# Patient Record
Sex: FEMALE | Race: WHITE | NOT HISPANIC OR LATINO | Employment: OTHER
[De-identification: names, ages, dates, MRNs, and addresses within clinical notes are randomized per-mention and may not be internally consistent; named-entity substitution may affect disease eponyms.]

---

## 2017-01-13 ENCOUNTER — SURGERY (OUTPATIENT)
Age: 77
End: 2017-01-13

## 2017-01-13 ENCOUNTER — HOSPITAL ENCOUNTER (EMERGENCY)
Facility: HOSPITAL | Age: 77
Discharge: OTHER INSTITUTION | End: 2017-01-13
Attending: EMERGENCY MEDICINE
Payer: MEDICARE

## 2017-01-13 ENCOUNTER — HOSPITAL ENCOUNTER (INPATIENT)
Facility: HOSPITAL | Age: 77
LOS: 1 days | Discharge: HOME | DRG: 247 | End: 2017-01-14
Attending: EMERGENCY MEDICINE | Admitting: HOSPITALIST
Payer: MEDICARE

## 2017-01-13 VITALS
DIASTOLIC BLOOD PRESSURE: 77 MMHG | BODY MASS INDEX: 38.39 KG/M2 | WEIGHT: 195.56 LBS | SYSTOLIC BLOOD PRESSURE: 175 MMHG | HEIGHT: 60 IN | OXYGEN SATURATION: 100 % | RESPIRATION RATE: 20 BRPM | HEART RATE: 68 BPM | TEMPERATURE: 99 F

## 2017-01-13 DIAGNOSIS — I21.4 NSTEMI (NON-ST ELEVATED MYOCARDIAL INFARCTION): Primary | ICD-10-CM

## 2017-01-13 DIAGNOSIS — J45.40 MODERATE PERSISTENT ASTHMA, UNCOMPLICATED: ICD-10-CM

## 2017-01-13 DIAGNOSIS — R07.9 CHEST PAIN: ICD-10-CM

## 2017-01-13 PROBLEM — I10 UNCONTROLLED HYPERTENSION: Status: ACTIVE | Noted: 2017-01-13

## 2017-01-13 PROBLEM — I25.110 CORONARY ARTERY DISEASE INVOLVING NATIVE CORONARY ARTERY OF NATIVE HEART WITH UNSTABLE ANGINA PECTORIS: Status: ACTIVE | Noted: 2017-01-13

## 2017-01-13 LAB
ALBUMIN SERPL BCP-MCNC: 3.3 G/DL
ALBUMIN SERPL BCP-MCNC: 3.7 G/DL
ALP SERPL-CCNC: 83 U/L
ALP SERPL-CCNC: 86 U/L
ALT SERPL W/O P-5'-P-CCNC: 10 U/L
ALT SERPL W/O P-5'-P-CCNC: 12 U/L
ANION GAP SERPL CALC-SCNC: 10 MMOL/L
ANION GAP SERPL CALC-SCNC: 6 MMOL/L
APTT BLDCRRT: 27.5 SEC
APTT BLDCRRT: 33.3 SEC
AST SERPL-CCNC: 21 U/L
AST SERPL-CCNC: 22 U/L
BASOPHILS # BLD AUTO: 0.03 K/UL
BASOPHILS # BLD AUTO: 0.1 K/UL
BASOPHILS NFR BLD: 0.4 %
BASOPHILS NFR BLD: 1 %
BILIRUB SERPL-MCNC: 0.4 MG/DL
BILIRUB SERPL-MCNC: 0.5 MG/DL
BNP SERPL-MCNC: 257 PG/ML
BUN SERPL-MCNC: 14 MG/DL
BUN SERPL-MCNC: 18 MG/DL
CALCIUM SERPL-MCNC: 8.9 MG/DL
CALCIUM SERPL-MCNC: 9.6 MG/DL
CHLORIDE SERPL-SCNC: 105 MMOL/L
CHLORIDE SERPL-SCNC: 109 MMOL/L
CO2 SERPL-SCNC: 24 MMOL/L
CO2 SERPL-SCNC: 25 MMOL/L
CREAT SERPL-MCNC: 0.7 MG/DL
CREAT SERPL-MCNC: 0.8 MG/DL
DIFFERENTIAL METHOD: ABNORMAL
DIFFERENTIAL METHOD: NORMAL
EOSINOPHIL # BLD AUTO: 0.1 K/UL
EOSINOPHIL # BLD AUTO: 0.1 K/UL
EOSINOPHIL NFR BLD: 1 %
EOSINOPHIL NFR BLD: 1.3 %
ERYTHROCYTE [DISTWIDTH] IN BLOOD BY AUTOMATED COUNT: 14.2 %
ERYTHROCYTE [DISTWIDTH] IN BLOOD BY AUTOMATED COUNT: 14.2 %
EST. GFR  (AFRICAN AMERICAN): >60 ML/MIN/1.73 M^2
EST. GFR  (AFRICAN AMERICAN): >60 ML/MIN/1.73 M^2
EST. GFR  (NON AFRICAN AMERICAN): >60 ML/MIN/1.73 M^2
EST. GFR  (NON AFRICAN AMERICAN): >60 ML/MIN/1.73 M^2
GIANT PLATELETS BLD QL SMEAR: PRESENT
GLUCOSE SERPL-MCNC: 82 MG/DL
GLUCOSE SERPL-MCNC: 89 MG/DL
HCT VFR BLD AUTO: 36.9 %
HCT VFR BLD AUTO: 38.9 %
HGB BLD-MCNC: 12.4 G/DL
HGB BLD-MCNC: 12.8 G/DL
INR PPP: 0.9
INR PPP: 1
LYMPHOCYTES # BLD AUTO: 1.6 K/UL
LYMPHOCYTES # BLD AUTO: 1.8 K/UL
LYMPHOCYTES NFR BLD: 22.6 %
LYMPHOCYTES NFR BLD: 26.9 %
MCH RBC QN AUTO: 29 PG
MCH RBC QN AUTO: 29.4 PG
MCHC RBC AUTO-ENTMCNC: 32.8 %
MCHC RBC AUTO-ENTMCNC: 33.6 %
MCV RBC AUTO: 87 FL
MCV RBC AUTO: 89 FL
MONOCYTES # BLD AUTO: 0.6 K/UL
MONOCYTES # BLD AUTO: 0.6 K/UL
MONOCYTES NFR BLD: 8.4 %
MONOCYTES NFR BLD: 8.5 %
NEUTROPHILS # BLD AUTO: 4.3 K/UL
NEUTROPHILS # BLD AUTO: 4.7 K/UL
NEUTROPHILS NFR BLD: 62.7 %
NEUTROPHILS NFR BLD: 66.6 %
PLATELET # BLD AUTO: 183 K/UL
PLATELET # BLD AUTO: 210 K/UL
PLATELET BLD QL SMEAR: NORMAL
PMV BLD AUTO: 11.3 FL
PMV BLD AUTO: 12.8 FL
POTASSIUM SERPL-SCNC: 3.8 MMOL/L
POTASSIUM SERPL-SCNC: 3.8 MMOL/L
PROT SERPL-MCNC: 6.2 G/DL
PROT SERPL-MCNC: 6.8 G/DL
PROTHROMBIN TIME: 10.3 SEC
PROTHROMBIN TIME: 9.8 SEC
RBC # BLD AUTO: 4.22 M/UL
RBC # BLD AUTO: 4.4 M/UL
SODIUM SERPL-SCNC: 139 MMOL/L
SODIUM SERPL-SCNC: 140 MMOL/L
TROPONIN I SERPL DL<=0.01 NG/ML-MCNC: 0.62 NG/ML
TROPONIN I SERPL DL<=0.01 NG/ML-MCNC: 0.71 NG/ML
WBC # BLD AUTO: 6.81 K/UL
WBC # BLD AUTO: 7.1 K/UL

## 2017-01-13 PROCEDURE — C1887 CATHETER, GUIDING: HCPCS

## 2017-01-13 PROCEDURE — 25000003 PHARM REV CODE 250: Performed by: HOSPITALIST

## 2017-01-13 PROCEDURE — 96360 HYDRATION IV INFUSION INIT: CPT

## 2017-01-13 PROCEDURE — 85610 PROTHROMBIN TIME: CPT

## 2017-01-13 PROCEDURE — 83880 ASSAY OF NATRIURETIC PEPTIDE: CPT

## 2017-01-13 PROCEDURE — 99223 1ST HOSP IP/OBS HIGH 75: CPT | Mod: ,,, | Performed by: HOSPITALIST

## 2017-01-13 PROCEDURE — 93010 ELECTROCARDIOGRAM REPORT: CPT | Mod: 59,,, | Performed by: INTERNAL MEDICINE

## 2017-01-13 PROCEDURE — 25000003 PHARM REV CODE 250: Performed by: EMERGENCY MEDICINE

## 2017-01-13 PROCEDURE — 99285 EMERGENCY DEPT VISIT HI MDM: CPT | Mod: 25,27

## 2017-01-13 PROCEDURE — 84484 ASSAY OF TROPONIN QUANT: CPT

## 2017-01-13 PROCEDURE — 96374 THER/PROPH/DIAG INJ IV PUSH: CPT

## 2017-01-13 PROCEDURE — 36415 COLL VENOUS BLD VENIPUNCTURE: CPT

## 2017-01-13 PROCEDURE — 93005 ELECTROCARDIOGRAM TRACING: CPT

## 2017-01-13 PROCEDURE — 93454 CORONARY ARTERY ANGIO S&I: CPT | Mod: 26,59,, | Performed by: INTERNAL MEDICINE

## 2017-01-13 PROCEDURE — 99285 EMERGENCY DEPT VISIT HI MDM: CPT | Mod: 25

## 2017-01-13 PROCEDURE — 85025 COMPLETE CBC W/AUTO DIFF WBC: CPT | Mod: 91

## 2017-01-13 PROCEDURE — 25000003 PHARM REV CODE 250: Performed by: INTERNAL MEDICINE

## 2017-01-13 PROCEDURE — 27200074 CATH LAB PROCEDURE

## 2017-01-13 PROCEDURE — 80053 COMPREHEN METABOLIC PANEL: CPT

## 2017-01-13 PROCEDURE — 25000003 PHARM REV CODE 250

## 2017-01-13 PROCEDURE — 85730 THROMBOPLASTIN TIME PARTIAL: CPT | Mod: 91

## 2017-01-13 PROCEDURE — 84484 ASSAY OF TROPONIN QUANT: CPT | Mod: 91

## 2017-01-13 PROCEDURE — 20600001 HC STEP DOWN PRIVATE ROOM

## 2017-01-13 PROCEDURE — 85025 COMPLETE CBC W/AUTO DIFF WBC: CPT

## 2017-01-13 PROCEDURE — 96375 TX/PRO/DX INJ NEW DRUG ADDON: CPT

## 2017-01-13 PROCEDURE — 80053 COMPREHEN METABOLIC PANEL: CPT | Mod: 91

## 2017-01-13 PROCEDURE — 92928 PRQ TCAT PLMT NTRAC ST 1 LES: CPT | Mod: RC,,, | Performed by: INTERNAL MEDICINE

## 2017-01-13 PROCEDURE — 85730 THROMBOPLASTIN TIME PARTIAL: CPT

## 2017-01-13 PROCEDURE — 85610 PROTHROMBIN TIME: CPT | Mod: 91

## 2017-01-13 PROCEDURE — 63600175 PHARM REV CODE 636 W HCPCS

## 2017-01-13 PROCEDURE — 99285 EMERGENCY DEPT VISIT HI MDM: CPT | Mod: ,,, | Performed by: EMERGENCY MEDICINE

## 2017-01-13 PROCEDURE — 027034Z DILATION OF CORONARY ARTERY, ONE ARTERY WITH DRUG-ELUTING INTRALUMINAL DEVICE, PERCUTANEOUS APPROACH: ICD-10-PCS | Performed by: INTERNAL MEDICINE

## 2017-01-13 PROCEDURE — 99284 EMERGENCY DEPT VISIT MOD MDM: CPT | Mod: ,,, | Performed by: INTERNAL MEDICINE

## 2017-01-13 PROCEDURE — B2111ZZ FLUOROSCOPY OF MULTIPLE CORONARY ARTERIES USING LOW OSMOLAR CONTRAST: ICD-10-PCS | Performed by: INTERNAL MEDICINE

## 2017-01-13 PROCEDURE — 63600175 PHARM REV CODE 636 W HCPCS: Performed by: HOSPITALIST

## 2017-01-13 RX ORDER — GLUCOSAMINE/CHONDRO SU A 500-400 MG
1 TABLET ORAL 3 TIMES DAILY
COMMUNITY
End: 2022-03-11

## 2017-01-13 RX ORDER — DIPHENHYDRAMINE HCL 50 MG
50 CAPSULE ORAL ONCE
Status: DISCONTINUED | OUTPATIENT
Start: 2017-01-13 | End: 2017-01-14 | Stop reason: HOSPADM

## 2017-01-13 RX ORDER — FERROUS SULFATE, DRIED 160(50) MG
1 TABLET, EXTENDED RELEASE ORAL DAILY
Status: DISCONTINUED | OUTPATIENT
Start: 2017-01-14 | End: 2017-01-14 | Stop reason: HOSPADM

## 2017-01-13 RX ORDER — LABETALOL HYDROCHLORIDE 5 MG/ML
10 INJECTION, SOLUTION INTRAVENOUS
Status: DISCONTINUED | OUTPATIENT
Start: 2017-01-13 | End: 2017-01-13

## 2017-01-13 RX ORDER — MONTELUKAST SODIUM 10 MG/1
10 TABLET ORAL NIGHTLY
COMMUNITY
End: 2018-01-10 | Stop reason: SDUPTHER

## 2017-01-13 RX ORDER — ATORVASTATIN CALCIUM 20 MG/1
40 TABLET, FILM COATED ORAL DAILY
Status: DISCONTINUED | OUTPATIENT
Start: 2017-01-14 | End: 2017-01-14 | Stop reason: HOSPADM

## 2017-01-13 RX ORDER — CLOPIDOGREL 300 MG/1
600 TABLET, FILM COATED ORAL
Status: COMPLETED | OUTPATIENT
Start: 2017-01-13 | End: 2017-01-13

## 2017-01-13 RX ORDER — HYDRALAZINE HYDROCHLORIDE 25 MG/1
25 TABLET, FILM COATED ORAL EVERY 6 HOURS
Status: DISCONTINUED | OUTPATIENT
Start: 2017-01-14 | End: 2017-01-13

## 2017-01-13 RX ORDER — HEPARIN SODIUM 5000 [USP'U]/ML
5000 INJECTION, SOLUTION INTRAVENOUS; SUBCUTANEOUS EVERY 8 HOURS
Status: DISCONTINUED | OUTPATIENT
Start: 2017-01-14 | End: 2017-01-14 | Stop reason: HOSPADM

## 2017-01-13 RX ORDER — LABETALOL HYDROCHLORIDE 5 MG/ML
5 INJECTION, SOLUTION INTRAVENOUS
Status: COMPLETED | OUTPATIENT
Start: 2017-01-13 | End: 2017-01-13

## 2017-01-13 RX ORDER — ASPIRIN 81 MG/1
81 TABLET ORAL DAILY
Status: DISCONTINUED | OUTPATIENT
Start: 2017-01-14 | End: 2017-01-14 | Stop reason: HOSPADM

## 2017-01-13 RX ORDER — MONTELUKAST SODIUM 10 MG/1
10 TABLET ORAL NIGHTLY
Status: DISCONTINUED | OUTPATIENT
Start: 2017-01-13 | End: 2017-01-14 | Stop reason: HOSPADM

## 2017-01-13 RX ORDER — NITROGLYCERIN 0.4 MG/1
0.4 TABLET SUBLINGUAL
Status: DISCONTINUED | OUTPATIENT
Start: 2017-01-13 | End: 2017-01-13

## 2017-01-13 RX ORDER — PANTOPRAZOLE SODIUM 40 MG/1
40 TABLET, DELAYED RELEASE ORAL DAILY
Status: DISCONTINUED | OUTPATIENT
Start: 2017-01-14 | End: 2017-01-14 | Stop reason: HOSPADM

## 2017-01-13 RX ORDER — LABETALOL HYDROCHLORIDE 5 MG/ML
10 INJECTION, SOLUTION INTRAVENOUS EVERY 4 HOURS PRN
Status: DISCONTINUED | OUTPATIENT
Start: 2017-01-13 | End: 2017-01-14 | Stop reason: HOSPADM

## 2017-01-13 RX ORDER — ALBUTEROL SULFATE 2.5 MG/.5ML
5 SOLUTION RESPIRATORY (INHALATION) EVERY 12 HOURS
Status: DISCONTINUED | OUTPATIENT
Start: 2017-01-13 | End: 2017-01-14 | Stop reason: HOSPADM

## 2017-01-13 RX ORDER — CLOPIDOGREL BISULFATE 75 MG/1
75 TABLET ORAL DAILY
Status: DISCONTINUED | OUTPATIENT
Start: 2017-01-14 | End: 2017-01-14 | Stop reason: HOSPADM

## 2017-01-13 RX ORDER — HYDRALAZINE HYDROCHLORIDE 25 MG/1
25 TABLET, FILM COATED ORAL EVERY 6 HOURS
Status: DISCONTINUED | OUTPATIENT
Start: 2017-01-13 | End: 2017-01-14 | Stop reason: HOSPADM

## 2017-01-13 RX ORDER — NITROGLYCERIN 0.4 MG/1
0.4 TABLET SUBLINGUAL
Status: COMPLETED | OUTPATIENT
Start: 2017-01-13 | End: 2017-01-13

## 2017-01-13 RX ORDER — LISINOPRIL 10 MG/1
10 TABLET ORAL DAILY
Status: DISCONTINUED | OUTPATIENT
Start: 2017-01-14 | End: 2017-01-14 | Stop reason: HOSPADM

## 2017-01-13 RX ORDER — SODIUM CHLORIDE 9 MG/ML
3 INJECTION, SOLUTION INTRAVENOUS CONTINUOUS
Status: ACTIVE | OUTPATIENT
Start: 2017-01-13 | End: 2017-01-13

## 2017-01-13 RX ORDER — NAPROXEN SODIUM 220 MG/1
162 TABLET, FILM COATED ORAL
Status: COMPLETED | OUTPATIENT
Start: 2017-01-13 | End: 2017-01-13

## 2017-01-13 RX ORDER — SODIUM CHLORIDE 9 MG/ML
1.5 INJECTION, SOLUTION INTRAVENOUS CONTINUOUS
Status: DISCONTINUED | OUTPATIENT
Start: 2017-01-13 | End: 2017-01-14

## 2017-01-13 RX ORDER — MORPHINE SULFATE 2 MG/ML
1 INJECTION, SOLUTION INTRAMUSCULAR; INTRAVENOUS EVERY 4 HOURS PRN
Status: DISCONTINUED | OUTPATIENT
Start: 2017-01-13 | End: 2017-01-14 | Stop reason: HOSPADM

## 2017-01-13 RX ORDER — CARVEDILOL 3.12 MG/1
3.12 TABLET ORAL 2 TIMES DAILY
Status: DISCONTINUED | OUTPATIENT
Start: 2017-01-13 | End: 2017-01-14 | Stop reason: HOSPADM

## 2017-01-13 RX ORDER — NAPROXEN SODIUM 220 MG/1
243 TABLET, FILM COATED ORAL
Status: DISCONTINUED | OUTPATIENT
Start: 2017-01-13 | End: 2017-01-13

## 2017-01-13 RX ORDER — DICLOFENAC SODIUM AND MISOPROSTOL 50; 200 MG/1; UG/1
1 TABLET, DELAYED RELEASE ORAL 3 TIMES DAILY
COMMUNITY
End: 2017-03-15

## 2017-01-13 RX ORDER — NITROGLYCERIN 0.4 MG/1
0.4 TABLET SUBLINGUAL EVERY 5 MIN PRN
Status: DISCONTINUED | OUTPATIENT
Start: 2017-01-13 | End: 2017-01-14 | Stop reason: HOSPADM

## 2017-01-13 RX ORDER — HEPARIN SODIUM,PORCINE/D5W 25000/250
12 INTRAVENOUS SOLUTION INTRAVENOUS CONTINUOUS
Status: DISCONTINUED | OUTPATIENT
Start: 2017-01-13 | End: 2017-01-13 | Stop reason: HOSPADM

## 2017-01-13 RX ORDER — LEVOTHYROXINE SODIUM 100 UG/1
100 TABLET ORAL
Status: DISCONTINUED | OUTPATIENT
Start: 2017-01-14 | End: 2017-01-14 | Stop reason: HOSPADM

## 2017-01-13 RX ADMIN — SODIUM CHLORIDE 1.5 ML/KG/HR: 0.9 INJECTION, SOLUTION INTRAVENOUS at 08:01

## 2017-01-13 RX ADMIN — LABETALOL HYDROCHLORIDE 5 MG: 5 INJECTION, SOLUTION INTRAVENOUS at 10:01

## 2017-01-13 RX ADMIN — HEPARIN SODIUM AND DEXTROSE 12 UNITS/KG/HR: 10000; 5 INJECTION INTRAVENOUS at 11:01

## 2017-01-13 RX ADMIN — CARVEDILOL 3.12 MG: 3.12 TABLET, FILM COATED ORAL at 08:01

## 2017-01-13 RX ADMIN — HYDRALAZINE HYDROCHLORIDE 25 MG: 25 TABLET, FILM COATED ORAL at 11:01

## 2017-01-13 RX ADMIN — NITROGLYCERIN 0.5 INCH: 20 OINTMENT TOPICAL at 10:01

## 2017-01-13 RX ADMIN — MORPHINE SULFATE 1 MG: 2 INJECTION, SOLUTION INTRAMUSCULAR; INTRAVENOUS at 09:01

## 2017-01-13 RX ADMIN — NITROGLYCERIN 0.4 MG: 0.4 TABLET SUBLINGUAL at 04:01

## 2017-01-13 RX ADMIN — CLOPIDOGREL BISULFATE 600 MG: 300 TABLET, FILM COATED ORAL at 03:01

## 2017-01-13 RX ADMIN — ASPIRIN 81 MG CHEWABLE TABLET 162 MG: 81 TABLET CHEWABLE at 08:01

## 2017-01-13 RX ADMIN — HYDRALAZINE HYDROCHLORIDE 25 MG: 25 TABLET, FILM COATED ORAL at 08:01

## 2017-01-13 RX ADMIN — MONTELUKAST SODIUM 10 MG: 10 TABLET ORAL at 09:01

## 2017-01-13 RX ADMIN — SODIUM CHLORIDE 3 ML/KG/HR: 0.9 INJECTION, SOLUTION INTRAVENOUS at 05:01

## 2017-01-13 NOTE — ED NOTES
Pt here for eval of left side chest pain that radiates to jaw  and shoulder that has been intermittent x3 days. Denies SOB. Denies n/v/. c/o mild dizziness upon changing positions. resp even and unlabored. Breath sounds clear bilat. abd soft non tender and non distended. No edema noted. Pt aaox3. Placed on cardiac monitor, nibp and continuous pulse ox. Pt placed on 2 L o2 nc. Denies discomfort at this time. Appears comfortable. Family at bedside.

## 2017-01-13 NOTE — IP AVS SNAPSHOT
[facility] [address]  [address]  [facility] [address]  Phone: [phone]           Patient Discharge Instructions     Our goal is to set you up for success. This packet includes information on your condition, medications, and your home care. It will help you to care for yourself so you don't get sicker and need to go back to the hospital.     Please ask your nurse if you have any questions.        There are many details to remember when preparing to leave the hospital. Here is what you will need to do:    1. Take your medicine. If you are prescribed medications, review your Medication List in the following pages. You may have new medications to  at the pharmacy and others that you'll need to stop taking. Review the instructions for how and when to take your medications. Talk with your doctor or nurses if you are unsure of what to do.     2. Go to your follow-up appointments. Specific follow-up information is listed in the following pages. Your may be contacted by a transition nurse or clinical provider about future appointments. Be sure we have all of the phone numbers to reach you, if needed. Please contact your provider's office if you are unable to make an appointment.     3. Watch for warning signs. Your doctor or nurse will give you detailed warning signs to watch for and when to call for assistance. These instructions may also include educational information about your condition. If you experience any of warning signs to your health, call your doctor.               [facility]  Unless otherwise directed by your provider, please contact [facility], our nurse care line that is available for 24/7 assistance.     [phone] (toll-free)    Registered nurses in the [facility] provide clinical advisement, health education, appointment booking, and other advisory services.                    ** Verify the list of medication(s) below is accurate and up  to date. Carry this with you in case of emergency. If your medications have changed, please notify your healthcare provider.             Medication List      START taking these medications        Additional Info    Begin Date AM Noon PM Bedtime    atorvastatin 40 MG tablet   Commonly known as:  LIPITOR   Quantity:  30 tablet   Refills:  2   Dose:  40 mg    Last time this was given:  40 mg on 1/14/2017  8:17 AM   Instructions:  Take 1 tablet (40 mg total) by mouth once daily.                               carvedilol 3.125 MG tablet   Commonly known as:  COREG   Quantity:  60 tablet   Refills:  2   Dose:  3.125 mg    Last time this was given:  3.125 mg on 1/14/2017  8:17 AM   Instructions:  Take 1 tablet (3.125 mg total) by mouth 2 (two) times daily.                                  clopidogrel 75 mg tablet   Commonly known as:  PLAVIX   Quantity:  30 tablet   Refills:  11   Dose:  75 mg    Last time this was given:  75 mg on 1/14/2017  8:17 AM   Instructions:  Take 1 tablet (75 mg total) by mouth once daily.                               lisinopril 10 MG tablet   Quantity:  30 tablet   Refills:  2   Dose:  10 mg    Last time this was given:  10 mg on 1/14/2017  8:17 AM   Instructions:  Take 1 tablet (10 mg total) by mouth once daily.                               nitroGLYCERIN 0.4 MG SL tablet   Commonly known as:  NITROSTAT   Quantity:  30 tablet   Refills:  0   Dose:  0.4 mg    Last time this was given:  0.4 mg on 1/13/2017  4:15 PM   Instructions:  Place 1 tablet (0.4 mg total) under the tongue every 5 (five) minutes as needed for Chest pain.     Take as needed for Chest Pain                         CONTINUE taking these medications        Additional Info    Begin Date AM Noon PM Bedtime    albuterol 5 mg/mL nebulizer solution   Commonly known as:  PROVENTIL   Refills:  0    Last time this was given:  5 mg on 1/14/2017  8:35 AM   Instructions:  Inhale into the lungs every 6 (six) hours as needed for Wheezing.      As needed for Wheezing                       aspirin 81 MG EC tablet   Commonly known as:  ECOTRIN   Refills:  0   Dose:  81 mg    Last time this was given:  81 mg on 1/14/2017  8:17 AM   Instructions:  Take 81 mg by mouth once daily.                               CALCIUM 500 + D ORAL   Refills:  0   Dose:  2 tablet    Last time this was given:  1 tablet on 1/14/2017  8:17 AM   Instructions:  Take 2 tablets by mouth once daily.                               diazePAM 10 MG Tab   Commonly known as:  VALIUM   Refills:  0   Dose:  10 mg    Instructions:  Take 10 mg by mouth daily as needed.     As needed for Anxiety/Stress                       diclofenac-misoprostol  mg-mcg  mg-mcg per tablet   Commonly known as:  ARTHROTEC 50   Refills:  0   Dose:  1 tablet    Instructions:  Take 1 tablet by mouth 3 (three) times daily.                                     glucosamine-chondroitin 500-400 mg tablet   Refills:  0   Dose:  1 tablet    Instructions:  Take 1 tablet by mouth 3 (three) times daily.                                     hydrOXYzine HCl 10 MG Tab   Commonly known as:  ATARAX   Refills:  0   Dose:  10 mg    Instructions:  Take 10 mg by mouth 3 (three) times daily as needed.                                     levothyroxine 100 MCG tablet   Commonly known as:  SYNTHROID   Refills:  0   Dose:  100 mcg    Last time this was given:  100 mcg on 1/14/2017  5:52 AM   Instructions:  Take 100 mcg by mouth before breakfast.            Take 2-3 hours before eating                   montelukast 10 mg tablet   Commonly known as:  SINGULAIR   Refills:  0   Dose:  10 mg    Last time this was given:  10 mg on 1/13/2017  9:00 PM   Instructions:  Take 10 mg by mouth every evening.                               omeprazole 40 MG capsule   Commonly known as:  PRILOSEC   Refills:  0   Dose:  40 mg    Instructions:  Take 40 mg by mouth once daily.                                    Where to Get Your Medications      These  "medications were sent to [id number] [place] [place] - [address]  [address] [facility] [address], [place] [address]     Phone:  [phone]     atorvastatin 40 MG tablet    carvedilol 3.125 MG tablet    clopidogrel 75 mg tablet    lisinopril 10 MG tablet    nitroGLYCERIN 0.4 MG SL tablet                  Please bring to all follow up appointments:    1. A copy of your discharge instructions.  2. All medicines you are currently taking in their original bottles.  3. Identification and insurance card.    Please arrive 15 minutes ahead of scheduled appointment time.    Please call 24 hours in advance if you must reschedule your appointment and/or time.        Your Scheduled Appointments     Feb 14, 2017 10:20 AM CST   Established Patient Visit with Yogesh Knight MD   [facility] ([facility])    [address] [place] [address], [address]  [facility] [address] [phone]              Follow-up Information     Follow up with Vinny Hernandes Iv, MD In 1 week.    Specialty:  Family Medicine    Contact information:    [address]   [address] [address]  [place] [address]  [phone]        Referrals     Future Orders    Ambulatory referral to Cardiology           Primary Diagnosis     Your primary diagnosis was:  Heart Attack      Admission Information     Date & Time Provider Department CSN    1/13/2017 12:35 PM Yogesh Bowen MD [facility] [id number]      Care Providers     Provider Role Specialty Primary office phone    Yogesh Bowen MD Attending Provider Hospitalist [phone]    Yogesh Bowen MD Team Attending  Hospitalist [phone]    Lisa Cazares MD Team Attending  Hospitalist [phone]      Your Vitals Were     BP Pulse Temp Resp Height Weight    137/80 (BP Location: Left arm, Patient Position: Lying, BP Method: Automatic) 72 98.2 °F (36.8 °C) (Oral) 16 5' 3" (1.6 m) 88.9 kg (196 lb)    SpO2 BMI             98% 34.72 kg/m2         Recent Lab Values        1/14/2017             "               5:29 AM           A1C 5.3           Comment for A1C at  5:29 AM on 1/14/2017:  According to ADA guidelines, hemoglobin A1C <7.0% represents  optimal control in non-pregnant diabetic patients.  Different  metrics may apply to specific populations.   Standards of Medical Care in Diabetes - 2016.  For the purpose of screening for the presence of diabetes:  <5.7%     Consistent with the absence of diabetes  5.7-6.4%  Consistent with increasing risk for diabetes   (prediabetes)  >or=6.5%  Consistent with diabetes  Currently no consensus exists for use of hemoglobin A1C  for diagnosis of diabetes for children.        Allergies as of 1/14/2017        Reactions    Codeine Nausea Only    Mobic [Meloxicam]     Sulfur       Advance Directives     An advance directive is a document which, in the event you are no longer able to make decisions for yourself, tells your healthcare team what kind of treatment you do or do not want to receive, or who you would like to make those decisions for you.  If you do not currently have an advance directive, [facility] encourages you to create one.  For more information call:  [phone] ([phone]), [phone] ([phone]),  or log on to [email or web address].        Smoking Cessation     If you would like to quit smoking:   You may be eligible for free services if you are a [place] resident and started smoking cigarettes before September 1, 1988.  Call the Smoking Cessation Trust (SCT) toll free at (674) 934-3418 or (815) 779-2676.   Call 1-800-QUIT-NOW if you do not meet the above criteria.            Language Assistance Services     ATTENTION: Language assistance services are available, free of charge. Please call [phone].      ATENCIÓN: Si habla español, tiene a prater disposición servicios gratuitos de asistencia lingüística. Llame al [phone].     CHÚ Ý: N?u b?n nói Ti?ng Vi?t, có các d?ch v? h? tr? ngôn ng? mi?n phí dành cho b?n. G?i s?  [phone].        [facility] Sign-Up     Activating your [facility] account is as easy as 1-2-3!     1) Visit [email or web address], select Sign Up Now, enter this activation code and your date of birth, then select Next.  [email or web address]  Expires: 2/28/2017 12:38 PM      2) Create a username and password to use when you visit [facility] in the future and select a security question in case you lose your password and select Next.    3) Enter your e-mail address and click Sign Up!    Additional Information  If you have questions, please e-mail [email or web address] or call [phone] to talk to our [facility] staff. Remember, [facility] is NOT to be used for urgent needs. For medical emergencies, dial 911.          [facility] complies with applicable Federal civil rights laws and does not discriminate on the basis of race, color, national origin, age, disability, or sex.

## 2017-01-13 NOTE — ED TRIAGE NOTES
Pt transfer from [facility] for CP.  Pt currently not experiencing CP or have any other symptoms.

## 2017-01-13 NOTE — PROGRESS NOTES
"Interventional Cardiology    Findings:  1. PAD  2. Mid LAD with 50% stenosis, YONG 3 flow  3. RCA 90% proximal stenosis (Right dominant system)    Interventions:  1. PCI with ARSLAN to mid RCA    Closure: Manual pressure    S: Pt resting comfortably. Denies chest pain, palpitations, lightheadedness, dizziness, dyspnea, numbness or weakness. Denies pain, bleeding or discharge from the cath site.    O:  Visit Vitals    BP (!) 172/81    Pulse 82    Temp 98.4 °F (36.9 °C) (Oral)    Resp (!) 21    Ht 5' 3" (1.6 m)    Wt 88.9 kg (196 lb)    SpO2 98%    BMI 34.72 kg/m2     Gen: NAD, resting comfortably  Extr: Right CFA cath site: no bleeding or discharge, no hematoma or mass  Pulse: 2+  Neuro: A+Ox3, 5/5 strength, grossly normal sensation    A/P: 76 y.o.female s/p RCA PCI.  1. Stable as above, cont to monitor  2. Flat in bed for 6 hours  3. Loaded with plavix. Cantinue dual antiplatelet therapy for 1 year (ASA/Plavix)  4. High intensity statin therapy  5. 2D echo to assess LV function this admission  6. Blood pressure control with BB and ACEI or ARB  7. Cardiac rehab consult  8. Follow up with Dr. Eric Tillman at [place]  9. Please refer to full report on EPIC      Pa Jimenes MD  Interventional Cardiology  Structural/Valvular heart disease  Fellow PGY-8  [phone]      "

## 2017-01-13 NOTE — H&P
"HISTORY AND PHYSICAL  Interventional Cardiology     CC: Chest Pain    HPI:  Heather Hughes is a 76 y.o. female with PMH of HTN, hypothyroidism, GERD, obesity, who presents with chest pain.  Chest pain started on Sunday while shopping at [employer].  Pain localized to left chest area, along with jaw pain radiating down her left arm.  Symptoms continued throughout the week and became significantly worse last night up to 10/10 in intensity.  Blood pressure noted to be 224/102 on presentation to ED.  EKG shows normal sinus rhythm with  inferior T wave inversions.  Troponin elevated at 0.62.  Presentation consistent with NSTEMI.  Currently chest pain free and hemodynamically stable.  Of note, pt reports history of "bleeding ulcers" in 1999, with no episodes since that time.  Takes ASA 81 mg daily with no bleeding issues.       Review of Systems:  Constitution: Denies chills, fever, and sweats.  HENT: Denies headaches or blurry vision.  Cardiovascular: Positive for chest and jaw pain.  Respiratory: Denies cough or shortness of breath.  Gastrointestinal: Denies abdominal pain, nausea, or vomiting.  Musculoskeletal: Denies muscle cramps.  Neurological: Denies dizziness or focal weakness.  Psychiatric/Behavioral: Normal mental status.  Hematologic/Lymphatic: Denies bleeding problem or easy bruising/bleeding.  Skin: Denies rash or suspicious lesions    History reviewed. No pertinent past medical history.  There are no active problems to display for this patient.    Social History   Substance Use Topics    Smoking status: Current Every Day Smoker    Smokeless tobacco: Never Used    Alcohol use No     History reviewed. No pertinent family history.  No current facility-administered medications on file prior to encounter.      Current Outpatient Prescriptions on File Prior to Encounter   Medication Sig Dispense Refill    albuterol (PROVENTIL) 5 mg/mL nebulizer solution Inhale into the lungs every 6 (six) hours as needed " for Wheezing.      aspirin (ECOTRIN) 81 MG EC tablet Take 81 mg by mouth once daily.      diazePAM (VALIUM) 10 MG Tab Take 10 mg by mouth daily as needed.      hydrOXYzine HCl (ATARAX) 10 MG Tab Take 10 mg by mouth 3 (three) times daily as needed.      levothyroxine (SYNTHROID) 100 MCG tablet Take 100 mcg by mouth before breakfast.      omeprazole (PRILOSEC) 40 MG capsule Take 40 mg by mouth once daily.      [DISCONTINUED] doxycycline (VIBRAMYCIN) 100 MG Cap Take 100 mg by mouth every 12 (twelve) hours.       Review of patient's allergies indicates:   Allergen Reactions    Codeine Nausea Only    Mobic [meloxicam]     Sulfur         Vital Signs (Most Recent)  Temp: 98.6 °F (37 °C) (01/13/17 0624)  Pulse: 68 (01/13/17 1100)  Resp: 20 (01/13/17 0624)  BP: (!) 175/77 (01/13/17 1048)  SpO2: 100 % (01/13/17 1100)    Vital Signs Range (Last 24H):  Temp:  [98.6 °F (37 °C)]   Pulse:  [65-83]   Resp:  [20]   BP: (158-224)/()   SpO2:  [96 %-100 %]     Physical Exam:  Constitutional: Frail appearing elderly lade, no acute distress, conversant  HEENT: Sclera anicteric, Pupils equal, round and reactive to light, extraocular motions intact, Oropharynx clear  Neck: No JVD, no carotid bruits  Cardiovascular: Regular rate and rhythm, no murmur, rubs or gallops, normal S1/S2  Pulmonary: Clear to auscultation bilaterally  Abdominal: Abdomen soft, nontender, nondistended, positive bowel sounds  Extremities: No lower extremity edema,   Pulses:  Carotid pulses are 2+ on the right side, and 2+ on the left side.  Radial pulses are 2+ on the right side, and 2+ on the left side.   Femoral pulses are 2+ on the right side, and 2+ on the left side.  Skin: No ecchymosis, erythema, or ulcers  Psych: Alert and oriented x 3, appropriate affect  Neuro: CNII-XII intact, no focal deficits        Laboratory:  Chemistry:  Lab Results   Component Value Date     01/13/2017    K 3.8 01/13/2017     01/13/2017    CO2 24 01/13/2017     BUN 18 01/13/2017    CREATININE 0.8 01/13/2017    CALCIUM 9.6 01/13/2017     (H) 01/13/2017     Cardiac Markers:  Lab Results   Component Value Date    TROPONINI 0.623 (H) 01/13/2017     Cardiac Markers (Last 3):  Lab Results   Component Value Date    TROPONINI 0.623 (H) 01/13/2017     CBC:   Lab Results   Component Value Date    WBC 7.10 01/13/2017    HGB 12.8 01/13/2017    HCT 38.9 01/13/2017    MCV 89 01/13/2017     01/13/2017     Lipids:No results found for: CHOL, TRIG, HDL, LDLDIRECT  Coagulation:   Lab Results   Component Value Date    INR 0.9 01/13/2017    APTT 27.5 01/13/2017       Diagnostic Results:  Labs: Reviewed  ECG: Reviewed  X-Ray: Reviewed      ASSESSMENT:  Heather Hughes is a 76 y.o. female with PMH of HTN, hypothyroidism, GERD, obesity, who presents with a NSTEMI.  Currently chest pain free and hemodynamically stable.     PLAN:  - Transfer to [facility] with possible intervention today  - Improve blood pressure control  - Start heparin drip  - Plan discussed in detail with pt and family.      The risks, benefits, and alternatives of coronary vascular angiography and possible intervention were discussed with pt. All questions were answered.  I had a detailed discussion with the patient regarding risk of stroke, MI, bleeding access site complications, renal failure, emergent need for heart surgery, acute limb complications including ischemia and loss, contrast allergy and death. Pt understands the risks and benefits of the procedure and wishes to proceed. If stents are needed and there is preference for ARSLAN, pt understands that would necessitate aspirin for life with plavix for at least 1 year. Additionally, pt is aware that non compliance is likely to result in stent clotting with heart attack, heart failure, and/or death.       Eric Tillman MD, PhD  Interventional Cardiology

## 2017-01-13 NOTE — CONSULTS
Cardiology Consult Note:    75 yo F with HTN (no meds), hypothyroidism, GERD/PUD with single episode of UGIB in 1999), obesity who presents with chest pain. Chest pain started on Sunday or Monday while shopping and carrying in groceries. She had intermittent exertional CP throughout the week that resolved with rest. CP described as substernal/left-sided pressure, radiating to jaw and left arm, associated with SOB. Last night she was awaken with severe CP, took one of husbands SL NTG with resolution. CP recurred when walking to bathroom. This prompted her to present to [facility] where she was diagnosed with NSTEMI. She was given ASA and Labetalol. She was transferred to [facility] for further management, accepted by interventional cardiology staff Dr. Potter.    AF. HTNive to 224/118 on presentation -> 148/82 with Labetalol, otherwise VSS. Exam unremarkable, 2+ and symmetric peripheral pulses throughout.  Labs notable for nl CBC, nl CMP, Tn 0.623, .  EKG: NSR, inferior TWIs  CXR: No acute radiographic findings in the chest on the single view.    Assessment:  NSTEMI: currently CP-free and HD stable  HTN: uncontrolled    Recs:  Admit to internal medicine for ACS protocol  Plavix 600 mg now followed by 75 mg daily  Pt already loaded with ASA, cont ASA 81 mg daily  High-intensity statin (Lipitor 80 mg daily)  B-blocker - start Lopressor 50 mg BID, titrate to goal HR 55-65  ACE-I - start Lisinopril 10 mg daily, titrate for goal BP <140/90  Heparin gtt  NTG prn  Trend Tn with EKGs q6  2D echo with CFD  Interventional cardiology consult for [facility] as early as today

## 2017-01-13 NOTE — ED PROVIDER NOTES
Encounter Date: 1/13/2017    SCRIBE #1 NOTE: I, Stephanie Adamson, am scribing for, and in the presence of,  Dr. Sherman . I have scribed the entire note.       History     Chief Complaint   Patient presents with    Chest Pain     Transfer from [facility]. EMS reports elevated Troponin. Heparin infusing at 12 U/kg/hr. Pt denies CP at present.      Review of patient's allergies indicates:   Allergen Reactions    Codeine Nausea Only    Mobic [meloxicam]     Sulfur      HPI Comments: Time seen by provider: 1:02 PM    This is a 76 y.o. female with history of HTN who presents as transfer from [facility] with acute intermittent chest pain that has progressively worsened for 5 days. Pt states the pain radiates to her jaw and left arm and is 10/10. She notes the pain is exacerbated by movement but has been relieved slightly when given nitroglycerin. Pt denies nausea and vomiting. She states she has not had this before.     The history is provided by the patient and a relative.     No past medical history on file.  No past medical history pertinent negatives.  No past surgical history on file.  No family history on file.  Social History   Substance Use Topics    Smoking status: Current Every Day Smoker    Smokeless tobacco: Never Used    Alcohol use No     Review of Systems   Constitutional: Negative for fever.   HENT:        Positive for jaw pain.   Eyes: Negative for visual disturbance.   Respiratory: Negative for shortness of breath.    Cardiovascular: Positive for chest pain.   Gastrointestinal: Negative for nausea and vomiting.   Genitourinary: Negative for dysuria.   Musculoskeletal: Negative for back pain.   Skin: Negative for rash.   Neurological: Negative for syncope.       Physical Exam   Initial Vitals   BP Pulse Resp Temp SpO2   01/13/17 1235 01/13/17 1235 01/13/17 1235 01/13/17 1235 01/13/17 1235   224/118 78 16 98.4 °F (36.9 °C) 98 %     Physical Exam    Nursing note and vitals reviewed.  Constitutional:  She appears well-developed and well-nourished. She is not diaphoretic. No distress.   HENT:   Head: Normocephalic and atraumatic.   Mouth/Throat: Oropharynx is clear and moist.   Neck: Normal range of motion. Neck supple. No JVD present.   Cardiovascular: Normal rate, regular rhythm, normal heart sounds and intact distal pulses.   Pulmonary/Chest: Breath sounds normal. No respiratory distress. She has no wheezes. She has no rhonchi. She has no rales.   Abdominal: Soft. She exhibits no distension. There is no tenderness.   Musculoskeletal: Normal range of motion. She exhibits no edema.   Lymphadenopathy:     She has no cervical adenopathy.   Neurological: She is alert and oriented to person, place, and time. She has normal strength. No cranial nerve deficit or sensory deficit.   Skin: Skin is warm and dry.         ED Course   Procedures  Labs Reviewed   COMPREHENSIVE METABOLIC PANEL   CBC W/ AUTO DIFFERENTIAL   TROPONIN I     EKG Readings: (Independently Interpreted)   Normal sinus rhythm at 76 with T-wave inversions in V3 and AVF. No ST elevations.        X-Rays:   Independently Interpreted Readings:   Chest X-Ray: No acute pulmonary disease.      Medical Decision Making:   History:   Old Medical Records: I decided to obtain old medical records.  Old Records Summarized: records from another hospital.  Initial Assessment:   Emergent evaluation of 76 y.o. female transferred from [facility] for emergent cardiology consult. Pt was found to have an NSTEMI and was treated with a heparin drip and transferred for further care. Cardiology contacted and case discussed. They will evaluate the pt. Labs, chest x-ray, and EKG reviewed from outside facility.     1:13 PM  Spoke to cardiology.     3:23 PM   Cardiology evaluated the pt. They deemed the pt stable for [place] [facility] 600 mg of plavix given. Hospital medicine contacted and case discussed. Will admit for further treatment and evaluation.   Clinical Tests:    Lab Tests: Reviewed  Radiological Study: Reviewed  Medical Tests: Reviewed  ED Management:  Plavix  Emergent Cardiology consult  Other:   I have discussed this case with another health care provider.       <> Summary of the Discussion: Cardiology.  Internal medicine.             Scribe Attestation:   Scribe #1: I performed the above scribed service and the documentation accurately describes the services I performed. I attest to the accuracy of the note.    Attending Attestation:           Physician Attestation for Scribe:  Physician Attestation Statement for Scribe #1: I, Dr. Sherman , reviewed documentation, as scribed by Stephanie Adamson in my presence, and it is both accurate and complete.                 ED Course     Clinical Impression:   The encounter diagnosis was NSTEMI (non-ST elevated myocardial infarction).    Disposition:   Disposition: Admitted  Condition: Annemarie Sherman MD  01/13/17 3798

## 2017-01-13 NOTE — ED NOTES
Two patient identifiers have been checked and are correct.     Appearance: Pt awake, alert & oriented to person, place & time. Pt in no acute distress at present time. Pt is clean and well groomed with clothes appropriately fastened.    Skin: Skin warm, dry & intact. Color consistent with ethnicity. Mucous membranes moist. No breakdown noted.  Musculoskeletal: Patient moving all extremities well, no obvious swelling or deformities noted.    Respiratory: Respirations spontaneous, even, and non-labored. Visible chest rise noted. Airway is open and patent. No accessory muscle use noted.    Neurologic: Sensation is intact. Speech is clear and appropriate. Eyes open spontaneously, behavior appropriate to situation, follows commands, facial expression symmetrical, bilateral hand grasp equal and even, purposeful motor response noted.  Cardiac: All peripheral pulses present. No Bilateral lower extremity edema. Cap refill is <3 seconds.  Abdomen: Abdomen soft, non-tender to palpation.    : Pt reports no dysuria or hematuria.

## 2017-01-13 NOTE — ED PROVIDER NOTES
Chief complaint:  Chest Pain (midsternal chest pain onset wednesday intermittently until now, worsening today, relieved by sublingual nitroglycerin last night)      HPI:  Heather Hughes is a 76 y.o. female with hx htn presenting with intermittent chest pain.  Patient has had intermittent left-sided chest pain with occasional radiation to the left arm for the past 3 days becoming more constant last evening marked by pressure in the jaw as well as the left arm and left chest.  Symptoms occur at rest and are not notably linked to exertion.  They do improve and ultimately resolve this morning with multiple doses of nitroglycerin, but would return once again to be improved with nitroglycerin again.  Patient has no known history of CAD.  She denies associated symptoms of diaphoresis, vomiting, dyspnea.  Patient is pain-free at present with pain mild to moderate at peak intensity.    ROS: As per HPI and below:  No headache, neck pain, syncope, dyspnea, hemoptysis, cough, rhinorrhea, fever, dysuria, abdominal pain, rashes, new peripheral swelling, visual changes. The patient/family denies dysphagia,  joint swelling, easy bruising.    Review of patient's allergies indicates:   Allergen Reactions    Codeine Nausea Only    Mobic [meloxicam]     Sulfur        Patient's Medications   New Prescriptions    No medications on file   Previous Medications    ALBUTEROL (PROVENTIL) 5 MG/ML NEBULIZER SOLUTION    Inhale into the lungs every 6 (six) hours as needed for Wheezing.    ASPIRIN (ECOTRIN) 81 MG EC TABLET    Take 81 mg by mouth once daily.    CALCIUM CARBONATE/VITAMIN D3 (CALCIUM 500 + D ORAL)    Take 2 tablets by mouth once daily.    DIAZEPAM (VALIUM) 10 MG TAB    Take 10 mg by mouth daily as needed.    DICLOFENAC-MISOPROSTOL  MG-MCG (ARTHROTEC 50)  MG-MCG PER TABLET    Take 1 tablet by mouth 3 (three) times daily.    GLUCOSAMINE-CHONDROITIN 500-400 MG TABLET    Take 1 tablet by mouth 3 (three) times daily.     HYDROXYZINE HCL (ATARAX) 10 MG TAB    Take 10 mg by mouth 3 (three) times daily as needed.    LEVOTHYROXINE (SYNTHROID) 100 MCG TABLET    Take 100 mcg by mouth before breakfast.    MONTELUKAST (SINGULAIR) 10 MG TABLET    Take 10 mg by mouth every evening.    OMEPRAZOLE (PRILOSEC) 40 MG CAPSULE    Take 40 mg by mouth once daily.   Modified Medications    No medications on file   Discontinued Medications    DOXYCYCLINE (VIBRAMYCIN) 100 MG CAP    Take 100 mg by mouth every 12 (twelve) hours.       PMH:  As per HPI and below:  History reviewed. No pertinent past medical history.  History reviewed. No pertinent past surgical history.    Social History     Social History    Marital status:      Spouse name: N/A    Number of children: N/A    Years of education: N/A     Social History Main Topics    Smoking status: Current Every Day Smoker    Smokeless tobacco: Never Used    Alcohol use No    Drug use: No    Sexual activity: No     Other Topics Concern    None     Social History Narrative       History reviewed. No pertinent family history.    Physical Exam:    Vitals:    01/13/17 0624   BP: (!) 224/102   Pulse: 81   Resp: 20   Temp: 98.6 °F (37 °C)     GENERAL:  No apparent distress.  Alert.    HEENT:  Moist mucous membranes.  Normocephalic and atraumatic.    NECK:  No swelling.  Midline trachea.   CARDIOVASCULAR:  Regular rate and rhythm.  2+ radial pulses.  No murmurs auscultated.  PULMONARY:  Lungs clear to auscultation bilaterally.  No wheezes, rales, or rhonci.  Unlabored respirations.  ABDOMEN:  Non-tender and non-distended.    EXTREMITIES:  Warm and well perfused.  Brisk capillary refill.  Minimal peripheral edema.  Legs symmetric and nontender to palpation.  NEUROLOGICAL:  Normal mental status.  Appropriate and conversant.  5/5 strength and sensation.    SKIN:  No rashes or ecchymoses.    BACK:  Atraumatic.  No CVA tenderness to palpation.      Labs Reviewed   CBC W/ AUTO DIFFERENTIAL    COMPREHENSIVE METABOLIC PANEL   TROPONIN I   B-TYPE NATRIURETIC PEPTIDE       Current Discharge Medication List      CONTINUE these medications which have NOT CHANGED    Details   albuterol (PROVENTIL) 5 mg/mL nebulizer solution Inhale into the lungs every 6 (six) hours as needed for Wheezing.      aspirin (ECOTRIN) 81 MG EC tablet Take 81 mg by mouth once daily.      CALCIUM CARBONATE/VITAMIN D3 (CALCIUM 500 + D ORAL) Take 2 tablets by mouth once daily.      diazePAM (VALIUM) 10 MG Tab Take 10 mg by mouth daily as needed.      diclofenac-misoprostol  mg-mcg (ARTHROTEC 50)  mg-mcg per tablet Take 1 tablet by mouth 3 (three) times daily.      glucosamine-chondroitin 500-400 mg tablet Take 1 tablet by mouth 3 (three) times daily.      hydrOXYzine HCl (ATARAX) 10 MG Tab Take 10 mg by mouth 3 (three) times daily as needed.      levothyroxine (SYNTHROID) 100 MCG tablet Take 100 mcg by mouth before breakfast.      montelukast (SINGULAIR) 10 mg tablet Take 10 mg by mouth every evening.      omeprazole (PRILOSEC) 40 MG capsule Take 40 mg by mouth once daily.             Orders Placed This Encounter   Procedures    X-Ray Chest AP Portable    CBC auto differential    Comprehensive metabolic panel    Troponin I    Brain natriuretic peptide    Cardiac Monitoring - Adult    EKG 12-lead (Chest Pain) Age >30    Saline lock IV       Imaging Results         X-Ray Chest AP Portable (In process)           ED Course   Comment By Time   EKG:  NSR, rate of 76,  normal intervals.  T-wave inversions in III and aVF.  No significant ST elevation or depression.  No prior EKG for comparison.   Gerard Singh MD 01/13 0700         MDM:    76 y.o. female with chest pain concerning for potential ACS.  Initial EKG shows nonspecific changes possibly consistent with ischemia without prior for comparison.  Further cardiac biomarker was sent for risk stratification.  Patient is currently pain-free.  Low suspicion for  other emergent intrathoracic processes as aortic dissection or PE based on history and evaluation.  I do not think further d-dimer or CT angiography is indicated.  Patient is already on aspirin.    Patient has inferior injury pattern with positive troponin suggestive of NSTEMI.  I did discuss with Dr. Tillman from cardiology who assessed the patient in the emergency department.  We did initially discussed transfer to [facility], although Dr. Tillman did ultimately change this requested disposition to [facility] for interventional cardiology there.  Patient was transferred ED ED for likely catheterization with any coagulation to be done at arrival receiving facility per discussion with Dr. Tillman.  Patient did receive labetalol and further nitroglycerin here at his direction.    Diagnoses:    1. Chest pain  2. NSTEMI     Gerard Singh MD  01/13/17 6447

## 2017-01-13 NOTE — ED NOTES
Pt up to RR in wheelchair. Reports increase in jaw pain and chest discomfort upon exertion. resp labored. Skin warm and dry. Pt aaox3. C/o mild dizziness.

## 2017-01-13 NOTE — ED NOTES
Returned from bathroom per wheelchair. Placed on cardiac and vitals monitor. Complains of jaw pain on return from bathroom

## 2017-01-13 NOTE — IP AVS SNAPSHOT
[address]  [place] [address]  Phone: [phone]           I have received a copy of my After Visit Summary and discharge instructions from [facility].    INSTRUCTIONS RECEIVED AND UNDERSTOOD BY:                     Patient/Patient Representative: ________________________________________________________________     Date/Time: ________________________________________________________________                     Instructions Given By: ________________________________________________________________     Date/Time: ________________________________________________________________

## 2017-01-13 NOTE — CONSULTS
INTERVENTIONAL CARDIOLOGY SERVICE  PGY-8 NOTE    STAFF REQUESTING CONSULT: Eric Tillman    REASON FOR CONSULT:  Angioplasty    HISTORY OF PRESENT ILLNESS  77 yo F with HTN (no meds), hypothyroidism, GERD/PUD with single episode of UGIB in 1999), obesity who presents with chest pain. Chest pain started on Sunday or Monday while shopping and carrying in groceries. She had intermittent exertional CP throughout the week that resolved with rest. CP described as substernal/left-sided pressure, radiating to jaw and left arm, associated with SOB. Last night she was awaken with severe CP, took one of husbands SL NTG with resolution. CP recurred when walking to bathroom. This prompted her to present to [facility] where she was diagnosed with NSTEMI. She was given ASA and Labetalol. She was transferred to [facility] ED for further management, accepted by interventional cardiology staff Dr. Potter.    PAST MEDICAL HISTORY  No past medical history on file.     PAST SURGICAL HISTORY  No past surgical history on file.    MEDICATIONS  Current Facility-Administered Medications on File Prior to Encounter   Medication Dose Route Frequency Provider Last Rate Last Dose    [COMPLETED] aspirin chewable tablet 162 mg  162 mg Oral ED 1 Time Gerard Singh MD   162 mg at 01/13/17 0836    [COMPLETED] heparin 25,000 units in dextrose 5% 250 mL (100 units/mL) bolus from bag; INITIAL BOLUS DOSE  46 Units/kg Intravenous Once Gerard Singh MD   4,080.2 Units at 01/13/17 1126    [COMPLETED] labetalol injection 5 mg  5 mg Intravenous ED 1 Time Gerard Singh MD   5 mg at 01/13/17 1029    [COMPLETED] nitroGLYCERIN 2% TD oint ointment 0.5 inch  0.5 inch Topical ED 1 Time Gerard Singh MD   0.5 inch at 01/13/17 1036    [DISCONTINUED] aspirin chewable tablet 243 mg  243 mg Oral ED 1 Time Gerard Singh MD        [DISCONTINUED] heparin 25,000 units in dextrose 5% 250 mL (100 units/mL) bolus from bag; PRN BOLUS  70  Units/kg Intravenous PRN Gerard Singh MD        [DISCONTINUED] heparin 25,000 units in dextrose 5% 250 mL (100 units/mL) bolus from bag; PRN BOLUS  35 Units/kg Intravenous PRN Gerard Singh MD        [DISCONTINUED] heparin 25,000 units in dextrose 5% 250 mL (100 units/mL) infusion; FEMALE  12 Units/kg/hr Intravenous Continuous Gerard Singh MD 10.6 mL/hr at 01/13/17 1131 12 Units/kg/hr at 01/13/17 1131    [DISCONTINUED] labetalol injection 10 mg  10 mg Intravenous ED 1 Time Gerard Singh MD        [DISCONTINUED] nitroGLYCERIN 2% TD oint (NITROGLYN) 2 % ointment             [DISCONTINUED] nitroGLYCERIN SL tablet 0.4 mg  0.4 mg Sublingual ED 1 Time Gerard Singh MD         Current Outpatient Prescriptions on File Prior to Encounter   Medication Sig Dispense Refill    albuterol (PROVENTIL) 5 mg/mL nebulizer solution Inhale into the lungs every 6 (six) hours as needed for Wheezing.      aspirin (ECOTRIN) 81 MG EC tablet Take 81 mg by mouth once daily.      CALCIUM CARBONATE/VITAMIN D3 (CALCIUM 500 + D ORAL) Take 2 tablets by mouth once daily.      diazePAM (VALIUM) 10 MG Tab Take 10 mg by mouth daily as needed.      diclofenac-misoprostol  mg-mcg (ARTHROTEC 50)  mg-mcg per tablet Take 1 tablet by mouth 3 (three) times daily.      glucosamine-chondroitin 500-400 mg tablet Take 1 tablet by mouth 3 (three) times daily.      hydrOXYzine HCl (ATARAX) 10 MG Tab Take 10 mg by mouth 3 (three) times daily as needed.      levothyroxine (SYNTHROID) 100 MCG tablet Take 100 mcg by mouth before breakfast.      montelukast (SINGULAIR) 10 mg tablet Take 10 mg by mouth every evening.      omeprazole (PRILOSEC) 40 MG capsule Take 40 mg by mouth once daily.      [DISCONTINUED] doxycycline (VIBRAMYCIN) 100 MG Cap Take 100 mg by mouth every 12 (twelve) hours.          SOCIAL HISTORY  TOBACCO:  ETOH:  ILLEGAL DRUGS:    Review of Systems   Constitutional: Negative for  "fever and chills.   HENT: Negative for hearing loss.   Eyes: Negative for blurred vision.   Respiratory:Negative for cough and shortness of breath.   Cardiovascular: See HPI   Gastrointestinal: Negative for heartburn and nausea.   Genitourinary: Negative for dysuria.   Musculoskeletal: Negative for myalgias.   Skin: Negative for rash.   Neurological: Negative for dizziness, tingling and headaches.   Endo/Heme/Allergies: Does not bruise/bleed easily.   Psychiatric/Behavioral: Negative for depression.       Physical Exam   Visit Vitals    BP (!) 172/77    Pulse 80    Temp 98.4 °F (36.9 °C) (Oral)    Resp (!) 21    Ht 5' 3" (1.6 m)    Wt 88.9 kg (196 lb)    SpO2 98%    BMI 34.72 kg/m2      Constitutional: Appears well-developed. not intubated.   HENT: PERRLA  Head: Normocephalic.   Eyes: Conjunctivae are normal. Pupils are equal, round, and reactive to light.   Neck: Normal range of motion. No JVD present.   Cardiovascular: RRR, no murmurs   Pulmonary/Chest: No apnea. Not intubated. No respiratory distress.   Musculoskeletal: No edema.   Skin: Not diaphoretic.       TELEMETRY REVIEW  NSR    12-LEAD SURFACE EKG:  NSR, TWI inferolateral  leads        ASSESSMENT AND PLAN    Heather Hughes is a 76 y.o. female with NSTEMI      RECOMMENDATIONS  Cardiac catheterization with probable PCI.   Antiplatelets: ASA, PLavix  Access: [facility]  Catheters: JL4, JR4  Pt is a ARSLAN candidate and understands the importance of taking plavix for at least one year, understands that in case of receiving a drug coated stent the failure to comply with dual anti-platelet therapy is likely to result in stent clothing, heart attack and death.   The risks, benefits, and alternatives of coronary vascular angiography and possible intervention were discussed with the patient. All questions were answered and informed consent was obtained. I had a detailed discussion with the patient regarding risk of stroke, MI, bleeding access site " complications including limb loss, allergy, kidney failure including dialysis and death.  The patient understands the risks and benefits and wishes to go ahead with the procedure.  1. All patient's questions were answered  2.       Pa Jimenes MD  Interventional Cardiology   Fellow PGY-7  [phone]

## 2017-01-14 VITALS
DIASTOLIC BLOOD PRESSURE: 80 MMHG | BODY MASS INDEX: 34.73 KG/M2 | HEART RATE: 72 BPM | OXYGEN SATURATION: 98 % | RESPIRATION RATE: 16 BRPM | HEIGHT: 63 IN | SYSTOLIC BLOOD PRESSURE: 137 MMHG | WEIGHT: 196 LBS | TEMPERATURE: 98 F

## 2017-01-14 LAB
ALBUMIN SERPL BCP-MCNC: 3 G/DL
ALP SERPL-CCNC: 79 U/L
ALT SERPL W/O P-5'-P-CCNC: 11 U/L
ANION GAP SERPL CALC-SCNC: 6 MMOL/L
AST SERPL-CCNC: 21 U/L
BASOPHILS # BLD AUTO: 0.03 K/UL
BASOPHILS NFR BLD: 0.4 %
BILIRUB SERPL-MCNC: 0.8 MG/DL
BUN SERPL-MCNC: 13 MG/DL
CALCIUM SERPL-MCNC: 8.7 MG/DL
CHLORIDE SERPL-SCNC: 107 MMOL/L
CHOLEST/HDLC SERPL: 3.1 {RATIO}
CO2 SERPL-SCNC: 24 MMOL/L
CREAT SERPL-MCNC: 0.7 MG/DL
DIFFERENTIAL METHOD: ABNORMAL
EOSINOPHIL # BLD AUTO: 0 K/UL
EOSINOPHIL NFR BLD: 0.6 %
ERYTHROCYTE [DISTWIDTH] IN BLOOD BY AUTOMATED COUNT: 14.1 %
EST. GFR  (AFRICAN AMERICAN): >60 ML/MIN/1.73 M^2
EST. GFR  (NON AFRICAN AMERICAN): >60 ML/MIN/1.73 M^2
ESTIMATED AVG GLUCOSE: 105 MG/DL
GLUCOSE SERPL-MCNC: 91 MG/DL
HBA1C MFR BLD HPLC: 5.3 %
HCT VFR BLD AUTO: 33.9 %
HDL/CHOLESTEROL RATIO: 32 %
HDLC SERPL-MCNC: 172 MG/DL
HDLC SERPL-MCNC: 55 MG/DL
HGB BLD-MCNC: 11 G/DL
LDLC SERPL CALC-MCNC: 103.4 MG/DL
LYMPHOCYTES # BLD AUTO: 1.8 K/UL
LYMPHOCYTES NFR BLD: 25.6 %
MAGNESIUM SERPL-MCNC: 1.8 MG/DL
MCH RBC QN AUTO: 29.3 PG
MCHC RBC AUTO-ENTMCNC: 32.4 %
MCV RBC AUTO: 90 FL
MONOCYTES # BLD AUTO: 0.5 K/UL
MONOCYTES NFR BLD: 7.5 %
NEUTROPHILS # BLD AUTO: 4.6 K/UL
NEUTROPHILS NFR BLD: 65.6 %
NONHDLC SERPL-MCNC: 117 MG/DL
PLATELET # BLD AUTO: 188 K/UL
PMV BLD AUTO: 12.9 FL
POTASSIUM SERPL-SCNC: 3.6 MMOL/L
PROT SERPL-MCNC: 5.7 G/DL
RBC # BLD AUTO: 3.75 M/UL
SODIUM SERPL-SCNC: 137 MMOL/L
TRIGL SERPL-MCNC: 68 MG/DL
WBC # BLD AUTO: 6.96 K/UL

## 2017-01-14 PROCEDURE — 36415 COLL VENOUS BLD VENIPUNCTURE: CPT

## 2017-01-14 PROCEDURE — 94640 AIRWAY INHALATION TREATMENT: CPT

## 2017-01-14 PROCEDURE — 85025 COMPLETE CBC W/AUTO DIFF WBC: CPT

## 2017-01-14 PROCEDURE — 99239 HOSP IP/OBS DSCHRG MGMT >30: CPT | Mod: ,,, | Performed by: HOSPITALIST

## 2017-01-14 PROCEDURE — 25000242 PHARM REV CODE 250 ALT 637 W/ HCPCS: Performed by: INTERNAL MEDICINE

## 2017-01-14 PROCEDURE — 80061 LIPID PANEL: CPT

## 2017-01-14 PROCEDURE — 93306 TTE W/DOPPLER COMPLETE: CPT | Mod: 26,,, | Performed by: INTERNAL MEDICINE

## 2017-01-14 PROCEDURE — 83036 HEMOGLOBIN GLYCOSYLATED A1C: CPT

## 2017-01-14 PROCEDURE — 25000003 PHARM REV CODE 250: Performed by: INTERNAL MEDICINE

## 2017-01-14 PROCEDURE — 93306 TTE W/DOPPLER COMPLETE: CPT

## 2017-01-14 PROCEDURE — 80053 COMPREHEN METABOLIC PANEL: CPT

## 2017-01-14 PROCEDURE — 25000003 PHARM REV CODE 250: Performed by: HOSPITALIST

## 2017-01-14 PROCEDURE — 83735 ASSAY OF MAGNESIUM: CPT

## 2017-01-14 RX ORDER — ATORVASTATIN CALCIUM 40 MG/1
40 TABLET, FILM COATED ORAL DAILY
Qty: 30 TABLET | Refills: 2 | Status: SHIPPED | OUTPATIENT
Start: 2017-01-14 | End: 2017-03-15 | Stop reason: SINTOL

## 2017-01-14 RX ORDER — NITROGLYCERIN 0.4 MG/1
0.4 TABLET SUBLINGUAL EVERY 5 MIN PRN
Qty: 30 TABLET | Refills: 0 | Status: SHIPPED | OUTPATIENT
Start: 2017-01-14 | End: 2017-02-13

## 2017-01-14 RX ORDER — CARVEDILOL 3.12 MG/1
3.12 TABLET ORAL 2 TIMES DAILY
Qty: 60 TABLET | Refills: 2 | Status: SHIPPED | OUTPATIENT
Start: 2017-01-14 | End: 2017-04-03 | Stop reason: SDUPTHER

## 2017-01-14 RX ORDER — LISINOPRIL 10 MG/1
10 TABLET ORAL DAILY
Qty: 30 TABLET | Refills: 2 | Status: SHIPPED | OUTPATIENT
Start: 2017-01-14 | End: 2017-03-31 | Stop reason: SDUPTHER

## 2017-01-14 RX ORDER — CLOPIDOGREL BISULFATE 75 MG/1
75 TABLET ORAL DAILY
Qty: 30 TABLET | Refills: 11 | Status: SHIPPED | OUTPATIENT
Start: 2017-01-14 | End: 2018-01-11 | Stop reason: SDUPTHER

## 2017-01-14 RX ADMIN — LISINOPRIL 10 MG: 10 TABLET ORAL at 08:01

## 2017-01-14 RX ADMIN — HYDRALAZINE HYDROCHLORIDE 25 MG: 25 TABLET, FILM COATED ORAL at 05:01

## 2017-01-14 RX ADMIN — HYDRALAZINE HYDROCHLORIDE 25 MG: 25 TABLET, FILM COATED ORAL at 12:01

## 2017-01-14 RX ADMIN — LEVOTHYROXINE SODIUM 100 MCG: 100 TABLET ORAL at 05:01

## 2017-01-14 RX ADMIN — OYSTER SHELL CALCIUM WITH VITAMIN D 1 TABLET: 500; 200 TABLET, FILM COATED ORAL at 08:01

## 2017-01-14 RX ADMIN — HEPARIN SODIUM 5000 UNITS: 5000 INJECTION, SOLUTION INTRAVENOUS; SUBCUTANEOUS at 05:01

## 2017-01-14 RX ADMIN — CLOPIDOGREL 75 MG: 75 TABLET, FILM COATED ORAL at 08:01

## 2017-01-14 RX ADMIN — CARVEDILOL 3.12 MG: 3.12 TABLET, FILM COATED ORAL at 08:01

## 2017-01-14 RX ADMIN — ATORVASTATIN CALCIUM 40 MG: 20 TABLET, FILM COATED ORAL at 08:01

## 2017-01-14 RX ADMIN — ALBUTEROL SULFATE 5 MG: 2.5 SOLUTION RESPIRATORY (INHALATION) at 08:01

## 2017-01-14 RX ADMIN — PANTOPRAZOLE SODIUM 40 MG: 40 TABLET, DELAYED RELEASE ORAL at 08:01

## 2017-01-14 RX ADMIN — ASPIRIN 81 MG: 81 TABLET, COATED ORAL at 08:01

## 2017-01-14 NOTE — PROGRESS NOTES
Pt does not have card with information about stent in her chart. Interventional cardiology paged. Will wait for return phone call.

## 2017-01-14 NOTE — H&P
HISTORY & PHYSICAL  Hospital Medicine    Team: [facility] C    PRESENTING HISTORY     Chief Complaint/Reason for Admission:  NSTEMI    History of Present Illness:  75 yo F with HTN (no meds), hypothyroidism, GERD/PUD (with single episode of UGIB in 1999), asthma, obesity, tobacco abuse who presents with chest pain. Chest pain started on Sunday or Monday while shopping and carrying in groceries. She had intermittent exertional CP throughout the week that resolved with rest. CP described as substernal/left-sided pressure, radiating to jaw and left arm, associated with SOB. Last night she was awaken with severe CP, took one of husbands SL NTG with resolution. CP recurred when walking to bathroom. This prompted her to present to [facility] where she was diagnosed with NSTEMI. She was given ASA and Labetalol. She was transferred to [facility] ED for further management, accepted by interventional cardiology staff Dr. Potter.    Review of Systems:    Review of Systems   Constitutional: Negative for chills and fever.   HENT: Negative for congestion and sore throat.    Eyes: Negative for photophobia, pain and discharge.   Respiratory: Negative for cough, hemoptysis, sputum production and shortness of breath.    Cardiovascular: Negative for chest pain, palpitations and leg swelling.   Gastrointestinal: Negative for abdominal pain, diarrhea, nausea and vomiting.   Genitourinary: Negative for dysuria and urgency.   Musculoskeletal: Negative for myalgias and neck pain.   Skin: Negative for itching and rash.   Neurological: Negative for sensory change, focal weakness and headaches.   Endo/Heme/Allergies: Negative for polydipsia. Does not bruise/bleed easily.   Psychiatric/Behavioral: Negative for depression and suicidal ideas.       PAST HISTORY:     No past medical history on file.    No past surgical history on file.    No family history on file.    Social History     Social History    Marital status:      Spouse name: N/A     Number of children: N/A    Years of education: N/A     Social History Main Topics    Smoking status: Current Every Day Smoker    Smokeless tobacco: Never Used    Alcohol use No    Drug use: No    Sexual activity: No     Other Topics Concern    Not on file     Social History Narrative       MEDICATIONS & ALLERGIES:     Current Facility-Administered Medications on File Prior to Encounter   Medication Dose Route Frequency Provider Last Rate Last Dose    [COMPLETED] aspirin chewable tablet 162 mg  162 mg Oral ED 1 Time Gerard Singh MD   162 mg at 01/13/17 0836    [COMPLETED] heparin 25,000 units in dextrose 5% 250 mL (100 units/mL) bolus from bag; INITIAL BOLUS DOSE  46 Units/kg Intravenous Once Gerard Singh MD   4,080.2 Units at 01/13/17 1126    [COMPLETED] labetalol injection 5 mg  5 mg Intravenous ED 1 Time Gerard Singh MD   5 mg at 01/13/17 1029    [COMPLETED] nitroGLYCERIN 2% TD oint ointment 0.5 inch  0.5 inch Topical ED 1 Time Gerard Singh MD   0.5 inch at 01/13/17 1036    [DISCONTINUED] aspirin chewable tablet 243 mg  243 mg Oral ED 1 Time Gerard Singh MD        [DISCONTINUED] heparin 25,000 units in dextrose 5% 250 mL (100 units/mL) bolus from bag; PRN BOLUS  70 Units/kg Intravenous PRN Gerard Singh MD        [DISCONTINUED] heparin 25,000 units in dextrose 5% 250 mL (100 units/mL) bolus from bag; PRN BOLUS  35 Units/kg Intravenous PRN Gerard Singh MD        [DISCONTINUED] heparin 25,000 units in dextrose 5% 250 mL (100 units/mL) infusion; FEMALE  12 Units/kg/hr Intravenous Continuous Gerard Singh MD 10.6 mL/hr at 01/13/17 1131 12 Units/kg/hr at 01/13/17 1131    [DISCONTINUED] labetalol injection 10 mg  10 mg Intravenous ED 1 Time Gerard Singh MD        [DISCONTINUED] nitroGLYCERIN 2% TD oint (NITROGLYN) 2 % ointment             [DISCONTINUED] nitroGLYCERIN SL tablet 0.4 mg  0.4 mg Sublingual ED 1 Time  "Gerard Singh MD         Current Outpatient Prescriptions on File Prior to Encounter   Medication Sig Dispense Refill    albuterol (PROVENTIL) 5 mg/mL nebulizer solution Inhale into the lungs every 6 (six) hours as needed for Wheezing.      aspirin (ECOTRIN) 81 MG EC tablet Take 81 mg by mouth once daily.      CALCIUM CARBONATE/VITAMIN D3 (CALCIUM 500 + D ORAL) Take 2 tablets by mouth once daily.      diazePAM (VALIUM) 10 MG Tab Take 10 mg by mouth daily as needed.      diclofenac-misoprostol  mg-mcg (ARTHROTEC 50)  mg-mcg per tablet Take 1 tablet by mouth 3 (three) times daily.      glucosamine-chondroitin 500-400 mg tablet Take 1 tablet by mouth 3 (three) times daily.      hydrOXYzine HCl (ATARAX) 10 MG Tab Take 10 mg by mouth 3 (three) times daily as needed.      levothyroxine (SYNTHROID) 100 MCG tablet Take 100 mcg by mouth before breakfast.      montelukast (SINGULAIR) 10 mg tablet Take 10 mg by mouth every evening.      omeprazole (PRILOSEC) 40 MG capsule Take 40 mg by mouth once daily.      [DISCONTINUED] doxycycline (VIBRAMYCIN) 100 MG Cap Take 100 mg by mouth every 12 (twelve) hours.          Review of patient's allergies indicates:   Allergen Reactions    Codeine Nausea Only    Mobic [meloxicam]     Sulfur        OBJECTIVE:     Vital Signs:  Temp:  [98.4 °F (36.9 °C)-98.9 °F (37.2 °C)] 98.9 °F (37.2 °C)  Pulse:  [65-83] 77  Resp:  [16-21] 21  SpO2:  [96 %-100 %] 98 %  BP: (143-224)/() 163/67  Body mass index is 34.72 kg/(m^2).     Physical Exam:    Objective:  General Appearance:  Comfortable and well-appearing.    Vital signs: (most recent): Blood pressure (!) 169/77, pulse 90, temperature 98.9 °F (37.2 °C), temperature source Oral, resp. rate (!) 21, height 5' 3" (1.6 m), weight 88.9 kg (196 lb), SpO2 98 %.  No fever.    Output: Producing urine and producing stool.    HEENT: Normal HEENT exam.    Lungs:  Normal effort.  She is not in respiratory distress.  Breath " sounds clear to auscultation.  No wheezes, rales or rhonchi.    Heart: Normal rate.  Regular rhythm.  S1 normal.  No murmur.   Chest: Symmetric chest wall expansion.   Extremities: Normal range of motion.    Neurological: Patient is alert and oriented to person, place and time.  Normal strength.    Skin:  Warm and dry.    Abdomen: Abdomen is soft.  Bowel sounds are normal.   There is no abdominal tenderness.   There is no mass.   Pupils:  Pupils are equal, round, and reactive to light.    Pulses: Distal pulses are intact.          Laboratory  Lab Results   Component Value Date    WBC 6.81 01/13/2017    HGB 12.4 01/13/2017    HCT 36.9 (L) 01/13/2017    MCV 87 01/13/2017     01/13/2017       Recent Labs  Lab 01/13/17  1604   GLU 89      K 3.8      CO2 25   BUN 14   CREATININE 0.7   CALCIUM 8.9     Lab Results   Component Value Date    INR 0.9 01/13/2017     No results found for: HGBA1C  No results for input(s): POCTGLUCOSE in the last 72 hours.    Diagnostic Results:  Labs: Reviewed  ECG: Reviewed  X-Ray: Reviewed    ASSESSMENT & PLAN:     Current Problems List:  Active Hospital Problems    Diagnosis  POA    NSTEMI (non-ST elevated myocardial infarction) [I21.4]  Yes    Moderate persistent asthma without complication [J45.40]  Yes    Uncontrolled hypertension [I10]  Yes    Coronary artery disease involving native coronary artery of native heart with unstable angina pectoris [I25.110]  Yes      Resolved Hospital Problems    Diagnosis Date Resolved POA   No resolved problems to display.       HIGH RISK CONDITION(S):  Patient has a condition that poses threat to life and bodily function: Acute Myocardial Infarction    Problem Assessment & Treatment Plan:    NSTEMI  - +Severo  - EKG: NSR, inferior TWIs  - CXR: No acute radiographic findings in the chest on the single view.  - Cath lab today with stent placement  - C/w ASA/plavix/statin/coreg/lisinopril  - 2D ECHO    HTN  - Cardiac meds as above  -  Patient appears persistently hypertensive: prn hydralazine and labetalol with target goals    Asthma  - C/w montelukast/nebs    Hypothyroidism  - C/w synthroid    Tobacco abuse  - Counseled extensively on cessation    Aim for d/c in AM.    Yogesh Bowen MD  [facility]

## 2017-01-14 NOTE — DISCHARGE SUMMARY
Discharge Summary  Hospital Medicine    Attending Provider on Discharge: Yogesh Bowen     Discharging Team: [facility]     Date of Admission:  1/13/2017         Date of Discharge:  1/14/2017      Diagnoses:     Principal Problem(s):   NSTEMI (non-ST elevated myocardial infarction)     Secondary Problems:  Active Hospital Problems    Diagnosis    *NSTEMI (non-ST elevated myocardial infarction)    Moderate persistent asthma without complication    Uncontrolled hypertension    Coronary artery disease involving native coronary artery of native heart with unstable angina pectoris        Hospital Course:      77 yo F with HTN (no meds), hypothyroidism, GERD/PUD (with single episode of UGIB in 1999), asthma, obesity, tobacco abuse who presents with chest pain. Chest pain started on Sunday or Monday while shopping and carrying in groceries. She had intermittent exertional CP throughout the week that resolved with rest. CP described as substernal/left-sided pressure, radiating to jaw and left arm, associated with SOB. Last night she was awaken with severe CP, took one of husbands SL NTG with resolution. CP recurred when walking to bathroom. This prompted her to present to [facility] where she was diagnosed with NSTEMI. She was given ASA and Labetalol. She was transferred to [facility] for further management, accepted by interventional cardiology staff Dr. Potter. Patient went for cath on 1/13 in which a ARSLAN was placed in the RCA: Patient did well and was ready for discharge the following day.  See below for further details:    NSTEMI  - +Severo  - EKG: NSR, inferior TWIs  - CXR: No acute radiographic findings in the chest on the single view.  - S/p cath with ARSLAN to RCA placed  - C/w ASA/plavix/statin/coreg/lisinopril  - 2D ECHO      HTN  - Cardiac meds as above  - Patient appears persistently hypertensive: prn hydralazine and labetalol with target goals      Asthma  - C/w montelukast/nebs      Hypothyroidism  - C/w  synthroid      Tobacco abuse  - Counseled extensively on cessation    Significant Diagnostic Studies:   Labs:   Recent Labs      01/14/17   0529   WBC  6.96   RBC  3.75*   HGB  11.0*   HCT  33.9*   PLT  188   MCV  90   MCH  29.3   MCHC  32.4   GRAN  65.6  4.6   LYMPH  25.6  1.8   MONO  7.5  0.5   EOS  0.0      Recent Labs      01/14/17   0529   GLU  91   NA  137   K  3.6   CL  107   CO2  24   BUN  13   CREATININE  0.7   CALCIUM  8.7   ANIONGAP  6*   MG  1.8        Microbiology:   No results found for: LABBLOO  No results found for: LABURIN  No results found for: LABAERO  No results found for: LABANAE    Radiology:   No results found for this or any previous visit.   No results found for this or any previous visit.   No results found for this or any previous visit.   No results found for this or any previous visit.     Cardiac Studies: Cardiac Cath, ECHO    Significant Treatments/Procedures:   Cardiac cath 1/13: Proximal RCA:  The lesion was successfully intervened. Post-stenosis of 0%, post-YONG 3 flow and TMP grade 3. The vessel was accessed natively. The following items were used: Blln Sc Euphora 2.5 X 10 and Stent Resolute Rx 3.00x15 (ARSLAN).    Consults while in Hospital:   Cardiology    Discharge Medications:      Current Discharge Medication List      START taking these medications    Details   atorvastatin (LIPITOR) 40 MG tablet Take 1 tablet (40 mg total) by mouth once daily.  Qty: 30 tablet, Refills: 2      carvedilol (COREG) 3.125 MG tablet Take 1 tablet (3.125 mg total) by mouth 2 (two) times daily.  Qty: 60 tablet, Refills: 2      clopidogrel (PLAVIX) 75 mg tablet Take 1 tablet (75 mg total) by mouth once daily.  Qty: 30 tablet, Refills: 11      lisinopril 10 MG tablet Take 1 tablet (10 mg total) by mouth once daily.  Qty: 30 tablet, Refills: 2      nitroGLYCERIN (NITROSTAT) 0.4 MG SL tablet Place 1 tablet (0.4 mg total) under the tongue every 5 (five) minutes as needed for Chest pain.  Qty: 30 tablet,  Refills: 0         CONTINUE these medications which have NOT CHANGED    Details   albuterol (PROVENTIL) 5 mg/mL nebulizer solution Inhale into the lungs every 6 (six) hours as needed for Wheezing.      aspirin (ECOTRIN) 81 MG EC tablet Take 81 mg by mouth once daily.      CALCIUM CARBONATE/VITAMIN D3 (CALCIUM 500 + D ORAL) Take 2 tablets by mouth once daily.      diazePAM (VALIUM) 10 MG Tab Take 10 mg by mouth daily as needed.      diclofenac-misoprostol  mg-mcg (ARTHROTEC 50)  mg-mcg per tablet Take 1 tablet by mouth 3 (three) times daily.      glucosamine-chondroitin 500-400 mg tablet Take 1 tablet by mouth 3 (three) times daily.      hydrOXYzine HCl (ATARAX) 10 MG Tab Take 10 mg by mouth 3 (three) times daily as needed.      levothyroxine (SYNTHROID) 100 MCG tablet Take 100 mcg by mouth before breakfast.      montelukast (SINGULAIR) 10 mg tablet Take 10 mg by mouth every evening.      omeprazole (PRILOSEC) 40 MG capsule Take 40 mg by mouth once daily.              Discharge Diet:cardiac diet with Normal Fluid intake of 1500 - 2000 mL per day    Activity: activity as tolerated    Discharged Condition: Stable    Discharge Disposition: Home or Self Care    Follow-up:   Future Appointments  Date Time Provider Department Center   2/14/2017 10:20 AM Yogesh Knight MD [facility]       Studies/Results pending on discharge:   None    Yogesh Bowen MD  [facility]

## 2017-01-14 NOTE — PROGRESS NOTES
Pt arrived from cath lab. Telemetry attached and fluids running at 270ml/hr. Dr. Bowen notified of pt's arrival. Pt's v/s are as follow. Insertion site of right groin did require additional pressure to be held. Dr. Marquez came to bedside to examine site and held pressure. I then held pressure for 10 more minutes. No bleeding at site at this time. Will continue to monitor and frequency checks initiated.        01/13/17 1745   Vital Signs   Temp 98.9 °F (37.2 °C)   Temp src Oral   Pulse 78   SpO2 98 %   BP (!) 143/68   MAP (mmHg) 98

## 2017-01-14 NOTE — PROGRESS NOTES
Progress Note  Hospital Medicine    Provider team: [facility]  Admit Date: 1/13/2017  Encounter Date: 01/14/2017     SUBJECTIVE:     Follow-up Visit for: NSTEMI (non-ST elevated myocardial infarction)    HPI (See H&P for complete P,F,SHx):75 yo F with HTN (no meds), hypothyroidism, GERD/PUD (with single episode of UGIB in 1999), asthma, obesity, tobacco abuse who presents with chest pain. Chest pain started on Sunday or Monday while shopping and carrying in groceries. She had intermittent exertional CP throughout the week that resolved with rest. CP described as substernal/left-sided pressure, radiating to jaw and left arm, associated with SOB. Last night she was awaken with severe CP, took one of husbands SL NTG with resolution. CP recurred when walking to bathroom. This prompted her to present to [facility] where she was diagnosed with NSTEMI. She was given ASA and Labetalol. She was transferred to [facility] for further management, accepted by interventional cardiology staff Dr. oPtter.  Patient went for cath on 1/13 in which a ARSLAN was placed in the RCA: Proximal RCA:  The lesion was successfully intervened. Post-stenosis of 0%, post-YONG 3 flow and TMP grade 3. The vessel was accessed natively. The following items were used: Blln Sc Euphora 2.5 X 10 and Stent Resolute Rx 3.00x15 (ARSLAN).    Interval history: Patient offered no complaints and was eager about discharge.  She reports that she is motivated to quit smoking.    Review of Systems:  Review of Systems   Constitutional: Negative for chills and fever.   HENT: Negative for congestion and sore throat.    Eyes: Negative for photophobia, pain and discharge.   Respiratory: Negative for cough, hemoptysis, sputum production and shortness of breath.    Cardiovascular: Negative for chest pain, palpitations and leg swelling.   Gastrointestinal: Negative for abdominal pain, diarrhea, nausea and vomiting.   Genitourinary: Negative for dysuria and urgency.  "  Musculoskeletal: Negative for myalgias and neck pain.   Skin: Negative for itching and rash.   Neurological: Negative for sensory change, focal weakness and headaches.   Endo/Heme/Allergies: Negative for polydipsia. Does not bruise/bleed easily.   Psychiatric/Behavioral: Negative for depression and suicidal ideas.       OBJECTIVE:       Intake/Output Summary (Last 24 hours) at 01/14/17 1332  Last data filed at 01/14/17 0800   Gross per 24 hour   Intake             1080 ml   Output              300 ml   Net              780 ml     Vital Signs Range (Last 24H):  Temp:  [97.3 °F (36.3 °C)-98.9 °F (37.2 °C)]   Pulse:  [69-87]   Resp:  [12-21]   BP: (130-188)/(58-88)   SpO2:  [95 %-99 %]   Body mass index is 34.72 kg/(m^2).    Objective:  General Appearance:  Comfortable and well-appearing.    Vital signs: (most recent): Blood pressure 137/80, pulse 72, temperature 98.2 °F (36.8 °C), temperature source Oral, resp. rate 16, height 5' 3" (1.6 m), weight 88.9 kg (196 lb), SpO2 98 %, not currently breastfeeding.  No fever.    Output: Producing urine and producing stool.    HEENT: Normal HEENT exam.    Lungs:  Normal effort.  She is not in respiratory distress.  Breath sounds clear to auscultation.  No wheezes, rales or rhonchi.    Heart: Normal rate.  Regular rhythm.  S1 normal and S2 normal.  No murmur.   Chest: Symmetric chest wall expansion.   Extremities: Normal range of motion.  There is no deformity, effusion, local swelling or dependent edema.    Neurological: Patient is alert and oriented to person, place and time.  Normal strength.    Skin:  Warm and dry.    Abdomen: Abdomen is soft.  Bowel sounds are normal.   There is no abdominal tenderness.     Pupils:  Pupils are equal, round, and reactive to light.    Pulses: Distal pulses are intact.          Medications:  Medication list was reviewed in EPIC and changes noted under Assessment/Plan and MAR.    Laboratory:  Recent Labs      01/14/17   0529   WBC  6.96   RBC  " 3.75*   HGB  11.0*   HCT  33.9*   PLT  188   MCV  90   MCH  29.3   MCHC  32.4   GRAN  65.6  4.6   LYMPH  25.6  1.8   MONO  7.5  0.5   EOS  0.0      Recent Labs      01/14/17   0529   GLU  91   NA  137   K  3.6   CL  107   CO2  24   BUN  13   CREATININE  0.7   CALCIUM  8.7   ANIONGAP  6*   MG  1.8       ASSESSMENT/PLAN:     Active Hospital Problems    Diagnosis  POA    *NSTEMI (non-ST elevated myocardial infarction) [I21.4]  Yes    Moderate persistent asthma without complication [J45.40]  Yes    Uncontrolled hypertension [I10]  Yes    Coronary artery disease involving native coronary artery of native heart with unstable angina pectoris [I25.110]  Yes      Resolved Hospital Problems    Diagnosis Date Resolved POA   No resolved problems to display.      NSTEMI  - +Severo  - EKG: NSR, inferior TWIs  - CXR: No acute radiographic findings in the chest on the single view.  - S/p cath with ARSLAN to RCA placed  - C/w ASA/plavix/statin/coreg/lisinopril  - 2D ECHO     HTN  - Cardiac meds as above  - Patient appears persistently hypertensive: prn hydralazine and labetalol with target goals     Asthma  - C/w montelukast/nebs     Hypothyroidism  - C/w synthroid     Tobacco abuse  - Counseled extensively on cessation    Anticipated discharge date and disposition:   D/c today. 35 min d/c planning and counseling alone.  Yogesh Bowen MD  [facility]

## 2017-01-14 NOTE — PROGRESS NOTES
PT D/C HOME PER MD ORDERS. TELE REMOVED, IV ACCESS REMOVED AND INTACT X1. VSS, NAD AND NO COMPLAINTS AT THIS TIME. PT GIVEN AND EXPLAINED MED LIST AND PRESCRIPTIONS. PT VERBALIZES COMPLETE UNDERSTANDING OF ALL D/C INSTRUCTIONS AND FOLLOW UP CARE. PT GIVEN PRINTED AVS, SIGNED COPY PLACED IN CHART.  PT AWAITING FAMILY ARRIVAL AND PT ESCORT. WILL CONTINUE TO MONITOR

## 2017-01-14 NOTE — PLAN OF CARE
Problem: Patient Care Overview  Goal: Plan of Care Review  Outcome: Ongoing (interventions implemented as appropriate)   Plan of care reviewed with patient. Frequent vitals done post cath. VSS. Pt to stay flat until 2345. Right groin site assessed, no bleeding or hematoma noted. All pulses 2+ bilaterally.  Pt c/o of back pain, morphine PRN orders given to relieve pain. Fall precaution in place. Daily goals discussed with patient. No acute events overnight. Will continue to monitor

## 2017-01-15 LAB
DIASTOLIC DYSFUNCTION: NO
ESTIMATED PA SYSTOLIC PRESSURE: 25
RETIRED EF AND QEF - SEE NOTES: 60 (ref 55–65)
TRICUSPID VALVE REGURGITATION: NORMAL

## 2017-01-16 ENCOUNTER — TELEPHONE (OUTPATIENT)
Dept: FAMILY MEDICINE | Facility: CLINIC | Age: 77
End: 2017-01-16

## 2017-01-16 LAB
CORONARY STENOSIS: ABNORMAL
CORONARY STENT: YES
POC ACTIVATED CLOTTING TIME K: 188 SEC (ref 74–137)
SAMPLE: ABNORMAL

## 2017-01-16 NOTE — TELEPHONE ENCOUNTER
----- Message from Brea Petersen sent at 1/16/2017 10:38 AM CST -----  Contact: /Chet [phone]  , Chet Hughes, is a patient of Dr. Mazariegos. His wife, the patient, had a heart attack on 1/12/17 and went to [facility] emergency room. They told him to schedule an appointment to see her doctor in one week.  is asking Dr. Mazariegos to be her PCP because he trusts him so much and see her Friday or Monday. Please call to schedule.

## 2017-01-17 ENCOUNTER — PATIENT OUTREACH (OUTPATIENT)
Dept: ADMINISTRATIVE | Facility: CLINIC | Age: 77
End: 2017-01-17
Payer: MEDICARE

## 2017-01-17 NOTE — PATIENT INSTRUCTIONS
Discharge Instructions for Heart Attack  You have had a heart attack (acute myocardial infarction). A heart attack occurs when a vessel that sends blood to your heart suddenly becomes blocked. Follow these guidelines for home care and lifestyle changes.  Home care  · Take your medicines exactly as directed. Dont skip doses.  · Remember that recovery after a heart attack takes time. Plan to rest for at least 4 to 8 weeks while you recover. Then return to normal activity when your doctor says its OK.  · Ask your doctor about joining a heart rehabilitation program.  · Tell your doctor if you are feeling depressed. Feelings of sadness are common after a heart attack. But it is important to speak to someone if you are feeling overwhelmed by these feelings.  · If you are having chest pain, call 911 for an ambulance. Do not drive yourself to the hospital.  · Ask your family members to learn CPR.  · Learn to take your own blood pressure and pulse. Keep a record of your results. Ask your doctor when you should seek emergency medical attention. He or she will tell you which blood pressure reading is dangerous.  Lifestyle changes  Your heart attack might have been caused by cardiovascular disease. Your healthcare provider will work with you to make changes to your lifestyle. This will help the heart disease from getting worse.  Diet  Your healthcare provider will tell you what changes you need to make to your diet. You may need to see a registered dietitian for help with these diet changes. These changes may include:  · Cutting back on the amount of fat and cholesterol in your diet  · Cutting back on the amount of sodium (salt) in your food, especially if you have high blood pressure  · Eating more fresh vegetables and fruits  · Eating lean proteins such as fish, poultry, and legumes (beans and peas), and eating less red meat and processed meats  · Using low-fat dairy products  · Using vegetable and nut oils in limited  amounts  · Limiting how many sweets and processed foods such as chips, cookies, and baked goods that you eat  Exercise  Your healthcare provider may tell you to get more exercise if you haven't been physically active. Depending on your case, your provider may recommend that you get moderate to vigorous physical activity for at least 40 minutes each day, and for at least 3 to 4 days each week. A few examples of moderate to vigorous activity include:  · Walking at a brisk pace, about 3 to 4 miles per hour  · Jogging or running  · Swimming or water aerobics  · Hiking  · Dancing  · Martial arts  · Tennis  · Riding a bicycle or stationary bike  · Dancing  Other changes  Your healthcare provider may also recommend that you:  · Lose weight. If you are overweight or obese, your provider will work with you to lose extra pounds. Making diet changes and getting more exercise can help.  · Stop smoking. Sign up for a stop-smoking program to make it more likely for you to quit for good.  · Learn to manage stress. Stress management techniques to help you deal with stress in your home and work life.  Follow-up  Make a follow-up appointment as directed by our staff.     When to seek medical advice  Call 911 right away if you have:  · Chest pain that is not relieved by medicine.  · Shortness of breath.  Otherwise, call your doctor immediately if you have:  · Lightheadedness, dizziness, or fainting.  · Feeling of irregular heartbeat or fast pulse.   © 8731-1781 [employer]. [address] [place] [address], [place] [address]. All rights reserved. This information is not intended as a substitute for professional medical care. Always follow your healthcare professional's instructions.

## 2017-01-17 NOTE — TELEPHONE ENCOUNTER
C3 nurse spoke with Heather Hughes for a TCC post hospital discharge follow up call. The patient has a scheduled HOSFU appointment with Dr. Mazariegos-carline PCP 03/15/17 @ 5109, and waiting on appointment this week with current physician.    Respectfully,  Larissa Fabian, RN  [facility]    [email or web address]       Please do not reply to this message, as this inbox is not routinely monitored.

## 2017-02-23 ENCOUNTER — DOCUMENTATION ONLY (OUTPATIENT)
Dept: FAMILY MEDICINE | Facility: CLINIC | Age: 77
End: 2017-02-23

## 2017-02-23 NOTE — PROGRESS NOTES
Pre-Visit Chart Review  For Appointment Scheduled on 3-15-17    Health Maintenance Due   Topic Date Due    TETANUS VACCINE  06/22/1959    DEXA SCAN  06/22/1981    Colonoscopy  06/22/1991    Zoster Vaccine  06/22/2001    Pneumococcal (65+) (1 of 2 - PCV13) 06/22/2006    Influenza Vaccine  08/01/2016

## 2017-03-15 ENCOUNTER — LAB VISIT (OUTPATIENT)
Dept: LAB | Facility: HOSPITAL | Age: 77
End: 2017-03-15
Attending: FAMILY MEDICINE
Payer: MEDICARE

## 2017-03-15 ENCOUNTER — OFFICE VISIT (OUTPATIENT)
Dept: FAMILY MEDICINE | Facility: CLINIC | Age: 77
End: 2017-03-15
Payer: MEDICARE

## 2017-03-15 VITALS
OXYGEN SATURATION: 96 % | HEIGHT: 61 IN | HEART RATE: 72 BPM | TEMPERATURE: 98 F | WEIGHT: 199.06 LBS | SYSTOLIC BLOOD PRESSURE: 136 MMHG | DIASTOLIC BLOOD PRESSURE: 76 MMHG | BODY MASS INDEX: 37.58 KG/M2 | RESPIRATION RATE: 16 BRPM

## 2017-03-15 DIAGNOSIS — I21.4 NSTEMI (NON-ST ELEVATED MYOCARDIAL INFARCTION): ICD-10-CM

## 2017-03-15 DIAGNOSIS — I10 GOOD HYPERTENSION CONTROL: ICD-10-CM

## 2017-03-15 DIAGNOSIS — Z76.89 ESTABLISHING CARE WITH NEW DOCTOR, ENCOUNTER FOR: Primary | ICD-10-CM

## 2017-03-15 DIAGNOSIS — Z78.0 POST-MENOPAUSE: ICD-10-CM

## 2017-03-15 DIAGNOSIS — Z12.11 COLON CANCER SCREENING: ICD-10-CM

## 2017-03-15 DIAGNOSIS — E03.9 HYPOTHYROIDISM, UNSPECIFIED TYPE: ICD-10-CM

## 2017-03-15 DIAGNOSIS — G47.00 INSOMNIA, UNSPECIFIED TYPE: ICD-10-CM

## 2017-03-15 DIAGNOSIS — Z00.00 ANNUAL PHYSICAL EXAM: ICD-10-CM

## 2017-03-15 DIAGNOSIS — I25.110 CORONARY ARTERY DISEASE INVOLVING NATIVE CORONARY ARTERY OF NATIVE HEART WITH UNSTABLE ANGINA PECTORIS: ICD-10-CM

## 2017-03-15 LAB
T4 FREE SERPL-MCNC: 1.26 NG/DL
TSH SERPL DL<=0.005 MIU/L-ACNC: 5.03 UIU/ML

## 2017-03-15 PROCEDURE — 3078F DIAST BP <80 MM HG: CPT | Mod: S$GLB,,, | Performed by: FAMILY MEDICINE

## 2017-03-15 PROCEDURE — 3075F SYST BP GE 130 - 139MM HG: CPT | Mod: S$GLB,,, | Performed by: FAMILY MEDICINE

## 2017-03-15 PROCEDURE — 99397 PER PM REEVAL EST PAT 65+ YR: CPT | Mod: S$GLB,,, | Performed by: FAMILY MEDICINE

## 2017-03-15 PROCEDURE — 36415 COLL VENOUS BLD VENIPUNCTURE: CPT | Mod: PO

## 2017-03-15 PROCEDURE — 99999 PR PBB SHADOW E&M-EST. PATIENT-LVL IV: CPT | Mod: PBBFAC,,, | Performed by: FAMILY MEDICINE

## 2017-03-15 PROCEDURE — 84443 ASSAY THYROID STIM HORMONE: CPT

## 2017-03-15 PROCEDURE — 84439 ASSAY OF FREE THYROXINE: CPT

## 2017-03-15 PROCEDURE — 82270 OCCULT BLOOD FECES: CPT | Mod: PBBFAC,PO | Performed by: FAMILY MEDICINE

## 2017-03-15 RX ORDER — NITROGLYCERIN 0.4 MG/1
0.4 TABLET SUBLINGUAL EVERY 5 MIN PRN
COMMUNITY
End: 2018-04-11 | Stop reason: SDUPTHER

## 2017-03-15 RX ORDER — TRAMADOL HYDROCHLORIDE 50 MG/1
50 TABLET ORAL 3 TIMES DAILY PRN
COMMUNITY
End: 2018-01-10 | Stop reason: ALTCHOICE

## 2017-03-15 RX ORDER — PRAVASTATIN SODIUM 40 MG/1
40 TABLET ORAL NIGHTLY
Qty: 90 TABLET | Refills: 3 | Status: SHIPPED | OUTPATIENT
Start: 2017-03-15 | End: 2018-01-10 | Stop reason: SINTOL

## 2017-03-15 RX ORDER — ALBUTEROL SULFATE 90 UG/1
2 AEROSOL, METERED RESPIRATORY (INHALATION) EVERY 6 HOURS PRN
COMMUNITY
End: 2018-01-10 | Stop reason: SDUPTHER

## 2017-03-15 RX ORDER — ERGOCALCIFEROL 1.25 MG/1
50000 CAPSULE ORAL
COMMUNITY
End: 2017-05-16

## 2017-03-15 RX ORDER — TRAZODONE HYDROCHLORIDE 50 MG/1
50 TABLET ORAL NIGHTLY PRN
Qty: 30 TABLET | Refills: 11 | Status: SHIPPED | OUTPATIENT
Start: 2017-03-15 | End: 2017-05-16

## 2017-03-15 RX ORDER — IBUPROFEN 200 MG
200 TABLET ORAL EVERY 6 HOURS PRN
COMMUNITY
End: 2018-01-10

## 2017-03-15 RX ORDER — BENZONATATE 200 MG/1
200 CAPSULE ORAL 3 TIMES DAILY PRN
Qty: 60 CAPSULE | Refills: 5 | Status: SHIPPED | OUTPATIENT
Start: 2017-03-15 | End: 2018-04-11 | Stop reason: SDUPTHER

## 2017-03-15 RX ORDER — ACETAMINOPHEN 500 MG
500 TABLET ORAL EVERY 6 HOURS PRN
COMMUNITY
End: 2018-04-11 | Stop reason: DRUGHIGH

## 2017-03-15 RX ORDER — BENZONATATE 200 MG/1
200 CAPSULE ORAL 3 TIMES DAILY PRN
COMMUNITY
End: 2017-03-15 | Stop reason: SDUPTHER

## 2017-03-15 NOTE — PROGRESS NOTES
Subjective:       Patient ID: Heather Hughes is a 76 y.o. female.    Chief Complaint: Establish Care    HPI Comments: 76 year old female in to establish care.  She had a NSTEMI January 13 with angiogram finding a 50% block in the LAD and a 99% block in the dominant RCA which was stented.  She is on plavix.  She smoked 1/2 ppd from teens to the MI and stopped on January 13.  She is feeling well but had multiple falls, falling backward without warning after starting lipitor and the problem stopped when she stopped the statin.  She has been under stress with the suicide of her son recently and the death of a close friend yesterday.  She uses valium for sleep.    Past Medical History:  No date: Allergy  No date: Arthritis  No date: Asthma  01/13/2017: Heart attack  No date: Hypertension  No date: Osteoporosis  No date: Thyroid disease  No date: Ulcer    Past Surgical History:  01/13/2017: cardic stent  No date: EYE SURGERY      Comment: bilateral cataracts  2011: FRACTURE SURGERY      Comment: left wrist   No date: THYROIDECTOMY    Review of patient's family history indicates:    Lupus                          Mother                    Osteoporosis                   Mother                    Heart disease                  Mother                    Ulcers                         Mother                    Esophageal cancer              Father                    Cancer                         Father                      Comment: esophageal    DIEGO disease                    Father                    Cancer                         Brother                   Esophageal cancer              Brother                   Lupus                          Daughter                  DIEGO disease                    Son                       Heart disease                  Maternal Aunt             Cancer                         Maternal Aunt             Kidney disease                 Maternal Aunt             Kidney cancer                   Maternal Aunt             Liver cancer                   Maternal Aunt             Dementia                       Maternal Aunt             Cancer                         Maternal Uncle            Colon cancer                   Maternal Uncle            Breast cancer                  Maternal Uncle            Lung cancer                    Maternal Uncle              Comment: smoker    Parkinsonism                   Paternal Aunt             Cancer                         Paternal Aunt             Stomach cancer                 Paternal Aunt             Heart disease                  Paternal Uncle            Cancer                         Paternal Uncle            Stomach cancer                 Paternal Uncle              Comment: x2    Bone cancer                    Paternal Uncle            Cancer                         Maternal Grandmother        Comment: tumors on head and body    No Known Problems              Maternal Grandfather      Parkinsonism                   Paternal Grandmother      No Known Problems              Paternal Grandfather      DIEGO disease                    Brother                   Ulcers                         Brother                   Dementia                       Brother                   Early death                    Son                         Comment: self    Bipolar disorder               Son                       Cancer                         Son                       Esophageal cancer              Son                       Drug abuse                     Son                       Social History    Marital status:              Spouse name:                       Years of education:                 Number of children:               Social History Main Topics    Smoking status: Former Smoker                                                                Packs/day: 0.50      Years: 0.00           Types: Cigarettes       Quit date: 1/13/2017    Smokeless status: Never  Used                        Alcohol use: No              Drug use: No              Sexual activity: No                       Current Outpatient Prescriptions:     acetaminophen (TYLENOL) 500 MG tablet, Take 500 mg by mouth every 6 (six) hours as needed for Pain., Disp: , Rfl:     albuterol 90 mcg/actuation inhaler, Inhale 2 puffs into the lungs every 6 (six) hours as needed for Wheezing. Rescue, Disp: , Rfl:     aspirin (ECOTRIN) 81 MG EC tablet, Take 81 mg by mouth once daily., Disp: , Rfl:     benzonatate (TESSALON) 200 MG capsule, Take 1 capsule (200 mg total) by mouth 3 (three) times daily as needed for Cough., Disp: 60 capsule, Rfl: 5    CALCIUM CARBONATE/VITAMIN D3 (CALCIUM 500 + D ORAL), Take 2 tablets by mouth once daily., Disp: , Rfl:     carvedilol (COREG) 3.125 MG tablet, Take 1 tablet (3.125 mg total) by mouth 2 (two) times daily., Disp: 60 tablet, Rfl: 2    clopidogrel (PLAVIX) 75 mg tablet, Take 1 tablet (75 mg total) by mouth once daily., Disp: 30 tablet, Rfl: 11    diazePAM (VALIUM) 10 MG Tab, Take 5 mg by mouth daily as needed. , Disp: , Rfl:     ergocalciferol (ERGOCALCIFEROL) 50,000 unit Cap, Take 50,000 Units by mouth every 7 days., Disp: , Rfl:     glucosamine-chondroitin 500-400 mg tablet, Take 1 tablet by mouth 3 (three) times daily., Disp: , Rfl:     hydrOXYzine HCl (ATARAX) 10 MG Tab, Take 5 mg by mouth 3 (three) times daily as needed. , Disp: , Rfl:     ibuprofen (ADVIL,MOTRIN) 200 MG tablet, Take 200 mg by mouth every 6 (six) hours as needed for Pain., Disp: , Rfl:     levothyroxine (SYNTHROID) 100 MCG tablet, Take 100 mcg by mouth before breakfast., Disp: , Rfl:     lisinopril 10 MG tablet, Take 1 tablet (10 mg total) by mouth once daily., Disp: 30 tablet, Rfl: 2    montelukast (SINGULAIR) 10 mg tablet, Take 10 mg by mouth every evening., Disp: , Rfl:     nitroGLYCERIN (NITROSTAT) 0.4 MG SL tablet, Place 0.4 mg under the tongue every 5 (five) minutes as needed for Chest  pain., Disp: , Rfl:     omeprazole (PRILOSEC) 40 MG capsule, Take 40 mg by mouth once daily., Disp: , Rfl:     tramadol (ULTRAM) 50 mg tablet, Take 50 mg by mouth 3 (three) times daily as needed for Pain., Disp: , Rfl:     pravastatin (PRAVACHOL) 40 MG tablet, Take 1 tablet (40 mg total) by mouth every evening., Disp: 90 tablet, Rfl: 3    trazodone (DESYREL) 50 MG tablet, Take 1 tablet (50 mg total) by mouth nightly as needed for Insomnia., Disp: 30 tablet, Rfl: 11    Fecal Occult Blood Test (FOBT) due on 1941  DEXA SCAN due on 06/22/1981  Colonoscopy due on 06/22/1991  Zoster Vaccine due on 06/22/2001      Review of Systems   Constitutional: Negative for chills, fatigue and fever.   HENT: Negative for congestion, nosebleeds and postnasal drip.    Eyes: Negative for photophobia and visual disturbance.   Respiratory: Positive for cough (needs refill benzonatate). Negative for chest tightness and shortness of breath.    Cardiovascular: Negative for chest pain and palpitations.   Gastrointestinal: Negative for abdominal pain, blood in stool, constipation, diarrhea, nausea and vomiting.   Endocrine: Negative for cold intolerance, heat intolerance, polydipsia and polyuria.   Genitourinary: Negative for dysuria, frequency, hematuria and urgency.   Musculoskeletal: Positive for arthralgias and joint swelling. Negative for myalgias.   Psychiatric/Behavioral: Positive for sleep disturbance. Negative for confusion, decreased concentration and dysphoric mood. The patient is nervous/anxious.        Objective:      Physical Exam   Constitutional: She is oriented to person, place, and time. She appears well-developed. No distress.   Good blood pressure control  Patient is obese with bmi of 37.9.    HENT:   Head: Normocephalic and atraumatic.   Right Ear: External ear normal.   Left Ear: External ear normal.   Nose: Nose normal.   Mouth/Throat: Oropharynx is clear and moist. No oropharyngeal exudate.   Eyes: Conjunctivae  and EOM are normal. Pupils are equal, round, and reactive to light. Right eye exhibits no discharge. Left eye exhibits no discharge. No scleral icterus.   Neck: Normal range of motion. Neck supple. No JVD present. No tracheal deviation present. No thyromegaly present.   Cardiovascular: Normal rate, regular rhythm and normal heart sounds.  Exam reveals no gallop and no friction rub.    No murmur heard.  Pulmonary/Chest: Effort normal and breath sounds normal. No respiratory distress. She has no wheezes. She has no rales. She exhibits no tenderness.   Abdominal: Soft. Bowel sounds are normal. She exhibits no distension and no mass. There is no tenderness. There is no rebound and no guarding. No hernia.   Musculoskeletal: Normal range of motion. She exhibits no edema or tenderness.   Lymphadenopathy:     She has no cervical adenopathy.   Neurological: She is alert and oriented to person, place, and time. She has normal reflexes. She displays normal reflexes. No cranial nerve deficit. She exhibits normal muscle tone.   Skin: Skin is warm and dry. No rash noted. She is not diaphoretic. No erythema.   Psychiatric: She has a normal mood and affect. Her behavior is normal. Judgment and thought content normal.   Nursing note and vitals reviewed.      Assessment:       1. Establishing care with new doctor, encounter for    2. Annual physical exam    3. Coronary artery disease involving native coronary artery of native heart with unstable angina pectoris    4. NSTEMI (non-ST elevated myocardial infarction)    5. Good hypertension control    6. Hypothyroidism, unspecified type    7. Post-menopause    8. Insomnia, unspecified type    9. Colon cancer screening    10. BMI 37.0-37.9, adult        Plan:       1. Establishing care with new doctor, encounter for    2. Annual physical exam    3. Coronary artery disease involving native coronary artery of native heart with unstable angina pectoris  - pravastatin (PRAVACHOL) 40 MG  tablet; Take 1 tablet (40 mg total) by mouth every evening.  Dispense: 90 tablet; Refill: 3    4. NSTEMI (non-ST elevated myocardial infarction)    5. Good hypertension control  Cough might be due to ace, borderline control.  She will monitor home blood pressures and consider increase or change at next visit    6. Hypothyroidism, unspecified type  - TSH; Future    7. Post-menopause  - DXA Bone Density Spine And Hip; Future    8. Insomnia, unspecified type  - trazodone (DESYREL) 50 MG tablet; Take 1 tablet (50 mg total) by mouth nightly as needed for Insomnia.  Dispense: 30 tablet; Refill: 11    9. Colon cancer screening  - POCT Hemocult Stool X3    10. BMI 37.0-37.9, adult         Patient readiness: acceptance and barriers:none    During the course of the visit the patient was educated and counseled about the following:     Hypertension:   Medication: consider increasing lisinopril to 20, if cough persists may change to losartan.  Dietary sodium restriction.  Regular aerobic exercise.  Obesity:   General weight loss/lifestyle modification strategies discussed (elicit support from others; identify saboteurs; non-food rewards, etc).  Informal exercise measures discussed, e.g. taking stairs instead of elevator.  Regular aerobic exercise program discussed.    Goals: Hypertension: Reduce Blood Pressure and Obesity: Reduce calorie intake and BMI    Did patient meet goals/outcomes: Yes    The following self management tools provided: blood pressure log  excercise log    Patient Instructions (the written plan) was given to the patient/family.     Time spent with patient: 55 minutes

## 2017-03-15 NOTE — PATIENT INSTRUCTIONS
Weight Management: Getting Started  Healthy bodies come in all shapes and sizes. Not all bodies are made to be thin. For some people, a healthy weight is higher than the average weight listed on weight charts. Your healthcare provider can help you decide on a healthy weight for you.    Reasons to lose weight  Losing weight can help with some health problems, such as high blood pressure, heart disease, diabetes, sleep apnea, and arthritis. You may also feel more energy.  Set your long-term goal  Your goal doesn't even have to be a specific weight. You may decide on a fitness goal (such as being able to walk 10 miles a week), or a health goal (such as lowering your blood pressure). Choose a goal that is measurable and reasonable, so you know when you've reached it. A goal of reaching a BMI of less than 25 is not always reasonable (or possible).   Make an action plan  Habits dont change overnight. Setting your goals too high can leave you feeling discouraged if you cant reach them. Be realistic. Choose one or two small changes you can make now. Set an action plan for how you are going to make these changes. When you can stick to this plan, keep making a few more small changes. Taking small steps will help you stay on the path to success.  Track your progress  Write down your goals. Then, keep a daily record of your progress. Write down what you eat and how active you are. This record lets you look back on how much youve done. It may also help when youre feeling frustrated. Reward yourself for success. Even if you dont reach every goal, give yourself credit for what you do get done.  Get support  Encouragement from others can help make losing weight easier. Ask your family members and friends for support. They may even want to join you. Also look to your healthcare provider, registered dietitian, and  for help. Your local hospital can give you more information about nutrition, exercise, and  weight loss.  Date Last Reviewed: 1/31/2016 © 2000-2016 [employer]. [address] [place] [address], [place] [address]. All rights reserved. This information is not intended as a substitute for professional medical care. Always follow your healthcare professional's instructions.        Controlling High Blood Pressure  High blood pressure (hypertension) is often called the silent killer. This is because many people who have it dont know it. High blood pressure is defined as 140/90 mm Hg or higher. Know your blood pressure and remember to check it regularly. Doing so can save your life. Here are some things you can do to help control your blood pressure.    Choose heart-healthy foods  · Select low-salt, low-fat foods. Limit sodium intake to 2,400 mg per day or the amount suggested by your healthcare provider.  · Limit canned, dried, cured, packaged, and fast foods. These can contain a lot of salt.  · Eat 8 to 10 servings of fruits and vegetables every day.  · Choose lean meats, fish, or chicken.  · Eat whole-grain pasta, brown rice, and beans.  · Eat 2 to 3 servings of low-fat or fat-free dairy products.  · Ask your doctor about the DASH eating plan. This plan helps reduce blood pressure.  · When you go to a restaurant, ask that your meal be prepared with no added salt.  Maintain a healthy weight  · Ask your healthcare provider how many calories to eat a day. Then stick to that number.  · Ask your healthcare provider what weight range is healthiest for you. If you are overweight, a weight loss of only 3% to 5% of your body weight can help lower blood pressure. Generally, a good weight loss goal is to lose 10% of your body weight in a year.  · Limit snacks and sweets.  · Get regular exercise.  Get up and get active  · Choose activities you enjoy. Find ones you can do with friends or family. This includes bicycling, dancing, walking, and jogging.  · Park farther away from building entrances.  · Use stairs  instead of the elevator.  · When you can, walk or bike instead of driving.  · [place] leaves, garden, or do household repairs.  · Be active at a moderate to vigorous level of physical activity for at least 40 minutes for a minimum of 3 to 4 days a week.   Manage stress  · Make time to relax and enjoy life. Find time to laugh.  · Communicate your concerns with your loved ones and your healthcare provider.  · Visit with family and friends, and keep up with hobbies.  Limit alcohol and quit smoking  · Men should have no more than 2 drinks per day.  · Women should have no more than 1 drink per day.  · Talk with your healthcare provider about quitting smoking. Smoking significantly increases your risk for heart disease and stroke. Ask your healthcare provider about community smoking cessation programs and other options.  Medicines  If lifestyle changes arent enough, your healthcare provider may prescribe high blood pressure medicine. Take all medicines as prescribed. If you have any questions about your medicines, ask your healthcare provider before stopping or changing them.   Date Last Reviewed: 4/27/2016 © 2000-2016 [employer]. [address] [place] [address]. All rights reserved. This information is not intended as a substitute for professional medical care. Always follow your healthcare professional's instructions.        Walking for Fitness  Fitness walking has something for everyone, even people who are already fit. Walking is one of the safest ways to condition your body aerobically. It can boost energy, help you lose weight, and reduce stress.    Physical benefits  · Walking strengthens your heart and lungs, and tones your muscles.  · When walking, your feet land with less impact than in other sports. This reduces chances of muscle, bone, and joint injury.  · Regular walking improves your cholesterol levels and lowers your risk of heart disease. And it helps you control your blood sugar if  you have diabetes.  · Walking is a weight-bearing activity, which helps maintain bone density. This can help prevent osteoporosis.  Personal rewards  · Taking walks can help you relax and manage stress. And fitness walking may make you feel better about yourself.  · Walking can help you sleep better at night and make you less likely to be depressed.  · Regular walking may help maintain your memory as you get older.  · Walking is a great way to spend extra time with friends and family members. Be sure to invite your dog along!  Q&A about fitness walking  Q: Will walking keep me fit?  A: Yes. Regular walking at the right pace gives you all the benefits of other aerobic activities, such as jogging and swimming.  Q: Will walking help me lose weight and keep it off?  A: Yes. Per mile, walking can burn as many calories as jogging. Your health care provider can help work walking into your weight-loss plan.  Q: Is walking safe for my health?  A: Yes. Walking is safe if you have high blood pressure, diabetes, heart disease, or other conditions. Talk to your health care provider before you start.  Date Last Reviewed: 5/9/2015  © 0228-8345 [employer]. [address] [place] [address], [place] [address]. All rights reserved. This information is not intended as a substitute for professional medical care. Always follow your healthcare professional's instructions.

## 2017-03-15 NOTE — MR AVS SNAPSHOT
[facility]  [address]  [place] [address]  Phone: [phone]  Fax: [phone]                  Heather Hughes   3/15/2017 2:40 PM   Office Visit    Description:  Female : 1940   Provider:  Graham Mazariegos MD   Department:  [place] [facility]           Reason for Visit     Establish Care           Diagnoses this Visit        Comments    Establishing care with new doctor, encounter for    -  Primary     Annual physical exam         Coronary artery disease involving native coronary artery of native heart with unstable angina pectoris         NSTEMI (non-ST elevated myocardial infarction)         Good hypertension control         Hypothyroidism, unspecified type         Post-menopause         Insomnia, unspecified type         Colon cancer screening         BMI 37.0-37.9, adult                To Do List           Future Appointments        Provider Department Dept Phone    4/3/2017 9:20 AM SLIC DEXA1 [facility] [phone]    2017 9:40 AM Graham Mazariegos MD [facility] [phone]      Goals (5 Years of Data)     None      Follow-Up and Disposition     Return in about 2 months (around 5/15/2017) for blood pressure and heart.       These Medications        Disp Refills Start End    pravastatin (PRAVACHOL) 40 MG tablet 90 tablet 3 3/15/2017 3/15/2018    Take 1 tablet (40 mg total) by mouth every evening. - Oral    [facility]: [id number] [place] [place] - [address] [place] [address] [address] Ph #: [phone]       trazodone (DESYREL) 50 MG tablet 30 tablet 11 3/15/2017 3/15/2018    Take 1 tablet (50 mg total) by mouth nightly as needed for Insomnia. - Oral    [facility]: [id number] - [place] - [address] [facility] [address] Ph #: [phone]       benzonatate (TESSALON) 200 MG capsule 60 capsule 5 3/15/2017     Take 1 capsule (200 mg total) by mouth 3 (three) times daily as needed for Cough. - Oral    Ph[facility]: [id number] -  [place] - [address] [facility] [address]  #: [phone]         [facility] On [facility]     [facility] Line - 24/7 Assistance  Registered nurses in the [facility] provide clinical advisement, health education, appointment booking, and other advisory services.  Call for this free service at [phone].             Medications           Message regarding Medications     Verify the changes and/or additions to your medication regime listed below are the same as discussed with your clinician today.  If any of these changes or additions are incorrect, please notify your healthcare provider.        START taking these NEW medications        Refills    pravastatin (PRAVACHOL) 40 MG tablet 3    Sig: Take 1 tablet (40 mg total) by mouth every evening.    Class: Normal    Route: Oral    trazodone (DESYREL) 50 MG tablet 11    Sig: Take 1 tablet (50 mg total) by mouth nightly as needed for Insomnia.    Class: Normal    Route: Oral    benzonatate (TESSALON) 200 MG capsule 5    Sig: Take 1 capsule (200 mg total) by mouth 3 (three) times daily as needed for Cough.    Class: Normal    Route: Oral      STOP taking these medications     atorvastatin (LIPITOR) 40 MG tablet Take 1 tablet (40 mg total) by mouth once daily.    albuterol (PROVENTIL) 5 mg/mL nebulizer solution Inhale 2.5 mg into the lungs every 6 (six) hours as needed for Wheezing.     diclofenac-misoprostol  mg-mcg (ARTHROTEC 50)  mg-mcg per tablet Take 1 tablet by mouth 3 (three) times daily.           Verify that the below list of medications is an accurate representation of the medications you are currently taking.  If none reported, the list may be blank. If incorrect, please contact your healthcare provider. Carry this list with you in case of emergency.           Current Medications     acetaminophen (TYLENOL) 500 MG tablet Take 500 mg by mouth every 6 (six) hours as needed for Pain.    albuterol 90 mcg/actuation inhaler Inhale 2 puffs  "into the lungs every 6 (six) hours as needed for Wheezing. Rescue    aspirin (ECOTRIN) 81 MG EC tablet Take 81 mg by mouth once daily.    benzonatate (TESSALON) 200 MG capsule Take 1 capsule (200 mg total) by mouth 3 (three) times daily as needed for Cough.    CALCIUM CARBONATE/VITAMIN D3 (CALCIUM 500 + D ORAL) Take 2 tablets by mouth once daily.    carvedilol (COREG) 3.125 MG tablet Take 1 tablet (3.125 mg total) by mouth 2 (two) times daily.    clopidogrel (PLAVIX) 75 mg tablet Take 1 tablet (75 mg total) by mouth once daily.    diazePAM (VALIUM) 10 MG Tab Take 5 mg by mouth daily as needed.     ergocalciferol (ERGOCALCIFEROL) 50,000 unit Cap Take 50,000 Units by mouth every 7 days.    glucosamine-chondroitin 500-400 mg tablet Take 1 tablet by mouth 3 (three) times daily.    hydrOXYzine HCl (ATARAX) 10 MG Tab Take 5 mg by mouth 3 (three) times daily as needed.     ibuprofen (ADVIL,MOTRIN) 200 MG tablet Take 200 mg by mouth every 6 (six) hours as needed for Pain.    levothyroxine (SYNTHROID) 100 MCG tablet Take 100 mcg by mouth before breakfast.    lisinopril 10 MG tablet Take 1 tablet (10 mg total) by mouth once daily.    montelukast (SINGULAIR) 10 mg tablet Take 10 mg by mouth every evening.    nitroGLYCERIN (NITROSTAT) 0.4 MG SL tablet Place 0.4 mg under the tongue every 5 (five) minutes as needed for Chest pain.    omeprazole (PRILOSEC) 40 MG capsule Take 40 mg by mouth once daily.    tramadol (ULTRAM) 50 mg tablet Take 50 mg by mouth 3 (three) times daily as needed for Pain.    pravastatin (PRAVACHOL) 40 MG tablet Take 1 tablet (40 mg total) by mouth every evening.    trazodone (DESYREL) 50 MG tablet Take 1 tablet (50 mg total) by mouth nightly as needed for Insomnia.           Clinical Reference Information           Your Vitals Were     BP Pulse Temp Resp Height Weight    136/76 (BP Location: Right arm, Patient Position: Sitting, BP Method: Manual) 72 97.7 °F (36.5 °C) (Oral) 16 5' 1" (1.549 m) 90.3 kg (199 " lb 1.2 oz)    SpO2 BMI             96% 37.62 kg/m2         Blood Pressure          Most Recent Value    BP  136/76      Allergies as of 3/15/2017     Lipitor [Atorvastatin]    Myrbetriq [Mirabegron]    Codeine    Mobic [Meloxicam]    Sulfur      Immunizations Administered on Date of Encounter - 3/15/2017     None      Orders Placed During Today's Visit      Normal Orders This Visit    POCT Hemocult Stool X3     Future Labs/Procedures Expected by Expires    DXA Bone Density Spine And Hip  3/15/2017 3/15/2018    TSH  3/15/2017 3/16/2018      Instructions      Weight Management: Getting Started  Healthy bodies come in all shapes and sizes. Not all bodies are made to be thin. For some people, a healthy weight is higher than the average weight listed on weight charts. Your healthcare provider can help you decide on a healthy weight for you.    Reasons to lose weight  Losing weight can help with some health problems, such as high blood pressure, heart disease, diabetes, sleep apnea, and arthritis. You may also feel more energy.  Set your long-term goal  Your goal doesn't even have to be a specific weight. You may decide on a fitness goal (such as being able to walk 10 miles a week), or a health goal (such as lowering your blood pressure). Choose a goal that is measurable and reasonable, so you know when you've reached it. A goal of reaching a BMI of less than 25 is not always reasonable (or possible).   Make an action plan  Habits dont change overnight. Setting your goals too high can leave you feeling discouraged if you cant reach them. Be realistic. Choose one or two small changes you can make now. Set an action plan for how you are going to make these changes. When you can stick to this plan, keep making a few more small changes. Taking small steps will help you stay on the path to success.  Track your progress  Write down your goals. Then, keep a daily record of your progress. Write down what you eat and how active  you are. This record lets you look back on how much youve done. It may also help when youre feeling frustrated. Reward yourself for success. Even if you dont reach every goal, give yourself credit for what you do get done.  Get support  Encouragement from others can help make losing weight easier. Ask your family members and friends for support. They may even want to join you. Also look to your healthcare provider, registered dietitian, and  for help. Your local hospital can give you more information about nutrition, exercise, and weight loss.  Date Last Reviewed: 1/31/2016 © 2000-2016 [employer]. [address] [place] [address], [place] [address]. All rights reserved. This information is not intended as a substitute for professional medical care. Always follow your healthcare professional's instructions.        Controlling High Blood Pressure  High blood pressure (hypertension) is often called the silent killer. This is because many people who have it dont know it. High blood pressure is defined as 140/90 mm Hg or higher. Know your blood pressure and remember to check it regularly. Doing so can save your life. Here are some things you can do to help control your blood pressure.    Choose heart-healthy foods  · Select low-salt, low-fat foods. Limit sodium intake to 2,400 mg per day or the amount suggested by your healthcare provider.  · Limit canned, dried, cured, packaged, and fast foods. These can contain a lot of salt.  · Eat 8 to 10 servings of fruits and vegetables every day.  · Choose lean meats, fish, or chicken.  · Eat whole-grain pasta, brown rice, and beans.  · Eat 2 to 3 servings of low-fat or fat-free dairy products.  · Ask your doctor about the DASH eating plan. This plan helps reduce blood pressure.  · When you go to a restaurant, ask that your meal be prepared with no added salt.  Maintain a healthy weight  · Ask your healthcare provider how many calories to eat a day.  Then stick to that number.  · Ask your healthcare provider what weight range is healthiest for you. If you are overweight, a weight loss of only 3% to 5% of your body weight can help lower blood pressure. Generally, a good weight loss goal is to lose 10% of your body weight in a year.  · Limit snacks and sweets.  · Get regular exercise.  Get up and get active  · Choose activities you enjoy. Find ones you can do with friends or family. This includes bicycling, dancing, walking, and jogging.  · Park farther away from building entrances.  · Use stairs instead of the elevator.  · When you can, walk or bike instead of driving.  · [place] leaves, garden, or do household repairs.  · Be active at a moderate to vigorous level of physical activity for at least 40 minutes for a minimum of 3 to 4 days a week.   Manage stress  · Make time to relax and enjoy life. Find time to laugh.  · Communicate your concerns with your loved ones and your healthcare provider.  · Visit with family and friends, and keep up with hobbies.  Limit alcohol and quit smoking  · Men should have no more than 2 drinks per day.  · Women should have no more than 1 drink per day.  · Talk with your healthcare provider about quitting smoking. Smoking significantly increases your risk for heart disease and stroke. Ask your healthcare provider about community smoking cessation programs and other options.  Medicines  If lifestyle changes arent enough, your healthcare provider may prescribe high blood pressure medicine. Take all medicines as prescribed. If you have any questions about your medicines, ask your healthcare provider before stopping or changing them.   Date Last Reviewed: 4/27/2016 © 2000-2016 [employer]. [address] [place] [address], [place] [address]. All rights reserved. This information is not intended as a substitute for professional medical care. Always follow your healthcare professional's instructions.        Walking for  Fitness  Fitness walking has something for everyone, even people who are already fit. Walking is one of the safest ways to condition your body aerobically. It can boost energy, help you lose weight, and reduce stress.    Physical benefits  · Walking strengthens your heart and lungs, and tones your muscles.  · When walking, your feet land with less impact than in other sports. This reduces chances of muscle, bone, and joint injury.  · Regular walking improves your cholesterol levels and lowers your risk of heart disease. And it helps you control your blood sugar if you have diabetes.  · Walking is a weight-bearing activity, which helps maintain bone density. This can help prevent osteoporosis.  Personal rewards  · Taking walks can help you relax and manage stress. And fitness walking may make you feel better about yourself.  · Walking can help you sleep better at night and make you less likely to be depressed.  · Regular walking may help maintain your memory as you get older.  · Walking is a great way to spend extra time with friends and family members. Be sure to invite your dog along!  Q&A about fitness walking  Q: Will walking keep me fit?  A: Yes. Regular walking at the right pace gives you all the benefits of other aerobic activities, such as jogging and swimming.  Q: Will walking help me lose weight and keep it off?  A: Yes. Per mile, walking can burn as many calories as jogging. Your health care provider can help work walking into your weight-loss plan.  Q: Is walking safe for my health?  A: Yes. Walking is safe if you have high blood pressure, diabetes, heart disease, or other conditions. Talk to your health care provider before you start.  Date Last Reviewed: 5/9/2015 © 2000-2016 [employer]. [address] [place] [address]. All rights reserved. This information is not intended as a substitute for professional medical care. Always follow your healthcare professional's  instructions.             Language Assistance Services     ATTENTION: Language assistance services are available, free of charge. Please call [phone].      ATENCIÓN: Si habla acacia, tiene a prater disposición servicios gratuitos de asistencia lingüística. Llame al [phone].     CHÚ Ý: N?u b?n nói Ti?ng Vi?t, có các d?ch v? h? tr? ngôn ng? mi?n phí dành cho b?n. G?i s? [phone].         [facility] complies with applicable Federal civil rights laws and does not discriminate on the basis of race, color, national origin, age, disability, or sex.

## 2017-03-17 DIAGNOSIS — E55.9 VITAMIN D DEFICIENCY: ICD-10-CM

## 2017-03-17 DIAGNOSIS — E03.9 HYPOTHYROIDISM, UNSPECIFIED TYPE: Primary | ICD-10-CM

## 2017-03-17 RX ORDER — LEVOTHYROXINE SODIUM 112 UG/1
112 TABLET ORAL
Qty: 30 TABLET | Refills: 5 | Status: SHIPPED | OUTPATIENT
Start: 2017-03-17 | End: 2017-09-10 | Stop reason: SDUPTHER

## 2017-03-17 NOTE — TELEPHONE ENCOUNTER
----- Message from Graham Mazariegos MD sent at 3/16/2017  7:57 PM CDT -----  The TSH is slightly elevated which means the thyroid is borderline low.  The T4 was normal so the thyroid level is okay for now but may tend to run a little low some of the time.  If feeling well no changes are needed.  If feeling cold, having constipation problems, trouble with memory or concentration we can increase the synthroid to the next dose up (112mcg) but too much can be a problem too.

## 2017-03-17 NOTE — TELEPHONE ENCOUNTER
Synthroid generic 112mcg sent to [facility]  Order in for TSH in three months  Order in for vitam D level.  May not need refill on that.

## 2017-03-17 NOTE — TELEPHONE ENCOUNTER
Patient was given results.   She stated she is having all of the listed problems.   Please send in new generic rx to her [facility].     She also wanted refill on her vitamin D. I don't see a lab result for vitamin d .

## 2017-03-20 RX ORDER — ERGOCALCIFEROL 1.25 MG/1
50000 CAPSULE ORAL
OUTPATIENT
Start: 2017-03-20

## 2017-03-29 ENCOUNTER — TELEPHONE (OUTPATIENT)
Dept: FAMILY MEDICINE | Facility: CLINIC | Age: 77
End: 2017-03-29

## 2017-03-29 NOTE — TELEPHONE ENCOUNTER
1.  Which medication or medications does she need to have refilled.  She is on carvedilol and lisinopril.  Both are for blood pressure.    2.  She was to keep a blood pressure log, does she have it?

## 2017-03-29 NOTE — TELEPHONE ENCOUNTER
----- Message from Izabella Cortez sent at 3/29/2017  2:13 PM CDT -----  Patient is requesting a refill on her blood pressure medication but states at her last visit Dr. Mazariegos was to speak with her cardiologist to find out if a change was need in medication after her stent were placed, contact patient at [phone].    Thank you

## 2017-03-31 RX ORDER — LISINOPRIL 20 MG/1
20 TABLET ORAL DAILY
Qty: 30 TABLET | Refills: 5 | Status: SHIPPED | OUTPATIENT
Start: 2017-03-31 | End: 2017-04-10 | Stop reason: DRUGHIGH

## 2017-03-31 NOTE — TELEPHONE ENCOUNTER
----- Message from Florencio Wright sent at 3/31/2017  9:02 AM CDT -----  Contact: pt  Pt is calling with her blood pressure readings 3-16-56338/84 pulse 72 3-17-17 160/80 pulse 74  3-18-17 154/87 pulse 74 3-19-17 142/87 pule 75 3-20-17 151/84 pulse 72 3-21-17 170/90 pulse 61 3-22-17 140/75 pulse 72 3-23-17 145/76 pulse 73 3-24-17 172/81 pulse 70 3-25-17 150/80 pulse 72 3-26-17 160/80 pulse 74 3-27-17 140/70 pulse 72 3-28-17 136/76 pulse 72  3-29-17 145/80 pulse 72 3-30-17 154/86 pulse 74 3-31-17 173/90 pulse 72 (pt is requeting a refill on her Zestril and Coreg 3.125mg)  Call Back#[phone]  Thanks    [id number] [place] [place] - [address]  [address]  [place] [address]  Phone: [phone] Fax: [phone]

## 2017-03-31 NOTE — TELEPHONE ENCOUNTER
bp readings are consistently high.  I increased lisinopril to 20mg and sent in refill.  Should recheck bp after four weeks.  Lisinopril is generic, zestril is brand name-same drug

## 2017-04-03 RX ORDER — CARVEDILOL 3.12 MG/1
3.12 TABLET ORAL 2 TIMES DAILY
Qty: 60 TABLET | Refills: 10 | Status: SHIPPED | OUTPATIENT
Start: 2017-04-03 | End: 2017-08-09 | Stop reason: ALTCHOICE

## 2017-04-03 NOTE — TELEPHONE ENCOUNTER
Patient tried to bring BP log this morning, but stated due to the rain, she will bring it in to office tomorrow.

## 2017-04-03 NOTE — TELEPHONE ENCOUNTER
Explained to patient regarding increased dosage of lisinopril, understanding indicated.  She is also requesting a refill of Coreg  LV 3.15.17  NV 5.16.17

## 2017-04-04 ENCOUNTER — HOSPITAL ENCOUNTER (OUTPATIENT)
Dept: RADIOLOGY | Facility: CLINIC | Age: 77
Discharge: HOME | End: 2017-04-04
Attending: FAMILY MEDICINE
Payer: MEDICARE

## 2017-04-04 DIAGNOSIS — Z78.0 POST-MENOPAUSE: ICD-10-CM

## 2017-04-04 LAB
CTP QC/QA: YES
FECAL OCCULT BLOOD, POC: NEGATIVE

## 2017-04-04 PROCEDURE — 77080 DXA BONE DENSITY AXIAL: CPT | Mod: TC,PO

## 2017-04-04 PROCEDURE — 77080 DXA BONE DENSITY AXIAL: CPT | Mod: 26,,, | Performed by: RADIOLOGY

## 2017-04-05 ENCOUNTER — TELEPHONE (OUTPATIENT)
Dept: FAMILY MEDICINE | Facility: CLINIC | Age: 77
End: 2017-04-05

## 2017-04-05 DIAGNOSIS — M81.0 OSTEOPOROSIS, UNSPECIFIED OSTEOPOROSIS TYPE, UNSPECIFIED PATHOLOGICAL FRACTURE PRESENCE: Primary | ICD-10-CM

## 2017-04-05 RX ORDER — IBANDRONATE SODIUM 150 MG/1
150 TABLET, FILM COATED ORAL
Qty: 1 TABLET | Refills: 11 | Status: SHIPPED | OUTPATIENT
Start: 2017-04-05 | End: 2017-05-16 | Stop reason: SDDI

## 2017-04-05 NOTE — TELEPHONE ENCOUNTER
----- Message from Graham Mazariegos MD sent at 4/4/2017  8:32 PM CDT -----  Her bone density is positive for osteoporosis at high risk of fracture.  It is recommended to go on a bisphosphonate such as boniva, actonel, or fosamax.

## 2017-04-10 RX ORDER — LISINOPRIL 40 MG/1
40 TABLET ORAL DAILY
Qty: 90 TABLET | Refills: 3 | Status: SHIPPED | OUTPATIENT
Start: 2017-04-10 | End: 2017-08-09 | Stop reason: ALTCHOICE

## 2017-04-10 NOTE — TELEPHONE ENCOUNTER
Patient was called for log, stated new medication is causing her to void a lot. She is also having problems with bladder. She is going to get her blood pressure log together and call back.   ( lori:Nephew shot himself over the weekend.)   Brigitte received call back.

## 2017-04-10 NOTE — TELEPHONE ENCOUNTER
Patient notified to increase Lisinopril to 40mg-will recheck in May- send new dose to [facility]

## 2017-04-10 NOTE — TELEPHONE ENCOUNTER
bp readings      169/95 76 today soon after taking medication  160/95  179/95  144/84  154/85  132/78

## 2017-05-12 ENCOUNTER — DOCUMENTATION ONLY (OUTPATIENT)
Dept: FAMILY MEDICINE | Facility: CLINIC | Age: 77
End: 2017-05-12

## 2017-05-12 NOTE — PROGRESS NOTES
Pre-Visit Chart Review  For Appointment Scheduled on 5-16-17    Health Maintenance Due   Topic Date Due    Colonoscopy  06/22/1991    Zoster Vaccine  06/22/2001

## 2017-05-16 ENCOUNTER — OFFICE VISIT (OUTPATIENT)
Dept: FAMILY MEDICINE | Facility: CLINIC | Age: 77
End: 2017-05-16
Payer: MEDICARE

## 2017-05-16 VITALS
SYSTOLIC BLOOD PRESSURE: 122 MMHG | HEART RATE: 65 BPM | WEIGHT: 198.44 LBS | BODY MASS INDEX: 37.47 KG/M2 | DIASTOLIC BLOOD PRESSURE: 84 MMHG | HEIGHT: 61 IN | TEMPERATURE: 98 F | RESPIRATION RATE: 20 BRPM | OXYGEN SATURATION: 97 %

## 2017-05-16 DIAGNOSIS — I10 GOOD HYPERTENSION CONTROL: Primary | ICD-10-CM

## 2017-05-16 DIAGNOSIS — M81.0 OSTEOPOROSIS, UNSPECIFIED OSTEOPOROSIS TYPE, UNSPECIFIED PATHOLOGICAL FRACTURE PRESENCE: ICD-10-CM

## 2017-05-16 PROCEDURE — 3079F DIAST BP 80-89 MM HG: CPT | Mod: S$GLB,,, | Performed by: FAMILY MEDICINE

## 2017-05-16 PROCEDURE — 3074F SYST BP LT 130 MM HG: CPT | Mod: S$GLB,,, | Performed by: FAMILY MEDICINE

## 2017-05-16 PROCEDURE — 1126F AMNT PAIN NOTED NONE PRSNT: CPT | Mod: S$GLB,,, | Performed by: FAMILY MEDICINE

## 2017-05-16 PROCEDURE — 99999 PR PBB SHADOW E&M-EST. PATIENT-LVL IV: CPT | Mod: PBBFAC,,, | Performed by: FAMILY MEDICINE

## 2017-05-16 PROCEDURE — 1160F RVW MEDS BY RX/DR IN RCRD: CPT | Mod: S$GLB,,, | Performed by: FAMILY MEDICINE

## 2017-05-16 PROCEDURE — 99213 OFFICE O/P EST LOW 20 MIN: CPT | Mod: S$GLB,,, | Performed by: FAMILY MEDICINE

## 2017-05-16 PROCEDURE — 1159F MED LIST DOCD IN RCRD: CPT | Mod: S$GLB,,, | Performed by: FAMILY MEDICINE

## 2017-05-16 NOTE — MR AVS SNAPSHOT
[facility]  [address]  [place] [address]  Phone: [phone]  Fax: [phone]                  Heather Hughes   2017 9:40 AM   Office Visit    Description:  Female : 1940   Provider:  Graham Mazariegos MD   Department:  [facility]           Reason for Visit     Hypertension           Diagnoses this Visit        Comments    Good hypertension control    -  Primary     Osteoporosis, unspecified osteoporosis type, unspecified pathological fracture presence                To Do List           Future Appointments        Provider Department Dept Phone    2017 10:00 AM LABANG[facility] - [facility] [phone]      Goals (5 Years of Data)     None      [facility]     [facility] Nurse Care [facility] -  Assistance  Unless otherwise directed by your provider, please contact [facility], our nurse care line that is available for  assistance.     Registered nurses in the [facility] provide: appointment scheduling, clinical advisement, health education, and other advisory services.  Call: [phone] (toll free)               Medications           Message regarding Medications     Verify the changes and/or additions to your medication regime listed below are the same as discussed with your clinician today.  If any of these changes or additions are incorrect, please notify your healthcare provider.        STOP taking these medications     diazePAM (VALIUM) 10 MG Tab Take 5 mg by mouth daily as needed.     ergocalciferol (ERGOCALCIFEROL) 50,000 unit Cap Take 50,000 Units by mouth every 7 days.    hydrOXYzine HCl (ATARAX) 10 MG Tab Take 5 mg by mouth 3 (three) times daily as needed.     ibandronate (BONIVA) 150 mg tablet Take 1 tablet (150 mg total) by mouth every 30 days.    trazodone (DESYREL) 50 MG tablet Take 1 tablet (50 mg total) by mouth nightly as needed for Insomnia.           Verify that the below list of  medications is an accurate representation of the medications you are currently taking.  If none reported, the list may be blank. If incorrect, please contact your healthcare provider. Carry this list with you in case of emergency.           Current Medications     acetaminophen (TYLENOL) 500 MG tablet Take 500 mg by mouth every 6 (six) hours as needed for Pain.    albuterol 90 mcg/actuation inhaler Inhale 2 puffs into the lungs every 6 (six) hours as needed for Wheezing. Rescue    aspirin (ECOTRIN) 81 MG EC tablet Take 81 mg by mouth once daily.    benzonatate (TESSALON) 200 MG capsule Take 1 capsule (200 mg total) by mouth 3 (three) times daily as needed for Cough.    CALCIUM CARBONATE/VITAMIN D3 (CALCIUM 500 + D ORAL) Take 2 tablets by mouth once daily.    carvedilol (COREG) 3.125 MG tablet Take 1 tablet (3.125 mg total) by mouth 2 (two) times daily.    clopidogrel (PLAVIX) 75 mg tablet Take 1 tablet (75 mg total) by mouth once daily.    glucosamine-chondroitin 500-400 mg tablet Take 1 tablet by mouth 3 (three) times daily.    ibuprofen (ADVIL,MOTRIN) 200 MG tablet Take 200 mg by mouth every 6 (six) hours as needed for Pain.    levothyroxine (SYNTHROID) 112 MCG tablet Take 1 tablet (112 mcg total) by mouth before breakfast.    lisinopril (PRINIVIL,ZESTRIL) 40 MG tablet Take 1 tablet (40 mg total) by mouth once daily. Dose change from 20mg    montelukast (SINGULAIR) 10 mg tablet Take 10 mg by mouth every evening.    nitroGLYCERIN (NITROSTAT) 0.4 MG SL tablet Place 0.4 mg under the tongue every 5 (five) minutes as needed for Chest pain.    omeprazole (PRILOSEC) 40 MG capsule Take 40 mg by mouth once daily.    pravastatin (PRAVACHOL) 40 MG tablet Take 1 tablet (40 mg total) by mouth every evening.    tramadol (ULTRAM) 50 mg tablet Take 50 mg by mouth 3 (three) times daily as needed for Pain.           Clinical Reference Information           Your Vitals Were     BP Pulse Temp Resp Height Weight    122/84 (BP  "Location: Right arm, Patient Position: Sitting, BP Method: Manual) 65 97.5 °F (36.4 °C) (Oral) 20 5' 1" (1.549 m) 90 kg (198 lb 6.6 oz)    SpO2 BMI             97% 37.49 kg/m2         Blood Pressure          Most Recent Value    BP  122/84      Allergies as of 5/16/2017     Lipitor [Atorvastatin]    Myrbetriq [Mirabegron]    Codeine    Mobic [Meloxicam]    Sulfur      Immunizations Administered on Date of Encounter - 5/16/2017     None      Instructions      Weight Management: Getting Started  Healthy bodies come in all shapes and sizes. Not all bodies are made to be thin. For some people, a healthy weight is higher than the average weight listed on weight charts. Your healthcare provider can help you decide on a healthy weight for you.    Reasons to lose weight  Losing weight can help with some health problems, such as high blood pressure, heart disease, diabetes, sleep apnea, and arthritis. You may also feel more energy.  Set your long-term goal  Your goal doesn't even have to be a specific weight. You may decide on a fitness goal (such as being able to walk 10 miles a week), or a health goal (such as lowering your blood pressure). Choose a goal that is measurable and reasonable, so you know when you've reached it. A goal of reaching a BMI of less than 25 is not always reasonable (or possible).   Make an action plan  Habits dont change overnight. Setting your goals too high can leave you feeling discouraged if you cant reach them. Be realistic. Choose one or two small changes you can make now. Set an action plan for how you are going to make these changes. When you can stick to this plan, keep making a few more small changes. Taking small steps will help you stay on the path to success.  Track your progress  Write down your goals. Then, keep a daily record of your progress. Write down what you eat and how active you are. This record lets you look back on how much youve done. It may also help when youre feeling " frustrated. Reward yourself for success. Even if you dont reach every goal, give yourself credit for what you do get done.  Get support  Encouragement from others can help make losing weight easier. Ask your family members and friends for support. They may even want to join you. Also look to your healthcare provider, registered dietitian, and  for help. Your local hospital can give you more information about nutrition, exercise, and weight loss.  Date Last Reviewed: 1/31/2016  © 5962-1144 [employer]. [address] [place] [address]. All rights reserved. This information is not intended as a substitute for professional medical care. Always follow your healthcare professional's instructions.        Controlling High Blood Pressure  High blood pressure (hypertension) is often called the silent killer. This is because many people who have it dont know it. High blood pressure is defined as 140/90 mm Hg or higher. Know your blood pressure and remember to check it regularly. Doing so can save your life. Here are some things you can do to help control your blood pressure.    Choose heart-healthy foods  · Select low-salt, low-fat foods. Limit sodium intake to 2,400 mg per day or the amount suggested by your healthcare provider.  · Limit canned, dried, cured, packaged, and fast foods. These can contain a lot of salt.  · Eat 8 to 10 servings of fruits and vegetables every day.  · Choose lean meats, fish, or chicken.  · Eat whole-grain pasta, brown rice, and beans.  · Eat 2 to 3 servings of low-fat or fat-free dairy products.  · Ask your doctor about the DASH eating plan. This plan helps reduce blood pressure.  · When you go to a restaurant, ask that your meal be prepared with no added salt.  Maintain a healthy weight  · Ask your healthcare provider how many calories to eat a day. Then stick to that number.  · Ask your healthcare provider what weight range is healthiest for you. If  you are overweight, a weight loss of only 3% to 5% of your body weight can help lower blood pressure. Generally, a good weight loss goal is to lose 10% of your body weight in a year.  · Limit snacks and sweets.  · Get regular exercise.  Get up and get active  · Choose activities you enjoy. Find ones you can do with friends or family. This includes bicycling, dancing, walking, and jogging.  · Park farther away from building entrances.  · Use stairs instead of the elevator.  · When you can, walk or bike instead of driving.  · [place] leaves, garden, or do household repairs.  · Be active at a moderate to vigorous level of physical activity for at least 40 minutes for a minimum of 3 to 4 days a week.   Manage stress  · Make time to relax and enjoy life. Find time to laugh.  · Communicate your concerns with your loved ones and your healthcare provider.  · Visit with family and friends, and keep up with hobbies.  Limit alcohol and quit smoking  · Men should have no more than 2 drinks per day.  · Women should have no more than 1 drink per day.  · Talk with your healthcare provider about quitting smoking. Smoking significantly increases your risk for heart disease and stroke. Ask your healthcare provider about community smoking cessation programs and other options.  Medicines  If lifestyle changes arent enough, your healthcare provider may prescribe high blood pressure medicine. Take all medicines as prescribed. If you have any questions about your medicines, ask your healthcare provider before stopping or changing them.   Date Last Reviewed: 4/27/2016  © 8576-2659 [employer]. [address] [place] [address], [place] [address]. All rights reserved. This information is not intended as a substitute for professional medical care. Always follow your healthcare professional's instructions.             Language Assistance Services     ATTENTION: Language assistance services are available, free of charge. Please call  [phone].      ATENCIÓN: Si habla español, tiene a prater disposición servicios gratuitos de asistencia lingüística. Llame al [phone].     CHÚ Ý: N?u b?n nói Ti?ng Vi?t, có các d?ch v? h? tr? ngôn ng? mi?n phí dành cho b?n. G?i s? [phone].         [facility] complies with applicable Federal civil rights laws and does not discriminate on the basis of race, color, national origin, age, disability, or sex.

## 2017-05-16 NOTE — PROGRESS NOTES
Subjective:       Patient ID: Heather Hughes is a 76 y.o. female.    Chief Complaint: Hypertension (f/u/ heart)    HPI Comments: 76 year old female with hypertension, chronic anxiety comes in for blood pressure recheck.  She had been given lisinopril 40mg at her last visit but stopped it due to complaints of muscle cramping and frequent urination.  Pill was checked and confirmed not to have a diuretic.  She went back to her 20mg taking it bid for a total daily dose of 40mg and has no side effect issues.  Home bp is labile depending on mood and stress.  All diastolics are good but most systolics are high.  After 10 minutes of relaxation she had a good level.  She uses a wrist cuff at home.    Past Medical History:  No date: Allergy  No date: Arthritis  No date: Asthma  01/13/2017: Heart attack  No date: Hypertension  No date: Osteoporosis  No date: Thyroid disease  No date: Ulcer    Past Surgical History:  01/13/2017: cardic stent  No date: EYE SURGERY      Comment: bilateral cataracts  2011: FRACTURE SURGERY      Comment: left wrist   No date: THYROIDECTOMY      Current Outpatient Prescriptions:     acetaminophen (TYLENOL) 500 MG tablet, Take 500 mg by mouth every 6 (six) hours as needed for Pain., Disp: , Rfl:     albuterol 90 mcg/actuation inhaler, Inhale 2 puffs into the lungs every 6 (six) hours as needed for Wheezing. Rescue, Disp: , Rfl:     aspirin (ECOTRIN) 81 MG EC tablet, Take 81 mg by mouth once daily., Disp: , Rfl:     benzonatate (TESSALON) 200 MG capsule, Take 1 capsule (200 mg total) by mouth 3 (three) times daily as needed for Cough., Disp: 60 capsule, Rfl: 5    CALCIUM CARBONATE/VITAMIN D3 (CALCIUM 500 + D ORAL), Take 2 tablets by mouth once daily., Disp: , Rfl:     carvedilol (COREG) 3.125 MG tablet, Take 1 tablet (3.125 mg total) by mouth 2 (two) times daily., Disp: 60 tablet, Rfl: 10    clopidogrel (PLAVIX) 75 mg tablet, Take 1 tablet (75 mg total) by mouth once daily., Disp: 30  tablet, Rfl: 11    glucosamine-chondroitin 500-400 mg tablet, Take 1 tablet by mouth 3 (three) times daily., Disp: , Rfl:     ibuprofen (ADVIL,MOTRIN) 200 MG tablet, Take 200 mg by mouth every 6 (six) hours as needed for Pain., Disp: , Rfl:     levothyroxine (SYNTHROID) 112 MCG tablet, Take 1 tablet (112 mcg total) by mouth before breakfast., Disp: 30 tablet, Rfl: 5    lisinopril (PRINIVIL,ZESTRIL) 40 MG tablet, Take 1 tablet (40 mg total) by mouth once daily. Dose change from 20mg (Patient taking differently: Take 20 mg by mouth 2 (two) times daily. Dose change from 20mg), Disp: 90 tablet, Rfl: 3    montelukast (SINGULAIR) 10 mg tablet, Take 10 mg by mouth every evening., Disp: , Rfl:     nitroGLYCERIN (NITROSTAT) 0.4 MG SL tablet, Place 0.4 mg under the tongue every 5 (five) minutes as needed for Chest pain., Disp: , Rfl:     omeprazole (PRILOSEC) 40 MG capsule, Take 40 mg by mouth once daily., Disp: , Rfl:     pravastatin (PRAVACHOL) 40 MG tablet, Take 1 tablet (40 mg total) by mouth every evening., Disp: 90 tablet, Rfl: 3    tramadol (ULTRAM) 50 mg tablet, Take 50 mg by mouth 3 (three) times daily as needed for Pain., Disp: , Rfl:     Zoster Vaccine due on 06/22/2001    She recently increased synthroid and is scheduled for recheck June 20.    Hypertension       Review of Systems   Psychiatric/Behavioral: Positive for sleep disturbance. The patient is nervous/anxious (she declined alternatives to valium which she is aware I will not give her.).        Objective:      Physical Exam   Constitutional: She appears well-developed. No distress.   Good blood pressure control  Patient is obese with bmi of 37.5.   Weight is decreased by 0.7lb since last visit of 3/15/17.     Skin: She is not diaphoretic.   Nursing note and vitals reviewed.      Assessment:       1. Good hypertension control    2. Osteoporosis, unspecified osteoporosis type, unspecified pathological fracture presence        Plan:       1. Good  hypertension control  No changes needed    2. Osteoporosis, unspecified osteoporosis type, unspecified pathological fracture presence  Declines bisphosphonates due to taking synthroid.  Both say to take alone first thing in the morning even though bisphosphonate is once a month she refuses to take both.         Patient readiness: nonacceptance and barriers:readiness, social stressors and household issues    During the course of the visit the patient was educated and counseled about the following:     Hypertension:   Medication: no change.  Dietary sodium restriction.  Regular aerobic exercise.  Obesity:   General weight loss/lifestyle modification strategies discussed (elicit support from others; identify saboteurs; non-food rewards, etc).  Informal exercise measures discussed, e.g. taking stairs instead of elevator.  Regular aerobic exercise program discussed.    Goals: Hypertension: Reduce Blood Pressure and Obesity: Reduce calorie intake and BMI     Did patient meet goals/outcomes: Yes    The following self management tools provided: blood pressure log  excercise log    Patient Instructions (the written plan) was given to the patient/family.     Time spent with patient: 15 minutes

## 2017-06-20 ENCOUNTER — LAB VISIT (OUTPATIENT)
Dept: LAB | Facility: HOSPITAL | Age: 77
End: 2017-06-20
Attending: FAMILY MEDICINE
Payer: MEDICARE

## 2017-06-20 DIAGNOSIS — E03.9 HYPOTHYROIDISM, UNSPECIFIED TYPE: ICD-10-CM

## 2017-06-20 DIAGNOSIS — E55.9 VITAMIN D DEFICIENCY: ICD-10-CM

## 2017-06-20 LAB
25(OH)D3+25(OH)D2 SERPL-MCNC: 20 NG/ML
TSH SERPL DL<=0.005 MIU/L-ACNC: 0.88 UIU/ML

## 2017-06-20 PROCEDURE — 36415 COLL VENOUS BLD VENIPUNCTURE: CPT | Mod: PO

## 2017-06-20 PROCEDURE — 82306 VITAMIN D 25 HYDROXY: CPT

## 2017-06-20 PROCEDURE — 84443 ASSAY THYROID STIM HORMONE: CPT

## 2017-06-20 RX ORDER — ERGOCALCIFEROL 1.25 MG/1
50000 CAPSULE ORAL
Qty: 12 CAPSULE | Refills: 3 | Status: SHIPPED | OUTPATIENT
Start: 2017-06-20 | End: 2018-04-11 | Stop reason: SDUPTHER

## 2017-07-25 ENCOUNTER — TELEPHONE (OUTPATIENT)
Dept: CARDIOLOGY | Facility: CLINIC | Age: 77
End: 2017-07-25

## 2017-07-25 DIAGNOSIS — I21.4 NSTEMI (NON-ST ELEVATED MYOCARDIAL INFARCTION): Primary | ICD-10-CM

## 2017-07-25 DIAGNOSIS — I25.10 CORONARY ARTERY DISEASE, ANGINA PRESENCE UNSPECIFIED, UNSPECIFIED VESSEL OR LESION TYPE, UNSPECIFIED WHETHER NATIVE OR TRANSPLANTED HEART: ICD-10-CM

## 2017-07-25 NOTE — TELEPHONE ENCOUNTER
----- Message from Ryan Pearl sent at 7/25/2017 10:44 AM CDT -----  [phone]- pt had bypass earlier this year and needs follow up nothing available soon  Please call thanks

## 2017-07-25 NOTE — TELEPHONE ENCOUNTER
Returned pts call. No answer. Left message advising pt that Dr Tillman is out of the office until September, but she can be scheduled with Dr Ace if she wished.  Left office number for pt to call back.

## 2017-07-27 NOTE — TELEPHONE ENCOUNTER
Called pt and scheduled her to br seen by Dr Ace, by her request. Pt verbalized OK with date and time. Letter also mailed out. No further issues discussed.

## 2017-08-09 ENCOUNTER — OFFICE VISIT (OUTPATIENT)
Dept: CARDIOLOGY | Facility: CLINIC | Age: 77
End: 2017-08-09
Payer: MEDICARE

## 2017-08-09 VITALS
HEIGHT: 61 IN | DIASTOLIC BLOOD PRESSURE: 86 MMHG | BODY MASS INDEX: 37.79 KG/M2 | OXYGEN SATURATION: 98 % | WEIGHT: 200.19 LBS | SYSTOLIC BLOOD PRESSURE: 197 MMHG | HEART RATE: 64 BPM

## 2017-08-09 DIAGNOSIS — I10 ESSENTIAL HYPERTENSION: ICD-10-CM

## 2017-08-09 DIAGNOSIS — I25.10 CORONARY ARTERY DISEASE INVOLVING NATIVE CORONARY ARTERY OF NATIVE HEART WITHOUT ANGINA PECTORIS: Primary | ICD-10-CM

## 2017-08-09 DIAGNOSIS — I25.10 CORONARY ARTERY DISEASE INVOLVING NATIVE CORONARY ARTERY OF NATIVE HEART WITHOUT ANGINA PECTORIS: ICD-10-CM

## 2017-08-09 PROCEDURE — 93000 ELECTROCARDIOGRAM COMPLETE: CPT | Mod: S$GLB,,, | Performed by: INTERNAL MEDICINE

## 2017-08-09 PROCEDURE — 3077F SYST BP >= 140 MM HG: CPT | Mod: S$GLB,,, | Performed by: INTERNAL MEDICINE

## 2017-08-09 PROCEDURE — 1126F AMNT PAIN NOTED NONE PRSNT: CPT | Mod: S$GLB,,, | Performed by: INTERNAL MEDICINE

## 2017-08-09 PROCEDURE — 3079F DIAST BP 80-89 MM HG: CPT | Mod: S$GLB,,, | Performed by: INTERNAL MEDICINE

## 2017-08-09 PROCEDURE — 3008F BODY MASS INDEX DOCD: CPT | Mod: S$GLB,,, | Performed by: INTERNAL MEDICINE

## 2017-08-09 PROCEDURE — 99213 OFFICE O/P EST LOW 20 MIN: CPT | Mod: S$GLB,,, | Performed by: INTERNAL MEDICINE

## 2017-08-09 PROCEDURE — 1159F MED LIST DOCD IN RCRD: CPT | Mod: S$GLB,,, | Performed by: INTERNAL MEDICINE

## 2017-08-09 PROCEDURE — 99999 PR PBB SHADOW E&M-EST. PATIENT-LVL III: CPT | Mod: PBBFAC,,, | Performed by: INTERNAL MEDICINE

## 2017-08-09 RX ORDER — HYDROCHLOROTHIAZIDE 25 MG/1
25 TABLET ORAL DAILY
Qty: 30 TABLET | Refills: 0 | Status: SHIPPED | OUTPATIENT
Start: 2017-08-09 | End: 2017-12-28 | Stop reason: ALTCHOICE

## 2017-08-09 RX ORDER — CARVEDILOL 6.25 MG/1
6.25 TABLET ORAL 2 TIMES DAILY WITH MEALS
Qty: 90 TABLET | Refills: 3 | Status: SHIPPED | OUTPATIENT
Start: 2017-08-09 | End: 2018-10-03 | Stop reason: SDUPTHER

## 2017-08-09 RX ORDER — LISINOPRIL AND HYDROCHLOROTHIAZIDE 12.5; 2 MG/1; MG/1
2 TABLET ORAL DAILY
Qty: 180 TABLET | Refills: 3 | Status: SHIPPED | OUTPATIENT
Start: 2017-08-09 | End: 2017-12-28 | Stop reason: ALTCHOICE

## 2017-08-09 NOTE — PROGRESS NOTES
[facility]    CC: Follow-up after her PCI  Chief Complaint   Patient presents with    Follow-up     Follow Up - stents in Jan 2017       Patient ID: Heather Hughes is a 77 y.o. female with a past medical history of non-STEMI status post PCI to the right coronary artery    HPI  Patient has been doing well post-PCI.  It was done at the main campus.  She remains on dual antiplatelet therapy.  Denies any chest pain or shortness of breath.    Past Medical History:   Diagnosis Date    Allergy     Arthritis     Asthma     Heart attack 01/13/2017    Hypertension     Osteoporosis     Thyroid disease     Ulcer      Past Surgical History:   Procedure Laterality Date    cardic stent  01/13/2017    EYE SURGERY      bilateral cataracts    FRACTURE SURGERY  2011    left wrist     THYROIDECTOMY       Social History     Social History    Marital status:      Spouse name: N/A    Number of children: N/A    Years of education: N/A     Occupational History    Not on file.     Social History Main Topics    Smoking status: Former Smoker     Packs/day: 0.50     Types: Cigarettes     Quit date: 1/13/2017    Smokeless tobacco: Never Used    Alcohol use No    Drug use: No    Sexual activity: No     Other Topics Concern    Not on file     Social History Narrative    No narrative on file     Family History   Problem Relation Age of Onset    Lupus Mother     Osteoporosis Mother     Heart disease Mother     Ulcers Mother     Esophageal cancer Father     Cancer Father      esophageal    DIEGO disease Father     Cancer Brother     Esophageal cancer Brother     Lupus Daughter     DIEGO disease Son     Heart disease Maternal Aunt     Cancer Maternal Aunt     Kidney disease Maternal Aunt     Kidney cancer Maternal Aunt     Liver cancer Maternal Aunt     Dementia Maternal Aunt     Cancer Maternal Uncle     Colon cancer Maternal Uncle     Breast cancer Maternal Uncle     Lung cancer  Maternal Uncle      smoker    Parkinsonism Paternal Aunt     Cancer Paternal Aunt     Stomach cancer Paternal Aunt     Heart disease Paternal Uncle     Cancer Paternal Uncle     Stomach cancer Paternal Uncle      x2    Bone cancer Paternal Uncle     Cancer Maternal Grandmother      tumors on head and body    No Known Problems Maternal Grandfather     Parkinsonism Paternal Grandmother     No Known Problems Paternal Grandfather     DIEGO disease Brother     Ulcers Brother     Dementia Brother     Early death Son      self    Bipolar disorder Son     Cancer Son     Esophageal cancer Son     Drug abuse Son        Review of patient's allergies indicates:   Allergen Reactions    Lipitor [atorvastatin] Other (See Comments)     Fall/ balance     Myrbetriq [mirabegron] Swelling and Other (See Comments)     Legs swelling, couldn't void     Codeine Nausea Only    Mobic [meloxicam]     Sulfur        Medication List with Changes/Refills   New Medications    CARVEDILOL (COREG) 6.25 MG TABLET    Take 1 tablet (6.25 mg total) by mouth 2 (two) times daily with meals.    HYDROCHLOROTHIAZIDE (HYDRODIURIL) 25 MG TABLET    Take 1 tablet (25 mg total) by mouth once daily.    LISINOPRIL-HYDROCHLOROTHIAZIDE (PRINZIDE,ZESTORETIC) 20-12.5 MG PER TABLET    Take 2 tablets by mouth once daily.   Current Medications    ACETAMINOPHEN (TYLENOL) 500 MG TABLET    Take 500 mg by mouth every 6 (six) hours as needed for Pain.    ALBUTEROL 90 MCG/ACTUATION INHALER    Inhale 2 puffs into the lungs every 6 (six) hours as needed for Wheezing. Rescue    ASPIRIN (ECOTRIN) 81 MG EC TABLET    Take 81 mg by mouth once daily.    BENZONATATE (TESSALON) 200 MG CAPSULE    Take 1 capsule (200 mg total) by mouth 3 (three) times daily as needed for Cough.    CALCIUM CARBONATE/VITAMIN D3 (CALCIUM 500 + D ORAL)    Take 2 tablets by mouth once daily.    CLOPIDOGREL (PLAVIX) 75 MG TABLET    Take 1 tablet (75 mg total) by mouth once daily.     "ERGOCALCIFEROL (ERGOCALCIFEROL) 50,000 UNIT CAP    Take 1 capsule (50,000 Units total) by mouth every 7 days.    GLUCOSAMINE-CHONDROITIN 500-400 MG TABLET    Take 1 tablet by mouth 3 (three) times daily.    IBUPROFEN (ADVIL,MOTRIN) 200 MG TABLET    Take 200 mg by mouth every 6 (six) hours as needed for Pain.    LEVOTHYROXINE (SYNTHROID) 112 MCG TABLET    Take 1 tablet (112 mcg total) by mouth before breakfast.    MONTELUKAST (SINGULAIR) 10 MG TABLET    Take 10 mg by mouth every evening.    NITROGLYCERIN (NITROSTAT) 0.4 MG SL TABLET    Place 0.4 mg under the tongue every 5 (five) minutes as needed for Chest pain.    OMEPRAZOLE (PRILOSEC) 40 MG CAPSULE    Take 40 mg by mouth once daily.    PRAVASTATIN (PRAVACHOL) 40 MG TABLET    Take 1 tablet (40 mg total) by mouth every evening.    TRAMADOL (ULTRAM) 50 MG TABLET    Take 50 mg by mouth 3 (three) times daily as needed for Pain.   Discontinued Medications    CARVEDILOL (COREG) 3.125 MG TABLET    Take 1 tablet (3.125 mg total) by mouth 2 (two) times daily.    LISINOPRIL (PRINIVIL,ZESTRIL) 40 MG TABLET    Take 1 tablet (40 mg total) by mouth once daily. Dose change from 20mg       Review of Systems  Constitution: Denies chills, fever, and sweats.  HENT: Denies headaches or blurry vision.  Cardiovascular: Denies chest pain or irregular heart beat.  Respiratory: Denies cough or shortness of breath.  Gastrointestinal: Denies abdominal pain, nausea, or vomiting.  Musculoskeletal: Denies muscle cramps.  Neurological: Denies dizziness or focal weakness.  Psychiatric/Behavioral: Normal mental status.  Hematologic/Lymphatic: Denies bleeding problem or easy bruising/bleeding.  Skin: Denies rash or suspicious lesions    Physical Examination  BP (!) 197/86 (BP Location: Left arm, Patient Position: Sitting)   Pulse 64   Ht 5' 1" (1.549 m)   Wt 90.8 kg (200 lb 2.8 oz)   SpO2 98%   BMI 37.82 kg/m²     Constitutional: No acute distress, conversant  HEENT: Sclera anicteric, Pupils " equal, round and reactive to light, extraocular motions intact, Oropharynx clear  Neck: No JVD, no carotid bruits  Cardiovascular: regular rate and rhythm, no murmur, rubs or gallops, normal S1/S2  Pulmonary: Clear to auscultation bilaterally  Abdominal: Abdomen soft, nontender, nondistended, positive bowel sounds  Extremities: No lower extremity edema,   Pulses:  Carotid pulses are 2+ on the right side, and 2+ on the left side.  Radial pulses are 2+ on the right side, and 2+ on the left side.   Femoral pulses are 2+ on the right side, and 2+ on the left side.  .    Skin: No ecchymosis, erythema, or ulcers  Psych: Alert and oriented x 3, appropriate affect  Neuro: CNII-XII intact, no focal deficits    Labs:  Most Recent Data  CBC:   Lab Results   Component Value Date    WBC 6.96 01/14/2017    HGB 11.0 (L) 01/14/2017    HCT 33.9 (L) 01/14/2017     01/14/2017    MCV 90 01/14/2017    RDW 14.1 01/14/2017     BMP:   Lab Results   Component Value Date     01/14/2017    K 3.6 01/14/2017     01/14/2017    CO2 24 01/14/2017    BUN 13 01/14/2017    CREATININE 0.7 01/14/2017    GLU 91 01/14/2017    CALCIUM 8.7 01/14/2017    MG 1.8 01/14/2017     LFTS;   Lab Results   Component Value Date    PROT 5.7 (L) 01/14/2017    ALBUMIN 3.0 (L) 01/14/2017    BILITOT 0.8 01/14/2017    AST 21 01/14/2017    ALKPHOS 79 01/14/2017    ALT 11 01/14/2017     COAGS:   Lab Results   Component Value Date    INR 1.0 01/13/2017     FLP:   Lab Results   Component Value Date    CHOL 172 01/14/2017    HDL 55 01/14/2017    LDLCALC 103.4 01/14/2017    TRIG 68 01/14/2017    CHOLHDL 32.0 01/14/2017     CARDIAC:   Lab Results   Component Value Date    TROPONINI 0.709 (H) 01/13/2017     (H) 01/13/2017       Imaging:    EKG: Normal sinus rhythm.  Inferior Q waves.    Echo:  CONCLUSIONS     1 - Normal left ventricular systolic function (EF 60-65%).     2 - Normal left ventricular diastolic function.     3 - Normal right ventricular  systolic function .     4 - The estimated PA systolic pressure is 25 mmHg.     5 - Mild tricuspid regurgitation.     6 - Low central venous pressure.   Stress Testing:    I have personally reviewed these images and echo data    Assessment/Plan:  Heather was seen today for follow-up.    Diagnoses and all orders for this visit:    Coronary artery disease involving native coronary artery of native heart without angina pectoris  -     EKG 12-lead    Essential hypertension    Other orders  -     carvedilol (COREG) 6.25 MG tablet; Take 1 tablet (6.25 mg total) by mouth 2 (two) times daily with meals.  -     lisinopril-hydrochlorothiazide (PRINZIDE,ZESTORETIC) 20-12.5 mg per tablet; Take 2 tablets by mouth once daily.  -     hydrochlorothiazide (HYDRODIURIL) 25 MG tablet; Take 1 tablet (25 mg total) by mouth once daily.       her blood pressure was slightly on the higher side.  I increased the Coreg.  In addition I added lisinopril with hydrochlorothiazide.  Emphasized on the need of dual antiplatelet therapy.  He is absolutely opposed to taking statins because apparently she was falling down on statins.    Total duration of face to face visit time 45 minutes.  Total time spent counseling greater than fifty percent of total visit time.  Counseling included discussion regarding imaging findings, diagnosis, possibilities, treatment options, risks and benefits.  The patient had many questions regarding the options and long-term effect which were all answered to my best ability.      Wale Ace MD,MRCP,RPVI,FACC,FSCAI.  Interventional Cardiology   Phone [phone]

## 2017-08-16 RX ORDER — ALBUTEROL SULFATE 90 UG/1
2 AEROSOL, METERED RESPIRATORY (INHALATION) EVERY 6 HOURS PRN
Qty: 18 G | Refills: 2 | Status: SHIPPED | OUTPATIENT
Start: 2017-08-16

## 2017-08-16 NOTE — TELEPHONE ENCOUNTER
Patient in clinic today with . She is requesting refill on ventolin inhaler previously prescribed via Dr. Hernandes. She reports wheezing/shortness of breath with purfume fragrances/strong odors. Has used this PRN for years, but is out currently.

## 2017-09-10 DIAGNOSIS — E03.9 HYPOTHYROIDISM, UNSPECIFIED TYPE: ICD-10-CM

## 2017-09-11 RX ORDER — LEVOTHYROXINE SODIUM 112 UG/1
TABLET ORAL
Qty: 30 TABLET | Refills: 8 | Status: SHIPPED | OUTPATIENT
Start: 2017-09-11 | End: 2018-05-29 | Stop reason: SDUPTHER

## 2017-12-08 ENCOUNTER — TELEPHONE (OUTPATIENT)
Dept: FAMILY MEDICINE | Facility: CLINIC | Age: 77
End: 2017-12-08

## 2017-12-08 NOTE — TELEPHONE ENCOUNTER
----- Message from Zoe Gonzales sent at 12/7/2017  9:19 AM CST -----  Contact: -Chet Dodson [phone]  Patient complain of left knee pain, the  asking for a referral to see Dr Frances office -[phone] Thanks!

## 2017-12-18 RX ORDER — LISINOPRIL 20 MG/1
TABLET ORAL
Qty: 30 TABLET | Refills: 0 | OUTPATIENT
Start: 2017-12-18

## 2017-12-19 NOTE — TELEPHONE ENCOUNTER
Taken off this and changed to lisinopril hct in august.  Last bp way up.  What has pharmacy been giving her??????  Needs bp check

## 2017-12-21 ENCOUNTER — TELEPHONE (OUTPATIENT)
Dept: CARDIOLOGY | Facility: CLINIC | Age: 77
End: 2017-12-21

## 2017-12-21 ENCOUNTER — TELEPHONE (OUTPATIENT)
Dept: FAMILY MEDICINE | Facility: CLINIC | Age: 77
End: 2017-12-21

## 2017-12-21 DIAGNOSIS — E03.9 ACQUIRED HYPOTHYROIDISM: ICD-10-CM

## 2017-12-21 DIAGNOSIS — R25.2 LEG CRAMPS: Primary | ICD-10-CM

## 2017-12-21 DIAGNOSIS — L65.9 LOSS OF HAIR: ICD-10-CM

## 2017-12-21 NOTE — TELEPHONE ENCOUNTER
We can change to lisinopril 40mg once a day and eliminate the hctz.  Its usually the diuretic that causes the problem.  Will need to recheck with me in two weeks to check bp and get a chemistry panel to check kidney function.

## 2017-12-21 NOTE — TELEPHONE ENCOUNTER
----- Message from Cheryl Moya sent at 12/21/2017  9:39 AM CST -----  Contact: self  Patient was told to call back Shanika to discuss medication   Please call back [phone]

## 2017-12-21 NOTE — TELEPHONE ENCOUNTER
Ms. Hughes came in to the office today to reschedule her appt with Dr. Ace from 02/2018 to 01/04/2018 due to needing sx clearance. While she was here she stated that Dr. ace changed her lisinopril from 20mg to lisinopril-hctz 40mg daily along with an extra fluid pill. She stated that she stopped taking the 40mg tablet because her BP was really low and she was also constantly in the restroom. I informed her that Dr. ace was out of the office until 12/26/2017, and I gave her a BP log to track her BP for us. She will call us with it on 12/26/2017.     I also advised her that is if she starts to feel dizzy, nauseous or faint again to proceed to the Er. Her and her daughter verbalized understanding. No further issues notes.

## 2017-12-21 NOTE — TELEPHONE ENCOUNTER
Patient called stating she is having numerous leg cramps since starting Lisinopril HCT 20-12.5mg - she is only taking one daily- she is not taking HCTZ 25mg on her list- seen recently by Dr. Ace and he made changes- she has not called him about this. bp running 116/66  108/73  90/75.

## 2017-12-26 ENCOUNTER — TELEPHONE (OUTPATIENT)
Dept: CARDIOLOGY | Facility: CLINIC | Age: 77
End: 2017-12-26

## 2017-12-26 NOTE — TELEPHONE ENCOUNTER
Patient stated that since she is taking the 20-12.5mg Zestoretic she is having a lot of voiding.  Having to spend too much time and stated that she feels weak with her kidneys.   Patient was instructed to take just the 40 mg lisinopril today and stop the HTCZ medication.  Asked patient to monitor her b/p twice daily.  And bring in logs to MD appointment moved frOm 01/04/18 to 12/28/17 at 2:00 pm.  Patient stated that she understands and agrees.

## 2017-12-28 ENCOUNTER — OFFICE VISIT (OUTPATIENT)
Dept: CARDIOLOGY | Facility: CLINIC | Age: 77
End: 2017-12-28
Payer: MEDICARE

## 2017-12-28 VITALS
HEIGHT: 61 IN | BODY MASS INDEX: 38.75 KG/M2 | SYSTOLIC BLOOD PRESSURE: 182 MMHG | HEART RATE: 74 BPM | WEIGHT: 205.25 LBS | OXYGEN SATURATION: 97 % | DIASTOLIC BLOOD PRESSURE: 101 MMHG

## 2017-12-28 DIAGNOSIS — Z01.818 PREOPERATIVE CLEARANCE: Primary | ICD-10-CM

## 2017-12-28 DIAGNOSIS — I10 ESSENTIAL HYPERTENSION: ICD-10-CM

## 2017-12-28 PROCEDURE — 99999 PR PBB SHADOW E&M-EST. PATIENT-LVL III: CPT | Mod: PBBFAC,,, | Performed by: INTERNAL MEDICINE

## 2017-12-28 PROCEDURE — 99214 OFFICE O/P EST MOD 30 MIN: CPT | Mod: S$GLB,,, | Performed by: INTERNAL MEDICINE

## 2017-12-28 RX ORDER — LISINOPRIL 40 MG/1
40 TABLET ORAL DAILY
COMMUNITY
End: 2017-12-28 | Stop reason: ALTCHOICE

## 2017-12-28 RX ORDER — LISINOPRIL 20 MG/1
20 TABLET ORAL 2 TIMES DAILY
Qty: 180 TABLET | Refills: 3 | Status: SHIPPED | OUTPATIENT
Start: 2017-12-28 | End: 2018-07-10

## 2017-12-28 NOTE — PROGRESS NOTES
[facility]    CC: PREOperative risk stratification    Patient ID: Heather Hughes is a 77 y.o. female with a past medical history of coronary artery disease     HPI  Patient is due to undergo left knee total replacement.  Had an angiogram or early part of this year.  She had a PCI to the right coronary artery.  There was an intermediate lesion in the LAD.  He is currently asymptomatic and denies any chest pain shortness of breath however her exercise capacity is extremely limited and she walks with a cane.  This is related to her knee issues.    Past Medical History:   Diagnosis Date    Allergy     Arthritis     Asthma     Heart attack 01/13/2017    Hypertension     Osteoporosis     Thyroid disease     Ulcer      Past Surgical History:   Procedure Laterality Date    cardic stent  01/13/2017    EYE SURGERY      bilateral cataracts    FRACTURE SURGERY  2011    left wrist     THYROIDECTOMY       Social History     Social History    Marital status:      Spouse name: N/A    Number of children: N/A    Years of education: N/A     Occupational History    Not on file.     Social History Main Topics    Smoking status: Former Smoker     Packs/day: 0.50     Types: Cigarettes     Quit date: 1/13/2017    Smokeless tobacco: Never Used    Alcohol use No    Drug use: No    Sexual activity: No     Other Topics Concern    Not on file     Social History Narrative    No narrative on file     Family History   Problem Relation Age of Onset    Lupus Mother     Osteoporosis Mother     Heart disease Mother     Ulcers Mother     Esophageal cancer Father     Cancer Father      esophageal    DIEGO disease Father     Cancer Brother     Esophageal cancer Brother     Lupus Daughter     DIEGO disease Son     Heart disease Maternal Aunt     Cancer Maternal Aunt     Kidney disease Maternal Aunt     Kidney cancer Maternal Aunt     Liver cancer Maternal Aunt     Dementia Maternal Aunt      Cancer Maternal Uncle     Colon cancer Maternal Uncle     Breast cancer Maternal Uncle     Lung cancer Maternal Uncle      smoker    Parkinsonism Paternal Aunt     Cancer Paternal Aunt     Stomach cancer Paternal Aunt     Heart disease Paternal Uncle     Cancer Paternal Uncle     Stomach cancer Paternal Uncle      x2    Bone cancer Paternal Uncle     Cancer Maternal Grandmother      tumors on head and body    No Known Problems Maternal Grandfather     Parkinsonism Paternal Grandmother     No Known Problems Paternal Grandfather     DIEGO disease Brother     Ulcers Brother     Dementia Brother     Early death Son      self    Bipolar disorder Son     Cancer Son     Esophageal cancer Son     Drug abuse Son        Review of patient's allergies indicates:   Allergen Reactions    Lipitor [atorvastatin] Other (See Comments)     Fall/ balance     Myrbetriq [mirabegron] Swelling and Other (See Comments)     Legs swelling, couldn't void     Codeine Nausea Only    Mobic [meloxicam]     Sulfur        Medication List with Changes/Refills   New Medications    LISINOPRIL (PRINIVIL,ZESTRIL) 20 MG TABLET    Take 1 tablet (20 mg total) by mouth 2 (two) times daily.   Current Medications    ACETAMINOPHEN (TYLENOL) 500 MG TABLET    Take 500 mg by mouth every 6 (six) hours as needed for Pain.    ALBUTEROL 90 MCG/ACTUATION INHALER    Inhale 2 puffs into the lungs every 6 (six) hours as needed for Wheezing. Rescue    ALBUTEROL 90 MCG/ACTUATION INHALER    Inhale 2 puffs into the lungs every 6 (six) hours as needed for Wheezing.    ASPIRIN (ECOTRIN) 81 MG EC TABLET    Take 81 mg by mouth once daily.    BENZONATATE (TESSALON) 200 MG CAPSULE    Take 1 capsule (200 mg total) by mouth 3 (three) times daily as needed for Cough.    CALCIUM CARBONATE/VITAMIN D3 (CALCIUM 500 + D ORAL)    Take 2 tablets by mouth once daily.    CARVEDILOL (COREG) 6.25 MG TABLET    Take 1 tablet (6.25 mg total) by mouth 2 (two) times daily  "with meals.    CLOPIDOGREL (PLAVIX) 75 MG TABLET    Take 1 tablet (75 mg total) by mouth once daily.    ERGOCALCIFEROL (ERGOCALCIFEROL) 50,000 UNIT CAP    Take 1 capsule (50,000 Units total) by mouth every 7 days.    GLUCOSAMINE-CHONDROITIN 500-400 MG TABLET    Take 1 tablet by mouth 3 (three) times daily.    IBUPROFEN (ADVIL,MOTRIN) 200 MG TABLET    Take 200 mg by mouth every 6 (six) hours as needed for Pain.    MONTELUKAST (SINGULAIR) 10 MG TABLET    Take 10 mg by mouth every evening.    NITROGLYCERIN (NITROSTAT) 0.4 MG SL TABLET    Place 0.4 mg under the tongue every 5 (five) minutes as needed for Chest pain.    OMEPRAZOLE (PRILOSEC) 40 MG CAPSULE    Take 40 mg by mouth once daily.    PRAVASTATIN (PRAVACHOL) 40 MG TABLET    Take 1 tablet (40 mg total) by mouth every evening.    SYNTHROID 112 MCG TABLET    TAKE ONE TABLET BY MOUTH EVERY DAY BEFORE BREAKFAST    TRAMADOL (ULTRAM) 50 MG TABLET    Take 50 mg by mouth 3 (three) times daily as needed for Pain.   Discontinued Medications    HYDROCHLOROTHIAZIDE (HYDRODIURIL) 25 MG TABLET    Take 1 tablet (25 mg total) by mouth once daily.    LISINOPRIL (PRINIVIL,ZESTRIL) 40 MG TABLET    Take 40 mg by mouth once daily.    LISINOPRIL-HYDROCHLOROTHIAZIDE (PRINZIDE,ZESTORETIC) 20-12.5 MG PER TABLET    Take 2 tablets by mouth once daily.       Review of Systems  Constitution: Denies chills, fever, and sweats.  HENT: Denies headaches or blurry vision.  Cardiovascular: Denies chest pain or irregular heart beat.  Respiratory: Denies cough or shortness of breath.  Gastrointestinal: Denies abdominal pain, nausea, or vomiting.  Musculoskeletal: Denies muscle cramps.  Neurological: Denies dizziness or focal weakness.  Psychiatric/Behavioral: Normal mental status.  Hematologic/Lymphatic: Denies bleeding problem or easy bruising/bleeding.  Skin: Denies rash or suspicious lesions    Physical Examination  BP (!) 182/101 (BP Location: Left arm)   Pulse 74   Ht 5' 1" (1.549 m)   Wt 93.1 " kg (205 lb 4 oz)   SpO2 97%   BMI 38.78 kg/m²     Constitutional: No acute distress, conversant  HEENT: Sclera anicteric, Pupils equal, round and reactive to light, extraocular motions intact, Oropharynx clear  Neck: No JVD, no carotid bruits  Cardiovascular: regular rate and rhythm, no murmur, rubs or gallops, normal S1/S2  Pulmonary: Clear to auscultation bilaterally  Abdominal: Abdomen soft, nontender, nondistended, positive bowel sounds  Extremities: No lower extremity edema,   Pulses:  Carotid pulses are 2+ on the right side, and 2+ on the left side.  Radial pulses are 2+ on the right side, and 2+ on the left side.     Skin: No ecchymosis, erythema, or ulcers  Psych: Alert and oriented x 3, appropriate affect  Neuro: CNII-XII intact, no focal deficits    Labs:  Most Recent Data  CBC:   Lab Results   Component Value Date    WBC 6.96 01/14/2017    HGB 11.0 (L) 01/14/2017    HCT 33.9 (L) 01/14/2017     01/14/2017    MCV 90 01/14/2017    RDW 14.1 01/14/2017     BMP:   Lab Results   Component Value Date     01/14/2017    K 3.6 01/14/2017     01/14/2017    CO2 24 01/14/2017    BUN 13 01/14/2017    CREATININE 0.7 01/14/2017    GLU 91 01/14/2017    CALCIUM 8.7 01/14/2017    MG 1.8 01/14/2017     LFTS;   Lab Results   Component Value Date    PROT 5.7 (L) 01/14/2017    ALBUMIN 3.0 (L) 01/14/2017    BILITOT 0.8 01/14/2017    AST 21 01/14/2017    ALKPHOS 79 01/14/2017    ALT 11 01/14/2017     COAGS:   Lab Results   Component Value Date    INR 1.0 01/13/2017     FLP:   Lab Results   Component Value Date    CHOL 172 01/14/2017    HDL 55 01/14/2017    LDLCALC 103.4 01/14/2017    TRIG 68 01/14/2017    CHOLHDL 32.0 01/14/2017     CARDIAC:   Lab Results   Component Value Date    TROPONINI 0.709 (H) 01/13/2017     (H) 01/13/2017       Imaging:    EKG: Normal sinus rhythm.  No significant ST-T wave changes.    Echo:    Diagnostic:          Patient has a right dominant coronary artery.        - Left Main  Coronary Artery:             The LM has luminal irregularities. There is YONG 3 flow.     - Left Anterior Descending Artery:             The mid LAD has a 50% stenosis. There is YONG 3 flow. The remaining portion of the vessel has luminal irregularities.     - Left Circumflex Artery:             The LCX has luminal irregularities. There is YONG 3 flow.     - Right Coronary Artery:             The proximal RCA has a 99% stenosis. There is YONG 3 flow.     - Common Femoral Artery:             The right CFA is normal.     - Femoral Artery:             The femoral artery is normal.  I have personally reviewed these images and echo data.    Intervention          Proximal RCA:              The lesion was successfully intervened. Post-stenosis of 0%, post-YONG 3 flow and TMP grade 3. The vessel was accessed natively.  The following items were used: Blln Sc Euphora 2.5 X 10 and Stent Resolute Rx 3.00x15 (ARSLAN).    Assessment/Plan:  Diagnoses and all orders for this visit:    Preoperative clearance  -     Pharmacological stress echo; Future    Essential hypertension    Other orders  -     lisinopril (PRINIVIL,ZESTRIL) 20 MG tablet; Take 1 tablet (20 mg total) by mouth 2 (two) times daily.        The patient's blood pressure was high today.  She has not taken any of her blood pressure medications today.  She actually wanted a refill on her lisinopril.  Final recommendations would follow the results of the pharmacological stress echo        Total duration of face to face visit time 30 minutes.  Total time spent counseling greater than fifty percent of total visit time.  Counseling included discussion regarding imaging findings, diagnosis, possibilities, treatment options, risks and benefits.  The patient had many questions regarding the options and long-term effect which were all answered to my best ability.      Wale Ace MD,MRCP,RPVI,FACC,FSCAI.  Interventional Cardiology   Phone [phone]

## 2018-01-10 ENCOUNTER — DOCUMENTATION ONLY (OUTPATIENT)
Dept: FAMILY MEDICINE | Facility: CLINIC | Age: 78
End: 2018-01-10

## 2018-01-10 ENCOUNTER — OFFICE VISIT (OUTPATIENT)
Dept: FAMILY MEDICINE | Facility: CLINIC | Age: 78
End: 2018-01-10
Attending: FAMILY MEDICINE
Payer: MEDICARE

## 2018-01-10 VITALS
RESPIRATION RATE: 16 BRPM | SYSTOLIC BLOOD PRESSURE: 110 MMHG | BODY MASS INDEX: 38.71 KG/M2 | OXYGEN SATURATION: 98 % | TEMPERATURE: 98 F | HEART RATE: 69 BPM | WEIGHT: 205 LBS | HEIGHT: 61 IN | DIASTOLIC BLOOD PRESSURE: 74 MMHG

## 2018-01-10 DIAGNOSIS — I10 GOOD HYPERTENSION CONTROL: Primary | ICD-10-CM

## 2018-01-10 DIAGNOSIS — Z23 IMMUNIZATION DUE: ICD-10-CM

## 2018-01-10 PROCEDURE — 99999 PR PBB SHADOW E&M-EST. PATIENT-LVL IV: CPT | Mod: PBBFAC,,, | Performed by: FAMILY MEDICINE

## 2018-01-10 PROCEDURE — 90732 PPSV23 VACC 2 YRS+ SUBQ/IM: CPT | Mod: S$GLB,,, | Performed by: FAMILY MEDICINE

## 2018-01-10 PROCEDURE — G0009 ADMIN PNEUMOCOCCAL VACCINE: HCPCS | Mod: S$GLB,,, | Performed by: FAMILY MEDICINE

## 2018-01-10 PROCEDURE — 99213 OFFICE O/P EST LOW 20 MIN: CPT | Mod: S$GLB,,, | Performed by: FAMILY MEDICINE

## 2018-01-10 RX ORDER — OMEPRAZOLE 40 MG/1
40 CAPSULE, DELAYED RELEASE ORAL DAILY
Qty: 90 CAPSULE | Refills: 3 | Status: SHIPPED | OUTPATIENT
Start: 2018-01-10 | End: 2018-02-23 | Stop reason: SDUPTHER

## 2018-01-10 RX ORDER — CLONIDINE HYDROCHLORIDE 0.1 MG/1
TABLET ORAL
Qty: 60 TABLET | Refills: 5 | Status: SHIPPED | OUTPATIENT
Start: 2018-01-10 | End: 2018-07-10

## 2018-01-10 RX ORDER — MONTELUKAST SODIUM 10 MG/1
10 TABLET ORAL NIGHTLY
Qty: 90 TABLET | Refills: 3 | Status: SHIPPED | OUTPATIENT
Start: 2018-01-10 | End: 2018-02-23 | Stop reason: SDUPTHER

## 2018-01-10 NOTE — PROGRESS NOTES
Subjective:       Patient ID: Heather Hughes is a 77 y.o. female.    Chief Complaint: Hypertension    77-year-old female with hypertension and coronary artery disease comes in for blood pressure check.  She tends to have systolic increases in the auscultatory gaps which make assessment of blood pressure difficult along with some white coat hypertension.  She has no symptoms of tinnitus, vision changes, chest pain, shortness of breath.  Home blood pressures ranged from systolics in the upper 120s to the 160s with rare increases to 180 with diastolics generally in the 60s to 80s.  Her pulse usually runs in the low 60s.  Past Medical History:  No date: Allergy  No date: Arthritis  No date: Asthma  01/13/2017: Heart attack  No date: Hypertension  No date: Osteoporosis  No date: Thyroid disease  No date: Ulcer    Past Surgical History:  01/13/2017: cardic stent  No date: EYE SURGERY      Comment: bilateral cataracts  2011: FRACTURE SURGERY      Comment: left wrist   No date: THYROIDECTOMY      Current Outpatient Prescriptions:     acetaminophen (TYLENOL) 500 MG tablet, Take 500 mg by mouth every 6 (six) hours as needed for Pain., Disp: , Rfl:     albuterol 90 mcg/actuation inhaler, Inhale 2 puffs into the lungs every 6 (six) hours as needed for Wheezing., Disp: 18 g, Rfl: 2    aspirin (ECOTRIN) 81 MG EC tablet, Take 81 mg by mouth once daily., Disp: , Rfl:     benzonatate (TESSALON) 200 MG capsule, Take 1 capsule (200 mg total) by mouth 3 (three) times daily as needed for Cough., Disp: 60 capsule, Rfl: 5    CALCIUM CARBONATE/VITAMIN D3 (CALCIUM 500 + D ORAL), Take 2 tablets by mouth once daily., Disp: , Rfl:     carvedilol (COREG) 6.25 MG tablet, Take 1 tablet (6.25 mg total) by mouth 2 (two) times daily with meals., Disp: 90 tablet, Rfl: 3    clopidogrel (PLAVIX) 75 mg tablet, Take 1 tablet (75 mg total) by mouth once daily., Disp: 30 tablet, Rfl: 11    ergocalciferol (ERGOCALCIFEROL) 50,000 unit Cap, Take  1 capsule (50,000 Units total) by mouth every 7 days., Disp: 12 capsule, Rfl: 3    glucosamine-chondroitin 500-400 mg tablet, Take 1 tablet by mouth 3 (three) times daily., Disp: , Rfl:     lisinopril (PRINIVIL,ZESTRIL) 20 MG tablet, Take 1 tablet (20 mg total) by mouth 2 (two) times daily., Disp: 180 tablet, Rfl: 3    montelukast (SINGULAIR) 10 mg tablet, Take 1 tablet (10 mg total) by mouth every evening., Disp: 90 tablet, Rfl: 3    nitroGLYCERIN (NITROSTAT) 0.4 MG SL tablet, Place 0.4 mg under the tongue every 5 (five) minutes as needed for Chest pain., Disp: , Rfl:     omeprazole (PRILOSEC) 40 MG capsule, Take 1 capsule (40 mg total) by mouth once daily., Disp: 90 capsule, Rfl: 3    SYNTHROID 112 mcg tablet, TAKE ONE TABLET BY MOUTH EVERY DAY BEFORE BREAKFAST, Disp: 30 tablet, Rfl: 8    cloNIDine (CATAPRES) 0.1 MG tablet, Take one if blood pressure over 160/110 up to four times a day prn, Disp: 60 tablet, Rfl: 5        Review of Systems   HENT: Positive for hearing loss. Negative for tinnitus.    Respiratory: Negative for cough, chest tightness and shortness of breath.    Cardiovascular: Negative for chest pain and palpitations.   Gastrointestinal: Negative for nausea and vomiting.   Neurological: Negative for dizziness, light-headedness and headaches.       Objective:      Physical Exam   Constitutional: She is oriented to person, place, and time. She appears well-developed. No distress.   Initial blood pressure was elevated but she has a prominent auscultatory gap   Cardiovascular: Normal rate, regular rhythm and normal heart sounds.  Exam reveals no gallop and no friction rub.    No murmur heard.  Pulmonary/Chest: Effort normal and breath sounds normal. No respiratory distress. She has no wheezes. She has no rales. She exhibits no tenderness.   Neurological: She is alert and oriented to person, place, and time.   Skin: She is not diaphoretic.   Psychiatric: She has a normal mood and affect. Her  behavior is normal. Thought content normal.   Nursing note and vitals reviewed.      Assessment:       1. Good hypertension control    2. Immunization due        Plan:       1. Good hypertension control  Patient given some low-dose clonidine to use when necessary blood pressure spikes with instructions to use the 1 mg for systolic over 160 or diastolic over 110    2. Immunization due  - (In Office Administered) Pneumococcal Polysaccharide Vaccine (23 Valent) (SQ/IM)

## 2018-01-10 NOTE — PROGRESS NOTES
Pre-Visit Chart Review  For Appointment Scheduled on 1-10-18    Health Maintenance Due   Topic Date Due    Zoster Vaccine  06/22/2001    Influenza Vaccine  08/01/2017    Pneumococcal (65+) (2 of 2 - PPSV23) 12/15/2017

## 2018-01-10 NOTE — PATIENT INSTRUCTIONS
Weight Management: Getting Started  Healthy bodies come in all shapes and sizes. Not all bodies are made to be thin. For some people, a healthy weight is higher than the average weight listed on weight charts. Your healthcare provider can help you decide on a healthy weight for you.    Reasons to lose weight  Losing weight can help with some health problems, such as high blood pressure, heart disease, diabetes, sleep apnea, and arthritis. You may also feel more energy.  Set your long-term goal  Your goal doesn't even have to be a specific weight. You may decide on a fitness goal (such as being able to walk 10 miles a week), or a health goal (such as lowering your blood pressure). Choose a goal that is measurable and reasonable, so you know when you've reached it. A goal of reaching a BMI of less than 25 is not always reasonable (or possible).   Make an action plan  Habits dont change overnight. Setting your goals too high can leave you feeling discouraged if you cant reach them. Be realistic. Choose one or two small changes you can make now. Set an action plan for how you are going to make these changes. When you can stick to this plan, keep making a few more small changes. Taking small steps will help you stay on the path to success.  Track your progress  Write down your goals. Then, keep a daily record of your progress. Write down what you eat and how active you are. This record lets you look back on how much youve done. It may also help when youre feeling frustrated. Reward yourself for success. Even if you dont reach every goal, give yourself credit for what you do get done.  Get support  Encouragement from others can help make losing weight easier. Ask your family members and friends for support. They may even want to join you. Also look to your healthcare provider, registered dietitian, and  for help. Your local hospital can give you more information about nutrition, exercise, and  weight loss.  Date Last Reviewed: 1/31/2016  © 3155-4564 [employer]. [address] [place] [address], [place] [address]. All rights reserved. This information is not intended as a substitute for professional medical care. Always follow your healthcare professional's instructions.        Controlling High Blood Pressure  High blood pressure (hypertension) is often called the silent killer. This is because many people who have it dont know it. High blood pressure is defined as 140/90 mm Hg or higher. Know your blood pressure and remember to check it regularly. Doing so can save your life. Here are some things you can do to help control your blood pressure.    Choose heart-healthy foods  · Select low-salt, low-fat foods. Limit sodium intake to 2,400 mg per day or the amount suggested by your healthcare provider.  · Limit canned, dried, cured, packaged, and fast foods. These can contain a lot of salt.  · Eat 8 to 10 servings of fruits and vegetables every day.  · Choose lean meats, fish, or chicken.  · Eat whole-grain pasta, brown rice, and beans.  · Eat 2 to 3 servings of low-fat or fat-free dairy products.  · Ask your doctor about the DASH eating plan. This plan helps reduce blood pressure.  · When you go to a restaurant, ask that your meal be prepared with no added salt.  Maintain a healthy weight  · Ask your healthcare provider how many calories to eat a day. Then stick to that number.  · Ask your healthcare provider what weight range is healthiest for you. If you are overweight, a weight loss of only 3% to 5% of your body weight can help lower blood pressure. Generally, a good weight loss goal is to lose 10% of your body weight in a year.  · Limit snacks and sweets.  · Get regular exercise.  Get up and get active  · Choose activities you enjoy. Find ones you can do with friends or family. This includes bicycling, dancing, walking, and jogging.  · Park farther away from building entrances.  · Use stairs  instead of the elevator.  · When you can, walk or bike instead of driving.  · [place] leaves, garden, or do household repairs.  · Be active at a moderate to vigorous level of physical activity for at least 40 minutes for a minimum of 3 to 4 days a week.   Manage stress  · Make time to relax and enjoy life. Find time to laugh.  · Communicate your concerns with your loved ones and your healthcare provider.  · Visit with family and friends, and keep up with hobbies.  Limit alcohol and quit smoking  · Men should have no more than 2 drinks per day.  · Women should have no more than 1 drink per day.  · Talk with your healthcare provider about quitting smoking. Smoking significantly increases your risk for heart disease and stroke. Ask your healthcare provider about community smoking cessation programs and other options.  Medicines  If lifestyle changes arent enough, your healthcare provider may prescribe high blood pressure medicine. Take all medicines as prescribed. If you have any questions about your medicines, ask your healthcare provider before stopping or changing them.   Date Last Reviewed: 4/27/2016 © 2000-2017 [employer]. [address] [place] [address], [place] [address]. All rights reserved. This information is not intended as a substitute for professional medical care. Always follow your healthcare professional's instructions.

## 2018-01-10 NOTE — TELEPHONE ENCOUNTER
Patient saw Dr. Barbosa in cardiology and he changed patients meds- taking Lisinopril 20mg to one twice daily- Coreg 6.25mg one twice daily- patient given appt 1/10/18 for bp follow up and lab. tsh ordered due to patient stating losing a lot of hair and requesting level.

## 2018-01-11 NOTE — TELEPHONE ENCOUNTER
----- Message from Malik Green sent at 1/11/2018  1:27 PM CST -----  Contact: same  Patient called in and wanted to see, since Dr. Ace would refill her Rx for:    clopidogrel (PLAVIX) 75 mg tablet 30 tablet 11 1/14/2017 1/14/2018   Sig - Route: Take 1 tablet (75 mg total) by mouth once daily. - Oral     Rx ran out and was last filled by Dr. Bowen at [facility].      [id number] - [place] - [address]  [address]  [place] [address]  Phone: [phone] Fax: [phone]    Patient call back number is [phone]

## 2018-01-11 NOTE — TELEPHONE ENCOUNTER
Called to let MsGuerline Heather know that we received her message and the request was sent over to Dr. Ace. She verbalized understanding. No further issues noted.

## 2018-01-24 RX ORDER — CLOPIDOGREL BISULFATE 75 MG/1
75 TABLET ORAL DAILY
Qty: 30 TABLET | Refills: 11 | Status: SHIPPED | OUTPATIENT
Start: 2018-01-24 | End: 2018-02-23 | Stop reason: SDUPTHER

## 2018-02-23 RX ORDER — MONTELUKAST SODIUM 10 MG/1
10 TABLET ORAL NIGHTLY
Qty: 90 TABLET | Refills: 3 | Status: SHIPPED | OUTPATIENT
Start: 2018-02-23 | End: 2018-11-29 | Stop reason: SDUPTHER

## 2018-02-23 RX ORDER — CLOPIDOGREL BISULFATE 75 MG/1
75 TABLET ORAL DAILY
Qty: 90 TABLET | Refills: 3 | Status: SHIPPED | OUTPATIENT
Start: 2018-02-23 | End: 2018-11-29 | Stop reason: SDUPTHER

## 2018-02-23 RX ORDER — OMEPRAZOLE 40 MG/1
40 CAPSULE, DELAYED RELEASE ORAL DAILY
Qty: 90 CAPSULE | Refills: 3 | Status: SHIPPED | OUTPATIENT
Start: 2018-02-23 | End: 2019-03-26 | Stop reason: SDUPTHER

## 2018-04-05 ENCOUNTER — OFFICE VISIT (OUTPATIENT)
Dept: FAMILY MEDICINE | Facility: CLINIC | Age: 78
End: 2018-04-05
Payer: MEDICARE

## 2018-04-05 VITALS
HEART RATE: 76 BPM | HEIGHT: 61 IN | SYSTOLIC BLOOD PRESSURE: 174 MMHG | TEMPERATURE: 98 F | DIASTOLIC BLOOD PRESSURE: 81 MMHG | WEIGHT: 209.69 LBS | BODY MASS INDEX: 39.59 KG/M2

## 2018-04-05 DIAGNOSIS — R31.9 URINARY TRACT INFECTION WITH HEMATURIA, SITE UNSPECIFIED: Primary | ICD-10-CM

## 2018-04-05 DIAGNOSIS — R30.0 DYSURIA: ICD-10-CM

## 2018-04-05 DIAGNOSIS — N39.0 URINARY TRACT INFECTION WITH HEMATURIA, SITE UNSPECIFIED: Primary | ICD-10-CM

## 2018-04-05 LAB
BILIRUB SERPL-MCNC: ABNORMAL MG/DL
BLOOD URINE, POC: 50
COLOR, POC UA: YELLOW
GLUCOSE UR QL STRIP: NORMAL
KETONES UR QL STRIP: ABNORMAL
LEUKOCYTE ESTERASE URINE, POC: ABNORMAL
NITRITE, POC UA: ABNORMAL
PH, POC UA: 7
PROTEIN, POC: ABNORMAL
SPECIFIC GRAVITY, POC UA: 1
UROBILINOGEN, POC UA: NORMAL

## 2018-04-05 PROCEDURE — 87077 CULTURE AEROBIC IDENTIFY: CPT

## 2018-04-05 PROCEDURE — 99214 OFFICE O/P EST MOD 30 MIN: CPT | Mod: 25,S$GLB,, | Performed by: FAMILY MEDICINE

## 2018-04-05 PROCEDURE — 87086 URINE CULTURE/COLONY COUNT: CPT

## 2018-04-05 PROCEDURE — 3077F SYST BP >= 140 MM HG: CPT | Mod: CPTII,S$GLB,, | Performed by: FAMILY MEDICINE

## 2018-04-05 PROCEDURE — 3079F DIAST BP 80-89 MM HG: CPT | Mod: CPTII,S$GLB,, | Performed by: FAMILY MEDICINE

## 2018-04-05 PROCEDURE — 87088 URINE BACTERIA CULTURE: CPT

## 2018-04-05 PROCEDURE — 81002 URINALYSIS NONAUTO W/O SCOPE: CPT | Mod: S$GLB,,, | Performed by: FAMILY MEDICINE

## 2018-04-05 PROCEDURE — 99999 PR PBB SHADOW E&M-EST. PATIENT-LVL IV: CPT | Mod: PBBFAC,,, | Performed by: FAMILY MEDICINE

## 2018-04-05 PROCEDURE — 87186 SC STD MICRODIL/AGAR DIL: CPT

## 2018-04-05 RX ORDER — NITROFURANTOIN 25; 75 MG/1; MG/1
CAPSULE ORAL
Qty: 20 CAPSULE | OUTPATIENT
Start: 2018-04-05

## 2018-04-05 RX ORDER — NITROFURANTOIN 25; 75 MG/1; MG/1
100 CAPSULE ORAL 2 TIMES DAILY
Qty: 20 CAPSULE | Refills: 0 | Status: SHIPPED | OUTPATIENT
Start: 2018-04-05 | End: 2018-06-20 | Stop reason: ALTCHOICE

## 2018-04-05 NOTE — PROGRESS NOTES
Subjective:       Patient ID: Heather Hughes is a 77 y.o. female.    Chief Complaint: Dysuria    Dysuria    This is a new problem. The current episode started yesterday. The problem occurs every urination. The quality of the pain is described as burning. The pain is moderate. There has been no fever. There is a history of pyelonephritis. Associated symptoms include frequency and nausea. Pertinent negatives include no flank pain, hematuria, vomiting or rash. She has tried increased fluids for the symptoms. The treatment provided mild relief.     Review of Systems   Constitutional: Negative for fever.   Respiratory: Negative for shortness of breath.    Cardiovascular: Negative for chest pain.   Gastrointestinal: Positive for nausea. Negative for abdominal pain and vomiting.   Genitourinary: Positive for dysuria and frequency. Negative for flank pain and hematuria.   Skin: Negative for rash.   All other systems reviewed and are negative.      Objective:      Physical Exam   Constitutional: She appears well-developed. No distress.   Cardiovascular: Normal rate and regular rhythm.    No murmur heard.  Pulmonary/Chest: Effort normal and breath sounds normal.   Abdominal: Soft. There is tenderness in the suprapubic area. There is no CVA tenderness.       Assessment:       1. Urinary tract infection with hematuria, site unspecified    2. Dysuria        Plan:         Heather was seen today for dysuria.    Diagnoses and all orders for this visit:    Urinary tract infection with hematuria, site unspecified  -     Urine culture    Dysuria  -     POCT URINE DIPSTICK WITHOUT MICROSCOPE    Other orders  -     nitrofurantoin, macrocrystal-monohydrate, (MACROBID) 100 MG capsule; Take 1 capsule (100 mg total) by mouth 2 (two) times daily.      Patient Instructions   AZO take two tablets three times a day for two days.

## 2018-04-09 ENCOUNTER — DOCUMENTATION ONLY (OUTPATIENT)
Dept: FAMILY MEDICINE | Facility: CLINIC | Age: 78
End: 2018-04-09

## 2018-04-09 LAB — BACTERIA UR CULT: NORMAL

## 2018-04-09 NOTE — PROGRESS NOTES
Pre-Visit Chart Review  For Appointment Scheduled on 4-11-18    Health Maintenance Due   Topic Date Due    Zoster Vaccine  06/22/2001    Influenza Vaccine  08/01/2017    Lipid Panel  01/14/2018    Fecal Occult Blood Test (FOBT)/FitKit  04/04/2018

## 2018-04-11 ENCOUNTER — OFFICE VISIT (OUTPATIENT)
Dept: FAMILY MEDICINE | Facility: CLINIC | Age: 78
End: 2018-04-11
Attending: FAMILY MEDICINE
Payer: MEDICARE

## 2018-04-11 ENCOUNTER — LAB VISIT (OUTPATIENT)
Dept: LAB | Facility: HOSPITAL | Age: 78
End: 2018-04-11
Attending: FAMILY MEDICINE
Payer: MEDICARE

## 2018-04-11 VITALS
OXYGEN SATURATION: 96 % | HEART RATE: 68 BPM | DIASTOLIC BLOOD PRESSURE: 80 MMHG | WEIGHT: 207.69 LBS | RESPIRATION RATE: 12 BRPM | BODY MASS INDEX: 39.21 KG/M2 | HEIGHT: 61 IN | TEMPERATURE: 98 F | SYSTOLIC BLOOD PRESSURE: 122 MMHG

## 2018-04-11 DIAGNOSIS — E78.5 HYPERLIPIDEMIA, UNSPECIFIED HYPERLIPIDEMIA TYPE: ICD-10-CM

## 2018-04-11 DIAGNOSIS — E55.9 VITAMIN D DEFICIENCY: ICD-10-CM

## 2018-04-11 DIAGNOSIS — E03.9 ACQUIRED HYPOTHYROIDISM: ICD-10-CM

## 2018-04-11 DIAGNOSIS — Z12.11 COLON CANCER SCREENING: ICD-10-CM

## 2018-04-11 DIAGNOSIS — E66.01 SEVERE OBESITY WITH BODY MASS INDEX (BMI) OF 35.0 TO 39.9 WITH COMORBIDITY: ICD-10-CM

## 2018-04-11 DIAGNOSIS — N32.81 OAB (OVERACTIVE BLADDER): ICD-10-CM

## 2018-04-11 DIAGNOSIS — I10 GOOD HYPERTENSION CONTROL: ICD-10-CM

## 2018-04-11 DIAGNOSIS — I10 GOOD HYPERTENSION CONTROL: Primary | ICD-10-CM

## 2018-04-11 LAB
25(OH)D3+25(OH)D2 SERPL-MCNC: 22 NG/ML
ANION GAP SERPL CALC-SCNC: 7 MMOL/L
BASOPHILS # BLD AUTO: 0.06 K/UL
BASOPHILS NFR BLD: 0.9 %
BUN SERPL-MCNC: 16 MG/DL
CALCIUM SERPL-MCNC: 9.9 MG/DL
CHLORIDE SERPL-SCNC: 101 MMOL/L
CHOLEST SERPL-MCNC: 223 MG/DL
CHOLEST/HDLC SERPL: 3.3 {RATIO}
CO2 SERPL-SCNC: 27 MMOL/L
CREAT SERPL-MCNC: 0.8 MG/DL
DIFFERENTIAL METHOD: ABNORMAL
EOSINOPHIL # BLD AUTO: 0.2 K/UL
EOSINOPHIL NFR BLD: 2.7 %
ERYTHROCYTE [DISTWIDTH] IN BLOOD BY AUTOMATED COUNT: 13.1 %
EST. GFR  (AFRICAN AMERICAN): >60 ML/MIN/1.73 M^2
EST. GFR  (NON AFRICAN AMERICAN): >60 ML/MIN/1.73 M^2
GLUCOSE SERPL-MCNC: 81 MG/DL
HCT VFR BLD AUTO: 36.6 %
HDLC SERPL-MCNC: 67 MG/DL
HDLC SERPL: 30 %
HGB BLD-MCNC: 11.9 G/DL
IMM GRANULOCYTES # BLD AUTO: 0.02 K/UL
IMM GRANULOCYTES NFR BLD AUTO: 0.3 %
LDLC SERPL CALC-MCNC: 133.6 MG/DL
LYMPHOCYTES # BLD AUTO: 2.3 K/UL
LYMPHOCYTES NFR BLD: 37 %
MCH RBC QN AUTO: 30.3 PG
MCHC RBC AUTO-ENTMCNC: 32.5 G/DL
MCV RBC AUTO: 93 FL
MONOCYTES # BLD AUTO: 0.5 K/UL
MONOCYTES NFR BLD: 8.5 %
NEUTROPHILS # BLD AUTO: 3.2 K/UL
NEUTROPHILS NFR BLD: 50.6 %
NONHDLC SERPL-MCNC: 156 MG/DL
NRBC BLD-RTO: 0 /100 WBC
PLATELET # BLD AUTO: 268 K/UL
PMV BLD AUTO: 12.5 FL
POTASSIUM SERPL-SCNC: 4.4 MMOL/L
RBC # BLD AUTO: 3.93 M/UL
SODIUM SERPL-SCNC: 135 MMOL/L
TRIGL SERPL-MCNC: 112 MG/DL
TSH SERPL DL<=0.005 MIU/L-ACNC: 0.56 UIU/ML
WBC # BLD AUTO: 6.33 K/UL

## 2018-04-11 PROCEDURE — 3079F DIAST BP 80-89 MM HG: CPT | Mod: CPTII,S$GLB,, | Performed by: FAMILY MEDICINE

## 2018-04-11 PROCEDURE — 99214 OFFICE O/P EST MOD 30 MIN: CPT | Mod: S$GLB,,, | Performed by: FAMILY MEDICINE

## 2018-04-11 PROCEDURE — 3074F SYST BP LT 130 MM HG: CPT | Mod: CPTII,S$GLB,, | Performed by: FAMILY MEDICINE

## 2018-04-11 PROCEDURE — 84443 ASSAY THYROID STIM HORMONE: CPT

## 2018-04-11 PROCEDURE — 85025 COMPLETE CBC W/AUTO DIFF WBC: CPT

## 2018-04-11 PROCEDURE — 99999 PR PBB SHADOW E&M-EST. PATIENT-LVL IV: CPT | Mod: PBBFAC,,, | Performed by: FAMILY MEDICINE

## 2018-04-11 PROCEDURE — 80061 LIPID PANEL: CPT

## 2018-04-11 PROCEDURE — 80048 BASIC METABOLIC PNL TOTAL CA: CPT

## 2018-04-11 PROCEDURE — 36415 COLL VENOUS BLD VENIPUNCTURE: CPT | Mod: PO

## 2018-04-11 PROCEDURE — 82306 VITAMIN D 25 HYDROXY: CPT

## 2018-04-11 RX ORDER — DEXTROMETHORPHAN HYDROBROMIDE, GUAIFENESIN 5; 100 MG/5ML; MG/5ML
650 LIQUID ORAL EVERY 8 HOURS PRN
COMMUNITY

## 2018-04-11 RX ORDER — NITROGLYCERIN 0.4 MG/1
0.4 TABLET SUBLINGUAL EVERY 5 MIN PRN
Qty: 25 TABLET | Refills: 3 | Status: SHIPPED | OUTPATIENT
Start: 2018-04-11 | End: 2022-03-17

## 2018-04-11 RX ORDER — BENZONATATE 200 MG/1
200 CAPSULE ORAL 3 TIMES DAILY PRN
Qty: 60 CAPSULE | Refills: 5 | Status: SHIPPED | OUTPATIENT
Start: 2018-04-11 | End: 2018-10-30 | Stop reason: ALTCHOICE

## 2018-04-11 RX ORDER — ERGOCALCIFEROL 1.25 MG/1
50000 CAPSULE ORAL
Qty: 12 CAPSULE | Refills: 0 | Status: SHIPPED | OUTPATIENT
Start: 2018-04-11 | End: 2018-07-01 | Stop reason: SDUPTHER

## 2018-04-11 RX ORDER — OXYBUTYNIN CHLORIDE 5 MG/1
5 TABLET, EXTENDED RELEASE ORAL DAILY
Qty: 30 TABLET | Refills: 11 | Status: SHIPPED | OUTPATIENT
Start: 2018-04-11 | End: 2018-07-10

## 2018-04-11 NOTE — PATIENT INSTRUCTIONS
Weight Management: Getting Started  Healthy bodies come in all shapes and sizes. Not all bodies are made to be thin. For some people, a healthy weight is higher than the average weight listed on weight charts. Your healthcare provider can help you decide on a healthy weight for you.    Reasons to lose weight  Losing weight can help with some health problems, such as high blood pressure, heart disease, diabetes, sleep apnea, and arthritis. You may also feel more energy.  Set your long-term goal  Your goal doesn't even have to be a specific weight. You may decide on a fitness goal (such as being able to walk 10 miles a week), or a health goal (such as lowering your blood pressure). Choose a goal that is measurable and reasonable, so you know when you've reached it. A goal of reaching a BMI of less than 25 is not always reasonable (or possible).   Make an action plan  Habits dont change overnight. Setting your goals too high can leave you feeling discouraged if you cant reach them. Be realistic. Choose one or two small changes you can make now. Set an action plan for how you are going to make these changes. When you can stick to this plan, keep making a few more small changes. Taking small steps will help you stay on the path to success.  Track your progress  Write down your goals. Then, keep a daily record of your progress. Write down what you eat and how active you are. This record lets you look back on how much youve done. It may also help when youre feeling frustrated. Reward yourself for success. Even if you dont reach every goal, give yourself credit for what you do get done.  Get support  Encouragement from others can help make losing weight easier. Ask your family members and friends for support. They may even want to join you. Also look to your healthcare provider, registered dietitian, and  for help. Your local hospital can give you more information about nutrition, exercise, and  weight loss.  Date Last Reviewed: 1/31/2016  © 2371-3987 [employer], [employer]. [address] [place] [address], [place] [address]. All rights reserved. This information is not intended as a substitute for professional medical care. Always follow your healthcare professional's instructions.

## 2018-04-11 NOTE — PROGRESS NOTES
Subjective:       Patient ID: Heather Hughes is a 77 y.o. female.    Chief Complaint: Medication Refill    77-year-old female with a case of white coat hypertension coming in for blood pressure check and also needs some refills.  She has some urge incontinence mixed with stress incontinence and wants to try some ditropan.  She needs a refill on nitroglycerin because hers have been open for to long and are no longer fresh but she's not having any active problems at this time.  She's due for a recheck on her thyroid and vitamin D levels and needs a couple of other refills as well.  She is also due for a fit kit.  She was treated last week for urinary tract infection is still on antibiotics.    Past Medical History:  No date: Allergy  No date: Arthritis  No date: Asthma  01/13/2017: Heart attack  No date: Hypertension  No date: Osteoporosis  No date: Thyroid disease  No date: Ulcer    Past Surgical History:  01/13/2017: cardic stent  No date: EYE SURGERY      Comment: bilateral cataracts  2011: FRACTURE SURGERY      Comment: left wrist   No date: THYROIDECTOMY      Current Outpatient Prescriptions:     acetaminophen (TYLENOL) 650 MG TbSR, Take 650 mg by mouth every 8 (eight) hours as needed., Disp: , Rfl:     albuterol 90 mcg/actuation inhaler, Inhale 2 puffs into the lungs every 6 (six) hours as needed for Wheezing., Disp: 18 g, Rfl: 2    aspirin (ECOTRIN) 81 MG EC tablet, Take 81 mg by mouth once daily., Disp: , Rfl:     benzonatate (TESSALON) 200 MG capsule, Take 1 capsule (200 mg total) by mouth 3 (three) times daily as needed for Cough., Disp: 60 capsule, Rfl: 5    CALCIUM CARBONATE/VITAMIN D3 (CALCIUM 500 + D ORAL), Take 2 tablets by mouth once daily., Disp: , Rfl:     carvedilol (COREG) 6.25 MG tablet, Take 1 tablet (6.25 mg total) by mouth 2 (two) times daily with meals., Disp: 90 tablet, Rfl: 3    cloNIDine (CATAPRES) 0.1 MG tablet, Take one if blood pressure over 160/110 up to four times a day  prn, Disp: 60 tablet, Rfl: 5    clopidogrel (PLAVIX) 75 mg tablet, Take 1 tablet (75 mg total) by mouth once daily., Disp: 90 tablet, Rfl: 3    ergocalciferol (ERGOCALCIFEROL) 50,000 unit Cap, Take 1 capsule (50,000 Units total) by mouth every 7 days., Disp: 12 capsule, Rfl: 0    glucosamine-chondroitin 500-400 mg tablet, Take 1 tablet by mouth 3 (three) times daily., Disp: , Rfl:     lisinopril (PRINIVIL,ZESTRIL) 20 MG tablet, Take 1 tablet (20 mg total) by mouth 2 (two) times daily., Disp: 180 tablet, Rfl: 3    montelukast (SINGULAIR) 10 mg tablet, Take 1 tablet (10 mg total) by mouth every evening., Disp: 90 tablet, Rfl: 3    nitrofurantoin, macrocrystal-monohydrate, (MACROBID) 100 MG capsule, Take 1 capsule (100 mg total) by mouth 2 (two) times daily., Disp: 20 capsule, Rfl: 0    nitroGLYCERIN (NITROSTAT) 0.4 MG SL tablet, Place 1 tablet (0.4 mg total) under the tongue every 5 (five) minutes as needed for Chest pain., Disp: 25 tablet, Rfl: 3    omeprazole (PRILOSEC) 40 MG capsule, Take 1 capsule (40 mg total) by mouth once daily., Disp: 90 capsule, Rfl: 3    SYNTHROID 112 mcg tablet, TAKE ONE TABLET BY MOUTH EVERY DAY BEFORE BREAKFAST, Disp: 30 tablet, Rfl: 8    Zoster Vaccine due on 06/22/2001  Lipid Panel due on 01/14/2018  Fecal Occult Blood Test (FOBT)/FitKit due on 04/04/2018        Review of Systems   Constitutional: Negative for chills and fever.   Respiratory: Negative for chest tightness and shortness of breath.    Cardiovascular: Negative for chest pain and palpitations.   Gastrointestinal: Negative for nausea and vomiting.   Genitourinary: Positive for frequency and urgency. Negative for dysuria.   Neurological: Negative for dizziness, light-headedness and headaches.       Objective:      Physical Exam   Constitutional: She is oriented to person, place, and time. She appears well-developed. No distress.   Initial blood pressure elevated but repeat was normal.  She is obese with a BMI of 39.2  she is up 2.7 pounds from her last visit with me January 10, 2018   Cardiovascular: Normal rate, regular rhythm and normal heart sounds.  Exam reveals no gallop and no friction rub.    No murmur heard.  Pulmonary/Chest: Effort normal and breath sounds normal. No respiratory distress. She has no wheezes. She has no rales. She exhibits no tenderness.   Neurological: She is alert and oriented to person, place, and time.   Skin: She is not diaphoretic.   Psychiatric: She has a normal mood and affect. Her behavior is normal. Judgment and thought content normal.   Nursing note and vitals reviewed.      Assessment:       1. Good hypertension control    2. Acquired hypothyroidism    3. Vitamin D deficiency    4. Hyperlipidemia, unspecified hyperlipidemia type    5. OAB (overactive bladder)    6. Colon cancer screening    7. Severe obesity with body mass index (BMI) of 35.0 to 39.9 with comorbidity        Plan:       1. Good hypertension control  No changes needed  - Basic metabolic panel; Future  - CBC auto differential; Future    2. Acquired hypothyroidism  Lab ordered  - TSH; Future    3. Vitamin D deficiency  Lab pending  - Vitamin D; Future  - ergocalciferol (ERGOCALCIFEROL) 50,000 unit Cap; Take 1 capsule (50,000 Units total) by mouth every 7 days.  Dispense: 12 capsule; Refill: 0    4. Hyperlipidemia, unspecified hyperlipidemia type  Lab pending  - Lipid panel; Future    5. OAB (overactive bladder)  Trial ditropan, warned that it could increase the symptoms from stress incontinence    6. Colon cancer screening  - Fecal Immunochemical Test (iFOBT); Future    7. Severe obesity with body mass index (BMI) of 35.0 to 39.9 with comorbidity           Patient readiness: acceptance and barriers:readiness    During the course of the visit the patient was educated and counseled about the following:     Hypertension:   Medication: no change.  Dietary sodium restriction.  Regular aerobic exercise.  Obesity:   General weight  loss/lifestyle modification strategies discussed (elicit support from others; identify saboteurs; non-food rewards, etc).  Informal exercise measures discussed, e.g. taking stairs instead of elevator.  Regular aerobic exercise program discussed.    Goals: Hypertension: Reduce Blood Pressure and Obesity: Reduce calorie intake and BMI    Did patient meet goals/outcomes: No    The following self management tools provided: blood pressure log  excercise log    Patient Instructions (the written plan) was given to the patient/family.     Time spent with patient: 30 minutes

## 2018-05-11 ENCOUNTER — TELEPHONE (OUTPATIENT)
Dept: CARDIOLOGY | Facility: CLINIC | Age: 78
End: 2018-05-11

## 2018-05-11 NOTE — TELEPHONE ENCOUNTER
Called and spoke with Ms. Hughes, gave her a number to call and reschedule the stress test. She verbalized understanding. No further issues noted.

## 2018-05-11 NOTE — TELEPHONE ENCOUNTER
----- Message from Cheryl Moya sent at 5/11/2018  9:48 AM CDT -----  Contact: Daughter Anupama   Daughter needs to speak to the nurse about getting patients Stress test rescheduled    Please call back [phone]

## 2018-05-25 ENCOUNTER — HOSPITAL ENCOUNTER (OUTPATIENT)
Dept: CARDIOLOGY | Facility: HOSPITAL | Age: 78
Discharge: HOME | End: 2018-05-25
Attending: INTERNAL MEDICINE
Payer: MEDICARE

## 2018-05-25 VITALS — HEART RATE: 70 BPM | DIASTOLIC BLOOD PRESSURE: 77 MMHG | SYSTOLIC BLOOD PRESSURE: 155 MMHG

## 2018-05-25 DIAGNOSIS — Z01.818 PREOPERATIVE CLEARANCE: ICD-10-CM

## 2018-05-25 PROCEDURE — 93351 STRESS TTE COMPLETE: CPT | Mod: 26,,, | Performed by: INTERNAL MEDICINE

## 2018-05-25 PROCEDURE — 63600175 PHARM REV CODE 636 W HCPCS

## 2018-05-25 PROCEDURE — 25500020 PHARM REV CODE 255: Performed by: INTERNAL MEDICINE

## 2018-05-25 PROCEDURE — 93351 STRESS TTE COMPLETE: CPT

## 2018-05-25 RX ORDER — ATROPINE SULFATE 0.1 MG/ML
INJECTION INTRAVENOUS
Status: DISCONTINUED
Start: 2018-05-25 | End: 2018-05-25 | Stop reason: WASHOUT

## 2018-05-25 RX ORDER — DOBUTAMINE HYDROCHLORIDE 200 MG/100ML
INJECTION INTRAVENOUS
Status: COMPLETED
Start: 2018-05-25 | End: 2018-05-25

## 2018-05-25 RX ORDER — DOBUTAMINE HYDROCHLORIDE 200 MG/100ML
50 INJECTION INTRAVENOUS ONCE
Status: DISCONTINUED | OUTPATIENT
Start: 2018-05-25 | End: 2018-05-25 | Stop reason: SDUPTHER

## 2018-05-25 RX ADMIN — DOBUTAMINE HYDROCHLORIDE 50 MCG/KG/MIN: 200 INJECTION INTRAVENOUS at 08:05

## 2018-05-25 RX ADMIN — SULFUR HEXAFLUORIDE 5 ML: KIT at 08:05

## 2018-05-28 ENCOUNTER — LAB VISIT (OUTPATIENT)
Dept: LAB | Facility: HOSPITAL | Age: 78
End: 2018-05-28
Attending: FAMILY MEDICINE
Payer: MEDICARE

## 2018-05-28 DIAGNOSIS — Z12.11 COLON CANCER SCREENING: ICD-10-CM

## 2018-05-28 LAB
AORTIC ROOT ANNULUS: 2.58 CM
AORTIC VALVE CUSP SEPERATION: 1.48 CM
CV ECHO LV RWT: 0.4 CM
CV STRESS PEAK BP: 155 MMHG
DOP CALC LVOT AREA: 3.27 CM2
DOP CALC LVOT DIAMETER: 2.04 CM
ECHO LV POSTERIOR WALL: 1.13 CM (ref 0.6–1.1)
FRACTIONAL SHORTENING: 31 % (ref 28–44)
HEMOCCULT STL QL IA: NEGATIVE
INTERVENTRICULAR SEPTUM: 1.09 CM (ref 0.6–1.1)
LEFT ATRIUM SIZE: 3.36 CM
LEFT INTERNAL DIMENSION IN SYSTOLE: 3.79 CM (ref 2.1–4)
LEFT VENTRICULAR INTERNAL DIMENSION IN DIASTOLE: 5.51 CM (ref 3.5–6)
LEFT VENTRICULAR MASS: 245.71 G
RIGHT VENTRICULAR END-DIASTOLIC DIMENSION: 2.76 CM
STRESS ECHO POST ESTIMATED WORKLOAD: 1 METS
STRESS ECHO POST EXERCISE DUR MIN: 7 MIN
STRESS ECHO POST EXERCISE DUR SEC: 26

## 2018-05-28 PROCEDURE — 82274 ASSAY TEST FOR BLOOD FECAL: CPT

## 2018-05-29 DIAGNOSIS — E03.9 HYPOTHYROIDISM, UNSPECIFIED TYPE: ICD-10-CM

## 2018-05-29 RX ORDER — LEVOTHYROXINE SODIUM 112 UG/1
TABLET ORAL
Qty: 30 TABLET | Refills: 10 | Status: SHIPPED | OUTPATIENT
Start: 2018-05-29 | End: 2019-04-18 | Stop reason: SDUPTHER

## 2018-06-05 ENCOUNTER — TELEPHONE (OUTPATIENT)
Dept: CARDIOLOGY | Facility: CLINIC | Age: 78
End: 2018-06-05

## 2018-06-05 NOTE — TELEPHONE ENCOUNTER
Called and spoke with ms. Hughes, advised her per Db her stress test is normal. She verbalized understanding. No further issues noted.

## 2018-06-05 NOTE — TELEPHONE ENCOUNTER
----- Message from Florencio Urrutia sent at 6/5/2018 11:18 AM CDT -----  Contact: pt  Stress test results   Call Back#[phone]  Thanks

## 2018-06-20 ENCOUNTER — OFFICE VISIT (OUTPATIENT)
Dept: FAMILY MEDICINE | Facility: CLINIC | Age: 78
End: 2018-06-20
Attending: FAMILY MEDICINE
Payer: MEDICARE

## 2018-06-20 VITALS
HEIGHT: 61 IN | DIASTOLIC BLOOD PRESSURE: 62 MMHG | OXYGEN SATURATION: 97 % | SYSTOLIC BLOOD PRESSURE: 140 MMHG | BODY MASS INDEX: 39.21 KG/M2 | TEMPERATURE: 98 F | RESPIRATION RATE: 17 BRPM | HEART RATE: 78 BPM | WEIGHT: 207.69 LBS

## 2018-06-20 DIAGNOSIS — I10 GOOD HYPERTENSION CONTROL: Primary | ICD-10-CM

## 2018-06-20 DIAGNOSIS — L30.9 ECZEMA, UNSPECIFIED TYPE: ICD-10-CM

## 2018-06-20 DIAGNOSIS — H91.90 HEARING LOSS, UNSPECIFIED HEARING LOSS TYPE, UNSPECIFIED LATERALITY: ICD-10-CM

## 2018-06-20 PROCEDURE — 99213 OFFICE O/P EST LOW 20 MIN: CPT | Mod: S$GLB,,, | Performed by: FAMILY MEDICINE

## 2018-06-20 PROCEDURE — 99999 PR PBB SHADOW E&M-EST. PATIENT-LVL IV: CPT | Mod: PBBFAC,,, | Performed by: FAMILY MEDICINE

## 2018-06-20 PROCEDURE — 3077F SYST BP >= 140 MM HG: CPT | Mod: CPTII,S$GLB,, | Performed by: FAMILY MEDICINE

## 2018-06-20 PROCEDURE — 3078F DIAST BP <80 MM HG: CPT | Mod: CPTII,S$GLB,, | Performed by: FAMILY MEDICINE

## 2018-06-20 NOTE — PROGRESS NOTES
Subjective:       Patient ID: Heather Hughes is a 77 y.o. female.    Chief Complaint: Follow-up and Rash (right leg)    77-year-old female coming in for a multitude of complaints with a sort of meandering complaint to complaint of complaint.  She has been having low blood pressures on her home readings so she stopped her carvedilol and lisinopril.  Pressure is borderline to mildly elevated now.  She is asymptomatic.  She has been trying to get a left knee replacement for about 6 months now and she finally completed a cardiology clearance with Dr. Ace.  She has never gone back to Dr. crane to get the surgery scheduled and wants me to give her another clearance.  She is having problems with hearing loss and wants to go to an audiologist.  She would like hearing aids.    Past Medical History:  No date: Allergy  No date: Arthritis  No date: Asthma  01/13/2017: Heart attack  No date: Hypertension  No date: Osteoporosis  No date: Thyroid disease  No date: Ulcer    Past Surgical History:  01/13/2017: cardic stent  No date: EYE SURGERY      Comment: bilateral cataracts  2011: FRACTURE SURGERY      Comment: left wrist   No date: THYROIDECTOMY    Current Outpatient Prescriptions on File Prior to Visit:  acetaminophen (TYLENOL) 650 MG TbSR, Take 650 mg by mouth every 8 (eight) hours as needed., Disp: , Rfl:   albuterol 90 mcg/actuation inhaler, Inhale 2 puffs into the lungs every 6 (six) hours as needed for Wheezing., Disp: 18 g, Rfl: 2  aspirin (ECOTRIN) 81 MG EC tablet, Take 81 mg by mouth once daily., Disp: , Rfl:   benzonatate (TESSALON) 200 MG capsule, Take 1 capsule (200 mg total) by mouth 3 (three) times daily as needed for Cough., Disp: 60 capsule, Rfl: 5  CALCIUM CARBONATE/VITAMIN D3 (CALCIUM 500 + D ORAL), Take 2 tablets by mouth once daily., Disp: , Rfl:   cloNIDine (CATAPRES) 0.1 MG tablet, Take one if blood pressure over 160/110 up to four times a day prn, Disp: 60 tablet, Rfl: 5  clopidogrel (PLAVIX) 75  mg tablet, Take 1 tablet (75 mg total) by mouth once daily., Disp: 90 tablet, Rfl: 3  ergocalciferol (ERGOCALCIFEROL) 50,000 unit Cap, Take 1 capsule (50,000 Units total) by mouth every 7 days., Disp: 12 capsule, Rfl: 0  glucosamine-chondroitin 500-400 mg tablet, Take 1 tablet by mouth 3 (three) times daily., Disp: , Rfl:   lisinopril (PRINIVIL,ZESTRIL) 20 MG tablet, Take 1 tablet (20 mg total) by mouth 2 (two) times daily., Disp: 180 tablet, Rfl: 3  montelukast (SINGULAIR) 10 mg tablet, Take 1 tablet (10 mg total) by mouth every evening., Disp: 90 tablet, Rfl: 3  nitroGLYCERIN (NITROSTAT) 0.4 MG SL tablet, Place 1 tablet (0.4 mg total) under the tongue every 5 (five) minutes as needed for Chest pain., Disp: 25 tablet, Rfl: 3  omeprazole (PRILOSEC) 40 MG capsule, Take 1 capsule (40 mg total) by mouth once daily., Disp: 90 capsule, Rfl: 3  SYNTHROID 112 mcg tablet, TAKE ONE TABLET BY MOUTH EVERY DAY BEFORE BREAKFAST, Disp: 30 tablet, Rfl: 10  carvedilol (COREG) 6.25 MG tablet, Take 1 tablet (6.25 mg total) by mouth 2 (two) times daily with meals., Disp: 90 tablet, Rfl: 3  oxybutynin (DITROPAN-XL) 5 MG TR24, Take 1 tablet (5 mg total) by mouth once daily., Disp: 30 tablet, Rfl: 11  (DISCONTINUED) nitrofurantoin, macrocrystal-monohydrate, (MACROBID) 100 MG capsule, Take 1 capsule (100 mg total) by mouth 2 (two) times daily., Disp: 20 capsule, Rfl: 0    No current facility-administered medications on file prior to visit.           Review of Systems   HENT: Positive for hearing loss. Negative for congestion, ear discharge and ear pain.    Respiratory: Negative for chest tightness and shortness of breath.    Cardiovascular: Negative for chest pain and palpitations.   Skin: Positive for color change and rash.       Objective:      Physical Exam   Constitutional: She appears well-developed. No distress.   Mildly elevated blood pressure  Severe obesity with a BMI of 39.2 she is stable from her last visit April 11, 2018    HENT:   Head: Normocephalic and atraumatic.   Right Ear: External ear normal.   Left Ear: External ear normal.   No cerumen impactions, tympanic membranes intact with good landmarks   Cardiovascular: Normal rate, regular rhythm and normal heart sounds.  Exam reveals no gallop and no friction rub.    No murmur heard.  Pulmonary/Chest: Effort normal and breath sounds normal. No respiratory distress. She has no wheezes. She has no rales. She exhibits no tenderness.   Skin: Skin is warm and dry. Rash noted. She is not diaphoretic. There is erythema. No pallor.        Nursing note and vitals reviewed.      Assessment:       1. Good hypertension control    2. Hearing loss, unspecified hearing loss type, unspecified laterality    3. Eczema, unspecified type        Plan:       1. Good hypertension control  See below    2. Hearing loss, unspecified hearing loss type, unspecified laterality  - Ambulatory Referral to Audiology    3. Eczema, unspecified type  No treatment needed minimal rash and asymptomatic         Patient readiness: acceptance and barriers:none    During the course of the visit the patient was educated and counseled about the following:     Hypertension:   Resume Coreg 6.25 twice a day.  She can hold off on the lisinopril for the time being.  Monitor blood pressure and if needed we can add lisinopril back at a lower dose    Goals: Hypertension: Reduce Blood Pressure    Did patient meet goals/outcomes: Yes    The following self management tools provided: blood pressure log    Patient Instructions (the written plan) was given to the patient/family.     Time spent with patient: 30 minutes

## 2018-06-25 ENCOUNTER — TELEPHONE (OUTPATIENT)
Dept: ORTHOPEDICS | Facility: CLINIC | Age: 78
End: 2018-06-25

## 2018-06-25 NOTE — TELEPHONE ENCOUNTER
Spoke with pt, she states she has cardio clearance and wants to set up surgery. Made ankush't for her to come in and asked her to bring the clearance with her when she comes.

## 2018-06-25 NOTE — TELEPHONE ENCOUNTER
----- Message from Dora Austin sent at 6/25/2018 11:40 AM CDT -----  Contact: PATIENT  PATIENT WANTS TO DISCUSS SETTING UP SURGERY

## 2018-07-01 DIAGNOSIS — E55.9 VITAMIN D DEFICIENCY: ICD-10-CM

## 2018-07-02 ENCOUNTER — OFFICE VISIT (OUTPATIENT)
Dept: OTOLARYNGOLOGY | Facility: CLINIC | Age: 78
End: 2018-07-02
Attending: FAMILY MEDICINE
Payer: MEDICARE

## 2018-07-02 ENCOUNTER — CLINICAL SUPPORT (OUTPATIENT)
Dept: AUDIOLOGY | Facility: CLINIC | Age: 78
End: 2018-07-02
Attending: FAMILY MEDICINE
Payer: MEDICARE

## 2018-07-02 VITALS — BODY MASS INDEX: 39.21 KG/M2 | WEIGHT: 207.69 LBS | HEIGHT: 61 IN

## 2018-07-02 DIAGNOSIS — H90.3 SENSORINEURAL HEARING LOSS (SNHL) OF BOTH EARS: Primary | ICD-10-CM

## 2018-07-02 DIAGNOSIS — H90.3 BILATERAL SENSORINEURAL HEARING LOSS: Primary | ICD-10-CM

## 2018-07-02 DIAGNOSIS — H93.19 TINNITUS, UNSPECIFIED LATERALITY: ICD-10-CM

## 2018-07-02 PROCEDURE — 99999 PR PBB SHADOW E&M-EST. PATIENT-LVL I: CPT | Mod: PBBFAC,,,

## 2018-07-02 PROCEDURE — 92557 COMPREHENSIVE HEARING TEST: CPT | Mod: S$GLB,,, | Performed by: AUDIOLOGIST

## 2018-07-02 PROCEDURE — 99999 PR PBB SHADOW E&M-EST. PATIENT-LVL III: CPT | Mod: PBBFAC,,, | Performed by: OTOLARYNGOLOGY

## 2018-07-02 PROCEDURE — 92567 TYMPANOMETRY: CPT | Mod: S$GLB,,, | Performed by: AUDIOLOGIST

## 2018-07-02 PROCEDURE — 99203 OFFICE O/P NEW LOW 30 MIN: CPT | Mod: S$GLB,,, | Performed by: OTOLARYNGOLOGY

## 2018-07-02 RX ORDER — ERGOCALCIFEROL 1.25 MG/1
CAPSULE ORAL
Qty: 12 CAPSULE | Refills: 1 | Status: SHIPPED | OUTPATIENT
Start: 2018-07-02 | End: 2019-07-23 | Stop reason: ALTCHOICE

## 2018-07-02 NOTE — PATIENT INSTRUCTIONS
"Oral Anti-histamine    Take an oral anti-histamine daily for control of nasal and/or eye symptoms. Depending on the dosage listed on the bottle, these medications are once or twice daily. They control allergy and sinus issues. Examples include Allegra, Zyrtec (Cetrizine) , Claritin, Xyzal as well as the generic names for each.     Be sure to look out for combination medications that contain a decongestant as well. Many times, the bottle will have a " -D " at the end of the name. I would prefer if you did not take the combination medication regularly. Stick to the single medication for control of daily symptoms and use a separate decongestant as needed for days where you feel more congested.    Common side effects of anti-histamines include: dry mouth and dry secretions, dry eyes, occasional drowsiness, constipation. I recommend drinking plenty of water while on this medication.    "

## 2018-07-02 NOTE — PROGRESS NOTES
Subjective:       Patient ID: Heather Hughes is a 78 y.o. female.    Chief Complaint: Hearing Loss    Hearing Loss:    She describes a left greater than right sided hearing loss starting many years ago and has been gradually worsening.      no pain, no otorrhea, no aural fullness, no tinnitus    Risk factors:  no Familial deafness   no Ototoxic medication exposure  no Acoustic trauma  no Head injury or trauma  no Otologic infection   no History of meningitis  no Ear surgery     Review of Systems   Constitutional: Negative for activity change and appetite change.   Eyes: Negative for discharge.   Respiratory: Negative for difficulty breathing and wheezing   Cardiovascular: Negative for chest pain.   Gastrointestinal: Negative for abdominal distention and abdominal pain.   Endocrine: Negative for cold intolerance and heat intolerance.   Genitourinary: Negative for dysuria.   Musculoskeletal: Negative for gait problem and joint swelling.   Skin: Negative for color change and pallor.   Neurological: Negative for syncope and weakness.   Psychiatric/Behavioral: Negative for agitation and confusion.     Objective:        Constitutional:   She is oriented to person, place, and time. She appears well-developed and well-nourished. She appears alert. She is active. Normal speech.      Head:  Normocephalic and atraumatic. Head is without TMJ tenderness. No scars. Salivary glands normal.  Facial strength is normal.      Ears:    Right Ear: No drainage or swelling. No middle ear effusion.   Left Ear: No drainage or swelling.  No middle ear effusion.     Nose:  No mucosal edema, rhinorrhea or sinus tenderness. No turbinate hypertrophy.      Mouth/Throat  Oropharynx clear and moist without lesions or asymmetry, normal uvula midline and mirror exam normal. Normal dentition. No uvula swelling, lacerations or trismus. No oropharyngeal exudate. Tonsillar erythema, tonsillar exudate.      Neck:  Full range of motion with neck  supple and no adenopathy. Thyroid tenderness is present. No tracheal deviation, no edema, no erythema, normal range of motion, no stridor, no crepitus and no neck rigidity present. No thyroid mass present.     Cardiovascular:   Intact distal pulses and normal pulses.      Pulmonary/Chest:   Effort normal and breath sounds normal. No stridor.     Psychiatric:   Her speech is normal and behavior is normal. Her mood appears not anxious. Her affect is not labile.     Neurological:   She is alert and oriented to person, place, and time. No sensory deficit.     Skin:   No abrasions, lacerations, lesions, or rashes. No abrasion and no bruising noted.         Tests / Results:  I personally reviewed the audiogram and my findings reveal: normal sloping to severe SNHL, good WRS           Assessment:       1. Bilateral sensorineural hearing loss          Plan:         Heather Hughes has bilateral hearing loss which is likely secondary to presbycusis.     she is medically cleared for hearing aids.     Discussed audio protection. Continue routine audiometric follow-ups.    Follow-up with me as needed.

## 2018-07-02 NOTE — LETTER
July 3, 2018      Graham Mazariegos MD  [address]  [place] [address]           [facility]  [address] [facility] [address] [place] [address]  Phone: [phone]  Fax: [phone]          Patient: Heather Hughes   MR Number: [id number]   YOB: 1940   Date of Visit: 7/2/2018       Dear Dr. Graham Mazariegos:    Thank you for referring Heather Hughes to me for evaluation. Attached you will find relevant portions of my assessment and plan of care.    If you have questions, please do not hesitate to call me. I look forward to following Heather Hughes along with you.    Sincerely,    Jagjit Ruffin MD    Enclosure  CC:  No Recipients    If you would like to receive this communication electronically, please contact [email or web address] or [phone] to request more information on [employer] access.    For providers and/or their staff who would like to refer a patient to [facility], please contact us through our one-stop-shop provider referral line, [facility], at [phone].    If you feel you have received this communication in error or would no longer like to receive these types of communications, please e-mail [email or web address]

## 2018-07-10 ENCOUNTER — OFFICE VISIT (OUTPATIENT)
Dept: FAMILY MEDICINE | Facility: CLINIC | Age: 78
End: 2018-07-10
Payer: MEDICARE

## 2018-07-10 ENCOUNTER — TELEPHONE (OUTPATIENT)
Dept: ORTHOPEDICS | Facility: CLINIC | Age: 78
End: 2018-07-10

## 2018-07-10 ENCOUNTER — DOCUMENTATION ONLY (OUTPATIENT)
Dept: FAMILY MEDICINE | Facility: CLINIC | Age: 78
End: 2018-07-10

## 2018-07-10 VITALS
SYSTOLIC BLOOD PRESSURE: 159 MMHG | TEMPERATURE: 98 F | HEART RATE: 73 BPM | HEIGHT: 61 IN | BODY MASS INDEX: 41.7 KG/M2 | DIASTOLIC BLOOD PRESSURE: 83 MMHG | WEIGHT: 220.88 LBS

## 2018-07-10 DIAGNOSIS — M17.12 PRIMARY OSTEOARTHRITIS OF LEFT KNEE: Primary | ICD-10-CM

## 2018-07-10 DIAGNOSIS — B37.31 VAGINAL YEAST INFECTION: ICD-10-CM

## 2018-07-10 DIAGNOSIS — B37.0 THRUSH: ICD-10-CM

## 2018-07-10 PROCEDURE — 99213 OFFICE O/P EST LOW 20 MIN: CPT | Mod: S$GLB,,, | Performed by: PHYSICIAN ASSISTANT

## 2018-07-10 PROCEDURE — 3077F SYST BP >= 140 MM HG: CPT | Mod: CPTII,S$GLB,, | Performed by: PHYSICIAN ASSISTANT

## 2018-07-10 PROCEDURE — 99999 PR PBB SHADOW E&M-EST. PATIENT-LVL III: CPT | Mod: PBBFAC,,, | Performed by: PHYSICIAN ASSISTANT

## 2018-07-10 PROCEDURE — 3079F DIAST BP 80-89 MM HG: CPT | Mod: CPTII,S$GLB,, | Performed by: PHYSICIAN ASSISTANT

## 2018-07-10 RX ORDER — NYSTATIN 100000 [USP'U]/ML
4 SUSPENSION ORAL 4 TIMES DAILY
Qty: 160 ML | Refills: 0 | Status: SHIPPED | OUTPATIENT
Start: 2018-07-10 | End: 2018-07-20

## 2018-07-10 RX ORDER — FLUCONAZOLE 150 MG/1
TABLET ORAL
Qty: 2 TABLET | Refills: 0 | Status: SHIPPED | OUTPATIENT
Start: 2018-07-10 | End: 2018-07-30

## 2018-07-10 RX ORDER — PHENAZOPYRIDINE HYDROCHLORIDE 95 MG/1
95 TABLET ORAL DAILY
COMMUNITY
End: 2018-10-30 | Stop reason: ALTCHOICE

## 2018-07-10 NOTE — PROGRESS NOTES
Pre-Visit Chart Review  For Appointment Scheduled on 07/10/18    Health Maintenance Due   Topic Date Due    Zoster Vaccine  06/22/2001

## 2018-07-10 NOTE — TELEPHONE ENCOUNTER
----- Message from Jose Whitley sent at 7/10/2018  2:10 PM CDT -----  Patient needs a clearance form filled out , please fax it over to Dr. Ace's Office ATTN: CATHY [phone]

## 2018-07-10 NOTE — TELEPHONE ENCOUNTER
----- Message from Dora Austin sent at 7/10/2018  8:12 AM CDT -----  Contact: Patient  Patient has appointment on the 17th and she is having severe pain in her left knee, wants to know what to do ? Can she be seen sooner ?

## 2018-07-11 ENCOUNTER — OFFICE VISIT (OUTPATIENT)
Dept: ORTHOPEDICS | Facility: CLINIC | Age: 78
End: 2018-07-11
Payer: MEDICARE

## 2018-07-11 VITALS
HEIGHT: 61 IN | HEART RATE: 71 BPM | BODY MASS INDEX: 41.7 KG/M2 | SYSTOLIC BLOOD PRESSURE: 148 MMHG | WEIGHT: 220.88 LBS | DIASTOLIC BLOOD PRESSURE: 72 MMHG

## 2018-07-11 DIAGNOSIS — M17.11 ARTHRITIS OF KNEE, RIGHT: ICD-10-CM

## 2018-07-11 DIAGNOSIS — M25.562 LEFT KNEE PAIN, UNSPECIFIED CHRONICITY: Primary | ICD-10-CM

## 2018-07-11 DIAGNOSIS — M17.12 ARTHRITIS OF LEFT KNEE: Primary | ICD-10-CM

## 2018-07-11 DIAGNOSIS — Z01.818 PRE-OP EXAM: ICD-10-CM

## 2018-07-11 PROCEDURE — 99999 PR PBB SHADOW E&M-EST. PATIENT-LVL III: CPT | Mod: PBBFAC,,, | Performed by: ORTHOPAEDIC SURGERY

## 2018-07-11 PROCEDURE — 20610 DRAIN/INJ JOINT/BURSA W/O US: CPT | Mod: 50,S$GLB,, | Performed by: ORTHOPAEDIC SURGERY

## 2018-07-11 PROCEDURE — 3078F DIAST BP <80 MM HG: CPT | Mod: CPTII,S$GLB,, | Performed by: ORTHOPAEDIC SURGERY

## 2018-07-11 PROCEDURE — 3077F SYST BP >= 140 MM HG: CPT | Mod: CPTII,S$GLB,, | Performed by: ORTHOPAEDIC SURGERY

## 2018-07-11 PROCEDURE — 99204 OFFICE O/P NEW MOD 45 MIN: CPT | Mod: 25,S$GLB,, | Performed by: ORTHOPAEDIC SURGERY

## 2018-07-11 RX ORDER — TRIAMCINOLONE ACETONIDE 40 MG/ML
40 INJECTION, SUSPENSION INTRA-ARTICULAR; INTRAMUSCULAR
Status: DISCONTINUED | OUTPATIENT
Start: 2018-07-11 | End: 2018-07-12 | Stop reason: HOSPADM

## 2018-07-11 RX ADMIN — TRIAMCINOLONE ACETONIDE 40 MG: 40 INJECTION, SUSPENSION INTRA-ARTICULAR; INTRAMUSCULAR at 10:07

## 2018-07-11 NOTE — LETTER
July 11, 2018      ISAAC Berg  [address]  [facility] [address]           [address] [address]  Phone: [phone]          Patient: Heather Hughes   MR Number: [id number]   YOB: 1940   Date of Visit: 7/11/2018       Dear Hortencia Ignacio:    Thank you for referring Heather Hughes to me for evaluation. Attached you will find relevant portions of my assessment and plan of care.    If you have questions, please do not hesitate to call me. I look forward to following Heather Hughes along with you.    Sincerely,    Shantanu Santiago MD    Enclosure  CC:  No Recipients    If you would like to receive this communication electronically, please contact [email or web address] or [phone] to request more information on [employer] Link access.    For providers and/or their staff who would like to refer a patient to [facility], please contact us through our one-stop-shop provider referral line, [facility], at [phone].    If you feel you have received this communication in error or would no longer like to receive these types of communications, please e-mail [email or web address]

## 2018-07-12 ENCOUNTER — TELEPHONE (OUTPATIENT)
Dept: FAMILY MEDICINE | Facility: CLINIC | Age: 78
End: 2018-07-12

## 2018-07-12 RX ORDER — SODIUM CHLORIDE 0.9 % (FLUSH) 0.9 %
3 SYRINGE (ML) INJECTION
Status: CANCELLED | OUTPATIENT
Start: 2018-07-12

## 2018-07-12 RX ORDER — MUPIROCIN 20 MG/G
OINTMENT TOPICAL
Status: CANCELLED | OUTPATIENT
Start: 2018-07-12

## 2018-07-12 NOTE — PROGRESS NOTES
Past Medical History:   Diagnosis Date    Allergy     Arthritis     Asthma     Heart attack 01/13/2017    Hypertension     Osteoporosis     Thyroid disease     Ulcer        Past Surgical History:   Procedure Laterality Date    cardic stent  01/13/2017    EYE SURGERY      bilateral cataracts    FRACTURE SURGERY  2011    left wrist     THYROIDECTOMY         Current Outpatient Prescriptions   Medication Sig    acetaminophen (TYLENOL) 650 MG TbSR Take 650 mg by mouth every 8 (eight) hours as needed.    albuterol 90 mcg/actuation inhaler Inhale 2 puffs into the lungs every 6 (six) hours as needed for Wheezing.    aspirin (ECOTRIN) 81 MG EC tablet Take 81 mg by mouth once daily.    benzonatate (TESSALON) 200 MG capsule Take 1 capsule (200 mg total) by mouth 3 (three) times daily as needed for Cough.    CALCIUM CARBONATE/VITAMIN D3 (CALCIUM 500 + D ORAL) Take 2 tablets by mouth once daily.    carvedilol (COREG) 6.25 MG tablet Take 1 tablet (6.25 mg total) by mouth 2 (two) times daily with meals. (Patient taking differently: Take 6.25 mg by mouth once daily. 1/2 tablet daily)    clopidogrel (PLAVIX) 75 mg tablet Take 1 tablet (75 mg total) by mouth once daily.    ergocalciferol (ERGOCALCIFEROL) 50,000 unit Cap TAKE ONE CAPSULE BY MOUTH EVERY 7 DAYS    fluconazole (DIFLUCAN) 150 MG Tab Take 1 tablet x 1; repeat in 72 hours    glucosamine-chondroitin 500-400 mg tablet Take 1 tablet by mouth 3 (three) times daily.    montelukast (SINGULAIR) 10 mg tablet Take 1 tablet (10 mg total) by mouth every evening.    nitroGLYCERIN (NITROSTAT) 0.4 MG SL tablet Place 1 tablet (0.4 mg total) under the tongue every 5 (five) minutes as needed for Chest pain.    nystatin (MYCOSTATIN) 100,000 unit/mL suspension Take 4 mLs (400,000 Units total) by mouth 4 (four) times daily. for 10 days    omeprazole (PRILOSEC) 40 MG capsule Take 1 capsule (40 mg total) by mouth once daily.    phenazopyridine (PYRIDIUM) 95 MG tablet  Take 95 mg by mouth once daily.    SYNTHROID 112 mcg tablet TAKE ONE TABLET BY MOUTH EVERY DAY BEFORE BREAKFAST     No current facility-administered medications for this visit.        Review of patient's allergies indicates:   Allergen Reactions    Lipitor [atorvastatin] Other (See Comments)     Fall/ balance     Myrbetriq [mirabegron] Swelling and Other (See Comments)     Legs swelling, couldn't void     Pravastatin Other (See Comments)     Balance problem     Codeine Nausea Only    Mobic [meloxicam]     Sulfur        Family History   Problem Relation Age of Onset    Lupus Mother     Osteoporosis Mother     Heart disease Mother     Ulcers Mother     Esophageal cancer Father     Cancer Father         esophageal    DIEGO disease Father     Cancer Brother     Esophageal cancer Brother     Lupus Daughter     DIEGO disease Son     Heart disease Maternal Aunt     Cancer Maternal Aunt     Kidney disease Maternal Aunt     Kidney cancer Maternal Aunt     Liver cancer Maternal Aunt     Dementia Maternal Aunt     Cancer Maternal Uncle     Colon cancer Maternal Uncle     Breast cancer Maternal Uncle     Lung cancer Maternal Uncle         smoker    Parkinsonism Paternal Aunt     Cancer Paternal Aunt     Stomach cancer Paternal Aunt     Heart disease Paternal Uncle     Cancer Paternal Uncle     Stomach cancer Paternal Uncle         x2    Bone cancer Paternal Uncle     Cancer Maternal Grandmother         tumors on head and body    Parkinsonism Paternal Grandmother     DIEGO disease Brother     Ulcers Brother     Dementia Brother     Early death Son         self    Bipolar disorder Son     Cancer Son     Esophageal cancer Son     Drug abuse Son        Social History     Social History    Marital status:      Spouse name: N/A    Number of children: N/A    Years of education: N/A     Occupational History    Not on file.     Social History Main Topics    Smoking status: Former Smoker  "    Packs/day: 0.50     Types: Cigarettes     Quit date: 1/13/2017    Smokeless tobacco: Never Used    Alcohol use No    Drug use: No    Sexual activity: No     Other Topics Concern    Not on file     Social History Narrative    No narrative on file       Chief Complaint:   Chief Complaint   Patient presents with    Left Knee - Pain       Consulting Physician: Hortencia Ignacio PA    History of present illness:    This is a 78 y.o. female who complains of bilateral knee pain with left worse. Pain severe in left and worse with use. No new injury.    Review of Systems:    Constitution: Denies chills, fever, and sweats.  HENT: Denies headaches or blurry vision.  Cardiovascular: Denies chest pain or irregular heart beat.  Respiratory: Denies cough or shortness of breath.  Gastrointestinal: Denies abdominal pain, nausea, or vomiting.  Musculoskeletal:  Denies muscle cramps.  Neurological: Denies dizziness or focal weakness.  Psychiatric/Behavioral: Normal mental status.  Hematologic/Lymphatic: Denies bleeding problem or easy bruising/bleeding.  Skin: Denies rash or suspicious lesions.    Examination:    Vital Signs:    Vitals:    07/11/18 1356   BP: (!) 148/72   Pulse: 71   Weight: 100.2 kg (220 lb 14.4 oz)   Height: 5' 1" (1.549 m)   PainSc: 0-No pain   PainLoc: Knee       Body mass index is 41.74 kg/m².    This a well-developed, well nourished patient in no acute distress.    Alert and oriented x 3 and cooperative to examination.       Physical Exam: Right Knee Exam    Gait   Antalgic    Skin  Rash:   None  Scars:   None    Inspection  Erythema:  None  Bruising:  None  Effusion:  None  Masses:  None  Lymphadenopathy: None    Range of Motion: 0 to 130°    Medial Joint : n  Lateral Joint : y    Patellofemoral Tenderness: Yes  Patellofemoral Crepitus: Yes    Lachman:  Normal  Anterior Drawer: Normal  Posterior Drawer: Normal    Khalif's:  Negative  Apley's:  Negative    Varus " Stress:  Stable  Valgus Stress:  Stable    Strength:  5/5    Pulses:  Palpable  Sensation:  Intact    Left Knee Exam    Gait   Antalgic    Skin  Rash:   None  Scars:   None    Inspection  Erythema:  None  Bruising:  None  Effusion:  None  Masses:  None  Lymphadenopathy: None    Range of Motion: 0 to 130°    Medial Joint : n  Lateral Joint : y    Patellofemoral Tenderness: Yes  Patellofemoral Crepitus: Yes    Lachman:  Normal  Anterior Drawer: Normal  Posterior Drawer: Normal    Khalif's:  Negative  Apley's:  Negative    Varus Stress:  Stable  Valgus Stress:  Stable    Strength:  5/5    Pulses:  Palpable  Sensation:  Intact            Imaging: X-rays ordered and reviewed today personally of both knees show severe DJD in the lateral sides.        Assessment: Arthritis of left knee  -     Large Joint Aspiration/Injection    Arthritis of knee, right  -     Large Joint Aspiration/Injection        Plan:  We discussed options today and she would like a left TKA. Injected both today. The risks, benefits, and alternatives to the procedure were explained to the patient including, but not limited to: incomplete pain relief, surgical failure, hardware failure, need for further procedures, damage to nerves, arteries, blood vessels and other structures, infection, Deep Vein Thrombosis (DVT), Pulmonary Embolus (PE), Complex Regional Pain Syndrome as well as general anesthetic complications including seizure, stroke, heart attack and even death. The patient understood these risks and wished to proceed and signed the informed consent. All questions were answered. No guarantees were implied or stated.    We will obtain the necessary clearances and schedule the patient for surgery.            DISCLAIMER: This note may have been dictated using voice recognition software and may contain grammatical errors.     NOTE: Consult report sent to referring provider via [employer].

## 2018-07-12 NOTE — PROCEDURES
Large Joint Aspiration/Injection  Date/Time: 7/11/2018 10:39 PM  Performed by: NEREIDA HAMMOND  Authorized by: NEREIDA HAMMOND     Consent Done?:  Yes (Verbal)  Indications:  Pain  Timeout: Prior to procedure the correct patient, procedure, and site was verified      Location:  Knee  Site:  R knee and L knee  Prep: Patient was prepped and draped in usual sterile fashion    Approach:  Anteromedial  Medications:  40 mg triamcinolone acetonide 40 mg/mL; 40 mg triamcinolone acetonide 40 mg/mL

## 2018-07-12 NOTE — TELEPHONE ENCOUNTER
----- Message from Aissatou Gee LPN sent at 7/11/2018  5:22 PM CDT -----      ----- Message -----  From: Monika Larkin LPN  Sent: 7/11/2018   3:02 PM  To: Delilah SHERIDAN Staff    We are scheduling Mrs. Hughes for left total knee arthroplasty for 8/15/18.  We require surgical clearance.  Will she need to be seen again or can Dr. Mazariegos clear her based on her last appt.    Please adviseMonika

## 2018-07-16 ENCOUNTER — TELEPHONE (OUTPATIENT)
Dept: ORTHOPEDICS | Facility: CLINIC | Age: 78
End: 2018-07-16

## 2018-07-16 NOTE — TELEPHONE ENCOUNTER
Returned pt's call, LVM advising that she can use PO or topical benadryl for welts.  Encouraged emergency treatment for SOB or swelling of throat.

## 2018-07-16 NOTE — TELEPHONE ENCOUNTER
----- Message from Kathy Villa MA sent at 7/16/2018  9:24 AM CDT -----  Contact: Self  The patient is wanting to make the staff aware of some side effects from her last shot. She has whelps appearing around the area. They started  Wednesday evening and more appeared Friday. She has taken Benadryl. She would like to know if there is something else she needs to do.     Call Back# [phone]  Thanks

## 2018-07-17 ENCOUNTER — HOSPITAL ENCOUNTER (OUTPATIENT)
Dept: RADIOLOGY | Facility: HOSPITAL | Age: 78
Discharge: HOME | End: 2018-07-17
Attending: ORTHOPAEDIC SURGERY
Payer: MEDICARE

## 2018-07-17 DIAGNOSIS — M25.562 LEFT KNEE PAIN, UNSPECIFIED CHRONICITY: ICD-10-CM

## 2018-07-17 PROCEDURE — 77073 BONE LENGTH STUDIES: CPT | Mod: TC,FY

## 2018-07-17 PROCEDURE — 73721 MRI JNT OF LWR EXTRE W/O DYE: CPT | Mod: TC,LT

## 2018-07-17 PROCEDURE — 73721 MRI JNT OF LWR EXTRE W/O DYE: CPT | Mod: 26,LT,, | Performed by: RADIOLOGY

## 2018-07-17 PROCEDURE — 77073 BONE LENGTH STUDIES: CPT | Mod: 26,,, | Performed by: RADIOLOGY

## 2018-07-20 NOTE — PROGRESS NOTES
Subjective:       Patient ID: Heather Hughes is a 78 y.o. female.    Chief Complaint: mouth is sore    HPI   Patient is a 78 year old  female presenting to the clinic with complaint of soreness to the mouth. She reports having been treated with abx for recent kidney infection and devloped burning sensation in her mouth. She tried an old rx for nystatin swish and swallow which has started to help, but ran out. Patient also reports vaginal irritation all the time, not associated with urinating.     She reports being told she needs a TKA & was going to get this scheduled with Dr. Frances, however, she would like to be referred to someone here at [facility]. She reports developing severe pain in left knee within the last couple of days making it difficult for her to ambulate. She is upset that Dr. Frances's office is not returning her calls regarding this issue.   Review of Systems   Constitutional: Negative for activity change, appetite change, chills, diaphoresis, fatigue and fever.   HENT: Negative for congestion, postnasal drip and rhinorrhea.    Respiratory: Negative.  Negative for cough, shortness of breath and wheezing.    Cardiovascular: Negative.  Negative for chest pain.   Gastrointestinal: Negative for abdominal pain, blood in stool, constipation, diarrhea, nausea and vomiting.   Genitourinary: Positive for vaginal discharge. Negative for dysuria, frequency, hematuria and urgency.   Musculoskeletal: Positive for arthralgias. Negative for neck pain and neck stiffness.   Skin: Negative.  Negative for color change and rash.   Neurological: Negative for dizziness and syncope.   Psychiatric/Behavioral: Negative for agitation, behavioral problems and confusion.       Objective:      Physical Exam   Constitutional: Vital signs are normal. She appears well-developed and well-nourished. No distress.   Cardiovascular: Normal rate, regular rhythm, S1 normal, S2 normal and normal heart sounds.  Exam reveals no  gallop.    No murmur heard.  Pulses:       Radial pulses are 2+ on the right side, and 2+ on the left side.   Pulmonary/Chest: Effort normal and breath sounds normal. No respiratory distress. She has no wheezes. She has no rhonchi.   Skin: Skin is warm and dry. She is not diaphoretic.   Psychiatric: She has a normal mood and affect. Her speech is normal and behavior is normal. Judgment and thought content normal. Cognition and memory are normal.       Assessment:       1. Primary osteoarthritis of left knee    2. Vaginal yeast infection    3. Thrush        Plan:       Heather was seen today for mouth is sore.    Diagnoses and all orders for this visit:    Primary osteoarthritis of left knee  -     Ambulatory referral to Orthopedics    Vaginal yeast infection  -     fluconazole (DIFLUCAN) 150 MG Tab; Take 1 tablet x 1; repeat in 72 hours    Thrush  -     nystatin (MYCOSTATIN) 100,000 unit/mL suspension; Take 4 mLs (400,000 Units total) by mouth 4 (four) times daily. for 10 days

## 2018-07-27 ENCOUNTER — DOCUMENTATION ONLY (OUTPATIENT)
Dept: FAMILY MEDICINE | Facility: CLINIC | Age: 78
End: 2018-07-27

## 2018-07-27 NOTE — PROGRESS NOTES
Pre-Visit Chart Review  For Appointment Scheduled on 7/30/18    Health Maintenance Due   Topic Date Due    Zoster Vaccine  06/22/2001

## 2018-07-30 ENCOUNTER — OFFICE VISIT (OUTPATIENT)
Dept: FAMILY MEDICINE | Facility: CLINIC | Age: 78
End: 2018-07-30
Attending: FAMILY MEDICINE
Payer: MEDICARE

## 2018-07-30 ENCOUNTER — TELEPHONE (OUTPATIENT)
Dept: FAMILY MEDICINE | Facility: CLINIC | Age: 78
End: 2018-07-30

## 2018-07-30 ENCOUNTER — HOSPITAL ENCOUNTER (OUTPATIENT)
Dept: RADIOLOGY | Facility: CLINIC | Age: 78
Discharge: HOME | End: 2018-07-30
Attending: FAMILY MEDICINE
Payer: MEDICARE

## 2018-07-30 VITALS
RESPIRATION RATE: 16 BRPM | WEIGHT: 206.38 LBS | TEMPERATURE: 98 F | HEIGHT: 61 IN | HEART RATE: 62 BPM | OXYGEN SATURATION: 97 % | DIASTOLIC BLOOD PRESSURE: 64 MMHG | BODY MASS INDEX: 38.96 KG/M2 | SYSTOLIC BLOOD PRESSURE: 134 MMHG

## 2018-07-30 DIAGNOSIS — J45.40 MODERATE PERSISTENT ASTHMA WITHOUT COMPLICATION: ICD-10-CM

## 2018-07-30 DIAGNOSIS — M17.0 PRIMARY OSTEOARTHRITIS OF BOTH KNEES: Primary | ICD-10-CM

## 2018-07-30 DIAGNOSIS — E66.01 SEVERE OBESITY (BMI 35.0-39.9) WITH COMORBIDITY: ICD-10-CM

## 2018-07-30 DIAGNOSIS — Z01.818 PREOP EXAMINATION: ICD-10-CM

## 2018-07-30 DIAGNOSIS — I25.110 CORONARY ARTERY DISEASE INVOLVING NATIVE CORONARY ARTERY OF NATIVE HEART WITH UNSTABLE ANGINA PECTORIS: ICD-10-CM

## 2018-07-30 DIAGNOSIS — E03.9 ACQUIRED HYPOTHYROIDISM: ICD-10-CM

## 2018-07-30 DIAGNOSIS — I10 GOOD HYPERTENSION CONTROL: ICD-10-CM

## 2018-07-30 PROCEDURE — 71046 X-RAY EXAM CHEST 2 VIEWS: CPT | Mod: 26,,, | Performed by: RADIOLOGY

## 2018-07-30 PROCEDURE — 93005 ELECTROCARDIOGRAM TRACING: CPT | Mod: S$GLB,,, | Performed by: FAMILY MEDICINE

## 2018-07-30 PROCEDURE — 93010 ELECTROCARDIOGRAM REPORT: CPT | Mod: S$GLB,,, | Performed by: INTERNAL MEDICINE

## 2018-07-30 PROCEDURE — 99999 PR PBB SHADOW E&M-EST. PATIENT-LVL IV: CPT | Mod: PBBFAC,,, | Performed by: FAMILY MEDICINE

## 2018-07-30 PROCEDURE — 3075F SYST BP GE 130 - 139MM HG: CPT | Mod: CPTII,S$GLB,, | Performed by: FAMILY MEDICINE

## 2018-07-30 PROCEDURE — 3078F DIAST BP <80 MM HG: CPT | Mod: CPTII,S$GLB,, | Performed by: FAMILY MEDICINE

## 2018-07-30 PROCEDURE — 99214 OFFICE O/P EST MOD 30 MIN: CPT | Mod: S$GLB,,, | Performed by: FAMILY MEDICINE

## 2018-07-30 PROCEDURE — 71046 X-RAY EXAM CHEST 2 VIEWS: CPT | Mod: TC,FY,PO

## 2018-07-30 NOTE — TELEPHONE ENCOUNTER
She is not currently on lisinopril.  She was here for preop clearance.  If she changes med she will not be cleared until we see they are stable.

## 2018-07-30 NOTE — TELEPHONE ENCOUNTER
Patient was just seen. She wants rx of lisinopril 20 and she would like the coreg to be half dose of 6.25 sent to [facility] [address] .

## 2018-07-30 NOTE — PROGRESS NOTES
Subjective:       Patient ID: Heather Hughes is a 78 y.o. female.    Chief Complaint: Pre-op Exam (BP medication needs to be changed in system)    78-year-old female coming in for preop clearance for left total knee arthroplasty to be done by Dr. Ortega's on August 15.  If all goes well there is a plan to do the right knee in November.  She has a history of hypertension, coronary disease with non-ST elevation MI and stents, hypothyroidism, moderate persistent asthma and arthritis.    Past Medical History:  No date: Allergy  No date: Arthritis  No date: Asthma  01/13/2017: Heart attack  No date: Hypertension  No date: Osteoporosis  No date: Thyroid disease  No date: Ulcer    Past Surgical History:  01/13/2017: cardic stent  No date: EYE SURGERY      Comment: bilateral cataracts  2011: FRACTURE SURGERY      Comment: left wrist   No date: THYROIDECTOMY    Review of patient's family history indicates:  Problem: Lupus      Relation: Mother       Age of Onset: (Not Specified)   Problem: Osteoporosis      Relation: Mother       Age of Onset: (Not Specified)   Problem: Heart disease      Relation: Mother       Age of Onset: (Not Specified)   Problem: Ulcers      Relation: Mother       Age of Onset: (Not Specified)   Problem: Esophageal cancer      Relation: Father       Age of Onset: (Not Specified)   Problem: Cancer      Relation: Father       Age of Onset: (Not Specified)       Comment: esophageal  Problem: DIEGO disease      Relation: Father       Age of Onset: (Not Specified)   Problem: Cancer      Relation: Brother       Age of Onset: (Not Specified)   Problem: Esophageal cancer      Relation: Brother       Age of Onset: (Not Specified)   Problem: Lupus      Relation: Daughter       Age of Onset: (Not Specified)   Problem: DIEGO disease      Relation: Son       Age of Onset: (Not Specified)   Problem: Heart disease      Relation: Maternal Aunt       Age of Onset: (Not Specified)   Problem: Cancer      Relation:  Maternal Aunt       Age of Onset: (Not Specified)   Problem: Kidney disease      Relation: Maternal Aunt       Age of Onset: (Not Specified)   Problem: Kidney cancer      Relation: Maternal Aunt       Age of Onset: (Not Specified)   Problem: Liver cancer      Relation: Maternal Aunt       Age of Onset: (Not Specified)   Problem: Dementia      Relation: Maternal Aunt       Age of Onset: (Not Specified)   Problem: Cancer      Relation: Maternal Uncle       Age of Onset: (Not Specified)   Problem: Colon cancer      Relation: Maternal Uncle       Age of Onset: (Not Specified)   Problem: Breast cancer      Relation: Maternal Uncle       Age of Onset: (Not Specified)   Problem: Lung cancer      Relation: Maternal Uncle       Age of Onset: (Not Specified)       Comment: smoker  Problem: Parkinsonism      Relation: Paternal Aunt       Age of Onset: (Not Specified)   Problem: Cancer      Relation: Paternal Aunt       Age of Onset: (Not Specified)   Problem: Stomach cancer      Relation: Paternal Aunt       Age of Onset: (Not Specified)   Problem: Heart disease      Relation: Paternal Uncle       Age of Onset: (Not Specified)   Problem: Cancer      Relation: Paternal Uncle       Age of Onset: (Not Specified)   Problem: Stomach cancer      Relation: Paternal Uncle       Age of Onset: (Not Specified)       Comment: x2  Problem: Bone cancer      Relation: Paternal Uncle       Age of Onset: (Not Specified)   Problem: Cancer      Relation: Maternal Grandmother       Age of Onset: (Not Specified)       Comment: tumors on head and body  Problem: Parkinsonism      Relation: Paternal Grandmother       Age of Onset: (Not Specified)   Problem: DIEGO disease      Relation: Brother       Age of Onset: (Not Specified)   Problem: Ulcers      Relation: Brother       Age of Onset: (Not Specified)   Problem: Dementia      Relation: Brother       Age of Onset: (Not Specified)   Problem: Early death      Relation: Son       Age of Onset: (Not  Specified)       Comment: self  Problem: Bipolar disorder      Relation: Son       Age of Onset: (Not Specified)   Problem: Cancer      Relation: Son       Age of Onset: (Not Specified)   Problem: Esophageal cancer      Relation: Son       Age of Onset: (Not Specified)   Problem: Drug abuse      Relation: Son       Age of Onset: (Not Specified)     Social History    Marital status:              Spouse name:                       Years of education:                 Number of children:               Social History Main Topics    Smoking status: Former Smoker                                                                Packs/day: 0.50      Years: 0.00           Types: Cigarettes       Quit date: 1/13/2017    Smokeless tobacco: Never Used                        Alcohol use: No              Drug use: No              Sexual activity: No                     Current Outpatient Prescriptions on File Prior to Visit:  acetaminophen (TYLENOL) 650 MG TbSR, Take 650 mg by mouth every 8 (eight) hours as needed., Disp: , Rfl:   albuterol 90 mcg/actuation inhaler, Inhale 2 puffs into the lungs every 6 (six) hours as needed for Wheezing., Disp: 18 g, Rfl: 2  aspirin (ECOTRIN) 81 MG EC tablet, Take 81 mg by mouth once daily., Disp: , Rfl:   benzonatate (TESSALON) 200 MG capsule, Take 1 capsule (200 mg total) by mouth 3 (three) times daily as needed for Cough., Disp: 60 capsule, Rfl: 5  CALCIUM CARBONATE/VITAMIN D3 (CALCIUM 500 + D ORAL), Take 2 tablets by mouth once daily., Disp: , Rfl:   carvedilol (COREG) 6.25 MG tablet, Take 1 tablet (6.25 mg total) by mouth 2 (two) times daily with meals. (Patient taking differently: Take 6.25 mg by mouth once daily. 1/2 tablet daily), Disp: 90 tablet, Rfl: 3  clopidogrel (PLAVIX) 75 mg tablet, Take 1 tablet (75 mg total) by mouth once daily., Disp: 90 tablet, Rfl: 3  ergocalciferol (ERGOCALCIFEROL) 50,000 unit Cap, TAKE ONE CAPSULE BY MOUTH EVERY 7 DAYS, Disp: 12 capsule, Rfl:  1  glucosamine-chondroitin 500-400 mg tablet, Take 1 tablet by mouth 3 (three) times daily., Disp: , Rfl:   montelukast (SINGULAIR) 10 mg tablet, Take 1 tablet (10 mg total) by mouth every evening., Disp: 90 tablet, Rfl: 3  nitroGLYCERIN (NITROSTAT) 0.4 MG SL tablet, Place 1 tablet (0.4 mg total) under the tongue every 5 (five) minutes as needed for Chest pain., Disp: 25 tablet, Rfl: 3  omeprazole (PRILOSEC) 40 MG capsule, Take 1 capsule (40 mg total) by mouth once daily., Disp: 90 capsule, Rfl: 3  phenazopyridine (PYRIDIUM) 95 MG tablet, Take 95 mg by mouth once daily., Disp: , Rfl:   SYNTHROID 112 mcg tablet, TAKE ONE TABLET BY MOUTH EVERY DAY BEFORE BREAKFAST, Disp: 30 tablet, Rfl: 10  (DISCONTINUED) fluconazole (DIFLUCAN) 150 MG Tab, Take 1 tablet x 1; repeat in 72 hours, Disp: 2 tablet, Rfl: 0    No current facility-administered medications on file prior to visit.       Zoster Vaccine due on 06/22/2001        Review of Systems   Constitutional: Negative for chills, diaphoresis, fatigue, fever and unexpected weight change.   HENT: Negative for congestion, ear pain, hearing loss, postnasal drip, sinus pressure, sneezing, sore throat, tinnitus and trouble swallowing.    Eyes: Negative for itching and visual disturbance.   Respiratory: Negative for cough, chest tightness, shortness of breath and wheezing (Use his rescue inhaler one to two times a week as needed).         No orthopnea   Cardiovascular: Negative for chest pain (No recent use of nitroglycerin), palpitations and leg swelling.   Gastrointestinal: Negative for abdominal pain, blood in stool, constipation, diarrhea, nausea and vomiting.   Genitourinary: Negative for dysuria, frequency, hematuria, menstrual problem, pelvic pain, vaginal bleeding and vaginal discharge.   Musculoskeletal: Positive for arthralgias, gait problem and joint swelling. Negative for back pain and myalgias.   Neurological: Negative for dizziness and headaches.   Hematological:  Negative for adenopathy.   Psychiatric/Behavioral: Negative for sleep disturbance. The patient is not nervous/anxious.        Objective:      Physical Exam   Constitutional: She is oriented to person, place, and time. She appears well-developed and well-nourished. No distress.   Initial blood pressure readings were a little high but third reading settle down.  She brings in her blood pressure log from home with systolics ranging from a low of 103 to a high of 139 and diastolics ranging from a low of 72 to a high of 89   HENT:   Head: Normocephalic and atraumatic.   Right Ear: External ear normal.   Left Ear: External ear normal.   Nose: Nose normal.   Mouth/Throat: Oropharynx is clear and moist. No oropharyngeal exudate.   Eyes: Conjunctivae and EOM are normal. Pupils are equal, round, and reactive to light. Right eye exhibits no discharge. Left eye exhibits no discharge. No scleral icterus.   Neck: Normal range of motion. Neck supple. No JVD present. No tracheal deviation present. No thyromegaly present.   Cardiovascular: Normal rate, regular rhythm and normal heart sounds.  Exam reveals no gallop and no friction rub.    No murmur heard.  Carotid pulses 2+ no bruit   Pulmonary/Chest: Effort normal and breath sounds normal. No respiratory distress. She has no wheezes. She has no rales. She exhibits no tenderness.   Abdominal: Soft. Bowel sounds are normal. She exhibits no distension and no mass. There is no tenderness. There is no rebound and no guarding.   Musculoskeletal: She exhibits no edema or tenderness.        Right knee: She exhibits deformity and abnormal alignment.        Left knee: She exhibits decreased range of motion, deformity and abnormal alignment.   Lymphadenopathy:     She has no cervical adenopathy.   Neurological: She is alert and oriented to person, place, and time. She has normal reflexes. She displays normal reflexes. No cranial nerve deficit or sensory deficit. She exhibits normal muscle  tone.   Skin: Skin is warm and dry. No rash noted. No erythema.   Psychiatric: She has a normal mood and affect. Her behavior is normal. Judgment and thought content normal.   Nursing note and vitals reviewed.      Assessment:       1. Primary osteoarthritis of both knees    2. Preop examination    3. Good hypertension control    4. Moderate persistent asthma without complication    5. Coronary artery disease involving native coronary artery of native heart with unstable angina pectoris    6. Acquired hypothyroidism    7. Severe obesity (BMI 35.0-39.9) with comorbidity        Plan:       1. Primary osteoarthritis of both knees  Plan for surgery left knee August 15    2. Preop examination  Clinically clear for surgery awaiting lab results EKG and chest x-ray  - Basic metabolic panel; Future  - CBC auto differential; Future  - X-Ray Chest PA And Lateral; Future  - IN OFFICE EKG 12-LEAD (to Muse)  - Urinalysis; Future    3. Good hypertension control  No changes needed  - Basic metabolic panel; Future  - CBC auto differential; Future    4. Moderate persistent asthma without complication  Relatively asymptomatic,    5. Coronary artery disease involving native coronary artery of native heart with unstable angina pectoris  Asymptomatic    6. Acquired hypothyroidism  Lab Results   Component Value Date    TSH 0.556 04/11/2018         7. Severe obesity (BMI 35.0-39.9) with comorbidity           Patient readiness: acceptance and barriers:none    During the course of the visit the patient was educated and counseled about the following:     Hypertension:   Medication: no change.  Dietary sodium restriction.  Regular aerobic exercise.  Obesity:   General weight loss/lifestyle modification strategies discussed (elicit support from others; identify saboteurs; non-food rewards, etc).  Informal exercise measures discussed, e.g. taking stairs instead of elevator.  Regular aerobic exercise program discussed.    Goals: Hypertension:  Reduce Blood Pressure and Obesity: Reduce calorie intake and BMI    Did patient meet goals/outcomes: No    The following self management tools provided: blood pressure log  excercise log    Patient Instructions (the written plan) was given to the patient/family.     Time spent with patient: 45 minutes

## 2018-07-31 ENCOUNTER — TELEPHONE (OUTPATIENT)
Dept: FAMILY MEDICINE | Facility: CLINIC | Age: 78
End: 2018-07-31

## 2018-07-31 DIAGNOSIS — N39.0 URINARY TRACT INFECTION WITHOUT HEMATURIA, SITE UNSPECIFIED: Primary | ICD-10-CM

## 2018-07-31 RX ORDER — AMOXICILLIN AND CLAVULANATE POTASSIUM 500; 125 MG/1; MG/1
1 TABLET, FILM COATED ORAL 2 TIMES DAILY
Qty: 14 TABLET | Refills: 0 | Status: SHIPPED | OUTPATIENT
Start: 2018-07-31 | End: 2018-08-07

## 2018-07-31 NOTE — TELEPHONE ENCOUNTER
She has a urinary tract infection.  We need to try to eradicate this before surgery.  Otherwise her lab is all good and she is clear for surgery.  I am sending augmentin 500mg bid for seven days to her pharmacy and putting in an order for a repeat ua when completed.

## 2018-08-03 ENCOUNTER — TELEPHONE (OUTPATIENT)
Dept: FAMILY MEDICINE | Facility: CLINIC | Age: 78
End: 2018-08-03

## 2018-08-03 DIAGNOSIS — B37.31 VAGINA, CANDIDIASIS: Primary | ICD-10-CM

## 2018-08-03 RX ORDER — FLUCONAZOLE 150 MG/1
150 TABLET ORAL EVERY OTHER DAY
Qty: 2 TABLET | Refills: 0 | Status: SHIPPED | OUTPATIENT
Start: 2018-08-03 | End: 2018-08-06

## 2018-08-03 NOTE — TELEPHONE ENCOUNTER
Patient will stay on Augmentin-started taking probiotic and yogurt- asking for Diflucan to be called in-says she always gets yeast infections when she takes antibiotics. Rustam

## 2018-08-03 NOTE — TELEPHONE ENCOUNTER
----- Message from Priscila Fuentes sent at 8/3/2018  8:59 AM CDT -----  Type: Needs Medical Advice    Who Called:patietn  Symptoms (please be specific):  Diarrhea/yeast infection  How long has patient had these symptoms:  na  Pharmacy name and phone #:    [facility] [place], [place] - [address] AT [facility] [address] [facility] [address]  [address]  [facility] [address]  Phone: [phone] Fax: [phone]    Best Call Back Number: [phone] (home)     Additional Information: Stating the amoxicillin-clavulanate 500-125mg (AUGMENTIN) 500-125 mg Tab has given her a yeast infection and diarrhea, would like another antibiotic instead

## 2018-08-06 ENCOUNTER — TELEPHONE (OUTPATIENT)
Dept: ORTHOPEDICS | Facility: CLINIC | Age: 78
End: 2018-08-06

## 2018-08-06 ENCOUNTER — HOSPITAL ENCOUNTER (OUTPATIENT)
Dept: PREADMISSION TESTING | Facility: HOSPITAL | Age: 78
Discharge: HOME | End: 2018-08-06
Attending: ORTHOPAEDIC SURGERY
Payer: MEDICARE

## 2018-08-06 ENCOUNTER — HOSPITAL ENCOUNTER (OUTPATIENT)
Dept: RADIOLOGY | Facility: HOSPITAL | Age: 78
Discharge: HOME | End: 2018-08-06
Attending: ORTHOPAEDIC SURGERY
Payer: MEDICARE

## 2018-08-06 VITALS — WEIGHT: 205 LBS | HEIGHT: 63 IN | BODY MASS INDEX: 36.32 KG/M2

## 2018-08-06 DIAGNOSIS — M17.12 ARTHRITIS OF LEFT KNEE: ICD-10-CM

## 2018-08-06 DIAGNOSIS — M17.12 ARTHRITIS OF KNEE, LEFT: Primary | ICD-10-CM

## 2018-08-06 DIAGNOSIS — Z01.818 PRE-OP EXAM: ICD-10-CM

## 2018-08-06 LAB
ABO + RH BLD: NORMAL
BLD GP AB SCN CELLS X3 SERPL QL: NORMAL

## 2018-08-06 PROCEDURE — 87081 CULTURE SCREEN ONLY: CPT

## 2018-08-06 PROCEDURE — 99900104 DSU ONLY-NO CHARGE-EA ADD'L HR (STAT)

## 2018-08-06 PROCEDURE — 99900103 DSU ONLY-NO CHARGE-INITIAL HR (STAT)

## 2018-08-06 PROCEDURE — 86850 RBC ANTIBODY SCREEN: CPT

## 2018-08-06 PROCEDURE — 36415 COLL VENOUS BLD VENIPUNCTURE: CPT

## 2018-08-06 NOTE — TELEPHONE ENCOUNTER
----- Message from Kathy Villa MA sent at 8/6/2018  2:02 PM CDT -----  Contact: Self  LVM and would like a call back to discuss her upcoming surgery    Call Back# [phone]

## 2018-08-07 ENCOUNTER — TELEPHONE (OUTPATIENT)
Dept: ORTHOPEDICS | Facility: CLINIC | Age: 78
End: 2018-08-07

## 2018-08-07 NOTE — TELEPHONE ENCOUNTER
----- Message from Rosalinda Canada sent at 8/7/2018  7:58 AM CDT -----  Please call pt at [phone] / returning call for Monika

## 2018-08-07 NOTE — TELEPHONE ENCOUNTER
Returned pt's call, reviewed Pre-op instructions with her, reiterated what she was advised yesterday that she needs to hold Plavix as of today and ASA as of 8/11/18.  No further needs at this time.

## 2018-08-08 LAB — MRSA SPEC QL CULT: NORMAL

## 2018-08-08 NOTE — PRE ADMISSION SCREENING
"               [facility] Risk Assessment Scale    Patient Name: Heather Hughes  YOB: 1940  MRN: [id number]            RIsk Factor Measure Recommendation Patient Data Scale/Score   BMI >40 Reconsider surgery, weight loss   Estimated body mass index is 36.31 kg/m² as calculated from the following:    Height as of this encounter: 5' 3" (1.6 m).    Weight as of this encounter: 93 kg (205 lb).   [] 0 = 1 - 24.9  [] 1 = 25-29.9  [] 2 = 30-34.9  [x] 3 = 35-39.9  [] 4 = 40-44.9  [] 5 = 45-99.9   Hemoglobin AIC (if applicable) >9 Delay surgery until DM under control  Refer for:  · Nutrition Therapy  · Exercise   · Medication    Lab Results   Component Value Date    HGBA1C 5.3 01/14/2017       Lab Results   Component Value Date    GLU 87 07/30/2018      [] 0 = 4.0-5.6  [] 1 = 5.7-6.4  [] 2 = 6.5-6.9  [] 3 = 7.0-7.9  [] 4 = 8.0-8.9  [] 5 = 9.0-12   Hemoglobin (Anemia) <9 Delay surgery   Correct anemia Lab Results   Component Value Date    HGB 12.3 07/30/2018    [] 20 - <9.0                    Albumin <3 Delay surgery &Workup Lab Results   Component Value Date    ALBUMIN 3.0 (L) 01/14/2017    [] 20 - <3.0   Smoking Cessation >4 Weeks Delay Surgery  Refer to OP Cessation Class    Former Smoker  Quit: 1/13/2017 [] 20 - current smoker                                _____ PPD                    Hx of MI, PE, Arrhythmia, CVA, DVT <30 Days Delay Surgery    N/A [] 20      Infection Variable Delay surgery and re-evaluate    [x] 20 - recent/current infection     Depression (PHQ) >10 out of 27 Delay Surgery and re-evaluate  Medication  Counseling              [x] 0     []1     []2     []3      []4      [] 5                    (1-4)      (5-9)  (10-14)  (15-19)   (20-27)     Memory Impairment & Memory loss (Mini-Cog Screening Tool) Advanced dementia and/or Parkinson's Reconsider surgery     [x] 0     []1     []2     []3     []4     [] 5     Physical Conditioning (Modified AM-PAC Per Physical [facility] at [facility]) Unable to " ambulate on day of surgery Delay surgery and re-evaluate  Pre-Rehabilitation   (PT evaluation)       []  0   [x]4       []8     []12        []16     []20       (<20%)   (<40%)   (<60%)   (<80% )    (>80%)     Home Environment/Caregiver support  (Per /Navigator Interview)    Availability of basic services and/or approprate assistance during post-operative period Delay surgery and re-evaluate  Safe home environment  Health   1 week post-surgery  Transportation  availability  Ability to obtain DME/Medications post-op    [x] 0     []1     []2     []3     []4     [] 5  [x] 0     []1     []2     []3     []4     [] 5  [x] 0     []1     []2     []3     []4     [] 5  [x] 0     []1     []2     []3     []4     [] 5         MD Contact: Dr. Santiago Comments:  Total Score:  27

## 2018-08-08 NOTE — PRE ADMISSION SCREENING
Patient Name: Heather Hughes  YOB: 1940   MRN: [id number]     [facility]   Basic Mobility Inpatient Short Form 6 Clicks         How much difficulty does the patient currently have  Unable  A Lot  A Little  None      1. Turning over in bed (including adjusting bedclothes, sheets and blankets)?     1 []    2 []    3 [x]    4 []        2. Sitting down on and standing up from a chair with arms (e.g., wheelchair, bedside commode, etc.)     1 []  2 []  3 []     4 [x]      3. Moving from lying on back to sitting on the side of the bed?     1 []  2 []  3 []    4 [x]    How much help from another person does the patient currently need  Total  A Lot  A Little  None      4. Moving to and from a bed to a chair (including a wheelchair)?    1 []  2 []  3 []    4 [x]      5. Need to walk in hospital room?    1 []  2 []  3 []    4 [x]      6. Climbing 3-5 steps with a railing?    1 []  2 []  3 [x]    4 []       Raw Score:     22             CMS 0-100% Score:    20.91        %   Standardized Score:   53.28            CMS Modifier:       [facility]                                   [facility]   Basic Mobility Inpatient Short Form 6 Clicks Score Conversion Table*         *Use this form to convert -PAC Basic Mobility Inpatient Raw Scores.   [facility] Inpatient Basic Mobility Short Form Scoring Example   1. Add the number values associated with the response to each item. For example, items totals yield a Raw Score of 21.   2. Match the raw score to the t-Scale scores (t-Scale score = 50.25, SE = 4.69).   3. Find the associated CMS % (CMS % = 28.97%).   4. Locate the correct CMS Functional Modifier Code, or G Code (G code = CJ)     NOTE: Each -PAC Short Form has a separate conversion table. Make sure that you use the correct conversion table.       Instruction Manual - page 45 contains conversion table

## 2018-08-08 NOTE — PRE ADMISSION SCREENING
JOINT CAMP ASSESSMENT    Name Heather Hughes   MRN [id number]    Age/Sex 78 y.o. female    Surgeon Dr. Shantanu Santiago   Joint Camp Date 8/8/2018   Surgery Date 8/15/2018   Procedure Left Knee Arthroplasty   Insurance Payor: [employer] MEDICARE / Plan: [employer] MEDICARE HMO / Product Type: Capitation /    Care Team Patient Care Team:  Graham Mazariegos MD as PCP - General (Family Medicine)  Eric Tillman MD PhD as Consulting Physician (Cardiology)  Jen Boyd MD as Consulting Physician (Optometry)  Wale Ace MD as Consulting Physician (Cardiology)  Shantanu Santiago MD as Consulting Physician (Sports Medicine)    Pharmacy   [facility] - [place], [place] - [address]  [address] [facility] [address] [address]  Phone: [phone] Fax: [phone]    [id number] - [place], [place] - [address] [facility] [address]  [address]  [place] [address]  Phone: [phone] Fax: [phone]    [facility] - [place], [place] - [address] [place]  [address] [place]  [place] [address]  Phone: [phone] Fax: [phone]     AM-PAC Score   22   Risk Assessment Score 27     Past Medical History:   Diagnosis Date    Allergy     Anticoagulant long-term use     Arthritis     Asthma     Coronary artery disease     stent x 1    Full dentures     Heart attack 01/13/2017    Hypertension     Osteoporosis     Thyroid disease     Ulcer     Wears glasses        Past Surgical History:   Procedure Laterality Date    cardic stent  01/13/2017    CHOLECYSTECTOMY      EYE SURGERY      bilateral cataracts    FRACTURE SURGERY  2011    left wrist     THYROIDECTOMY           Home Enviroment     Living Arrangement: Lives with spouse  Home Environment: 1-story house/ trailer, number of outside stairs: 1, walk-in shower  Home Safety Concerns: unremarkable    DISCHARGE CAREGIVER/SUPPORT SYSTEM     Identified post-op caregiver:  Patient has children / family / friends to help, spouse and daughter.  Patient's caregiver(s) will be able to provide physical assistance. Patient will have someone to assist overnight.      Caregiver present at pre-op interview:  No      PRE-OPERATIVE FUNCTIONAL STATUS     Employment: Retired    Pre-op Functional Status: Patient is independent with mobility/ambulation, transfers, ADL's, IADL's.    Use of assistive device for ambulation: walker  ADL: self care  ADL Limitations: difficulty with walking  Medical Restrictions: Decreased range of motions in extremities    POTENTIAL BARRIERS TO DISCHARGE/POTENTIAL POST-OP COMPLICATIONS     Patient with heart attack (01/13/2017) and CAD with stent X1 with long term anticoagulation use (Plavix). Patient also has Hx of asthma. Consider respiratory therapy. Patient also states that she has urinary incontinence. Patient had Hx of blood clots in right leg in 1965 & 1971.      DISCHARGE PLAN     Expected LOS of 2 days or less for joint replacement discussed with patient. We also discussed a discharge path of HH for approximately two weeks with a transition to outpatient PT on the third week given no post-op complications.      Patient in agreement with discharge plan: Yes    Discharge to: Discharge home with home emerson (PT/OT) x2 weeks with transition to outpatient PT     HH: [facility]      OP PT: [facility]      Home DME: rolling walker, shower chair and patient with grab bars around commode.     Needed DME at D/C: none     Rx: Per Dr. Santiago at discharge.     Meds to Beds: N/A    Patient expected to discharge on previous dose of Plavix ( Clopidogrel) and Aspirin for DVT    prophylaxis.

## 2018-08-09 ENCOUNTER — LAB VISIT (OUTPATIENT)
Dept: LAB | Facility: HOSPITAL | Age: 78
End: 2018-08-09
Attending: FAMILY MEDICINE
Payer: MEDICARE

## 2018-08-09 DIAGNOSIS — N39.0 URINARY TRACT INFECTION WITHOUT HEMATURIA, SITE UNSPECIFIED: ICD-10-CM

## 2018-08-09 LAB

## 2018-08-09 PROCEDURE — 81003 URINALYSIS AUTO W/O SCOPE: CPT

## 2018-08-14 ENCOUNTER — ANESTHESIA EVENT (OUTPATIENT)
Dept: SURGERY | Facility: HOSPITAL | Age: 78
DRG: 470 | End: 2018-08-14
Payer: MEDICARE

## 2018-08-15 ENCOUNTER — HOSPITAL ENCOUNTER (INPATIENT)
Facility: HOSPITAL | Age: 78
LOS: 1 days | Discharge: HOME HEALTH | DRG: 470 | End: 2018-08-16
Attending: ORTHOPAEDIC SURGERY | Admitting: ORTHOPAEDIC SURGERY
Payer: MEDICARE

## 2018-08-15 ENCOUNTER — ANESTHESIA (OUTPATIENT)
Dept: SURGERY | Facility: HOSPITAL | Age: 78
DRG: 470 | End: 2018-08-15
Payer: MEDICARE

## 2018-08-15 DIAGNOSIS — I10 HYPERTENSION, UNSPECIFIED TYPE: ICD-10-CM

## 2018-08-15 DIAGNOSIS — I10 ESSENTIAL HYPERTENSION: ICD-10-CM

## 2018-08-15 DIAGNOSIS — I25.110 CORONARY ARTERY DISEASE INVOLVING NATIVE CORONARY ARTERY OF NATIVE HEART WITH UNSTABLE ANGINA PECTORIS: ICD-10-CM

## 2018-08-15 DIAGNOSIS — M17.12 ARTHRITIS OF LEFT KNEE: ICD-10-CM

## 2018-08-15 DIAGNOSIS — Z96.652 S/P TOTAL KNEE ARTHROPLASTY, LEFT: Primary | ICD-10-CM

## 2018-08-15 LAB
ANION GAP SERPL CALC-SCNC: 6 MMOL/L
BASOPHILS # BLD AUTO: 0 K/UL
BASOPHILS NFR BLD: 0.4 %
BUN SERPL-MCNC: 16 MG/DL
CALCIUM SERPL-MCNC: 9.4 MG/DL
CHLORIDE SERPL-SCNC: 107 MMOL/L
CO2 SERPL-SCNC: 25 MMOL/L
CREAT SERPL-MCNC: 0.8 MG/DL
DIFFERENTIAL METHOD: ABNORMAL
EOSINOPHIL # BLD AUTO: 0.1 K/UL
EOSINOPHIL NFR BLD: 1.5 %
ERYTHROCYTE [DISTWIDTH] IN BLOOD BY AUTOMATED COUNT: 14.1 %
EST. GFR  (AFRICAN AMERICAN): >60 ML/MIN/1.73 M^2
EST. GFR  (NON AFRICAN AMERICAN): >60 ML/MIN/1.73 M^2
GLUCOSE SERPL-MCNC: 109 MG/DL
HCT VFR BLD AUTO: 35.6 %
HGB BLD-MCNC: 11.6 G/DL
LYMPHOCYTES # BLD AUTO: 1.4 K/UL
LYMPHOCYTES NFR BLD: 20.3 %
MCH RBC QN AUTO: 28.8 PG
MCHC RBC AUTO-ENTMCNC: 32.5 G/DL
MCV RBC AUTO: 89 FL
MONOCYTES # BLD AUTO: 0.3 K/UL
MONOCYTES NFR BLD: 3.8 %
NEUTROPHILS # BLD AUTO: 5.2 K/UL
NEUTROPHILS NFR BLD: 74 %
PLATELET # BLD AUTO: 219 K/UL
PMV BLD AUTO: 10.4 FL
POTASSIUM SERPL-SCNC: 5.1 MMOL/L
RBC # BLD AUTO: 4.01 M/UL
SODIUM SERPL-SCNC: 138 MMOL/L
WBC # BLD AUTO: 7 K/UL

## 2018-08-15 PROCEDURE — 71000039 HC RECOVERY, EACH ADD'L HOUR: Performed by: ORTHOPAEDIC SURGERY

## 2018-08-15 PROCEDURE — 64447 NJX AA&/STRD FEMORAL NRV IMG: CPT | Mod: 59,LT,, | Performed by: ANESTHESIOLOGY

## 2018-08-15 PROCEDURE — 11000001 HC ACUTE MED/SURG PRIVATE ROOM

## 2018-08-15 PROCEDURE — 97162 PT EVAL MOD COMPLEX 30 MIN: CPT

## 2018-08-15 PROCEDURE — 63600175 PHARM REV CODE 636 W HCPCS: Performed by: ORTHOPAEDIC SURGERY

## 2018-08-15 PROCEDURE — 63600175 PHARM REV CODE 636 W HCPCS: Performed by: ANESTHESIOLOGY

## 2018-08-15 PROCEDURE — C1713 ANCHOR/SCREW BN/BN,TIS/BN: HCPCS | Performed by: ORTHOPAEDIC SURGERY

## 2018-08-15 PROCEDURE — 37000009 HC ANESTHESIA EA ADD 15 MINS: Performed by: ORTHOPAEDIC SURGERY

## 2018-08-15 PROCEDURE — 80048 BASIC METABOLIC PNL TOTAL CA: CPT

## 2018-08-15 PROCEDURE — C1776 JOINT DEVICE (IMPLANTABLE): HCPCS | Performed by: ORTHOPAEDIC SURGERY

## 2018-08-15 PROCEDURE — G8978 MOBILITY CURRENT STATUS: HCPCS | Mod: CL

## 2018-08-15 PROCEDURE — 27200750 HC INSULATED NEEDLE/ STIMUPLEX: Performed by: ANESTHESIOLOGY

## 2018-08-15 PROCEDURE — 25000003 PHARM REV CODE 250: Performed by: ANESTHESIOLOGY

## 2018-08-15 PROCEDURE — 27201423 OPTIME MED/SURG SUP & DEVICES STERILE SUPPLY: Performed by: ORTHOPAEDIC SURGERY

## 2018-08-15 PROCEDURE — 64447 NJX AA&/STRD FEMORAL NRV IMG: CPT | Performed by: ANESTHESIOLOGY

## 2018-08-15 PROCEDURE — 25000003 PHARM REV CODE 250: Performed by: INTERNAL MEDICINE

## 2018-08-15 PROCEDURE — D9220A PRA ANESTHESIA: Mod: ANES,,, | Performed by: ANESTHESIOLOGY

## 2018-08-15 PROCEDURE — 97116 GAIT TRAINING THERAPY: CPT

## 2018-08-15 PROCEDURE — 27200688 HC TRAY, SPINAL-HYPER/ ISOBARIC: Performed by: ANESTHESIOLOGY

## 2018-08-15 PROCEDURE — 63600175 PHARM REV CODE 636 W HCPCS

## 2018-08-15 PROCEDURE — G8979 MOBILITY GOAL STATUS: HCPCS | Mod: CK

## 2018-08-15 PROCEDURE — 99900104 DSU ONLY-NO CHARGE-EA ADD'L HR (STAT): Performed by: ORTHOPAEDIC SURGERY

## 2018-08-15 PROCEDURE — 94761 N-INVAS EAR/PLS OXIMETRY MLT: CPT

## 2018-08-15 PROCEDURE — 25000003 PHARM REV CODE 250: Performed by: ORTHOPAEDIC SURGERY

## 2018-08-15 PROCEDURE — 36000711: Performed by: ORTHOPAEDIC SURGERY

## 2018-08-15 PROCEDURE — 76942 ECHO GUIDE FOR BIOPSY: CPT | Mod: 26,,, | Performed by: ANESTHESIOLOGY

## 2018-08-15 PROCEDURE — 3E0T3BZ INTRODUCTION OF ANESTHETIC AGENT INTO PERIPHERAL NERVES AND PLEXI, PERCUTANEOUS APPROACH: ICD-10-PCS | Performed by: ANESTHESIOLOGY

## 2018-08-15 PROCEDURE — 85025 COMPLETE CBC W/AUTO DIFF WBC: CPT

## 2018-08-15 PROCEDURE — 36415 COLL VENOUS BLD VENIPUNCTURE: CPT

## 2018-08-15 PROCEDURE — 99900103 DSU ONLY-NO CHARGE-INITIAL HR (STAT): Performed by: ORTHOPAEDIC SURGERY

## 2018-08-15 PROCEDURE — 71000033 HC RECOVERY, INTIAL HOUR: Performed by: ORTHOPAEDIC SURGERY

## 2018-08-15 PROCEDURE — D9220A PRA ANESTHESIA: Mod: CRNA,,, | Performed by: NURSE ANESTHETIST, CERTIFIED REGISTERED

## 2018-08-15 PROCEDURE — 36000710: Performed by: ORTHOPAEDIC SURGERY

## 2018-08-15 PROCEDURE — 99222 1ST HOSP IP/OBS MODERATE 55: CPT | Mod: ,,, | Performed by: INTERNAL MEDICINE

## 2018-08-15 PROCEDURE — 27447 TOTAL KNEE ARTHROPLASTY: CPT | Mod: LT,,, | Performed by: ORTHOPAEDIC SURGERY

## 2018-08-15 PROCEDURE — 97110 THERAPEUTIC EXERCISES: CPT

## 2018-08-15 PROCEDURE — 25000003 PHARM REV CODE 250: Performed by: NURSE ANESTHETIST, CERTIFIED REGISTERED

## 2018-08-15 PROCEDURE — 0SRD0J9 REPLACEMENT OF LEFT KNEE JOINT WITH SYNTHETIC SUBSTITUTE, CEMENTED, OPEN APPROACH: ICD-10-PCS | Performed by: ORTHOPAEDIC SURGERY

## 2018-08-15 PROCEDURE — 63600175 PHARM REV CODE 636 W HCPCS: Performed by: NURSE ANESTHETIST, CERTIFIED REGISTERED

## 2018-08-15 PROCEDURE — 63600175 PHARM REV CODE 636 W HCPCS: Performed by: INTERNAL MEDICINE

## 2018-08-15 PROCEDURE — S0020 INJECTION, BUPIVICAINE HYDRO: HCPCS | Performed by: ANESTHESIOLOGY

## 2018-08-15 PROCEDURE — 37000008 HC ANESTHESIA 1ST 15 MINUTES: Performed by: ORTHOPAEDIC SURGERY

## 2018-08-15 PROCEDURE — 99900035 HC TECH TIME PER 15 MIN (STAT)

## 2018-08-15 DEVICE — BASEPLATE GEN II TIBIAL SZ4 LT: Type: IMPLANTABLE DEVICE | Site: KNEE | Status: FUNCTIONAL

## 2018-08-15 DEVICE — COMP PATELLA GENESIS II 8.5X29: Type: IMPLANTABLE DEVICE | Site: KNEE | Status: FUNCTIONAL

## 2018-08-15 DEVICE — CEMENT BONE RDPQ 40G PDR 20ML: Type: IMPLANTABLE DEVICE | Site: KNEE | Status: FUNCTIONAL

## 2018-08-15 RX ORDER — EPHEDRINE SULFATE 50 MG/ML
INJECTION, SOLUTION INTRAVENOUS
Status: DISCONTINUED | OUTPATIENT
Start: 2018-08-15 | End: 2018-08-15

## 2018-08-15 RX ORDER — SODIUM CHLORIDE 0.9 % (FLUSH) 0.9 %
3 SYRINGE (ML) INJECTION
Status: DISCONTINUED | OUTPATIENT
Start: 2018-08-15 | End: 2018-08-16 | Stop reason: HOSPADM

## 2018-08-15 RX ORDER — TRANEXAMIC ACID 100 MG/ML
INJECTION, SOLUTION INTRAVENOUS
Status: DISCONTINUED | OUTPATIENT
Start: 2018-08-15 | End: 2018-08-15

## 2018-08-15 RX ORDER — BENZONATATE 100 MG/1
200 CAPSULE ORAL 3 TIMES DAILY PRN
Status: DISCONTINUED | OUTPATIENT
Start: 2018-08-15 | End: 2018-08-16 | Stop reason: HOSPADM

## 2018-08-15 RX ORDER — OXYCODONE HYDROCHLORIDE 5 MG/1
5 TABLET ORAL
Status: DISCONTINUED | OUTPATIENT
Start: 2018-08-15 | End: 2018-08-16 | Stop reason: HOSPADM

## 2018-08-15 RX ORDER — ACETAMINOPHEN 10 MG/ML
1000 INJECTION, SOLUTION INTRAVENOUS ONCE
Status: COMPLETED | OUTPATIENT
Start: 2018-08-15 | End: 2018-08-15

## 2018-08-15 RX ORDER — ONDANSETRON 2 MG/ML
INJECTION INTRAMUSCULAR; INTRAVENOUS
Status: DISCONTINUED | OUTPATIENT
Start: 2018-08-15 | End: 2018-08-15

## 2018-08-15 RX ORDER — PANTOPRAZOLE SODIUM 40 MG/1
40 TABLET, DELAYED RELEASE ORAL DAILY
Status: DISCONTINUED | OUTPATIENT
Start: 2018-08-16 | End: 2018-08-16 | Stop reason: HOSPADM

## 2018-08-15 RX ORDER — ALBUTEROL SULFATE 90 UG/1
2 AEROSOL, METERED RESPIRATORY (INHALATION) EVERY 6 HOURS PRN
Status: DISCONTINUED | OUTPATIENT
Start: 2018-08-15 | End: 2018-08-15

## 2018-08-15 RX ORDER — ACETAMINOPHEN 10 MG/ML
1000 INJECTION, SOLUTION INTRAVENOUS EVERY 8 HOURS
Status: DISCONTINUED | OUTPATIENT
Start: 2018-08-15 | End: 2018-08-15 | Stop reason: SDUPTHER

## 2018-08-15 RX ORDER — LIDOCAINE HYDROCHLORIDE 10 MG/ML
INJECTION, SOLUTION EPIDURAL; INFILTRATION; INTRACAUDAL; PERINEURAL
Status: DISCONTINUED
Start: 2018-08-15 | End: 2018-08-15 | Stop reason: WASHOUT

## 2018-08-15 RX ORDER — CELECOXIB 100 MG/1
200 CAPSULE ORAL ONCE
Status: COMPLETED | OUTPATIENT
Start: 2018-08-15 | End: 2018-08-15

## 2018-08-15 RX ORDER — SODIUM CHLORIDE, SODIUM LACTATE, POTASSIUM CHLORIDE, CALCIUM CHLORIDE 600; 310; 30; 20 MG/100ML; MG/100ML; MG/100ML; MG/100ML
INJECTION, SOLUTION INTRAVENOUS CONTINUOUS
Status: DISCONTINUED | OUTPATIENT
Start: 2018-08-15 | End: 2018-08-15

## 2018-08-15 RX ORDER — LIDOCAINE HCL/PF 100 MG/5ML
SYRINGE (ML) INTRAVENOUS
Status: DISCONTINUED | OUTPATIENT
Start: 2018-08-15 | End: 2018-08-15

## 2018-08-15 RX ORDER — DEXAMETHASONE SODIUM PHOSPHATE 4 MG/ML
INJECTION, SOLUTION INTRA-ARTICULAR; INTRALESIONAL; INTRAMUSCULAR; INTRAVENOUS; SOFT TISSUE
Status: DISCONTINUED | OUTPATIENT
Start: 2018-08-15 | End: 2018-08-15

## 2018-08-15 RX ORDER — BUPIVACAINE HYDROCHLORIDE 7.5 MG/ML
INJECTION, SOLUTION EPIDURAL; RETROBULBAR
Status: DISCONTINUED | OUTPATIENT
Start: 2018-08-15 | End: 2018-08-15

## 2018-08-15 RX ORDER — PROPOFOL 10 MG/ML
VIAL (ML) INTRAVENOUS
Status: DISCONTINUED | OUTPATIENT
Start: 2018-08-15 | End: 2018-08-15

## 2018-08-15 RX ORDER — LEVOTHYROXINE SODIUM 112 UG/1
112 TABLET ORAL
Status: DISCONTINUED | OUTPATIENT
Start: 2018-08-16 | End: 2018-08-16 | Stop reason: HOSPADM

## 2018-08-15 RX ORDER — HYDROCODONE BITARTRATE AND ACETAMINOPHEN 10; 325 MG/1; MG/1
1 TABLET ORAL EVERY 4 HOURS PRN
Status: DISCONTINUED | OUTPATIENT
Start: 2018-08-15 | End: 2018-08-16 | Stop reason: HOSPADM

## 2018-08-15 RX ORDER — OXYCODONE HCL 10 MG/1
10 TABLET, FILM COATED, EXTENDED RELEASE ORAL ONCE
Status: COMPLETED | OUTPATIENT
Start: 2018-08-15 | End: 2018-08-15

## 2018-08-15 RX ORDER — MIDAZOLAM HYDROCHLORIDE 1 MG/ML
INJECTION, SOLUTION INTRAMUSCULAR; INTRAVENOUS
Status: DISCONTINUED | OUTPATIENT
Start: 2018-08-15 | End: 2018-08-15

## 2018-08-15 RX ORDER — PREGABALIN 75 MG/1
75 CAPSULE ORAL ONCE
Status: COMPLETED | OUTPATIENT
Start: 2018-08-15 | End: 2018-08-15

## 2018-08-15 RX ORDER — FENTANYL CITRATE 50 UG/ML
INJECTION, SOLUTION INTRAMUSCULAR; INTRAVENOUS
Status: DISCONTINUED | OUTPATIENT
Start: 2018-08-15 | End: 2018-08-15

## 2018-08-15 RX ORDER — FENTANYL CITRATE 50 UG/ML
25 INJECTION, SOLUTION INTRAMUSCULAR; INTRAVENOUS EVERY 5 MIN PRN
Status: DISCONTINUED | OUTPATIENT
Start: 2018-08-15 | End: 2018-08-15 | Stop reason: HOSPADM

## 2018-08-15 RX ORDER — ASPIRIN 81 MG/1
81 TABLET ORAL DAILY
Status: DISCONTINUED | OUTPATIENT
Start: 2018-08-16 | End: 2018-08-16 | Stop reason: HOSPADM

## 2018-08-15 RX ORDER — PREGABALIN 75 MG/1
75 CAPSULE ORAL 2 TIMES DAILY
Status: DISCONTINUED | OUTPATIENT
Start: 2018-08-15 | End: 2018-08-16 | Stop reason: HOSPADM

## 2018-08-15 RX ORDER — SODIUM CHLORIDE 9 MG/ML
INJECTION, SOLUTION INTRAVENOUS CONTINUOUS
Status: DISCONTINUED | OUTPATIENT
Start: 2018-08-15 | End: 2018-08-15

## 2018-08-15 RX ORDER — IPRATROPIUM BROMIDE AND ALBUTEROL SULFATE 2.5; .5 MG/3ML; MG/3ML
3 SOLUTION RESPIRATORY (INHALATION) EVERY 6 HOURS PRN
Status: DISCONTINUED | OUTPATIENT
Start: 2018-08-15 | End: 2018-08-16 | Stop reason: HOSPADM

## 2018-08-15 RX ORDER — SODIUM CHLORIDE, SODIUM LACTATE, POTASSIUM CHLORIDE, CALCIUM CHLORIDE 600; 310; 30; 20 MG/100ML; MG/100ML; MG/100ML; MG/100ML
125 INJECTION, SOLUTION INTRAVENOUS CONTINUOUS
Status: DISCONTINUED | OUTPATIENT
Start: 2018-08-15 | End: 2018-08-15

## 2018-08-15 RX ORDER — ACETAMINOPHEN 10 MG/ML
1000 INJECTION, SOLUTION INTRAVENOUS EVERY 8 HOURS
Status: COMPLETED | OUTPATIENT
Start: 2018-08-15 | End: 2018-08-16

## 2018-08-15 RX ORDER — MEPERIDINE HYDROCHLORIDE 25 MG/ML
12.5 INJECTION INTRAMUSCULAR; INTRAVENOUS; SUBCUTANEOUS EVERY 10 MIN PRN
Status: DISCONTINUED | OUTPATIENT
Start: 2018-08-15 | End: 2018-08-15 | Stop reason: HOSPADM

## 2018-08-15 RX ORDER — OXYCODONE HYDROCHLORIDE 5 MG/1
10 TABLET ORAL EVERY 4 HOURS PRN
Status: DISCONTINUED | OUTPATIENT
Start: 2018-08-15 | End: 2018-08-16 | Stop reason: HOSPADM

## 2018-08-15 RX ORDER — ERGOCALCIFEROL 1.25 MG/1
50000 CAPSULE ORAL
Status: DISCONTINUED | OUTPATIENT
Start: 2018-08-16 | End: 2018-08-15

## 2018-08-15 RX ORDER — ERGOCALCIFEROL 1.25 MG/1
50000 CAPSULE ORAL
Status: DISCONTINUED | OUTPATIENT
Start: 2018-08-15 | End: 2018-08-16 | Stop reason: HOSPADM

## 2018-08-15 RX ORDER — OXYCODONE HCL 10 MG/1
10 TABLET, FILM COATED, EXTENDED RELEASE ORAL EVERY 12 HOURS
Status: DISCONTINUED | OUTPATIENT
Start: 2018-08-15 | End: 2018-08-16 | Stop reason: HOSPADM

## 2018-08-15 RX ORDER — CLOPIDOGREL BISULFATE 75 MG/1
75 TABLET ORAL DAILY
Status: DISCONTINUED | OUTPATIENT
Start: 2018-08-16 | End: 2018-08-16 | Stop reason: HOSPADM

## 2018-08-15 RX ORDER — LIDOCAINE HYDROCHLORIDE 10 MG/ML
1 INJECTION, SOLUTION EPIDURAL; INFILTRATION; INTRACAUDAL; PERINEURAL ONCE
Status: COMPLETED | OUTPATIENT
Start: 2018-08-15 | End: 2018-08-15

## 2018-08-15 RX ORDER — OXYCODONE HYDROCHLORIDE 5 MG/1
5 TABLET ORAL ONCE AS NEEDED
Status: DISCONTINUED | OUTPATIENT
Start: 2018-08-15 | End: 2018-08-15 | Stop reason: HOSPADM

## 2018-08-15 RX ORDER — ALBUTEROL SULFATE 2.5 MG/.5ML
2.5 SOLUTION RESPIRATORY (INHALATION) EVERY 6 HOURS PRN
Status: DISCONTINUED | OUTPATIENT
Start: 2018-08-15 | End: 2018-08-16 | Stop reason: HOSPADM

## 2018-08-15 RX ORDER — MUPIROCIN 20 MG/G
OINTMENT TOPICAL
Status: DISCONTINUED | OUTPATIENT
Start: 2018-08-15 | End: 2018-08-15 | Stop reason: HOSPADM

## 2018-08-15 RX ORDER — BUPIVACAINE HYDROCHLORIDE 5 MG/ML
INJECTION, SOLUTION EPIDURAL; INTRACAUDAL
Status: DISCONTINUED | OUTPATIENT
Start: 2018-08-15 | End: 2018-08-15

## 2018-08-15 RX ORDER — CELECOXIB 100 MG/1
100 CAPSULE ORAL 2 TIMES DAILY
Status: DISCONTINUED | OUTPATIENT
Start: 2018-08-15 | End: 2018-08-16 | Stop reason: HOSPADM

## 2018-08-15 RX ORDER — PROPOFOL 10 MG/ML
VIAL (ML) INTRAVENOUS CONTINUOUS PRN
Status: DISCONTINUED | OUTPATIENT
Start: 2018-08-15 | End: 2018-08-15

## 2018-08-15 RX ORDER — CEFAZOLIN SODIUM 2 G/50ML
2 SOLUTION INTRAVENOUS EVERY 8 HOURS
Status: COMPLETED | OUTPATIENT
Start: 2018-08-15 | End: 2018-08-16

## 2018-08-15 RX ORDER — CARVEDILOL 6.25 MG/1
6.25 TABLET ORAL 2 TIMES DAILY WITH MEALS
Status: DISCONTINUED | OUTPATIENT
Start: 2018-08-15 | End: 2018-08-16 | Stop reason: HOSPADM

## 2018-08-15 RX ORDER — MONTELUKAST SODIUM 10 MG/1
10 TABLET ORAL NIGHTLY
Status: DISCONTINUED | OUTPATIENT
Start: 2018-08-15 | End: 2018-08-16 | Stop reason: HOSPADM

## 2018-08-15 RX ORDER — PHENYLEPHRINE HYDROCHLORIDE 10 MG/ML
INJECTION INTRAVENOUS
Status: DISCONTINUED | OUTPATIENT
Start: 2018-08-15 | End: 2018-08-15

## 2018-08-15 RX ORDER — ENOXAPARIN SODIUM 100 MG/ML
40 INJECTION SUBCUTANEOUS EVERY 24 HOURS
Status: DISCONTINUED | OUTPATIENT
Start: 2018-08-15 | End: 2018-08-16 | Stop reason: HOSPADM

## 2018-08-15 RX ORDER — GLYCOPYRROLATE 0.2 MG/ML
INJECTION INTRAMUSCULAR; INTRAVENOUS
Status: DISCONTINUED | OUTPATIENT
Start: 2018-08-15 | End: 2018-08-15

## 2018-08-15 RX ORDER — ONDANSETRON 2 MG/ML
4 INJECTION INTRAMUSCULAR; INTRAVENOUS ONCE AS NEEDED
Status: DISCONTINUED | OUTPATIENT
Start: 2018-08-15 | End: 2018-08-15 | Stop reason: HOSPADM

## 2018-08-15 RX ORDER — CEFAZOLIN SODIUM 2 G/50ML
2 SOLUTION INTRAVENOUS
Status: COMPLETED | OUTPATIENT
Start: 2018-08-15 | End: 2018-08-15

## 2018-08-15 RX ORDER — FERROUS SULFATE, DRIED 160(50) MG
1 TABLET, EXTENDED RELEASE ORAL DAILY
Status: DISCONTINUED | OUTPATIENT
Start: 2018-08-16 | End: 2018-08-16 | Stop reason: HOSPADM

## 2018-08-15 RX ADMIN — CEFAZOLIN SODIUM 2 G: 2 SOLUTION INTRAVENOUS at 03:08

## 2018-08-15 RX ADMIN — SODIUM CHLORIDE, SODIUM LACTATE, POTASSIUM CHLORIDE, AND CALCIUM CHLORIDE: .6; .31; .03; .02 INJECTION, SOLUTION INTRAVENOUS at 07:08

## 2018-08-15 RX ADMIN — PREGABALIN 75 MG: 75 CAPSULE ORAL at 06:08

## 2018-08-15 RX ADMIN — ONDANSETRON 4 MG: 2 INJECTION, SOLUTION INTRAMUSCULAR; INTRAVENOUS at 07:08

## 2018-08-15 RX ADMIN — EPHEDRINE SULFATE 25 MG: 50 INJECTION, SOLUTION INTRAMUSCULAR; INTRAVENOUS; SUBCUTANEOUS at 08:08

## 2018-08-15 RX ADMIN — SODIUM CHLORIDE, SODIUM LACTATE, POTASSIUM CHLORIDE, AND CALCIUM CHLORIDE: .6; .31; .03; .02 INJECTION, SOLUTION INTRAVENOUS at 09:08

## 2018-08-15 RX ADMIN — MONTELUKAST SODIUM 10 MG: 10 TABLET, FILM COATED ORAL at 08:08

## 2018-08-15 RX ADMIN — CEFAZOLIN SODIUM 2 G: 2 SOLUTION INTRAVENOUS at 10:08

## 2018-08-15 RX ADMIN — CARVEDILOL 6.25 MG: 6.25 TABLET, FILM COATED ORAL at 05:08

## 2018-08-15 RX ADMIN — SODIUM CHLORIDE 1000 ML: 0.9 INJECTION, SOLUTION INTRAVENOUS at 06:08

## 2018-08-15 RX ADMIN — MIDAZOLAM 0.5 MG: 1 INJECTION INTRAMUSCULAR; INTRAVENOUS at 08:08

## 2018-08-15 RX ADMIN — GLYCOPYRROLATE 0.2 MG: 0.2 INJECTION, SOLUTION INTRAMUSCULAR; INTRAVENOUS at 07:08

## 2018-08-15 RX ADMIN — BUPIVACAINE HYDROCHLORIDE 7.5 ML: 5 INJECTION, SOLUTION EPIDURAL; INTRACAUDAL; PERINEURAL at 01:08

## 2018-08-15 RX ADMIN — OXYCODONE HYDROCHLORIDE 10 MG: 10 TABLET, FILM COATED, EXTENDED RELEASE ORAL at 08:08

## 2018-08-15 RX ADMIN — FENTANYL CITRATE 25 MCG: 50 INJECTION, SOLUTION INTRAMUSCULAR; INTRAVENOUS at 07:08

## 2018-08-15 RX ADMIN — SODIUM CHLORIDE, SODIUM LACTATE, POTASSIUM CHLORIDE, AND CALCIUM CHLORIDE: .6; .31; .03; .02 INJECTION, SOLUTION INTRAVENOUS at 08:08

## 2018-08-15 RX ADMIN — FENTANYL CITRATE 50 MCG: 50 INJECTION, SOLUTION INTRAMUSCULAR; INTRAVENOUS at 07:08

## 2018-08-15 RX ADMIN — CELECOXIB 100 MG: 100 CAPSULE ORAL at 08:08

## 2018-08-15 RX ADMIN — CELECOXIB 200 MG: 100 CAPSULE ORAL at 06:08

## 2018-08-15 RX ADMIN — ACETAMINOPHEN 1000 MG: 10 INJECTION, SOLUTION INTRAVENOUS at 09:08

## 2018-08-15 RX ADMIN — PREGABALIN 75 MG: 75 CAPSULE ORAL at 08:08

## 2018-08-15 RX ADMIN — PROPOFOL 20 MG: 10 INJECTION, EMULSION INTRAVENOUS at 07:08

## 2018-08-15 RX ADMIN — ACETAMINOPHEN 1000 MG: 10 INJECTION, SOLUTION INTRAVENOUS at 06:08

## 2018-08-15 RX ADMIN — MUPIROCIN: 20 OINTMENT TOPICAL at 06:08

## 2018-08-15 RX ADMIN — ERGOCALCIFEROL 50000 UNITS: 1.25 CAPSULE ORAL at 05:08

## 2018-08-15 RX ADMIN — DEXAMETHASONE SODIUM PHOSPHATE 8 MG: 4 INJECTION, SOLUTION INTRAMUSCULAR; INTRAVENOUS at 07:08

## 2018-08-15 RX ADMIN — OXYCODONE HYDROCHLORIDE 5 MG: 5 TABLET ORAL at 03:08

## 2018-08-15 RX ADMIN — ACETAMINOPHEN 1000 MG: 10 INJECTION, SOLUTION INTRAVENOUS at 01:08

## 2018-08-15 RX ADMIN — MIDAZOLAM 1 MG: 1 INJECTION INTRAMUSCULAR; INTRAVENOUS at 07:08

## 2018-08-15 RX ADMIN — ENOXAPARIN SODIUM 40 MG: 100 INJECTION SUBCUTANEOUS at 08:08

## 2018-08-15 RX ADMIN — TRANEXAMIC ACID 900 MG: 100 INJECTION, SOLUTION INTRAVENOUS at 07:08

## 2018-08-15 RX ADMIN — BUPIVACAINE HYDROCHLORIDE 2 ML: 7.5 INJECTION, SOLUTION EPIDURAL; RETROBULBAR at 01:08

## 2018-08-15 RX ADMIN — PHENYLEPHRINE HYDROCHLORIDE 100 MCG: 10 INJECTION INTRAVENOUS at 08:08

## 2018-08-15 RX ADMIN — PROPOFOL 50 MCG/KG/MIN: 10 INJECTION, EMULSION INTRAVENOUS at 07:08

## 2018-08-15 RX ADMIN — LIDOCAINE HYDROCHLORIDE 50 MG: 20 INJECTION, SOLUTION INTRAVENOUS at 07:08

## 2018-08-15 RX ADMIN — CEFAZOLIN SODIUM 2 G: 2 SOLUTION INTRAVENOUS at 08:08

## 2018-08-15 RX ADMIN — OXYCODONE HYDROCHLORIDE 10 MG: 10 TABLET, FILM COATED, EXTENDED RELEASE ORAL at 06:08

## 2018-08-15 NOTE — PROGRESS NOTES
08/15/18 1505   Patient Assessment/Suction   Level of Consciousness (AVPU) alert   Respiratory Effort Normal   All Lung Fields Breath Sounds diminished   PRE-TX-O2-ETCO2   O2 Device (Oxygen Therapy) room air   SpO2 99 %   Pulse Oximetry Type Intermittent   $ Pulse Oximetry - Multiple Charge Pulse Oximetry - Multiple   Pulse 61   Resp 18   Aerosol Therapy   $ Aerosol Therapy Charges PRN treatment not required   Respiratory Treatment Status PRN treatment not required   Alb and Duo Q6PRN, RA, IS, vitals as charted.

## 2018-08-15 NOTE — ANESTHESIA POSTPROCEDURE EVALUATION
"Anesthesia Post Evaluation    Patient: Heather Hughes    Procedure(s) Performed: Procedure(s) (LRB):  ARTHROPLASTY, KNEE (Left)    Final Anesthesia Type: spinal  Patient location during evaluation: PACU  Patient participation: Yes- Able to Participate  Level of consciousness: awake and alert  Post-procedure vital signs: reviewed and stable  Pain management: adequate  Airway patency: patent  PONV status at discharge: No PONV  Anesthetic complications: no      Cardiovascular status: hemodynamically stable  Respiratory status: unassisted and room air  Hydration status: euvolemic  Follow-up not needed.        Visit Vitals  BP (!) 166/70 (Patient Position: Lying)   Pulse 61   Temp 35.7 °C (96.3 °F) (Axillary)   Resp 16   Ht 5' 3" (1.6 m)   Wt 92.3 kg (203 lb 7.8 oz)   SpO2 95%   Breastfeeding? No   BMI 36.05 kg/m²       Pain/Leonor Score: Pain Assessment Performed: Yes (8/15/2018 11:30 AM)  Presence of Pain: denies (8/15/2018 11:30 AM)  Pain Rating Prior to Med Admin: 0 (8/15/2018  1:36 PM)  Leonor Score: 10 (8/15/2018 11:30 AM)        "

## 2018-08-15 NOTE — ANESTHESIA PROCEDURE NOTES
Spinal    Diagnosis: osteoarthritis   Patient location during procedure: OR  Start time: 8/15/2018 7:40 AM  Timeout: 8/15/2018 7:40 AM  End time: 8/15/2018 7:50 AM  Staffing  Anesthesiologist: Agustín Cartwright MD  Performed: anesthesiologist   Preanesthetic Checklist  Completed: patient identified, site marked, surgical consent, pre-op evaluation, timeout performed, IV checked, risks and benefits discussed and monitors and equipment checked  Spinal Block  Patient position: sitting  Prep: ChloraPrep  Patient monitoring: heart rate, cardiac monitor and continuous pulse ox  Approach: midline  Location: L4-5  Injection technique: single shot  CSF Fluid: clear free-flowing CSF  Needle  Needle type: Travis   Needle gauge: 25 G  Needle length: 3.5 in  Additional Documentation: incremental injection, negative aspiration for heme and no paresthesia on injection  Needle localization: anatomical landmarks  Assessment  Sensory level: T10   Dermatomal levels determined by alcohol wipe  Ease of block: easy  Patient's tolerance of the procedure: comfortable throughout block and no complaints  Additional Notes  VSS

## 2018-08-15 NOTE — PT/OT/SLP EVAL
"Physical Therapy Evaluation    Patient Name:  Heather Hughes   MRN:  [id number]    Recommendations:     Discharge Recommendations:  home health PT   Discharge Equipment Recommendations: none   Barriers to discharge: None    Assessment:     Heather Hughes is a 78 y.o. female admitted with a medical diagnosis of S/P total knee arthroplasty, left.  She presents with the following impairments/functional limitations:  weakness, impaired functional mobilty, impaired endurance, impaired self care skills, gait instability, decreased lower extremity function, decreased ROM. Pt had multiple family members present upon PT entry, and was in good spirits. Pt was receptive to treatment, and stated she was "not used to being the patient".     Rehab Prognosis:  good; patient would benefit from acute skilled PT services to address these deficits and reach maximum level of function.      Recent Surgery: Procedure(s) (LRB):  ARTHROPLASTY, KNEE (Left) Day of Surgery    Plan:     During this hospitalization, patient to be seen BID to address the above listed problems via gait training, therapeutic activities, therapeutic exercises  · Plan of Care Expires:  08/31/18   Plan of Care Reviewed with: patient, family, daughter, spouse    Subjective     Communicated with nurse Norton prior to session.  Patient found supine with multiple family members present upon PT entry to room, agreeable to evaluation. Pt stated she is "usually the caregiver" and is not used to being taken care of. Pt indicated that her leg was still numb from the block, but was willing to perform gait training.     Chief Complaint: numb leg  Patient comments/goals: get better  Pain/Comfort:  · Pain Rating 1: 0/10    Patients cultural, spiritual, Uatsdin conflicts given the current situation:      Living Environment:  Lives with spouse  Prior to admission, patients level of function was independent.  Patient has the following equipment: walker, rolling.  DME " owned (not currently used): rolling walker.  Upon discharge, patient will have assistance from spouse and family.    Objective:     Patient found with: cryotherapy, peripheral IV, knee immobilizer, SCD     General Precautions: Standard, fall   Orthopedic Precautions:LLE weight bearing as tolerated   Braces: Knee immobilizer     Exams:  · Sensation:    · -       Intact  on non-op leg, no sensation on op leg due to block  · RLE ROM: WFL  · RLE Strength: WFL  · LLE ROM: 73 degrees of flexion  · LLE Strength: 3/5    Functional Mobility:  · Bed Mobility:     · Rolling Left:  moderate assistance  · Scooting: moderate assistance  · Supine to Sit: moderate assistance  · Sit to Supine: moderate assistance  · Gait: pt ambulated 10 ft with RW   · Gait was slow with small steps  · Side, forward, and backward steps performed     AM-PAC 6 CLICK MOBILITY  Total Score:14       Therapeutic Activities and Exercises:   AP, LAQ, SLR, QS, Hip flexion    Patient left supine, HOB elevated, immobilizer removed per nurse Dionne' instructions with call button in reach and nurse Dionne notified.    GOALS:   Multidisciplinary Problems     Physical Therapy Goals        Problem: Physical Therapy Goal    Goal Priority Disciplines Outcome Goal Variances Interventions   Physical Therapy Goal     PT, PT/OT             Problem: Physical Therapy Goal    Goal Priority Disciplines Outcome Goal Variances Interventions   Physical Therapy Goal     PT, PT/OT      Description:  Goals to be met by: 2018     Patient will increase functional independence with mobility by performin. Supine to sit with Supervision  2. Sit to stand transfer with Contact Guard Assistance  3. Bed to chair transfer with Supervision using Rolling Walker  4. Gait  x 250 feet with Contact Guard Assistance using Rolling Walker.   5. Increased functional strength to WFL to be able to drive.  6. Lower extremity exercise program x20 reps per handout, with independence                       History:     Past Medical History:   Diagnosis Date    Allergy     Anticoagulant long-term use     Arthritis     Asthma     Coronary artery disease     stent x 1    Deep vein blood clot of right lower extremity 1965 & 1971    Full dentures     Heart attack 01/13/2017    Hypertension     Osteoporosis     Thyroid disease     Ulcer     Wears glasses        Past Surgical History:   Procedure Laterality Date    cardic stent  01/13/2017    CHOLECYSTECTOMY      EYE SURGERY      bilateral cataracts    FRACTURE SURGERY  2011    left wrist     THYROIDECTOMY         Clinical Decision Making:     History  Co-morbidities and personal factors that may impact the plan of care Examination  Body Structures and Functions, activity limitations and participation restrictions that may impact the plan of care Clinical Presentation   Decision Making/ Complexity Score   Co-morbidities:   [] Time since onset of injury / illness / exacerbation  [] Status of current condition  []Patient's cognitive status and safety concerns    [] Multiple Medical Problems (see med hx)  Personal Factors:   [] Patient's age  [] Prior Level of function   [] Patient's home situation (environment and family support)  [] Patient's level of motivation  [] Expected progression of patient      HISTORY:(criteria)    [] 95897 - no personal factors/history    [] 58863 - has 1-2 personal factor/comorbidity     [] 92177 - has >3 personal factor/comorbidity     Body Regions:  [] Objective examination findings  [] Head     []  Neck  [] Trunk   [] Upper Extremity  [] Lower Extremity    Body Systems:  [] For communication ability, affect, cognition, language, and learning style: the assessment of the ability to make needs known, consciousness, orientation (person, place, and time), expected emotional /behavioral responses, and learning preferences (eg, learning barriers, education  needs)  [] For the neuromuscular system: a general assessment of  gross coordinated movement (eg, balance, gait, locomotion, transfers, and transitions) and motor function  (motor control and motor learning)  [] For the musculoskeletal system: the assessment of gross symmetry, gross range of motion, gross strength, height, and weight  [] For the integumentary system: the assessment of pliability(texture), presence of scar formation, skin color, and skin integrity  [] For cardiovascular/pulmonary system: the assessment of heart rate, respiratory rate, blood pressure, and edema     Activity limitations:    [] Patient's cognitive status and saf ety concerns          [] Status of current condition      [] Weight bearing restriction  [] Cardiopulmunary Restriction    Participation Restrictions:   [] Goals and goal agreement with the patient     [] Rehab potential (prognosis) and probable outcome      Examination of Body System: (criteria)    [] 27259 - addressing 1-2 elements    [] 26836 - addressing a total of 3 or more elements     [] 19006 -  Addressing a total of 4 or more elements         Clinical Presentation: (criteria)  Choose one     On examination of body system using standardized tests and measures patient presents with (CHOOSE ONE) elements from any of the following: body structures and functions, activity limitations, and/or participation restrictions.  Leading to a clinical presentation that is considered (CHOOSE ONE)                              Clinical Decision Making  (Eval Complexity):  Choose One     Time Tracking:     PT Received On: 08/15/18  PT Start Time: 1355     PT Stop Time: 1432  PT Total Time (min): 37 min     Billable Minutes: Evaluation 8, Gait Training 15 and Therapeutic Exercise 14      Valarie Tejada, PT  08/15/2018

## 2018-08-15 NOTE — INTERVAL H&P NOTE
The patient has been examined and the H&P has been reviewed:    I concur with the findings and no changes have occurred since H&P was written.    Anesthesia/Surgery risks, benefits and alternative options discussed and understood by patient/family.          Active Hospital Problems    Diagnosis  POA    Arthritis of left knee [M17.12]  Yes      Resolved Hospital Problems   No resolved problems to display.

## 2018-08-15 NOTE — ANESTHESIA PREPROCEDURE EVALUATION
08/15/2018  Heather Hughes is a 78 y.o., female.    Anesthesia Evaluation    I have reviewed the Patient Summary Reports.    I have reviewed the Nursing Notes.   I have reviewed the Medications.     Review of Systems  Anesthesia Hx:  No problems with previous Anesthesia    Social:  Former Smoker    Hematology/Oncology:  Hematology Normal   Oncology Normal     EENT/Dental:   Full dentures   Cardiovascular:   Hypertension Past MI CAD  CABG/stent  Angina    Pulmonary:   Asthma    Musculoskeletal:   Arthritis  Arthritis of left knee   Neurological:  Neurology Normal    Dermatological:  Skin Normal    Psych:  Psychiatric Normal           Physical Exam  General:  Obesity    Airway/Jaw/Neck:  Airway Findings: Mouth Opening: Normal Tongue: Normal  General Airway Assessment: Adult  Mallampati: II  TM Distance: Normal, at least 6 cm        Eyes/Ears/Nose:  EYES/EARS/NOSE FINDINGS: Normal   Dental:  DENTAL FINDINGS: Normal   Chest/Lungs:  Chest/Lungs Findings: Clear to auscultation, Normal Respiratory Rate     Heart/Vascular:  Heart Findings: Rate: Normal  Rhythm: Regular Rhythm        Mental Status:  Mental Status Findings:  Cooperative, Alert and Oriented         Anesthesia Plan  Type of Anesthesia, risks & benefits discussed:  Anesthesia Type:  spinal  Patient's Preference:   Intra-op Monitoring Plan: standard ASA monitors  Intra-op Monitoring Plan Comments:   Post Op Pain Control Plan: multimodal analgesia, peripheral nerve block and IV/PO Opioids PRN  Post Op Pain Control Plan Comments:   Induction:   IV  Beta Blocker:  Patient is on a Beta-Blocker and has received one dose within the past 24 hours (No further documentation required).       Informed Consent: Patient understands risks and agrees with Anesthesia plan.  Questions answered. Anesthesia consent signed with patient.  ASA Score: 3     Day of Surgery  Review of History & Physical:    H&P update referred to the surgeon.         Ready For Surgery From Anesthesia Perspective.

## 2018-08-15 NOTE — BRIEF OP NOTE
[facility]  Brief Operative Note    SUMMARY     Surgery Date: 8/15/2018     Surgeon(s) and Role:     * Shantanu Santiago MD - Primary    Assisting Surgeon: None    Pre-op Diagnosis:  Arthritis of left knee [M17.12]    Post-op Diagnosis:  Post-Op Diagnosis Codes:     * Arthritis of left knee [M17.12]    Procedure(s) (LRB):  ARTHROPLASTY, KNEE (Left)    Anesthesia: General    Description of Procedure: left total knee arthroplasty    Description of the findings of the procedure: See Dictation     Estimated Blood Loss: 25 mL         Specimens:   Specimen (12h ago, onward)    None      Dictation #1  MRN:[id number]  CSN:[id number]  [id number]

## 2018-08-15 NOTE — H&P
Past Medical History:   Diagnosis Date    Allergy     Arthritis     Asthma     Heart attack 01/13/2017    Hypertension     Osteoporosis     Thyroid disease     Ulcer            Past Surgical History:   Procedure Laterality Date    cardic stent  01/13/2017    EYE SURGERY      bilateral cataracts    FRACTURE SURGERY  2011    left wrist     THYROIDECTOMY            Current Outpatient Prescriptions   Medication Sig    acetaminophen (TYLENOL) 650 MG TbSR Take 650 mg by mouth every 8 (eight) hours as needed.    albuterol 90 mcg/actuation inhaler Inhale 2 puffs into the lungs every 6 (six) hours as needed for Wheezing.    aspirin (ECOTRIN) 81 MG EC tablet Take 81 mg by mouth once daily.    benzonatate (TESSALON) 200 MG capsule Take 1 capsule (200 mg total) by mouth 3 (three) times daily as needed for Cough.    CALCIUM CARBONATE/VITAMIN D3 (CALCIUM 500 + D ORAL) Take 2 tablets by mouth once daily.    carvedilol (COREG) 6.25 MG tablet Take 1 tablet (6.25 mg total) by mouth 2 (two) times daily with meals. (Patient taking differently: Take 6.25 mg by mouth once daily. 1/2 tablet daily)    clopidogrel (PLAVIX) 75 mg tablet Take 1 tablet (75 mg total) by mouth once daily.    ergocalciferol (ERGOCALCIFEROL) 50,000 unit Cap TAKE ONE CAPSULE BY MOUTH EVERY 7 DAYS    fluconazole (DIFLUCAN) 150 MG Tab Take 1 tablet x 1; repeat in 72 hours    glucosamine-chondroitin 500-400 mg tablet Take 1 tablet by mouth 3 (three) times daily.    montelukast (SINGULAIR) 10 mg tablet Take 1 tablet (10 mg total) by mouth every evening.    nitroGLYCERIN (NITROSTAT) 0.4 MG SL tablet Place 1 tablet (0.4 mg total) under the tongue every 5 (five) minutes as needed for Chest pain.    nystatin (MYCOSTATIN) 100,000 unit/mL suspension Take 4 mLs (400,000 Units total) by mouth 4 (four) times daily. for 10 days    omeprazole (PRILOSEC) 40 MG capsule Take 1 capsule (40 mg total) by mouth once daily.    phenazopyridine (PYRIDIUM) 95 MG  tablet Take 95 mg by mouth once daily.    SYNTHROID 112 mcg tablet TAKE ONE TABLET BY MOUTH EVERY DAY BEFORE BREAKFAST     No current facility-administered medications for this visit.            Review of patient's allergies indicates:   Allergen Reactions    Lipitor [atorvastatin] Other (See Comments)     Fall/ balance     Myrbetriq [mirabegron] Swelling and Other (See Comments)     Legs swelling, couldn't void     Pravastatin Other (See Comments)     Balance problem     Codeine Nausea Only    Mobic [meloxicam]     Sulfur            Family History   Problem Relation Age of Onset    Lupus Mother     Osteoporosis Mother     Heart disease Mother     Ulcers Mother     Esophageal cancer Father     Cancer Father     esophageal    DIEGO disease Father     Cancer Brother     Esophageal cancer Brother     Lupus Daughter     DIEGO disease Son     Heart disease Maternal Aunt     Cancer Maternal Aunt     Kidney disease Maternal Aunt     Kidney cancer Maternal Aunt     Liver cancer Maternal Aunt     Dementia Maternal Aunt     Cancer Maternal Uncle     Colon cancer Maternal Uncle     Breast cancer Maternal Uncle     Lung cancer Maternal Uncle     smoker    Parkinsonism Paternal Aunt     Cancer Paternal Aunt     Stomach cancer Paternal Aunt     Heart disease Paternal Uncle     Cancer Paternal Uncle     Stomach cancer Paternal Uncle     x2    Bone cancer Paternal Uncle     Cancer Maternal Grandmother     tumors on head and body    Parkinsonism Paternal Grandmother     DIEGO disease Brother     Ulcers Brother     Dementia Brother     Early death Son     self    Bipolar disorder Son     Cancer Son     Esophageal cancer Son     Drug abuse Son      Social History                                                                                                                                                                      Chief Complaint:       Chief Complaint   Patient presents with    Left  "Knee - Pain     Consulting Physician: Hortencia Ignacio PA   History of present illness:   This is a 78 y.o. female who complains of bilateral knee pain with left worse. Pain severe in left and worse with use. No new injury.   Review of Systems:   Constitution: Denies chills, fever, and sweats.   HENT: Denies headaches or blurry vision.   Cardiovascular: Denies chest pain or irregular heart beat.   Respiratory: Denies cough or shortness of breath.   Gastrointestinal: Denies abdominal pain, nausea, or vomiting.   Musculoskeletal: Denies muscle cramps.   Neurological: Denies dizziness or focal weakness.   Psychiatric/Behavioral: Normal mental status.   Hematologic/Lymphatic: Denies bleeding problem or easy bruising/bleeding.   Skin: Denies rash or suspicious lesions.   Examination:   Vital Signs:       Vitals:    07/11/18 1356   BP: (!) 148/72   Pulse: 71   Weight: 100.2 kg (220 lb 14.4 oz)   Height: 5' 1" (1.549 m)   PainSc: 0-No pain   PainLoc: Knee     Body mass index is 41.74 kg/m².   This a well-developed, well nourished patient in no acute distress.   Alert and oriented x 3 and cooperative to examination.   Physical Exam: Right Knee Exam   Gait Antalgic   Skin   Rash: None   Scars: None   Inspection   Erythema: None   Bruising: None   Effusion: None   Masses: None   Lymphadenopathy: None   Range of Motion: 0 to 130°   Medial Joint : n   Lateral Joint : y   Patellofemoral Tenderness: Yes   Patellofemoral Crepitus: Yes   Lachman: Normal   Anterior Drawer: Normal   Posterior Drawer: Normal   Khalif's: Negative   Apley's: Negative   Varus Stress: Stable   Valgus Stress: Stable   Strength: 5/5   Pulses: Palpable   Sensation: Intact   Left Knee Exam   Gait Antalgic   Skin   Rash: None   Scars: None   Inspection   Erythema: None   Bruising: None   Effusion: None   Masses: None   Lymphadenopathy: None   Range of Motion: 0 to 130°   Medial Joint : n   Lateral Joint : y "   Patellofemoral Tenderness: Yes   Patellofemoral Crepitus: Yes   Lachman: Normal   Anterior Drawer: Normal   Posterior Drawer: Normal   Khalif's: Negative   Apley's: Negative   Varus Stress: Stable   Valgus Stress: Stable   Strength: 5/5   Pulses: Palpable   Sensation: Intact   Imaging: X-rays ordered and reviewed today personally of both knees show severe DJD in the lateral sides.   Assessment: Arthritis of left knee   - Large Joint Aspiration/Injection   Arthritis of knee, right   - Large Joint Aspiration/Injection     Plan: We discussed options today and she would like a left TKA. Injected both today. The risks, benefits, and alternatives to the procedure were explained to the patient including, but not limited to: incomplete pain relief, surgical failure, hardware failure, need for further procedures, damage to nerves, arteries, blood vessels and other structures, infection, Deep Vein Thrombosis (DVT), Pulmonary Embolus (PE), Complex Regional Pain Syndrome as well as general anesthetic complications including seizure, stroke, heart attack and even death. The patient understood these risks and wished to proceed and signed the informed consent. All questions were answered. No guarantees were implied or stated.

## 2018-08-15 NOTE — TRANSFER OF CARE
"Anesthesia Transfer of Care Note    Patient: Heather Hughes    Procedure(s) Performed: Procedure(s) (LRB):  ARTHROPLASTY, KNEE (Left)    Patient location: PACU    Anesthesia Type: general    Transport from OR: Transported from OR on 2-3 L/min O2 by NC with adequate spontaneous ventilation    Post pain: adequate analgesia    Post assessment: no apparent anesthetic complications and tolerated procedure well    Post vital signs: stable    Level of consciousness: awake, alert and oriented    Nausea/Vomiting: no nausea/vomiting    Complications: none    Transfer of care protocol was followed      Last vitals:   Visit Vitals  BP (!) 175/77   Pulse (!) 58   Temp 36.8 °C (98.2 °F) (Skin)   Ht 5' 3" (1.6 m)   Wt 93 kg (205 lb)   SpO2 100%   Breastfeeding? No   BMI 36.31 kg/m²     "

## 2018-08-15 NOTE — OP NOTE
DATE OF PROCEDURE:  08/15/2018.    PREOPERATIVE DIAGNOSIS:  Left knee osteoarthritis.    POSTOPERATIVE DIAGNOSIS:  Left knee osteoarthritis.    PROCEDURE:  Left total knee arthroplasty.    SURGEON:  Shantanu Santiago M.D.    ASSISTANT:  Marie Canada.    ANESTHESIA:  Spinal with adductor block and local infiltrate.    HISTORY:  Ms. Hughes is a 78-year-old woman who presented to our office with   complaint of bilateral knee pain.  Her imaging studies and examination were   consistent with osteoarthritis.  We discussed different treatment options.  She   elected to proceed with a left total knee arthroplasty.  The risks and benefits   of surgical intervention including the potential incomplete pain relief and the   need for further procedures were explained to the patient who expressed she   understood and wished to proceed.  Informed consent was obtained.    PROCEDURE IN DETAIL:  After verifying informed consent and correct site, the   patient was brought back to the Operating Room, placed on the OR table in the   supine position.  Preoperative IV antibiotics were administered and a timeout   was performed.  The patient's left lower extremity was then prepped and draped   in sterile fashion.  We began by exsanguinating the leg and raising the   tourniquet to 300 psi.  Once the tourniquet was up, we made a midline incision   over the knee.  Dissection was carried down through skin and subcutaneous tissue   until we came down on to the patella and patellar tendon.  A medial lateral   flap was created and then a medial parapatellar arthrotomy was performed.  Once   arthrotomy was done, we removed extraneous tissue and then placed our   retractors, we could visualize the distal end of the femur.  We then placed our   [employer] patient specific instrumentation on the distal femur and pinned   in place.  We then made our distal femoral cut and measured our pieces, which   were consistent with our preoperative plan.  We  then placed a 4-in-1 cutting   guide and made our chamfer cuts.  These cuts were also verified against the   preoperative plan.  Once the femoral cuts were made, we then did a trial size 5   femoral component, which fit nicely.  We then removed the trial component and   turned our attention to the tibia.  Once again, the patient's specific block was   placed and our proximal tibial cut was made.  We verified again the   preoperative plan and found that it was acceptable.  We then finished our tibial   preparation and placed a trial size 4 tibial component.  With our trial tibial   component in place, we then used our femoral guide to create our femoral box   cut.  With the box cut in place, we then trialed our complete component set up,   which was a size 5 femur and a size 4 tibia along with a 9-mm poly.  Once this   was put in place, we had excellent range of motion, we had full extension and we   had flexion past 120 degrees.  We had good stability to varus and valgus both   in flexion and extension.  We then turned our attention to the patella.  We made   our patellar cut and trialed the 29 mm patellar button, which fit nicely and   tracked well.  At this point, I removed all of our trial components and   irrigated the bone cuts in preparation for cementing.  A standard cementing   technique was used to place a [employer] size 5 femoral component,   size 4 tibial component and a 29 mm patellar button.  We held the knee in full   extension while the cement was cured.  Once the cement was cured, we again   checked our fit on the 9-mm poly and we are happy with that, so we placed a   final 9-mm polyethylene component.  At this point, the wounds were copiously   irrigated and we began our closure.  A #1 Stratafix suture was used to close the   arthrotomy followed by 2-0 Vicryl to approximate the skin followed with a 3-0   Stratafix to approximate the skin.  A Prineo dressing was applied to the knee    along with a sterile dressing and an Ace bandage with a PolarCare device.  The   patient was then awoken from anesthesia, extubated, and brought to the PACU in   good condition.    TOTAL TOURNIQUET TIME:  75 minutes.    DRAINS:  None.    SPECIMENS:  None.    COMPLICATIONS:  None.    ESTIMATED BLOOD LOSS:  25 mL    POSTOPERATIVE PLAN:  She will be admitted for postoperative care.          /mere [id number] margo(s)        HERMES/CHRISTINA  dd: 08/15/2018 10:15:08 (CDT)  td: 08/15/2018 11:47:36 (CDT)  Doc ID   #[id number]  Job ID #[id number]    CC:

## 2018-08-15 NOTE — H&P
PCP: Graham Mazariegos MD    History & Physical    Chief Complaint: s/p Left total knee arthroplasty    History of Present Illness:  Patient is a 78 y.o. female admitted to Hospitalist Service from Operation Room s/p left total knee arthroplasty performed by Dr. Santiago. Patient reportedly has past medical history significant for Hypertension, CAD, history of bronchial asthma, history of DVT and hypothyroidism. Post-operatively, patient denied chest pain, shortness of breath, abdominal pain, nausea, vomiting, headache, vision changes, focal neuro-deficits, cough or fever.    Past Medical History:   Diagnosis Date    Allergy     Anticoagulant long-term use     Arthritis     Asthma     Coronary artery disease     stent x 1    Deep vein blood clot of right lower extremity 1965 & 1971    Full dentures     Heart attack 01/13/2017    Hypertension     Osteoporosis     Thyroid disease     Ulcer     Wears glasses      Past Surgical History:   Procedure Laterality Date    cardic stent  01/13/2017    CHOLECYSTECTOMY      EYE SURGERY      bilateral cataracts    FRACTURE SURGERY  2011    left wrist     THYROIDECTOMY       Family History   Problem Relation Age of Onset    Lupus Mother     Osteoporosis Mother     Heart disease Mother     Ulcers Mother     Esophageal cancer Father     Cancer Father         esophageal    DIEGO disease Father     Cancer Brother     Esophageal cancer Brother     Lupus Daughter     DIEGO disease Son     Heart disease Maternal Aunt     Cancer Maternal Aunt     Kidney disease Maternal Aunt     Kidney cancer Maternal Aunt     Liver cancer Maternal Aunt     Dementia Maternal Aunt     Cancer Maternal Uncle     Colon cancer Maternal Uncle     Breast cancer Maternal Uncle     Lung cancer Maternal Uncle         smoker    Parkinsonism Paternal Aunt     Cancer Paternal Aunt     Stomach cancer Paternal Aunt     Heart disease Paternal Uncle     Cancer Paternal Uncle      Stomach cancer Paternal Uncle         x2    Bone cancer Paternal Uncle     Cancer Maternal Grandmother         tumors on head and body    Parkinsonism Paternal Grandmother     DIEGO disease Brother     Ulcers Brother     Dementia Brother     Early death Son         self    Bipolar disorder Son     Cancer Son     Esophageal cancer Son     Drug abuse Son      Social History     Tobacco Use    Smoking status: Former Smoker     Packs/day: 0.50     Types: Cigarettes     Last attempt to quit: 2017     Years since quittin.5    Smokeless tobacco: Never Used   Substance Use Topics    Alcohol use: No    Drug use: No      Review of patient's allergies indicates:   Allergen Reactions    Lipitor [atorvastatin] Other (See Comments)     Fall/ balance     Myrbetriq [mirabegron] Swelling and Other (See Comments)     Legs swelling, couldn't void     Pravastatin Other (See Comments)     Balance problem     Codeine Nausea Only    Mobic [meloxicam] Other (See Comments)     Not sure reaction    Perfume Other (See Comments)     And flowers cause allergy and makes her cough.    Sulfur Other (See Comments)     Was told not to take as a child     PTA Medications   Medication Sig    acetaminophen (TYLENOL) 650 MG TbSR Take 650 mg by mouth every 8 (eight) hours as needed.    albuterol 90 mcg/actuation inhaler Inhale 2 puffs into the lungs every 6 (six) hours as needed for Wheezing.    aspirin (ECOTRIN) 81 MG EC tablet Take 81 mg by mouth once daily.    benzonatate (TESSALON) 200 MG capsule Take 1 capsule (200 mg total) by mouth 3 (three) times daily as needed for Cough.    CALCIUM CARBONATE/VITAMIN D3 (CALCIUM 500 + D ORAL) Take 2 tablets by mouth once daily.    ergocalciferol (ERGOCALCIFEROL) 50,000 unit Cap TAKE ONE CAPSULE BY MOUTH EVERY 7 DAYS    glucosamine-chondroitin 500-400 mg tablet Take 1 tablet by mouth 3 (three) times daily.    montelukast (SINGULAIR) 10 mg tablet Take 1 tablet (10 mg total) by  mouth every evening.    omeprazole (PRILOSEC) 40 MG capsule Take 1 capsule (40 mg total) by mouth once daily. (Patient taking differently: Take 40 mg by mouth every evening. )    phenazopyridine (PYRIDIUM) 95 MG tablet Take 95 mg by mouth once daily.    SYNTHROID 112 mcg tablet TAKE ONE TABLET BY MOUTH EVERY DAY BEFORE BREAKFAST    carvedilol (COREG) 6.25 MG tablet Take 1 tablet (6.25 mg total) by mouth 2 (two) times daily with meals. (Patient taking differently: Take 6.25 mg by mouth once daily. 1/2 tablet daily)    clopidogrel (PLAVIX) 75 mg tablet Take 1 tablet (75 mg total) by mouth once daily.    nitroGLYCERIN (NITROSTAT) 0.4 MG SL tablet Place 1 tablet (0.4 mg total) under the tongue every 5 (five) minutes as needed for Chest pain.     Review of Systems:  Constitutional: no fever or chills  Eyes: no visual changes  Ears, nose, mouth, throat, and face: no nasal congestion or sore throat  Respiratory: no cough or shorness of breath  Cardiovascular: no chest pain or palpitations  Gastrointestinal: no nausea or vomiting, no abdominal pain or change in bowel habits  Genitourinary: no hematuria or dysuria  Integument/breast: no rash or pruritis  Hematologic/lymphatic: no easy bruising or lymphadenopathy  Musculoskeletal: see HPI  Neurological: no seizures or tremors.  Behavioral/Psych: no auditory or visual hallucinations  Endocrine: no heat or cold intolerance     OBJECTIVE:     Vital Signs (Most Recent)  Temp: 96.3 °F (35.7 °C) (08/15/18 1145)  Pulse: 61 (08/15/18 1145)  Resp: 16 (08/15/18 1145)  BP: (!) 166/70 (08/15/18 1145)  SpO2: 95 % (08/15/18 1145)    Physical Exam:  General appearance: well developed, appears stated age  Head: normocephalic, atraumatic  Eyes:  conjunctivae/corneas clear. PERRL.  Nose: Nares normal. Septum midline.  Throat: lips, mucosa, and tongue normal; teeth and gums normal, no throat erythema.  Neck: supple, symmetrical, trachea midline, no JVD and thyroid not enlarged, symmetric,  no tenderness/mass/nodules  Lungs:  clear to auscultation bilaterally and normal respiratory effort  Chest wall: no tenderness  Heart: regular rate and rhythm, S1, S2 normal, no murmur, click, rub or gallop  Abdomen: soft, non-tender non-distented; bowel sounds normal; no masses,  no organomegaly  Extremities: no cyanosis, clubbing or edema. Left knee dressing C/D/I.  Pulses: 2+ and symmetric  Skin: Skin color, texture, turgor normal. No rashes or lesions.  Lymph nodes: Cervical, supraclavicular, and axillary nodes normal.  Neurologic: Normal strength and tone. No focal numbness or weakness. CNII-XII intact.      Laboratory:   CBC:   Recent Labs   Lab  08/15/18   1039   WBC  7.00   RBC  4.01   HGB  11.6*   HCT  35.6*   PLT  219   MCV  89   MCH  28.8   MCHC  32.5     CMP:   Recent Labs   Lab  08/15/18   1039   GLU  109   CALCIUM  9.4   NA  138   K  5.1   CO2  25   CL  107   BUN  16   CREATININE  0.8     Recent Labs   Lab  08/09/18   1215   COLORU  Yellow   SPECGRAV  1.015   PHUR  6.0   PROTEINUA  Negative   NITRITE  Negative   LEUKOCYTESUR  Negative   UROBILINOGEN  Negative       Hemoglobin A1C   Date Value Ref Range Status   01/14/2017 5.3 4.5 - 6.2 % Final     Comment:     According to ADA guidelines, hemoglobin A1C <7.0% represents  optimal control in non-pregnant diabetic patients.  Different  metrics may apply to specific populations.   Standards of Medical Care in Diabetes - 2016.  For the purpose of screening for the presence of diabetes:  <5.7%     Consistent with the absence of diabetes  5.7-6.4%  Consistent with increasing risk for diabetes   (prediabetes)  >or=6.5%  Consistent with diabetes  Currently no consensus exists for use of hemoglobin A1C  for diagnosis of diabetes for children.       Diagnostic Results:   Left knee x-ray: Status post left tricompartmental knee arthroplasty without complication.    Assessment/Plan:     Active Hospital Problems    Diagnosis  POA    *S/P total knee arthroplasty,  left [Z96.652]  Not Applicable    Arthritis of left knee [M17.12]  Continue to follow Orthopedic recommendations.  Needs aggressive incentive spirometry.  Follow hemoglobin and hematocrit closely.  Pain control with IV narcotics and antiemetics as needed.  Physical therapy as per Orthopedics protocol with fall precautions.    Yes    Essential hypertension [I10]  Chronic problem. Will continue chronic medications and monitor for any changes, adjusting as needed.  DC IVF.    Yes    Coronary artery disease involving native coronary artery of native heart with unstable angina pectoris [I25.110]  Patient with known CAD and monitor for S/Sx of angina/ACS. Continue to monitor on telemetry.    Yes           VTE Risk Mitigation (From admission, onward)        Ordered     enoxaparin injection 40 mg  Daily      08/15/18 1223     IP VTE LOW RISK PATIENT  Once      08/15/18 0611     Place sequential compression device  Until discontinued      08/15/18 0611     Place RUBY hose  Until discontinued      08/15/18 0611        Aleshia Isabel MD  Department of Hospital Medicine   [facility]

## 2018-08-15 NOTE — PLAN OF CARE
Patient is stable at this time.  VSS. Anesthesiologist at patient's bedside and is ok for patient to transfer to the floor.  Dressings clean, dry and intact.  Pain is a 0/10.  No complaints of nausea or vomiting.  Patient tolerating po intake well.

## 2018-08-15 NOTE — PLAN OF CARE
Problem: Fall Risk (Adult)  Goal: Identify Related Risk Factors and Signs and Symptoms  Related risk factors and signs and symptoms are identified upon initiation of Human Response Clinical Practice Guideline (CPG)  Outcome: Ongoing (interventions implemented as appropriate)  Pt remained free of falls, injuries, or trauma during shift. Fall precautions maintained throughout shift. Bed kept in lowest position, locked and Call light within reach. Fall risk band/allergy band/blood band/exparil band on. Pt showed no new s/s of skin breakdown during shift. Pt was able to use trapeze to reposition ~Q2hr to prevent skin breakdown.Pt rounded on hourly in a purposeful manner. Neurovascular checks intact, VSS and Pain well controlled per patient. Cryo in place and dressing C/D/I. Tele in place with sinus rhythm noted. No acute events throughout the shift. Pt able to work with PT some this afternoon. Pt also able to stand up in room and walk about 10 steps. Patient progressing towards goals as tolerated. Plan of care reviewed with pt, all concerns addressed. Will continue to monitor

## 2018-08-15 NOTE — ANESTHESIA PROCEDURE NOTES
Peripheral    Patient location during procedure: pre-op   Block not for primary anesthetic.  Reason for block: at surgeon's request and post-op pain management   Post-op Pain Location: left knee  Start time: 8/15/2018 7:05 AM  Timeout: 8/15/2018 7:05 AM   End time: 8/15/2018 7:10 AM  Staffing  Anesthesiologist: Agustín Cartwright MD  Performed: anesthesiologist   Preanesthetic Checklist  Completed: patient identified, site marked, surgical consent, pre-op evaluation, timeout performed, IV checked, risks and benefits discussed and monitors and equipment checked  Peripheral Block  Patient position: supine  Prep: ChloraPrep  Patient monitoring: heart rate, cardiac monitor, continuous pulse ox, continuous capnometry and frequent blood pressure checks  Block type: adductor canal  Laterality: left  Injection technique: single shot  Needle  Needle type: Stimuplex   Needle gauge: 21 G  Needle length: 4 in  Needle localization: anatomical landmarks and ultrasound guidance   -ultrasound image captured on disc.  Assessment  Injection assessment: negative aspiration, negative parasthesia and local visualized surrounding nerve  Paresthesia pain: none  Heart rate change: no  Slow fractionated injection: yes  Additional Notes  +Exparel 15cc.  VSS throughout

## 2018-08-16 VITALS
WEIGHT: 203.5 LBS | DIASTOLIC BLOOD PRESSURE: 67 MMHG | SYSTOLIC BLOOD PRESSURE: 146 MMHG | RESPIRATION RATE: 18 BRPM | TEMPERATURE: 97 F | HEIGHT: 63 IN | OXYGEN SATURATION: 96 % | HEART RATE: 71 BPM | BODY MASS INDEX: 36.06 KG/M2

## 2018-08-16 LAB
ANION GAP SERPL CALC-SCNC: 6 MMOL/L
BASOPHILS # BLD AUTO: 0 K/UL
BASOPHILS NFR BLD: 0.4 %
BUN SERPL-MCNC: 18 MG/DL
CALCIUM SERPL-MCNC: 8.9 MG/DL
CHLORIDE SERPL-SCNC: 106 MMOL/L
CO2 SERPL-SCNC: 21 MMOL/L
CREAT SERPL-MCNC: 0.8 MG/DL
DIFFERENTIAL METHOD: ABNORMAL
EOSINOPHIL # BLD AUTO: 0 K/UL
EOSINOPHIL NFR BLD: 0 %
ERYTHROCYTE [DISTWIDTH] IN BLOOD BY AUTOMATED COUNT: 13.8 %
EST. GFR  (AFRICAN AMERICAN): >60 ML/MIN/1.73 M^2
EST. GFR  (NON AFRICAN AMERICAN): >60 ML/MIN/1.73 M^2
GLUCOSE SERPL-MCNC: 132 MG/DL
HCT VFR BLD AUTO: 31.4 %
HGB BLD-MCNC: 10 G/DL
LYMPHOCYTES # BLD AUTO: 0.9 K/UL
LYMPHOCYTES NFR BLD: 8.4 %
MCH RBC QN AUTO: 28.4 PG
MCHC RBC AUTO-ENTMCNC: 32 G/DL
MCV RBC AUTO: 89 FL
MONOCYTES # BLD AUTO: 0.8 K/UL
MONOCYTES NFR BLD: 7.4 %
NEUTROPHILS # BLD AUTO: 8.8 K/UL
NEUTROPHILS NFR BLD: 83.8 %
PLATELET # BLD AUTO: 199 K/UL
PMV BLD AUTO: 10.9 FL
POTASSIUM SERPL-SCNC: 4.5 MMOL/L
RBC # BLD AUTO: 3.52 M/UL
SODIUM SERPL-SCNC: 133 MMOL/L
WBC # BLD AUTO: 10.5 K/UL

## 2018-08-16 PROCEDURE — 97116 GAIT TRAINING THERAPY: CPT

## 2018-08-16 PROCEDURE — 36415 COLL VENOUS BLD VENIPUNCTURE: CPT

## 2018-08-16 PROCEDURE — 63600175 PHARM REV CODE 636 W HCPCS: Performed by: ORTHOPAEDIC SURGERY

## 2018-08-16 PROCEDURE — 97110 THERAPEUTIC EXERCISES: CPT

## 2018-08-16 PROCEDURE — 99024 POSTOP FOLLOW-UP VISIT: CPT | Mod: ,,, | Performed by: ORTHOPAEDIC SURGERY

## 2018-08-16 PROCEDURE — 25000003 PHARM REV CODE 250: Performed by: INTERNAL MEDICINE

## 2018-08-16 PROCEDURE — 80048 BASIC METABOLIC PNL TOTAL CA: CPT

## 2018-08-16 PROCEDURE — 99239 HOSP IP/OBS DSCHRG MGMT >30: CPT | Mod: ,,, | Performed by: INTERNAL MEDICINE

## 2018-08-16 PROCEDURE — 99900035 HC TECH TIME PER 15 MIN (STAT)

## 2018-08-16 PROCEDURE — 25000003 PHARM REV CODE 250: Performed by: ANESTHESIOLOGY

## 2018-08-16 PROCEDURE — 94761 N-INVAS EAR/PLS OXIMETRY MLT: CPT

## 2018-08-16 PROCEDURE — 25000242 PHARM REV CODE 250 ALT 637 W/ HCPCS: Performed by: INTERNAL MEDICINE

## 2018-08-16 PROCEDURE — 94640 AIRWAY INHALATION TREATMENT: CPT

## 2018-08-16 PROCEDURE — 97530 THERAPEUTIC ACTIVITIES: CPT

## 2018-08-16 PROCEDURE — 94799 UNLISTED PULMONARY SVC/PX: CPT

## 2018-08-16 PROCEDURE — 85025 COMPLETE CBC W/AUTO DIFF WBC: CPT

## 2018-08-16 RX ORDER — OXYCODONE AND ACETAMINOPHEN 10; 325 MG/1; MG/1
1-2 TABLET ORAL EVERY 6 HOURS PRN
Qty: 60 TABLET | Refills: 0 | OUTPATIENT
Start: 2018-08-16

## 2018-08-16 RX ORDER — ASPIRIN 325 MG
325 TABLET ORAL 2 TIMES DAILY
Refills: 0 | COMMUNITY
Start: 2018-08-16 | End: 2019-06-11 | Stop reason: DRUGHIGH

## 2018-08-16 RX ORDER — HYDROCODONE BITARTRATE AND ACETAMINOPHEN 10; 325 MG/1; MG/1
1 TABLET ORAL EVERY 4 HOURS PRN
Refills: 0
Start: 2018-08-16 | End: 2018-08-30 | Stop reason: SDUPTHER

## 2018-08-16 RX ORDER — ASPIRIN 325 MG
325 TABLET ORAL 2 TIMES DAILY
Qty: 60 TABLET | Refills: 1 | OUTPATIENT
Start: 2018-08-16 | End: 2018-10-15

## 2018-08-16 RX ORDER — IBUPROFEN 800 MG/1
800 TABLET ORAL 3 TIMES DAILY
Qty: 90 TABLET | Refills: 0 | OUTPATIENT
Start: 2018-08-16

## 2018-08-16 RX ORDER — ASPIRIN 325 MG
325 TABLET ORAL 2 TIMES DAILY
Qty: 60 TABLET | Refills: 0 | Status: SHIPPED | OUTPATIENT
Start: 2018-08-16 | End: 2018-08-30 | Stop reason: CLARIF

## 2018-08-16 RX ORDER — OXYCODONE AND ACETAMINOPHEN 10; 325 MG/1; MG/1
1-2 TABLET ORAL EVERY 6 HOURS PRN
Qty: 60 TABLET | Refills: 0 | Status: SHIPPED | OUTPATIENT
Start: 2018-08-16 | End: 2018-09-07 | Stop reason: SDUPTHER

## 2018-08-16 RX ADMIN — PREGABALIN 75 MG: 75 CAPSULE ORAL at 08:08

## 2018-08-16 RX ADMIN — CALCIUM CARBONATE-VITAMIN D TAB 500 MG-200 UNIT 1 TABLET: 500-200 TAB at 08:08

## 2018-08-16 RX ADMIN — OXYCODONE HYDROCHLORIDE 5 MG: 5 TABLET ORAL at 03:08

## 2018-08-16 RX ADMIN — OXYCODONE HYDROCHLORIDE 5 MG: 5 TABLET ORAL at 10:08

## 2018-08-16 RX ADMIN — ASPIRIN 81 MG: 81 TABLET, COATED ORAL at 08:08

## 2018-08-16 RX ADMIN — LEVOTHYROXINE SODIUM 112 MCG: 112 TABLET ORAL at 06:08

## 2018-08-16 RX ADMIN — FLUCONAZOLE 150 MG: 50 TABLET ORAL at 04:08

## 2018-08-16 RX ADMIN — CARVEDILOL 6.25 MG: 6.25 TABLET, FILM COATED ORAL at 08:08

## 2018-08-16 RX ADMIN — IPRATROPIUM BROMIDE AND ALBUTEROL SULFATE 3 ML: .5; 3 SOLUTION RESPIRATORY (INHALATION) at 01:08

## 2018-08-16 RX ADMIN — CLOPIDOGREL BISULFATE 75 MG: 75 TABLET ORAL at 08:08

## 2018-08-16 RX ADMIN — ALBUTEROL SULFATE 2.5 MG: 2.5 SOLUTION RESPIRATORY (INHALATION) at 05:08

## 2018-08-16 RX ADMIN — CEFAZOLIN SODIUM 2 G: 2 SOLUTION INTRAVENOUS at 06:08

## 2018-08-16 RX ADMIN — CELECOXIB 100 MG: 100 CAPSULE ORAL at 08:08

## 2018-08-16 RX ADMIN — PANTOPRAZOLE SODIUM 40 MG: 40 TABLET, DELAYED RELEASE ORAL at 08:08

## 2018-08-16 RX ADMIN — OXYCODONE HYDROCHLORIDE 10 MG: 10 TABLET, FILM COATED, EXTENDED RELEASE ORAL at 08:08

## 2018-08-16 RX ADMIN — ACETAMINOPHEN 1000 MG: 10 INJECTION, SOLUTION INTRAVENOUS at 05:08

## 2018-08-16 NOTE — PROGRESS NOTES
POD 1    Pt AAOx3. Pain controlled. Ambulating.    Left LE NVI. Dressing CDI.     Plan - Doing well. DC home. FU 2 weeks.

## 2018-08-16 NOTE — PLAN OF CARE
Received call from pt's daughter, Angela who states [facility] is the agency she wants her mother to use for home health....NABILA Mann       08/16/18 2208   Discharge Reassessment   Assessment Type Discharge Planning Reassessment

## 2018-08-16 NOTE — PT/OT/SLP PROGRESS
Physical Therapy Treatment    Patient Name:  Heather Hughes   MRN:  [id number]    Recommendations:     Discharge Recommendations:  home health PT   Discharge Equipment Recommendations: none   Barriers to discharge: None    Assessment:     Heather Hughes is a 78 y.o. female admitted with a medical diagnosis of S/P total knee arthroplasty, left.  She presents with the following impairments/functional limitations:  weakness, impaired endurance, impaired self care skills, gait instability, decreased lower extremity function, pain, decreased ROM, impaired functional mobilty . Pt seen BID today with increased gait distance with LK flexion 86 degrees. Pt anticipating discharge this pm.    Rehab Prognosis:  good; patient would benefit from acute skilled PT services to address these deficits and reach maximum level of function.      Recent Surgery: Procedure(s) (LRB):  ARTHROPLASTY, KNEE (Left) 1 Day Post-Op    Plan:     During this hospitalization, patient to be seen BID to address the above listed problems via gait training, therapeutic activities, therapeutic exercises  · Plan of Care Expires:  08/31/18   Plan of Care Reviewed with: patient, daughter    Subjective     Communicated with nurse Kendrick prior to session.  Patient found supine upon PT entry to room, agreeable to treatment.    Pt stated she just laid down and was up all morning and afternoon  Pt agreeable to ambulate inspite of being tired  Daughter and son at the bedside  Chief Complaint: tired  Patient comments/goals: get home today  Pain/Comfort:  · Pain Rating 1: 8/10  · Location - Side 1: Left  · Location - Orientation 1: upper  · Location 1: knee  · Pain Addressed 1: Reposition, Distraction  · Pain Rating Post-Intervention 1: 8/10    Patients cultural, spiritual, Adventist conflicts given the current situation:      Objective:     Patient found with: cryotherapy, SCD, peripheral IV     General Precautions: Standard, fall   Orthopedic  Precautions:LLE weight bearing as tolerated   Braces: Knee immobilizer     Functional Mobility:  · Bed Mobility:     · Rolling Right: contact guard assistance  · Supine to Sit: contact guard assistance  · Transfers:     · Sit to Stand:  contact guard assistance with rolling walker  · Gait: 120 ft with RW              Therapeutic Activities and Exercises:   pt completed HS, LAQ and AP while seated EOB      Patient left EOB with nurse Mireille, son/daughter- pt being discharged present..    GOALS:   Multidisciplinary Problems     Physical Therapy Goals        Problem: Physical Therapy Goal    Goal Priority Disciplines Outcome Goal Variances Interventions   Physical Therapy Goal     PT, PT/OT             Problem: Physical Therapy Goal    Goal Priority Disciplines Outcome Goal Variances Interventions   Physical Therapy Goal     PT, PT/OT Ongoing (interventions implemented as appropriate)     Description:  Goals to be met by: 2018     Patient will increase functional independence with mobility by performin. Supine to sit with Supervision  2. Sit to stand transfer with Contact Guard Assistance  3. Bed to chair transfer with Supervision using Rolling Walker  4. Gait  x 250 feet with Contact Guard Assistance using Rolling Walker.   5. Increased functional strength to WFL for ADLs.  6. Lower extremity exercise program x20 reps per handout, with independence                      Time Tracking:     PT Received On: 18  PT Start Time: 1457     PT Stop Time: 1515  PT Total Time (min): 18 min     Billable Minutes: Gait Training 9 and Therapeutic Exercise 9    Treatment Type: Treatment  PT/PTA: PT           Valarie Tejada, PT  2018

## 2018-08-16 NOTE — PT/OT/SLP PROGRESS
"Physical Therapy Treatment    Patient Name:  Heather Hughes   MRN:  [id number]    Recommendations:     Discharge Recommendations:  home health PT   Discharge Equipment Recommendations: none   Barriers to discharge: None    Assessment:     Heather Hughes is a 78 y.o. female admitted with a medical diagnosis of S/P total knee arthroplasty, left.  She presents with the following impairments/functional limitations:  weakness, impaired endurance, impaired self care skills, gait instability, decreased lower extremity function, pain, decreased ROM, impaired functional mobilty. Pt was motivated for therapy, talkative, and wants to be discharged. Pt stated she wanted to progress and "didn't want to be like her mother".     Rehab Prognosis:  good; patient would benefit from acute skilled PT services to address these deficits and reach maximum level of function.      Recent Surgery: Procedure(s) (LRB):  ARTHROPLASTY, KNEE (Left) 1 Day Post-Op    Plan:     During this hospitalization, patient to be seen BID to address the above listed problems via gait training, therapeutic exercises  · Plan of Care Expires:  08/31/18   Plan of Care Reviewed with: patient, daughter    Subjective     Communicated with nurse Kendrick prior to session.  Patient found sitting bedside talking to her daughter upon PT entry to room, agreeable to treatment. Pt was talkative and eager to progress to ambulation for discharge. During gait instructions pt jovially stated "I'm not stupid".  Pt began to experience pain in the LLE due to block dissipating.     Chief Complaint: pain  Patient comments/goals: get better  Pain/Comfort:  · Pain Rating 1: 8/10  · Location - Side 1: Left  · Location - Orientation 1: upper  · Location 1: leg  · Pain Addressed 1: Pre-medicate for activity, Distraction  · Pain Rating Post-Intervention 1: 8/10    Patients cultural, spiritual, Restoration conflicts given the current situation:      Objective:     Patient found " with: cryotherapy, SCD, peripheral IV     General Precautions: Standard, fall   Orthopedic Precautions:LLE weight bearing as tolerated   Braces: Knee immobilizer     Functional Mobility:  · Gait: pt ambulated 250 ft with RW with 3 brief pauses to catch breath   · LK flexion to 86 degrees  · Pt found sitting, performed sit-to-stand with CGA  · Pt performed stand-to-sit with CGA                Therapeutic Activities and Exercises:   AP, HS, LAQ x10 reps    Patient left up in chair with nurse Mireille notified and daughter present..    GOALS:   Multidisciplinary Problems     Physical Therapy Goals        Problem: Physical Therapy Goal    Goal Priority Disciplines Outcome Goal Variances Interventions   Physical Therapy Goal     PT, PT/OT             Problem: Physical Therapy Goal    Goal Priority Disciplines Outcome Goal Variances Interventions   Physical Therapy Goal     PT, PT/OT      Description:  Goals to be met by: 2018     Patient will increase functional independence with mobility by performin. Supine to sit with Supervision  2. Sit to stand transfer with Contact Guard Assistance  3. Bed to chair transfer with Supervision using Rolling Walker  4. Gait  x 250 feet with Contact Guard Assistance using Rolling Walker.   5. Increased functional strength to WFL for ADLs.  6. Lower extremity exercise program x20 reps per handout, with independence                      Time Tracking:     PT Received On: 18  PT Start Time: 1104     PT Stop Time: 1151  PT Total Time (min): 47 min     Billable Minutes: Gait Training 35 and Therapeutic Exercise 12    Treatment Type: Treatment  PT/PTA: PT           Valarie Tejada, PT  2018

## 2018-08-16 NOTE — PROGRESS NOTES
Ok to pull bedside commode from [facility] closet per Hyun with [facility] [phone].  [facility] sent patient information to [facility] through [facility] for authorization and acceptance.DON de luna    The referral for the patient in [facility], room 402, bed 402 A admitted at 8/15/2018 5:54 AM has been updated by [email or web address].  Update: Accepted.

## 2018-08-16 NOTE — PLAN OF CARE
Problem: Physical Therapy Goal  Goal: Physical Therapy Goal  Goals to be met by: 2018     Patient will increase functional independence with mobility by performin. Supine to sit with Supervision  2. Sit to stand transfer with Contact Guard Assistance  3. Bed to chair transfer with Supervision using Rolling Walker  4. Gait  x 250 feet with Contact Guard Assistance using Rolling Walker.   5. Increased functional strength to WFL for ADLs.  6. Lower extremity exercise program x20 reps per handout, with independence     Outcome: Ongoing (interventions implemented as appropriate)  Pt seen BID today. Gait training to hallways with RW 250ft in am and 120ft in pm. Pt up in chair x 3 hours .

## 2018-08-16 NOTE — PLAN OF CARE
08/16/18 0841   Discharge Assessment   Assessment Type Discharge Planning Assessment   Confirmed/corrected address and phone number on facesheet? Yes   Assessment information obtained from? Patient   Expected Length of Stay (days) 2   Communicated expected length of stay with patient/caregiver yes   Prior to hospitilization cognitive status: Alert/Oriented   Prior to hospitalization functional status: Independent   Current cognitive status: Alert/Oriented   Current Functional Status: Assistive Equipment;Needs Assistance   Lives With spouse  (daughter, Angela Steele (ph#[phone]) will also be staying with the pt for 6wks when she is discharged )   Able to Return to Prior Arrangements yes   Is patient able to care for self after discharge? Yes  (with assistance )   Patient's perception of discharge disposition admitted as an inpatient   Readmission Within The Last 30 Days no previous admission in last 30 days   Patient currently being followed by outpatient case management? No   Patient currently receives any other outside agency services? No   Equipment Currently Used at Home walker, rolling   Do you have any problems affording any of your prescribed medications? No  (pharm: [facility] )   Is the patient taking medications as prescribed? yes   Does the patient have transportation home? Yes   Transportation Available family or friend will provide   Does the patient receive services at the [facility]? No   Discharge Plan A Home Health   Patient/Family In Agreement With Plan yes     The pt states that she initially declined a 3 in 1 BSC but now wants one. She also spoke with Zac about using [facility] but her daughter told her about another home health agency she should use. We both tried calling the daughter, Angela with no answer. I left my name and number on the white board for the pt to call me once she speaks with her daughter.   The pt did ramana the pt choice/disclosure and I placed  it in her blue folder....NABILA Mann CM

## 2018-08-16 NOTE — PLAN OF CARE
Problem: Patient Care Overview  Goal: Plan of Care Review  Outcome: Ongoing (interventions implemented as appropriate)  Patient is AA&O x4. Plan of care reviewed with patient. L. Knee dressing clean, dry & intact. Pulses wnl, cryotherapy in use to L. Knee. Medicated once for pain with scheduled pain medication. Full relief obtained. Remains free from falls/injury. Instructed to call for assistance as needed during night . Verbalized understanding. Call light in reach. Bed in low &lock position, bed alarm in use.

## 2018-08-16 NOTE — DISCHARGE SUMMARY
Discharge Summary  Hospital Medicine    Admit Date: 8/15/2018    Date and Time: 8/16/201812:36 PM    Discharge Attending Physician: Aleshia Isabel MD    Primary Care Physician: Graham Mazariegos MD    Diagnoses:  Active Hospital Problems    Diagnosis  POA    *S/P total knee arthroplasty, left [Z96.652]  Not Applicable    Arthritis of left knee [M17.12]  Yes    Essential hypertension [I10]  Yes    Coronary artery disease involving native coronary artery of native heart with unstable angina pectoris [I25.110]  Yes    Hypertension [I10]  Yes      Resolved Hospital Problems   No resolved problems to display.     Discharged Condition: Good    Hospital Course:   Patient is a 78 y.o. female admitted to Hospitalist Service from Operation Room s/p left total knee arthroplasty performed by Dr. Santiago. Patient reportedly has past medical history significant for Hypertension, CAD, history of bronchial asthma, history of DVT and hypothyroidism. Post-operatively, patient denied chest pain, shortness of breath, abdominal pain, nausea, vomiting, headache, vision changes, focal neuro-deficits, cough or fever. Patient was admitted to Hospitalist medicine service. Patient was followed by Dr. Santiago.Post-operative, patient did well. Pain adequately controlled. Patient participated with physical therapy. Home health and home physical therapy has been arranged. Fall precautions discussed with the patient. Patient to follow up with primary care physician next week and orthopedic doctor in 2 weeks. Post-operative anti-coagulation as per orthopedics recommendations advised. During hospital stay, patient complained of fungal vaginitis and mild thrush for which patient is prescribed antifungal treatment. In case of chest pain, shortness of breath, stroke or stroke like symptoms, high grade fever or any signs or symptoms of surgical site wound infection symptoms, patient to return to nearest emergency room as soon as possible. Patient was  discharged home in stable condition with following discharge plan of care. Total time with the patient was 30 minutes and greater than 50% was spent in counseling and coordination of care. The assessment and plan have been discussed at length. Physicians' notes reviewed. Labs and procedure reviewed.     Consults: Dr. Santiago    Significant Diagnostic Studies:     Left knee x-ray: Status post left tricompartmental knee arthroplasty without complication.    Special Treatments/Procedures: as above  Disposition: Home or Self Care    Medications:  Reconciled Home Medications:   Current Discharge Medication List      START taking these medications    Details   aspirin 325 MG tablet Take 1 tablet (325 mg total) by mouth 2 (two) times daily.  Refills: 0      HYDROcodone-acetaminophen (NORCO)  mg per tablet Take 1 tablet by mouth every 4 (four) hours as needed.  Refills: 0         CONTINUE these medications which have NOT CHANGED    Details   acetaminophen (TYLENOL) 650 MG TbSR Take 650 mg by mouth every 8 (eight) hours as needed.      albuterol 90 mcg/actuation inhaler Inhale 2 puffs into the lungs every 6 (six) hours as needed for Wheezing.  Qty: 18 g, Refills: 2      benzonatate (TESSALON) 200 MG capsule Take 1 capsule (200 mg total) by mouth 3 (three) times daily as needed for Cough.  Qty: 60 capsule, Refills: 5      CALCIUM CARBONATE/VITAMIN D3 (CALCIUM 500 + D ORAL) Take 2 tablets by mouth once daily.      ergocalciferol (ERGOCALCIFEROL) 50,000 unit Cap TAKE ONE CAPSULE BY MOUTH EVERY 7 DAYS  Qty: 12 capsule, Refills: 1    Associated Diagnoses: Vitamin D deficiency      glucosamine-chondroitin 500-400 mg tablet Take 1 tablet by mouth 3 (three) times daily.      montelukast (SINGULAIR) 10 mg tablet Take 1 tablet (10 mg total) by mouth every evening.  Qty: 90 tablet, Refills: 3      omeprazole (PRILOSEC) 40 MG capsule Take 1 capsule (40 mg total) by mouth once daily.  Qty: 90 capsule, Refills: 3     "  phenazopyridine (PYRIDIUM) 95 MG tablet Take 95 mg by mouth once daily.      SYNTHROID 112 mcg tablet TAKE ONE TABLET BY MOUTH EVERY DAY BEFORE BREAKFAST  Qty: 30 tablet, Refills: 10    Comments: This prescription was filled on 5/8/2018. Any refills authorized will be placed on file.  Associated Diagnoses: Hypothyroidism, unspecified type      carvedilol (COREG) 6.25 MG tablet Take 1 tablet (6.25 mg total) by mouth 2 (two) times daily with meals.  Qty: 90 tablet, Refills: 3      clopidogrel (PLAVIX) 75 mg tablet Take 1 tablet (75 mg total) by mouth once daily.  Qty: 90 tablet, Refills: 3      nitroGLYCERIN (NITROSTAT) 0.4 MG SL tablet Place 1 tablet (0.4 mg total) under the tongue every 5 (five) minutes as needed for Chest pain.  Qty: 25 tablet, Refills: 3         STOP taking these medications       aspirin (ECOTRIN) 81 MG EC tablet Comments:   Reason for Stopping:             Discharge Procedure Orders   COMMODE FOR HOME USE     Order Specific Question Answer Comments   Type: Standard 3 in 1   Height: 5' 3" (1.6 m)    Weight: 92.3 kg (203 lb 7.8 oz)    Does patient have medical equipment at home? walker, rolling    Length of need (1-99 months): 11      Ambulatory referral to Home Health   Referral Priority: Routine Referral Type: Home Health   Referral Reason: Specialty Services Required   Requested Specialty: Home Health Services   Number of Visits Requested: 1     Diet Cardiac     Other restrictions (specify):   Order Comments: PLEASE OBSERVE FALL PRECAUTIONS     Call MD for:   Order Comments: For worsening symptoms, chest pain, shortness of breath, increased abdominal pain, high grade fever, stroke or stroke like symptoms, immediately go to the nearest Emergency Room or call 911 as soon as possible.     Follow-up Information     Shantanu Santiago MD.    Specialties:  Sports Medicine, Orthopedic Surgery  Contact information:  [address] [place] [address]  [place] [address] [phone]             Graham Mazariegos MD " In 1 week.    Specialty:  Family Medicine  Contact information:  [address] [place] [address]  [phone]

## 2018-08-16 NOTE — PLAN OF CARE
08/16/18 1056   Patient Assessment/Suction   Level of Consciousness (AVPU) alert   Respiratory Effort Normal;Unlabored   Expansion/Accessory Muscles/Retractions no use of accessory muscles;no retractions;expansion symmetric   All Lung Fields Breath Sounds clear   Rhythm/Pattern, Respiratory depth regular;pattern regular;unlabored   Cough Frequency infrequent   Cough Type good;nonproductive   PRE-TX-O2-ETCO2   O2 Device (Oxygen Therapy) room air   SpO2 96 %   Pulse Oximetry Type Intermittent   $ Pulse Oximetry - Multiple Charge Pulse Oximetry - Multiple   Pulse 69   Resp 16   Aerosol Therapy   $ Aerosol Therapy Charges PRN treatment not required   Respiratory Treatment Status PRN treatment not required   Incentive Spirometer   $ Incentive Spirometer Charges done with encouragement   Administration (Incentive Spirometer) done with encouragement   Number of Repetitions (Incentive Spirometer) 10   Level (mL) (Incentive Spirometer) 1250   Patient Tolerance good       Aerosol treatments PRN. Patient encouraged to do deep breathing exercises independently.

## 2018-08-16 NOTE — PLAN OF CARE
Patient felt very tired this morning and have not taken any pain medication. Will decrease drips to 4 ml/h and 8 ml/h.  Ask to call if any concerns.   Problem: Patient Care Overview  Goal: Plan of Care Review  Patient sats 94% on RA/ I S done 1000ml x 10bpm, not in any distress

## 2018-08-16 NOTE — NURSING
Discharge instructions given to patient and family, verbalized understanding. Peripheral IV removed, tolerated well. Dressing changed. Left floor via wheelchair with son at side. Remained free from fall and injury.

## 2018-08-17 ENCOUNTER — TELEPHONE (OUTPATIENT)
Dept: ORTHOPEDICS | Facility: CLINIC | Age: 78
End: 2018-08-17

## 2018-08-17 NOTE — TELEPHONE ENCOUNTER
Call to pt, she is post op day 2 from TKA.   Pt reports having dizzy spell yesterday upon arrival home from hospital.  She is only taking 1/2 tab of Percocet to control pain.  Home health has been to home and PT will be out today to evaluate her.  Family believes that she may need hospital bed, asked them to let PT evaluate her before ordering.  Encouraged them to call for any need, 2 wk post op appt made.

## 2018-08-17 NOTE — TELEPHONE ENCOUNTER
Returned call to  nurse and advised ok for island dressing change if current dressing is soiled or not intact. Advised does not need to change dressing if it is clean, dry and intact. Nurse verbalized understanding.

## 2018-08-17 NOTE — TELEPHONE ENCOUNTER
----- Message from Malik Green sent at 8/17/2018  3:33 PM CDT -----  Contact: Deena/[facility]  Denea called in regarding the attached patient.  Deena stated patient just got out of hospital yesterday 8/16/18 and Deena needs wound care orders for patient.  Deena's call back number is [phone]

## 2018-08-28 ENCOUNTER — TELEPHONE (OUTPATIENT)
Dept: ORTHOPEDICS | Facility: CLINIC | Age: 78
End: 2018-08-28

## 2018-08-28 NOTE — TELEPHONE ENCOUNTER
----- Message from Kathy Villa MA sent at 8/28/2018  1:54 PM CDT -----  Contact: Darling//[facility]  Patients leg is swollen with purple/red colors. It is warm to the touch. The patient follows up on Friday.    Call Back# [phone]  Thanks

## 2018-08-28 NOTE — TELEPHONE ENCOUNTER
Returned call to [place].  She advised that it was her first time seeing pt and she was concerned about cellulitis.  Pt son and  report that leg is improving.  Asked them to ice and elevate leg, have PT evaluate it tomorrow and let us know if it is worsening.  She verbalized understanding.

## 2018-08-30 ENCOUNTER — HOSPITAL ENCOUNTER (OUTPATIENT)
Facility: HOSPITAL | Age: 78
Discharge: HOME HEALTH | End: 2018-09-01
Attending: EMERGENCY MEDICINE | Admitting: INTERNAL MEDICINE
Payer: MEDICARE

## 2018-08-30 DIAGNOSIS — Z96.652 S/P TOTAL KNEE ARTHROPLASTY, LEFT: Primary | ICD-10-CM

## 2018-08-30 DIAGNOSIS — L03.116 CELLULITIS OF LEG WITHOUT FOOT, LEFT: ICD-10-CM

## 2018-08-30 DIAGNOSIS — M79.89 LEFT LEG SWELLING: ICD-10-CM

## 2018-08-30 DIAGNOSIS — L03.116 CELLULITIS OF LEFT LOWER EXTREMITY WITHOUT FOOT: ICD-10-CM

## 2018-08-30 DIAGNOSIS — M25.561 RIGHT KNEE PAIN, UNSPECIFIED CHRONICITY: Primary | ICD-10-CM

## 2018-08-30 DIAGNOSIS — M25.562 LEFT KNEE PAIN: ICD-10-CM

## 2018-08-30 DIAGNOSIS — M25.562 LEFT KNEE PAIN, UNSPECIFIED CHRONICITY: Primary | ICD-10-CM

## 2018-08-30 LAB
ALBUMIN SERPL BCP-MCNC: 3.2 G/DL
ALP SERPL-CCNC: 150 U/L
ALT SERPL W/O P-5'-P-CCNC: 14 U/L
ANION GAP SERPL CALC-SCNC: 9 MMOL/L
AST SERPL-CCNC: 17 U/L
BASOPHILS # BLD AUTO: 0 K/UL
BASOPHILS NFR BLD: 0.4 %
BILIRUB SERPL-MCNC: 0.4 MG/DL
BILIRUB UR QL STRIP: NEGATIVE
BUN SERPL-MCNC: 20 MG/DL
CALCIUM SERPL-MCNC: 9.5 MG/DL
CHLORIDE SERPL-SCNC: 103 MMOL/L
CLARITY UR: CLEAR
CO2 SERPL-SCNC: 25 MMOL/L
COLOR UR: YELLOW
CREAT SERPL-MCNC: 1 MG/DL
DIFFERENTIAL METHOD: ABNORMAL
EOSINOPHIL # BLD AUTO: 0.3 K/UL
EOSINOPHIL NFR BLD: 4.2 %
ERYTHROCYTE [DISTWIDTH] IN BLOOD BY AUTOMATED COUNT: 14.6 %
EST. GFR  (AFRICAN AMERICAN): >60 ML/MIN/1.73 M^2
EST. GFR  (NON AFRICAN AMERICAN): 54 ML/MIN/1.73 M^2
GLUCOSE SERPL-MCNC: 118 MG/DL
GLUCOSE UR QL STRIP: NEGATIVE
HCT VFR BLD AUTO: 28.4 %
HGB BLD-MCNC: 9 G/DL
HGB UR QL STRIP: NEGATIVE
KETONES UR QL STRIP: NEGATIVE
LACTATE SERPL-SCNC: 0.8 MMOL/L
LEUKOCYTE ESTERASE UR QL STRIP: NEGATIVE
LYMPHOCYTES # BLD AUTO: 1.3 K/UL
LYMPHOCYTES NFR BLD: 17.6 %
MCH RBC QN AUTO: 28.5 PG
MCHC RBC AUTO-ENTMCNC: 31.9 G/DL
MCV RBC AUTO: 89 FL
MONOCYTES # BLD AUTO: 0.5 K/UL
MONOCYTES NFR BLD: 7 %
NEUTROPHILS # BLD AUTO: 5.2 K/UL
NEUTROPHILS NFR BLD: 70.8 %
NITRITE UR QL STRIP: NEGATIVE
PH UR STRIP: 7 [PH] (ref 5–8)
PLATELET # BLD AUTO: 330 K/UL
PMV BLD AUTO: 9.9 FL
POTASSIUM SERPL-SCNC: 4.6 MMOL/L
PROT SERPL-MCNC: 6.2 G/DL
PROT UR QL STRIP: NEGATIVE
RBC # BLD AUTO: 3.17 M/UL
SODIUM SERPL-SCNC: 137 MMOL/L
SP GR UR STRIP: <=1.005 (ref 1–1.03)
URN SPEC COLLECT METH UR: ABNORMAL
UROBILINOGEN UR STRIP-ACNC: NEGATIVE EU/DL
WBC # BLD AUTO: 7.4 K/UL

## 2018-08-30 PROCEDURE — G0378 HOSPITAL OBSERVATION PER HR: HCPCS

## 2018-08-30 PROCEDURE — 12000002 HC ACUTE/MED SURGE SEMI-PRIVATE ROOM

## 2018-08-30 PROCEDURE — 83605 ASSAY OF LACTIC ACID: CPT

## 2018-08-30 PROCEDURE — 80053 COMPREHEN METABOLIC PANEL: CPT

## 2018-08-30 PROCEDURE — 36415 COLL VENOUS BLD VENIPUNCTURE: CPT

## 2018-08-30 PROCEDURE — 96365 THER/PROPH/DIAG IV INF INIT: CPT

## 2018-08-30 PROCEDURE — 85025 COMPLETE CBC W/AUTO DIFF WBC: CPT

## 2018-08-30 PROCEDURE — 87040 BLOOD CULTURE FOR BACTERIA: CPT | Mod: 59

## 2018-08-30 PROCEDURE — 99284 EMERGENCY DEPT VISIT MOD MDM: CPT | Mod: 25

## 2018-08-30 PROCEDURE — 81003 URINALYSIS AUTO W/O SCOPE: CPT

## 2018-08-30 PROCEDURE — 25000003 PHARM REV CODE 250: Performed by: EMERGENCY MEDICINE

## 2018-08-30 RX ORDER — HYDROCODONE BITARTRATE AND ACETAMINOPHEN 5; 325 MG/1; MG/1
1 TABLET ORAL
Status: COMPLETED | OUTPATIENT
Start: 2018-08-30 | End: 2018-08-30

## 2018-08-30 RX ORDER — VANCOMYCIN HCL IN 5 % DEXTROSE 1G/250ML
1000 PLASTIC BAG, INJECTION (ML) INTRAVENOUS
Status: COMPLETED | OUTPATIENT
Start: 2018-08-30 | End: 2018-08-31

## 2018-08-30 RX ADMIN — HYDROCODONE BITARTRATE AND ACETAMINOPHEN 1 TABLET: 5; 325 TABLET ORAL at 10:08

## 2018-08-31 PROBLEM — M25.562 LEFT KNEE PAIN: Status: ACTIVE | Noted: 2018-08-31

## 2018-08-31 PROBLEM — Z86.718 HISTORY OF DVT (DEEP VEIN THROMBOSIS): Status: ACTIVE | Noted: 2018-08-31

## 2018-08-31 PROBLEM — E03.9 ACQUIRED HYPOTHYROIDISM: Status: ACTIVE | Noted: 2018-08-31

## 2018-08-31 LAB
ANION GAP SERPL CALC-SCNC: 7 MMOL/L
BASOPHILS # BLD AUTO: 0 K/UL
BASOPHILS NFR BLD: 0.7 %
BUN SERPL-MCNC: 17 MG/DL
CALCIUM SERPL-MCNC: 9.5 MG/DL
CHLORIDE SERPL-SCNC: 105 MMOL/L
CO2 SERPL-SCNC: 27 MMOL/L
CREAT SERPL-MCNC: 0.8 MG/DL
DIFFERENTIAL METHOD: ABNORMAL
EOSINOPHIL # BLD AUTO: 0.3 K/UL
EOSINOPHIL NFR BLD: 4.9 %
ERYTHROCYTE [DISTWIDTH] IN BLOOD BY AUTOMATED COUNT: 14.5 %
EST. GFR  (AFRICAN AMERICAN): >60 ML/MIN/1.73 M^2
EST. GFR  (NON AFRICAN AMERICAN): >60 ML/MIN/1.73 M^2
FOLATE SERPL-MCNC: 11 NG/ML
GLUCOSE SERPL-MCNC: 97 MG/DL
HCT VFR BLD AUTO: 25.1 %
HGB BLD-MCNC: 8.1 G/DL
IRON SERPL-MCNC: 19 UG/DL
LACTATE SERPL-SCNC: 0.7 MMOL/L
LYMPHOCYTES # BLD AUTO: 1.5 K/UL
LYMPHOCYTES NFR BLD: 26.1 %
MCH RBC QN AUTO: 28.6 PG
MCHC RBC AUTO-ENTMCNC: 32.3 G/DL
MCV RBC AUTO: 88 FL
MONOCYTES # BLD AUTO: 0.5 K/UL
MONOCYTES NFR BLD: 8.2 %
NEUTROPHILS # BLD AUTO: 3.5 K/UL
NEUTROPHILS NFR BLD: 60.1 %
PLATELET # BLD AUTO: 304 K/UL
PMV BLD AUTO: 9.6 FL
POTASSIUM SERPL-SCNC: 4.4 MMOL/L
RBC # BLD AUTO: 2.84 M/UL
SATURATED IRON: 7 %
SODIUM SERPL-SCNC: 139 MMOL/L
TOTAL IRON BINDING CAPACITY: 280 UG/DL
TRANSFERRIN SERPL-MCNC: 189 MG/DL
VIT B12 SERPL-MCNC: 1194 PG/ML
WBC # BLD AUTO: 5.8 K/UL

## 2018-08-31 PROCEDURE — G0378 HOSPITAL OBSERVATION PER HR: HCPCS

## 2018-08-31 PROCEDURE — 97162 PT EVAL MOD COMPLEX 30 MIN: CPT

## 2018-08-31 PROCEDURE — 83605 ASSAY OF LACTIC ACID: CPT

## 2018-08-31 PROCEDURE — 80048 BASIC METABOLIC PNL TOTAL CA: CPT

## 2018-08-31 PROCEDURE — G8979 MOBILITY GOAL STATUS: HCPCS | Mod: CH

## 2018-08-31 PROCEDURE — 25000003 PHARM REV CODE 250: Performed by: INTERNAL MEDICINE

## 2018-08-31 PROCEDURE — 99219 PR INITIAL OBSERVATION CARE,LEVL II: CPT | Mod: ,,, | Performed by: INTERNAL MEDICINE

## 2018-08-31 PROCEDURE — 63600175 PHARM REV CODE 636 W HCPCS: Performed by: NURSE PRACTITIONER

## 2018-08-31 PROCEDURE — 25000003 PHARM REV CODE 250: Performed by: NURSE PRACTITIONER

## 2018-08-31 PROCEDURE — 63600175 PHARM REV CODE 636 W HCPCS: Performed by: EMERGENCY MEDICINE

## 2018-08-31 PROCEDURE — 85025 COMPLETE CBC W/AUTO DIFF WBC: CPT

## 2018-08-31 PROCEDURE — 25000003 PHARM REV CODE 250: Performed by: EMERGENCY MEDICINE

## 2018-08-31 PROCEDURE — 94761 N-INVAS EAR/PLS OXIMETRY MLT: CPT

## 2018-08-31 PROCEDURE — G8978 MOBILITY CURRENT STATUS: HCPCS | Mod: CK

## 2018-08-31 PROCEDURE — 63600175 PHARM REV CODE 636 W HCPCS: Performed by: INTERNAL MEDICINE

## 2018-08-31 PROCEDURE — 36415 COLL VENOUS BLD VENIPUNCTURE: CPT

## 2018-08-31 PROCEDURE — 82607 VITAMIN B-12: CPT

## 2018-08-31 PROCEDURE — 83540 ASSAY OF IRON: CPT

## 2018-08-31 PROCEDURE — 82746 ASSAY OF FOLIC ACID SERUM: CPT

## 2018-08-31 RX ORDER — AMOXICILLIN 250 MG
1 CAPSULE ORAL 2 TIMES DAILY PRN
Status: DISCONTINUED | OUTPATIENT
Start: 2018-08-31 | End: 2018-09-01 | Stop reason: HOSPADM

## 2018-08-31 RX ORDER — ASPIRIN 325 MG
325 TABLET ORAL 2 TIMES DAILY
Status: DISCONTINUED | OUTPATIENT
Start: 2018-08-31 | End: 2018-08-31

## 2018-08-31 RX ORDER — PANTOPRAZOLE SODIUM 40 MG/1
40 TABLET, DELAYED RELEASE ORAL DAILY
Status: DISCONTINUED | OUTPATIENT
Start: 2018-08-31 | End: 2018-09-01 | Stop reason: HOSPADM

## 2018-08-31 RX ORDER — ONDANSETRON 2 MG/ML
4 INJECTION INTRAMUSCULAR; INTRAVENOUS EVERY 4 HOURS PRN
Status: DISCONTINUED | OUTPATIENT
Start: 2018-08-31 | End: 2018-09-01 | Stop reason: HOSPADM

## 2018-08-31 RX ORDER — LEVOTHYROXINE SODIUM 112 UG/1
112 TABLET ORAL
Status: DISCONTINUED | OUTPATIENT
Start: 2018-08-31 | End: 2018-09-01 | Stop reason: HOSPADM

## 2018-08-31 RX ORDER — ENOXAPARIN SODIUM 100 MG/ML
40 INJECTION SUBCUTANEOUS EVERY 24 HOURS
Status: DISCONTINUED | OUTPATIENT
Start: 2018-08-31 | End: 2018-09-01 | Stop reason: HOSPADM

## 2018-08-31 RX ORDER — HYDROCODONE BITARTRATE AND ACETAMINOPHEN 10; 325 MG/1; MG/1
1 TABLET ORAL EVERY 6 HOURS PRN
Status: DISCONTINUED | OUTPATIENT
Start: 2018-08-31 | End: 2018-09-01 | Stop reason: HOSPADM

## 2018-08-31 RX ORDER — RAMELTEON 8 MG/1
8 TABLET ORAL NIGHTLY PRN
Status: DISCONTINUED | OUTPATIENT
Start: 2018-08-31 | End: 2018-09-01 | Stop reason: HOSPADM

## 2018-08-31 RX ORDER — HYDROCODONE BITARTRATE AND ACETAMINOPHEN 5; 325 MG/1; MG/1
1 TABLET ORAL EVERY 6 HOURS PRN
Status: DISCONTINUED | OUTPATIENT
Start: 2018-08-31 | End: 2018-09-01 | Stop reason: HOSPADM

## 2018-08-31 RX ORDER — CLOPIDOGREL BISULFATE 75 MG/1
75 TABLET ORAL DAILY
Status: DISCONTINUED | OUTPATIENT
Start: 2018-08-31 | End: 2018-09-01 | Stop reason: HOSPADM

## 2018-08-31 RX ORDER — HYDRALAZINE HYDROCHLORIDE 20 MG/ML
10 INJECTION INTRAMUSCULAR; INTRAVENOUS EVERY 6 HOURS PRN
Status: DISCONTINUED | OUTPATIENT
Start: 2018-08-31 | End: 2018-08-31

## 2018-08-31 RX ORDER — LABETALOL HYDROCHLORIDE 5 MG/ML
20 INJECTION, SOLUTION INTRAVENOUS ONCE
Status: COMPLETED | OUTPATIENT
Start: 2018-08-31 | End: 2018-08-31

## 2018-08-31 RX ORDER — MAG HYDROX/ALUMINUM HYD/SIMETH 200-200-20
15 SUSPENSION, ORAL (FINAL DOSE FORM) ORAL EVERY 6 HOURS PRN
Status: DISCONTINUED | OUTPATIENT
Start: 2018-08-31 | End: 2018-09-01 | Stop reason: HOSPADM

## 2018-08-31 RX ORDER — FERROUS SULFATE, DRIED 160(50) MG
2 TABLET, EXTENDED RELEASE ORAL DAILY
Status: DISCONTINUED | OUTPATIENT
Start: 2018-08-31 | End: 2018-09-01 | Stop reason: HOSPADM

## 2018-08-31 RX ORDER — SODIUM CHLORIDE 0.9 % (FLUSH) 0.9 %
5 SYRINGE (ML) INJECTION
Status: DISCONTINUED | OUTPATIENT
Start: 2018-08-31 | End: 2018-09-01 | Stop reason: HOSPADM

## 2018-08-31 RX ORDER — HYDRALAZINE HYDROCHLORIDE 20 MG/ML
10 INJECTION INTRAMUSCULAR; INTRAVENOUS
Status: DISCONTINUED | OUTPATIENT
Start: 2018-08-31 | End: 2018-09-01 | Stop reason: HOSPADM

## 2018-08-31 RX ORDER — CARVEDILOL 6.25 MG/1
6.25 TABLET ORAL DAILY
Status: DISCONTINUED | OUTPATIENT
Start: 2018-08-31 | End: 2018-09-01 | Stop reason: HOSPADM

## 2018-08-31 RX ORDER — MONTELUKAST SODIUM 10 MG/1
10 TABLET ORAL NIGHTLY
Status: DISCONTINUED | OUTPATIENT
Start: 2018-08-31 | End: 2018-09-01 | Stop reason: HOSPADM

## 2018-08-31 RX ORDER — ACETAMINOPHEN 325 MG/1
650 TABLET ORAL EVERY 4 HOURS PRN
Status: DISCONTINUED | OUTPATIENT
Start: 2018-08-31 | End: 2018-09-01 | Stop reason: HOSPADM

## 2018-08-31 RX ORDER — ASPIRIN 81 MG/1
81 TABLET ORAL DAILY
Status: DISCONTINUED | OUTPATIENT
Start: 2018-09-01 | End: 2018-09-01 | Stop reason: HOSPADM

## 2018-08-31 RX ADMIN — LEVOTHYROXINE SODIUM 112 MCG: 112 TABLET ORAL at 05:08

## 2018-08-31 RX ADMIN — PANTOPRAZOLE SODIUM 40 MG: 40 TABLET, DELAYED RELEASE ORAL at 08:08

## 2018-08-31 RX ADMIN — MONTELUKAST SODIUM 10 MG: 10 TABLET, FILM COATED ORAL at 10:08

## 2018-08-31 RX ADMIN — CLOPIDOGREL BISULFATE 75 MG: 75 TABLET ORAL at 08:08

## 2018-08-31 RX ADMIN — ACETAMINOPHEN 650 MG: 325 TABLET, FILM COATED ORAL at 08:08

## 2018-08-31 RX ADMIN — CALCIUM CARBONATE 500 MG (1,250 MG)-VITAMIN D3 200 UNIT TABLET 2 TABLET: at 08:08

## 2018-08-31 RX ADMIN — CARVEDILOL 6.25 MG: 6.25 TABLET, FILM COATED ORAL at 08:08

## 2018-08-31 RX ADMIN — HYDROCODONE BITARTRATE AND ACETAMINOPHEN 1 TABLET: 10; 325 TABLET ORAL at 05:08

## 2018-08-31 RX ADMIN — HYDROCODONE BITARTRATE AND ACETAMINOPHEN 1 TABLET: 5; 325 TABLET ORAL at 10:08

## 2018-08-31 RX ADMIN — ENOXAPARIN SODIUM 40 MG: 100 INJECTION SUBCUTANEOUS at 05:08

## 2018-08-31 RX ADMIN — ASPIRIN 325 MG ORAL TABLET 325 MG: 325 PILL ORAL at 08:08

## 2018-08-31 RX ADMIN — RAMELTEON 8 MG: 8 TABLET, FILM COATED ORAL at 10:08

## 2018-08-31 RX ADMIN — LABETALOL HYDROCHLORIDE 20 MG: 5 INJECTION, SOLUTION INTRAVENOUS at 06:08

## 2018-08-31 RX ADMIN — VANCOMYCIN HYDROCHLORIDE 1000 MG: 1 INJECTION, POWDER, LYOPHILIZED, FOR SOLUTION INTRAVENOUS at 12:08

## 2018-08-31 RX ADMIN — HYDRALAZINE HYDROCHLORIDE 10 MG: 20 INJECTION INTRAMUSCULAR; INTRAVENOUS at 04:08

## 2018-08-31 NOTE — SUBJECTIVE & OBJECTIVE
Past Medical History:   Diagnosis Date    Allergy     Anticoagulant long-term use     Arthritis     Asthma     Coronary artery disease     stent x 1    Deep vein blood clot of right lower extremity 1965 & 1971    Full dentures     Heart attack 01/13/2017    Hypertension     Osteoporosis     Thyroid disease     Ulcer     Wears glasses        Past Surgical History:   Procedure Laterality Date    cardic stent  01/13/2017    CHOLECYSTECTOMY      EYE SURGERY      bilateral cataracts    FRACTURE SURGERY  2011    left wrist     THYROIDECTOMY         Review of patient's allergies indicates:   Allergen Reactions    Lipitor [atorvastatin] Other (See Comments)     Fall/ balance     Myrbetriq [mirabegron] Swelling and Other (See Comments)     Legs swelling, couldn't void     Pravastatin Other (See Comments)     Balance problem     Codeine Nausea Only    Mobic [meloxicam] Other (See Comments)     Not sure reaction    Perfume Other (See Comments)     And flowers cause allergy and makes her cough.    Sulfur Other (See Comments)     Was told not to take as a child       No current facility-administered medications on file prior to encounter.      Current Outpatient Medications on File Prior to Encounter   Medication Sig    acetaminophen (TYLENOL) 650 MG TbSR Take 650 mg by mouth every 8 (eight) hours as needed.    albuterol 90 mcg/actuation inhaler Inhale 2 puffs into the lungs every 6 (six) hours as needed for Wheezing.    aspirin 325 MG tablet Take 1 tablet (325 mg total) by mouth 2 (two) times daily.    benzonatate (TESSALON) 200 MG capsule Take 1 capsule (200 mg total) by mouth 3 (three) times daily as needed for Cough.    CALCIUM CARBONATE/VITAMIN D3 (CALCIUM 500 + D ORAL) Take 2 tablets by mouth once daily.    carvedilol (COREG) 6.25 MG tablet Take 1 tablet (6.25 mg total) by mouth 2 (two) times daily with meals. (Patient taking differently: Take 6.25 mg by mouth once daily. )    clopidogrel  (PLAVIX) 75 mg tablet Take 1 tablet (75 mg total) by mouth once daily.    ergocalciferol (ERGOCALCIFEROL) 50,000 unit Cap TAKE ONE CAPSULE BY MOUTH EVERY 7 DAYS    glucosamine-chondroitin 500-400 mg tablet Take 1 tablet by mouth 3 (three) times daily.    montelukast (SINGULAIR) 10 mg tablet Take 1 tablet (10 mg total) by mouth every evening.    nitroGLYCERIN (NITROSTAT) 0.4 MG SL tablet Place 1 tablet (0.4 mg total) under the tongue every 5 (five) minutes as needed for Chest pain. (Patient taking differently: Place 0.4 mg under the tongue every 5 (five) minutes as needed for Chest pain. Seek medical help if pain is not relieved by the third dose.)    omeprazole (PRILOSEC) 40 MG capsule Take 1 capsule (40 mg total) by mouth once daily. (Patient taking differently: Take 40 mg by mouth every evening. )    oxyCODONE-acetaminophen (PERCOCET)  mg per tablet Take 1-2 tablets by mouth every 6 (six) hours as needed for Pain. (Patient taking differently: Take 1-2 tablets by mouth every 6 (six) hours as needed for Pain. Take for 7 days.)    phenazopyridine (PYRIDIUM) 95 MG tablet Take 95 mg by mouth once daily.    SYNTHROID 112 mcg tablet TAKE ONE TABLET BY MOUTH EVERY DAY BEFORE BREAKFAST     Family History     Problem Relation (Age of Onset)    Bipolar disorder Son    Bone cancer Paternal Uncle    Breast cancer Maternal Uncle    Cancer Father, Brother, Maternal Aunt, Maternal Uncle, Paternal Aunt, Paternal Uncle, Maternal Grandmother, Son    Colon cancer Maternal Uncle    Dementia Maternal Aunt, Brother    Drug abuse Son    Early death Son    Esophageal cancer Father, Brother, Son    DIEGO disease Father, Son, Brother    Heart disease Mother, Maternal Aunt, Paternal Uncle    Kidney cancer Maternal Aunt    Kidney disease Maternal Aunt    Liver cancer Maternal Aunt    Lung cancer Maternal Uncle    Lupus Mother, Daughter    Osteoporosis Mother    Parkinsonism Paternal Aunt, Paternal Grandmother    Stomach cancer  Paternal Aunt, Paternal Uncle    Ulcers Mother, Brother        Tobacco Use    Smoking status: Former Smoker     Packs/day: 0.50     Types: Cigarettes     Last attempt to quit: 2017     Years since quittin.6    Smokeless tobacco: Never Used   Substance and Sexual Activity    Alcohol use: No    Drug use: No    Sexual activity: No     Review of Systems   Constitutional: Negative for chills and fever.   HENT: Negative for congestion.    Eyes: Negative for visual disturbance.   Respiratory: Negative for shortness of breath.    Cardiovascular: Positive for leg swelling. Negative for chest pain and palpitations.   Gastrointestinal: Negative for abdominal pain, constipation, diarrhea, nausea and vomiting.   Genitourinary: Negative for dysuria.   Musculoskeletal: Positive for arthralgias.   Skin: Positive for color change and wound.        Left knee incision   Neurological: Negative for dizziness and headaches.   Hematological: Bruises/bleeds easily.        On ASA and Plavix   Psychiatric/Behavioral: Negative for confusion and hallucinations.     Objective:     Vital Signs (Most Recent):  Temp: 97.5 °F (36.4 °C) (18 0123)  Pulse: 77 (18 0123)  Resp: 19 (18 0123)  BP: (!) 182/77 (18 0123)  SpO2: 97 % (18) Vital Signs (24h Range):  Temp:  [97.5 °F (36.4 °C)-98.5 °F (36.9 °C)] 97.5 °F (36.4 °C)  Pulse:  [77-84] 77  Resp:  [18-19] 19  SpO2:  [97 %] 97 %  BP: (169-199)/(77-84) 182/77     Weight: 96.6 kg (212 lb 15.4 oz)  Body mass index is 37.72 kg/m².    Physical Exam   Constitutional: She is oriented to person, place, and time. No distress.   HENT:   Head: Normocephalic.   Eyes: Pupils are equal, round, and reactive to light.   Neck: Normal range of motion. Neck supple. No JVD present. No tracheal deviation present.   Cardiovascular: Normal rate, regular rhythm, normal heart sounds and intact distal pulses.   No murmur heard.  Pulmonary/Chest: Effort normal and breath sounds  normal. No respiratory distress.   Abdominal: Soft. Bowel sounds are normal. She exhibits no distension. There is no tenderness.   Musculoskeletal: Normal range of motion. She exhibits edema and tenderness.   Swelling and tenderness to LLE extending from knee to toes.    Neurological: She is alert and oriented to person, place, and time. No cranial nerve deficit.   Skin: Skin is warm and dry. Capillary refill takes less than 2 seconds. There is erythema.   Healing anterior left knee incision without swelling or drainage. Erythema around incision extending anteriorly down leg to ankle. Affected areas are slightly warmer that surrounding areas and right leg. Ecchymosis to toes on right foot.   Psychiatric: She has a normal mood and affect. Her behavior is normal. Judgment and thought content normal.         CRANIAL NERVES     CN III, IV, VI   Pupils are equal, round, and reactive to light.       Significant Labs:   CBC:   Recent Labs   Lab  08/30/18   2150   WBC  7.40   HGB  9.0*   HCT  28.4*   PLT  330     CMP:   Recent Labs   Lab  08/30/18   2149   NA  137   K  4.6   CL  103   CO2  25   GLU  118*   BUN  20   CREATININE  1.0   CALCIUM  9.5   PROT  6.2   ALBUMIN  3.2*   BILITOT  0.4   ALKPHOS  150*   AST  17   ALT  14   ANIONGAP  9   EGFRNONAA  54*     Lactic Acid:   Recent Labs   Lab  08/30/18   2236  08/31/18   0148   LACTATE  0.8  0.7     Urine Studies:   Recent Labs   Lab  08/30/18   2215   COLORU  Yellow   APPEARANCEUA  Clear   PHUR  7.0   SPECGRAV  <=1.005*   PROTEINUA  Negative   GLUCUA  Negative   KETONESU  Negative   BILIRUBINUA  Negative   OCCULTUA  Negative   NITRITE  Negative   UROBILINOGEN  Negative   LEUKOCYTESUR  Negative     Microbiology Results (last 7 days)     Procedure Component Value Units Date/Time    Blood culture x two cultures. Draw prior to antibiotics. [[id number]] Collected:  08/30/18 2236    Order Status:  Sent Specimen:  Blood from Peripheral, Left  Hand Updated:  08/30/18 7325    Blood  culture x two cultures. Draw prior to antibiotics. [[id number]] Collected:  08/30/18 6090    Order Status:  Sent Specimen:  Blood from Antecubital, Right Updated:  08/30/18 9072            Significant Imaging:     CXR: Reviewed film, appears hazy.    Left Knee Xray: Radiologist's report not downloaded.    Venous Ultrasound LLE: Reviewed radiologist's report--No DVT. Edema throughout leg.

## 2018-08-31 NOTE — CONSULTS
Past Medical History:   Diagnosis Date    Allergy     Anticoagulant long-term use     Arthritis     Asthma     Coronary artery disease     stent x 1    Deep vein blood clot of right lower extremity 1965 & 1971    Full dentures     Heart attack 01/13/2017    Hypertension     Osteoporosis     Thyroid disease     Ulcer     Wears glasses        Past Surgical History:   Procedure Laterality Date    cardic stent  01/13/2017    CHOLECYSTECTOMY      EYE SURGERY      bilateral cataracts    FRACTURE SURGERY  2011    left wrist     THYROIDECTOMY         Current Facility-Administered Medications   Medication    acetaminophen tablet 650 mg    aluminum-magnesium hydroxide-simethicone 200-200-20 mg/5 mL suspension 15 mL    [START ON 9/1/2018] aspirin EC tablet 81 mg    calcium-vitamin D3 500 mg(1,250mg) -200 unit per tablet 2 tablet    carvedilol tablet 6.25 mg    clopidogrel tablet 75 mg    enoxaparin injection 40 mg    hydrALAZINE injection 10 mg    HYDROcodone-acetaminophen  mg per tablet 1 tablet    HYDROcodone-acetaminophen 5-325 mg per tablet 1 tablet    levothyroxine tablet 112 mcg    montelukast tablet 10 mg    ondansetron injection 4 mg    pantoprazole EC tablet 40 mg    ramelteon tablet 8 mg    senna-docusate 8.6-50 mg per tablet 1 tablet    sodium chloride 0.9% flush 5 mL    [START ON 9/1/2018] vancomycin (VANCOCIN) 1,250 mg in dextrose 5 % 250 mL IVPB       Review of patient's allergies indicates:   Allergen Reactions    Lipitor [atorvastatin] Other (See Comments)     Fall/ balance     Myrbetriq [mirabegron] Swelling and Other (See Comments)     Legs swelling, couldn't void     Pravastatin Other (See Comments)     Balance problem     Codeine Nausea Only    Mobic [meloxicam] Other (See Comments)     Not sure reaction    Perfume Other (See Comments)     And flowers cause allergy and makes her cough.    Sulfur Other (See Comments)     Was told not to take as a child        Family History   Problem Relation Age of Onset    Lupus Mother     Osteoporosis Mother     Heart disease Mother     Ulcers Mother     Esophageal cancer Father     Cancer Father         esophageal    DIEGO disease Father     Cancer Brother     Esophageal cancer Brother     Lupus Daughter     DIEGO disease Son     Heart disease Maternal Aunt     Cancer Maternal Aunt     Kidney disease Maternal Aunt     Kidney cancer Maternal Aunt     Liver cancer Maternal Aunt     Dementia Maternal Aunt     Cancer Maternal Uncle     Colon cancer Maternal Uncle     Breast cancer Maternal Uncle     Lung cancer Maternal Uncle         smoker    Parkinsonism Paternal Aunt     Cancer Paternal Aunt     Stomach cancer Paternal Aunt     Heart disease Paternal Uncle     Cancer Paternal Uncle     Stomach cancer Paternal Uncle         x2    Bone cancer Paternal Uncle     Cancer Maternal Grandmother         tumors on head and body    Parkinsonism Paternal Grandmother     DIEGO disease Brother     Ulcers Brother     Dementia Brother     Early death Son         self    Bipolar disorder Son     Cancer Son     Esophageal cancer Son     Drug abuse Son        Social History     Socioeconomic History    Marital status:      Spouse name: Not on file    Number of children: Not on file    Years of education: Not on file    Highest education level: Not on file   Social Needs    Financial resource strain: Not on file    Food insecurity - worry: Not on file    Food insecurity - inability: Not on file    Transportation needs - medical: Not on file    Transportation needs - non-medical: Not on file   Occupational History    Not on file   Tobacco Use    Smoking status: Former Smoker     Packs/day: 0.50     Types: Cigarettes     Last attempt to quit: 2017     Years since quittin.6    Smokeless tobacco: Never Used   Substance and Sexual Activity    Alcohol use: No    Drug use: No    Sexual  "activity: No   Other Topics Concern    Not on file   Social History Narrative    Not on file       Chief Complaint:   Chief Complaint   Patient presents with    Leg Pain     c/o pain to left lower leg with redness, edema and warm to touch; s/p left knee replacement August 15th       Consulting Physician: Self, Aaareferral    History of Present Illness:    This is a 78 y.o. year old female who complains of left leg pain, swelling and redness for 2 days. Pain localized to lateral ankle and occasionally shooting up leg towards knee. Redness was mostly around ankle to mid tibia, but started to come up yesterday so she came to the ER.      ROS    Examination:    Vital Signs:    Vitals:    08/31/18 0123 08/31/18 0808 08/31/18 0903 08/31/18 1204   BP: (!) 182/77 (!) 205/95  (!) 190/79   Pulse: 77 83  72   Resp: 19 18  20   Temp: 97.5 °F (36.4 °C) 97.9 °F (36.6 °C)  97 °F (36.1 °C)   TempSrc: Oral Oral  Oral   SpO2: 97% 97% 97% 97%   Weight: 96.6 kg (212 lb 15.4 oz)      Height: 5' 3" (1.6 m)          Body mass index is 37.72 kg/m².    This a well-developed, well nourished patient in no acute distress.    Alert and oriented x 3 and cooperative to examination.       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 Draw prior to antibiotics.; Standing  -     CBC auto differential; Standing  -     Comprehensive metabolic panel; Standing  -     Lactic acid, plasma #1; Standing  -     Lactic acid, plasma #2; Standing  -     Vital signs; Standing  -     Bed rest; Standing  -     Cardiac Monitoring - Adult; Standing  -     Pulse Oximetry Continuous; Standing  -     Strict intake and output; Standing  -     Urinalysis, Reflex to Urine Culture Urine, Clean Catch; Standing  -     Saline lock IV; Standing  -     X-Ray Chest AP Portable; Standing  -     HYDROcodone-acetaminophen 5-325 mg per tablet 1 tablet; Take 1 tablet by mouth ED 1 Time.  -     vancomycin in dextrose 5 % 1 gram/250 mL IVPB 1,000 mg; Inject 250 mLs (1,000 mg total) into the vein ED 1 Time.  -     Cancel: Place in Observation; Standing  -     Admit to Inpatient; Standing  -     sodium chloride 0.9% flush 5 mL; Inject 5 mLs into the vein as needed for Line Care.  -     IP VTE HIGH RISK PATIENT; Standing  -     Full code; Standing  -     Vital signs; Standing  -     Pulse Oximetry Q4H; Standing  -     Progressive Mobility Protocol (mobilize patient to their highest level of functioning at least twice daily); Standing  -     Cardiac Monitoring - Adult; Standing  -     Fall precautions; Standing  -     Diet Cardiac; Standing  -     enoxaparin injection 40 mg; Inject 0.4 mLs (40 mg total) into the skin Daily.  -     acetaminophen tablet 650 mg; Take 2 tablets (650 mg total) by mouth every 4 (four) hours as needed for mild pain 1-3/10 pain scale or Headaches.  -     HYDROcodone-acetaminophen 5-325 mg per tablet 1 tablet; Take 1 tablet by mouth every 6 (six) hours as needed for moderate pain 4-6/10 pain scale.  -      HYDROcodone-acetaminophen  mg per tablet 1 tablet; Take 1 tablet by mouth every 6 (six) hours as needed for severe pain 7-10/10 pain scale.  -     ramelteon tablet 8 mg; Take 1 tablet (8 mg total) by mouth nightly as needed for Insomnia.  -     senna-docusate 8.6-50 mg per tablet 1 tablet; Take 1 tablet by mouth 2 (two) times daily as needed for Constipation.  -     aluminum-magnesium hydroxide-simethicone 200-200-20 mg/5 mL suspension 15 mL; Take 15 mLs by mouth every 6 (six) hours as needed for Indigestion.  -     ondansetron injection 4 mg; Inject 4 mg into the vein every 4 (four) hours as needed for Nausea/Vomiting (1st choice) - use as first treatment.  -     CBC auto differential; Standing  -     Basic metabolic panel; Standing  -     Pharmacy to dose Vancomycin consult; Standing  -     Discontinue: hydrALAZINE injection 10 mg; Inject 0.5 mLs (10 mg total) into the vein every 6 (six) hours as needed (SBP >150).  -     PT evaluate and treat; Standing  -     Discontinue: aspirin tablet 325 mg; Take 1 tablet (325 mg total) by mouth 2 (two) times daily.  -     carvedilol tablet 6.25 mg; Take 1 tablet (6.25 mg total) by mouth once daily.  -     clopidogrel tablet 75 mg; Take 1 tablet (75 mg total) by mouth once daily.  -     montelukast tablet 10 mg; Take 1 tablet (10 mg total) by mouth every evening.  -     pantoprazole EC tablet 40 mg; Take 1 tablet (40 mg total) by mouth once daily.  -     levothyroxine tablet 112 mcg; Take 1 tablet (112 mcg total) by mouth before breakfast.  -     calcium-vitamin D3 500 mg(1,250mg) -200 unit per tablet 2 tablet; Take 2 tablets by mouth once daily.  -     Inpatient consult to Orthopedic Surgery; Standing  -     vancomycin (VANCOCIN) 1,250 mg in dextrose 5 % 250 mL IVPB; Inject 1,250 mg into the vein every 24 hours.  -     Cancel: VANCOMYCIN, TROUGH before 3rd dose; Standing  -     VANCOMYCIN, TROUGH before 3rd dose; Standing  -     Basic metabolic panel; Standing  -      hydrALAZINE injection 10 mg; Inject 0.5 mLs (10 mg total) into the vein every 2 (two) hours as needed for SBP greater than (160 mm Hg).  -     Tele: Maintain IV access while on telemetry - Adult; Standing  -     Cardiac Monitoring - Adult; Standing  -     aspirin EC tablet 81 mg; Take 1 tablet (81 mg total) by mouth once daily.  -     Vitamin B12; Standing  -     Folate; Standing  -     Iron and TIBC; Standing  -     Place in Observation; Standing        Plan:  She has cellulitis that appears to be unrelated to her previous total knee replacement. No evidence DVT. Good ROM knee and able to ambulate without knee pain. Recommend continuing antibiotics and observation.      DISCLAIMER: This note may have been dictated using voice recognition software and may contain grammatical errors.     NOTE: Consult report sent to referring provider via [employer].

## 2018-08-31 NOTE — CONSULTS
Date: 8/31/2018   Heather Hughes [id number] is a 78 y.o. female who has been consulted for vancomycin dosing.    The patient has the following labs:     Creatinine (mg/dl)  WBC Count  Serum creatinine: 0.8 mg/dL 08/31/18 0335  Estimated creatinine clearance: 65.2 mL/min  Lab Results  Component  Value  Date  WBC  5.80  08/31/2018    Current weight is 96.6 kg (212 lb 15.4 oz)    Pt being treated with vancomycin for skin &soft tissue.    The patient received  1000 mg on 08/31 at 0000.    The patient will be started on vancomycin at a dose of 1250 mg every 24 hours. The vancomycin trough has been ordered for 09/01 at 2330.  Target goal is 10-15.     Patient will be followed by pharmacy for changes in renal function, toxicity, and efficacy.      Thank you for allowing us to participate in this patient's care.     Veronique Ovalles, PharmD

## 2018-08-31 NOTE — PLAN OF CARE
Problem: Physical Therapy Goal  Goal: Physical Therapy Goal  Goals to be met by: 2018     Patient will increase functional independence with mobility by performin. Supine to sit with [place]  1. Sit to supine with [place]  2. Sit to stand transfer with [place]  3. Bed to chair transfer with Modified [place] using Rolling Walker  4. Gait  x 250 feet with Modified [place] using Rolling Walker.     Outcome: Ongoing (interventions implemented as appropriate)  PT renay completed this pm. Pt was able to tolerate bed mobility, transfers and gait 20 ft x 2 min A with RW. Pt was left supine in bed w/ needs in reach and lines intact.

## 2018-08-31 NOTE — ED PROVIDER NOTES
Encounter Date: 8/30/2018    SCRIBE #1 NOTE: I, Rene Casillas, am scribing for, and in the presence of, Dr. Lea.       History     Chief Complaint   Patient presents with    Leg Pain     c/o pain to left lower leg with redness, edema and warm to touch; s/p left knee replacement August 15th 08/30/2018 9:19 PM    The pt is a 78 y.o. female with a past medical history CAD, DVT, HTN, thyroid disease, and MI presenting to the ED with a gradual onset of acute L knee pain beginning 2 days ago. The pt reported s/p L knee replacement surgery her knee became swollen and painful. The pt described the knee pain as a sharp stabbing which radiates from her L ankle to her L knee that has progressively worsened. The relative noted they were advised to report to the ED by home health nurse. The pt endorsed taking hydrocodone with minimal improvement of pain. She has a history of cigarette smoking. The pt has a past surgical history of thyroidectomy, cardiac stent, L knee arthroplasty.       The history is provided by the patient.     Review of patient's allergies indicates:   Allergen Reactions    Lipitor [atorvastatin] Other (See Comments)     Fall/ balance     Myrbetriq [mirabegron] Swelling and Other (See Comments)     Legs swelling, couldn't void     Pravastatin Other (See Comments)     Balance problem     Codeine Nausea Only    Mobic [meloxicam] Other (See Comments)     Not sure reaction    Perfume Other (See Comments)     And flowers cause allergy and makes her cough.    Sulfur Other (See Comments)     Was told not to take as a child     Past Medical History:   Diagnosis Date    Allergy     Anticoagulant long-term use     Arthritis     Asthma     Coronary artery disease     stent x 1    Deep vein blood clot of right lower extremity 1965 & 1971    Full dentures     Heart attack 01/13/2017    Hypertension     Osteoporosis     Thyroid disease     Ulcer     Wears glasses      Past Surgical History:    Procedure Laterality Date    cardic stent  2017    CHOLECYSTECTOMY      EYE SURGERY      bilateral cataracts    FRACTURE SURGERY  2011    left wrist     THYROIDECTOMY       Family History   Problem Relation Age of Onset    Lupus Mother     Osteoporosis Mother     Heart disease Mother     Ulcers Mother     Esophageal cancer Father     Cancer Father         esophageal    DIEGO disease Father     Cancer Brother     Esophageal cancer Brother     Lupus Daughter     DIEGO disease Son     Heart disease Maternal Aunt     Cancer Maternal Aunt     Kidney disease Maternal Aunt     Kidney cancer Maternal Aunt     Liver cancer Maternal Aunt     Dementia Maternal Aunt     Cancer Maternal Uncle     Colon cancer Maternal Uncle     Breast cancer Maternal Uncle     Lung cancer Maternal Uncle         smoker    Parkinsonism Paternal Aunt     Cancer Paternal Aunt     Stomach cancer Paternal Aunt     Heart disease Paternal Uncle     Cancer Paternal Uncle     Stomach cancer Paternal Uncle         x2    Bone cancer Paternal Uncle     Cancer Maternal Grandmother         tumors on head and body    Parkinsonism Paternal Grandmother     DIEGO disease Brother     Ulcers Brother     Dementia Brother     Early death Son         self    Bipolar disorder Son     Cancer Son     Esophageal cancer Son     Drug abuse Son      Social History     Tobacco Use    Smoking status: Former Smoker     Packs/day: 0.50     Types: Cigarettes     Last attempt to quit: 2017     Years since quittin.6    Smokeless tobacco: Never Used   Substance Use Topics    Alcohol use: No    Drug use: No     Review of Systems   Constitutional: Negative for fever.   HENT: Negative for congestion.    Eyes: Negative for visual disturbance.   Respiratory: Negative for wheezing.    Cardiovascular: Negative for chest pain.   Gastrointestinal: Negative for abdominal pain.   Genitourinary: Negative for dysuria.   Musculoskeletal:  Negative for joint swelling.        + L knee pain  + L leg swelling   Skin: Negative for rash.        + L knee surgical wound   Neurological: Negative for syncope.   Hematological: Does not bruise/bleed easily.   Psychiatric/Behavioral: Negative for confusion.       Physical Exam     Initial Vitals [08/30/18 2030]   BP Pulse Resp Temp SpO2   (!) 199/81 84 18 98.5 °F (36.9 °C) 97 %      MAP       --         Physical Exam    Nursing note and vitals reviewed.  Constitutional: She appears well-nourished.   HENT:   Head: Normocephalic and atraumatic.   Eyes: Conjunctivae and EOM are normal.   Neck: Normal range of motion. Neck supple. No thyroid mass present.   Cardiovascular: Normal rate, regular rhythm and normal heart sounds. Exam reveals no gallop and no friction rub.    No murmur heard.  Pulmonary/Chest: Breath sounds normal. She has no wheezes. She has no rhonchi. She has no rales.   Abdominal: Soft. Normal appearance and bowel sounds are normal. There is no tenderness.   Musculoskeletal:   No L knee swelling  Diffuse swelling at the level of the distal L thigh   Neurological: She is alert and oriented to person, place, and time. She has normal strength. No cranial nerve deficit or sensory deficit.   Skin: Skin is warm and dry.   Diffuse warmth at the level of the L distal thigh  Well healed incision of the L knee  No focal erythema  No L knee isolated warmth or erythema   Psychiatric: She has a normal mood and affect. Her speech is normal. Cognition and memory are normal.         ED Course   Procedures  Labs Reviewed   CBC W/ AUTO DIFFERENTIAL - Abnormal; Notable for the following components:       Result Value    RBC 3.17 (*)     Hemoglobin 9.0 (*)     Hematocrit 28.4 (*)     MCHC 31.9 (*)     RDW 14.6 (*)     Lymph% 17.6 (*)     All other components within normal limits   CULTURE, BLOOD   CULTURE, BLOOD   COMPREHENSIVE METABOLIC PANEL   LACTIC ACID, PLASMA   LACTIC ACID, PLASMA   URINALYSIS, REFLEX TO URINE  CULTURE          Imaging Results          US Lower Extremity Veins Left (In process)                X-Ray Chest AP Portable (In process)                X-Ray Knee 3 View Left (In process)                  Medical Decision Making:   History:   Old Medical Records: I decided to obtain old medical records.  Clinical Tests:   Lab Tests: Ordered and Reviewed  Radiological Study: Reviewed and Ordered  ED Management:  This patient was interviewed and examined emergently.  There is diffuse erythema, warmth, and swelling localized to the left lower extremity not including the foot.  Ultrasound is negative for evidence of underlying DVT.  These complaints are not associated with the knee exclusively and the incision is well healed.  I doubt septic arthritis, but rather, this appears to be associated with a diffuse area of cellulitis.  Labs found to be negative for evidence of progressing sepsis, septic shock, end organ failure, but the patient does warrant admission for further IV antibiotics considering the large area involved, underlying new prosthetic joint and for potential consultation with her orthopedic surgeon.  The case was discussed with Mrs. Lombardi who agreed to admit the patient.  She was initiated on vancomycin and transferred to a medical bed in guarded condition after being updated on the plan of care and was in agreement.            Scribe Attestation:   Scribe #1: I performed the above scribed service and the documentation accurately describes the services I performed. I attest to the accuracy of the note.    I, Dr. Krishna Lea, personally performed the services described in this documentation. All medical record entries made by the scribe were at my direction and in my presence.  I have reviewed the chart and agree that the record reflects my personal performance and is accurate and complete. Krishna Lea MD.  6:41 AM 08/31/2018             Clinical Impression:   Diagnoses of Left knee pain, Left leg  swelling, Cellulitis of left lower extremity without foot, and Cellulitis of leg without foot, left were pertinent to this visit.      Disposition:   Disposition: Admitted  Condition: Annemarie Lea MD  08/31/18 0641

## 2018-08-31 NOTE — H&P
PCP: Graham Mazariegos MD    History & Physical    Chief Complaint: Left leg cellulitis    History of Present Illness:  Patient is a 78 y.o. female admitted to Hospitalist Service from [facility] with complaint of Left leg pain and redness.Patient reportedly has past medical history significant for Hypertension, CAD, history of bronchial asthma, history of DVT and hypothyroidism. Patient recently underwent left TKA performed on 08-15-18 by Dr. Santiago. Patient was discharged home on 08-16-18. The pt described the knee pain as a sharp stabbing which radiates from her L ankle to her L knee that has progressively worsened. The relative noted they were advised to report to the ED by home health nurse. The pt endorsed taking hydrocodone with minimal improvement of pain. Patient denied chest pain, shortness of breath, abdominal pain, nausea, vomiting, headache, vision changes, focal neuro-deficits, cough or fever. Patient is continuing to smoke cigarettes.    Past Medical History:   Diagnosis Date    Allergy     Anticoagulant long-term use     Arthritis     Asthma     Coronary artery disease     stent x 1    Deep vein blood clot of right lower extremity 1965 & 1971    Full dentures     Heart attack 01/13/2017    Hypertension     Osteoporosis     Thyroid disease     Ulcer     Wears glasses      Past Surgical History:   Procedure Laterality Date    cardic stent  01/13/2017    CHOLECYSTECTOMY      EYE SURGERY      bilateral cataracts    FRACTURE SURGERY  2011    left wrist     THYROIDECTOMY       Family History   Problem Relation Age of Onset    Lupus Mother     Osteoporosis Mother     Heart disease Mother     Ulcers Mother     Esophageal cancer Father     Cancer Father         esophageal    DIEGO disease Father     Cancer Brother     Esophageal cancer Brother     Lupus Daughter     DIEGO disease Son     Heart disease Maternal Aunt     Cancer Maternal Aunt     Kidney  disease Maternal Aunt     Kidney cancer Maternal Aunt     Liver cancer Maternal Aunt     Dementia Maternal Aunt     Cancer Maternal Uncle     Colon cancer Maternal Uncle     Breast cancer Maternal Uncle     Lung cancer Maternal Uncle         smoker    Parkinsonism Paternal Aunt     Cancer Paternal Aunt     Stomach cancer Paternal Aunt     Heart disease Paternal Uncle     Cancer Paternal Uncle     Stomach cancer Paternal Uncle         x2    Bone cancer Paternal Uncle     Cancer Maternal Grandmother         tumors on head and body    Parkinsonism Paternal Grandmother     DIEGO disease Brother     Ulcers Brother     Dementia Brother     Early death Son         self    Bipolar disorder Son     Cancer Son     Esophageal cancer Son     Drug abuse Son      Social History     Tobacco Use    Smoking status: Former Smoker     Packs/day: 0.50     Types: Cigarettes     Last attempt to quit: 2017     Years since quittin.6    Smokeless tobacco: Never Used   Substance Use Topics    Alcohol use: No    Drug use: No      Review of patient's allergies indicates:   Allergen Reactions    Lipitor [atorvastatin] Other (See Comments)     Fall/ balance     Myrbetriq [mirabegron] Swelling and Other (See Comments)     Legs swelling, couldn't void     Pravastatin Other (See Comments)     Balance problem     Codeine Nausea Only    Mobic [meloxicam] Other (See Comments)     Not sure reaction    Perfume Other (See Comments)     And flowers cause allergy and makes her cough.    Sulfur Other (See Comments)     Was told not to take as a child     PTA Medications   Medication Sig    acetaminophen (TYLENOL) 650 MG TbSR Take 650 mg by mouth every 8 (eight) hours as needed.    albuterol 90 mcg/actuation inhaler Inhale 2 puffs into the lungs every 6 (six) hours as needed for Wheezing.    aspirin 325 MG tablet Take 1 tablet (325 mg total) by mouth 2 (two) times daily.    benzonatate (TESSALON) 200 MG capsule  Take 1 capsule (200 mg total) by mouth 3 (three) times daily as needed for Cough.    CALCIUM CARBONATE/VITAMIN D3 (CALCIUM 500 + D ORAL) Take 2 tablets by mouth once daily.    carvedilol (COREG) 6.25 MG tablet Take 1 tablet (6.25 mg total) by mouth 2 (two) times daily with meals. (Patient taking differently: Take 6.25 mg by mouth once daily. )    clopidogrel (PLAVIX) 75 mg tablet Take 1 tablet (75 mg total) by mouth once daily.    ergocalciferol (ERGOCALCIFEROL) 50,000 unit Cap TAKE ONE CAPSULE BY MOUTH EVERY 7 DAYS    glucosamine-chondroitin 500-400 mg tablet Take 1 tablet by mouth 3 (three) times daily.    montelukast (SINGULAIR) 10 mg tablet Take 1 tablet (10 mg total) by mouth every evening.    omeprazole (PRILOSEC) 40 MG capsule Take 1 capsule (40 mg total) by mouth once daily. (Patient taking differently: Take 40 mg by mouth every evening. )    oxyCODONE-acetaminophen (PERCOCET)  mg per tablet Take 1-2 tablets by mouth every 6 (six) hours as needed for Pain. (Patient taking differently: Take 1-2 tablets by mouth every 6 (six) hours as needed for Pain. Take for 7 days.)    phenazopyridine (PYRIDIUM) 95 MG tablet Take 95 mg by mouth once daily.    SYNTHROID 112 mcg tablet TAKE ONE TABLET BY MOUTH EVERY DAY BEFORE BREAKFAST    nitroGLYCERIN (NITROSTAT) 0.4 MG SL tablet Place 1 tablet (0.4 mg total) under the tongue every 5 (five) minutes as needed for Chest pain. (Patient taking differently: Place 0.4 mg under the tongue every 5 (five) minutes as needed for Chest pain. Seek medical help if pain is not relieved by the third dose.)     Review of Systems:  Constitutional: no fever or chills  Eyes: no visual changes  Ears, nose, mouth, throat, and face: no nasal congestion or sore throat  Respiratory: no cough or shorness of breath  Cardiovascular: no chest pain or palpitations  Gastrointestinal: no nausea or vomiting, no abdominal pain or change in bowel habits  Genitourinary: no hematuria or  dysuria  Integument/breast: see HPI  Hematologic/lymphatic: no easy bruising or lymphadenopathy  Musculoskeletal: + Left leg pain  Neurological: no seizures or tremors.  Behavioral/Psych: no auditory or visual hallucinations  Endocrine: no heat or cold intolerance     OBJECTIVE:     Vital Signs (Most Recent)  Temp: 97.9 °F (36.6 °C) (08/31/18 0808)  Pulse: 83 (08/31/18 0808)  Resp: 18 (08/31/18 0808)  BP: (!) 205/95 (08/31/18 0808)  SpO2: 97 % (08/31/18 0808)    Physical Exam:  General appearance: well developed, appears stated age  Head: normocephalic, atraumatic  Eyes:  conjunctivae/corneas clear. PERRL.  Nose: Nares normal. Septum midline.  Throat: lips, mucosa, and tongue normal; teeth and gums normal, no throat erythema.  Neck: supple, symmetrical, trachea midline, no JVD and thyroid not enlarged, symmetric, no tenderness/mass/nodules  Lungs:  clear to auscultation bilaterally and normal respiratory effort  Chest wall: no tenderness  Heart: regular rate and rhythm, S1, S2 normal, no murmur, click, rub or gallop  Abdomen: soft, non-tender non-distented; bowel sounds normal; no masses,  no organomegaly  Extremities: no cyanosis, clubbing or edema. No knee swelling. Left distal thigh diffuse swelling.   Pulses: 2+ and symmetric  Skin: Skin color, texture, turgor normal. No rashes or lesions.  Lymph nodes: Cervical, supraclavicular, and axillary nodes normal.  Neurologic: Normal strength and tone. No focal numbness or weakness. CNII-XII intact.      Laboratory:   CBC:   Recent Labs   Lab  08/31/18   0335   WBC  5.80   RBC  2.84*   HGB  8.1*   HCT  25.1*   PLT  304   MCV  88   MCH  28.6   MCHC  32.3     CMP:   Recent Labs   Lab  08/30/18   2149  08/31/18   0335   GLU  118*  97   CALCIUM  9.5  9.5   ALBUMIN  3.2*   --    PROT  6.2   --    NA  137  139   K  4.6  4.4   CO2  25  27   CL  103  105   BUN  20  17   CREATININE  1.0  0.8   ALKPHOS  150*   --    ALT  14   --    AST  17   --    BILITOT  0.4   --       Microbiology Results (last 7 days)     Procedure Component Value Units Date/Time    Blood culture x two cultures. Draw prior to antibiotics. [[id number]] Collected:  08/30/18 2236    Order Status:  Completed Specimen:  Blood from Peripheral, Left  Hand Updated:  08/31/18 0915     Blood Culture, Routine No Growth to date    Narrative:       Aerobic and anaerobic    Blood culture x two cultures. Draw prior to antibiotics. [[id number]] Collected:  08/30/18 2235    Order Status:  Completed Specimen:  Blood from Antecubital, Right Updated:  08/31/18 0915     Blood Culture, Routine No Growth to date    Narrative:       Aerobic and anaerobic        Recent Labs   Lab  08/30/18 2215   COLORU  Yellow   SPECGRAV  <=1.005*   PHUR  7.0   PROTEINUA  Negative   NITRITE  Negative   LEUKOCYTESUR  Negative   UROBILINOGEN  Negative       Hemoglobin A1C   Date Value Ref Range Status   01/14/2017 5.3 4.5 - 6.2 % Final     Comment:     According to ADA guidelines, hemoglobin A1C <7.0% represents  optimal control in non-pregnant diabetic patients.  Different  metrics may apply to specific populations.   Standards of Medical Care in Diabetes - 2016.  For the purpose of screening for the presence of diabetes:  <5.7%     Consistent with the absence of diabetes  5.7-6.4%  Consistent with increasing risk for diabetes   (prediabetes)  >or=6.5%  Consistent with diabetes  Currently no consensus exists for use of hemoglobin A1C  for diagnosis of diabetes for children.       Microbiology Results (last 7 days)     Procedure Component Value Units Date/Time    Blood culture x two cultures. Draw prior to antibiotics. [[id number]] Collected:  08/30/18 2236    Order Status:  Completed Specimen:  Blood from Peripheral, Left  Hand Updated:  08/31/18 0915     Blood Culture, Routine No Growth to date    Narrative:       Aerobic and anaerobic    Blood culture x two cultures. Draw prior to antibiotics. [[id number]] Collected:  08/30/18 2235    Order Status:   Completed Specimen:  Blood from Antecubital, Right Updated:  08/31/18 0915     Blood Culture, Routine No Growth to date    Narrative:       Aerobic and anaerobic        Diagnostic Results:  Chest X-Ray: No acute process.  No significant change.    Left knee x-ray: Status post left tricompartmental knee arthroplasty.  Small joint effusion without evidence for periprosthetic fracture.  Mild generalized soft tissue swelling.    Left leg venous doppler scan: No evidence of deep venous thrombosis in the left lower extremity.    Assessment/Plan:     * Cellulitis of left lower extremity without foot    IV vancomycin  Pain management  Consult orthopedics since had recent surgery to affected area          Acquired hypothyroidism    Chronic. Continue home levothyroxine dose.          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  Chronic problem. Will continue chronic medications and monitor for any changes, adjusting as needed.  Use IV Hydralazine 10 mg q 2 hrs prn SBP > 160 mmHg.     VTE Risk Mitigation (From admission, onward)        Ordered     enoxaparin injection 40 mg  Daily      08/31/18 0100     IP VTE HIGH RISK PATIENT  Once      08/31/18 0100        Aleshia Isabel MD  Department of Hospital Medicine   [facility]

## 2018-08-31 NOTE — NURSING
Alert/oriented. Denies pain to left knee/leg. Instructed on plan of care.  Picture of left knee/leg placed in epic. Elevated on pillow. Incontinent care done. Will continue to monitor.

## 2018-08-31 NOTE — PLAN OF CARE
The pt was admitted 8/15/18-8/16/18 for a knee replacement. She discharged home where she lives with her spouse . She was delivered a BSC and set up with [facility]. The pts daughter Angela Steele [phone] is staying with the pt for several weeks to assist in her care. I updated Epic with her contact info. The pts PCP is Dr. Mazariegos and she has [employer] managed medicare. The pt will discharge home with resumption of  services. Neena Jacob LMSW      08/31/18 1004   Discharge Assessment   Assessment Type Discharge Planning Assessment   Confirmed/corrected address and phone number on facesheet? No   Assessment information obtained from? Medical Record;Patient   Communicated expected length of stay with patient/caregiver no   Prior to hospitilization cognitive status: Alert/Oriented   Prior to hospitalization functional status: Assistive Equipment   Current cognitive status: Alert/Oriented   Current Functional Status: Assistive Equipment   Lives With spouse   Able to Return to Prior Arrangements yes   Is patient able to care for self after discharge? Yes   Who are your caregiver(s) and their phone number(s)? Spouse Chet [phone] and daughter Angela [phone]    Readmission Within The Last 30 Days current reason for admission unrelated to previous admission   If yes, most recent facility name: [facility] 8/15-8/16/18   Patient currently being followed by outpatient case management? No   Patient currently receives any other outside agency services? No   Equipment Currently Used at Home walker, rolling   Do you have any problems affording any of your prescribed medications? No   Is the patient taking medications as prescribed? yes   Does the patient have transportation home? Yes   Transportation Available car   Does the patient receive services at the [facility]? No   Discharge Plan A Home;Home Health   Discharge Plan B Home with family;Home Health   Patient/Family In Agreement With  Plan yes

## 2018-08-31 NOTE — ASSESSMENT & PLAN NOTE
Chronic/Uncontrolled--possibly due to pain  Continue chronic medications, adjusting as needed  IV Hydralazine prn

## 2018-08-31 NOTE — PLAN OF CARE
Problem: Patient Care Overview  Goal: Plan of Care Review  Outcome: Ongoing (interventions implemented as appropriate)  Pt on room air with 97% sats

## 2018-08-31 NOTE — HPI
Ms. Hughes is a 77yo F with a PMH of HTN, CAD/MI, Hypothyroidism, and DVT on ASA. She presents to the ED today with c/o left leg redness and swelling. She underwent a left TKA on 8/15/18 per Dr. Santiago and has been doing fairly well since surgery until about 2-3 days ago when she developed swelling to her LLE. She also developed redness to her knee and lower leg areas. Associated symptoms include warmth and intermittent shooting pain to her ankle. She takes hydrocodone for her pain and it was not helping. She gets home health and was advised to go to the hospital. While in the ED, a venous ultrasound was done which was negative for a DVT. IV vancomycin was given. She currently denies pain or discomforts. Her daughter is at bedside.             
29-Mar-2017 04:48

## 2018-08-31 NOTE — PLAN OF CARE
The pt was admitted 8/15/18-8/16/18 for a knee replacement. She discharged home with a BSC and  services from [facility]. She returned due to knee redness, edema and warmth. Neena Jacob, [facility] [place] [facility]     08/31/18 1003   Readmission Questionnaire   At the time of your discharge, did someone talk to you about what your health problems were? Yes   At the time of discharge, did someone talk to you about what to watch out for regarding worsening of your health problem? Yes   At the time of discharge, did someone talk to you about what to do if you experienced worsening of your health problem? Yes   At the time of discharge, did someone talk to you about which medication to take when you left the hospital and which ones to stop taking? Yes   At the time of discharge, did someone talk to you about when and where to follow up with a doctor after you left the hospital? Yes   How often do you need to have someone help you when you read instructions, pamphlets, or other written material from your doctor or pharmacy? Sometimes   Do you have problems taking your medications as prescribed? No   Do you have any problems affording any of  your prescribed medications? No   Do you have problems obtaining/receiving your medications? No   Does the patient have transportation to healthcare appointments? Yes   Lives With spouse   Does the patient have family/friends to help with healtcare needs after discharge? yes   Who are your caregiver(s) and their phone number(s)? Spouse- Chet- 2 sons and 1 daughter Angela [phone]   Does your caregiver provide all the help you need? Yes   Are you currently feeling confused? No   Are you currently having problems thinking? No   Are you currently having memory problems? No   In the last 7 days, my sleep quality was: fair

## 2018-08-31 NOTE — PT/OT/SLP EVAL
Physical Therapy Evaluation    Patient Name:  Heather Hughes   MRN:  [id number]    Recommendations:     Discharge Recommendations:  home, home with home health   Discharge Equipment Recommendations: none   Barriers to discharge: None    Assessment:     Heather Hughes is a 78 y.o. female admitted with a medical diagnosis of Cellulitis of left lower extremity without foot.  She presents with the following impairments/functional limitations:  weakness, impaired endurance, impaired functional mobilty, gait instability, impaired balance, decreased coordination, decreased safety awareness which limits safe functional mobility.    Rehab Prognosis:  GOOD; patient would benefit from acute skilled PT services to address these deficits and reach maximum level of function.      Recent Surgery: * No surgery found *      Plan:     During this hospitalization, patient to be seen 6 x/week to address the above listed problems via gait training, therapeutic activities, therapeutic exercises  · Plan of Care Expires:  09/21/18   Plan of Care Reviewed with: patient    Subjective     Communicated with nurse Ng prior to session.  Patient found sitting on toilet with aide after bath upon PT entry to room, agreeable to evaluation.      Chief Complaint: no major complaint  Patient comments/goals: to go home  Pain/Comfort:  · Pain Rating 1: 0/10  · Pain Addressed 1: Reposition, Distraction    Patients cultural, spiritual, Quaker conflicts given the current situation:      Living Environment:  Pt lives with  in single story home.  Prior to admission, patients level of function was independent with intermittent assist; recent L TKR for which she was receiving HHPT.  Patient has the following equipment: bath bench, bedside commode, walker, rolling, cane, straight.  DME owned (not currently used): none.  Upon discharge, patient will have assistance from family (son checks on her frequently) and will benefit from  resumption of HHPT services.    Objective:     Patient found with: peripheral IV, telemetry     General Precautions: Standard, fall   Orthopedic Precautions:N/A   Braces: N/A     Exams:  · RLE ROM: WFL  · RLE Strength: WFL  · LLE ROM: WFL  · LLE Strength: WFL    Functional Mobility:  · Bed Mobility:     · Sit to Supine: contact guard assistance  · Transfers:     · Sit to Stand:  contact guard assistance with rolling walker  · Bed to Chair: contact guard assistance with  rolling walker  using  Stand Pivot  · Gait: 20ft x2 min A with RW    AM-PAC 6 CLICK MOBILITY  Total Score:16       Therapeutic Activities and Exercises:       Patient left supine with all lines intact, call button in reach and nursing notified.    GOALS:   Multidisciplinary Problems     Physical Therapy Goals        Problem: Physical Therapy Goal    Goal Priority Disciplines Outcome Goal Variances Interventions   Physical Therapy Goal     PT, PT/OT Ongoing (interventions implemented as appropriate)     Description:  Goals to be met by: 2018     Patient will increase functional independence with mobility by performin. Supine to sit with [place]  1. Sit to supine with [place]  2. Sit to stand transfer with [place]  3. Bed to chair transfer with Modified [place] using Rolling Walker  4. Gait  x 250 feet with Modified [place] using Rolling Walker.                       History:     Past Medical History:   Diagnosis Date    Allergy     Anticoagulant long-term use     Arthritis     Asthma     Coronary artery disease     stent x 1    Deep vein blood clot of right lower extremity 1965 & 1971    Full dentures     Heart attack 2017    Hypertension     Osteoporosis     Thyroid disease     Ulcer     Wears glasses        Past Surgical History:   Procedure Laterality Date    cardic stent  2017    CHOLECYSTECTOMY      EYE SURGERY      bilateral cataracts    FRACTURE SURGERY      left wrist      THYROIDECTOMY         Clinical Decision Making:     History  Co-morbidities and personal factors that may impact the plan of care Examination  Body Structures and Functions, activity limitations and participation restrictions that may impact the plan of care Clinical Presentation   Decision Making/ Complexity Score   Co-morbidities:   [] Time since onset of injury / illness / exacerbation  [] Status of current condition  []Patient's cognitive status and safety concerns    [] Multiple Medical Problems (see med hx)  Personal Factors:   [] Patient's age  [] Prior Level of function   [] Patient's home situation (environment and family support)  [] Patient's level of motivation  [] Expected progression of patient      HISTORY:(criteria)    [] 90571 - no personal factors/history    [] 68674 - has 1-2 personal factor/comorbidity     [] 83460 - has >3 personal factor/comorbidity     Body Regions:  [] Objective examination findings  [] Head     []  Neck  [] Trunk   [] Upper Extremity  [] Lower Extremity    Body Systems:  [] For communication ability, affect, cognition, language, and learning style: the assessment of the ability to make needs known, consciousness, orientation (person, place, and time), expected emotional /behavioral responses, and learning preferences (eg, learning barriers, education  needs)  [] For the neuromuscular system: a general assessment of gross coordinated movement (eg, balance, gait, locomotion, transfers, and transitions) and motor function  (motor control and motor learning)  [] For the musculoskeletal system: the assessment of gross symmetry, gross range of motion, gross strength, height, and weight  [] For the integumentary system: the assessment of pliability(texture), presence of scar formation, skin color, and skin integrity  [] For cardiovascular/pulmonary system: the assessment of heart rate, respiratory rate, blood pressure, and edema     Activity limitations:    [] Patient's cognitive  status and saf ety concerns          [] Status of current condition      [] Weight bearing restriction  [] Cardiopulmunary Restriction    Participation Restrictions:   [] Goals and goal agreement with the patient     [] Rehab potential (prognosis) and probable outcome      Examination of Body System: (criteria)    [] 69209 - addressing 1-2 elements    [] 03726 - addressing a total of 3 or more elements     [] 73278 -  Addressing a total of 4 or more elements         Clinical Presentation: (criteria)  Choose one     On examination of body system using standardized tests and measures patient presents with (CHOOSE ONE) elements from any of the following: body structures and functions, activity limitations, and/or participation restrictions.  Leading to a clinical presentation that is considered (CHOOSE ONE)                              Clinical Decision Making  (Eval Complexity):  Choose One     Time Tracking:     PT Received On: 08/31/18  PT Start Time: 1143     PT Stop Time: 1156  PT Total Time (min): 13 min     Billable Minutes: Evaluation 13      Mily Sky, PT  08/31/2018

## 2018-09-01 ENCOUNTER — TELEPHONE (OUTPATIENT)
Dept: FAMILY MEDICINE | Facility: CLINIC | Age: 78
End: 2018-09-01

## 2018-09-01 VITALS
WEIGHT: 212.94 LBS | TEMPERATURE: 98 F | SYSTOLIC BLOOD PRESSURE: 125 MMHG | DIASTOLIC BLOOD PRESSURE: 77 MMHG | RESPIRATION RATE: 18 BRPM | HEIGHT: 63 IN | OXYGEN SATURATION: 96 % | HEART RATE: 81 BPM | BODY MASS INDEX: 37.73 KG/M2

## 2018-09-01 LAB
ANION GAP SERPL CALC-SCNC: 8 MMOL/L
BASOPHILS # BLD AUTO: 0 K/UL
BASOPHILS NFR BLD: 0.6 %
BUN SERPL-MCNC: 10 MG/DL
CALCIUM SERPL-MCNC: 9.6 MG/DL
CHLORIDE SERPL-SCNC: 102 MMOL/L
CO2 SERPL-SCNC: 25 MMOL/L
CREAT SERPL-MCNC: 0.7 MG/DL
DIFFERENTIAL METHOD: ABNORMAL
EOSINOPHIL # BLD AUTO: 0.2 K/UL
EOSINOPHIL NFR BLD: 3.5 %
ERYTHROCYTE [DISTWIDTH] IN BLOOD BY AUTOMATED COUNT: 14.3 %
EST. GFR  (AFRICAN AMERICAN): >60 ML/MIN/1.73 M^2
EST. GFR  (NON AFRICAN AMERICAN): >60 ML/MIN/1.73 M^2
GLUCOSE SERPL-MCNC: 96 MG/DL
HCT VFR BLD AUTO: 28.5 %
HGB BLD-MCNC: 9.2 G/DL
LYMPHOCYTES # BLD AUTO: 1.9 K/UL
LYMPHOCYTES NFR BLD: 30.5 %
MCH RBC QN AUTO: 28.5 PG
MCHC RBC AUTO-ENTMCNC: 32.2 G/DL
MCV RBC AUTO: 89 FL
MONOCYTES # BLD AUTO: 0.6 K/UL
MONOCYTES NFR BLD: 9 %
NEUTROPHILS # BLD AUTO: 3.6 K/UL
NEUTROPHILS NFR BLD: 56.4 %
PLATELET # BLD AUTO: 342 K/UL
PMV BLD AUTO: 9.9 FL
POTASSIUM SERPL-SCNC: 3.8 MMOL/L
RBC # BLD AUTO: 3.22 M/UL
SODIUM SERPL-SCNC: 135 MMOL/L
WBC # BLD AUTO: 6.4 K/UL

## 2018-09-01 PROCEDURE — 99217 PR OBSERVATION CARE DISCHARGE: CPT | Mod: ,,, | Performed by: INTERNAL MEDICINE

## 2018-09-01 PROCEDURE — 25000003 PHARM REV CODE 250: Performed by: NURSE PRACTITIONER

## 2018-09-01 PROCEDURE — 97116 GAIT TRAINING THERAPY: CPT

## 2018-09-01 PROCEDURE — 63600175 PHARM REV CODE 636 W HCPCS: Performed by: NURSE PRACTITIONER

## 2018-09-01 PROCEDURE — 36415 COLL VENOUS BLD VENIPUNCTURE: CPT

## 2018-09-01 PROCEDURE — G0378 HOSPITAL OBSERVATION PER HR: HCPCS

## 2018-09-01 PROCEDURE — 94761 N-INVAS EAR/PLS OXIMETRY MLT: CPT

## 2018-09-01 PROCEDURE — 80048 BASIC METABOLIC PNL TOTAL CA: CPT

## 2018-09-01 PROCEDURE — 63600175 PHARM REV CODE 636 W HCPCS: Performed by: INTERNAL MEDICINE

## 2018-09-01 PROCEDURE — 85025 COMPLETE CBC W/AUTO DIFF WBC: CPT

## 2018-09-01 PROCEDURE — 25000003 PHARM REV CODE 250: Performed by: INTERNAL MEDICINE

## 2018-09-01 RX ORDER — AMOXICILLIN AND CLAVULANATE POTASSIUM 875; 125 MG/1; MG/1
1 TABLET, FILM COATED ORAL 2 TIMES DAILY
Qty: 14 TABLET | Refills: 0 | Status: SHIPPED | OUTPATIENT
Start: 2018-09-01 | End: 2018-09-08

## 2018-09-01 RX ADMIN — CLOPIDOGREL BISULFATE 75 MG: 75 TABLET ORAL at 09:09

## 2018-09-01 RX ADMIN — CALCIUM CARBONATE 500 MG (1,250 MG)-VITAMIN D3 200 UNIT TABLET 2 TABLET: at 09:09

## 2018-09-01 RX ADMIN — CARVEDILOL 6.25 MG: 6.25 TABLET, FILM COATED ORAL at 09:09

## 2018-09-01 RX ADMIN — VANCOMYCIN HYDROCHLORIDE 1250 MG: 1 INJECTION, POWDER, LYOPHILIZED, FOR SOLUTION INTRAVENOUS at 12:09

## 2018-09-01 RX ADMIN — ONDANSETRON 4 MG: 2 INJECTION INTRAMUSCULAR; INTRAVENOUS at 07:09

## 2018-09-01 RX ADMIN — LEVOTHYROXINE SODIUM 112 MCG: 112 TABLET ORAL at 05:09

## 2018-09-01 RX ADMIN — HYDRALAZINE HYDROCHLORIDE 10 MG: 20 INJECTION INTRAMUSCULAR; INTRAVENOUS at 03:09

## 2018-09-01 RX ADMIN — PANTOPRAZOLE SODIUM 40 MG: 40 TABLET, DELAYED RELEASE ORAL at 09:09

## 2018-09-01 RX ADMIN — ASPIRIN 81 MG: 81 TABLET, COATED ORAL at 09:09

## 2018-09-01 NOTE — PT/OT/SLP PROGRESS
Physical Therapy Treatment    Patient Name:  Heather Hughes   MRN:  [id number]    Recommendations:     Discharge Recommendations:          Assessment:     Heather Hughes is a 78 y.o. female admitted with a medical diagnosis of Cellulitis of left lower extremity without foot.  She presents with the following impairments/functional limitations:  weakness, impaired endurance, impaired self care skills, impaired functional mobilty .  Min SOB with ambulation.  No LOB.    Rehab Prognosis:  ; patient would benefit from acute skilled PT services to address these deficits and reach maximum level of function.      Recent Surgery: * No surgery found *      Plan:     During this hospitalization, patient to be seen 6 x/week to address the above listed problems via gait training, therapeutic activities, therapeutic exercises  · Plan of Care Expires:  09/21/18   Plan of Care Reviewed with: patient    Subjective     Communicated with nurse Fuentes prior to session.  Patient found supine upon PT entry to room, agreeable to treatment.      Chief Complaint:   Patient comments/goals: agreeable to tx  Pain/Comfort:  · Pain Rating 1: 0/10    Patients cultural, spiritual, Orthodox conflicts given the current situation:      Objective:     Patient found with: telemetry     General Precautions: Standard, fall   Orthopedic Precautions:N/A   Braces:       Functional Mobility:  · Bed Mobility:     · Rolling Left:  stand by assistance  · Transfers:     · Sit to Stand:  contact guard assistance with rolling walker  · Gait: 70      AM-PAC 6 CLICK MOBILITY          Therapeutic Activities and Exercises:   seated truong pritchard, hr/tr x 20 B each    Patient left up in chair with call button in reach and nurse present..    GOALS:   Multidisciplinary Problems     Physical Therapy Goals     Not on file          Multidisciplinary Problems (Resolved)        Problem: Physical Therapy Goal    Goal Priority Disciplines Outcome Goal Variances  Interventions   Physical Therapy Goal   (Resolved)     PT, PT/OT Outcome(s) achieved     Description:  Goals to be met by: 2018     Patient will increase functional independence with mobility by performin. Supine to sit with [place]  1. Sit to supine with [place]  2. Sit to stand transfer with [place]  3. Bed to chair transfer with Modified [place] using Rolling Walker  4. Gait  x 250 feet with Modified [place] using Rolling Walker.                       Time Tracking:     PT Received On: 18  PT Start Time: 938     PT Stop Time: 950  PT Total Time (min): 12 min     Billable Minutes: Gait Training 12    Treatment Type: Treatment  PT/PTA: PTA     PTA Visit Number: 1     Rowena Vallejo, PTA  2018

## 2018-09-01 NOTE — PLAN OF CARE
Discharge planner sent [facility] home health referral via [facility]. Notified on-call nursing of patient discharge disposition, awaiting call back to accept patient     1248- Patient accepted by [facility]

## 2018-09-01 NOTE — PLAN OF CARE
Discharged pt per MD order. Given d/c instruction and education on new medications with family at bedside. Given RX X1. Pt transferred off unit via w/c with NAD noted. Belongings with family.

## 2018-09-01 NOTE — TELEPHONE ENCOUNTER
Patient was recently admitted to and discharged from hospital.  She should probably scheduled for a hospital follow-up visit if possible.  It appears that she may have been discharged home with home health.  If so, a hospital follow-up visit is probably optional.  Thanks.  Elen

## 2018-09-01 NOTE — PLAN OF CARE
Problem: Fall Risk (Adult)  Goal: Identify Related Risk Factors and Signs and Symptoms  Related risk factors and signs and symptoms are identified upon initiation of Human Response Clinical Practice Guideline (CPG)  Outcome: Ongoing (interventions implemented as appropriate)  Alert/oriented  Chronic pain controlled with ordered medication  Ambulates with walker and stand by assistance  Clear yellow urine/some incontinence  Sinus rhythm  Safe

## 2018-09-02 NOTE — DISCHARGE SUMMARY
[facility] Hospital Medicine  Discharge Summary      Patient Name: Heather Hughes  MRN: [id number]  Admission Date: 8/30/2018  Hospital Length of Stay: 1 days  Discharge Date and Time: 9/1/2018 11:47 AM  Attending Physician: No att. providers found   Discharging Provider: Hoa Clark NP  Primary Care Provider: Graham Mazariegos MD      HPI:   Ms. Hughes is a 77yo F with a PMH of HTN, CAD/MI, Hypothyroidism, and DVT on ASA. She presents to the ED today with c/o left leg redness and swelling. She underwent a left TKA on 8/15/18 per Dr. Santiago and has been doing fairly well since surgery until about 2-3 days ago when she developed swelling to her LLE. She also developed redness to her knee and lower leg areas. Associated symptoms include warmth and intermittent shooting pain to her ankle. She takes hydrocodone for her pain and it was not helping. She gets home health and was advised to go to the hospital. While in the ED, a venous ultrasound was done which was negative for a DVT. IV vancomycin was given. She currently denies pain or discomforts. Her daughter is at bedside.               * No surgery found *      Hospital Course:   Patient monitored during observation stay. She was initiated on IV antibiotics. Orthopedic surgery consulted. PT consulted. Erythema to left knee improving. She is stable for DC from ortho standpoint. Ambulated in rodgers with PT using walker.     PE: ext. LLL +1 edema, no erythema to left knee present.      Consults:   Consults (From admission, onward)        Status Ordering Provider     Inpatient consult to Orthopedic Surgery  Once     Provider:  Shantanu Santiago MD    Completed BREE GALAVIZ          * Cellulitis of left lower extremity without foot    IV vancomycin  Pain management  Consult orthopedics since had recent surgery to affected area          Acquired hypothyroidism    Chronic. Continue home levothyroxine dose.          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/18    Consult PT          Essential hypertension    Chronic/Uncontrolled--possibly due to pain  Continue chronic medications, adjusting as needed  IV Hydralazine prn          NSTEMI (non-ST elevated myocardial infarction)    Continue ASA, Plavix, and statin            Final Active Diagnoses:    Diagnosis Date Noted POA    PRINCIPAL PROBLEM:  Cellulitis of left lower extremity without foot [L03.116] 08/30/2018 Yes    History of DVT (deep vein thrombosis) [Z86.718] 08/31/2018 Not Applicable    Acquired hypothyroidism [E03.9] 08/31/2018 Yes    Left knee pain [M25.562] 08/31/2018 Yes    S/P total knee arthroplasty, left 8/15/18 [Z96.652] 08/15/2018 Not Applicable    Essential hypertension [I10] 08/09/2017 Yes    NSTEMI (non-ST elevated myocardial infarction) [I21.4] 01/13/2017 Yes      Problems Resolved During this Admission:       Discharged Condition: stable    Disposition: Home-Health Care [facility]    Follow Up:  Follow-up Information     Graham Mazariegos MD In 1 week.    Specialty:  Family Medicine  Why:  hospital follow up  Contact information:  [address] [facility] [address]  [phone]             [facility].    Specialties:  Physical Therapy, Home Health Services  Why:  Home Health  Contact information:  [address]  [place] [address]  [phone]                 Patient Instructions:      Ambulatory referral to Home Health   Referral Priority: Routine Referral Type: Home Health   Referral Reason: Specialty Services Required   Requested Specialty: Home Health Services   Number of Visits Requested: 1     Diet Cardiac     Notify your health care provider if you experience any of the following:  severe uncontrolled pain     Notify your health care provider if you experience any of the following:  persistent nausea and vomiting or diarrhea      Notify your health care provider if you experience any of the following:  persistent dizziness, light-headedness, or visual disturbances     Notify your health care provider if you experience any of the following:  worsening rash     Notify your health care provider if you experience any of the following:  redness, tenderness, or signs of infection (pain, swelling, redness, odor or green/yellow discharge around incision site)     Activity as tolerated       Significant Diagnostic Studies: Labs:   BMP:   Recent Labs   Lab  09/01/18   0348   GLU  96   NA  135*   K  3.8   CL  102   CO2  25   BUN  10   CREATININE  0.7   CALCIUM  9.6    and CMP   Recent Labs   Lab  09/01/18   0348   NA  135*   K  3.8   CL  102   CO2  25   GLU  96   BUN  10   CREATININE  0.7   CALCIUM  9.6   ANIONGAP  8   ESTGFRAFRICA  >60   EGFRNONAA  >60     Radiology:   Order Status: Completed Updated: 08/31/18 1439   US Lower Extremity Veins Left [[id number]] Resulted: 08/31/18 0904   Order Status: Completed Updated: 08/31/18 0906   Narrative:     EXAMINATION:  US LOWER EXTREMITY VEINS LEFT    CLINICAL HISTORY:  Other specified soft tissue disorders    TECHNIQUE:  Duplex and color flow Doppler evaluation and graded compression of the left lower extremity veins was performed.    COMPARISON:  None    FINDINGS:  Left thigh veins: The common femoral, femoral, popliteal, upper greater saphenous, and deep femoral veins are patent and free of thrombus. The veins are normally compressible and have normal phasic flow and augmentation response.    Left calf veins: The visualized calf veins are patent.    Contralateral CFV: The contralateral (right) common femoral vein is patent and free of thrombus.    There is edema throughout the leg.   Impression:       No evidence of deep venous thrombosis in the left lower extremity.      Electronically signed by: Yogesh Nicholson MD  Date: 08/31/2018  Time: 09:04   X-Ray Chest AP Portable [[id number]] Resulted: 08/31/18  0845   Order Status: Completed Updated: 08/31/18 0848   Narrative:     EXAMINATION:  XR CHEST AP PORTABLE    CLINICAL HISTORY:  Sepsis;    TECHNIQUE:  Single frontal view of the chest was performed.    COMPARISON:  07/30/2018    FINDINGS:  The cardiomediastinal silhouette is with normal limits.  The lungs are well expanded without consolidation or pleural effusion.  Surgical clips are present at the base of the neck.   Impression:       No acute process.  No significant change.      Electronically signed by: Spencer Kruger MD  Date: 08/31/2018  Time: 08:45   X-Ray Knee 3 View Left [[id number]] Resulted: 08/31/18 0845   Order Status: Completed Updated: 08/31/18 0847   Narrative:     EXAMINATION:  XR KNEE 3 VIEW LEFT    CLINICAL HISTORY:  Pain in left knee    TECHNIQUE:  AP, lateral, and Merchant views of the left knee were performed.    COMPARISON:  None    FINDINGS:  There is a cemented tricompartmental left knee arthroplasty without evidence for loosening, migration, or periprosthetic fracture.  A small joint effusion is present.  Mild lateral patellar tilt is present.  Mild generalized soft tissue swelling is present.   Impression:       Status post left tricompartmental knee arthroplasty.  Small joint effusion without evidence for periprosthetic fracture.  Mild generalized soft tissue swelling.      Electronically signed by: Spencer Kruger MD  Date: 08/31/2018  Time: 08:45            Pending Diagnostic Studies:     None         Medications:  Reconciled Home Medications:      Medication List      START taking these medications    amoxicillin-clavulanate 875-125mg 875-125 mg per tablet  Commonly known as:  AUGMENTIN  Take 1 tablet by mouth 2 (two) times daily. for 7 days        CHANGE how you take these medications    carvedilol 6.25 MG tablet  Commonly known as:  COREG  Take 1 tablet (6.25 mg total) by mouth 2 (two) times daily with meals.  What changed:  when to take this     nitroGLYCERIN 0.4 MG SL  tablet  Commonly known as:  NITROSTAT  Place 1 tablet (0.4 mg total) under the tongue every 5 (five) minutes as needed for Chest pain.  What changed:  additional instructions     omeprazole 40 MG capsule  Commonly known as:  PRILOSEC  Take 1 capsule (40 mg total) by mouth once daily.  What changed:  when to take this     oxyCODONE-acetaminophen  mg per tablet  Commonly known as:  PERCOCET  Take 1-2 tablets by mouth every 6 (six) hours as needed for Pain.  What changed:  additional instructions        CONTINUE taking these medications    acetaminophen 650 MG Tbsr  Commonly known as:  TYLENOL  Take 650 mg by mouth every 8 (eight) hours as needed.     albuterol 90 mcg/actuation inhaler  Inhale 2 puffs into the lungs every 6 (six) hours as needed for Wheezing.     aspirin 325 MG tablet  Take 1 tablet (325 mg total) by mouth 2 (two) times daily.     benzonatate 200 MG capsule  Commonly known as:  TESSALON  Take 1 capsule (200 mg total) by mouth 3 (three) times daily as needed for Cough.     CALCIUM 500 + D ORAL  Take 2 tablets by mouth once daily.     clopidogrel 75 mg tablet  Commonly known as:  PLAVIX  Take 1 tablet (75 mg total) by mouth once daily.     ergocalciferol 50,000 unit Cap  Commonly known as:  ERGOCALCIFEROL  TAKE ONE CAPSULE BY MOUTH EVERY 7 DAYS     glucosamine-chondroitin 500-400 mg tablet  Take 1 tablet by mouth 3 (three) times daily.     montelukast 10 mg tablet  Commonly known as:  SINGULAIR  Take 1 tablet (10 mg total) by mouth every evening.     phenazopyridine 95 MG tablet  Commonly known as:  PYRIDIUM  Take 95 mg by mouth once daily.     SYNTHROID 112 MCG tablet  Generic drug:  levothyroxine  TAKE ONE TABLET BY MOUTH EVERY DAY BEFORE BREAKFAST            Indwelling Lines/Drains at time of discharge:   Lines/Drains/Airways          None          Time spent on the discharge of patient: 30 minutes  Patient was seen and examined on the date of discharge and determined to be suitable for  discharge.         Hoa Clark NP  Department of Hospital Medicine  [facility]

## 2018-09-02 NOTE — HOSPITAL COURSE
Patient monitored during observation stay. She was initiated on IV antibiotics. Orthopedic surgery consulted. PT consulted. Erythema to left knee improving. She is stable for DC from ortho standpoint. Ambulated in rodgers with PT using walker.     PE: ext. LLL +1 edema, no erythema to left knee present.

## 2018-09-04 ENCOUNTER — TELEPHONE (OUTPATIENT)
Dept: ORTHOPEDICS | Facility: CLINIC | Age: 78
End: 2018-09-04

## 2018-09-04 NOTE — TELEPHONE ENCOUNTER
Called and LVM at provider's request.  Asked pt to return call so we can reschedule her hospital follow up.

## 2018-09-05 LAB
BACTERIA BLD CULT: NORMAL
BACTERIA BLD CULT: NORMAL

## 2018-09-05 NOTE — TELEPHONE ENCOUNTER
----- Message from Luz Dubose sent at 9/5/2018  9:59 AM CDT -----  Contact: Heather   Calling to get a hospital follow up for 1 week from 09/01. She was in for cellulitis in her knee and leg after her surgery. Please call her [phone] (home)

## 2018-09-07 NOTE — TELEPHONE ENCOUNTER
----- Message from Kathy Villa MA sent at 9/7/2018  2:01 PM CDT -----  Contact: Self  oxyCODONE-acetaminophen (PERCOCET)  mg per tablet  SENT TO  [facility] [address]  [place], [place] - [address] AT [facility] [address]   [phone] (Phone)  [phone] (Fax)

## 2018-09-10 ENCOUNTER — DOCUMENTATION ONLY (OUTPATIENT)
Dept: FAMILY MEDICINE | Facility: CLINIC | Age: 78
End: 2018-09-10

## 2018-09-10 RX ORDER — OXYCODONE AND ACETAMINOPHEN 10; 325 MG/1; MG/1
1-2 TABLET ORAL EVERY 6 HOURS PRN
Qty: 40 TABLET | Refills: 0 | Status: SHIPPED | OUTPATIENT
Start: 2018-09-10 | End: 2018-10-30 | Stop reason: ALTCHOICE

## 2018-09-10 NOTE — TELEPHONE ENCOUNTER
Returned pt's call, advised that her prescriptions have been refilled as requested.  She advised that the pharmacy will be calling about some issue they are having.

## 2018-09-10 NOTE — PROGRESS NOTES
Pre-Visit Chart Review  For Appointment Scheduled on 9-13-18    Health Maintenance Due   Topic Date Due    Zoster Vaccine  06/22/2001    Influenza Vaccine  08/01/2018

## 2018-09-11 ENCOUNTER — HOSPITAL ENCOUNTER (OUTPATIENT)
Dept: RADIOLOGY | Facility: HOSPITAL | Age: 78
Discharge: HOME | End: 2018-09-11
Attending: ORTHOPAEDIC SURGERY
Payer: MEDICARE

## 2018-09-11 ENCOUNTER — OFFICE VISIT (OUTPATIENT)
Dept: ORTHOPEDICS | Facility: CLINIC | Age: 78
End: 2018-09-11
Payer: MEDICARE

## 2018-09-11 VITALS
HEART RATE: 74 BPM | HEIGHT: 63 IN | SYSTOLIC BLOOD PRESSURE: 197 MMHG | WEIGHT: 212.94 LBS | BODY MASS INDEX: 37.73 KG/M2 | DIASTOLIC BLOOD PRESSURE: 87 MMHG

## 2018-09-11 DIAGNOSIS — Z96.652 S/P TOTAL KNEE ARTHROPLASTY, LEFT: Primary | ICD-10-CM

## 2018-09-11 DIAGNOSIS — M25.562 LEFT KNEE PAIN, UNSPECIFIED CHRONICITY: Primary | ICD-10-CM

## 2018-09-11 DIAGNOSIS — M25.562 LEFT KNEE PAIN, UNSPECIFIED CHRONICITY: ICD-10-CM

## 2018-09-11 PROCEDURE — 73560 X-RAY EXAM OF KNEE 1 OR 2: CPT | Mod: 26,LT,, | Performed by: RADIOLOGY

## 2018-09-11 PROCEDURE — 73560 X-RAY EXAM OF KNEE 1 OR 2: CPT | Mod: TC,PN,LT

## 2018-09-11 PROCEDURE — 99213 OFFICE O/P EST LOW 20 MIN: CPT | Mod: PBBFAC,25,PN | Performed by: ORTHOPAEDIC SURGERY

## 2018-09-11 PROCEDURE — 99024 POSTOP FOLLOW-UP VISIT: CPT | Mod: ,,, | Performed by: ORTHOPAEDIC SURGERY

## 2018-09-11 PROCEDURE — 99999 PR PBB SHADOW E&M-EST. PATIENT-LVL III: CPT | Mod: PBBFAC,,, | Performed by: ORTHOPAEDIC SURGERY

## 2018-09-14 NOTE — PROGRESS NOTES
Past Medical History:   Diagnosis Date    Allergy     Anticoagulant long-term use     Arthritis     Asthma     Coronary artery disease     stent x 1    Deep vein blood clot of right lower extremity 1965 & 1971    Full dentures     Heart attack 01/13/2017    Hypertension     Osteoporosis     Thyroid disease     Ulcer     Wears glasses        Past Surgical History:   Procedure Laterality Date    ARTHROPLASTY, KNEE Left 8/15/2018    Per[facility] Shantanu Santiago [facility] at [facility] OR    [facility] stent  01/13/2017    CHOLECYSTECTOMY      EYE SURGERY      bilateral cataracts    FRACTURE SURGERY  2011    left wrist     KNEE ARTHROPLASTY Left 8/15/2018    Procedure: ARTHROPLASTY, KNEE;  Surgeon: Shantanu Santiago MD;  Location: [facility];  Service: Orthopedics;  Laterality: Left;  ANESTHESIA GENERAL AND BLOCK    STENT-CORONARY N/A 1/13/2017    Performed by [facility] at [facility] CATH LAB    THYROIDECTOMY         Current Outpatient Medications   Medication Sig    acetaminophen (TYLENOL) 650 MG TbSR Take 650 mg by mouth every 8 (eight) hours as needed.    albuterol 90 mcg/actuation inhaler Inhale 2 puffs into the lungs every 6 (six) hours as needed for Wheezing.    aspirin 325 MG tablet Take 1 tablet (325 mg total) by mouth 2 (two) times daily.    benzonatate (TESSALON) 200 MG capsule Take 1 capsule (200 mg total) by mouth 3 (three) times daily as needed for Cough.    CALCIUM CARBONATE/VITAMIN D3 (CALCIUM 500 + D ORAL) Take 2 tablets by mouth once daily.    carvedilol (COREG) 6.25 MG tablet Take 1 tablet (6.25 mg total) by mouth 2 (two) times daily with meals. (Patient taking differently: Take 6.25 mg by mouth once daily. )    clopidogrel (PLAVIX) 75 mg tablet Take 1 tablet (75 mg total) by mouth once daily.    ergocalciferol (ERGOCALCIFEROL) 50,000 unit Cap TAKE ONE CAPSULE BY MOUTH EVERY 7 DAYS    glucosamine-chondroitin 500-400 mg tablet Take 1 tablet by mouth 3 (three) times daily.     montelukast (SINGULAIR) 10 mg tablet Take 1 tablet (10 mg total) by mouth every evening.    nitroGLYCERIN (NITROSTAT) 0.4 MG SL tablet Place 1 tablet (0.4 mg total) under the tongue every 5 (five) minutes as needed for Chest pain. (Patient taking differently: Place 0.4 mg under the tongue every 5 (five) minutes as needed for Chest pain. Seek medical help if pain is not relieved by the third dose.)    omeprazole (PRILOSEC) 40 MG capsule Take 1 capsule (40 mg total) by mouth once daily. (Patient taking differently: Take 40 mg by mouth every evening. )    oxyCODONE-acetaminophen (PERCOCET)  mg per tablet Take 1-2 tablets by mouth every 6 (six) hours as needed for Pain.    phenazopyridine (PYRIDIUM) 95 MG tablet Take 95 mg by mouth once daily.    SYNTHROID 112 mcg tablet TAKE ONE TABLET BY MOUTH EVERY DAY BEFORE BREAKFAST     No current facility-administered medications for this visit.        Review of patient's allergies indicates:   Allergen Reactions    Lipitor [atorvastatin] Other (See Comments)     Fall/ balance     Myrbetriq [mirabegron] Swelling and Other (See Comments)     Legs swelling, couldn't void     Pravastatin Other (See Comments)     Balance problem     Codeine Nausea Only    Mobic [meloxicam] Other (See Comments)     Not sure reaction    Perfume Other (See Comments)     And flowers cause allergy and makes her cough.    Sulfur Other (See Comments)     Was told not to take as a child       Family History   Problem Relation Age of Onset    Lupus Mother     Osteoporosis Mother     Heart disease Mother     Ulcers Mother     Esophageal cancer Father     Cancer Father         esophageal    DIEGO disease Father     Cancer Brother     Esophageal cancer Brother     Lupus Daughter     DIEGO disease Son     Heart disease Maternal Aunt     Cancer Maternal Aunt     Kidney disease Maternal Aunt     Kidney cancer Maternal Aunt     Liver cancer Maternal Aunt     Dementia Maternal Aunt      Cancer Maternal Uncle     Colon cancer Maternal Uncle     Breast cancer Maternal Uncle     Lung cancer Maternal Uncle         smoker    Parkinsonism Paternal Aunt     Cancer Paternal Aunt     Stomach cancer Paternal Aunt     Heart disease Paternal Uncle     Cancer Paternal Uncle     Stomach cancer Paternal Uncle         x2    Bone cancer Paternal Uncle     Cancer Maternal Grandmother         tumors on head and body    Parkinsonism Paternal Grandmother     DIEGO disease Brother     Ulcers Brother     Dementia Brother     Early death Son         self    Bipolar disorder Son     Cancer Son     Esophageal cancer Son     Drug abuse Son        Social History     Socioeconomic History    Marital status:      Spouse name: Not on file    Number of children: Not on file    Years of education: Not on file    Highest education level: Not on file   Social Needs    Financial resource strain: Not on file    Food insecurity - worry: Not on file    Food insecurity - inability: Not on file    Transportation needs - medical: Not on file    Transportation needs - non-medical: Not on file   Occupational History    Not on file   Tobacco Use    Smoking status: Former Smoker     Packs/day: 0.50     Types: Cigarettes     Last attempt to quit: 2017     Years since quittin.6    Smokeless tobacco: Never Used   Substance and Sexual Activity    Alcohol use: No    Drug use: No    Sexual activity: No   Other Topics Concern    Not on file   Social History Narrative    Not on file       Chief Complaint:   Chief Complaint   Patient presents with    Post-op Evaluation     S/P LEFT TKA 8/15/18       Consulting Physician: No ref. provider found    History of present illness: This is a 78 y.o. female following up for left TKA 8-15-18.      Review of Systems:    Constitution: Denies chills, fever, and sweats.    HENT: Denies headaches or blurry vision.    Cardiovascular: Denies chest pain or irregular  heart beat.    Respiratory: Denies cough or shortness of breath.    Gastrointestinal: Denies abdominal pain, nausea, or vomiting.    Musculoskeletal:  Denies muscle cramps.    Neurological: Denies dizziness or focal weakness.    Psychiatric/Behavioral: Normal mental status.    Hematologic/Lymphatic: Denies bleeding problem or easy bruising/bleeding.    Skin: Denies rash or suspicious lesions.      Examination:    Vital Signs:    Vitals:    09/11/18 1309   BP: (!) 197/87   Pulse: 74       Body mass index is 37.72 kg/m².    This a well-developed, well nourished patient in no acute distress.    Alert and oriented and cooperative to examination.       Physical Exam: left knee    Inspection/Observation   Swelling:   Mild, small fracture blister distal tibia.  Erythema:   none  Bruising:   none  Wounds:   Clean and dry  Drainage:  None    Range of Motion   5-110    Muscle Strength   4    Other   Sensation:  normal  Pulse:    palpable    Imaging: Normal post-operative XR     Assessment: S/P total knee arthroplasty, left        Plan: Doing well. Continue PT. Back in 4 weeks.      DISCLAIMER: This note may have been dictated using voice recognition software and may contain grammatical errors. Report sent to referring provider as required.

## 2018-09-17 ENCOUNTER — TELEPHONE (OUTPATIENT)
Dept: ORTHOPEDICS | Facility: CLINIC | Age: 78
End: 2018-09-17

## 2018-09-17 NOTE — TELEPHONE ENCOUNTER
----- Message from Papi Carrillo sent at 9/17/2018  9:51 AM CDT -----  Contact: Gilbert STEVENSON with [facility]  Gilbert STEVENSON with [facility], [phone]. Calling to give an update on patient's status. Please advise. Thanks.

## 2018-09-17 NOTE — TELEPHONE ENCOUNTER
Returned Gilbert's call, he advised that pt's left leg is longer than right, so she compensates by bending left knee.  He explained that due to this he does not feel that she will ever get full extension.  Other than that, she is doing well.  He just wanted to give office an update.

## 2018-09-18 DIAGNOSIS — S80.02XA CONTUSION OF LEFT KNEE, INITIAL ENCOUNTER: ICD-10-CM

## 2018-09-18 DIAGNOSIS — S40.012A CONTUSION OF LEFT SHOULDER, INITIAL ENCOUNTER: Primary | ICD-10-CM

## 2018-09-18 RX ORDER — TRAMADOL HYDROCHLORIDE 50 MG/1
50 TABLET ORAL EVERY 6 HOURS PRN
Qty: 40 TABLET | Refills: 0 | OUTPATIENT
Start: 2018-09-18 | End: 2018-09-28

## 2018-09-18 RX ORDER — TRAMADOL HYDROCHLORIDE 50 MG/1
50 TABLET ORAL EVERY 6 HOURS PRN
Qty: 40 TABLET | Refills: 0 | Status: SHIPPED | OUTPATIENT
Start: 2018-09-18 | End: 2018-09-28

## 2018-09-18 NOTE — TELEPHONE ENCOUNTER
"----- Message from Shantanu Santiago MD sent at 9/18/2018 12:22 PM CDT -----  Contact: Kerri//[facility]  Yes.  ----- Message -----  From: Monika Larkin LPN  Sent: 9/18/2018   9:52 AM  To: Shantanu Santiago MD     Would you like to change her medication to Tramadol?  Please adviseMonika  ----- Message -----  From: Kathy Villa MA  Sent: 9/18/2018   9:38 AM  To: [facility] Staff    Kerri states the patient is taking Percocet 10. The patient does not like the way the medication is making her feel. The patient is requesting a medication that does make her feel, "out of it", and that does not make her sleep all day.    Call Back# [phone]  Thanks      "

## 2018-09-20 DIAGNOSIS — Z96.652 HISTORY OF TOTAL LEFT KNEE REPLACEMENT: Primary | ICD-10-CM

## 2018-09-25 ENCOUNTER — TELEPHONE (OUTPATIENT)
Dept: FAMILY MEDICINE | Facility: CLINIC | Age: 78
End: 2018-09-25

## 2018-09-25 NOTE — TELEPHONE ENCOUNTER
Spoke to patient- she cancelled hospital f/u appt after knee surgery- she has fatigue and is anemic per cbc done recently- appt made with Dr. Mazariegos.

## 2018-09-25 NOTE — TELEPHONE ENCOUNTER
----- Message from Sindy Singh sent at 9/25/2018 10:36 AM CDT -----  Contact: Pt  Name of Who is Calling: Heather      What is the request in detail: Patient is calling requesting to reschedule her follow up visit for next week. Patient states 10/25 is too long, because she's already been waiting. Patient states if she doesn't have to come in please let her know      Can the clinic reply by [facility]:       What Number to Call Back if not in [facility]: [phone] (home)

## 2018-09-26 ENCOUNTER — DOCUMENTATION ONLY (OUTPATIENT)
Dept: FAMILY MEDICINE | Facility: CLINIC | Age: 78
End: 2018-09-26

## 2018-09-26 NOTE — PROGRESS NOTES
Pre-Visit Chart Review  For Appointment Scheduled on 10-3-18    Health Maintenance Due   Topic Date Due    Zoster Vaccine  06/22/2001    Influenza Vaccine  08/01/2018

## 2018-10-02 ENCOUNTER — OFFICE VISIT (OUTPATIENT)
Dept: ORTHOPEDICS | Facility: CLINIC | Age: 78
End: 2018-10-02
Payer: MEDICARE

## 2018-10-02 VITALS
SYSTOLIC BLOOD PRESSURE: 178 MMHG | DIASTOLIC BLOOD PRESSURE: 80 MMHG | HEIGHT: 63 IN | WEIGHT: 212.94 LBS | HEART RATE: 68 BPM | BODY MASS INDEX: 37.73 KG/M2

## 2018-10-02 DIAGNOSIS — Z96.652 S/P TOTAL KNEE ARTHROPLASTY, LEFT: Primary | ICD-10-CM

## 2018-10-02 PROCEDURE — 99024 POSTOP FOLLOW-UP VISIT: CPT | Mod: ,,, | Performed by: ORTHOPAEDIC SURGERY

## 2018-10-02 PROCEDURE — 99213 OFFICE O/P EST LOW 20 MIN: CPT | Mod: PBBFAC,PN | Performed by: ORTHOPAEDIC SURGERY

## 2018-10-02 PROCEDURE — 99999 PR PBB SHADOW E&M-EST. PATIENT-LVL III: CPT | Mod: PBBFAC,,, | Performed by: ORTHOPAEDIC SURGERY

## 2018-10-02 RX ORDER — TRAMADOL HYDROCHLORIDE 50 MG/1
50 TABLET ORAL EVERY 6 HOURS PRN
COMMUNITY
End: 2018-10-30 | Stop reason: SDUPTHER

## 2018-10-03 ENCOUNTER — OFFICE VISIT (OUTPATIENT)
Dept: FAMILY MEDICINE | Facility: CLINIC | Age: 78
End: 2018-10-03
Attending: FAMILY MEDICINE
Payer: MEDICARE

## 2018-10-03 ENCOUNTER — TELEPHONE (OUTPATIENT)
Dept: ORTHOPEDICS | Facility: CLINIC | Age: 78
End: 2018-10-03

## 2018-10-03 VITALS
RESPIRATION RATE: 16 BRPM | DIASTOLIC BLOOD PRESSURE: 80 MMHG | SYSTOLIC BLOOD PRESSURE: 152 MMHG | HEART RATE: 66 BPM | BODY MASS INDEX: 36.64 KG/M2 | HEIGHT: 63 IN | OXYGEN SATURATION: 96 % | TEMPERATURE: 98 F | WEIGHT: 206.81 LBS

## 2018-10-03 DIAGNOSIS — E66.01 SEVERE OBESITY (BMI 35.0-39.9) WITH COMORBIDITY: ICD-10-CM

## 2018-10-03 DIAGNOSIS — I10 HYPERTENSION, POOR CONTROL: Primary | ICD-10-CM

## 2018-10-03 DIAGNOSIS — L03.116 CELLULITIS OF LEFT LOWER EXTREMITY WITHOUT FOOT: ICD-10-CM

## 2018-10-03 DIAGNOSIS — I10 HYPERTENSION, ESSENTIAL: ICD-10-CM

## 2018-10-03 DIAGNOSIS — E03.9 ACQUIRED HYPOTHYROIDISM: ICD-10-CM

## 2018-10-03 DIAGNOSIS — Z86.718 HISTORY OF DVT (DEEP VEIN THROMBOSIS): ICD-10-CM

## 2018-10-03 DIAGNOSIS — Z96.652 S/P TOTAL KNEE ARTHROPLASTY, LEFT: ICD-10-CM

## 2018-10-03 PROBLEM — I25.2 HISTORY OF MI (MYOCARDIAL INFARCTION): Status: ACTIVE | Noted: 2017-01-13

## 2018-10-03 PROCEDURE — 3077F SYST BP >= 140 MM HG: CPT | Mod: CPTII,,, | Performed by: FAMILY MEDICINE

## 2018-10-03 PROCEDURE — 3079F DIAST BP 80-89 MM HG: CPT | Mod: CPTII,,, | Performed by: FAMILY MEDICINE

## 2018-10-03 PROCEDURE — 1101F PT FALLS ASSESS-DOCD LE1/YR: CPT | Mod: CPTII,,, | Performed by: FAMILY MEDICINE

## 2018-10-03 PROCEDURE — 99214 OFFICE O/P EST MOD 30 MIN: CPT | Mod: S$PBB,,, | Performed by: FAMILY MEDICINE

## 2018-10-03 PROCEDURE — 99213 OFFICE O/P EST LOW 20 MIN: CPT | Mod: PBBFAC,PO | Performed by: FAMILY MEDICINE

## 2018-10-03 PROCEDURE — 99999 PR PBB SHADOW E&M-EST. PATIENT-LVL III: CPT | Mod: PBBFAC,,, | Performed by: FAMILY MEDICINE

## 2018-10-03 PROCEDURE — 90662 IIV NO PRSV INCREASED AG IM: CPT | Mod: PBBFAC,PO

## 2018-10-03 RX ORDER — CARVEDILOL 6.25 MG/1
6.25 TABLET ORAL 2 TIMES DAILY WITH MEALS
Qty: 180 TABLET | Refills: 3 | Status: SHIPPED | OUTPATIENT
Start: 2018-10-03 | End: 2018-11-29 | Stop reason: SDUPTHER

## 2018-10-03 NOTE — TELEPHONE ENCOUNTER
----- Message from Kathy Villa MA sent at 10/3/2018  9:46 AM CDT -----  Contact: Ashlie//[facility]  Home Health orders needs to be discussed    Call Back# [phone]  Thanks

## 2018-10-03 NOTE — PROGRESS NOTES
Subjective:       Patient ID: Heather Hughes is a 78 y.o. female.    Chief Complaint: Follow-up (left kneee replacement f/u)    78-year-old female with a history of coronary disease with prior MI, hypertension, asthma, hypothyroidism and deep vein thrombosis recently underwent a left total knee arthroplasty with Dr. Santiago.  Surgery went fairly well but postoperatively and post discharge she develops some redness and swelling in her left leg and having had a deep vein thrombosis in the past she presume that she had a repeat of that and went to the emergency room immediately.  Ultrasound was negative and she was found to have cellulitis of the left lower leg but not the foot.  She was treated and has now resolved and her symptoms have resolved.  Post discharge she was given Percocet which was way too strong for and she was having problems with lightheadedness weakness and some diaphoresis so she stopped taking it.  She is on tramadol now an using generally 1 a day although she has use none in the last 2 days.  She is working with home physical therapy through [facility] and has graduated from that program and will be going to physical therapy outpatient in the near future.  She is having some problems with feeling tired weak and cold.  She is noted to have had anemia postop which is slowly improving.  She does have a history of hypothyroidism and a recent TSH was normal so I suspect that is the iron deficiency that is giving her symptoms.  She are  both need the flu vaccine.    Past Medical History:  No date: Allergy  No date: Anticoagulant long-term use  No date: Arthritis  No date: Asthma  No date: Coronary artery disease      Comment:  stent x 1  1965 & 1971: Deep vein blood clot of right lower extremity  No date: Full dentures  01/13/2017: Heart attack  No date: Hypertension  No date: Osteoporosis  No date: Thyroid disease  No date: Ulcer  No date: Wears glasses    Past Surgical  History:  8/15/2018: ARTHROPLASTY, KNEE; Left      Comment:  Performed by Shantanu Santiago MD at [facility] OR  01/13/2017: cardic stent  No date: CHOLECYSTECTOMY  No date: EYE SURGERY      Comment:  bilateral cataracts  2011: FRACTURE SURGERY      Comment:  left wrist   8/15/2018: KNEE ARTHROPLASTY; Left      Comment:  Procedure: ARTHROPLASTY, KNEE;  Surgeon: Shantanu Santiago MD;  Location: [facility] OR;  Service: Orthopedics;                 Laterality: Left;  ANESTHESIA GENERAL AND BLOCK  1/13/2017: STENT-CORONARY; N/A      Comment:  Performed by Yg Potter MD at [facility] CATH LAB  No date: THYROIDECTOMY    Current Outpatient Medications on File Prior to Visit:  acetaminophen (TYLENOL) 650 MG TbSR, Take 650 mg by mouth every 8 (eight) hours as needed., Disp: , Rfl:   albuterol 90 mcg/actuation inhaler, Inhale 2 puffs into the lungs every 6 (six) hours as needed for Wheezing., Disp: 18 g, Rfl: 2  benzonatate (TESSALON) 200 MG capsule, Take 1 capsule (200 mg total) by mouth 3 (three) times daily as needed for Cough., Disp: 60 capsule, Rfl: 5  CALCIUM CARBONATE/VITAMIN D3 (CALCIUM 500 + D ORAL), Take 2 tablets by mouth once daily., Disp: , Rfl:   clopidogrel (PLAVIX) 75 mg tablet, Take 1 tablet (75 mg total) by mouth once daily., Disp: 90 tablet, Rfl: 3  ergocalciferol (ERGOCALCIFEROL) 50,000 unit Cap, TAKE ONE CAPSULE BY MOUTH EVERY 7 DAYS, Disp: 12 capsule, Rfl: 1  glucosamine-chondroitin 500-400 mg tablet, Take 1 tablet by mouth 3 (three) times daily., Disp: , Rfl:   montelukast (SINGULAIR) 10 mg tablet, Take 1 tablet (10 mg total) by mouth every evening., Disp: 90 tablet, Rfl: 3  nitroGLYCERIN (NITROSTAT) 0.4 MG SL tablet, Place 1 tablet (0.4 mg total) under the tongue every 5 (five) minutes as needed for Chest pain. (Patient taking differently: Place 0.4 mg under the tongue every 5 (five) minutes as needed for Chest pain. Seek medical help if pain is not relieved by the third dose.), Disp: 25 tablet,  Rfl: 3  omeprazole (PRILOSEC) 40 MG capsule, Take 1 capsule (40 mg total) by mouth once daily. (Patient taking differently: Take 40 mg by mouth every evening. ), Disp: 90 capsule, Rfl: 3  phenazopyridine (PYRIDIUM) 95 MG tablet, Take 95 mg by mouth once daily., Disp: , Rfl:   SYNTHROID 112 mcg tablet, TAKE ONE TABLET BY MOUTH EVERY DAY BEFORE BREAKFAST, Disp: 30 tablet, Rfl: 10  traMADol (ULTRAM) 50 mg tablet, Take 50 mg by mouth every 6 (six) hours as needed for Pain., Disp: , Rfl:   (DISCONTINUED) carvedilol (COREG) 6.25 MG tablet, Take 1 tablet (6.25 mg total) by mouth 2 (two) times daily with meals. (Patient taking differently: Take 6.25 mg by mouth once daily. ), Disp: 90 tablet, Rfl: 3  aspirin 325 MG tablet, Take 1 tablet (325 mg total) by mouth 2 (two) times daily., Disp: , Rfl: 0  oxyCODONE-acetaminophen (PERCOCET)  mg per tablet, Take 1-2 tablets by mouth every 6 (six) hours as needed for Pain., Disp: 40 tablet, Rfl: 0    No current facility-administered medications on file prior to visit.           Review of Systems   Constitutional: Positive for fatigue.   Musculoskeletal: Positive for arthralgias and joint swelling.   Neurological: Positive for weakness.       Objective:      Physical Exam   Constitutional: She is oriented to person, place, and time. She appears well-developed. No distress.   Blood pressure elevated and she is taking only 1 Coreg a day instead of the 2 a day ordered  Severe obesity with a BMI of 36.6 she is up 0.5 lb from her July 30, 2018 preop visit   HENT:   Head: Normocephalic and atraumatic.   Eyes: EOM are normal. Pupils are equal, round, and reactive to light. No scleral icterus.   Neck: Normal range of motion. Neck supple.   Cardiovascular: Normal rate, regular rhythm and normal heart sounds. Exam reveals no gallop and no friction rub.   No murmur heard.  Pulmonary/Chest: Effort normal and breath sounds normal. No stridor. No respiratory distress. She has no wheezes. She  has no rales.   Abdominal: Soft. Bowel sounds are normal. There is no tenderness.   Musculoskeletal: She exhibits no edema or tenderness (No cords or calf tenderness left leg).        Left knee: She exhibits decreased range of motion, swelling, effusion and erythema.   Neurological: She is alert and oriented to person, place, and time.   Skin: She is not diaphoretic.   Psychiatric: She has a normal mood and affect. Her behavior is normal. Judgment and thought content normal.   Nursing note and vitals reviewed.      Assessment:       1. Hypertension, poor control    2. Cellulitis of left lower extremity without foot    3. S/P total knee arthroplasty, left 8/15/18    4. Acquired hypothyroidism    5. History of DVT (deep vein thrombosis)    6. Hypertension, essential    7. Severe obesity (BMI 35.0-39.9) with comorbidity        Plan:       1. Hypertension, poor control  See below    2. Cellulitis of left lower extremity without foot  Resolved    3. S/P total knee arthroplasty, left 8/15/18  Appropriate erythema and heat to the stage of healing but no sign of active infection or DVT    4. Acquired hypothyroidism  Lab Results   Component Value Date    TSH 0.556 04/11/2018     No changes needed    5. History of DVT (deep vein thrombosis)    6. Hypertension, essential  See below  - carvedilol (COREG) 6.25 MG tablet; Take 1 tablet (6.25 mg total) by mouth 2 (two) times daily with meals.  Dispense: 180 tablet; Refill: 3    7. Severe obesity (BMI 35.0-39.9) with comorbidity           Patient readiness: acceptance and barriers:none    During the course of the visit the patient was educated and counseled about the following:     Hypertension:   Medication: Resume Coreg 6.25 twice daily as ordered.  Dietary sodium restriction.  Regular aerobic exercise.  Obesity:   General weight loss/lifestyle modification strategies discussed (elicit support from others; identify saboteurs; non-food rewards, etc).  Informal exercise measures  discussed, e.g. taking stairs instead of elevator.  Regular aerobic exercise program discussed.    Goals: Hypertension: Reduce Blood Pressure and Obesity: Reduce calorie intake and BMI    Did patient meet goals/outcomes: Yes    The following self management tools provided: blood pressure log  excercise log    Patient Instructions (the written plan) was given to the patient/family.     Time spent with patient: 30 minutes

## 2018-10-03 NOTE — TELEPHONE ENCOUNTER
Returned call to Ashlie, verified that patient will begin out patient physical therapy next week and can be discharged from home health.

## 2018-10-05 NOTE — PROGRESS NOTES
Past Medical History:   Diagnosis Date    Allergy     Anticoagulant long-term use     Arthritis     Asthma     Coronary artery disease     stent x 1    Deep vein blood clot of right lower extremity 1965 & 1971    Full dentures     Heart attack 01/13/2017    Hypertension     Osteoporosis     Thyroid disease     Ulcer     Wears glasses        Past Surgical History:   Procedure Laterality Date    ARTHROPLASTY, KNEE Left 8/15/2018    [facility]anu [facility] at [facility] OR    [facility] stent  01/13/2017    CHOLECYSTECTOMY      EYE SURGERY      bilateral cataracts    FRACTURE SURGERY  2011    left wrist     KNEE ARTHROPLASTY Left 8/15/2018    Procedure: ARTHROPLASTY, KNEE;  Surgeon: Shantanu Santiago MD;  Location: [facility];  Service: Orthopedics;  Laterality: Left;  ANESTHESIA GENERAL AND BLOCK    STENT-CORONARY N/A 1/13/2017    [facility] C[facility] LAB    THYROIDECTOMY         Current Outpatient Medications   Medication Sig    acetaminophen (TYLENOL) 650 MG TbSR Take 650 mg by mouth every 8 (eight) hours as needed.    albuterol 90 mcg/actuation inhaler Inhale 2 puffs into the lungs every 6 (six) hours as needed for Wheezing.    benzonatate (TESSALON) 200 MG capsule Take 1 capsule (200 mg total) by mouth 3 (three) times daily as needed for Cough.    CALCIUM CARBONATE/VITAMIN D3 (CALCIUM 500 + D ORAL) Take 2 tablets by mouth once daily.    clopidogrel (PLAVIX) 75 mg tablet Take 1 tablet (75 mg total) by mouth once daily.    ergocalciferol (ERGOCALCIFEROL) 50,000 unit Cap TAKE ONE CAPSULE BY MOUTH EVERY 7 DAYS    glucosamine-chondroitin 500-400 mg tablet Take 1 tablet by mouth 3 (three) times daily.    montelukast (SINGULAIR) 10 mg tablet Take 1 tablet (10 mg total) by mouth every evening.    nitroGLYCERIN (NITROSTAT) 0.4 MG SL tablet Place 1 tablet (0.4 mg total) under the tongue every 5 (five) minutes as needed for Chest pain. (Patient taking differently: Place  0.4 mg under the tongue every 5 (five) minutes as needed for Chest pain. Seek medical help if pain is not relieved by the third dose.)    omeprazole (PRILOSEC) 40 MG capsule Take 1 capsule (40 mg total) by mouth once daily. (Patient taking differently: Take 40 mg by mouth every evening. )    phenazopyridine (PYRIDIUM) 95 MG tablet Take 95 mg by mouth once daily.    SYNTHROID 112 mcg tablet TAKE ONE TABLET BY MOUTH EVERY DAY BEFORE BREAKFAST    traMADol (ULTRAM) 50 mg tablet Take 50 mg by mouth every 6 (six) hours as needed for Pain.    aspirin 325 MG tablet Take 1 tablet (325 mg total) by mouth 2 (two) times daily.    carvedilol (COREG) 6.25 MG tablet Take 1 tablet (6.25 mg total) by mouth 2 (two) times daily with meals.    oxyCODONE-acetaminophen (PERCOCET)  mg per tablet Take 1-2 tablets by mouth every 6 (six) hours as needed for Pain.     No current facility-administered medications for this visit.        Review of patient's allergies indicates:   Allergen Reactions    Lipitor [atorvastatin] Other (See Comments)     Fall/ balance     Myrbetriq [mirabegron] Swelling and Other (See Comments)     Legs swelling, couldn't void     Pravastatin Other (See Comments)     Balance problem     Codeine Nausea Only    Mobic [meloxicam] Other (See Comments)     Not sure reaction    Perfume Other (See Comments)     And flowers cause allergy and makes her cough.    Sulfur Other (See Comments)     Was told not to take as a child       Family History   Problem Relation Age of Onset    Lupus Mother     Osteoporosis Mother     Heart disease Mother     Ulcers Mother     Esophageal cancer Father     Cancer Father         esophageal    DIEGO disease Father     Cancer Brother     Esophageal cancer Brother     Lupus Daughter     DIEGO disease Son     Heart disease Maternal Aunt     Cancer Maternal Aunt     Kidney disease Maternal Aunt     Kidney cancer Maternal Aunt     Liver cancer Maternal Aunt      Dementia Maternal Aunt     Cancer Maternal Uncle     Colon cancer Maternal Uncle     Breast cancer Maternal Uncle     Lung cancer Maternal Uncle         smoker    Parkinsonism Paternal Aunt     Cancer Paternal Aunt     Stomach cancer Paternal Aunt     Heart disease Paternal Uncle     Cancer Paternal Uncle     Stomach cancer Paternal Uncle         x2    Bone cancer Paternal Uncle     Cancer Maternal Grandmother         tumors on head and body    Parkinsonism Paternal Grandmother     DIEGO disease Brother     Ulcers Brother     Dementia Brother     Early death Son         self    Bipolar disorder Son     Cancer Son     Esophageal cancer Son     Drug abuse Son        Social History     Socioeconomic History    Marital status:      Spouse name: Not on file    Number of children: Not on file    Years of education: Not on file    Highest education level: Not on file   Social Needs    Financial resource strain: Not on file    Food insecurity - worry: Not on file    Food insecurity - inability: Not on file    Transportation needs - medical: Not on file    Transportation needs - non-medical: Not on file   Occupational History    Not on file   Tobacco Use    Smoking status: Former Smoker     Packs/day: 0.50     Types: Cigarettes     Last attempt to quit: 2017     Years since quittin.7    Smokeless tobacco: Never Used   Substance and Sexual Activity    Alcohol use: No    Drug use: No    Sexual activity: No   Other Topics Concern    Not on file   Social History Narrative    Not on file       Chief Complaint:   Chief Complaint   Patient presents with    Post-op Evaluation     S/P LEFT TKA 8/15/18       Consulting Physician: No ref. provider found    History of present illness: This is a 78 y.o. female following up for left TKA 8-15-18.      Review of Systems:    Constitution: Denies chills, fever, and sweats.    HENT: Denies headaches or blurry vision.    Cardiovascular:  Denies chest pain or irregular heart beat.    Respiratory: Denies cough or shortness of breath.    Gastrointestinal: Denies abdominal pain, nausea, or vomiting.    Musculoskeletal:  Denies muscle cramps.    Neurological: Denies dizziness or focal weakness.    Psychiatric/Behavioral: Normal mental status.    Hematologic/Lymphatic: Denies bleeding problem or easy bruising/bleeding.    Skin: Denies rash or suspicious lesions.      Examination:    Vital Signs:    Vitals:    10/02/18 1046   BP: (!) 178/80   Pulse: 68       Body mass index is 37.72 kg/m².    This a well-developed, well nourished patient in no acute distress.    Alert and oriented and cooperative to examination.       Physical Exam: left knee    Inspection/Observation   Swelling:   none  Erythema:   none  Bruising:   none  Wounds:   healed  Drainage:  None    Range of Motion   0-120    Muscle Strength   4-4+    Other   Sensation:  normal  Pulse:    palpable    Imaging: Normal post-operative XR     Assessment: S/P total knee arthroplasty, left        Plan: Doing well. Continue PT at [place]. Back in 6 weeks.      DISCLAIMER: This note may have been dictated using voice recognition software and may contain grammatical errors. Report sent to referring provider as required.

## 2018-10-30 ENCOUNTER — HOSPITAL ENCOUNTER (OUTPATIENT)
Dept: RADIOLOGY | Facility: CLINIC | Age: 78
Discharge: HOME | End: 2018-10-30
Attending: NURSE PRACTITIONER
Payer: MEDICARE

## 2018-10-30 ENCOUNTER — OFFICE VISIT (OUTPATIENT)
Dept: FAMILY MEDICINE | Facility: CLINIC | Age: 78
End: 2018-10-30
Payer: MEDICARE

## 2018-10-30 VITALS
SYSTOLIC BLOOD PRESSURE: 128 MMHG | DIASTOLIC BLOOD PRESSURE: 60 MMHG | TEMPERATURE: 98 F | WEIGHT: 210 LBS | HEIGHT: 63 IN | HEART RATE: 72 BPM | BODY MASS INDEX: 37.21 KG/M2

## 2018-10-30 DIAGNOSIS — E03.9 ACQUIRED HYPOTHYROIDISM: ICD-10-CM

## 2018-10-30 DIAGNOSIS — M25.572 ACUTE LEFT ANKLE PAIN: Primary | ICD-10-CM

## 2018-10-30 DIAGNOSIS — Z96.652 S/P TOTAL KNEE ARTHROPLASTY, LEFT: ICD-10-CM

## 2018-10-30 DIAGNOSIS — E66.01 SEVERE OBESITY (BMI 35.0-39.9) WITH COMORBIDITY: ICD-10-CM

## 2018-10-30 DIAGNOSIS — I10 ESSENTIAL HYPERTENSION: ICD-10-CM

## 2018-10-30 DIAGNOSIS — M25.572 ACUTE LEFT ANKLE PAIN: ICD-10-CM

## 2018-10-30 PROCEDURE — 3074F SYST BP LT 130 MM HG: CPT | Mod: CPTII,,, | Performed by: NURSE PRACTITIONER

## 2018-10-30 PROCEDURE — 73610 X-RAY EXAM OF ANKLE: CPT | Mod: 26,LT,S$GLB, | Performed by: RADIOLOGY

## 2018-10-30 PROCEDURE — 1101F PT FALLS ASSESS-DOCD LE1/YR: CPT | Mod: CPTII,,, | Performed by: NURSE PRACTITIONER

## 2018-10-30 PROCEDURE — 99999 PR PBB SHADOW E&M-EST. PATIENT-LVL IV: CPT | Mod: PBBFAC,,, | Performed by: NURSE PRACTITIONER

## 2018-10-30 PROCEDURE — 3078F DIAST BP <80 MM HG: CPT | Mod: CPTII,,, | Performed by: NURSE PRACTITIONER

## 2018-10-30 PROCEDURE — 99214 OFFICE O/P EST MOD 30 MIN: CPT | Mod: PBBFAC,25,PO | Performed by: NURSE PRACTITIONER

## 2018-10-30 PROCEDURE — 99214 OFFICE O/P EST MOD 30 MIN: CPT | Mod: S$PBB,,, | Performed by: NURSE PRACTITIONER

## 2018-10-30 PROCEDURE — 73610 X-RAY EXAM OF ANKLE: CPT | Mod: TC,FY,PO,LT

## 2018-10-30 RX ORDER — TRAMADOL HYDROCHLORIDE 50 MG/1
50 TABLET ORAL EVERY 6 HOURS PRN
Qty: 40 TABLET | Refills: 0 | Status: SHIPPED | OUTPATIENT
Start: 2018-10-30 | End: 2018-11-13 | Stop reason: SDUPTHER

## 2018-10-30 RX ORDER — METHYLPREDNISOLONE 4 MG/1
TABLET ORAL
Qty: 1 PACKAGE | Refills: 0 | Status: SHIPPED | OUTPATIENT
Start: 2018-10-30 | End: 2018-11-20

## 2018-10-30 NOTE — PROGRESS NOTES
Subjective:       Patient ID: Heather Hughes is a 78 y.o. female.    Chief Complaint: Leg Pain (Left)    Chief Complaint  Chief Complaint   Patient presents with    Leg Pain     Left       HPI  Heather Hughes is a 78 y.o. female with medical diagnoses as listed in the medical history and problem list that presents with c/o left ankle pain. Patient has a history of left total knee arthroplasty with Dr. Santiago on 8/15/18. Patient states ankle started to have pain about 2 weeks after her knee surgery. The ankles are swollen but the left ankle is more swollen than the right. Any movement of ankle or weight bearing increases pain. Pain is relieved with elevating the leg, tramadol relieves pain. Pain is aggravated by physical therapy. Patient did not go to physical therapy today. States pain really started when she started bike pedaling exercise in physical therapy. Symptoms are constant. Patient is regularly treated for hypertension, CAD and has one stent, and hypothyroidism, post thyroidectomy. Blood pressure today is 140/74. BMI is 37.20 and patient has gained 4 pounds since last visit.  Established patient with last clinic appointment 10/3/2018.        PAST MEDICAL HISTORY:  Past Medical History:   Diagnosis Date    Allergy     Anticoagulant long-term use     Arthritis     Asthma     Coronary artery disease     stent x 1    Deep vein blood clot of right lower extremity 1965 & 1971    Full dentures     Heart attack 01/13/2017    Hypertension     Osteoporosis     Thyroid disease     Ulcer     Wears glasses        PAST SURGICAL HISTORY:  Past Surgical History:   Procedure Laterality Date    ARTHROPLASTY, KNEE Left 8/15/2018    [facility] at [facility] OR    [facility] stent  01/13/2017    CHOLECYSTECTOMY      EYE SURGERY      bilateral cataracts    FRACTURE SURGERY  2011    left wrist     KNEE ARTHROPLASTY Left 8/15/2018    Procedure: ARTHROPLASTY, KNEE;  Surgeon: Shantanu GONZALEZ  MD Jack;  [facility]  Service: Orthopedics;  Laterality: Left;  ANESTHESIA GENERAL AND BLOCK    STENT-CORONARY N/A 2017    [facility] at [facility] CATH LAB    THYROIDECTOMY         SOCIAL HISTORY:  Social History     Socioeconomic History    Marital status:      Spouse name: Not on file    Number of children: Not on file    Years of education: Not on file    Highest education level: Not on file   Social Needs    Financial resource strain: Not on file    Food insecurity - worry: Not on file    Food insecurity - inability: Not on file    Transportation needs - medical: Not on file    Transportation needs - non-medical: Not on file   Occupational History    Not on file   Tobacco Use    Smoking status: Former Smoker     Packs/day: 0.50     Types: Cigarettes     Last attempt to quit: 2017     Years since quittin.7    Smokeless tobacco: Never Used   Substance and Sexual Activity    Alcohol use: No    Drug use: No    Sexual activity: No   Other Topics Concern    Not on file   Social History Narrative    Not on file       FAMILY HISTORY:  Family History   Problem Relation Age of Onset    Lupus Mother     Osteoporosis Mother     Heart disease Mother     Ulcers Mother     Esophageal cancer Father     Cancer Father         esophageal    DIEGO disease Father     Cancer Brother     Esophageal cancer Brother     Lupus Daughter     DIEGO disease Son     Heart disease Maternal Aunt     Cancer Maternal Aunt     Kidney disease Maternal Aunt     Kidney cancer Maternal Aunt     Liver cancer Maternal Aunt     Dementia Maternal Aunt     Cancer Maternal Uncle     Colon cancer Maternal Uncle     Breast cancer Maternal Uncle     Lung cancer Maternal Uncle         smoker    Parkinsonism Paternal Aunt     Cancer Paternal Aunt     Stomach cancer Paternal Aunt     Heart disease Paternal Uncle     Cancer Paternal Uncle     Stomach cancer Paternal Uncle          x2    Bone cancer Paternal Uncle     Cancer Maternal Grandmother         tumors on head and body    Parkinsonism Paternal Grandmother     DIEGO disease Brother     Ulcers Brother     Dementia Brother     Early death Son         self    Bipolar disorder Son     Cancer Son     Esophageal cancer Son     Drug abuse Son        ALLERGIES AND MEDICATIONS: updated and reviewed.  Review of patient's allergies indicates:   Allergen Reactions    Lipitor [atorvastatin] Other (See Comments)     Fall/ balance     Myrbetriq [mirabegron] Swelling and Other (See Comments)     Legs swelling, couldn't void     Pravastatin Other (See Comments)     Balance problem     Codeine Nausea Only    Mobic [meloxicam] Other (See Comments)     Not sure reaction    Perfume Other (See Comments)     And flowers cause allergy and makes her cough.    Sulfur Other (See Comments)     Was told not to take as a child     Current Outpatient Medications   Medication Sig Dispense Refill    acetaminophen (TYLENOL) 650 MG TbSR Take 650 mg by mouth every 8 (eight) hours as needed.      albuterol 90 mcg/actuation inhaler Inhale 2 puffs into the lungs every 6 (six) hours as needed for Wheezing. 18 g 2    aspirin 325 MG tablet Take 1 tablet (325 mg total) by mouth 2 (two) times daily.  0    CALCIUM CARBONATE/VITAMIN D3 (CALCIUM 500 + D ORAL) Take 2 tablets by mouth once daily.      carvedilol (COREG) 6.25 MG tablet Take 1 tablet (6.25 mg total) by mouth 2 (two) times daily with meals. 180 tablet 3    clopidogrel (PLAVIX) 75 mg tablet Take 1 tablet (75 mg total) by mouth once daily. 90 tablet 3    ergocalciferol (ERGOCALCIFEROL) 50,000 unit Cap TAKE ONE CAPSULE BY MOUTH EVERY 7 DAYS 12 capsule 1    glucosamine-chondroitin 500-400 mg tablet Take 1 tablet by mouth 3 (three) times daily.      montelukast (SINGULAIR) 10 mg tablet Take 1 tablet (10 mg total) by mouth every evening. 90 tablet 3    nitroGLYCERIN (NITROSTAT) 0.4 MG SL tablet  Place 1 tablet (0.4 mg total) under the tongue every 5 (five) minutes as needed for Chest pain. (Patient taking differently: Place 0.4 mg under the tongue every 5 (five) minutes as needed for Chest pain. Seek medical help if pain is not relieved by the third dose.) 25 tablet 3    omeprazole (PRILOSEC) 40 MG capsule Take 1 capsule (40 mg total) by mouth once daily. (Patient taking differently: Take 40 mg by mouth every evening. ) 90 capsule 3    SYNTHROID 112 mcg tablet TAKE ONE TABLET BY MOUTH EVERY DAY BEFORE BREAKFAST 30 tablet 10    traMADol (ULTRAM) 50 mg tablet Take 1 tablet (50 mg total) by mouth every 6 (six) hours as needed for Pain. 40 tablet 0    methylPREDNISolone (MEDROL DOSEPACK) 4 mg tablet use as directed 1 Package 0     No current facility-administered medications for this visit.        I have reviewed the patient's medical history in detail and updated the computerized patient record.    Review of Systems   Constitutional: Negative for activity change, appetite change, chills and fever.   Eyes: Negative for visual disturbance.        Vision corrected with glasses   Respiratory: Negative for cough and shortness of breath.    Cardiovascular: Positive for leg swelling. Negative for chest pain and palpitations.        Swelling to the bilateral ankles with left more swollen than right   Gastrointestinal: Negative for abdominal pain, constipation, diarrhea, nausea and vomiting.   Genitourinary: Negative for difficulty urinating and dysuria.   Musculoskeletal: Positive for arthralgias.        Pain to the left ankle   Skin: Positive for color change. Negative for rash and wound.        Bruising to right elbow following use of ibuprofen   Neurological: Negative for dizziness, numbness and headaches.   Hematological: Bruises/bleeds easily.        Bruises easily with aspirin and plavix   Psychiatric/Behavioral: Negative for dysphoric mood and sleep disturbance. The patient is not nervous/anxious.          "Sleeping well at night         Objective:      Vitals:    10/30/18 1059 10/30/18 1211   BP: (!) 140/74 128/60   BP Location: Right arm Right arm   Patient Position: Sitting Sitting   BP Method: Large (Automatic) Large (Manual)   Pulse: 72    Temp: 98.2 °F (36.8 °C)    TempSrc: Oral    Weight: 95.3 kg (210 lb)    Height: 5' 3" (1.6 m)      Physical Exam   Constitutional: She is oriented to person, place, and time. She appears well-developed. No distress.   Blood pressure 140/74 initially, down to 128/60 at end of visit   HENT:   Head: Normocephalic and atraumatic.   Right Ear: External ear normal.   Left Ear: External ear normal.   Eyes: Conjunctivae and lids are normal. Pupils are equal, round, and reactive to light. Right eye exhibits no discharge. Left eye exhibits no discharge. Right conjunctiva is not injected. Left conjunctiva is not injected.   Neck: Normal range of motion. Neck supple. Normal carotid pulses present. Carotid bruit is not present.   Cardiovascular: Normal rate, regular rhythm, S1 normal, S2 normal, normal heart sounds, intact distal pulses and normal pulses.   No murmur heard.  Pulses:       Carotid pulses are 2+ on the right side, and 2+ on the left side.       Radial pulses are 2+ on the right side, and 2+ on the left side.        Dorsalis pedis pulses are 2+ on the right side, and 2+ on the left side.   Edema to bilateral ankles, left greater than right   Pulmonary/Chest: Effort normal and breath sounds normal. No respiratory distress. She has no decreased breath sounds. She has no wheezes. She has no rhonchi. She has no rales.   Musculoskeletal:        Left ankle: She exhibits swelling. Tenderness. Medial malleolus tenderness found.   Patient with full active range of motion of left ankle.  Ankle is painful to palpation, especially over the medial malleolus.  Entire circumference of ankle with edema noted, non-pitting.  No redness or warmth noted over ankle joint.   Lymphadenopathy:     She " has no cervical adenopathy.   Neurological: She is alert and oriented to person, place, and time. She has normal strength. No sensory deficit.   Strength to left ankle with plantar flexion and dorsiflexion against resistance 5/5 with some discomfort noted.   Skin: Skin is warm, dry and intact. She is not diaphoretic. No pallor.   Psychiatric: She has a normal mood and affect. Her speech is normal and behavior is normal. Judgment and thought content normal. Cognition and memory are normal.   Nursing note and vitals reviewed.        Assessment:       1. Acute left ankle pain    2. S/P total knee arthroplasty, left 8/15/18    3. Essential hypertension    4. Acquired hypothyroidism    5. Severe obesity (BMI 35.0-39.9) with comorbidity          Plan:       Heather was seen today for leg pain.  Patient complains of pain to the left ankle since two week status post left total knee arthroplasty.  Reports no injury.  Diagnoses and all orders for this visit:    Acute left ankle pain  -     X-Ray Ankle Complete Left; Future  -     methylPREDNISolone (MEDROL DOSEPACK) 4 mg tablet; use as directed  -     traMADol (ULTRAM) 50 mg tablet; Take 1 tablet (50 mg total) by mouth every 6 (six) hours as needed for Pain.  - patient advised not to take any over-the-counter inflammatory such as ibuprofen or Aleve as these will increase risk of bleeding when combined with aspirin and Plavix.  - encouraged to continue to participate in physical therapy but to request exercise modification for any exercises that increased pain to the left ankle  - elevate left lower leg when at rest  - apply ice or moist heat 20 min 3 times daily to painful area for comfort  - depending on results of x-ray and amount of relief with Medrol Dosepak, may require referral back to orthopedist    S/P total knee arthroplasty, left 8/15/18   Patient denies any pain in the left knee, able to fully straighten and flex the knee to about 110°   Encouraged to continue to  participate in physical therapy   Follow-up with orthopedist, Dr. Santiago, as instructed  Essential hypertension    Blood pressure controlled 128/60 with repeat measure at end of visit, continue current medication without change  Acquired hypothyroidism     Lab Results   Component Value Date    TSH 0.556 04/11/2018      Continue Synthroid 112 mcg daily  Severe obesity (BMI 35.0-39.9) with comorbidity   BMI today is 37.20 and patient has gained 4 lb since last visit on 10/3/2018   Weight loss recommended with well balanced diet and portion controlled meals and increased activity.    The patient's BMI has been recorded in the chart. The patient has been provided educational materials regarding the benefits of attaining and maintaining a normal weight. We will continue to address and follow this issue during follow up visits.    Follow-up if symptoms worsen or fail to improve.      Patient readiness: acceptance and barriers:none and readiness    During the course of the visit the patient was educated and counseled about the following:     Hypertension:   Medication: no change.  Dietary sodium restriction.  Regular aerobic exercise.  Obesity:   General weight loss/lifestyle modification strategies discussed (elicit support from others; identify saboteurs; non-food rewards, etc).  Diet interventions: moderate (500 kCal/d) deficit diet.  Informal exercise measures discussed, e.g. taking stairs instead of elevator.  Regular aerobic exercise program discussed.    Goals: Hypertension: Reduce Blood Pressure and Obesity: Reduce calorie intake and BMI    Did patient meet goals/outcomes: Yes for hypertension, no for obesity    The following self management tools provided: declined    Patient Instructions (the written plan) was given to the patient/family.     Time spent with patient: 30 minutes    Barriers to medications present (no )    Adverse reactions to current medications (no)    Over the counter medications reviewed  (Yes)

## 2018-10-31 ENCOUNTER — TELEPHONE (OUTPATIENT)
Dept: FAMILY MEDICINE | Facility: CLINIC | Age: 78
End: 2018-10-31

## 2018-10-31 NOTE — TELEPHONE ENCOUNTER
----- Message from Mariano Brown sent at 10/31/2018  2:41 PM CDT -----  Contact: Self/[phone]  Patient called to request X Ray results. Thank you.

## 2018-11-13 ENCOUNTER — OFFICE VISIT (OUTPATIENT)
Dept: ORTHOPEDICS | Facility: CLINIC | Age: 78
End: 2018-11-13
Payer: MEDICARE

## 2018-11-13 ENCOUNTER — TELEPHONE (OUTPATIENT)
Dept: RHEUMATOLOGY | Facility: CLINIC | Age: 78
End: 2018-11-13

## 2018-11-13 VITALS — BODY MASS INDEX: 37.23 KG/M2 | WEIGHT: 210.13 LBS | HEIGHT: 63 IN

## 2018-11-13 DIAGNOSIS — M25.572 ACUTE LEFT ANKLE PAIN: ICD-10-CM

## 2018-11-13 DIAGNOSIS — Z96.652 S/P TOTAL KNEE ARTHROPLASTY, LEFT: ICD-10-CM

## 2018-11-13 DIAGNOSIS — M25.473 SWELLING OF ANKLE JOINT, UNSPECIFIED LATERALITY: Primary | ICD-10-CM

## 2018-11-13 PROCEDURE — 99999 PR PBB SHADOW E&M-EST. PATIENT-LVL III: CPT | Mod: PBBFAC,,, | Performed by: ORTHOPAEDIC SURGERY

## 2018-11-13 PROCEDURE — 99024 POSTOP FOLLOW-UP VISIT: CPT | Mod: S$GLB,,, | Performed by: ORTHOPAEDIC SURGERY

## 2018-11-13 RX ORDER — TRAMADOL HYDROCHLORIDE 50 MG/1
50 TABLET ORAL EVERY 6 HOURS PRN
Qty: 40 TABLET | Refills: 0 | Status: SHIPPED | OUTPATIENT
Start: 2018-11-13 | End: 2019-07-23 | Stop reason: ALTCHOICE

## 2018-11-13 NOTE — TELEPHONE ENCOUNTER
Called Pt, no answer. LVM advising medication was sent to the pharmacy and should be available for pickup.

## 2018-11-13 NOTE — TELEPHONE ENCOUNTER
----- Message from Nasrin Shah LPN sent at 11/13/2018 11:47 AM CST -----  Good Morning!    A referral has been put in for this patient to see doctor Varma. Please call patient to schedule an appointment.    Thanks.

## 2018-11-14 DIAGNOSIS — M25.572 LEFT ANKLE PAIN, UNSPECIFIED CHRONICITY: Primary | ICD-10-CM

## 2018-11-15 ENCOUNTER — HOSPITAL ENCOUNTER (OUTPATIENT)
Dept: RADIOLOGY | Facility: HOSPITAL | Age: 78
Discharge: HOME | End: 2018-11-15
Attending: ORTHOPAEDIC SURGERY
Payer: MEDICARE

## 2018-11-15 ENCOUNTER — OFFICE VISIT (OUTPATIENT)
Dept: ORTHOPEDICS | Facility: CLINIC | Age: 78
End: 2018-11-15
Payer: MEDICARE

## 2018-11-15 VITALS — WEIGHT: 210.13 LBS | BODY MASS INDEX: 37.23 KG/M2 | HEIGHT: 63 IN

## 2018-11-15 DIAGNOSIS — M25.572 LEFT ANKLE PAIN, UNSPECIFIED CHRONICITY: ICD-10-CM

## 2018-11-15 DIAGNOSIS — M19.072 DEGENERATIVE JOINT DISEASE OF LEFT HINDFOOT: ICD-10-CM

## 2018-11-15 DIAGNOSIS — M19.072 ARTHRITIS OF ANKLE, LEFT: ICD-10-CM

## 2018-11-15 PROCEDURE — 99999 PR PBB SHADOW E&M-EST. PATIENT-LVL III: CPT | Mod: PBBFAC,,, | Performed by: ORTHOPAEDIC SURGERY

## 2018-11-15 PROCEDURE — 1101F PT FALLS ASSESS-DOCD LE1/YR: CPT | Mod: CPTII,S$GLB,, | Performed by: ORTHOPAEDIC SURGERY

## 2018-11-15 PROCEDURE — 73610 X-RAY EXAM OF ANKLE: CPT | Mod: 26,LT,, | Performed by: RADIOLOGY

## 2018-11-15 PROCEDURE — 99213 OFFICE O/P EST LOW 20 MIN: CPT | Mod: S$GLB,,, | Performed by: ORTHOPAEDIC SURGERY

## 2018-11-15 PROCEDURE — 73610 X-RAY EXAM OF ANKLE: CPT | Mod: TC,PO,LT

## 2018-11-15 NOTE — LETTER
November 20, 2018      Shantanu Santiago MD  [address] [place] [address]  [address]  [facility] [address]           [facility]  [address] [facility] [address] [place] [address]  Phone: [phone]          Patient: Heather Hughes   MR Number: [id number]   YOB: 1940   Date of Visit: 11/15/2018       Dear Dr. Shantanu Santiago:    Thank you for referring Heather Hughes to me for evaluation. Attached you will find relevant portions of my assessment and plan of care.    If you have questions, please do not hesitate to call me. I look forward to following Heather Hughes along with you.    Sincerely,    Umang Lopez MD    Enclosure  CC:  No Recipients    If you would like to receive this communication electronically, please contact [email or web address] or [phone] to request more information on [employer] access.    For providers and/or their staff who would like to refer a patient to [facility], please contact us through our one-stop-shop provider referral line, [facility] , at [phone].    If you feel you have received this communication in error or would no longer like to receive these types of communications, please e-mail [email or web address]

## 2018-11-20 PROBLEM — M19.072 ARTHRITIS OF ANKLE, LEFT: Status: ACTIVE | Noted: 2018-11-20

## 2018-11-20 PROBLEM — M25.562 LEFT KNEE PAIN: Status: RESOLVED | Noted: 2018-08-31 | Resolved: 2018-11-20

## 2018-11-20 PROBLEM — M17.12 ARTHRITIS OF LEFT KNEE: Status: RESOLVED | Noted: 2018-08-15 | Resolved: 2018-11-20

## 2018-11-20 PROBLEM — M19.072 DEGENERATIVE JOINT DISEASE OF LEFT HINDFOOT: Status: ACTIVE | Noted: 2018-11-20

## 2018-11-20 PROBLEM — Z01.818 PREOPERATIVE CLEARANCE: Status: RESOLVED | Noted: 2017-12-28 | Resolved: 2018-11-20

## 2018-11-20 NOTE — PROGRESS NOTES
"HPI: Heather Hughes is a  78 y.o. female who was referred to me by Dr. Santiago and was seen in consultation today for left ankle pain. She rates her pain as 0/10 today. She says Dr. Santiago tried to inject the ankle on 10/13/18 but it was so painful he had to stop.  She says a few days after that the pain stopped. She says it still swells some. The pain began when she started PT after her total knee arthroplasty.    Her mother and her daughter both have h/o Lupus so she is going to see a Rheumatologist as well.     PAST MEDICAL/SURGICAL/FAMILY/SOCIAL/ HISTORY: REVIEWED    ALLERGIES/MEDICATIONS: REVIEWED       Review of Systems:     Constitution: Negative.   HEENT: Negative.   Eyes: Negative.   Cardiovascular: Negative.   Respiratory: Negative.   Endocrine: Negative.   Hematologic/Lymphatic: Negative.   Skin: Negative.   Musculoskeletal: Positive for left ankle pain   Gastrointestinal: Negative.   Genitourinary: Negative.   Neurological: Negative.   Psychiatric/Behavioral: Negative.   Allergic/Immunologic: Negative.       PHYSICAL EXAM:  Vitals:     Ht Readings from Last 1 Encounters:   11/15/18 5' 3" (1.6 m)     Wt Readings from Last 1 Encounters:   11/15/18 95.3 kg (210 lb 1.6 oz)       GENERAL: Well developed, well nourished, no acute distress.  Very pleasant.   SKIN: Skin is intact. No atrophy, abrasions or lesions are noted.   Neurological: Normal mental status. Appropriate and conversant. Alert and oriented x 3.  GAIT: Walks with a non-antalgic gait.    Left  lower extremity compared with RLE:  2+ dorsalis pedis pulse.  Capillary refill < 3 seconds.  Mildly decreased range of motion tibiotalar and subtalar joints. Normal alignment of the forefoot and the hindfoot.  5/5 strength EHL, FHL, tibialis anterior, gastrocsoleus, tibialis posterior and peroneals. Sensation to light touch intact sural, saphenous, superficial peroneal and deep peroneal nerves. Mild to moderate swelling of the ankle and hindfoot. No " ecchymosis or deformity. No lymphadenopathy, no masses or tumors palpated.   tenderness to palpation at the ankle and subtalar joints.  She has 2+ pitting edema up to just below the knee on the left.     XRAYS:   3 views of left ankle obtained and reviewed today reveal mild osteoarthritis of the ankle and the hindfoot.       ASSESSMENT:        Encounter Diagnoses   Name Primary?    Arthritis of ankle, left     Degenerative joint disease of left hindfoot         PLAN:  I spent 20 minutes in consulation with the patient and her son today. More than half the time was spent counseling the patient on her condition. I can repeat injections every 4-6 months or prn as needed.  I gave her a prescription for compression stockings and I gave her an ankle sleeve today. F/u prn.

## 2018-11-22 NOTE — PROGRESS NOTES
Past Medical History:   Diagnosis Date    Allergy     Anticoagulant long-term use     Arthritis     Asthma     Coronary artery disease     stent x 1    Deep vein blood clot of right lower extremity 1965 & 1971    Full dentures     Heart attack 01/13/2017    Hypertension     Osteoporosis     Thyroid disease     Ulcer     Wears glasses        Past Surgical History:   Procedure Laterality Date    ARTHROPLASTY, KNEE Left 8/15/2018    Performed by [facility]anu [facility] at [facility]    [facility] stent  01/13/2017    CHOLECYSTECTOMY      EYE SURGERY      bilateral cataracts    FRACTURE SURGERY  2011    left wrist     KNEE ARTHROPLASTY Left 8/15/2018    Procedure: ARTHROPLASTY, KNEE;  Surgeon: Shantanu Santiago MD;  Location: [facility];  Service: Orthopedics;  Laterality: Left;  ANESTHESIA GENERAL AND BLOCK    STENT-CORONARY N/A 1/13/2017    Performed by Yg [facility] at [facility] CATH LAB    THYROIDECTOMY         Current Outpatient Medications   Medication Sig    acetaminophen (TYLENOL) 650 MG TbSR Take 650 mg by mouth every 8 (eight) hours as needed.    albuterol 90 mcg/actuation inhaler Inhale 2 puffs into the lungs every 6 (six) hours as needed for Wheezing.    CALCIUM CARBONATE/VITAMIN D3 (CALCIUM 500 + D ORAL) Take 2 tablets by mouth once daily.    carvedilol (COREG) 6.25 MG tablet Take 1 tablet (6.25 mg total) by mouth 2 (two) times daily with meals.    clopidogrel (PLAVIX) 75 mg tablet Take 1 tablet (75 mg total) by mouth once daily.    ergocalciferol (ERGOCALCIFEROL) 50,000 unit Cap TAKE ONE CAPSULE BY MOUTH EVERY 7 DAYS    glucosamine-chondroitin 500-400 mg tablet Take 1 tablet by mouth 3 (three) times daily.    montelukast (SINGULAIR) 10 mg tablet Take 1 tablet (10 mg total) by mouth every evening.    nitroGLYCERIN (NITROSTAT) 0.4 MG SL tablet Place 1 tablet (0.4 mg total) under the tongue every 5 (five) minutes as needed for Chest pain. (Patient taking differently: Place 0.4 mg under the  tongue every 5 (five) minutes as needed for Chest pain. Seek medical help if pain is not relieved by the third dose.)    omeprazole (PRILOSEC) 40 MG capsule Take 1 capsule (40 mg total) by mouth once daily. (Patient taking differently: Take 40 mg by mouth every evening. )    SYNTHROID 112 mcg tablet TAKE ONE TABLET BY MOUTH EVERY DAY BEFORE BREAKFAST    aspirin 325 MG tablet Take 1 tablet (325 mg total) by mouth 2 (two) times daily.    traMADol (ULTRAM) 50 mg tablet Take 1 tablet (50 mg total) by mouth every 6 (six) hours as needed for Pain.     No current facility-administered medications for this visit.        Review of patient's allergies indicates:   Allergen Reactions    Lipitor [atorvastatin] Other (See Comments)     Fall/ balance     Myrbetriq [mirabegron] Swelling and Other (See Comments)     Legs swelling, couldn't void     Pravastatin Other (See Comments)     Balance problem     Codeine Nausea Only    Mobic [meloxicam] Other (See Comments)     Not sure reaction    Perfume Other (See Comments)     And flowers cause allergy and makes her cough.    Sulfur Other (See Comments)     Was told not to take as a child       Family History   Problem Relation Age of Onset    Lupus Mother     Osteoporosis Mother     Heart disease Mother     Ulcers Mother     Esophageal cancer Father     Cancer Father         esophageal    DIEGO disease Father     Cancer Brother     Esophageal cancer Brother     Lupus Daughter     DIEGO disease Son     Heart disease Maternal Aunt     Cancer Maternal Aunt     Kidney disease Maternal Aunt     Kidney cancer Maternal Aunt     Liver cancer Maternal Aunt     Dementia Maternal Aunt     Cancer Maternal Uncle     Colon cancer Maternal Uncle     Breast cancer Maternal Uncle     Lung cancer Maternal Uncle         smoker    Parkinsonism Paternal Aunt     Cancer Paternal Aunt     Stomach cancer Paternal Aunt     Heart disease Paternal Uncle     Cancer Paternal Uncle      Stomach cancer Paternal Uncle         x2    Bone cancer Paternal Uncle     Cancer Maternal Grandmother         tumors on head and body    Parkinsonism Paternal Grandmother     DIEGO disease Brother     Ulcers Brother     Dementia Brother     Early death Son         self    Bipolar disorder Son     Cancer Son     Esophageal cancer Son     Drug abuse Son        Social History     Socioeconomic History    Marital status:      Spouse name: Not on file    Number of children: Not on file    Years of education: Not on file    Highest education level: Not on file   Social Needs    Financial resource strain: Not on file    Food insecurity - worry: Not on file    Food insecurity - inability: Not on file    Transportation needs - medical: Not on file    Transportation needs - non-medical: Not on file   Occupational History    Not on file   Tobacco Use    Smoking status: Former Smoker     Packs/day: 0.50     Types: Cigarettes     Last attempt to quit: 2017     Years since quittin.8    Smokeless tobacco: Never Used   Substance and Sexual Activity    Alcohol use: No    Drug use: No    Sexual activity: No   Other Topics Concern    Not on file   Social History Narrative    Not on file       Chief Complaint:   Chief Complaint   Patient presents with    Post-op Evaluation     S/P LEFT TKA 8/15/18       Consulting Physician: No ref. provider found    History of present illness: This is a 78 y.o. female following up for left TKA 8-15-18. Reports new left ankle pain and swelling.      Review of Systems:    Constitution: Denies chills, fever, and sweats.    HENT: Denies headaches or blurry vision.    Cardiovascular: Denies chest pain or irregular heart beat.    Respiratory: Denies cough or shortness of breath.    Gastrointestinal: Denies abdominal pain, nausea, or vomiting.    Musculoskeletal:  Denies muscle cramps.    Neurological: Denies dizziness or focal  weakness.    Psychiatric/Behavioral: Normal mental status.    Hematologic/Lymphatic: Denies bleeding problem or easy bruising/bleeding.    Skin: Denies rash or suspicious lesions.      Examination:    Vital Signs:    There were no vitals filed for this visit.    Body mass index is 37.22 kg/m².    This a well-developed, well nourished patient in no acute distress.    Alert and oriented and cooperative to examination.       Physical Exam: left knee    Inspection/Observation   Swelling:   none  Erythema:   none  Bruising:   none  Wounds:   healed  Drainage:  None    Range of Motion   0-120+    Muscle Strength   4+    Other   Sensation:  normal  Pulse:    palpable    Left Ankle Exam     Gait:   antalgic    Skin  Scars:   None  Rashes:  None    Inspection   Deformity:   None  Erythema:   None  Bruising:   None   Swelling:   moderate  Lymphadenopathy: None    Instability:  None    Range of Motion   Slightly limited due to pain and swelling    Muscle Strength   Strength:  5/5    Tests   Anterior drawer:  Negative  Varus tilt:  Negative    Other   Ankle Crepitus:  None  Sensation:   Normal  Achilles:  Normal  Forefoot:  Normal  Tenderness:  Medial and lateral joint line    Vascular Exam   Dorsalis Pedis:       Palpable  Capillary refill:    Normal        Imaging: XR images of ankle reviewed personally by me show mild DJD.     Assessment: Swelling of ankle joint, unspecified laterality  -     Ambulatory Referral to Rheumatology    S/P total knee arthroplasty, left    Acute left ankle pain        Plan: Total knee is doing well. Attempted injection of ankle but too painful. Will refer to Romel Lopez and Kojo for evaluation.    DISCLAIMER: This note may have been dictated using voice recognition software and may contain grammatical errors. Report sent to referring provider as required.

## 2018-11-29 DIAGNOSIS — I10 HYPERTENSION, ESSENTIAL: ICD-10-CM

## 2018-11-30 RX ORDER — CLOPIDOGREL BISULFATE 75 MG/1
75 TABLET ORAL DAILY
Qty: 90 TABLET | Refills: 3 | Status: SHIPPED | OUTPATIENT
Start: 2018-11-30 | End: 2019-11-30 | Stop reason: SDUPTHER

## 2018-11-30 RX ORDER — MONTELUKAST SODIUM 10 MG/1
10 TABLET ORAL NIGHTLY
Qty: 90 TABLET | Refills: 3 | Status: SHIPPED | OUTPATIENT
Start: 2018-11-30 | End: 2020-08-12 | Stop reason: SDUPTHER

## 2018-11-30 RX ORDER — CARVEDILOL 6.25 MG/1
6.25 TABLET ORAL 2 TIMES DAILY WITH MEALS
Qty: 180 TABLET | Refills: 3 | Status: SHIPPED | OUTPATIENT
Start: 2018-11-30 | End: 2019-11-30 | Stop reason: SDUPTHER

## 2018-12-10 RX ORDER — CLOPIDOGREL BISULFATE 75 MG/1
75 TABLET ORAL DAILY
Qty: 90 TABLET | Refills: 3 | OUTPATIENT
Start: 2018-12-10 | End: 2019-12-10

## 2018-12-11 NOTE — TELEPHONE ENCOUNTER
Advised pt she should still have refills at pharmacy. Pt stated she will call and check and let us know.

## 2018-12-11 NOTE — TELEPHONE ENCOUNTER
She has been given a 1 year supply at the end of January, the end of February, and the end of November this year.  How many does she need?

## 2019-01-16 ENCOUNTER — OFFICE VISIT (OUTPATIENT)
Dept: RHEUMATOLOGY | Facility: CLINIC | Age: 79
End: 2019-01-16
Payer: MEDICARE

## 2019-01-16 ENCOUNTER — HOSPITAL ENCOUNTER (OUTPATIENT)
Dept: RADIOLOGY | Facility: HOSPITAL | Age: 79
Discharge: HOME | End: 2019-01-16
Attending: INTERNAL MEDICINE
Payer: MEDICARE

## 2019-01-16 VITALS
BODY MASS INDEX: 37.54 KG/M2 | HEART RATE: 63 BPM | DIASTOLIC BLOOD PRESSURE: 84 MMHG | HEIGHT: 63 IN | SYSTOLIC BLOOD PRESSURE: 175 MMHG | WEIGHT: 211.88 LBS

## 2019-01-16 DIAGNOSIS — M94.9 DISORDER OF BONE AND CARTILAGE: ICD-10-CM

## 2019-01-16 DIAGNOSIS — M79.10 MYALGIA: ICD-10-CM

## 2019-01-16 DIAGNOSIS — M89.9 DISORDER OF BONE AND CARTILAGE: ICD-10-CM

## 2019-01-16 DIAGNOSIS — M25.50 POLYARTHRALGIA: ICD-10-CM

## 2019-01-16 DIAGNOSIS — R53.83 FATIGUE, UNSPECIFIED TYPE: ICD-10-CM

## 2019-01-16 DIAGNOSIS — M25.50 POLYARTHRALGIA: Primary | ICD-10-CM

## 2019-01-16 DIAGNOSIS — M35.00 SICCA COMPLEX: ICD-10-CM

## 2019-01-16 DIAGNOSIS — E66.09 CLASS 2 OBESITY DUE TO EXCESS CALORIES WITHOUT SERIOUS COMORBIDITY WITH BODY MASS INDEX (BMI) OF 37.0 TO 37.9 IN ADULT: ICD-10-CM

## 2019-01-16 DIAGNOSIS — M15.9 PRIMARY OSTEOARTHRITIS INVOLVING MULTIPLE JOINTS: ICD-10-CM

## 2019-01-16 PROCEDURE — 73130 X-RAY EXAM OF HAND: CPT | Mod: 50,TC,FY

## 2019-01-16 PROCEDURE — 99999 PR PBB SHADOW E&M-EST. PATIENT-LVL III: ICD-10-PCS | Mod: PBBFAC,,, | Performed by: INTERNAL MEDICINE

## 2019-01-16 PROCEDURE — 99205 OFFICE O/P NEW HI 60 MIN: CPT | Mod: S$GLB,,, | Performed by: INTERNAL MEDICINE

## 2019-01-16 PROCEDURE — 3077F SYST BP >= 140 MM HG: CPT | Mod: CPTII,S$GLB,, | Performed by: INTERNAL MEDICINE

## 2019-01-16 PROCEDURE — 73130 XR HAND COMPLETE 3 VIEWS BILATERAL: ICD-10-PCS | Mod: 26,50,, | Performed by: RADIOLOGY

## 2019-01-16 PROCEDURE — 1101F PR PT FALLS ASSESS DOC 0-1 FALLS W/OUT INJ PAST YR: ICD-10-PCS | Mod: CPTII,S$GLB,, | Performed by: INTERNAL MEDICINE

## 2019-01-16 PROCEDURE — 99999 PR PBB SHADOW E&M-EST. PATIENT-LVL III: CPT | Mod: PBBFAC,,, | Performed by: INTERNAL MEDICINE

## 2019-01-16 PROCEDURE — 3079F PR MOST RECENT DIASTOLIC BLOOD PRESSURE 80-89 MM HG: ICD-10-PCS | Mod: CPTII,S$GLB,, | Performed by: INTERNAL MEDICINE

## 2019-01-16 PROCEDURE — 99205 PR OFFICE/OUTPT VISIT, NEW, LEVL V, 60-74 MIN: ICD-10-PCS | Mod: S$GLB,,, | Performed by: INTERNAL MEDICINE

## 2019-01-16 PROCEDURE — 3079F DIAST BP 80-89 MM HG: CPT | Mod: CPTII,S$GLB,, | Performed by: INTERNAL MEDICINE

## 2019-01-16 PROCEDURE — 1101F PT FALLS ASSESS-DOCD LE1/YR: CPT | Mod: CPTII,S$GLB,, | Performed by: INTERNAL MEDICINE

## 2019-01-16 PROCEDURE — 73130 X-RAY EXAM OF HAND: CPT | Mod: 26,50,, | Performed by: RADIOLOGY

## 2019-01-16 PROCEDURE — 3077F PR MOST RECENT SYSTOLIC BLOOD PRESSURE >= 140 MM HG: ICD-10-PCS | Mod: CPTII,S$GLB,, | Performed by: INTERNAL MEDICINE

## 2019-01-16 RX ORDER — LIDOCAINE AND PRILOCAINE 25; 25 MG/G; MG/G
CREAM TOPICAL 2 TIMES DAILY PRN
Qty: 30 G | Refills: 2 | Status: SHIPPED | OUTPATIENT
Start: 2019-01-16 | End: 2020-03-10 | Stop reason: CLARIF

## 2019-01-16 ASSESSMENT — ROUTINE ASSESSMENT OF PATIENT INDEX DATA (RAPID3)
PATIENT GLOBAL ASSESSMENT SCORE: 5
AM STIFFNESS SCORE: 1, YES
WHEN YOU AWAKENED IN THE MORNING OVER THE LAST WEEK, PLEASE INDICATE THE AMOUNT OF TIME IT TAKES UNTIL YOU ARE AS LIMBER AS YOU WILL BE FOR THE DAY: COUPLE HOURS
PAIN SCORE: 7
FATIGUE SCORE: 5
PSYCHOLOGICAL DISTRESS SCORE: 0
MDHAQ FUNCTION SCORE: 1.4
TOTAL RAPID3 SCORE: 5.55

## 2019-01-16 NOTE — PROGRESS NOTES
"Subjective:       Patient ID: Heather Hughes is a 78 y.o. female.    Chief Complaint: Polyarthralgia   HPI 79 yo female with HTN, CAD S/P L TKA 8/2018. She reports B/L ankle pain since the surgery. Dr. Santiago tried to inject the ankle on 10/13/18 but it was so painful he had to stop. Patient complains of polyarthralgia and myalgia for the last 20 years.  Pain is aching type, continuous, worse as the day progresses, worse with activity, better with rest.  Denies any joint effusion.  Early morning stiffness lasts for 2 hrs   +SICCA symptoms   She denies fever, weight loss, photosensitivity, rash, ulcers, Raynaud's phenomenon, alopecia, dysphagia, diarrhea or blood in the stools    Reviewed PMH, FH, SH and past labs      Review of Systems   Constitutional: Positive for fatigue. Negative for fever.   HENT: Negative for ear discharge and ear pain.    Eyes: Negative for pain and redness.   Respiratory: Negative for cough and shortness of breath.    Cardiovascular: Negative for chest pain and palpitations.   Gastrointestinal: Negative for abdominal distention and abdominal pain.   Genitourinary: Negative for genital sores and hematuria.   Musculoskeletal: Positive for arthralgias and myalgias.   Skin: Negative for color change and wound.   Neurological: Negative for tremors and seizures.   Psychiatric/Behavioral: Negative for agitation and hallucinations.         Objective:   BP (!) 175/84 (BP Location: Left arm, Patient Position: Sitting)   Pulse 63   Ht 5' 3" (1.6 m)   Wt 96.1 kg (211 lb 13.8 oz)   BMI 37.53 kg/m²      Physical Exam   Nursing note and vitals reviewed.  Constitutional: She is oriented to person, place, and time and well-developed, well-nourished, and in no distress.   HENT:   Head: Normocephalic and atraumatic.   Eyes: Conjunctivae and EOM are normal. Pupils are equal, round, and reactive to light.   Neck: Normal range of motion. Neck supple. No thyromegaly present.   Cardiovascular: Normal " rate and regular rhythm.  Exam reveals no friction rub.    Pulmonary/Chest: Effort normal and breath sounds normal.   Abdominal: Soft. Bowel sounds are normal. She exhibits no mass.   Neurological: She is alert and oriented to person, place, and time. She exhibits normal muscle tone.   Skin: Skin is warm and dry.     Psychiatric: Memory and affect normal.   Musculoskeletal: She exhibits no edema.   Neck with decreased range of motion in all directions   Bilateral shoulders with intact ROM   Elbows without any swelling or tenderness  Bilateral first CMC tenderness  No swelling or tenderness of bilateral wrists, MCPs, PIP, and DIP  Bilateral hips with external rotation 25° and  internal rotation 15°   Bilateral knee crepitus  Both ankles and feet nontender, without synovitis             Lab Results   Component Value Date    WBC 6.40 09/01/2018    HGB 9.2 (L) 09/01/2018    HCT 28.5 (L) 09/01/2018    MCV 89 09/01/2018     09/01/2018     CMP  Sodium   Date Value Ref Range Status   09/01/2018 135 (L) 136 - 145 mmol/L Final     Potassium   Date Value Ref Range Status   09/01/2018 3.8 3.5 - 5.1 mmol/L Final     Chloride   Date Value Ref Range Status   09/01/2018 102 95 - 110 mmol/L Final     CO2   Date Value Ref Range Status   09/01/2018 25 23 - 29 mmol/L Final     Glucose   Date Value Ref Range Status   09/01/2018 96 70 - 110 mg/dL Final     BUN, Bld   Date Value Ref Range Status   09/01/2018 10 8 - 23 mg/dL Final     Creatinine   Date Value Ref Range Status   09/01/2018 0.7 0.5 - 1.4 mg/dL Final     Calcium   Date Value Ref Range Status   09/01/2018 9.6 8.7 - 10.5 mg/dL Final     Total Protein   Date Value Ref Range Status   08/30/2018 6.2 6.0 - 8.4 g/dL Final     Albumin   Date Value Ref Range Status   08/30/2018 3.2 (L) 3.5 - 5.2 g/dL Final     Total Bilirubin   Date Value Ref Range Status   08/30/2018 0.4 0.1 - 1.0 mg/dL Final     Comment:     For infants and newborns, interpretation of results should be  based  on gestational age, weight and in agreement with clinical  observations.  Premature Infant recommended reference ranges:  Up to 24 hours.............<8.0 mg/dL  Up to 48 hours............<12.0 mg/dL  3-5 days..................<15.0 mg/dL  6-29 days.................<15.0 mg/dL       Alkaline Phosphatase   Date Value Ref Range Status   08/30/2018 150 (H) 55 - 135 U/L Final     AST   Date Value Ref Range Status   08/30/2018 17 10 - 40 U/L Final     ALT   Date Value Ref Range Status   08/30/2018 14 10 - 44 U/L Final     Anion Gap   Date Value Ref Range Status   09/01/2018 8 8 - 16 mmol/L Final     eGFR if    Date Value Ref Range Status   09/01/2018 >60 >60 mL/min/1.73 m^2 Final     eGFR if non    Date Value Ref Range Status   09/01/2018 >60 >60 mL/min/1.73 m^2 Final     Comment:     Calculation used to obtain the estimated glomerular filtration  rate (eGFR) is the CKD-EPI equation.        Radiology: I personally reviewed imaging  X-ray left ankle Soft tissue swelling both medially and laterally   Assessment:   Polyarthralgia- Rule out inflammatory arthritis  SICCA symptoms-check SSA  Diffused myalgia-Rule out myositis  Fatigue-Rule out disorders of bone and cartilage/ vitamin d deficiency  Osteoarthritis of multiple  CAD-avoid NSAIDs   Obesity       Plan:    Check LESLIE, SSA, RF, CCP, ESR, CRP,   Check x-ray of hands  Check CPK, aldolase,25 hydroxy Vit D   Start EMLA cream  Patient educated about osteoarthritis  Offered PT referral but patient refused  Counseled about obesity.  Discussed lifestyle modification to lose weight  Return to clinic after lab review    -Patient seen face to face for 60 minutes and greater than 50% spent in counseling regarding above diagnosis and chart review

## 2019-01-16 NOTE — LETTER
January 18, 2019      Shantanu Santiago MD  [address] [place] [address]  [address]  [place] [address]           [place] [facility]  [address] [facility]  [place] [address]  Phone: [phone]  Fax: [phone]          Patient: Heather Hughes   MR Number: [id number]   YOB: 1940   Date of Visit: 1/16/2019       Dear Dr. Shantanu Santiago:    Thank you for referring Heather Hguhes to me for evaluation. Attached you will find relevant portions of my assessment and plan of care.    If you have questions, please do not hesitate to call me. I look forward to following Heather Hughes along with you.    Sincerely,    Honey Varma MD    Enclosure  CC:  No Recipients    If you would like to receive this communication electronically, please contact [email or web address] or [phone] to request more information on [employer] Link access.    For providers and/or their staff who would like to refer a patient to [facility], please contact us through our one-stop-shop provider referral line, [facility], at [phone].    If you feel you have received this communication in error or would no longer like to receive these types of communications, please e-mail [email or web address]

## 2019-01-21 ENCOUNTER — TELEPHONE (OUTPATIENT)
Dept: RHEUMATOLOGY | Facility: CLINIC | Age: 79
End: 2019-01-21

## 2019-01-21 RX ORDER — OXYBUTYNIN CHLORIDE 5 MG/1
TABLET, EXTENDED RELEASE ORAL
Qty: 90 TABLET | Refills: 9 | OUTPATIENT
Start: 2019-01-21

## 2019-01-21 NOTE — TELEPHONE ENCOUNTER
It is made for once a day but there is a 10mg if needed and tolerated.  It can cause dizziness and drowsiness so falls are a potential problem.  Her vitamin D was minimally low, otc should be fine.  No more than 2000 units a day

## 2019-01-21 NOTE — TELEPHONE ENCOUNTER
Patient informed. She will stay on the 5mg. Please send to pharmacy.   She will go on 2000 vit D otc when Rx vitamin D is finished.

## 2019-01-21 NOTE — TELEPHONE ENCOUNTER
Patient stated she was no longer taking this July 10, 2018 when she saw Raquel.  Taken off med list

## 2019-01-22 RX ORDER — OXYBUTYNIN CHLORIDE 5 MG/1
5 TABLET, EXTENDED RELEASE ORAL DAILY
Qty: 30 TABLET | Refills: 11 | Status: SHIPPED | OUTPATIENT
Start: 2019-01-22 | End: 2019-07-23 | Stop reason: SINTOL

## 2019-02-07 DIAGNOSIS — E55.9 VITAMIN D DEFICIENCY: ICD-10-CM

## 2019-02-07 RX ORDER — ERGOCALCIFEROL 1.25 MG/1
CAPSULE ORAL
Qty: 12 CAPSULE | Refills: 0 | OUTPATIENT
Start: 2019-02-07

## 2019-02-08 NOTE — TELEPHONE ENCOUNTER
Dr. Varma did her labs in January and told her to change vitamin-D to 800 units a day.  This is over-the-counter.

## 2019-03-25 ENCOUNTER — OFFICE VISIT (OUTPATIENT)
Dept: RHEUMATOLOGY | Facility: CLINIC | Age: 79
End: 2019-03-25
Payer: MEDICARE

## 2019-03-25 VITALS
WEIGHT: 213.88 LBS | HEART RATE: 59 BPM | BODY MASS INDEX: 37.89 KG/M2 | SYSTOLIC BLOOD PRESSURE: 156 MMHG | HEIGHT: 63 IN | DIASTOLIC BLOOD PRESSURE: 71 MMHG

## 2019-03-25 DIAGNOSIS — M25.50 POLYARTHRALGIA: Primary | ICD-10-CM

## 2019-03-25 DIAGNOSIS — M15.9 PRIMARY OSTEOARTHRITIS INVOLVING MULTIPLE JOINTS: ICD-10-CM

## 2019-03-25 DIAGNOSIS — E66.9 OBESITY (BMI 35.0-39.9 WITHOUT COMORBIDITY): ICD-10-CM

## 2019-03-25 PROCEDURE — 3077F PR MOST RECENT SYSTOLIC BLOOD PRESSURE >= 140 MM HG: ICD-10-PCS | Mod: CPTII,S$GLB,, | Performed by: INTERNAL MEDICINE

## 2019-03-25 PROCEDURE — 99214 OFFICE O/P EST MOD 30 MIN: CPT | Mod: S$GLB,,, | Performed by: INTERNAL MEDICINE

## 2019-03-25 PROCEDURE — 99999 PR PBB SHADOW E&M-EST. PATIENT-LVL III: ICD-10-PCS | Mod: PBBFAC,,, | Performed by: INTERNAL MEDICINE

## 2019-03-25 PROCEDURE — 99214 PR OFFICE/OUTPT VISIT, EST, LEVL IV, 30-39 MIN: ICD-10-PCS | Mod: S$GLB,,, | Performed by: INTERNAL MEDICINE

## 2019-03-25 PROCEDURE — 99999 PR PBB SHADOW E&M-EST. PATIENT-LVL III: CPT | Mod: PBBFAC,,, | Performed by: INTERNAL MEDICINE

## 2019-03-25 PROCEDURE — 3077F SYST BP >= 140 MM HG: CPT | Mod: CPTII,S$GLB,, | Performed by: INTERNAL MEDICINE

## 2019-03-25 PROCEDURE — 3078F PR MOST RECENT DIASTOLIC BLOOD PRESSURE < 80 MM HG: ICD-10-PCS | Mod: CPTII,S$GLB,, | Performed by: INTERNAL MEDICINE

## 2019-03-25 PROCEDURE — 3078F DIAST BP <80 MM HG: CPT | Mod: CPTII,S$GLB,, | Performed by: INTERNAL MEDICINE

## 2019-03-25 PROCEDURE — 1101F PR PT FALLS ASSESS DOC 0-1 FALLS W/OUT INJ PAST YR: ICD-10-PCS | Mod: CPTII,S$GLB,, | Performed by: INTERNAL MEDICINE

## 2019-03-25 PROCEDURE — 1101F PT FALLS ASSESS-DOCD LE1/YR: CPT | Mod: CPTII,S$GLB,, | Performed by: INTERNAL MEDICINE

## 2019-03-25 ASSESSMENT — ROUTINE ASSESSMENT OF PATIENT INDEX DATA (RAPID3)
MDHAQ FUNCTION SCORE: 1.3
PATIENT GLOBAL ASSESSMENT SCORE: 8
PAIN SCORE: 5
WHEN YOU AWAKENED IN THE MORNING OVER THE LAST WEEK, PLEASE INDICATE THE AMOUNT OF TIME IT TAKES UNTIL YOU ARE AS LIMBER AS YOU WILL BE FOR THE DAY: 1
PSYCHOLOGICAL DISTRESS SCORE: 0
FATIGUE SCORE: 5
AM STIFFNESS SCORE: 1, YES
TOTAL RAPID3 SCORE: 5.78

## 2019-03-25 NOTE — PATIENT INSTRUCTIONS
Please follow up with one of the following providers    Alfonso Campbell MD @ [facility]   Phone: [phone]  Fax: [phone]    Ernestina Larkin DO @ [facility]  Phone: [phone]   Fax: [phone]    Jah Handy MD @ [facility]  Phone: [phone]  Fax: [phone]

## 2019-03-25 NOTE — PROGRESS NOTES
"Subjective:       Patient ID: Heather Hughes is a 78 y.o. female.    Chief Complaint: Polyarthralgia   HPI 77 yo female with HTN, CAD S/P L TKA 8/2018 he is here for follow-up for osteoarthritis.  Work up negative for autoimmune diseases.  Today, pain score is 5 x 10, diffuse aching type, continuous, worse as the day progresses, worse with activity, better with rest.  Denies any joint effusion.  Early morning stiffness lasts for 2 hrs    She denies fever, weight loss, photosensitivity, rash, ulcers, Raynaud's phenomenon, alopecia, dysphagia, diarrhea or blood in the stools          Review of Systems   HENT: Negative for ear discharge and ear pain.    Eyes: Negative for pain and redness.   Respiratory: Negative for cough and shortness of breath.    Cardiovascular: Negative for chest pain and palpitations.   Gastrointestinal: Negative for abdominal distention and abdominal pain.   Genitourinary: Negative for genital sores and hematuria.   Musculoskeletal: Positive for arthralgias.   Skin: Negative for color change and wound.         Objective:   BP (!) 156/71   Pulse (!) 59   Ht 5' 3" (1.6 m)   Wt 97 kg (213 lb 13.5 oz)   BMI 37.88 kg/m²      Physical Exam   Nursing note and vitals reviewed.  Constitutional: She is oriented to person, place, and time and well-developed, well-nourished, and in no distress.   HENT:   Head: Normocephalic and atraumatic.   Eyes: Conjunctivae and EOM are normal. Pupils are equal, round, and reactive to light.   Neck: Normal range of motion. Neck supple. No thyromegaly present.   Cardiovascular: Normal rate and regular rhythm.  Exam reveals no friction rub.    Pulmonary/Chest: Effort normal and breath sounds normal.   Abdominal: Soft. Bowel sounds are normal.   Neurological: She is alert and oriented to person, place, and time.   Skin: Skin is warm and dry. No rash noted.     Psychiatric: Memory and affect normal.   Musculoskeletal: She exhibits no edema.   No joint effusion       "      Lab Results   Component Value Date    WBC 6.40 09/01/2018    HGB 9.2 (L) 09/01/2018    HCT 28.5 (L) 09/01/2018    MCV 89 09/01/2018     09/01/2018     CMP  Sodium   Date Value Ref Range Status   09/01/2018 135 (L) 136 - 145 mmol/L Final     Potassium   Date Value Ref Range Status   09/01/2018 3.8 3.5 - 5.1 mmol/L Final     Chloride   Date Value Ref Range Status   09/01/2018 102 95 - 110 mmol/L Final     CO2   Date Value Ref Range Status   09/01/2018 25 23 - 29 mmol/L Final     Glucose   Date Value Ref Range Status   09/01/2018 96 70 - 110 mg/dL Final     BUN, Bld   Date Value Ref Range Status   09/01/2018 10 8 - 23 mg/dL Final     Creatinine   Date Value Ref Range Status   09/01/2018 0.7 0.5 - 1.4 mg/dL Final     Calcium   Date Value Ref Range Status   09/01/2018 9.6 8.7 - 10.5 mg/dL Final     Total Protein   Date Value Ref Range Status   08/30/2018 6.2 6.0 - 8.4 g/dL Final     Albumin   Date Value Ref Range Status   08/30/2018 3.2 (L) 3.5 - 5.2 g/dL Final     Total Bilirubin   Date Value Ref Range Status   08/30/2018 0.4 0.1 - 1.0 mg/dL Final     Comment:     For infants and newborns, interpretation of results should be based  on gestational age, weight and in agreement with clinical  observations.  Premature Infant recommended reference ranges:  Up to 24 hours.............<8.0 mg/dL  Up to 48 hours............<12.0 mg/dL  3-5 days..................<15.0 mg/dL  6-29 days.................<15.0 mg/dL       Alkaline Phosphatase   Date Value Ref Range Status   08/30/2018 150 (H) 55 - 135 U/L Final     AST   Date Value Ref Range Status   08/30/2018 17 10 - 40 U/L Final     ALT   Date Value Ref Range Status   08/30/2018 14 10 - 44 U/L Final     Anion Gap   Date Value Ref Range Status   09/01/2018 8 8 - 16 mmol/L Final     eGFR if    Date Value Ref Range Status   09/01/2018 >60 >60 mL/min/1.73 m^2 Final     eGFR if non    Date Value Ref Range Status   09/01/2018 >60 >60  mL/min/1.73 m^2 Final     Comment:     Calculation used to obtain the estimated glomerular filtration  rate (eGFR) is the CKD-EPI equation.      Results for GABRIELE FANG (MRN [id number]) as of 3/25/2019 13:56   Ref. Range 1/16/2019 11:32   Anti-SSA Antibody Latest Ref Range: 0.00 - 19.99 EU 0.54   Anti-SSA Interpretation Latest Ref Range: Negative  Negative   LESLIE Screen Latest Ref Range: Negative <1:160  Negative <1:160   CCP Antibodies Latest Ref Range: <5.0 U/mL <0.5   Rheumatoid Factor Latest Ref Range: 0.0 - 15.0 IU/mL <10.0     Radiology: I personally reviewed imaging  X rays of b/l hands   Revealed degenerative changes     Assessment:   Osteoarthritis of multiple  CAD-avoid NSAIDs   Obesity       Plan:   Discussed results with patient, answered all her questions  Reviewed imaging her  Patient educated about osteoarthritis.  Advised to exercise as tolerated  Counseled about obesity:  Discussed lifestyle modification to lose weight  Continue EMLA cream  RTC 6 MONTHS     -Patient seen face to face for 25 minutes and greater than 50% spent in counseling regarding above diagnosis and chart review

## 2019-03-26 RX ORDER — OMEPRAZOLE 40 MG/1
40 CAPSULE, DELAYED RELEASE ORAL DAILY
Qty: 90 CAPSULE | Refills: 3 | Status: SHIPPED | OUTPATIENT
Start: 2019-03-26 | End: 2020-08-12 | Stop reason: SDUPTHER

## 2019-03-28 ENCOUNTER — TELEPHONE (OUTPATIENT)
Dept: FAMILY MEDICINE | Facility: CLINIC | Age: 79
End: 2019-03-28

## 2019-03-28 NOTE — TELEPHONE ENCOUNTER
----- Message from Anupama Lopez sent at 3/27/2019  1:19 PM CDT -----  Contact: Blaire Irby Nurse practitioner with [facility] Nurse practitioner with [facility] is calling regarding the assessment she gave patient. PAD show positive finding in lower extremities. Doctor will get a complete report. Please call Blaire at [phone]. Thanks!

## 2019-04-11 RX ORDER — ASPIRIN 325 MG
TABLET ORAL
Qty: 60 TABLET | Refills: 0 | OUTPATIENT
Start: 2019-04-11

## 2019-04-12 DIAGNOSIS — I10 HYPERTENSION, UNSPECIFIED TYPE: Primary | ICD-10-CM

## 2019-04-15 ENCOUNTER — OFFICE VISIT (OUTPATIENT)
Dept: CARDIOLOGY | Facility: CLINIC | Age: 79
End: 2019-04-15
Payer: MEDICARE

## 2019-04-15 VITALS
HEART RATE: 64 BPM | SYSTOLIC BLOOD PRESSURE: 193 MMHG | WEIGHT: 214.94 LBS | OXYGEN SATURATION: 98 % | DIASTOLIC BLOOD PRESSURE: 84 MMHG | BODY MASS INDEX: 38.09 KG/M2 | HEIGHT: 63 IN

## 2019-04-15 DIAGNOSIS — G47.33 OSA (OBSTRUCTIVE SLEEP APNEA): Primary | ICD-10-CM

## 2019-04-15 DIAGNOSIS — I10 HYPERTENSION, UNSPECIFIED TYPE: ICD-10-CM

## 2019-04-15 DIAGNOSIS — R60.9 EDEMA, UNSPECIFIED TYPE: ICD-10-CM

## 2019-04-15 PROCEDURE — 93000 EKG 12-LEAD: ICD-10-PCS | Mod: S$GLB,,, | Performed by: INTERNAL MEDICINE

## 2019-04-15 PROCEDURE — 99213 PR OFFICE/OUTPT VISIT, EST, LEVL III, 20-29 MIN: ICD-10-PCS | Mod: S$GLB,,, | Performed by: NURSE PRACTITIONER

## 2019-04-15 PROCEDURE — 99499 RISK ADDL DX/OHS AUDIT: ICD-10-PCS | Mod: S$GLB,,, | Performed by: NURSE PRACTITIONER

## 2019-04-15 PROCEDURE — 3077F SYST BP >= 140 MM HG: CPT | Mod: CPTII,S$GLB,, | Performed by: NURSE PRACTITIONER

## 2019-04-15 PROCEDURE — 1101F PR PT FALLS ASSESS DOC 0-1 FALLS W/OUT INJ PAST YR: ICD-10-PCS | Mod: CPTII,S$GLB,, | Performed by: NURSE PRACTITIONER

## 2019-04-15 PROCEDURE — 99499 UNLISTED E&M SERVICE: CPT | Mod: S$GLB,,, | Performed by: NURSE PRACTITIONER

## 2019-04-15 PROCEDURE — 3079F PR MOST RECENT DIASTOLIC BLOOD PRESSURE 80-89 MM HG: ICD-10-PCS | Mod: CPTII,S$GLB,, | Performed by: NURSE PRACTITIONER

## 2019-04-15 PROCEDURE — 1101F PT FALLS ASSESS-DOCD LE1/YR: CPT | Mod: CPTII,S$GLB,, | Performed by: NURSE PRACTITIONER

## 2019-04-15 PROCEDURE — 99213 OFFICE O/P EST LOW 20 MIN: CPT | Mod: S$GLB,,, | Performed by: NURSE PRACTITIONER

## 2019-04-15 PROCEDURE — 3077F PR MOST RECENT SYSTOLIC BLOOD PRESSURE >= 140 MM HG: ICD-10-PCS | Mod: CPTII,S$GLB,, | Performed by: NURSE PRACTITIONER

## 2019-04-15 PROCEDURE — 99999 PR PBB SHADOW E&M-EST. PATIENT-LVL IV: CPT | Mod: PBBFAC,,, | Performed by: NURSE PRACTITIONER

## 2019-04-15 PROCEDURE — 99999 PR PBB SHADOW E&M-EST. PATIENT-LVL IV: ICD-10-PCS | Mod: PBBFAC,,, | Performed by: NURSE PRACTITIONER

## 2019-04-15 PROCEDURE — 3079F DIAST BP 80-89 MM HG: CPT | Mod: CPTII,S$GLB,, | Performed by: NURSE PRACTITIONER

## 2019-04-15 PROCEDURE — 93000 ELECTROCARDIOGRAM COMPLETE: CPT | Mod: S$GLB,,, | Performed by: INTERNAL MEDICINE

## 2019-04-15 RX ORDER — FUROSEMIDE 20 MG/1
20 TABLET ORAL DAILY
Status: SHIPPED | OUTPATIENT
Start: 2019-04-15 | End: 2019-04-19

## 2019-04-15 RX ORDER — CIPROFLOXACIN 250 MG/1
500 TABLET, FILM COATED ORAL EVERY 12 HOURS
Status: SHIPPED | OUTPATIENT
Start: 2019-04-15 | End: 2019-04-22

## 2019-04-15 NOTE — PROGRESS NOTES
Patient, Heather Hugehs (MRN #[id number]), presented with a recorded BMI of 38.08 kg/m^2 and a documented comorbidity(s):  - Obstructive Sleep Apnea  - Hypertension  to which the severe obesity is a contributing factor. This is consistent with the definition of severe obesity (BMI 35.0-39.9) with comorbidity (ICD-10 E66.01, Z68.35). The patient's severe obesity was monitored, evaluated, addressed and/or treated. This addendum to the medical record is made on 04/15/2019.

## 2019-04-15 NOTE — PROGRESS NOTES
Subjective:    Patient ID:  Heather Hughes is a 78 y.o. female who presents for  of low heart rate/ leg swelling      HPI  In today for c/o leg edema. It has been ongoing for about a month. She has 3 + edema to both lower extremities. The R is greater then the L. She also has some redness and small blistering of her R foot. Carries a hx of DVT to the R leg. She had a PAD study done by home health  And left is at mild risk and R is mod risk for PAD. She notes pain to her ankles bilaterally.   also notes she stops breathing at night and thinks she has ALVINA and needs testing.  Also noted to be hypertensive on OV, she states BP is usually 110-130's systolicly at home.     Review of Systems   Constitution: Negative.   HENT: Negative.    Eyes: Negative.    Cardiovascular: Positive for leg swelling.   Respiratory: Negative.    Skin: Negative.    Musculoskeletal: Positive for joint pain.   Gastrointestinal: Negative.    Genitourinary: Negative.    Neurological: Negative.    Psychiatric/Behavioral: Negative.         Objective:    Physical Exam   Constitutional: She is oriented to person, place, and time. She appears well-developed and well-nourished.   HENT:   Head: Normocephalic.   Eyes: Pupils are equal, round, and reactive to light. Conjunctivae are normal.   Neck: Normal range of motion. Neck supple. No JVD present. No thyromegaly present.   Cardiovascular: Normal rate, regular rhythm, normal heart sounds and intact distal pulses. Exam reveals no gallop and no friction rub.   No murmur heard.  Pulmonary/Chest: Effort normal. She has wheezes.   Abdominal: Soft. Bowel sounds are normal.   Musculoskeletal: Normal range of motion. She exhibits edema.   3+ edema to bilat lower extremity with R greater the L.   Neurological: She is alert and oriented to person, place, and time.   Skin: Skin is warm and dry. There is erythema.   Redness and blistering to R lower leg.   Psychiatric: She has a normal mood and affect.  Her behavior is normal. Thought content normal.         Assessment:       1. Hypertension, unspecified type    2.      Edema to bilateral lower extremity  3.      ALVINA  4.      DVT, history of  5.      Asthma  6.      Cellulitis     Plan:       Order a Venous US bilat lower extremity  Lasix 20 mg for 4 days  BMP in 4 days  Ciprofloxin 500 mg for 7 days  Sleep study  BP diary on return.  Follow up in 7 days

## 2019-04-16 ENCOUNTER — HOSPITAL ENCOUNTER (OUTPATIENT)
Dept: RADIOLOGY | Facility: HOSPITAL | Age: 79
Discharge: HOME | End: 2019-04-16
Attending: NURSE PRACTITIONER
Payer: MEDICARE

## 2019-04-16 DIAGNOSIS — R60.9 EDEMA, UNSPECIFIED TYPE: ICD-10-CM

## 2019-04-16 PROCEDURE — 93970 EXTREMITY STUDY: CPT | Mod: TC

## 2019-04-16 PROCEDURE — 93970 US LOWER EXTREMITY VEINS BILATERAL: ICD-10-PCS | Mod: 26,,, | Performed by: RADIOLOGY

## 2019-04-16 PROCEDURE — 93970 EXTREMITY STUDY: CPT | Mod: 26,,, | Performed by: RADIOLOGY

## 2019-04-17 ENCOUNTER — LAB VISIT (OUTPATIENT)
Dept: LAB | Facility: HOSPITAL | Age: 79
End: 2019-04-17
Attending: NURSE PRACTITIONER
Payer: MEDICARE

## 2019-04-17 DIAGNOSIS — I10 HYPERTENSION, UNSPECIFIED TYPE: ICD-10-CM

## 2019-04-17 DIAGNOSIS — R60.9 EDEMA, UNSPECIFIED TYPE: ICD-10-CM

## 2019-04-17 LAB
ANION GAP SERPL CALC-SCNC: 6 MMOL/L (ref 8–16)
BUN SERPL-MCNC: 18 MG/DL (ref 8–23)
CALCIUM SERPL-MCNC: 9.7 MG/DL (ref 8.7–10.5)
CHLORIDE SERPL-SCNC: 105 MMOL/L (ref 95–110)
CO2 SERPL-SCNC: 28 MMOL/L (ref 23–29)
CREAT SERPL-MCNC: 0.8 MG/DL (ref 0.5–1.4)
EST. GFR  (AFRICAN AMERICAN): >60 ML/MIN/1.73 M^2
EST. GFR  (NON AFRICAN AMERICAN): >60 ML/MIN/1.73 M^2
GLUCOSE SERPL-MCNC: 90 MG/DL (ref 70–110)
POTASSIUM SERPL-SCNC: 4.3 MMOL/L (ref 3.5–5.1)
SODIUM SERPL-SCNC: 139 MMOL/L (ref 136–145)

## 2019-04-17 PROCEDURE — 80048 BASIC METABOLIC PNL TOTAL CA: CPT

## 2019-04-17 PROCEDURE — 36415 COLL VENOUS BLD VENIPUNCTURE: CPT | Mod: PO

## 2019-04-18 DIAGNOSIS — E03.9 HYPOTHYROIDISM, UNSPECIFIED TYPE: ICD-10-CM

## 2019-04-18 RX ORDER — LEVOTHYROXINE SODIUM 112 UG/1
TABLET ORAL
Qty: 30 TABLET | Refills: 0 | Status: SHIPPED | OUTPATIENT
Start: 2019-04-18 | End: 2019-05-24 | Stop reason: SDUPTHER

## 2019-04-22 ENCOUNTER — LAB VISIT (OUTPATIENT)
Dept: LAB | Facility: HOSPITAL | Age: 79
End: 2019-04-22
Attending: FAMILY MEDICINE
Payer: MEDICARE

## 2019-04-22 ENCOUNTER — OFFICE VISIT (OUTPATIENT)
Dept: CARDIOLOGY | Facility: CLINIC | Age: 79
End: 2019-04-22
Payer: MEDICARE

## 2019-04-22 VITALS
WEIGHT: 214.75 LBS | HEIGHT: 63 IN | HEART RATE: 79 BPM | SYSTOLIC BLOOD PRESSURE: 160 MMHG | DIASTOLIC BLOOD PRESSURE: 74 MMHG | BODY MASS INDEX: 38.05 KG/M2 | OXYGEN SATURATION: 94 %

## 2019-04-22 DIAGNOSIS — I10 HYPERTENSION, UNSPECIFIED TYPE: ICD-10-CM

## 2019-04-22 DIAGNOSIS — E03.9 HYPOTHYROIDISM, UNSPECIFIED TYPE: ICD-10-CM

## 2019-04-22 DIAGNOSIS — R60.9 EDEMA, UNSPECIFIED TYPE: Primary | ICD-10-CM

## 2019-04-22 LAB — TSH SERPL DL<=0.005 MIU/L-ACNC: 0.58 UIU/ML (ref 0.4–4)

## 2019-04-22 PROCEDURE — 99212 PR OFFICE/OUTPT VISIT, EST, LEVL II, 10-19 MIN: ICD-10-PCS | Mod: S$GLB,,, | Performed by: NURSE PRACTITIONER

## 2019-04-22 PROCEDURE — 84443 ASSAY THYROID STIM HORMONE: CPT

## 2019-04-22 PROCEDURE — 1101F PT FALLS ASSESS-DOCD LE1/YR: CPT | Mod: CPTII,S$GLB,, | Performed by: NURSE PRACTITIONER

## 2019-04-22 PROCEDURE — 99999 PR PBB SHADOW E&M-EST. PATIENT-LVL IV: ICD-10-PCS | Mod: PBBFAC,,, | Performed by: NURSE PRACTITIONER

## 2019-04-22 PROCEDURE — 99999 PR PBB SHADOW E&M-EST. PATIENT-LVL IV: CPT | Mod: PBBFAC,,, | Performed by: NURSE PRACTITIONER

## 2019-04-22 PROCEDURE — 3078F DIAST BP <80 MM HG: CPT | Mod: CPTII,S$GLB,, | Performed by: NURSE PRACTITIONER

## 2019-04-22 PROCEDURE — 3077F PR MOST RECENT SYSTOLIC BLOOD PRESSURE >= 140 MM HG: ICD-10-PCS | Mod: CPTII,S$GLB,, | Performed by: NURSE PRACTITIONER

## 2019-04-22 PROCEDURE — 36415 COLL VENOUS BLD VENIPUNCTURE: CPT | Mod: PO

## 2019-04-22 PROCEDURE — 1101F PR PT FALLS ASSESS DOC 0-1 FALLS W/OUT INJ PAST YR: ICD-10-PCS | Mod: CPTII,S$GLB,, | Performed by: NURSE PRACTITIONER

## 2019-04-22 PROCEDURE — 3078F PR MOST RECENT DIASTOLIC BLOOD PRESSURE < 80 MM HG: ICD-10-PCS | Mod: CPTII,S$GLB,, | Performed by: NURSE PRACTITIONER

## 2019-04-22 PROCEDURE — 99212 OFFICE O/P EST SF 10 MIN: CPT | Mod: S$GLB,,, | Performed by: NURSE PRACTITIONER

## 2019-04-22 PROCEDURE — 3077F SYST BP >= 140 MM HG: CPT | Mod: CPTII,S$GLB,, | Performed by: NURSE PRACTITIONER

## 2019-04-22 RX ORDER — FUROSEMIDE 20 MG/1
20 TABLET ORAL DAILY
Qty: 20 TABLET | Refills: 1 | Status: SHIPPED | OUTPATIENT
Start: 2019-04-22 | End: 2019-06-03 | Stop reason: ALTCHOICE

## 2019-04-22 RX ORDER — CIPROFLOXACIN 500 MG/1
500 TABLET ORAL 2 TIMES DAILY
Qty: 14 TABLET | Refills: 1 | Status: SHIPPED | OUTPATIENT
Start: 2019-04-22 | End: 2019-06-03 | Stop reason: ALTCHOICE

## 2019-04-22 RX ORDER — CIPROFLOXACIN 500 MG/1
500 TABLET ORAL 2 TIMES DAILY
COMMUNITY
Start: 2019-04-22 | End: 2019-04-22 | Stop reason: SDUPTHER

## 2019-04-22 RX ORDER — FUROSEMIDE 20 MG/1
20 TABLET ORAL DAILY
COMMUNITY
End: 2019-04-22 | Stop reason: SDUPTHER

## 2019-04-22 NOTE — PROGRESS NOTES
Subjective:    Patient ID:  Heather Hughes is a 78 y.o. female who presents for 1 week f/u      HPI  Did not get her scripts, they went to [facility] mail order. So will send her lasix and Ciprofloxin to a local pharmacy. She has been keeping her legs elevated so look better. But will still need an antibiotic for the cellulitis to R ankle.  ROS   As per above.    Objective:    Physical Exam      Assessment:       Cellulitis R ankle  Edema bilat     Plan:       Lasix for 4 days and Ciprofloxin for 7 days.

## 2019-05-24 DIAGNOSIS — E03.9 HYPOTHYROIDISM, UNSPECIFIED TYPE: ICD-10-CM

## 2019-05-24 RX ORDER — LEVOTHYROXINE SODIUM 112 UG/1
TABLET ORAL
Qty: 30 TABLET | Refills: 10 | Status: SHIPPED | OUTPATIENT
Start: 2019-05-24 | End: 2020-04-21

## 2019-05-29 ENCOUNTER — DOCUMENTATION ONLY (OUTPATIENT)
Dept: FAMILY MEDICINE | Facility: CLINIC | Age: 79
End: 2019-05-29

## 2019-05-29 NOTE — PROGRESS NOTES
Pre-Visit Chart Review  For Appointment Scheduled on 6-3-19    There are no preventive care reminders to display for this patient.

## 2019-06-03 ENCOUNTER — OFFICE VISIT (OUTPATIENT)
Dept: FAMILY MEDICINE | Facility: CLINIC | Age: 79
End: 2019-06-03
Attending: FAMILY MEDICINE
Payer: MEDICARE

## 2019-06-03 VITALS
HEART RATE: 75 BPM | TEMPERATURE: 98 F | SYSTOLIC BLOOD PRESSURE: 158 MMHG | HEIGHT: 63 IN | BODY MASS INDEX: 37.81 KG/M2 | DIASTOLIC BLOOD PRESSURE: 80 MMHG | WEIGHT: 213.38 LBS

## 2019-06-03 DIAGNOSIS — G57.93 NEUROPATHY INVOLVING BOTH LOWER EXTREMITIES: ICD-10-CM

## 2019-06-03 DIAGNOSIS — L03.115 CELLULITIS OF RIGHT LEG WITHOUT FOOT: Primary | ICD-10-CM

## 2019-06-03 PROCEDURE — 1101F PR PT FALLS ASSESS DOC 0-1 FALLS W/OUT INJ PAST YR: ICD-10-PCS | Mod: HCNC,CPTII,S$GLB, | Performed by: FAMILY MEDICINE

## 2019-06-03 PROCEDURE — 3079F DIAST BP 80-89 MM HG: CPT | Mod: HCNC,CPTII,S$GLB, | Performed by: FAMILY MEDICINE

## 2019-06-03 PROCEDURE — 99999 PR PBB SHADOW E&M-EST. PATIENT-LVL III: CPT | Mod: PBBFAC,HCNC,, | Performed by: FAMILY MEDICINE

## 2019-06-03 PROCEDURE — 99999 PR PBB SHADOW E&M-EST. PATIENT-LVL III: ICD-10-PCS | Mod: PBBFAC,HCNC,, | Performed by: FAMILY MEDICINE

## 2019-06-03 PROCEDURE — 3077F PR MOST RECENT SYSTOLIC BLOOD PRESSURE >= 140 MM HG: ICD-10-PCS | Mod: HCNC,CPTII,S$GLB, | Performed by: FAMILY MEDICINE

## 2019-06-03 PROCEDURE — 3079F PR MOST RECENT DIASTOLIC BLOOD PRESSURE 80-89 MM HG: ICD-10-PCS | Mod: HCNC,CPTII,S$GLB, | Performed by: FAMILY MEDICINE

## 2019-06-03 PROCEDURE — 3077F SYST BP >= 140 MM HG: CPT | Mod: HCNC,CPTII,S$GLB, | Performed by: FAMILY MEDICINE

## 2019-06-03 PROCEDURE — 99213 OFFICE O/P EST LOW 20 MIN: CPT | Mod: HCNC,S$GLB,, | Performed by: FAMILY MEDICINE

## 2019-06-03 PROCEDURE — 99213 PR OFFICE/OUTPT VISIT, EST, LEVL III, 20-29 MIN: ICD-10-PCS | Mod: HCNC,S$GLB,, | Performed by: FAMILY MEDICINE

## 2019-06-03 PROCEDURE — 1101F PT FALLS ASSESS-DOCD LE1/YR: CPT | Mod: HCNC,CPTII,S$GLB, | Performed by: FAMILY MEDICINE

## 2019-06-03 RX ORDER — GABAPENTIN 300 MG/1
300 CAPSULE ORAL NIGHTLY
Qty: 30 CAPSULE | Refills: 5 | Status: SHIPPED | OUTPATIENT
Start: 2019-06-03 | End: 2019-06-11 | Stop reason: DRUGHIGH

## 2019-06-03 RX ORDER — AMOXICILLIN AND CLAVULANATE POTASSIUM 875; 125 MG/1; MG/1
1 TABLET, FILM COATED ORAL 2 TIMES DAILY
Qty: 20 TABLET | Refills: 0 | Status: SHIPPED | OUTPATIENT
Start: 2019-06-03 | End: 2019-06-13

## 2019-06-03 NOTE — TELEPHONE ENCOUNTER
appt made 7/30/18   NP Progress Note     [facility] -- Bolivar Carbajal, El, Brittany, Reynaldo Sweeney Savella  Office # [phone]      Follow Up: Afib w/ RVR Pre-op eval     HPI:  51 year old man, had sudden onset at 7 PM of abdominal pain (11 May 2019 00:30)        Subjective/Observations: Pt. seen and examined and evaluated. Pt. resting comfortably in bed in NAD, with no respiratory distress, no chest pain, dyspnea, palpitations, PND, or orthopnea.      REVIEW OF SYSTEMS: All other review of systems is negative unless indicated above    PAST MEDICAL & SURGICAL HISTORY:  Diabetes mellitus with no complication  No significant past surgical history      MEDICATIONS  (STANDING):  amiodarone    Tablet 200 milliGRAM(s) Oral daily  dextrose 5%. 1000 milliLiter(s) (50 mL/Hr) IV Continuous <Continuous>  dextrose 50% Injectable 12.5 Gram(s) IV Push once  dextrose 50% Injectable 25 Gram(s) IV Push once  docusate sodium 100 milliGRAM(s) Oral three times a day  enoxaparin Injectable 40 milliGRAM(s) SubCutaneous at bedtime  insulin glargine Injectable (LANTUS) 10 Unit(s) SubCutaneous at bedtime  insulin lispro (HumaLOG) corrective regimen sliding scale   SubCutaneous Before meals and at bedtime  insulin lispro Injectable (HumaLOG) 3 Unit(s) SubCutaneous three times a day before meals  nicotine -  14 mG/24Hr(s) Patch 1 patch Transdermal daily  pantoprazole    Tablet 40 milliGRAM(s) Oral two times a day  polyethylene glycol 3350 17 Gram(s) Oral at bedtime  senna 2 Tablet(s) Oral at bedtime  tamsulosin 0.8 milliGRAM(s) Oral at bedtime    MEDICATIONS  (PRN):  acetaminophen   Tablet .. 650 milliGRAM(s) Oral every 6 hours PRN Mild Pain (1 - 3)  aluminum hydroxide/magnesium hydroxide/simethicone Suspension 30 milliLiter(s) Oral every 4 hours PRN Dyspepsia  dextrose 40% Gel 15 Gram(s) Oral once PRN Blood Glucose LESS THAN 70 milliGRAM(s)/deciliter  glucagon  Injectable 1 milliGRAM(s) IntraMuscular once PRN Glucose LESS THAN 70 milligrams/deciliter  nitroglycerin     SubLingual 0.4 milliGRAM(s) SubLingual every 5 minutes PRN Chest Pain  ondansetron Injectable 4 milliGRAM(s) IV Push every 8 hours PRN Nausea and/or Vomiting  oxyCODONE    5 mG/acetaminophen 325 mG 1 Tablet(s) Oral every 4 hours PRN Moderate Pain (4 - 6)  oxyCODONE    5 mG/acetaminophen 325 mG 2 Tablet(s) Oral every 6 hours PRN Severe Pain (7 - 10)      Allergies: No Known Allergies      Vital Signs Last 24 Hrs  T(C): 37.4 (03 Jun 2019 04:33), Max: 37.4 (03 Jun 2019 04:33)  T(F): 99.4 (03 Jun 2019 04:33), Max: 99.4 (03 Jun 2019 04:33)  HR: 53 (03 Jun 2019 04:33) (53 - 60)  BP: 115/73 (03 Jun 2019 04:33) (110/73 - 126/75)  BP(mean): --  RR: 18 (03 Jun 2019 04:33) (18 - 18)  SpO2: 95% (03 Jun 2019 04:33) (95% - 99%)    I&O's Summary    02 Jun 2019 07:01  -  03 Jun 2019 07:00  --------------------------------------------------------  IN: 1015 mL / OUT: 3 mL / NET: 1012 mL              LABS: All Labs Reviewed:                        8.5    5.86  )-----------( 347      ( 03 Jun 2019 06:14 )             27.2               03 Jun 2019 06:14    143    |  107    |  13     ----------------------------<  117    4.3     |  32     |  1.30      3.6     |  29     |  1.20     Ca    8.4        03 Jun 2019 06:14      Echo:  < from: TTE Echo Doppler w/o Cont (05.16.19 @ 09:45) >  OBSERVATIONS:    Mitral Valve: Thickened leaflets, moderate MR.  Aortic Valve/Aorta: Sclerotic trileaflet aortic valve with normal   opening. Trace AI  Tricuspid Valve: normal with mild TR.  Pulmonic Valve: normal  Left Atrium: Mildly dilated  Right Atrium: normal  Left Ventricle: normal LV size and systolic function, estimated LVEF of   65%. No evidence of segmental dysfunction. Basal septal hypertrophy is   present  Right Ventricle: normal size and systolic function.  Pericardium/Pleura: normal, no significant pericardial effusion.    Conclusion:     normal LV size and systolic function, estimated LVEF of 65%. No evidence   of segmental dysfunction  Normal RV size and systolic function.   Mild left atrial enlargement  Sclerotic trileaflet aortic valve with normal opening. Trace AI  Mitral valve with thickened leaflets and moderate MR  Mild TR  No significant pericardial effusion.    Interpretation of Telemetry:      Physical Exam:  Appearance: [ ] Normal  [ ] abnormal [ ] NAD   Eyes: [ ] PERRL [ ] EOMI  HEENT: [ ] Normal [ ] Abnormal oral mucosa [ ]NC/AT  Cardiovascular: [ ] S1 [ ] S2 [ ] RRR [ ] m/r/g [ ]edema [ ] JVP  Procedural Access Site: [ ]  hematoma [ ] tender to palpation [ ] 2+ pulse [ ] bruit [ ] Ecchymosis  Respiratory: [ ] Clear to auscultation bilaterally  Gastrointestinal: [ ] Soft [ ] tenderness[ ] distension [ ] BS  Musculoskeletal: [ ] clubbing [ ] joint deformity   Neurologic: [ ] Non-focal  Lymphatic: [ ] lymphadenopathy  Psychiatry: [ ] AAOx3  [ ] confused [ ] disoriented [ ] Mood & affect appropriate  Skin: [ ]  rashes [ ] ecchymoses [ ] cyanosis NP Progress Note     [facility] -- Bolivar Carbajal, El, Brittany, Reynaldo Sweeney Savella  Office # [phone]      Follow Up: Afib w/ RVR Pre-op eval     HPI:  51 year old man, had sudden onset at 7 PM of abdominal pain (11 May 2019 00:30)        Subjective/Observations: Pt. seen and examined and evaluated. Pt. resting comfortably in bed in NAD, with no respiratory distress, no chest pain, dyspnea, palpitations, PND, or orthopnea.      REVIEW OF SYSTEMS: All other review of systems is negative unless indicated above    PAST MEDICAL & SURGICAL HISTORY:  Diabetes mellitus with no complication  No significant past surgical history      MEDICATIONS  (STANDING):  amiodarone    Tablet 200 milliGRAM(s) Oral daily  dextrose 5%. 1000 milliLiter(s) (50 mL/Hr) IV Continuous <Continuous>  dextrose 50% Injectable 12.5 Gram(s) IV Push once  dextrose 50% Injectable 25 Gram(s) IV Push once  docusate sodium 100 milliGRAM(s) Oral three times a day  enoxaparin Injectable 40 milliGRAM(s) SubCutaneous at bedtime  insulin glargine Injectable (LANTUS) 10 Unit(s) SubCutaneous at bedtime  insulin lispro (HumaLOG) corrective regimen sliding scale   SubCutaneous Before meals and at bedtime  insulin lispro Injectable (HumaLOG) 3 Unit(s) SubCutaneous three times a day before meals  nicotine -  14 mG/24Hr(s) Patch 1 patch Transdermal daily  pantoprazole    Tablet 40 milliGRAM(s) Oral two times a day  polyethylene glycol 3350 17 Gram(s) Oral at bedtime  senna 2 Tablet(s) Oral at bedtime  tamsulosin 0.8 milliGRAM(s) Oral at bedtime    MEDICATIONS  (PRN):  acetaminophen   Tablet .. 650 milliGRAM(s) Oral every 6 hours PRN Mild Pain (1 - 3)  aluminum hydroxide/magnesium hydroxide/simethicone Suspension 30 milliLiter(s) Oral every 4 hours PRN Dyspepsia  dextrose 40% Gel 15 Gram(s) Oral once PRN Blood Glucose LESS THAN 70 milliGRAM(s)/deciliter  glucagon  Injectable 1 milliGRAM(s) IntraMuscular once PRN Glucose LESS THAN 70 milligrams/deciliter  nitroglycerin     SubLingual 0.4 milliGRAM(s) SubLingual every 5 minutes PRN Chest Pain  ondansetron Injectable 4 milliGRAM(s) IV Push every 8 hours PRN Nausea and/or Vomiting  oxyCODONE    5 mG/acetaminophen 325 mG 1 Tablet(s) Oral every 4 hours PRN Moderate Pain (4 - 6)  oxyCODONE    5 mG/acetaminophen 325 mG 2 Tablet(s) Oral every 6 hours PRN Severe Pain (7 - 10)      Allergies: No Known Allergies      Vital Signs Last 24 Hrs  T(C): 37.4 (03 Jun 2019 04:33), Max: 37.4 (03 Jun 2019 04:33)  T(F): 99.4 (03 Jun 2019 04:33), Max: 99.4 (03 Jun 2019 04:33)  HR: 53 (03 Jun 2019 04:33) (53 - 60)  BP: 115/73 (03 Jun 2019 04:33) (110/73 - 126/75)  BP(mean): --  RR: 18 (03 Jun 2019 04:33) (18 - 18)  SpO2: 95% (03 Jun 2019 04:33) (95% - 99%)    I&O's Summary    02 Jun 2019 07:01  -  03 Jun 2019 07:00  --------------------------------------------------------  IN: 1015 mL / OUT: 3 mL / NET: 1012 mL              LABS: All Labs Reviewed:                        8.5    5.86  )-----------( 347      ( 03 Jun 2019 06:14 )             27.2               03 Jun 2019 06:14    143    |  107    |  13     ----------------------------<  117    4.3     |  32     |  1.30      3.6     |  29     |  1.20     Ca    8.4        03 Jun 2019 06:14      Echo:  < from: TTE Echo Doppler w/o Cont (05.16.19 @ 09:45) >  OBSERVATIONS:    Mitral Valve: Thickened leaflets, moderate MR.  Aortic Valve/Aorta: Sclerotic trileaflet aortic valve with normal   opening. Trace AI  Tricuspid Valve: normal with mild TR.  Pulmonic Valve: normal  Left Atrium: Mildly dilated  Right Atrium: normal  Left Ventricle: normal LV size and systolic function, estimated LVEF of   65%. No evidence of segmental dysfunction. Basal septal hypertrophy is   present  Right Ventricle: normal size and systolic function.  Pericardium/Pleura: normal, no significant pericardial effusion.    Conclusion:     normal LV size and systolic function, estimated LVEF of 65%. No evidence   of segmental dysfunction  Normal RV size and systolic function.   Mild left atrial enlargement  Sclerotic trileaflet aortic valve with normal opening. Trace AI  Mitral valve with thickened leaflets and moderate MR  Mild TR  No significant pericardial effusion.    Interpretation of Telemetry: Pt. is not on telemetry      Physical Exam:  Appearance: [ ] Normal  [ ] abnormal [X] NAD   Eyes: [ ] PERRL [ ] EOMI  HEENT: [ ] Normal [ ] Abnormal oral mucosa [ ]NC/AT  Cardiovascular: [X ] S1 [X ] S2[X ]edema +2 B/L LE    Procedural Access Site: [X]  left foot dressing dry and intact  Respiratory: [X ] Clear to auscultation bilaterally  Gastrointestinal: [ ] Soft [ ] tenderness[ ] distension [ ] BS  Musculoskeletal: [ ] clubbing [ ] joint deformity   Neurologic: [ ] Non-focal  Lymphatic: [ ] lymphadenopathy  Psychiatry: [X ] AAOx3  [ ] confused [ ] disoriented [ ] Mood & affect appropriate  Skin: [ ]  rashes [ ] ecchymoses [ ] cyanosis

## 2019-06-03 NOTE — PROGRESS NOTES
Subjective:       Patient ID: eHather Hughes is a 78 y.o. female.    Chief Complaint: Edema    78-year-old female comes in for redness and swelling of the right leg.  She developed this more than a month ago and was seen by her cardiologist to treated her with Cipro and Lasix after checking bilateral ultrasounds showing no evidence of deep vein thrombosis and no report of venous insufficiency.  The patient does have a history of deep vein thrombosis of the right lower extremity on two separate occasions and she is on Plavix.  She complains of a burning discomfort in the area of redness on the posterior distal right leg and also has burning discomfort in her left knee following knee replacement.  She has been taking some of her 's gabapentin 300 mg once a day which is giving her good relief of the burning pain that was not relieved with anti inflammatories and tramadol.  The patient recently completed her course of Cipro but the redness in the right leg was unaffected.    Past Medical History:  No date: Allergy  No date: Anticoagulant long-term use  No date: Arthritis  No date: Asthma  No date: Coronary artery disease      Comment:  stent x 1  1965 & 1971: Deep vein blood clot of right lower extremity  No date: Full dentures  01/13/2017: Heart attack  No date: Hypertension  No date: Osteoporosis  No date: Thyroid disease  No date: Ulcer  No date: Wears glasses    Past Surgical History:  8/15/2018: ARTHROPLASTY, KNEE; Left      Comment:  Performed by Shantanu Santiago MD at [facility] OR  01/13/2017: cardic stent  No date: CHOLECYSTECTOMY  No date: EYE SURGERY      Comment:  bilateral cataracts  2011: FRACTURE SURGERY      Comment:  left wrist   1/13/2017: STENT-CORONARY; N/A      Comment:  Performed by Yg Potter MD at [facility] CATH LAB  No date: THYROIDECTOMY    Current Outpatient Medications on File Prior to Visit:  acetaminophen (TYLENOL) 650 MG TbSR, Take 650 mg by mouth every 8 (eight) hours as  needed., Disp: , Rfl:   albuterol 90 mcg/actuation inhaler, Inhale 2 puffs into the lungs every 6 (six) hours as needed for Wheezing., Disp: 18 g, Rfl: 2  aspirin 325 MG tablet, Take 1 tablet (325 mg total) by mouth 2 (two) times daily. (Patient taking differently: Take 81 mg by mouth once. ), Disp: , Rfl: 0  CALCIUM CARBONATE/VITAMIN D3 (CALCIUM 500 + D ORAL), Take 2 tablets by mouth once daily., Disp: , Rfl:   carvedilol (COREG) 6.25 MG tablet, Take 1 tablet (6.25 mg total) by mouth 2 (two) times daily with meals., Disp: 180 tablet, Rfl: 3  clopidogrel (PLAVIX) 75 mg tablet, Take 1 tablet (75 mg total) by mouth once daily., Disp: 90 tablet, Rfl: 3  ergocalciferol (ERGOCALCIFEROL) 50,000 unit Cap, TAKE ONE CAPSULE BY MOUTH EVERY 7 DAYS, Disp: 12 capsule, Rfl: 1  glucosamine-chondroitin 500-400 mg tablet, Take 1 tablet by mouth 3 (three) times daily., Disp: , Rfl:   lidocaine-prilocaine (EMLA) cream, Apply topically 2 (two) times daily as needed., Disp: 30 g, Rfl: 2  montelukast (SINGULAIR) 10 mg tablet, Take 1 tablet (10 mg total) by mouth every evening., Disp: 90 tablet, Rfl: 3  nitroGLYCERIN (NITROSTAT) 0.4 MG SL tablet, Place 1 tablet (0.4 mg total) under the tongue every 5 (five) minutes as needed for Chest pain. (Patient taking differently: Place 0.4 mg under the tongue every 5 (five) minutes as needed for Chest pain. Seek medical help if pain is not relieved by the third dose.), Disp: 25 tablet, Rfl: 3  omeprazole (PRILOSEC) 40 MG capsule, TAKE 1 CAPSULE (40 MG TOTAL) BY MOUTH ONCE DAILY., Disp: 90 capsule, Rfl: 3  oxybutynin (DITROPAN-XL) 5 MG TR24, Take 1 tablet (5 mg total) by mouth once daily., Disp: 30 tablet, Rfl: 11  SYNTHROID 112 mcg tablet, TAKE ONE TABLET BY MOUTH EVERY DAY BEFORE BREAKFAST, Disp: 30 tablet, Rfl: 10  traMADol (ULTRAM) 50 mg tablet, Take 1 tablet (50 mg total) by mouth every 6 (six) hours as needed for Pain., Disp: 40 tablet, Rfl: 0  (DISCONTINUED) ciprofloxacin HCl (CIPRO) 500 MG  tablet, Take 1 tablet (500 mg total) by mouth 2 (two) times daily., Disp: 14 tablet, Rfl: 1  (DISCONTINUED) furosemide (LASIX) 20 MG tablet, Take 1 tablet (20 mg total) by mouth once daily. Take 20mg by mouth once daily for 4 days, Disp: 20 tablet, Rfl: 1    No current facility-administered medications on file prior to visit.         Review of Systems   Constitutional: Negative for chills, diaphoresis and fever.   Skin: Positive for color change and rash.   Neurological: Positive for numbness (Burning discomfort both lower extremities).       Objective:      Physical Exam   Cardiovascular:   Varicosities right calf but soft, nontender, and no cords palpable   Musculoskeletal: She exhibits edema (Trace to 1+ edema both lower extremities) and tenderness (Tenderness around left knee, postop). She exhibits no deformity.   Skin: Rash noted. No petechiae noted. There is erythema.        Nursing note and vitals reviewed.      Assessment:       1. Cellulitis of right leg without foot    2. Neuropathy involving both lower extremities        Plan:       1. Cellulitis of right leg without foot  - amoxicillin-clavulanate 875-125mg (AUGMENTIN) 875-125 mg per tablet; Take 1 tablet by mouth 2 (two) times daily. for 10 days  Dispense: 20 tablet; Refill: 0  Recheck one week    2. Neuropathy involving both lower extremities  - gabapentin (NEURONTIN) 300 MG capsule; Take 1 capsule (300 mg total) by mouth every evening.  Dispense: 30 capsule; Refill: 5

## 2019-06-07 ENCOUNTER — DOCUMENTATION ONLY (OUTPATIENT)
Dept: FAMILY MEDICINE | Facility: CLINIC | Age: 79
End: 2019-06-07

## 2019-06-07 NOTE — PROGRESS NOTES
Pre-Visit Chart Review  For Appointment Scheduled on 6-11-19    There are no preventive care reminders to display for this patient.

## 2019-06-11 ENCOUNTER — OFFICE VISIT (OUTPATIENT)
Dept: FAMILY MEDICINE | Facility: CLINIC | Age: 79
End: 2019-06-11
Attending: FAMILY MEDICINE
Payer: MEDICARE

## 2019-06-11 VITALS
DIASTOLIC BLOOD PRESSURE: 80 MMHG | TEMPERATURE: 97 F | RESPIRATION RATE: 20 BRPM | HEART RATE: 71 BPM | HEIGHT: 63 IN | WEIGHT: 214.5 LBS | BODY MASS INDEX: 38.01 KG/M2 | OXYGEN SATURATION: 97 % | SYSTOLIC BLOOD PRESSURE: 184 MMHG

## 2019-06-11 DIAGNOSIS — L97.311 VENOUS STASIS ULCER OF RIGHT ANKLE LIMITED TO BREAKDOWN OF SKIN, UNSPECIFIED WHETHER VARICOSE VEINS PRESENT: Primary | ICD-10-CM

## 2019-06-11 DIAGNOSIS — G57.93 NEUROPATHY INVOLVING BOTH LOWER EXTREMITIES: ICD-10-CM

## 2019-06-11 DIAGNOSIS — I83.013 VENOUS STASIS ULCER OF RIGHT ANKLE LIMITED TO BREAKDOWN OF SKIN, UNSPECIFIED WHETHER VARICOSE VEINS PRESENT: Primary | ICD-10-CM

## 2019-06-11 PROCEDURE — 1100F PR PT FALLS ASSESS DOC 2+ FALLS/FALL W/INJURY/YR: ICD-10-PCS | Mod: HCNC,CPTII,S$GLB, | Performed by: FAMILY MEDICINE

## 2019-06-11 PROCEDURE — 99999 PR PBB SHADOW E&M-EST. PATIENT-LVL IV: CPT | Mod: PBBFAC,HCNC,, | Performed by: FAMILY MEDICINE

## 2019-06-11 PROCEDURE — 3079F PR MOST RECENT DIASTOLIC BLOOD PRESSURE 80-89 MM HG: ICD-10-PCS | Mod: HCNC,CPTII,S$GLB, | Performed by: FAMILY MEDICINE

## 2019-06-11 PROCEDURE — 3079F DIAST BP 80-89 MM HG: CPT | Mod: HCNC,CPTII,S$GLB, | Performed by: FAMILY MEDICINE

## 2019-06-11 PROCEDURE — 99213 OFFICE O/P EST LOW 20 MIN: CPT | Mod: HCNC,S$GLB,, | Performed by: FAMILY MEDICINE

## 2019-06-11 PROCEDURE — 3288F FALL RISK ASSESSMENT DOCD: CPT | Mod: HCNC,CPTII,S$GLB, | Performed by: FAMILY MEDICINE

## 2019-06-11 PROCEDURE — 99999 PR PBB SHADOW E&M-EST. PATIENT-LVL IV: ICD-10-PCS | Mod: PBBFAC,HCNC,, | Performed by: FAMILY MEDICINE

## 2019-06-11 PROCEDURE — 3077F SYST BP >= 140 MM HG: CPT | Mod: HCNC,CPTII,S$GLB, | Performed by: FAMILY MEDICINE

## 2019-06-11 PROCEDURE — 99213 PR OFFICE/OUTPT VISIT, EST, LEVL III, 20-29 MIN: ICD-10-PCS | Mod: HCNC,S$GLB,, | Performed by: FAMILY MEDICINE

## 2019-06-11 PROCEDURE — 3288F PR FALLS RISK ASSESSMENT DOCUMENTED: ICD-10-PCS | Mod: HCNC,CPTII,S$GLB, | Performed by: FAMILY MEDICINE

## 2019-06-11 PROCEDURE — 1100F PTFALLS ASSESS-DOCD GE2>/YR: CPT | Mod: HCNC,CPTII,S$GLB, | Performed by: FAMILY MEDICINE

## 2019-06-11 PROCEDURE — 3077F PR MOST RECENT SYSTOLIC BLOOD PRESSURE >= 140 MM HG: ICD-10-PCS | Mod: HCNC,CPTII,S$GLB, | Performed by: FAMILY MEDICINE

## 2019-06-11 RX ORDER — SILVER SULFADIAZINE 10 G/1000G
CREAM TOPICAL DAILY
Qty: 400 G | Refills: 2 | Status: SHIPPED | OUTPATIENT
Start: 2019-06-11 | End: 2020-03-10 | Stop reason: CLARIF

## 2019-06-11 RX ORDER — ASPIRIN 81 MG/1
81 TABLET ORAL DAILY
COMMUNITY

## 2019-06-11 RX ORDER — GABAPENTIN 100 MG/1
100 CAPSULE ORAL 2 TIMES DAILY
Qty: 60 CAPSULE | Refills: 5 | Status: SHIPPED | OUTPATIENT
Start: 2019-06-11 | End: 2020-01-03

## 2019-06-11 NOTE — PROGRESS NOTES
Subjective:       Patient ID: Heather Hughes is a 78 y.o. female.    Chief Complaint: Follow-up (1wk leg cellulitis )    78-year-old female comes in for recheck of her visit of Ammy 3, 2019 with cellulitis of the right leg.  The original lesions have resolved but have now developed into almost an urticarial reaction which appears to be secondary to a stasis ulcer just above the medial malleolus of the right ankle.  The patient states she has had this for some time and she tends to break out in urticarial rash near wounds that are healing.  She did get good relief with the gabapentin 300 however it was too strong for leaving her somewhat confused and disoriented and one morning she woke up on the floor with no knowledge of how she got there.  She would like a lower dose.    Past Medical History:  No date: Allergy  No date: Anticoagulant long-term use  No date: Arthritis  No date: Asthma  No date: Coronary artery disease      Comment:  stent x 1  1965 & 1971: Deep vein blood clot of right lower extremity  No date: Full dentures  01/13/2017: Heart attack  No date: Hypertension  No date: Osteoporosis  No date: Sleep apnea  No date: Thyroid disease  No date: Ulcer  No date: Wears glasses    Past Surgical History:  8/15/2018: ARTHROPLASTY, KNEE; Left      Comment:  Performed by Shantanu Santiago MD at [facility] OR  01/13/2017: cardic stent  No date: CHOLECYSTECTOMY  No date: EYE SURGERY      Comment:  bilateral cataracts  2011: FRACTURE SURGERY      Comment:  left wrist   1/13/2017: STENT-CORONARY; N/A      Comment:  Performed by Yg Potter MD at [facility] CATH LAB  No date: THYROIDECTOMY    Current Outpatient Medications on File Prior to Visit:  acetaminophen (TYLENOL) 650 MG TbSR, Take 650 mg by mouth every 8 (eight) hours as needed., Disp: , Rfl:   albuterol 90 mcg/actuation inhaler, Inhale 2 puffs into the lungs every 6 (six) hours as needed for Wheezing., Disp: 18 g, Rfl: 2  amoxicillin-clavulanate 875-125mg  (AUGMENTIN) 875-125 mg per tablet, Take 1 tablet by mouth 2 (two) times daily. for 10 days, Disp: 20 tablet, Rfl: 0  aspirin (ECOTRIN) 81 MG EC tablet, Take 81 mg by mouth once daily., Disp: , Rfl:   CALCIUM CARBONATE/VITAMIN D3 (CALCIUM 500 + D ORAL), Take 2 tablets by mouth once daily., Disp: , Rfl:   carvedilol (COREG) 6.25 MG tablet, Take 1 tablet (6.25 mg total) by mouth 2 (two) times daily with meals., Disp: 180 tablet, Rfl: 3  cholecalciferol, vitamin D3, (VITAMIN D3 ORAL), Take 1,500 Units by mouth as needed., Disp: , Rfl:   clopidogrel (PLAVIX) 75 mg tablet, Take 1 tablet (75 mg total) by mouth once daily., Disp: 90 tablet, Rfl: 3  glucosamine-chondroitin 500-400 mg tablet, Take 1 tablet by mouth 3 (three) times daily., Disp: , Rfl:   lidocaine-prilocaine (EMLA) cream, Apply topically 2 (two) times daily as needed., Disp: 30 g, Rfl: 2  montelukast (SINGULAIR) 10 mg tablet, Take 1 tablet (10 mg total) by mouth every evening., Disp: 90 tablet, Rfl: 3  nitroGLYCERIN (NITROSTAT) 0.4 MG SL tablet, Place 1 tablet (0.4 mg total) under the tongue every 5 (five) minutes as needed for Chest pain. (Patient taking differently: Place 0.4 mg under the tongue every 5 (five) minutes as needed for Chest pain. Seek medical help if pain is not relieved by the third dose.), Disp: 25 tablet, Rfl: 3  omeprazole (PRILOSEC) 40 MG capsule, TAKE 1 CAPSULE (40 MG TOTAL) BY MOUTH ONCE DAILY., Disp: 90 capsule, Rfl: 3  SYNTHROID 112 mcg tablet, TAKE ONE TABLET BY MOUTH EVERY DAY BEFORE BREAKFAST, Disp: 30 tablet, Rfl: 10  (DISCONTINUED) gabapentin (NEURONTIN) 300 MG capsule, Take 1 capsule (300 mg total) by mouth every evening., Disp: 30 capsule, Rfl: 5  ergocalciferol (ERGOCALCIFEROL) 50,000 unit Cap, TAKE ONE CAPSULE BY MOUTH EVERY 7 DAYS, Disp: 12 capsule, Rfl: 1  oxybutynin (DITROPAN-XL) 5 MG TR24, Take 1 tablet (5 mg total) by mouth once daily., Disp: 30 tablet, Rfl: 11  traMADol (ULTRAM) 50 mg tablet, Take 1 tablet (50 mg total)  by mouth every 6 (six) hours as needed for Pain., Disp: 40 tablet, Rfl: 0  (DISCONTINUED) aspirin 325 MG tablet, Take 1 tablet (325 mg total) by mouth 2 (two) times daily. (Patient taking differently: Take 81 mg by mouth once. ), Disp: , Rfl: 0    No current facility-administered medications on file prior to visit.         Review of Systems   Constitutional: Negative for chills and fever.   Skin: Positive for color change, rash and wound.       Objective:      Physical Exam   Constitutional: She is oriented to person, place, and time.   Neurological: She is alert and oriented to person, place, and time.   Skin: Rash noted. There is erythema.        Nursing note and vitals reviewed.      Assessment:       1. Venous stasis ulcer of right ankle limited to breakdown of skin, unspecified whether varicose veins present    2. Neuropathy involving both lower extremities        Plan:       1. Venous stasis ulcer of right ankle limited to breakdown of skin, unspecified whether varicose veins present  Continue daily cleaning.  Apply Silvadene and a light dressing and will recheck in 3-4 weeks  - silver sulfADIAZINE 1% (SILVADENE) 1 % cream; Apply topically once daily.  Dispense: 400 g; Refill: 2    2. Neuropathy involving both lower extremities  Good response to gabapentin but the 300 mg dose was more than she can handle cognitively.  Will reduce to 100 mg daily until she is able to tolerate two daily and gradually work upward if needed and tolerated  - gabapentin (NEURONTIN) 100 MG capsule; Take 1 capsule (100 mg total) by mouth 2 (two) times daily.  Dispense: 60 capsule; Refill: 5

## 2019-06-11 NOTE — PATIENT INSTRUCTIONS
Weight Management: Getting Started  Healthy bodies come in all shapes and sizes. Not all bodies are made to be thin. For some people, a healthy weight is higher than the average weight listed on weight charts. Your healthcare provider can help you decide on a healthy weight for you.    Reasons to lose weight  Losing weight can help with some health problems, such as high blood pressure, heart disease, diabetes, sleep apnea, and arthritis. You may also feel more energy.  Set your long-term goal  Your goal doesn't even have to be a specific weight. You may decide on a fitness goal (such as being able to walk 10 miles a week), or a health goal (such as lowering your blood pressure). Choose a goal that is measurable and reasonable, so you know when you've reached it. A goal of reaching a BMI of less than 25 is not always reasonable (or possible).   Make an action plan  Habits dont change overnight. Setting your goals too high can leave you feeling discouraged if you cant reach them. Be realistic. Choose one or two small changes you can make now. Set an action plan for how you are going to make these changes. When you can stick to this plan, keep making a few more small changes. Taking small steps will help you stay on the path to success.  Track your progress  Write down your goals. Then, keep a daily record of your progress. Write down what you eat and how active you are. This record lets you look back on how much youve done. It may also help when youre feeling frustrated. Reward yourself for success. Even if you dont reach every goal, give yourself credit for what you do get done.  Get support  Encouragement from others can help make losing weight easier. Ask your family members and friends for support. They may even want to join you. Also look to your healthcare provider, registered dietitian, and  for help. Your local hospital can give you more information about nutrition, exercise, and  weight loss.  Date Last Reviewed: 1/31/2016  © 9186-4654 [employer]. [address] [place] [address], [place] [address]. All rights reserved. This information is not intended as a substitute for professional medical care. Always follow your healthcare professional's instructions.        Controlling High Blood Pressure  High blood pressure (hypertension) is often called the silent killer. This is because many people who have it dont know it. High blood pressure is defined as 140/90 mm Hg or higher. Know your blood pressure and remember to check it regularly. Doing so can save your life. Here are some things you can do to help control your blood pressure.    Choose heart-healthy foods  · Select low-salt, low-fat foods. Limit sodium intake to 2,400 mg per day or the amount suggested by your healthcare provider.  · Limit canned, dried, cured, packaged, and fast foods. These can contain a lot of salt.  · Eat 8 to 10 servings of fruits and vegetables every day.  · Choose lean meats, fish, or chicken.  · Eat whole-grain pasta, brown rice, and beans.  · Eat 2 to 3 servings of low-fat or fat-free dairy products.  · Ask your doctor about the DASH eating plan. This plan helps reduce blood pressure.  · When you go to a restaurant, ask that your meal be prepared with no added salt.  Maintain a healthy weight  · Ask your healthcare provider how many calories to eat a day. Then stick to that number.  · Ask your healthcare provider what weight range is healthiest for you. If you are overweight, a weight loss of only 3% to 5% of your body weight can help lower blood pressure. Generally, a good weight loss goal is to lose 10% of your body weight in a year.  · Limit snacks and sweets.  · Get regular exercise.  Get up and get active  · Choose activities you enjoy. Find ones you can do with friends or family. This includes bicycling, dancing, walking, and jogging.  · Park farther away from building entrances.  · Use stairs  instead of the elevator.  · When you can, walk or bike instead of driving.  · [place] leaves, garden, or do household repairs.  · Be active at a moderate to vigorous level of physical activity for at least 40 minutes for a minimum of 3 to 4 days a week.   Manage stress  · Make time to relax and enjoy life. Find time to laugh.  · Communicate your concerns with your loved ones and your healthcare provider.  · Visit with family and friends, and keep up with hobbies.  Limit alcohol and quit smoking  · Men should have no more than 2 drinks per day.  · Women should have no more than 1 drink per day.  · Talk with your healthcare provider about quitting smoking. Smoking significantly increases your risk for heart disease and stroke. Ask your healthcare provider about community smoking cessation programs and other options.  Medicines  If lifestyle changes arent enough, your healthcare provider may prescribe high blood pressure medicine. Take all medicines as prescribed. If you have any questions about your medicines, ask your healthcare provider before stopping or changing them.   Date Last Reviewed: 4/27/2016 © 2000-2017 [employer]. [address] [place] [address], [place] [address]. All rights reserved. This information is not intended as a substitute for professional medical care. Always follow your healthcare professional's instructions.

## 2019-06-14 ENCOUNTER — TELEPHONE (OUTPATIENT)
Dept: FAMILY MEDICINE | Facility: CLINIC | Age: 79
End: 2019-06-14

## 2019-06-14 DIAGNOSIS — L03.115 CELLULITIS OF LEG, RIGHT: ICD-10-CM

## 2019-06-14 DIAGNOSIS — B37.0 ORAL CANDIDA: Primary | ICD-10-CM

## 2019-06-14 RX ORDER — NYSTATIN 100000 [USP'U]/ML
4 SUSPENSION ORAL 4 TIMES DAILY
Qty: 160 ML | Refills: 0 | Status: SHIPPED | OUTPATIENT
Start: 2019-06-14 | End: 2019-06-24

## 2019-06-14 NOTE — TELEPHONE ENCOUNTER
----- Message from Romy Yusuf sent at 6/14/2019  9:19 AM CDT -----  Contact: patient  Type: Needs Medical Advice    Who Called:  patient  Symptoms (please be specific):  na  How long has patient had these symptoms:  na  Pharmacy name and phone #:   [facility] [place] - [address] [place] [id number] [place]  [place] [address]  Phone: [phone] Fax: [phone]    Best Call Back Number: [phone]  Additional Information: patient states she needs her RX mouthwash called into the pharmacy.  Please call to advise. Thanks!

## 2019-06-17 NOTE — TELEPHONE ENCOUNTER
Patient picked up the Nystatin- she is finished the antibiotic. She states the redness has gone further up leg to knee but the ulcer has improved.

## 2019-06-17 NOTE — TELEPHONE ENCOUNTER
Patient using Silvadene on ulcer and vit e cream on legs for moisturizing. Patient will call Dr. Swanson's office for appt. Referral faxed.

## 2019-06-17 NOTE — TELEPHONE ENCOUNTER
Is she using any topical agent on the leg particularly neomycin containing antibiotic creams?    Suggest Dermatology consult

## 2019-06-18 ENCOUNTER — PATIENT OUTREACH (OUTPATIENT)
Dept: ADMINISTRATIVE | Facility: HOSPITAL | Age: 79
End: 2019-06-18

## 2019-06-21 ENCOUNTER — DOCUMENTATION ONLY (OUTPATIENT)
Dept: FAMILY MEDICINE | Facility: CLINIC | Age: 79
End: 2019-06-21

## 2019-06-21 ENCOUNTER — TELEPHONE (OUTPATIENT)
Dept: FAMILY MEDICINE | Facility: CLINIC | Age: 79
End: 2019-06-21

## 2019-06-21 DIAGNOSIS — R29.6 FREQUENT FALLS: ICD-10-CM

## 2019-06-21 DIAGNOSIS — R53.1 WEAKNESS: Primary | ICD-10-CM

## 2019-06-21 DIAGNOSIS — R29.898 MUSCULAR DECONDITIONING: ICD-10-CM

## 2019-06-21 NOTE — TELEPHONE ENCOUNTER
Called patient- she is asking Dr. Mazariegos to give her a physical therapy order to help her regain strength in her legs. Also see the consult note from Dr. Mazariegos on your desk.

## 2019-06-21 NOTE — PROGRESS NOTES
Pre-Visit Chart Review  For Appointment Scheduled on 7-1-19    There are no preventive care reminders to display for this patient.

## 2019-06-21 NOTE — TELEPHONE ENCOUNTER
----- Message from Jordon Weiss sent at 6/21/2019 10:43 AM CDT -----  Type:  Patient Requesting Referral    Who Called:  [facility]  Does the patient already have the specialty appointment scheduled?:  No  If yes, what is the date of that appointment?:    Referral to What Specialty:  Physical Therapy  Reason for Referral:  Weakness and falling  Does the patient want the referral with a specific physician?:[facility]     Is the specialist an [facility]?:  Non  Patient Requesting a Call Back?:  Yes  Best Call Back Number:  (Patient) [phone]  Additional Information:   Fax # [phone]

## 2019-07-01 ENCOUNTER — OFFICE VISIT (OUTPATIENT)
Dept: FAMILY MEDICINE | Facility: CLINIC | Age: 79
End: 2019-07-01
Attending: FAMILY MEDICINE
Payer: MEDICARE

## 2019-07-01 VITALS
HEART RATE: 66 BPM | DIASTOLIC BLOOD PRESSURE: 66 MMHG | TEMPERATURE: 98 F | BODY MASS INDEX: 38.09 KG/M2 | SYSTOLIC BLOOD PRESSURE: 138 MMHG | WEIGHT: 214.94 LBS | HEIGHT: 63 IN

## 2019-07-01 DIAGNOSIS — I87.2 VENOUS STASIS DERMATITIS, UNSPECIFIED LATERALITY: Primary | ICD-10-CM

## 2019-07-01 DIAGNOSIS — L97.319 VENOUS STASIS ULCER OF RIGHT ANKLE, UNSPECIFIED ULCER STAGE, UNSPECIFIED WHETHER VARICOSE VEINS PRESENT: ICD-10-CM

## 2019-07-01 DIAGNOSIS — I83.013 VENOUS STASIS ULCER OF RIGHT ANKLE, UNSPECIFIED ULCER STAGE, UNSPECIFIED WHETHER VARICOSE VEINS PRESENT: ICD-10-CM

## 2019-07-01 PROCEDURE — 3075F PR MOST RECENT SYSTOLIC BLOOD PRESS GE 130-139MM HG: ICD-10-PCS | Mod: HCNC,CPTII,S$GLB, | Performed by: FAMILY MEDICINE

## 2019-07-01 PROCEDURE — 3078F DIAST BP <80 MM HG: CPT | Mod: HCNC,CPTII,S$GLB, | Performed by: FAMILY MEDICINE

## 2019-07-01 PROCEDURE — 1101F PR PT FALLS ASSESS DOC 0-1 FALLS W/OUT INJ PAST YR: ICD-10-PCS | Mod: HCNC,CPTII,S$GLB, | Performed by: FAMILY MEDICINE

## 2019-07-01 PROCEDURE — 99999 PR PBB SHADOW E&M-EST. PATIENT-LVL III: CPT | Mod: PBBFAC,HCNC,, | Performed by: FAMILY MEDICINE

## 2019-07-01 PROCEDURE — 99212 OFFICE O/P EST SF 10 MIN: CPT | Mod: HCNC,S$GLB,, | Performed by: FAMILY MEDICINE

## 2019-07-01 PROCEDURE — 99212 PR OFFICE/OUTPT VISIT, EST, LEVL II, 10-19 MIN: ICD-10-PCS | Mod: HCNC,S$GLB,, | Performed by: FAMILY MEDICINE

## 2019-07-01 PROCEDURE — 1101F PT FALLS ASSESS-DOCD LE1/YR: CPT | Mod: HCNC,CPTII,S$GLB, | Performed by: FAMILY MEDICINE

## 2019-07-01 PROCEDURE — 3078F PR MOST RECENT DIASTOLIC BLOOD PRESSURE < 80 MM HG: ICD-10-PCS | Mod: HCNC,CPTII,S$GLB, | Performed by: FAMILY MEDICINE

## 2019-07-01 PROCEDURE — 3075F SYST BP GE 130 - 139MM HG: CPT | Mod: HCNC,CPTII,S$GLB, | Performed by: FAMILY MEDICINE

## 2019-07-01 PROCEDURE — 99999 PR PBB SHADOW E&M-EST. PATIENT-LVL III: ICD-10-PCS | Mod: PBBFAC,HCNC,, | Performed by: FAMILY MEDICINE

## 2019-07-01 RX ORDER — MUPIROCIN 20 MG/G
OINTMENT TOPICAL
Refills: 3 | COMMUNITY
Start: 2019-06-28 | End: 2020-01-23
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
## 2019-07-22 ENCOUNTER — TELEPHONE (OUTPATIENT)
Dept: FAMILY MEDICINE | Facility: CLINIC | Age: 79
End: 2019-07-22

## 2019-07-22 NOTE — TELEPHONE ENCOUNTER
----- Message from Jose Bush sent at 7/22/2019  4:27 PM CDT -----  Contact: pt  Type:  Patient Returning Call    Who Called:  pt  Who Left Message for Patient:  Leonor  Does the patient know what this is regarding?:  returning pt's call about an appointment today   Best Call Back Number:  [phone]  Additional Information:

## 2019-07-22 NOTE — TELEPHONE ENCOUNTER
----- Message from Veronique Aguirre sent at 7/22/2019 11:32 AM CDT -----  Contact: pt  ..Type:  Sooner Apoointment Request    Caller is requesting a sooner appointment.  Caller declined first available appointment listed below.  Caller will not accept being placed on the waitlist and is requesting a message be sent to doctor.    Name of Caller:  pt  When is the first available appointment?  12-24-19  Symptoms: f/u C PAP Machine  Best Call Back Number:    Additional Information:  Pt needs to schedule a f/u appt appt since the start of using her C pap machine (per pt's request)  Please advise  Thanks

## 2019-07-23 ENCOUNTER — DOCUMENTATION ONLY (OUTPATIENT)
Dept: FAMILY MEDICINE | Facility: CLINIC | Age: 79
End: 2019-07-23

## 2019-07-23 ENCOUNTER — OFFICE VISIT (OUTPATIENT)
Dept: FAMILY MEDICINE | Facility: CLINIC | Age: 79
End: 2019-07-23
Attending: FAMILY MEDICINE
Payer: MEDICARE

## 2019-07-23 VITALS
BODY MASS INDEX: 37.93 KG/M2 | RESPIRATION RATE: 16 BRPM | SYSTOLIC BLOOD PRESSURE: 128 MMHG | OXYGEN SATURATION: 97 % | DIASTOLIC BLOOD PRESSURE: 84 MMHG | HEIGHT: 63 IN | HEART RATE: 60 BPM | TEMPERATURE: 98 F | WEIGHT: 214.06 LBS

## 2019-07-23 DIAGNOSIS — G47.33 OSA ON CPAP: ICD-10-CM

## 2019-07-23 DIAGNOSIS — I10 GOOD HYPERTENSION CONTROL: ICD-10-CM

## 2019-07-23 DIAGNOSIS — E03.9 ACQUIRED HYPOTHYROIDISM: ICD-10-CM

## 2019-07-23 DIAGNOSIS — E66.01 SEVERE OBESITY (BMI 35.0-39.9) WITH COMORBIDITY: ICD-10-CM

## 2019-07-23 DIAGNOSIS — I83.013 STASIS ULCER OF ANKLE, RIGHT: Primary | ICD-10-CM

## 2019-07-23 DIAGNOSIS — I25.110 CORONARY ARTERY DISEASE INVOLVING NATIVE CORONARY ARTERY OF NATIVE HEART WITH UNSTABLE ANGINA PECTORIS: ICD-10-CM

## 2019-07-23 DIAGNOSIS — I25.2 HISTORY OF MI (MYOCARDIAL INFARCTION): ICD-10-CM

## 2019-07-23 DIAGNOSIS — L97.319 STASIS ULCER OF ANKLE, RIGHT: Primary | ICD-10-CM

## 2019-07-23 DIAGNOSIS — I87.2 STASIS DERMATITIS OF BOTH LEGS: ICD-10-CM

## 2019-07-23 PROBLEM — L03.116 CELLULITIS OF LEFT LOWER EXTREMITY WITHOUT FOOT: Status: RESOLVED | Noted: 2018-08-30 | Resolved: 2019-07-23

## 2019-07-23 PROCEDURE — 3079F DIAST BP 80-89 MM HG: CPT | Mod: HCNC,CPTII,S$GLB, | Performed by: FAMILY MEDICINE

## 2019-07-23 PROCEDURE — 1101F PT FALLS ASSESS-DOCD LE1/YR: CPT | Mod: HCNC,CPTII,S$GLB, | Performed by: FAMILY MEDICINE

## 2019-07-23 PROCEDURE — 3079F PR MOST RECENT DIASTOLIC BLOOD PRESSURE 80-89 MM HG: ICD-10-PCS | Mod: HCNC,CPTII,S$GLB, | Performed by: FAMILY MEDICINE

## 2019-07-23 PROCEDURE — 99214 PR OFFICE/OUTPT VISIT, EST, LEVL IV, 30-39 MIN: ICD-10-PCS | Mod: HCNC,S$GLB,, | Performed by: FAMILY MEDICINE

## 2019-07-23 PROCEDURE — 1101F PR PT FALLS ASSESS DOC 0-1 FALLS W/OUT INJ PAST YR: ICD-10-PCS | Mod: HCNC,CPTII,S$GLB, | Performed by: FAMILY MEDICINE

## 2019-07-23 PROCEDURE — 99214 OFFICE O/P EST MOD 30 MIN: CPT | Mod: HCNC,S$GLB,, | Performed by: FAMILY MEDICINE

## 2019-07-23 PROCEDURE — 99999 PR PBB SHADOW E&M-EST. PATIENT-LVL IV: CPT | Mod: PBBFAC,HCNC,, | Performed by: FAMILY MEDICINE

## 2019-07-23 PROCEDURE — 3074F SYST BP LT 130 MM HG: CPT | Mod: HCNC,CPTII,S$GLB, | Performed by: FAMILY MEDICINE

## 2019-07-23 PROCEDURE — 99999 PR PBB SHADOW E&M-EST. PATIENT-LVL IV: ICD-10-PCS | Mod: PBBFAC,HCNC,, | Performed by: FAMILY MEDICINE

## 2019-07-23 PROCEDURE — 3074F PR MOST RECENT SYSTOLIC BLOOD PRESSURE < 130 MM HG: ICD-10-PCS | Mod: HCNC,CPTII,S$GLB, | Performed by: FAMILY MEDICINE

## 2019-07-23 RX ORDER — FUROSEMIDE 20 MG/1
TABLET ORAL
COMMUNITY
Start: 2019-04-22 | End: 2020-03-10 | Stop reason: CLARIF

## 2019-07-23 RX ORDER — NYSTATIN 100000 [USP'U]/ML
SUSPENSION ORAL
COMMUNITY
Start: 2019-06-15 | End: 2020-03-10 | Stop reason: CLARIF

## 2019-07-23 NOTE — PROGRESS NOTES
Pre-Visit Chart Review  For Appointment Scheduled on 7-25-19    There are no preventive care reminders to display for this patient.

## 2019-07-23 NOTE — PROGRESS NOTES
Pre-Visit Chart Review  For Appointment Scheduled on 7-26-19    There are no preventive care reminders to display for this patient.

## 2019-07-23 NOTE — PROGRESS NOTES
Subjective:       Patient ID: Heather Hughes is a 79 y.o. female.    Chief Complaint: Leg Pain and C-pap dept (stated she had to be seen)    79-year-old female coming in to recheck stasis dermatitis of both legs and stasis ulcer of right leg.  At her last visit July 1st she had nearly healed the stasis ulcer and her stasis dermatitis and edema had much improved using Unna boots.  She discontinued the boots after that visit.  The ulcer has completely healed and she is controlling the edema and stasis dermatitis using Lasix daily and elevation of the feet periodically.    The patient has elevated blood pressure initially today associated with some anxiety related to a mixup on her appointment date.  She took a a clonidine and her blood pressure came down to normal levels.  She had no complaints of chest pain or shortness of breath and no diaphoresis.    The patient was referred to sleep studies for sleep apnea by Cardiology.  The patient reports she had her sleep study and they sent her a CPAP machine but the company needs a report from me.  We did not set up the study and we do not have any records of results or even the company name.  The patient thinks she has the name of the company at her home and will call that to us tomorrow.  I suspect they just need a usual report on how well she is doing with the CPAP, how much she is using it etc.  For the record she is using it every night.  Her total sleep time is usually between five and 6 hr and she is using the CPAP at least 5 hr nightly.  She goes to bed about 11:00 p.m. and usually wakes up between four and 5:00 p.m. in the morning.  She does feel refreshed and rested but will often nap in the early to mid morning after having breakfast and a cup of coffee.  She sits in a chair for the nap and she does not use the CPAP at that time.  She has no issues with comfort of the mask or difficulty maintaining the mask in position.    She needs a handicap parking permit  for [place].  Her son has the forms from the [place] [employer].    Past Medical History:  No date: Allergy  No date: Anticoagulant long-term use  No date: Arthritis  No date: Asthma  No date: Coronary artery disease      Comment:  stent x 1  1965 & 1971: Deep vein blood clot of right lower extremity  No date: Full dentures  01/13/2017: Heart attack  No date: Hypertension  No date: Osteoporosis  No date: Sleep apnea  No date: Thyroid disease  No date: Ulcer  No date: Wears glasses    Past Surgical History:  8/15/2018: ARTHROPLASTY, KNEE; Left      Comment:  Performed by Shantanu Santiago MD at [facility] OR  01/13/2017: cardic stent  No date: CHOLECYSTECTOMY  No date: EYE SURGERY      Comment:  bilateral cataracts  2011: FRACTURE SURGERY      Comment:  left wrist   1/13/2017: STENT-CORONARY; N/A      Comment:  Performed by Yg Potter MD at [facility] CATH LAB  No date: THYROIDECTOMY    Current Outpatient Medications on File Prior to Visit:  acetaminophen (TYLENOL) 650 MG TbSR, Take 650 mg by mouth every 8 (eight) hours as needed., Disp: , Rfl:   albuterol 90 mcg/actuation inhaler, Inhale 2 puffs into the lungs every 6 (six) hours as needed for Wheezing., Disp: 18 g, Rfl: 2  aspirin (ECOTRIN) 81 MG EC tablet, Take 81 mg by mouth once daily., Disp: , Rfl:   CALCIUM CARBONATE/VITAMIN D3 (CALCIUM 500 + D ORAL), Take 2 tablets by mouth once daily., Disp: , Rfl:   carvedilol (COREG) 6.25 MG tablet, Take 1 tablet (6.25 mg total) by mouth 2 (two) times daily with meals., Disp: 180 tablet, Rfl: 3  cholecalciferol, vitamin D3, (VITAMIN D3 ORAL), Take 1,500 Units by mouth as needed., Disp: , Rfl:   clopidogrel (PLAVIX) 75 mg tablet, Take 1 tablet (75 mg total) by mouth once daily., Disp: 90 tablet, Rfl: 3  furosemide (LASIX) 20 MG tablet, TAKE ONE TABLET BY MOUTH DAILY FOR FOUR DAYS, Disp: , Rfl:   gabapentin (NEURONTIN) 100 MG capsule, Take 1 capsule (100 mg total) by mouth 2 (two) times daily., Disp: 60 capsule, Rfl:  5  glucosamine-chondroitin 500-400 mg tablet, Take 1 tablet by mouth 3 (three) times daily., Disp: , Rfl:   lidocaine-prilocaine (EMLA) cream, Apply topically 2 (two) times daily as needed., Disp: 30 g, Rfl: 2  montelukast (SINGULAIR) 10 mg tablet, Take 1 tablet (10 mg total) by mouth every evening. (Patient taking differently: Take 10 mg by mouth as needed. ), Disp: 90 tablet, Rfl: 3  mupirocin (BACTROBAN) 2 % ointment, Apply on the skin THREE TIMES DAILY, Disp: , Rfl: 3  nitroGLYCERIN (NITROSTAT) 0.4 MG SL tablet, Place 1 tablet (0.4 mg total) under the tongue every 5 (five) minutes as needed for Chest pain. (Patient taking differently: Place 0.4 mg under the tongue every 5 (five) minutes as needed for Chest pain. Seek medical help if pain is not relieved by the third dose.), Disp: 25 tablet, Rfl: 3  nystatin (MYCOSTATIN) 100,000 unit/mL suspension, TAKE 4 ML BY MOUTH FOUR TIMES DAILY FOR 10 DAYS, Disp: , Rfl:   omeprazole (PRILOSEC) 40 MG capsule, TAKE 1 CAPSULE (40 MG TOTAL) BY MOUTH ONCE DAILY., Disp: 90 capsule, Rfl: 3  silver sulfADIAZINE 1% (SILVADENE) 1 % cream, Apply topically once daily., Disp: 400 g, Rfl: 2  SYNTHROID 112 mcg tablet, TAKE ONE TABLET BY MOUTH EVERY DAY BEFORE BREAKFAST, Disp: 30 tablet, Rfl: 10  (DISCONTINUED) ergocalciferol (ERGOCALCIFEROL) 50,000 unit Cap, TAKE ONE CAPSULE BY MOUTH EVERY 7 DAYS, Disp: 12 capsule, Rfl: 1  (DISCONTINUED) oxybutynin (DITROPAN-XL) 5 MG TR24, Take 1 tablet (5 mg total) by mouth once daily., Disp: 30 tablet, Rfl: 11  (DISCONTINUED) traMADol (ULTRAM) 50 mg tablet, Take 1 tablet (50 mg total) by mouth every 6 (six) hours as needed for Pain., Disp: 40 tablet, Rfl: 0    No current facility-administered medications on file prior to visit.     Shingles Vaccine(1 of 2) due on 06/22/1991      Review of Systems   Constitutional: Negative for chills, diaphoresis and fever.   Respiratory: Negative for chest tightness and shortness of breath.    Cardiovascular: Positive  for leg swelling (Still has some edema of the legs but much improved with approximately 1+ in late afternoon). Negative for chest pain and palpitations.   Gastrointestinal: Negative for abdominal pain, blood in stool, constipation, diarrhea, nausea and vomiting.   Skin: Positive for color change (She has improvement in the erythematous discoloration of the stasis dermatitis). Negative for rash and wound.   Psychiatric/Behavioral: Negative for sleep disturbance.       Objective:      Physical Exam   Constitutional: She appears well-developed. No distress.   Initial blood pressure 240/92.  Over a period of time after the clonidine she was monitored coming down to 128/84 with no complaints of dizziness or lightheadedness  Severe obesity with a BMI of 37.9 she is down 0.8 lb from her July 1, 2019 visit with me   HENT:   Head: Normocephalic and atraumatic.   Eyes: Pupils are equal, round, and reactive to light. EOM are normal. No scleral icterus.   Neck: Normal range of motion. Neck supple. No JVD present. No tracheal deviation present. No thyromegaly present.   Cardiovascular: Normal rate, regular rhythm and normal heart sounds. Exam reveals no gallop and no friction rub.   No murmur heard.  Pulmonary/Chest: Effort normal and breath sounds normal. No stridor. No respiratory distress. She has no wheezes. She has no rales. She exhibits no tenderness.   Musculoskeletal: She exhibits edema (1+ edema right lower extremity 1/2 to 1+ on left lower extremity to mid leg).   Lymphadenopathy:     She has no cervical adenopathy.   Skin: Skin is warm and dry. No rash noted. She is not diaphoretic. No erythema.   Ulcer of medial right ankle fully healed with no residual  No erythema present, minimal residual stasis dermatitis of lower extremities   Psychiatric: She has a normal mood and affect. Her behavior is normal. Judgment and thought content normal.   Nursing note and vitals reviewed.      Assessment:       1. Stasis ulcer of  ankle, right    2. Stasis dermatitis of both legs    3. Good hypertension control    4. ALVINA on CPAP    5. Coronary artery disease involving native coronary artery of native heart with unstable angina pectoris    6. History of MI (myocardial infarction)    7. Acquired hypothyroidism    8. Severe obesity (BMI 35.0-39.9) with comorbidity        Plan:       1. Stasis ulcer of ankle, right  Resolved fully, continue Lasix daily with elevation of feet frequently and reduction of salt intake    2. Stasis dermatitis of both legs  Much improved, only a residual chronic discoloration present on the right side    3. Good hypertension control  Good control after use of clonidine for spike associated with anxiety    4. ALVINA on CPAP  Patient reports doing well with CPAP, no complaints with mass comfort, uses it minimum of 5 hr a night every night at of a total 5-6 hours sleep time.  Patient reports improved quality of sleep with increased energy and since of refreshment in the morning.  Patient does not use CPAP during napping sitting up in a recliner chair    5. Coronary artery disease involving native coronary artery of native heart with unstable angina pectoris  Asymptomatic, no chest pain or shortness of breath    6. History of MI (myocardial infarction)  Asymptomatic, remote history    7. Acquired hypothyroidism  Lab Results   Component Value Date    TSH 0.578 04/22/2019     Good, no changes    8. Severe obesity (BMI 35.0-39.9) with comorbidity  Slowly improving         Patient readiness: acceptance and barriers:social stressors    During the course of the visit the patient was educated and counseled about the following:     Hypertension:   Medication: no change.  Dietary sodium restriction.  Regular aerobic exercise.  Obesity:   General weight loss/lifestyle modification strategies discussed (elicit support from others; identify saboteurs; non-food rewards, etc).  Informal exercise measures discussed, e.g. taking stairs  instead of elevator.  Regular aerobic exercise program discussed.    Goals: Hypertension: Reduce Blood Pressure and Obesity: Reduce calorie intake and BMI    Did patient meet goals/outcomes: Yes    The following self management tools provided: blood pressure log  excercise log    Patient Instructions (the written plan) was given to the patient/family.     Time spent with patient: 30 minutes

## 2019-07-23 NOTE — PATIENT INSTRUCTIONS
Weight Management: Getting Started  Healthy bodies come in all shapes and sizes. Not all bodies are made to be thin. For some people, a healthy weight is higher than the average weight listed on weight charts. Your healthcare provider can help you decide on a healthy weight for you.    Reasons to lose weight  Losing weight can help with some health problems, such as high blood pressure, heart disease, diabetes, sleep apnea, and arthritis. You may also feel more energy.  Set your long-term goal  Your goal doesn't even have to be a specific weight. You may decide on a fitness goal (such as being able to walk 10 miles a week), or a health goal (such as lowering your blood pressure). Choose a goal that is measurable and reasonable, so you know when you've reached it. A goal of reaching a BMI of less than 25 is not always reasonable (or possible).   Make an action plan  Habits dont change overnight. Setting your goals too high can leave you feeling discouraged if you cant reach them. Be realistic. Choose one or two small changes you can make now. Set an action plan for how you are going to make these changes. When you can stick to this plan, keep making a few more small changes. Taking small steps will help you stay on the path to success.  Track your progress  Write down your goals. Then, keep a daily record of your progress. Write down what you eat and how active you are. This record lets you look back on how much youve done. It may also help when youre feeling frustrated. Reward yourself for success. Even if you dont reach every goal, give yourself credit for what you do get done.  Get support  Encouragement from others can help make losing weight easier. Ask your family members and friends for support. They may even want to join you. Also look to your healthcare provider, registered dietitian, and  for help. Your local hospital can give you more information about nutrition, exercise, and  weight loss.  Date Last Reviewed: 1/31/2016  © 1800-3060 [employer]. [address] [place] [address], [place] [address]. All rights reserved. This information is not intended as a substitute for professional medical care. Always follow your healthcare professional's instructions.        Controlling High Blood Pressure  High blood pressure (hypertension) is often called the silent killer. This is because many people who have it dont know it. High blood pressure is defined as 140/90 mm Hg or higher. Know your blood pressure and remember to check it regularly. Doing so can save your life. Here are some things you can do to help control your blood pressure.    Choose heart-healthy foods  · Select low-salt, low-fat foods. Limit sodium intake to 2,400 mg per day or the amount suggested by your healthcare provider.  · Limit canned, dried, cured, packaged, and fast foods. These can contain a lot of salt.  · Eat 8 to 10 servings of fruits and vegetables every day.  · Choose lean meats, fish, or chicken.  · Eat whole-grain pasta, brown rice, and beans.  · Eat 2 to 3 servings of low-fat or fat-free dairy products.  · Ask your doctor about the DASH eating plan. This plan helps reduce blood pressure.  · When you go to a restaurant, ask that your meal be prepared with no added salt.  Maintain a healthy weight  · Ask your healthcare provider how many calories to eat a day. Then stick to that number.  · Ask your healthcare provider what weight range is healthiest for you. If you are overweight, a weight loss of only 3% to 5% of your body weight can help lower blood pressure. Generally, a good weight loss goal is to lose 10% of your body weight in a year.  · Limit snacks and sweets.  · Get regular exercise.  Get up and get active  · Choose activities you enjoy. Find ones you can do with friends or family. This includes bicycling, dancing, walking, and jogging.  · Park farther away from building entrances.  · Use stairs  instead of the elevator.  · When you can, walk or bike instead of driving.  · [place] leaves, garden, or do household repairs.  · Be active at a moderate to vigorous level of physical activity for at least 40 minutes for a minimum of 3 to 4 days a week.   Manage stress  · Make time to relax and enjoy life. Find time to laugh.  · Communicate your concerns with your loved ones and your healthcare provider.  · Visit with family and friends, and keep up with hobbies.  Limit alcohol and quit smoking  · Men should have no more than 2 drinks per day.  · Women should have no more than 1 drink per day.  · Talk with your healthcare provider about quitting smoking. Smoking significantly increases your risk for heart disease and stroke. Ask your healthcare provider about community smoking cessation programs and other options.  Medicines  If lifestyle changes arent enough, your healthcare provider may prescribe high blood pressure medicine. Take all medicines as prescribed. If you have any questions about your medicines, ask your healthcare provider before stopping or changing them.   Date Last Reviewed: 4/27/2016 © 2000-2017 [employer]. [address] [place] [address], [place] [address]. All rights reserved. This information is not intended as a substitute for professional medical care. Always follow your healthcare professional's instructions.

## 2019-07-24 ENCOUNTER — TELEPHONE (OUTPATIENT)
Dept: FAMILY MEDICINE | Facility: CLINIC | Age: 79
End: 2019-07-24

## 2019-07-24 NOTE — TELEPHONE ENCOUNTER
----- Message from Vivi Canada sent at 7/24/2019 12:53 PM CDT -----  Contact: pt  Calling  about a cpap machine and please advise. .[phone] (home)

## 2019-07-24 NOTE — TELEPHONE ENCOUNTER
Called patient- sleep study ordered by cardiology-patient advised to contact that office for what sleep clinic is asking for.

## 2019-07-24 NOTE — TELEPHONE ENCOUNTER
----- Message from Morelia Davies sent at 7/24/2019 12:03 PM CDT -----  Contact: Patient  Type: Needs Medical Advice    Who Called:  Patient  Symptoms (please be specific):  na  How long has patient had these symptoms:  simona  Pharmacy name and phone #:  simona  Best Call Back Number: [phone] (home)    Additional Information: Patient states that she was told to call the office to provide information. Requesting a call back. Thank you!

## 2019-08-22 ENCOUNTER — OFFICE VISIT (OUTPATIENT)
Dept: CARDIOLOGY | Facility: CLINIC | Age: 79
End: 2019-08-22
Payer: MEDICARE

## 2019-08-22 VITALS
WEIGHT: 214.31 LBS | HEIGHT: 63 IN | HEART RATE: 67 BPM | DIASTOLIC BLOOD PRESSURE: 84 MMHG | BODY MASS INDEX: 37.97 KG/M2 | SYSTOLIC BLOOD PRESSURE: 182 MMHG

## 2019-08-22 DIAGNOSIS — Z86.718 HISTORY OF DEEP VENOUS THROMBOSIS (DVT) OF DISTAL VEIN OF RIGHT LOWER EXTREMITY: ICD-10-CM

## 2019-08-22 DIAGNOSIS — Z95.5 STATUS POST INSERTION OF DRUG-ELUTING STENT INTO RIGHT CORONARY ARTERY FOR CORONARY ARTERY DISEASE: ICD-10-CM

## 2019-08-22 DIAGNOSIS — E78.5 DYSLIPIDEMIA: ICD-10-CM

## 2019-08-22 DIAGNOSIS — G47.33 OSA ON CPAP: Primary | ICD-10-CM

## 2019-08-22 DIAGNOSIS — D50.8 IRON DEFICIENCY ANEMIA SECONDARY TO INADEQUATE DIETARY IRON INTAKE: ICD-10-CM

## 2019-08-22 DIAGNOSIS — E66.01 SEVERE OBESITY (BMI 35.0-39.9) WITH COMORBIDITY: ICD-10-CM

## 2019-08-22 DIAGNOSIS — Z78.9 STATIN INTOLERANCE: ICD-10-CM

## 2019-08-22 DIAGNOSIS — L03.116 CELLULITIS OF LEFT LOWER EXTREMITY WITHOUT FOOT: ICD-10-CM

## 2019-08-22 DIAGNOSIS — F43.9 STRESS AT HOME: ICD-10-CM

## 2019-08-22 PROCEDURE — 99999 PR PBB SHADOW E&M-EST. PATIENT-LVL III: ICD-10-PCS | Mod: PBBFAC,HCNC,, | Performed by: INTERNAL MEDICINE

## 2019-08-22 PROCEDURE — 3079F DIAST BP 80-89 MM HG: CPT | Mod: HCNC,CPTII,S$GLB, | Performed by: INTERNAL MEDICINE

## 2019-08-22 PROCEDURE — 99999 PR PBB SHADOW E&M-EST. PATIENT-LVL III: CPT | Mod: PBBFAC,HCNC,, | Performed by: INTERNAL MEDICINE

## 2019-08-22 PROCEDURE — 1101F PT FALLS ASSESS-DOCD LE1/YR: CPT | Mod: HCNC,CPTII,S$GLB, | Performed by: INTERNAL MEDICINE

## 2019-08-22 PROCEDURE — 3077F SYST BP >= 140 MM HG: CPT | Mod: HCNC,CPTII,S$GLB, | Performed by: INTERNAL MEDICINE

## 2019-08-22 PROCEDURE — 3077F PR MOST RECENT SYSTOLIC BLOOD PRESSURE >= 140 MM HG: ICD-10-PCS | Mod: HCNC,CPTII,S$GLB, | Performed by: INTERNAL MEDICINE

## 2019-08-22 PROCEDURE — 3079F PR MOST RECENT DIASTOLIC BLOOD PRESSURE 80-89 MM HG: ICD-10-PCS | Mod: HCNC,CPTII,S$GLB, | Performed by: INTERNAL MEDICINE

## 2019-08-22 PROCEDURE — 1101F PR PT FALLS ASSESS DOC 0-1 FALLS W/OUT INJ PAST YR: ICD-10-PCS | Mod: HCNC,CPTII,S$GLB, | Performed by: INTERNAL MEDICINE

## 2019-08-22 PROCEDURE — 99215 OFFICE O/P EST HI 40 MIN: CPT | Mod: HCNC,S$GLB,, | Performed by: INTERNAL MEDICINE

## 2019-08-22 PROCEDURE — 99215 PR OFFICE/OUTPT VISIT, EST, LEVL V, 40-54 MIN: ICD-10-PCS | Mod: HCNC,S$GLB,, | Performed by: INTERNAL MEDICINE

## 2019-08-22 NOTE — PATIENT INSTRUCTIONS

## 2019-08-22 NOTE — PROGRESS NOTES
"Subjective:    Patient ID:  Heather Hughes is a 79 y.o. female who presents for follow-up of Follow-up (adherence report from PAP devicew)  Also CAD post ARSLAN, history of MI, ALVINA now on CPAP  PCP: Dr. Mazariegos, only see as needed about every 4 months  Prior cardiologist: Dr. Ace, last seen 2017, reviewed by SAMSON CarrollP in 2019, ordered CPAP  Lives with , Chet, chronically ill, patient is the caretaker with stress  Son, Chet, here with patient, also helps out.  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    Health literacy: medium  Activities: do own housework, no other exercise, sits a lot due to OA  Nicotine: quit , 5 py  Alcohol: none  Illicit drugs: none  Cardiac symptoms: none  Home BP: 136/84  Medication compliance: yes  Diet: regular, use little salt  Caffeine: 2-3 cpd  Labs: 2018, .6, not on Rx, 2019 normal BMP, CBC (Hgb 9.2) iron low sat, normal TSH, no A1C  Last Echo: DSE 2018  Last stress test: 2018  Cardiovascular angiogram: 2017 with ARSLAN to RCA  EC2019 NSR normal, rate 61  Fundoscopic exam: within the past year, negative for HTN retinopathy    WF here to check adherence use of CPAP started in 2019. Son report  noted better sleep. Summary report using 93.3% of the time. No heart worries. Had discussion about leg arthritis and cellulitis, resolve per Dr. Mazariegos, explained not due to CVD.     Dr. Ace noted "77 y.o. female with a past medical history of coronary artery disease     Patient is due to undergo left knee total replacement.  Had an angiogram or early part of this year.  She had a PCI to the right coronary artery.  There was an intermediate lesion in the LAD.  He is currently asymptomatic and denies any chest pain shortness of breath however her exercise capacity is extremely limited and she walks with a cane.  This is related to her knee issues."    DSE 2018 - Left ventricle ejection fraction is " normal.  No stress induced wall motion abnormality  Negative for ischemia.    Venous US LE - 4/2019 -       Review of Systems   Constitution: Negative for diaphoresis, fever, malaise/fatigue, night sweats and weight gain.   HENT: Negative for nosebleeds and tinnitus.    Eyes: Negative for visual disturbance.   Cardiovascular: Negative for chest pain, claudication, cyanosis, dyspnea on exertion, irregular heartbeat, leg swelling, near-syncope, orthopnea, palpitations and paroxysmal nocturnal dyspnea.   Respiratory: Positive for snoring. Negative for cough, shortness of breath, sleep disturbances due to breathing and wheezing.         [place] score 9, on CPAP 93%   Endocrine: Positive for heat intolerance. Negative for polydipsia and polyuria.   Hematologic/Lymphatic: Bruises/bleeds easily.   Skin: Positive for dry skin, itching and rash. Negative for color change, flushing, nail changes, poor wound healing and suspicious lesions.   Musculoskeletal: Positive for arthritis, joint pain, joint swelling and muscle weakness. Negative for falls, gout, muscle cramps and myalgias.   Gastrointestinal: Positive for heartburn. Negative for hematemesis, hematochezia, melena and nausea.   Genitourinary: Positive for frequency.   Neurological: Negative for disturbances in coordination, excessive daytime sleepiness, dizziness, focal weakness, headaches, light-headedness, loss of balance, numbness, vertigo and weakness.   Psychiatric/Behavioral: Negative for depression and substance abuse. The patient does not have insomnia and is not nervous/anxious.    Allergic/Immunologic: Positive for environmental allergies.        Objective:    Physical Exam   Constitutional: She is oriented to person, place, and time. She appears well-developed and well-nourished.   HENT:   Head: Normocephalic.   Eyes: Pupils are equal, round, and reactive to light. Conjunctivae and EOM are normal.   Neck: Normal range of motion. Neck supple. No JVD present.  "No thyromegaly present.   Cardiovascular: Normal rate, regular rhythm and intact distal pulses. Exam reveals distant heart sounds. Exam reveals no gallop and no friction rub.   No murmur heard.  Pulses:       Carotid pulses are 1+ on the right side, and 1+ on the left side.       Radial pulses are 1+ on the right side, and 1+ on the left side.        Femoral pulses are 1+ on the right side, and 1+ on the left side.       Popliteal pulses are 1+ on the right side, and 1+ on the left side.        Right dorsalis pedis pulse not accessible and left dorsalis pedis pulse not accessible.        Right posterior tibial pulse not accessible and left posterior tibial pulse not accessible.   Pulmonary/Chest: Effort normal and breath sounds normal. She has no rales. She exhibits no tenderness.   Abdominal: Soft. Bowel sounds are normal. There is no tenderness.   Waist 48.5"   Musculoskeletal: Normal range of motion. She exhibits edema (1+ pitting in both LE).   Lymphadenopathy:     She has no cervical adenopathy.   Neurological: She is alert and oriented to person, place, and time.   Skin: Skin is warm and dry. No rash noted.         Assessment:       1. ALVINA on CPAP, started 4/2019, using 93%    2. Cellulitis of left lower extremity without foot    3. Status post insertion of drug-eluting stent into right coronary artery for coronary artery disease,. 1/2017    4. Severe obesity (BMI 35.0-39.9) with comorbidity, today 37.9    5. History of deep venous thrombosis (DVT) of distal vein of right lower extremity, 1965 and 1971    6. Iron deficiency anemia secondary to inadequate dietary iron intake    7. Stress at home, caretaking    8. Statin intolerance, tried atorvastatin and pravastatin    9. Dyslipidemia, baseline .6         Plan:       Heather was seen today for follow-up.    Diagnoses and all orders for this visit:    ALVINA on CPAP, started 4/2019, using 93%    Cellulitis of left lower extremity without foot    Status post " insertion of drug-eluting stent into right coronary artery for coronary artery disease,. 1/2017    Severe obesity (BMI 35.0-39.9) with comorbidity, today 37.9    History of deep venous thrombosis (DVT) of distal vein of right lower extremity, 1965 and 1971    Iron deficiency anemia secondary to inadequate dietary iron intake    Stress at home, caretaking    Statin intolerance, tried atorvastatin and pravastatin    Dyslipidemia, baseline .6    - All medical issues reviewed, continue current Rx. Do not want to retry statin Rx.   Consider hematology review with Dr. Mazariegos  - CV status stable, all medications reviewed, patient acknowledge good understanding.  - Recommend healthy living: no nicotine, moderate alcohol, healthy diet and regular exercise aiming for fitness, and weight control   - Instruction for Mediterranean diet and heart healthy exercise given.  - Check home blood pressure, 2 days weekly, do 2 readings within 5 minutes in AM and PM, keep log for review.  - Weigh twice weekly, try to lose 1-2 lbs per week. Target weight loss of 5%-10%.  - Highly recommend 30 minutes of exercise / activities daily, can have Sunday off, with 2-3 sessions of muscle strengthening weekly. A  would be very helpful.  - Recommend at least annual cardiovascular evaluation in view of patient's significant risk factors. Patient's preference    Total face-to-face time with the patient was 50 minutes and greater than 50% was spent in counseling and coordination of care. The above assessment and plan have been discussed at length. Prior physician's note reviewed. Labs and procedure over the last 6 months reviewed. Problem List updated. Asked to bring in all active medications / pills bottles with next visit.

## 2019-08-22 NOTE — PROGRESS NOTES
Patient ID:  Heahter Hughes is a 79 y.o. female who presents for 5 of Follow-up (adherence report from PAP devicew)      Health literacy:  Medium  Activities: housework  Nicotine: former  Alcohol: no  Illicit drugs: no  Cardiac symptoms: none  Home BP:yes  Medication compliance: yes  Diet: regular, diabetic, Mediterranean Regular  Caffeine:  2-3 cups  Labs:   Last Echo: 18  Last stress test: 18  Cardiovascular angiogram:   EC/15/19  Fundoscopic exam:     No flowsheet data found.      HPI    ROS     Objective:    Physical Exam      Assessment:       No diagnosis found.     Plan:         There are no diagnoses linked to this encounter.

## 2019-09-10 ENCOUNTER — TELEPHONE (OUTPATIENT)
Dept: CARDIOLOGY | Facility: CLINIC | Age: 79
End: 2019-09-10

## 2019-09-10 NOTE — TELEPHONE ENCOUNTER
----- Message from Beatriz Ariza sent at 9/10/2019  9:33 AM CDT -----  Contact: Emili w/[facility]  Emili received clinical notes but need the notes to state that she needs to continue her C PAP use.  If you can fax that over to Emili she will be able to get the patients supplies when needed.  Call Emili back if any questions at [phone] or just fax over the requested note to Fax # [phone].  Thank you!

## 2019-10-22 ENCOUNTER — TELEPHONE (OUTPATIENT)
Dept: FAMILY MEDICINE | Facility: CLINIC | Age: 79
End: 2019-10-22

## 2019-10-22 NOTE — TELEPHONE ENCOUNTER
----- Message from Nathalia Eric sent at 10/22/2019  3:49 PM CDT -----  Contact: Angela Steele (Daughter)  Type: Needs Medical Advice    Who Called:  Angela Steele (Daughter)  Symptoms (please be specific):  Diagnosed with Charcot Foot/ hard time walking/ both feet are wrapped in bandages  Best Call Back Number: Angela at   Additional Information: Calling to request an order for a wheelchair. Please fax the order to   [phone], ([facility])

## 2019-11-30 DIAGNOSIS — I10 HYPERTENSION, ESSENTIAL: ICD-10-CM

## 2019-12-02 RX ORDER — CARVEDILOL 6.25 MG/1
TABLET ORAL
Qty: 180 TABLET | Refills: 1 | Status: ON HOLD | OUTPATIENT
Start: 2019-12-02 | End: 2020-03-12 | Stop reason: HOSPADM

## 2019-12-02 RX ORDER — CLOPIDOGREL BISULFATE 75 MG/1
TABLET ORAL
Qty: 90 TABLET | Refills: 3 | Status: SHIPPED | OUTPATIENT
Start: 2019-12-02 | End: 2020-03-04 | Stop reason: SDUPTHER

## 2019-12-09 ENCOUNTER — TELEPHONE (OUTPATIENT)
Dept: FAMILY MEDICINE | Facility: CLINIC | Age: 79
End: 2019-12-09

## 2019-12-09 NOTE — TELEPHONE ENCOUNTER
----- Message from Malik HATCH Norma sent at 12/9/2019  8:08 AM CST -----  Contact: same  Patient called in and stated she has been having right ankle/foot pain for awhile now & Dr. Mazariegos had told her sometime ago that she had cellulitis.  Patient stated she is still having pain & thinks she may be a diabetic.  Patient would like to talk to nurse to see if Dr. Mazariegos would order her some blood work.    Patient call back number is [phone]

## 2019-12-10 ENCOUNTER — TELEPHONE (OUTPATIENT)
Dept: VASCULAR SURGERY | Facility: CLINIC | Age: 79
End: 2019-12-10

## 2019-12-10 ENCOUNTER — OFFICE VISIT (OUTPATIENT)
Dept: FAMILY MEDICINE | Facility: CLINIC | Age: 79
End: 2019-12-10
Payer: MEDICARE

## 2019-12-10 VITALS
HEART RATE: 62 BPM | SYSTOLIC BLOOD PRESSURE: 136 MMHG | RESPIRATION RATE: 18 BRPM | TEMPERATURE: 98 F | DIASTOLIC BLOOD PRESSURE: 84 MMHG | HEIGHT: 63 IN | WEIGHT: 215.19 LBS | BODY MASS INDEX: 38.13 KG/M2 | OXYGEN SATURATION: 96 %

## 2019-12-10 DIAGNOSIS — I73.9 PVD (PERIPHERAL VASCULAR DISEASE): ICD-10-CM

## 2019-12-10 DIAGNOSIS — I87.2 VENOUS STASIS DERMATITIS OF RIGHT LOWER EXTREMITY: ICD-10-CM

## 2019-12-10 DIAGNOSIS — B37.9 YEAST INFECTION: ICD-10-CM

## 2019-12-10 DIAGNOSIS — I83.891 VARICOSE VEINS OF LEG WITH EDEMA, RIGHT: ICD-10-CM

## 2019-12-10 DIAGNOSIS — L03.115 CELLULITIS OF RIGHT LOWER EXTREMITY: ICD-10-CM

## 2019-12-10 DIAGNOSIS — M79.604 PAIN OF RIGHT LOWER EXTREMITY: Primary | ICD-10-CM

## 2019-12-10 PROCEDURE — 3079F DIAST BP 80-89 MM HG: CPT | Mod: HCNC,CPTII,S$GLB, | Performed by: PHYSICIAN ASSISTANT

## 2019-12-10 PROCEDURE — 99214 OFFICE O/P EST MOD 30 MIN: CPT | Mod: HCNC,S$GLB,, | Performed by: PHYSICIAN ASSISTANT

## 2019-12-10 PROCEDURE — 99499 RISK ADDL DX/OHS AUDIT: ICD-10-PCS | Mod: HCNC,S$GLB,, | Performed by: PHYSICIAN ASSISTANT

## 2019-12-10 PROCEDURE — 99999 PR PBB SHADOW E&M-EST. PATIENT-LVL V: ICD-10-PCS | Mod: PBBFAC,HCNC,, | Performed by: PHYSICIAN ASSISTANT

## 2019-12-10 PROCEDURE — 1101F PR PT FALLS ASSESS DOC 0-1 FALLS W/OUT INJ PAST YR: ICD-10-PCS | Mod: HCNC,CPTII,S$GLB, | Performed by: PHYSICIAN ASSISTANT

## 2019-12-10 PROCEDURE — 1101F PT FALLS ASSESS-DOCD LE1/YR: CPT | Mod: HCNC,CPTII,S$GLB, | Performed by: PHYSICIAN ASSISTANT

## 2019-12-10 PROCEDURE — 99214 PR OFFICE/OUTPT VISIT, EST, LEVL IV, 30-39 MIN: ICD-10-PCS | Mod: HCNC,S$GLB,, | Performed by: PHYSICIAN ASSISTANT

## 2019-12-10 PROCEDURE — 99999 PR PBB SHADOW E&M-EST. PATIENT-LVL V: CPT | Mod: PBBFAC,HCNC,, | Performed by: PHYSICIAN ASSISTANT

## 2019-12-10 PROCEDURE — 1125F PR PAIN SEVERITY QUANTIFIED, PAIN PRESENT: ICD-10-PCS | Mod: HCNC,S$GLB,, | Performed by: PHYSICIAN ASSISTANT

## 2019-12-10 PROCEDURE — 99499 UNLISTED E&M SERVICE: CPT | Mod: HCNC,S$GLB,, | Performed by: PHYSICIAN ASSISTANT

## 2019-12-10 PROCEDURE — 1125F AMNT PAIN NOTED PAIN PRSNT: CPT | Mod: HCNC,S$GLB,, | Performed by: PHYSICIAN ASSISTANT

## 2019-12-10 PROCEDURE — 3075F PR MOST RECENT SYSTOLIC BLOOD PRESS GE 130-139MM HG: ICD-10-PCS | Mod: HCNC,CPTII,S$GLB, | Performed by: PHYSICIAN ASSISTANT

## 2019-12-10 PROCEDURE — 3079F PR MOST RECENT DIASTOLIC BLOOD PRESSURE 80-89 MM HG: ICD-10-PCS | Mod: HCNC,CPTII,S$GLB, | Performed by: PHYSICIAN ASSISTANT

## 2019-12-10 PROCEDURE — 1159F MED LIST DOCD IN RCRD: CPT | Mod: HCNC,S$GLB,, | Performed by: PHYSICIAN ASSISTANT

## 2019-12-10 PROCEDURE — 3075F SYST BP GE 130 - 139MM HG: CPT | Mod: HCNC,CPTII,S$GLB, | Performed by: PHYSICIAN ASSISTANT

## 2019-12-10 PROCEDURE — 1159F PR MEDICATION LIST DOCUMENTED IN MEDICAL RECORD: ICD-10-PCS | Mod: HCNC,S$GLB,, | Performed by: PHYSICIAN ASSISTANT

## 2019-12-10 RX ORDER — CEPHALEXIN 500 MG/1
500 CAPSULE ORAL EVERY 8 HOURS
Qty: 30 CAPSULE | Refills: 0 | Status: SHIPPED | OUTPATIENT
Start: 2019-12-10 | End: 2019-12-20

## 2019-12-10 RX ORDER — TRIAMCINOLONE ACETONIDE 1 MG/G
CREAM TOPICAL
Refills: 3 | COMMUNITY
Start: 2019-10-23 | End: 2020-03-10 | Stop reason: CLARIF

## 2019-12-10 RX ORDER — HYDROCORTISONE 1 %
CREAM (GRAM) TOPICAL
COMMUNITY
Start: 2019-11-13 | End: 2020-03-10 | Stop reason: CLARIF

## 2019-12-10 RX ORDER — FLUCONAZOLE 150 MG/1
150 TABLET ORAL DAILY
Qty: 1 TABLET | Refills: 0 | Status: SHIPPED | OUTPATIENT
Start: 2019-12-10 | End: 2019-12-11

## 2019-12-10 NOTE — PROGRESS NOTES
Subjective:       Patient ID: Heather Hughes is a 79 y.o. female.    Chief Complaint: Ankle Pain (right ankle, red to color )    HPI   Long hx of redness and rash R lower leg x yrs  Swelling and pain  Sx worsening  Long hx PVD  Previous ultrasounds show no DVT  Review of Systems   Constitutional: Positive for activity change. Negative for appetite change, chills, diaphoresis, fatigue, fever and unexpected weight change.   HENT: Negative.    Eyes: Negative.    Respiratory: Negative.  Negative for cough and shortness of breath.    Cardiovascular: Negative.  Negative for chest pain and leg swelling.   Gastrointestinal: Negative.    Endocrine: Negative.    Genitourinary: Negative.    Musculoskeletal: Positive for arthralgias and gait problem.   Skin: Positive for rash.       Objective:      Physical Exam   Constitutional: She appears well-developed and well-nourished. No distress.   HENT:   Head: Normocephalic and atraumatic.   Eyes: Conjunctivae are normal. No scleral icterus.   Neck: Normal range of motion. Neck supple. No tracheal deviation present. No thyromegaly present.   Cardiovascular: Normal rate, regular rhythm, normal heart sounds and intact distal pulses. Exam reveals no gallop and no friction rub.   No murmur heard.  Pulmonary/Chest: Effort normal and breath sounds normal. No stridor. No respiratory distress. She has no wheezes. She has no rales.   Musculoskeletal: She exhibits edema.   PVD both lower ext  Severe varicosity R calf  Edema R lower leg       Lymphadenopathy:     She has no cervical adenopathy.   Skin: Skin is warm and dry. Rash noted.   Erythema R lower leg with warmth to touch and tenderness  Scaling   No open lesions or drainage noted   Vitals reviewed.      Assessment:       1. Pain of right lower extremity    2. PVD (peripheral vascular disease)    3. Varicose veins of leg with edema, right    4. Venous stasis dermatitis of right lower extremity    5. Cellulitis of right lower  extremity        Plan:       Heather was seen today for ankle pain.    Diagnoses and all orders for this visit:    Pain of right lower extremity  -     Ambulatory referral to Vascular Surgery    PVD (peripheral vascular disease)  -     Ambulatory referral to Vascular Surgery    Varicose veins of leg with edema, right  -     Ambulatory referral to Vascular Surgery    Venous stasis dermatitis of right lower extremity  -     Ambulatory referral to Vascular Surgery    Cellulitis of right lower extremity  -     cephALEXin (KEFLEX) 500 MG capsule; Take 1 capsule (500 mg total) by mouth every 8 (eight) hours. for 10 days    Yeast infection  -     fluconazole (DIFLUCAN) 150 MG Tab; Take 1 tablet (150 mg total) by mouth once daily. for 1 day    discussed proper elevation  Salt restriction

## 2019-12-10 NOTE — TELEPHONE ENCOUNTER
----- Message from Ryan Pearl sent at 12/10/2019  2:11 PM CST -----  [phone] pt being referred for veins.   Please call to schedule.

## 2019-12-11 ENCOUNTER — TELEPHONE (OUTPATIENT)
Dept: FAMILY MEDICINE | Facility: CLINIC | Age: 79
End: 2019-12-11

## 2019-12-11 NOTE — TELEPHONE ENCOUNTER
----- Message from Bridgette Sagastume sent at 12/11/2019 10:13 AM CST -----  Contact: Patient  Patient called in regards to brie referral to Dr. Sanon   Office advised her that he no longer does that vein procedure and told her they do not have anyone that does, needs referral to another provider.    Please call to advise: [phone]

## 2019-12-16 ENCOUNTER — TELEPHONE (OUTPATIENT)
Dept: FAMILY MEDICINE | Facility: CLINIC | Age: 79
End: 2019-12-16

## 2019-12-16 DIAGNOSIS — I73.9 PVD (PERIPHERAL VASCULAR DISEASE): Primary | ICD-10-CM

## 2019-12-16 NOTE — TELEPHONE ENCOUNTER
----- Message from Kareen Jackson sent at 12/16/2019  4:55 PM CST -----  Contact: Pt  Type: Needs Medical Advice    Who Called:  Pt  Best Call Back Number: [phone]  Additional Information: Requesting a call back regarding pt wants to discuss about when she seen the NP doctor they want her to see a Vascular doctor but wants to discuss it first   Please Advise ---Thank you

## 2019-12-18 ENCOUNTER — TELEPHONE (OUTPATIENT)
Dept: FAMILY MEDICINE | Facility: CLINIC | Age: 79
End: 2019-12-18

## 2019-12-18 NOTE — TELEPHONE ENCOUNTER
Patient notified referral faxed to Dr. Yogesh Stafford's office and gave her number to call to schedule appt.  Records faxed to his office.

## 2019-12-18 NOTE — TELEPHONE ENCOUNTER
----- Message from Anupama Lopez sent at 12/18/2019  9:46 AM CST -----  Contact: self  Type: Needs Medical Advice    Who Called:  self  Symptoms (please be specific): right ankle swollen and red  How long has patient had these symptoms:    Pharmacy name and phone #:    Best Call Back Number: [phone] (home)   Additional Information: Patient states know one has contacted her for the vascular surgery. Please call patient to advise. Thanks!

## 2019-12-27 ENCOUNTER — TELEPHONE (OUTPATIENT)
Dept: FAMILY MEDICINE | Facility: CLINIC | Age: 79
End: 2019-12-27

## 2019-12-27 NOTE — TELEPHONE ENCOUNTER
----- Message from Su Whitley sent at 12/27/2019  7:44 AM CST -----  Contact: patient daughter chadd medley # [phone]  patient daughter chadd medley # [phone]  Requesting order for wheelchair, patient unable to walk.    Patient is not on home health

## 2019-12-27 NOTE — TELEPHONE ENCOUNTER
Wheelchair order faxed to [facility]. Patient has an old one a friend let her use until she gets one. Patient needs appt for home health orders-not seen by Dr. Mazariegos since July.

## 2019-12-27 NOTE — TELEPHONE ENCOUNTER
Patient will have laser procedure on her foot 1/13/20 per Dr. Stafford. Patient unable to walk on foot due to pain. Asking for wheelchair and home health orders.

## 2019-12-31 NOTE — TELEPHONE ENCOUNTER
----- Message from Izabella Cortez sent at 12/31/2019  9:52 AM CST -----  Type: Needs Medical Advice    Who Called:  Angela Steele - daughter  Best Call Back Number: [phone]  Additional Information: contact regarding status of home health orders

## 2020-01-03 ENCOUNTER — OFFICE VISIT (OUTPATIENT)
Dept: FAMILY MEDICINE | Facility: CLINIC | Age: 80
End: 2020-01-03
Attending: FAMILY MEDICINE
Payer: MEDICARE

## 2020-01-03 VITALS
BODY MASS INDEX: 38.21 KG/M2 | SYSTOLIC BLOOD PRESSURE: 140 MMHG | TEMPERATURE: 98 F | HEIGHT: 63 IN | HEART RATE: 60 BPM | WEIGHT: 215.63 LBS | DIASTOLIC BLOOD PRESSURE: 84 MMHG | OXYGEN SATURATION: 97 %

## 2020-01-03 DIAGNOSIS — Z96.652 S/P TOTAL KNEE ARTHROPLASTY, LEFT: ICD-10-CM

## 2020-01-03 DIAGNOSIS — I87.2 VENOUS INSUFFICIENCY OF RIGHT LEG: ICD-10-CM

## 2020-01-03 DIAGNOSIS — I10 ESSENTIAL HYPERTENSION: ICD-10-CM

## 2020-01-03 DIAGNOSIS — I83.893 VARICOSE VEINS OF BILATERAL LOWER EXTREMITIES WITH OTHER COMPLICATIONS: ICD-10-CM

## 2020-01-03 DIAGNOSIS — G47.33 OSA ON CPAP: ICD-10-CM

## 2020-01-03 DIAGNOSIS — G57.93 NEUROPATHY INVOLVING BOTH LOWER EXTREMITIES: ICD-10-CM

## 2020-01-03 DIAGNOSIS — I25.2 HISTORY OF MI (MYOCARDIAL INFARCTION): ICD-10-CM

## 2020-01-03 DIAGNOSIS — I25.110 CORONARY ARTERY DISEASE INVOLVING NATIVE CORONARY ARTERY OF NATIVE HEART WITH UNSTABLE ANGINA PECTORIS: ICD-10-CM

## 2020-01-03 DIAGNOSIS — E66.01 SEVERE OBESITY (BMI 35.0-39.9) WITH COMORBIDITY: ICD-10-CM

## 2020-01-03 DIAGNOSIS — I83.009 VENOUS STASIS ULCER WITH VARICOSE VEINS, UNSPECIFIED SITE, UNSPECIFIED ULCER STAGE: Primary | ICD-10-CM

## 2020-01-03 DIAGNOSIS — L97.909 VENOUS STASIS ULCER WITH VARICOSE VEINS, UNSPECIFIED SITE, UNSPECIFIED ULCER STAGE: Primary | ICD-10-CM

## 2020-01-03 DIAGNOSIS — E78.5 DYSLIPIDEMIA: ICD-10-CM

## 2020-01-03 DIAGNOSIS — Z78.9 STATIN INTOLERANCE: ICD-10-CM

## 2020-01-03 DIAGNOSIS — E03.9 ACQUIRED HYPOTHYROIDISM: ICD-10-CM

## 2020-01-03 PROCEDURE — 99999 PR PBB SHADOW E&M-EST. PATIENT-LVL IV: ICD-10-PCS | Mod: PBBFAC,HCNC,, | Performed by: FAMILY MEDICINE

## 2020-01-03 PROCEDURE — 1126F PR PAIN SEVERITY QUANTIFIED, NO PAIN PRESENT: ICD-10-PCS | Mod: HCNC,S$GLB,, | Performed by: FAMILY MEDICINE

## 2020-01-03 PROCEDURE — 99499 RISK ADDL DX/OHS AUDIT: ICD-10-PCS | Mod: HCNC,S$GLB,, | Performed by: FAMILY MEDICINE

## 2020-01-03 PROCEDURE — 1126F AMNT PAIN NOTED NONE PRSNT: CPT | Mod: HCNC,S$GLB,, | Performed by: FAMILY MEDICINE

## 2020-01-03 PROCEDURE — 3079F PR MOST RECENT DIASTOLIC BLOOD PRESSURE 80-89 MM HG: ICD-10-PCS | Mod: HCNC,CPTII,S$GLB, | Performed by: FAMILY MEDICINE

## 2020-01-03 PROCEDURE — 99214 PR OFFICE/OUTPT VISIT, EST, LEVL IV, 30-39 MIN: ICD-10-PCS | Mod: HCNC,S$GLB,, | Performed by: FAMILY MEDICINE

## 2020-01-03 PROCEDURE — 3077F SYST BP >= 140 MM HG: CPT | Mod: HCNC,CPTII,S$GLB, | Performed by: FAMILY MEDICINE

## 2020-01-03 PROCEDURE — 3079F DIAST BP 80-89 MM HG: CPT | Mod: HCNC,CPTII,S$GLB, | Performed by: FAMILY MEDICINE

## 2020-01-03 PROCEDURE — 99214 OFFICE O/P EST MOD 30 MIN: CPT | Mod: HCNC,S$GLB,, | Performed by: FAMILY MEDICINE

## 2020-01-03 PROCEDURE — 3077F PR MOST RECENT SYSTOLIC BLOOD PRESSURE >= 140 MM HG: ICD-10-PCS | Mod: HCNC,CPTII,S$GLB, | Performed by: FAMILY MEDICINE

## 2020-01-03 PROCEDURE — 99999 PR PBB SHADOW E&M-EST. PATIENT-LVL IV: CPT | Mod: PBBFAC,HCNC,, | Performed by: FAMILY MEDICINE

## 2020-01-03 PROCEDURE — 1101F PT FALLS ASSESS-DOCD LE1/YR: CPT | Mod: HCNC,CPTII,S$GLB, | Performed by: FAMILY MEDICINE

## 2020-01-03 PROCEDURE — 99499 UNLISTED E&M SERVICE: CPT | Mod: HCNC,S$GLB,, | Performed by: FAMILY MEDICINE

## 2020-01-03 PROCEDURE — 1101F PR PT FALLS ASSESS DOC 0-1 FALLS W/OUT INJ PAST YR: ICD-10-PCS | Mod: HCNC,CPTII,S$GLB, | Performed by: FAMILY MEDICINE

## 2020-01-03 PROCEDURE — 1159F PR MEDICATION LIST DOCUMENTED IN MEDICAL RECORD: ICD-10-PCS | Mod: HCNC,S$GLB,, | Performed by: FAMILY MEDICINE

## 2020-01-03 PROCEDURE — 1159F MED LIST DOCD IN RCRD: CPT | Mod: HCNC,S$GLB,, | Performed by: FAMILY MEDICINE

## 2020-01-03 RX ORDER — GABAPENTIN 100 MG/1
100 CAPSULE ORAL 3 TIMES DAILY
Qty: 90 CAPSULE | Refills: 5 | Status: SHIPPED | OUTPATIENT
Start: 2020-01-03 | End: 2020-06-15

## 2020-01-03 NOTE — PROGRESS NOTES
Subjective:       Patient ID: Heather Hughes is a 79 y.o. female.    Chief Complaint: Follow-up (home health request )    79-year-old female has been having problems with stasis dermatitis, venous insufficiency and chronic recurring leg ulcers.  She used Unna boots via home health for a number of months with no lasting improvement and in fact seemed to have worsened.  She is now applying Aquaphor and elevating her feet without compression.  Tish Donohue referred her to vascular surgery, she saw Dr. Stafford who is planning a laser venoplasty in early February, probably February 13th.  She is no longer getting home health through the [facility] system that and ordered the Unna boots and is looking for home health through [facility].    Past Medical History:  No date: Allergy  No date: Anticoagulant long-term use  No date: Arthritis  No date: Asthma  No date: Coronary artery disease      Comment:  stent x 1  1965 & 1971: Deep vein blood clot of right lower extremity  No date: Full dentures  01/13/2017: Heart attack  No date: Hypertension  No date: Osteoporosis  No date: Sleep apnea  No date: Thyroid disease  No date: Ulcer  No date: Wears glasses    Past Surgical History:  01/13/2017: cardic stent  No date: CHOLECYSTECTOMY  No date: EYE SURGERY      Comment:  bilateral cataracts  2011: FRACTURE SURGERY      Comment:  left wrist   8/15/2018: KNEE ARTHROPLASTY; Left      Comment:  Procedure: ARTHROPLASTY, KNEE;  Surgeon: Shantanu Santiago MD;  Location: [facility];  Service: Orthopedics;                 Laterality: Left;  ANESTHESIA GENERAL AND BLOCK  No date: THYROIDECTOMY    Current Outpatient Medications on File Prior to Visit:  acetaminophen (TYLENOL) 650 MG TbSR, Take 650 mg by mouth every 8 (eight) hours as needed., Disp: , Rfl:   albuterol 90 mcg/actuation inhaler, Inhale 2 puffs into the lungs every 6 (six) hours as needed for Wheezing., Disp: 18 g, Rfl: 2  aspirin (ECOTRIN) 81 MG EC tablet,  Take 81 mg by mouth once daily., Disp: , Rfl:   CALCIUM CARBONATE/VITAMIN D3 (CALCIUM 500 + D ORAL), Take 2 tablets by mouth once daily., Disp: , Rfl:   carvedilol (COREG) 6.25 MG tablet, TAKE 1 TABLET TWICE DAILY WITH MEALS, Disp: 180 tablet, Rfl: 1  cholecalciferol, vitamin D3, (VITAMIN D3 ORAL), Take 1,500 Units by mouth as needed., Disp: , Rfl:   clopidogrel (PLAVIX) 75 mg tablet, TAKE 1 TABLET EVERY DAY, Disp: 90 tablet, Rfl: 3  furosemide (LASIX) 20 MG tablet, TAKE ONE TABLET BY MOUTH DAILY FOR FOUR DAYS, Disp: , Rfl:   glucosamine-chondroitin 500-400 mg tablet, Take 1 tablet by mouth 3 (three) times daily., Disp: , Rfl:   hydrocortisone 1 % cream, , Disp: , Rfl:   lidocaine-prilocaine (EMLA) cream, Apply topically 2 (two) times daily as needed., Disp: 30 g, Rfl: 2  montelukast (SINGULAIR) 10 mg tablet, Take 1 tablet (10 mg total) by mouth every evening. (Patient taking differently: Take 10 mg by mouth as needed. ), Disp: 90 tablet, Rfl: 3  mupirocin (BACTROBAN) 2 % ointment, Apply on the skin THREE TIMES DAILY, Disp: , Rfl: 3  nitroGLYCERIN (NITROSTAT) 0.4 MG SL tablet, Place 1 tablet (0.4 mg total) under the tongue every 5 (five) minutes as needed for Chest pain. (Patient taking differently: Place 0.4 mg under the tongue every 5 (five) minutes as needed for Chest pain. Seek medical help if pain is not relieved by the third dose.), Disp: 25 tablet, Rfl: 3  nystatin (MYCOSTATIN) 100,000 unit/mL suspension, TAKE 4 ML BY MOUTH FOUR TIMES DAILY FOR 10 DAYS, Disp: , Rfl:   omeprazole (PRILOSEC) 40 MG capsule, TAKE 1 CAPSULE (40 MG TOTAL) BY MOUTH ONCE DAILY., Disp: 90 capsule, Rfl: 3  silver sulfADIAZINE 1% (SILVADENE) 1 % cream, Apply topically once daily., Disp: 400 g, Rfl: 2  SYNTHROID 112 mcg tablet, TAKE ONE TABLET BY MOUTH EVERY DAY BEFORE BREAKFAST, Disp: 30 tablet, Rfl: 10  triamcinolone acetonide 0.1% (KENALOG) 0.1 % cream, Apply on the skin TWICE DAILY AS NEEDED, Disp: , Rfl: 3  (DISCONTINUED) gabapentin  (NEURONTIN) 100 MG capsule, Take 1 capsule (100 mg total) by mouth 2 (two) times daily., Disp: 60 capsule, Rfl: 5    No current facility-administered medications on file prior to visit.         Review of Systems   Constitutional: Negative for chills and fever.   Cardiovascular: Positive for leg swelling.   Skin: Positive for color change and wound.       Objective:      Physical Exam   Constitutional: She is oriented to person, place, and time. She appears well-developed. No distress.   Borderline blood pressure  Severe obesity with a BMI of 38.2 she is up 0.4 lb from her February 10, 2019 visit with Gabe Donohue   Cardiovascular:   Extensive varicosities of the right lower extremity with chronic edema worse on the right than the left as well as spider veins and micro varices.   Musculoskeletal:   Uses a walker for ambulation   Neurological: She is alert and oriented to person, place, and time.   Skin: Skin is warm and dry. No rash noted. She is not diaphoretic. There is erythema.   Psychiatric: She has a normal mood and affect. Her behavior is normal. Judgment and thought content normal.   Nursing note and vitals reviewed.      Assessment:       1. Venous stasis ulcer with varicose veins, unspecified site, unspecified ulcer stage    2. Venous insufficiency of right leg    3. Varicose veins of bilateral lower extremities with other complications    4. Coronary artery disease involving native coronary artery of native heart with unstable angina pectoris    5. History of MI (myocardial infarction)    6. Essential hypertension    7. Dyslipidemia, baseline .6    8. Acquired hypothyroidism    9. 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 ALVINA on CPAP, started 4/2019, using 93%    11. Statin intolerance, tried atorvastatin and pravastatin    12. Severe obesity (BMI 35.0-39.9) with comorbidity, today 37.9    13. Neuropathy involving both lower extremities        Plan:       1. Venous stasis ulcer with varicose  veins, unspecified site, unspecified ulcer stage  - Ambulatory referral to Home Health  - mineral oil-hydrophil petrolat (AQUAPHOR) Oint; Apply topically 2 (two) times daily. To lower extremities  Dispense: 452 g; Refill: 5    2. Venous insufficiency of right leg  - Ambulatory referral to Home Health  - mineral oil-hydrophil petrolat (AQUAPHOR) Oint; Apply topically 2 (two) times daily. To lower extremities  Dispense: 452 g; Refill: 5    3. Varicose veins of bilateral lower extremities with other complications  - Ambulatory referral to Home Health    4. Coronary artery disease involving native coronary artery of native heart with unstable angina pectoris  - Ambulatory referral to Home Health    5. History of MI (myocardial infarction)  - Ambulatory referral to Home Health    6. Essential hypertension  - Ambulatory referral to Home Health    7. Dyslipidemia, baseline .6  - Ambulatory referral to Home Health    8. Acquired hypothyroidism  - Ambulatory referral to Home Health    9. S/P total knee arthroplasty, left 8/15/18  - Ambulatory referral to Home Health    10. ALVINA on CPAP, started 4/2019, using 93%  - Ambulatory referral to Home Health    11. Statin intolerance, tried atorvastatin and pravastatin  - Ambulatory referral to Home Health    12. Severe obesity (BMI 35.0-39.9) with comorbidity, today 37.9  - Ambulatory referral to Home Health    13. Neuropathy involving both lower extremities  - Ambulatory referral to Home Health  - gabapentin (NEURONTIN) 100 MG capsule; Take 1 capsule (100 mg total) by mouth 3 (three) times daily.  Dispense: 90 capsule; Refill: 5

## 2020-01-07 PROCEDURE — G0180 PR HOME HEALTH MD CERTIFICATION: ICD-10-PCS | Mod: ,,, | Performed by: FAMILY MEDICINE

## 2020-01-07 PROCEDURE — G0180 MD CERTIFICATION HHA PATIENT: HCPCS | Mod: ,,, | Performed by: FAMILY MEDICINE

## 2020-01-13 ENCOUNTER — EXTERNAL HOME HEALTH (OUTPATIENT)
Dept: HOME HEALTH SERVICES | Facility: HOSPITAL | Age: 80
End: 2020-01-13
Payer: MEDICARE

## 2020-01-23 DIAGNOSIS — L03.119 CELLULITIS OF LOWER EXTREMITY, UNSPECIFIED LATERALITY: Primary | ICD-10-CM

## 2020-01-23 RX ORDER — MUPIROCIN 20 MG/G
OINTMENT TOPICAL 3 TIMES DAILY
Qty: 22 G | Refills: 3 | Status: SHIPPED | OUTPATIENT
Start: 2020-01-23 | End: 2020-03-10 | Stop reason: CLARIF

## 2020-01-23 RX ORDER — DOXYCYCLINE HYCLATE 100 MG
100 TABLET ORAL 2 TIMES DAILY
Qty: 20 TABLET | Refills: 0 | Status: SHIPPED | OUTPATIENT
Start: 2020-01-23 | End: 2020-02-02

## 2020-01-23 NOTE — TELEPHONE ENCOUNTER
Patient has cellulitis of right lower leg again. Has some weeping from stasis ulcer. Asking for a refill of antibiotics. Has pvd and has a procedure on 2/13/20 with Dr. Stafford.

## 2020-01-23 NOTE — TELEPHONE ENCOUNTER
Sent a prescription for doxycycline twice a day for 10 days to [facility].  Continue local wound care, Bactroban applied to the area of cellulitis twice daily may help

## 2020-01-23 NOTE — TELEPHONE ENCOUNTER
Home health nurse Kandi reports that when she checked patient on Tuesday her leg was normal. Today on right outer ankle there is a red area moving up the leg that feels warm to the touch and afebrile. The ulcer that is weeping is very scant and on other side of her leg.

## 2020-01-23 NOTE — TELEPHONE ENCOUNTER
----- Message from Romy Yusuf sent at 1/23/2020 11:18 AM CST -----  Contact: Kandi/Rommel henley  Type: Needs Medical Advice    Who Called:  Kandi  Best Call Back Number: [phone]  Additional Information: Kandi states she needs to know if patient needs to be put on antibiotics .  Cellulitis is acting up leg is red.  Please call to advise.  Thanks!

## 2020-01-29 ENCOUNTER — TELEPHONE (OUTPATIENT)
Dept: FAMILY MEDICINE | Facility: CLINIC | Age: 80
End: 2020-01-29

## 2020-01-29 NOTE — TELEPHONE ENCOUNTER
Kandi stated patient stopped the bactroban ointment on Saturday due to reaction.  Red, tingling, itching, burning.    Med card and allergy list updated   Stated she went back to Aquaphor ointment.   The area is looking much better per Kandi.

## 2020-01-29 NOTE — TELEPHONE ENCOUNTER
----- Message from Nasrin Mancera sent at 1/29/2020 11:52 AM CST -----  Contact: Vania w/ [facility]  Type: Needs Medical Advice    Who Called:  Vania  Symptoms (please be specific):  Pt's leg is looking better. She is taking the antibiotic/she was unable to take the bactroban due to allergy  How long has patient had these symptoms:  n/a  Best Call Back Number: [phone]  Additional Information: Pls call vania for further

## 2020-03-04 ENCOUNTER — OFFICE VISIT (OUTPATIENT)
Dept: FAMILY MEDICINE | Facility: CLINIC | Age: 80
End: 2020-03-04
Payer: MEDICARE

## 2020-03-04 ENCOUNTER — TELEPHONE (OUTPATIENT)
Dept: FAMILY MEDICINE | Facility: CLINIC | Age: 80
End: 2020-03-04

## 2020-03-04 VITALS
DIASTOLIC BLOOD PRESSURE: 72 MMHG | BODY MASS INDEX: 37.7 KG/M2 | HEART RATE: 71 BPM | SYSTOLIC BLOOD PRESSURE: 156 MMHG | OXYGEN SATURATION: 97 % | WEIGHT: 212.75 LBS | HEIGHT: 63 IN | TEMPERATURE: 98 F

## 2020-03-04 DIAGNOSIS — Z86.718 HISTORY OF DEEP VENOUS THROMBOSIS (DVT) OF DISTAL VEIN OF RIGHT LOWER EXTREMITY: ICD-10-CM

## 2020-03-04 DIAGNOSIS — I83.013 VENOUS STASIS ULCER OF RIGHT ANKLE, UNSPECIFIED ULCER STAGE, UNSPECIFIED WHETHER VARICOSE VEINS PRESENT: ICD-10-CM

## 2020-03-04 DIAGNOSIS — L97.319 VENOUS STASIS ULCER OF RIGHT ANKLE, UNSPECIFIED ULCER STAGE, UNSPECIFIED WHETHER VARICOSE VEINS PRESENT: ICD-10-CM

## 2020-03-04 DIAGNOSIS — L03.115 CELLULITIS OF RIGHT LOWER EXTREMITY: Primary | ICD-10-CM

## 2020-03-04 PROCEDURE — 1101F PT FALLS ASSESS-DOCD LE1/YR: CPT | Mod: HCNC,CPTII,S$GLB, | Performed by: FAMILY MEDICINE

## 2020-03-04 PROCEDURE — 3078F DIAST BP <80 MM HG: CPT | Mod: HCNC,CPTII,S$GLB, | Performed by: FAMILY MEDICINE

## 2020-03-04 PROCEDURE — 1125F PR PAIN SEVERITY QUANTIFIED, PAIN PRESENT: ICD-10-PCS | Mod: HCNC,S$GLB,, | Performed by: FAMILY MEDICINE

## 2020-03-04 PROCEDURE — 1101F PR PT FALLS ASSESS DOC 0-1 FALLS W/OUT INJ PAST YR: ICD-10-PCS | Mod: HCNC,CPTII,S$GLB, | Performed by: FAMILY MEDICINE

## 2020-03-04 PROCEDURE — 3077F SYST BP >= 140 MM HG: CPT | Mod: HCNC,CPTII,S$GLB, | Performed by: FAMILY MEDICINE

## 2020-03-04 PROCEDURE — 3078F PR MOST RECENT DIASTOLIC BLOOD PRESSURE < 80 MM HG: ICD-10-PCS | Mod: HCNC,CPTII,S$GLB, | Performed by: FAMILY MEDICINE

## 2020-03-04 PROCEDURE — 1125F AMNT PAIN NOTED PAIN PRSNT: CPT | Mod: HCNC,S$GLB,, | Performed by: FAMILY MEDICINE

## 2020-03-04 PROCEDURE — 3077F PR MOST RECENT SYSTOLIC BLOOD PRESSURE >= 140 MM HG: ICD-10-PCS | Mod: HCNC,CPTII,S$GLB, | Performed by: FAMILY MEDICINE

## 2020-03-04 PROCEDURE — 1159F MED LIST DOCD IN RCRD: CPT | Mod: HCNC,S$GLB,, | Performed by: FAMILY MEDICINE

## 2020-03-04 PROCEDURE — 99214 OFFICE O/P EST MOD 30 MIN: CPT | Mod: HCNC,S$GLB,, | Performed by: FAMILY MEDICINE

## 2020-03-04 PROCEDURE — 99999 PR PBB SHADOW E&M-EST. PATIENT-LVL IV: ICD-10-PCS | Mod: PBBFAC,HCNC,, | Performed by: FAMILY MEDICINE

## 2020-03-04 PROCEDURE — 99214 PR OFFICE/OUTPT VISIT, EST, LEVL IV, 30-39 MIN: ICD-10-PCS | Mod: HCNC,S$GLB,, | Performed by: FAMILY MEDICINE

## 2020-03-04 PROCEDURE — 99999 PR PBB SHADOW E&M-EST. PATIENT-LVL IV: CPT | Mod: PBBFAC,HCNC,, | Performed by: FAMILY MEDICINE

## 2020-03-04 PROCEDURE — 1159F PR MEDICATION LIST DOCUMENTED IN MEDICAL RECORD: ICD-10-PCS | Mod: HCNC,S$GLB,, | Performed by: FAMILY MEDICINE

## 2020-03-04 RX ORDER — CLOPIDOGREL BISULFATE 75 MG/1
75 TABLET ORAL DAILY
Qty: 90 TABLET | Refills: 3 | Status: ON HOLD | OUTPATIENT
Start: 2020-03-04 | End: 2020-03-12 | Stop reason: HOSPADM

## 2020-03-04 RX ORDER — CEPHALEXIN 500 MG/1
500 CAPSULE ORAL EVERY 8 HOURS
Qty: 30 CAPSULE | Refills: 0 | Status: SHIPPED | OUTPATIENT
Start: 2020-03-04 | End: 2020-03-14

## 2020-03-04 RX ORDER — FLUCONAZOLE 150 MG/1
150 TABLET ORAL DAILY
Qty: 1 TABLET | Refills: 0 | Status: SHIPPED | OUTPATIENT
Start: 2020-03-04 | End: 2020-03-04

## 2020-03-04 RX ORDER — FLUCONAZOLE 150 MG/1
150 TABLET ORAL DAILY
Qty: 1 TABLET | Refills: 0 | Status: SHIPPED | OUTPATIENT
Start: 2020-03-04 | End: 2020-03-05

## 2020-03-04 RX ORDER — CEPHALEXIN 500 MG/1
500 CAPSULE ORAL EVERY 8 HOURS
Qty: 30 CAPSULE | Refills: 0 | Status: SHIPPED | OUTPATIENT
Start: 2020-03-04 | End: 2020-03-04

## 2020-03-04 NOTE — TELEPHONE ENCOUNTER
----- Message from Gabe Garza sent at 3/4/2020 11:39 AM CST -----  Contact: Aissatou  Type: Needs Medical Advice    Who Called:  Aissatou with [facility]  Symptoms (please be specific):    How long has patient had these symptoms:    Pharmacy name and phone #:    Best Call Back Number:  [phone]  Additional Information: called to advise that patient is having green drainage from her right ankle. Concerned about infection call back to advise what to do

## 2020-03-04 NOTE — PATIENT INSTRUCTIONS
Discharge Instructions for Cellulitis  You have been diagnosed with cellulitis. This is an infection in the deepest layer of the skin. In some cases, the infection also affects the muscle. Cellulitis is caused by bacteria. The bacteria can enter the body through broken skin. This can happen with a cut, scratch, animal bite, or an insect bite that has been scratched. You may have been treated in the hospital with antibiotics and fluids. You will likely be given a prescription for antibiotics to take at home. This sheet will help you take care of yourself at home.  Home care  When you are home:  · Take the prescribed antibiotic medicine you are given as directed until it is gone. Take it even if you feel better. It treats the infection and stops it from returning. Not taking all the medicine can make future infections hard to treat.  · Keep the infected area clean.  · When possible, raise the infected area above the level of your heart. This helps keep swelling down.  · Talk with your healthcare provider if you are in pain. Ask what kind of over-the-counter medicine you can take for pain.  · Apply clean bandages as advised.  · Take your temperature once a day for a week.  · Wash your hands often to prevent spreading the infection.  In the future, wash your hands before and after you touch cuts, scratches, or bandages. This will help prevent infection.   When to call your healthcare provider  Call your healthcare provider immediately if you have any of the following:  · Difficulty or pain when moving the joints above or below the infected area  · Discharge or pus draining from the area  · Fever of 100.4°F (38°C) or higher, or as directed by your healthcare provider  · Pain that gets worse in or around the infected   · Redness that gets worse in or around the infected area, particularly if the area of redness expands to a wider area  · Shaking chills  · Swelling of the infected area  · Vomiting   Date Last Reviewed:  8/1/2016  © 8393-1301 [employer], [employer]. [address] [place] [address], [place] [address]. All rights reserved. This information is not intended as a substitute for professional medical care. Always follow your healthcare professional's instructions.

## 2020-03-04 NOTE — PROGRESS NOTES
Subjective:       Patient ID: Heather Hughes is a 79 y.o. female.    Chief Complaint: Wound Check (yellow pus)    Patient here today after her home health agency discovered purulent discharge from her venous stasis ulcer on her right ankle.  This is a chronic problem.  She has been on antibiotics 3 times in the last year with this becoming infected.  She recently had a laser vein ablation done February 13th.  The wound became painful few days ago but became purulent yesterday.  She denies fevers or chills.  She has never seen wound care before.    Review of Systems   Constitutional: Negative for activity change, chills and fever.   HENT: Negative for congestion, sinus pressure and sinus pain.    Eyes: Negative for itching.   Respiratory: Negative for chest tightness and shortness of breath.    Cardiovascular: Negative for chest pain and palpitations.   Gastrointestinal: Negative for abdominal pain, constipation, nausea and vomiting.   Endocrine: Negative for cold intolerance.   Genitourinary: Negative for difficulty urinating and menstrual problem.   Musculoskeletal: Negative for arthralgias, joint swelling and myalgias.   Skin: Positive for color change and wound.   Allergic/Immunologic: Negative for environmental allergies.   Neurological: Negative for dizziness, weakness and headaches.   Psychiatric/Behavioral: Negative for agitation and confusion.       Objective:      Physical Exam   Constitutional: She is oriented to person, place, and time. She appears well-developed and well-nourished.   HENT:   Head: Normocephalic and atraumatic.   Eyes: Pupils are equal, round, and reactive to light. EOM are normal.   Neck: Normal range of motion. Neck supple.   Cardiovascular: Normal rate and regular rhythm.   No murmur heard.  Pulmonary/Chest: Effort normal and breath sounds normal. No respiratory distress. She has no wheezes. She has no rales.   Abdominal: Soft. She exhibits no distension. There is no tenderness.  There is no guarding.   Musculoskeletal: Normal range of motion.   Neurological: She is alert and oriented to person, place, and time. She displays normal reflexes.   Skin: Skin is warm and dry.        Right ankle is very red and indurated from lower ankle to the forefoot.  Patient has it covered in dressing.  Dressing is soaked with purulent discharge. Dressing was removed for further inspection.   Psychiatric: She has a normal mood and affect. Her behavior is normal. Judgment and thought content normal.   Nursing note and vitals reviewed.      Assessment:       1. Cellulitis of right lower extremity    2. History of deep venous thrombosis (DVT) of distal vein of right lower extremity, 1965 and 1971    3. Venous stasis ulcer of right ankle, unspecified ulcer stage, unspecified whether varicose veins present        Plan:       Orders Placed This Encounter   Procedures    Ambulatory referral/consult to Wound Clinic     Standing Status:   Future     Standing Expiration Date:   4/4/2021     Referral Priority:   Routine     Referral Type:   Consultation     Referral Reason:   Specialty Services Required     Requested Specialty:   Wound Care     Number of Visits Requested:   1     -will treat with Keflex times 10 days.  Patient states she gets a yeast infection with antibiotics will prescribe Diflucan as well.  -at this point I believe it is justified and patient being established with wound care.  Will write the referral.

## 2020-03-07 PROCEDURE — G0179 MD RECERTIFICATION HHA PT: HCPCS | Mod: ,,, | Performed by: FAMILY MEDICINE

## 2020-03-07 PROCEDURE — G0179 PR HOME HEALTH MD RECERTIFICATION: ICD-10-PCS | Mod: ,,, | Performed by: FAMILY MEDICINE

## 2020-03-10 ENCOUNTER — HOSPITAL ENCOUNTER (INPATIENT)
Facility: HOSPITAL | Age: 80
LOS: 2 days | Discharge: HOME HEALTH | DRG: 309 | End: 2020-03-12
Attending: EMERGENCY MEDICINE | Admitting: HOSPITALIST
Payer: MEDICARE

## 2020-03-10 ENCOUNTER — TELEPHONE (OUTPATIENT)
Dept: FAMILY MEDICINE | Facility: CLINIC | Age: 80
End: 2020-03-10

## 2020-03-10 DIAGNOSIS — R00.2 PALPITATIONS: ICD-10-CM

## 2020-03-10 DIAGNOSIS — I48.91 ATRIAL FIBRILLATION WITH RVR: Primary | ICD-10-CM

## 2020-03-10 DIAGNOSIS — I48.91 ATRIAL FIBRILLATION: ICD-10-CM

## 2020-03-10 PROBLEM — I87.2 VENOUS STASIS DERMATITIS OF RIGHT LOWER EXTREMITY: Status: ACTIVE | Noted: 2020-03-10

## 2020-03-10 LAB
ALBUMIN SERPL BCP-MCNC: 3.8 G/DL (ref 3.5–5.2)
ALP SERPL-CCNC: 93 U/L (ref 55–135)
ALT SERPL W/O P-5'-P-CCNC: 14 U/L (ref 10–44)
ANION GAP SERPL CALC-SCNC: 8 MMOL/L (ref 8–16)
AST SERPL-CCNC: 20 U/L (ref 10–40)
BASOPHILS # BLD AUTO: 0.05 K/UL (ref 0–0.2)
BASOPHILS NFR BLD: 0.7 % (ref 0–1.9)
BILIRUB SERPL-MCNC: 0.3 MG/DL (ref 0.1–1)
BNP SERPL-MCNC: 1035 PG/ML (ref 0–99)
BUN SERPL-MCNC: 23 MG/DL (ref 8–23)
CALCIUM SERPL-MCNC: 10.3 MG/DL (ref 8.7–10.5)
CHLORIDE SERPL-SCNC: 104 MMOL/L (ref 95–110)
CO2 SERPL-SCNC: 26 MMOL/L (ref 23–29)
CREAT SERPL-MCNC: 1 MG/DL (ref 0.5–1.4)
DIFFERENTIAL METHOD: ABNORMAL
EOSINOPHIL # BLD AUTO: 0.3 K/UL (ref 0–0.5)
EOSINOPHIL NFR BLD: 4.2 % (ref 0–8)
ERYTHROCYTE [DISTWIDTH] IN BLOOD BY AUTOMATED COUNT: 13.4 % (ref 11.5–14.5)
EST. GFR  (AFRICAN AMERICAN): >60 ML/MIN/1.73 M^2
EST. GFR  (NON AFRICAN AMERICAN): 54 ML/MIN/1.73 M^2
GLUCOSE SERPL-MCNC: 99 MG/DL (ref 70–110)
HCT VFR BLD AUTO: 37.8 % (ref 37–48.5)
HGB BLD-MCNC: 11.6 G/DL (ref 12–16)
IMM GRANULOCYTES # BLD AUTO: 0.01 K/UL (ref 0–0.04)
LYMPHOCYTES # BLD AUTO: 2.1 K/UL (ref 1–4.8)
LYMPHOCYTES NFR BLD: 28.3 % (ref 18–48)
MCH RBC QN AUTO: 28.7 PG (ref 27–31)
MCHC RBC AUTO-ENTMCNC: 30.7 G/DL (ref 32–36)
MCV RBC AUTO: 94 FL (ref 82–98)
MONOCYTES # BLD AUTO: 0.7 K/UL (ref 0.3–1)
MONOCYTES NFR BLD: 8.9 % (ref 4–15)
NEUTROPHILS # BLD AUTO: 4.2 K/UL (ref 1.8–7.7)
NEUTROPHILS NFR BLD: 57.8 % (ref 38–73)
NRBC BLD-RTO: 0 /100 WBC
PLATELET # BLD AUTO: 280 K/UL (ref 150–350)
PMV BLD AUTO: 12.7 FL (ref 9.2–12.9)
POTASSIUM SERPL-SCNC: 4.6 MMOL/L (ref 3.5–5.1)
PROT SERPL-MCNC: 6.8 G/DL (ref 6–8.4)
RBC # BLD AUTO: 4.04 M/UL (ref 4–5.4)
SODIUM SERPL-SCNC: 138 MMOL/L (ref 136–145)
TROPONIN I SERPL DL<=0.01 NG/ML-MCNC: 0.03 NG/ML (ref 0–0.03)
TROPONIN I SERPL DL<=0.01 NG/ML-MCNC: 0.03 NG/ML (ref 0–0.03)
TSH SERPL DL<=0.005 MIU/L-ACNC: 1.66 UIU/ML (ref 0.4–4)
WBC # BLD AUTO: 7.34 K/UL (ref 3.9–12.7)

## 2020-03-10 PROCEDURE — 80053 COMPREHEN METABOLIC PANEL: CPT | Mod: HCNC

## 2020-03-10 PROCEDURE — 93005 ELECTROCARDIOGRAM TRACING: CPT | Mod: HCNC

## 2020-03-10 PROCEDURE — 94760 N-INVAS EAR/PLS OXIMETRY 1: CPT | Mod: HCNC

## 2020-03-10 PROCEDURE — 93010 ELECTROCARDIOGRAM REPORT: CPT | Mod: HCNC,,, | Performed by: INTERNAL MEDICINE

## 2020-03-10 PROCEDURE — 83880 ASSAY OF NATRIURETIC PEPTIDE: CPT | Mod: HCNC

## 2020-03-10 PROCEDURE — 94761 N-INVAS EAR/PLS OXIMETRY MLT: CPT | Mod: HCNC

## 2020-03-10 PROCEDURE — 99291 CRITICAL CARE FIRST HOUR: CPT | Mod: 25,HCNC

## 2020-03-10 PROCEDURE — 93010 EKG 12-LEAD: ICD-10-PCS | Mod: HCNC,,, | Performed by: INTERNAL MEDICINE

## 2020-03-10 PROCEDURE — 85025 COMPLETE CBC W/AUTO DIFF WBC: CPT | Mod: HCNC

## 2020-03-10 PROCEDURE — 96376 TX/PRO/DX INJ SAME DRUG ADON: CPT | Mod: HCNC

## 2020-03-10 PROCEDURE — 84443 ASSAY THYROID STIM HORMONE: CPT | Mod: HCNC

## 2020-03-10 PROCEDURE — 25000003 PHARM REV CODE 250: Mod: HCNC | Performed by: EMERGENCY MEDICINE

## 2020-03-10 PROCEDURE — 63600175 PHARM REV CODE 636 W HCPCS: Mod: HCNC | Performed by: HOSPITALIST

## 2020-03-10 PROCEDURE — 12000002 HC ACUTE/MED SURGE SEMI-PRIVATE ROOM: Mod: HCNC

## 2020-03-10 PROCEDURE — 84484 ASSAY OF TROPONIN QUANT: CPT | Mod: 91,HCNC

## 2020-03-10 PROCEDURE — 25000003 PHARM REV CODE 250: Mod: HCNC | Performed by: HOSPITALIST

## 2020-03-10 PROCEDURE — 36415 COLL VENOUS BLD VENIPUNCTURE: CPT | Mod: HCNC

## 2020-03-10 PROCEDURE — 96365 THER/PROPH/DIAG IV INF INIT: CPT | Mod: HCNC

## 2020-03-10 RX ORDER — LANOLIN ALCOHOL/MO/W.PET/CERES
800 CREAM (GRAM) TOPICAL
Status: DISCONTINUED | OUTPATIENT
Start: 2020-03-10 | End: 2020-03-12 | Stop reason: HOSPADM

## 2020-03-10 RX ORDER — CARVEDILOL 6.25 MG/1
6.25 TABLET ORAL 2 TIMES DAILY WITH MEALS
Status: DISCONTINUED | OUTPATIENT
Start: 2020-03-10 | End: 2020-03-11

## 2020-03-10 RX ORDER — IBUPROFEN 200 MG
24 TABLET ORAL
Status: DISCONTINUED | OUTPATIENT
Start: 2020-03-10 | End: 2020-03-12 | Stop reason: HOSPADM

## 2020-03-10 RX ORDER — PANTOPRAZOLE SODIUM 40 MG/1
40 TABLET, DELAYED RELEASE ORAL DAILY
Status: DISCONTINUED | OUTPATIENT
Start: 2020-03-11 | End: 2020-03-12 | Stop reason: HOSPADM

## 2020-03-10 RX ORDER — PETROLATUM 420 MG/G
OINTMENT TOPICAL DAILY
Status: DISCONTINUED | OUTPATIENT
Start: 2020-03-10 | End: 2020-03-12 | Stop reason: HOSPADM

## 2020-03-10 RX ORDER — CEPHALEXIN 250 MG/1
500 CAPSULE ORAL EVERY 8 HOURS
Status: DISCONTINUED | OUTPATIENT
Start: 2020-03-10 | End: 2020-03-12 | Stop reason: HOSPADM

## 2020-03-10 RX ORDER — LEVOTHYROXINE SODIUM 112 UG/1
112 TABLET ORAL
Status: DISCONTINUED | OUTPATIENT
Start: 2020-03-11 | End: 2020-03-12 | Stop reason: HOSPADM

## 2020-03-10 RX ORDER — GLUCAGON 1 MG
1 KIT INJECTION
Status: DISCONTINUED | OUTPATIENT
Start: 2020-03-10 | End: 2020-03-12 | Stop reason: HOSPADM

## 2020-03-10 RX ORDER — ASPIRIN 81 MG/1
81 TABLET ORAL DAILY
Status: DISCONTINUED | OUTPATIENT
Start: 2020-03-11 | End: 2020-03-12 | Stop reason: HOSPADM

## 2020-03-10 RX ORDER — DILTIAZEM HCL 1 MG/ML
5 INJECTION, SOLUTION INTRAVENOUS
Status: COMPLETED | OUTPATIENT
Start: 2020-03-10 | End: 2020-03-10

## 2020-03-10 RX ORDER — SODIUM CHLORIDE 0.9 % (FLUSH) 0.9 %
10 SYRINGE (ML) INJECTION
Status: DISCONTINUED | OUTPATIENT
Start: 2020-03-10 | End: 2020-03-12 | Stop reason: HOSPADM

## 2020-03-10 RX ORDER — GABAPENTIN 100 MG/1
100 CAPSULE ORAL 3 TIMES DAILY
Status: DISCONTINUED | OUTPATIENT
Start: 2020-03-10 | End: 2020-03-10

## 2020-03-10 RX ORDER — POTASSIUM CHLORIDE 20 MEQ/15ML
60 SOLUTION ORAL
Status: DISCONTINUED | OUTPATIENT
Start: 2020-03-10 | End: 2020-03-12 | Stop reason: HOSPADM

## 2020-03-10 RX ORDER — FLUCONAZOLE 150 MG/1
TABLET ORAL ONCE
COMMUNITY
Start: 2020-03-05 | End: 2020-03-20 | Stop reason: ALTCHOICE

## 2020-03-10 RX ORDER — SODIUM,POTASSIUM PHOSPHATES 280-250MG
2 POWDER IN PACKET (EA) ORAL
Status: DISCONTINUED | OUTPATIENT
Start: 2020-03-10 | End: 2020-03-12 | Stop reason: HOSPADM

## 2020-03-10 RX ORDER — POTASSIUM CHLORIDE 20 MEQ/15ML
40 SOLUTION ORAL
Status: DISCONTINUED | OUTPATIENT
Start: 2020-03-10 | End: 2020-03-12 | Stop reason: HOSPADM

## 2020-03-10 RX ORDER — ENOXAPARIN SODIUM 100 MG/ML
40 INJECTION SUBCUTANEOUS EVERY 24 HOURS
Status: DISCONTINUED | OUTPATIENT
Start: 2020-03-11 | End: 2020-03-11

## 2020-03-10 RX ORDER — ACETAMINOPHEN 325 MG/1
650 TABLET ORAL EVERY 4 HOURS PRN
Status: DISCONTINUED | OUTPATIENT
Start: 2020-03-10 | End: 2020-03-12 | Stop reason: HOSPADM

## 2020-03-10 RX ORDER — IBUPROFEN 200 MG
16 TABLET ORAL
Status: DISCONTINUED | OUTPATIENT
Start: 2020-03-10 | End: 2020-03-12 | Stop reason: HOSPADM

## 2020-03-10 RX ORDER — ALBUTEROL SULFATE 90 UG/1
2 AEROSOL, METERED RESPIRATORY (INHALATION) EVERY 6 HOURS PRN
Status: DISCONTINUED | OUTPATIENT
Start: 2020-03-10 | End: 2020-03-10 | Stop reason: SDUPTHER

## 2020-03-10 RX ORDER — CLOPIDOGREL BISULFATE 75 MG/1
75 TABLET ORAL DAILY
Status: DISCONTINUED | OUTPATIENT
Start: 2020-03-11 | End: 2020-03-12 | Stop reason: HOSPADM

## 2020-03-10 RX ORDER — GABAPENTIN 100 MG/1
100 CAPSULE ORAL 3 TIMES DAILY
Status: DISCONTINUED | OUTPATIENT
Start: 2020-03-10 | End: 2020-03-12 | Stop reason: HOSPADM

## 2020-03-10 RX ORDER — ALBUTEROL SULFATE 2.5 MG/.5ML
2.5 SOLUTION RESPIRATORY (INHALATION) EVERY 6 HOURS PRN
Status: DISCONTINUED | OUTPATIENT
Start: 2020-03-10 | End: 2020-03-12 | Stop reason: HOSPADM

## 2020-03-10 RX ORDER — ENOXAPARIN SODIUM 100 MG/ML
1 INJECTION SUBCUTANEOUS ONCE
Status: COMPLETED | OUTPATIENT
Start: 2020-03-10 | End: 2020-03-10

## 2020-03-10 RX ORDER — DILTIAZEM HYDROCHLORIDE 5 MG/ML
15 INJECTION INTRAVENOUS
Status: COMPLETED | OUTPATIENT
Start: 2020-03-10 | End: 2020-03-10

## 2020-03-10 RX ORDER — DILTIAZEM HCL/D5W 125 MG/125
5 PLASTIC BAG, INJECTION (ML) INTRAVENOUS CONTINUOUS
Status: DISCONTINUED | OUTPATIENT
Start: 2020-03-10 | End: 2020-03-11

## 2020-03-10 RX ORDER — ONDANSETRON 4 MG/1
8 TABLET, ORALLY DISINTEGRATING ORAL EVERY 8 HOURS PRN
Status: DISCONTINUED | OUTPATIENT
Start: 2020-03-10 | End: 2020-03-12 | Stop reason: HOSPADM

## 2020-03-10 RX ORDER — MONTELUKAST SODIUM 10 MG/1
10 TABLET ORAL NIGHTLY
Status: DISCONTINUED | OUTPATIENT
Start: 2020-03-10 | End: 2020-03-12 | Stop reason: HOSPADM

## 2020-03-10 RX ADMIN — MONTELUKAST 10 MG: 10 TABLET, FILM COATED ORAL at 09:03

## 2020-03-10 RX ADMIN — GABAPENTIN 100 MG: 100 CAPSULE ORAL at 09:03

## 2020-03-10 RX ADMIN — GABAPENTIN 100 MG: 100 CAPSULE ORAL at 05:03

## 2020-03-10 RX ADMIN — DILTIAZEM HYDROCHLORIDE 15 MG: 5 INJECTION INTRAVENOUS at 02:03

## 2020-03-10 RX ADMIN — ACETAMINOPHEN 650 MG: 325 TABLET ORAL at 09:03

## 2020-03-10 RX ADMIN — CARVEDILOL 6.25 MG: 6.25 TABLET, FILM COATED ORAL at 05:03

## 2020-03-10 RX ADMIN — DILTIAZEM HYDROCHLORIDE 5 MG/HR: 5 INJECTION INTRAVENOUS at 05:03

## 2020-03-10 RX ADMIN — DILTIAZEM HYDROCHLORIDE 5 MG/HR: 5 INJECTION INTRAVENOUS at 02:03

## 2020-03-10 RX ADMIN — ENOXAPARIN SODIUM 100 MG: 100 INJECTION SUBCUTANEOUS at 04:03

## 2020-03-10 RX ADMIN — ACETAMINOPHEN 650 MG: 325 TABLET ORAL at 05:03

## 2020-03-10 RX ADMIN — CEPHALEXIN 500 MG: 250 CAPSULE ORAL at 09:03

## 2020-03-10 RX ADMIN — PETROLATUM: 420 OINTMENT TOPICAL at 09:03

## 2020-03-10 NOTE — NURSING
Pt states she has daily wound care ordered for right foot/ankle wound. She states that HH nurse comes by and applies Aquaphor to area then wrapps leg with Broseley dressing. Notified Dr Carter whom ordered to put in order to resume Home wound care.

## 2020-03-10 NOTE — ASSESSMENT & PLAN NOTE
Acute/uncontrolled.  Unsure of time frame as this could have been going on for days per patient report.  Defer anticoagulation to Cardiology.  TTE pending.  Amiodarone drip per Cardiology, plan for possible KIARA tomorrow

## 2020-03-10 NOTE — ED NOTES
Pt arrives with son, pt states she had severe jaw pain 2 nights ago, which she took nitro. Also pt has been feeling episodes of palpitations. When talking to home health , who was there for her wound , she was instructed to go get an EKG. Pt states the only other history she has had of palpitations was ''when I was younger and given valium. ''

## 2020-03-10 NOTE — HPI
"Ms. Hughes is a 79 y.o. 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  She reports that this all started on Sunday when she began to have jaw pain which resolved after taking nitroglycerin.  Denied associated chest pain but reports that jaw pain was her symptom of her previous heart attack.  Since that time she reports increased weakness and fatigue and feels like her heart is fluttering.  Today her home health nurse came to address her venous stasis wounds for which she is currently taking Keflex for an infection of her right lower extremity which she reports has been improving and the nurse noted that her heart rate was irregular and elevated.  She presented to the ED for further evaluation.  Denies fever chills, denies nausea or vomiting.  Denies decreased p.o. intake.  Does report some increased anxiety secondary to her  being ill.  Currently on aspirin and Plavix but not on full-dose blood thinner.  EKG with AFib with RVR and possible new left bundle-branch block.  Troponin mildly elevated.  Patient is to be admitted for cardiology consult, diltiazem drip, TTE.    I discussed at length with patient and her son atrial fibrillation and have it increases risk for blood clots.  I discussed with them that I am going to give her a 1 time dose of full-dose Lovenox blood thinner tonight and will let Cardiology make the decision on further blood thinners in the morning.  Patient reports history of GI bleed a few years ago.  States will monitor her closely for bleeding and will continue PPI.  Patient and son expressed understanding.  "

## 2020-03-10 NOTE — ASSESSMENT & PLAN NOTE
Patient has chronic hypothyroidism. TFTs reviewed-   Lab Results   Component Value Date    TSH 0.578 04/22/2019   . Will continue chronic levothyroxine and adjust for and clinical changes.

## 2020-03-10 NOTE — ASSESSMENT & PLAN NOTE
Patient currently on outpatient Keflex with improvement in right lower extremity appearance symptoms.  Continue Keflex.

## 2020-03-10 NOTE — ED PROVIDER NOTES
"Encounter Date: 3/10/2020    SCRIBE #1 NOTE: I, Amy Rush, am scribing for, and in the presence of, Jeff Mosley MD.       History     Chief Complaint   Patient presents with    Palpitations       Time seen by provider: 1:28 PM on 03/10/2020    Heather Hughes is a 79 y.o. female with PMHx of a heart attack, thyroid disease, asthma, HTN, osteoporosis, CAD, and DVT on RLE presents to the ED with an onset of heart "flutters," jaw pain, and shortness of breathe. The patient states that her symptoms started 3 days ago. She took nitroglycerin to ease the jaw pain. She describes her fast heart beat as "heart flutters." She talked to Dr. Graham Mazariegos and was told to come to the ED. The patient had a stent placed 2.5 to 3 years ago. She currently takes Aspirin and Plavix. The patient denies nausea, vomiting, burning with urination, abdominal pain, chest pain, or any other symptoms at this time. PSHx includes thyroidectomy, cardiac stent, and cholecystectomy. She is allergic to Bactroban, Ditropan, Lipitor, Myrbetriq, Oxycodone, codeine, and mobic. Former cigarette smoker and smoked 0.5 a pack a day. She quit smoking 3 years ago.    The history is provided by the patient.     Review of patient's allergies indicates:   Allergen Reactions    Bactroban [mupirocin calcium] Itching and Other (See Comments)     Red, tingling, itching, burning     Ditropan [oxybutynin chloride] Swelling     swelling    Lipitor [atorvastatin] Other (See Comments)     Fall/ balance     Myrbetriq [mirabegron] Swelling and Other (See Comments)     Legs swelling, couldn't void     Pravastatin Other (See Comments)     Balance problem     Oxycodone     Codeine Nausea Only    Mobic [meloxicam] Other (See Comments)     Not sure reaction    Perfume Other (See Comments)     And flowers cause allergy and makes her cough.    Sulfur Other (See Comments)     Was told not to take as a child     Past Medical History:   Diagnosis Date    " Allergy     Anticoagulant long-term use     Arthritis     Asthma     Coronary artery disease     stent x 1    Deep vein blood clot of right lower extremity 1965 & 1971    Full dentures     Heart attack 01/13/2017    Hypertension     Osteoporosis     Sleep apnea     Thyroid disease     Ulcer     Wears glasses      Past Surgical History:   Procedure Laterality Date    cardic stent  01/13/2017    CHOLECYSTECTOMY      EYE SURGERY      bilateral cataracts    FRACTURE SURGERY  2011    left wrist     KNEE ARTHROPLASTY Left 8/15/2018    Procedure: ARTHROPLASTY, KNEE;  Surgeon: Shantanu Santiago MD;  Location: [facility];  Service: Orthopedics;  Laterality: Left;  ANESTHESIA GENERAL AND BLOCK    THYROIDECTOMY       Family History   Problem Relation Age of Onset    Lupus Mother     Osteoporosis Mother     Heart disease Mother     Ulcers Mother     Esophageal cancer Father     Cancer Father         esophageal    DIEGO disease Father     Cancer Brother     Esophageal cancer Brother     Lupus Daughter     DIEGO disease Son     Heart disease Maternal Aunt     Cancer Maternal Aunt     Kidney disease Maternal Aunt     Kidney cancer Maternal Aunt     Liver cancer Maternal Aunt     Dementia Maternal Aunt     Cancer Maternal Uncle     Colon cancer Maternal Uncle     Breast cancer Maternal Uncle     Lung cancer Maternal Uncle         smoker    Parkinsonism Paternal Aunt     Cancer Paternal Aunt     Stomach cancer Paternal Aunt     Heart disease Paternal Uncle     Cancer Paternal Uncle     Stomach cancer Paternal Uncle         x2    Bone cancer Paternal Uncle     Cancer Maternal Grandmother         tumors on head and body    Parkinsonism Paternal Grandmother     DIEGO disease Brother     Ulcers Brother     Dementia Brother     Early death Son         self    Bipolar disorder Son     Cancer Son     Esophageal cancer Son     Drug abuse Son      Social History     Tobacco Use    Smoking  status: Former Smoker     Packs/day: 0.50     Types: Cigarettes     Last attempt to quit: 1/13/2017     Years since quitting: 3.1    Smokeless tobacco: Never Used   Substance Use Topics    Alcohol use: No    Drug use: No     Review of Systems   Constitutional: Negative for fever.   HENT: Negative for sore throat.    Respiratory: Positive for shortness of breath.    Cardiovascular: Negative for chest pain.   Gastrointestinal: Positive for diarrhea. Negative for abdominal pain, nausea and vomiting.   Genitourinary: Negative for dysuria.   Musculoskeletal: Negative for back pain.   Skin: Negative for rash.   Neurological: Negative for weakness.   Hematological: Does not bruise/bleed easily.       Physical Exam     Initial Vitals [03/10/20 1315]   BP Pulse Resp Temp SpO2   (!) 171/86 (!) 140 18 97.8 °F (36.6 °C) 99 %      MAP       --         Physical Exam    Nursing note and vitals reviewed.  Constitutional: She appears well-developed.  Non-toxic appearance.   Nontoxic, well appearing female.    HENT:   Head: Normocephalic and atraumatic.   Mouth/Throat: Uvula is midline. Mucous membranes are dry.   Eyes: EOM are normal. Pupils are equal, round, and reactive to light.   Neck: Neck supple.   Cardiovascular: Normal rate, normal heart sounds and intact distal pulses. An irregularly irregular rhythm present.  Exam reveals no gallop and no friction rub.    No murmur heard.  Pulmonary/Chest: No respiratory distress. She has no decreased breath sounds. She has no wheezes. She has no rhonchi. She has rales.   Subtly rales in bilateral bases.   Abdominal: Soft. Bowel sounds are normal. She exhibits no distension. There is no tenderness.   Musculoskeletal: Normal range of motion. She exhibits no edema.   Erythema skin. Circumferential around right ankle on dorsal side.   Neurological: She is alert and oriented to person, place, and time.   Skin: Skin is warm and dry. There is erythema.   Psychiatric: She has a normal mood and  affect.         ED Course   Critical Care  Date/Time: 3/10/2020 2:06 PM  Performed by: Jeff Mosley MD  Authorized by: Jeff Mosley MD   Direct patient critical care time: 15 minutes  Additional history critical care time: 5 minutes  Ordering / reviewing critical care time: 5 minutes  Documentation critical care time: 5 minutes  Consulting other physicians critical care time: 5 minutes  Consult with family critical care time: 5 minutes  Total critical care time (exclusive of procedural time) : 40 minutes  Critical care was necessary to treat or prevent imminent or life-threatening deterioration of the following conditions: circulatory failure.  Critical care was time spent personally by me on the following activities: discussions with consultants, ordering and review of laboratory studies, ordering and review of radiographic studies, pulse oximetry, re-evaluation of patient's condition, review of old charts and evaluation of patient's response to treatment.        Labs Reviewed   CBC W/ AUTO DIFFERENTIAL   COMPREHENSIVE METABOLIC PANEL   TROPONIN I   TROPONIN I   B-TYPE NATRIURETIC PEPTIDE          Imaging Results    None          Medical Decision Making:   History:   Old Medical Records: I decided to obtain old medical records.  Independently Interpreted Test(s):   I have ordered and independently interpreted EKG Reading(s) - see prior notes  Clinical Tests:   Medical Tests: Ordered and Reviewed            Scribe Attestation:   Scribe #1: I performed the above scribed service and the documentation accurately describes the services I performed. I attest to the accuracy of the note.            ED Course as of Mar 12 0404   Tue Mar 10, 2020   1353 Patient appears to have atrial fibrillation with left bundle branch block.  This could be AFib with aberrancy.  Prior EKG showed a narrow complex.  She did have chest pain a few days ago.  I will see if I can decrease her rate and if her QRS narrows.  Awaiting  troponin at this time.  She does not appear to be in significant distress.  Will give diltiazem bolus and diltiazem drip.    [JS]   1456 Patient may have early pulmonary edema in the setting of atrial fibrillation with rapid ventricular response.  This is new for.  I feel that she needs to be admitted for further evaluation.    [JS]      ED Course User Index  [JS] Jeff Mosley MD          I, Dr. Jeff Mosley personally performed the services described in this documentation. All medical record entries made by the scribe were at my direction and in my presence.  I have reviewed the chart and agree that the record reflects my personal performance and is accurate and complete. Jeff Mosley MD.  4:04 AM 03/12/2020    DISCLAIMER: This note was prepared with Dragon NaturallySpeaking voice recognition transcription software. Garbled syntax, mangled pronouns, and other bizarre constructions may be attributed to that software system       Clinical Impression:       ICD-10-CM ICD-9-CM   1. Palpitations R00.2 785.1   2. Atrial fibrillation with RVR I48.91 427.31   3. Atrial fibrillation I48.91 427.31                                Jeff Mosley MD  03/12/20 0404

## 2020-03-10 NOTE — SUBJECTIVE & OBJECTIVE
Past Medical History:   Diagnosis Date    Allergy     Anticoagulant long-term use     Arthritis     Asthma     Coronary artery disease     stent x 1    Deep vein blood clot of right lower extremity 1965 & 1971    Full dentures     Heart attack 01/13/2017    Hypertension     Osteoporosis     Sleep apnea     Thyroid disease     Ulcer     Wears glasses        Past Surgical History:   Procedure Laterality Date    cardic stent  01/13/2017    CHOLECYSTECTOMY      EYE SURGERY      bilateral cataracts    FRACTURE SURGERY  2011    left wrist     KNEE ARTHROPLASTY Left 8/15/2018    Procedure: ARTHROPLASTY, KNEE;  Surgeon: Shantanu Santiago MD;  Location: [facility];  Service: Orthopedics;  Laterality: Left;  ANESTHESIA GENERAL AND BLOCK    THYROIDECTOMY         Review of patient's allergies indicates:   Allergen Reactions    Bactroban [mupirocin calcium] Itching and Other (See Comments)     Red, tingling, itching, burning     Ditropan [oxybutynin chloride] Swelling     swelling    Lipitor [atorvastatin] Other (See Comments)     Fall/ balance     Myrbetriq [mirabegron] Swelling and Other (See Comments)     Legs swelling, couldn't void     Pravastatin Other (See Comments)     Balance problem     Oxycodone     Codeine Nausea Only    Mobic [meloxicam] Other (See Comments)     Not sure reaction    Perfume Other (See Comments)     And flowers cause allergy and makes her cough.    Sulfur Other (See Comments)     Was told not to take as a child       No current facility-administered medications on file prior to encounter.      Current Outpatient Medications on File Prior to Encounter   Medication Sig    acetaminophen (TYLENOL) 650 MG TbSR Take 650 mg by mouth every 8 (eight) hours as needed (pain).     albuterol 90 mcg/actuation inhaler Inhale 2 puffs into the lungs every 6 (six) hours as needed for Wheezing.    aspirin (ECOTRIN) 81 MG EC tablet Take 81 mg by mouth once daily.    CALCIUM  CARBONATE/VITAMIN D3 (CALCIUM 500 + D ORAL) Take 2 tablets by mouth once daily.    carvedilol (COREG) 6.25 MG tablet TAKE 1 TABLET TWICE DAILY WITH MEALS (Patient taking differently: Take 6.25 mg by mouth 2 (two) times daily with meals. )    cephALEXin (KEFLEX) 500 MG capsule Take 1 capsule (500 mg total) by mouth every 8 (eight) hours. for 10 days    clopidogreL (PLAVIX) 75 mg tablet Take 1 tablet (75 mg total) by mouth once daily.    gabapentin (NEURONTIN) 100 MG capsule Take 1 capsule (100 mg total) by mouth 3 (three) times daily.    glucosamine-chondroitin 500-400 mg tablet Take 1 tablet by mouth 3 (three) times daily.    montelukast (SINGULAIR) 10 mg tablet Take 1 tablet (10 mg total) by mouth every evening. (Patient taking differently: Take 10 mg by mouth as needed (rhinitis). )    nitroGLYCERIN (NITROSTAT) 0.4 MG SL tablet Place 1 tablet (0.4 mg total) under the tongue every 5 (five) minutes as needed for Chest pain. (Patient taking differently: Place 0.4 mg under the tongue every 5 (five) minutes as needed for Chest pain. Seek medical help if pain is not relieved by the third dose.)    omeprazole (PRILOSEC) 40 MG capsule TAKE 1 CAPSULE (40 MG TOTAL) BY MOUTH ONCE DAILY.    SYNTHROID 112 mcg tablet TAKE ONE TABLET BY MOUTH EVERY DAY BEFORE BREAKFAST (Patient taking differently: Take 112 mcg by mouth before breakfast. )    [DISCONTINUED] cholecalciferol, vitamin D3, (VITAMIN D3 ORAL) Take 1,500 Units by mouth as needed.    [DISCONTINUED] furosemide (LASIX) 20 MG tablet TAKE ONE TABLET BY MOUTH DAILY FOR FOUR DAYS    [DISCONTINUED] hydrocortisone 1 % cream     [DISCONTINUED] lidocaine-prilocaine (EMLA) cream Apply topically 2 (two) times daily as needed.    [DISCONTINUED] mineral oil-hydrophil petrolat (AQUAPHOR) Oint Apply topically 2 (two) times daily. To lower extremities    [DISCONTINUED] mupirocin (BACTROBAN) 2 % ointment Apply topically 3 (three) times daily.    [DISCONTINUED] nystatin  (MYCOSTATIN) 100,000 unit/mL suspension TAKE 4 ML BY MOUTH FOUR TIMES DAILY FOR 10 DAYS    [DISCONTINUED] silver sulfADIAZINE 1% (SILVADENE) 1 % cream Apply topically once daily.    [DISCONTINUED] triamcinolone acetonide 0.1% (KENALOG) 0.1 % cream Apply on the skin TWICE DAILY AS NEEDED     Family History     Problem Relation (Age of Onset)    Bipolar disorder Son    Bone cancer Paternal Uncle    Breast cancer Maternal Uncle    Cancer Father, Brother, Maternal Aunt, Maternal Uncle, Paternal Aunt, Paternal Uncle, Maternal Grandmother, Son    Colon cancer Maternal Uncle    Dementia Maternal Aunt, Brother    Drug abuse Son    Early death Son    Esophageal cancer Father, Brother, Son    DIEGO disease Father, Son, Brother    Heart disease Mother, Maternal Aunt, Paternal Uncle    Kidney cancer Maternal Aunt    Kidney disease Maternal Aunt    Liver cancer Maternal Aunt    Lung cancer Maternal Uncle    Lupus Mother, Daughter    Osteoporosis Mother    Parkinsonism Paternal Aunt, Paternal Grandmother    Stomach cancer Paternal Aunt, Paternal Uncle    Ulcers Mother, Brother        Tobacco Use    Smoking status: Former Smoker     Packs/day: 0.50     Types: Cigarettes     Last attempt to quit: 1/13/2017     Years since quitting: 3.1    Smokeless tobacco: Never Used   Substance and Sexual Activity    Alcohol use: No    Drug use: No    Sexual activity: Never     Review of Systems   Constitutional: Positive for fatigue. Negative for chills and fever.   HENT: Negative for congestion and rhinorrhea.    Eyes: Negative for photophobia and visual disturbance.   Respiratory: Negative for cough, shortness of breath and wheezing.    Cardiovascular: Positive for palpitations. Negative for chest pain and leg swelling.   Gastrointestinal: Negative for abdominal distention, abdominal pain, nausea and vomiting.   Endocrine: Negative for cold intolerance and heat intolerance.   Genitourinary: Negative for dysuria and urgency.    Musculoskeletal: Positive for neck pain (On Sunday). Negative for arthralgias and neck stiffness.   Skin: Positive for wound (Right lower extremity, improving on outpatient antibiotics). Negative for rash.   Neurological: Positive for weakness. Negative for dizziness.   Psychiatric/Behavioral: Negative for agitation and confusion. The patient is nervous/anxious.      Objective:     Vital Signs (Most Recent):  Temp: 97.8 °F (36.6 °C) (03/10/20 1315)  Pulse: (!) 121 (03/10/20 1551)  Resp: 18 (03/10/20 1315)  BP: (!) 143/62 (03/10/20 1531)  SpO2: 99 % (03/10/20 1551) Vital Signs (24h Range):  Temp:  [97.8 °F (36.6 °C)] 97.8 °F (36.6 °C)  Pulse:  [110-140] 121  Resp:  [18] 18  SpO2:  [96 %-99 %] 99 %  BP: (116-171)/() 143/62     Weight: 96.2 kg (212 lb)  Body mass index is 37.55 kg/m².    Physical Exam   Constitutional: She is oriented to person, place, and time. She appears well-developed and well-nourished. No distress.   HENT:   Head: Normocephalic and atraumatic.   Eyes: Pupils are equal, round, and reactive to light. EOM are normal.   Neck: Normal range of motion. Neck supple. No thyromegaly present.   Cardiovascular:   No murmur heard.  Irregularly irregular rhythm, tachycardic   Pulmonary/Chest: Effort normal and breath sounds normal. No respiratory distress. She has no wheezes. She has no rales.   Abdominal: Soft. Bowel sounds are normal. She exhibits no distension. There is no tenderness.   Musculoskeletal: Normal range of motion. She exhibits no edema.   Neurological: She is alert and oriented to person, place, and time. No cranial nerve deficit.   Skin: Skin is warm and dry. Capillary refill takes less than 2 seconds. No rash noted. There is erythema (Erythema of right lower extremity surrounding entire ankle.).   Psychiatric: She has a normal mood and affect. Her behavior is normal.         CRANIAL NERVES     CN III, IV, VI   Pupils are equal, round, and reactive to light.  Extraocular motions are  normal.        Significant Labs: All pertinent labs within the past 24 hours have been reviewed.    Significant Imaging: I have reviewed and interpreted all pertinent imaging results/findings within the past 24 hours.

## 2020-03-10 NOTE — TELEPHONE ENCOUNTER
Spoke to Kandi with home health, she stated that Ms. Hughes is having irregular heart rate and ranging from 130s than it will drop to 74 than jump back up to 124. Patient has been felling off since Sunday. Kandi stated that Ms. Hughes took 2 nitros and it didn't help. I advised her to go to urgent care or hospital to be evaluated.

## 2020-03-10 NOTE — PROGRESS NOTES
"[facility]  Progress Note    Patient Name: Heather Hughes  MRN: [id number]  Patient Class: IP- Inpatient   Admission Date: 3/10/2020  Length of Stay: 0 days  Attending Physician: Jeff Mosley MD  Primary Care Provider: Graham Mazariegos MD        Subjective:     Principal Problem:Atrial fibrillation with RVR        HPI:  Ms. Hughes is a 79 y.o. 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  She reports that this all started on Sunday when she began to have jaw pain which resolved after taking nitroglycerin.  Denied associated chest pain but reports that jaw pain was her symptom of her previous heart attack.  Since that time she reports increased weakness and fatigue and feels like her heart is fluttering.  Today her home health nurse came to address her venous stasis wounds for which she is currently taking Keflex for an infection of her right lower extremity which she reports has been improving and the nurse noted that her heart rate was irregular and elevated.  She presented to the ED for further evaluation.  Denies fever chills, denies nausea or vomiting.  Denies decreased p.o. intake.  Does report some increased anxiety secondary to her  being ill.  Currently on aspirin and Plavix but not on full-dose blood thinner.  EKG with AFib with RVR and possible new left bundle-branch block.  Troponin mildly elevated.  Patient is to be admitted for cardiology consult, diltiazem drip, TTE.    I discussed at length with patient and her son atrial fibrillation and have it increases risk for blood clots.  I discussed with them that I am going to give her a 1 time dose of full-dose Lovenox blood thinner tonight and will let Cardiology make the decision on further blood thinners in the morning.  Patient " reports history of GI bleed a few years ago.  States will monitor her closely for bleeding and will continue PPI.  Patient and son expressed understanding.    Overview/Hospital Course:  No notes on file    Past Medical History:   Diagnosis Date    Allergy     Anticoagulant long-term use     Arthritis     Asthma     Coronary artery disease     stent x 1    Deep vein blood clot of right lower extremity 1965 & 1971    Full dentures     Heart attack 01/13/2017    Hypertension     Osteoporosis     Sleep apnea     Thyroid disease     Ulcer     Wears glasses        Past Surgical History:   Procedure Laterality Date    cardic stent  01/13/2017    CHOLECYSTECTOMY      EYE SURGERY      bilateral cataracts    FRACTURE SURGERY  2011    left wrist     KNEE ARTHROPLASTY Left 8/15/2018    Procedure: ARTHROPLASTY, KNEE;  Surgeon: Shantanu Santiago MD;  Location: [facility];  Service: Orthopedics;  Laterality: Left;  ANESTHESIA GENERAL AND BLOCK    THYROIDECTOMY         Review of patient's allergies indicates:   Allergen Reactions    Bactroban [mupirocin calcium] Itching and Other (See Comments)     Red, tingling, itching, burning     Ditropan [oxybutynin chloride] Swelling     swelling    Lipitor [atorvastatin] Other (See Comments)     Fall/ balance     Myrbetriq [mirabegron] Swelling and Other (See Comments)     Legs swelling, couldn't void     Pravastatin Other (See Comments)     Balance problem     Oxycodone     Codeine Nausea Only    Mobic [meloxicam] Other (See Comments)     Not sure reaction    Perfume Other (See Comments)     And flowers cause allergy and makes her cough.    Sulfur Other (See Comments)     Was told not to take as a child       No current facility-administered medications on file prior to encounter.      Current Outpatient Medications on File Prior to Encounter   Medication Sig    acetaminophen (TYLENOL) 650 MG TbSR Take 650 mg by mouth every 8 (eight) hours as needed (pain).      albuterol 90 mcg/actuation inhaler Inhale 2 puffs into the lungs every 6 (six) hours as needed for Wheezing.    aspirin (ECOTRIN) 81 MG EC tablet Take 81 mg by mouth once daily.    CALCIUM CARBONATE/VITAMIN D3 (CALCIUM 500 + D ORAL) Take 2 tablets by mouth once daily.    carvedilol (COREG) 6.25 MG tablet TAKE 1 TABLET TWICE DAILY WITH MEALS (Patient taking differently: Take 6.25 mg by mouth 2 (two) times daily with meals. )    cephALEXin (KEFLEX) 500 MG capsule Take 1 capsule (500 mg total) by mouth every 8 (eight) hours. for 10 days    clopidogreL (PLAVIX) 75 mg tablet Take 1 tablet (75 mg total) by mouth once daily.    gabapentin (NEURONTIN) 100 MG capsule Take 1 capsule (100 mg total) by mouth 3 (three) times daily.    glucosamine-chondroitin 500-400 mg tablet Take 1 tablet by mouth 3 (three) times daily.    montelukast (SINGULAIR) 10 mg tablet Take 1 tablet (10 mg total) by mouth every evening. (Patient taking differently: Take 10 mg by mouth as needed (rhinitis). )    nitroGLYCERIN (NITROSTAT) 0.4 MG SL tablet Place 1 tablet (0.4 mg total) under the tongue every 5 (five) minutes as needed for Chest pain. (Patient taking differently: Place 0.4 mg under the tongue every 5 (five) minutes as needed for Chest pain. Seek medical help if pain is not relieved by the third dose.)    omeprazole (PRILOSEC) 40 MG capsule TAKE 1 CAPSULE (40 MG TOTAL) BY MOUTH ONCE DAILY.    SYNTHROID 112 mcg tablet TAKE ONE TABLET BY MOUTH EVERY DAY BEFORE BREAKFAST (Patient taking differently: Take 112 mcg by mouth before breakfast. )    [DISCONTINUED] cholecalciferol, vitamin D3, (VITAMIN D3 ORAL) Take 1,500 Units by mouth as needed.    [DISCONTINUED] furosemide (LASIX) 20 MG tablet TAKE ONE TABLET BY MOUTH DAILY FOR FOUR DAYS    [DISCONTINUED] hydrocortisone 1 % cream     [DISCONTINUED] lidocaine-prilocaine (EMLA) cream Apply topically 2 (two) times daily as needed.    [DISCONTINUED] mineral oil-hydrophil petrolat  (AQUAPHOR) Oint Apply topically 2 (two) times daily. To lower extremities    [DISCONTINUED] mupirocin (BACTROBAN) 2 % ointment Apply topically 3 (three) times daily.    [DISCONTINUED] nystatin (MYCOSTATIN) 100,000 unit/mL suspension TAKE 4 ML BY MOUTH FOUR TIMES DAILY FOR 10 DAYS    [DISCONTINUED] silver sulfADIAZINE 1% (SILVADENE) 1 % cream Apply topically once daily.    [DISCONTINUED] triamcinolone acetonide 0.1% (KENALOG) 0.1 % cream Apply on the skin TWICE DAILY AS NEEDED     Family History     Problem Relation (Age of Onset)    Bipolar disorder Son    Bone cancer Paternal Uncle    Breast cancer Maternal Uncle    Cancer Father, Brother, Maternal Aunt, Maternal Uncle, Paternal Aunt, Paternal Uncle, Maternal Grandmother, Son    Colon cancer Maternal Uncle    Dementia Maternal Aunt, Brother    Drug abuse Son    Early death Son    Esophageal cancer Father, Brother, Son    DIEGO disease Father, Son, Brother    Heart disease Mother, Maternal Aunt, Paternal Uncle    Kidney cancer Maternal Aunt    Kidney disease Maternal Aunt    Liver cancer Maternal Aunt    Lung cancer Maternal Uncle    Lupus Mother, Daughter    Osteoporosis Mother    Parkinsonism Paternal Aunt, Paternal Grandmother    Stomach cancer Paternal Aunt, Paternal Uncle    Ulcers Mother, Brother        Tobacco Use    Smoking status: Former Smoker     Packs/day: 0.50     Types: Cigarettes     Last attempt to quit: 1/13/2017     Years since quitting: 3.1    Smokeless tobacco: Never Used   Substance and Sexual Activity    Alcohol use: No    Drug use: No    Sexual activity: Never     Review of Systems   Constitutional: Positive for fatigue. Negative for chills and fever.   HENT: Negative for congestion and rhinorrhea.    Eyes: Negative for photophobia and visual disturbance.   Respiratory: Negative for cough, shortness of breath and wheezing.    Cardiovascular: Positive for palpitations. Negative for chest pain and leg swelling.   Gastrointestinal:  Negative for abdominal distention, abdominal pain, nausea and vomiting.   Endocrine: Negative for cold intolerance and heat intolerance.   Genitourinary: Negative for dysuria and urgency.   Musculoskeletal: Positive for neck pain (On Sunday). Negative for arthralgias and neck stiffness.   Skin: Positive for wound (Right lower extremity, improving on outpatient antibiotics). Negative for rash.   Neurological: Positive for weakness. Negative for dizziness.   Psychiatric/Behavioral: Negative for agitation and confusion. The patient is nervous/anxious.      Objective:     Vital Signs (Most Recent):  Temp: 97.8 °F (36.6 °C) (03/10/20 1315)  Pulse: (!) 121 (03/10/20 1551)  Resp: 18 (03/10/20 1315)  BP: (!) 143/62 (03/10/20 1531)  SpO2: 99 % (03/10/20 1551) Vital Signs (24h Range):  Temp:  [97.8 °F (36.6 °C)] 97.8 °F (36.6 °C)  Pulse:  [110-140] 121  Resp:  [18] 18  SpO2:  [96 %-99 %] 99 %  BP: (116-171)/() 143/62     Weight: 96.2 kg (212 lb)  Body mass index is 37.55 kg/m².    Physical Exam   Constitutional: She is oriented to person, place, and time. She appears well-developed and well-nourished. No distress.   HENT:   Head: Normocephalic and atraumatic.   Eyes: Pupils are equal, round, and reactive to light. EOM are normal.   Neck: Normal range of motion. Neck supple. No thyromegaly present.   Cardiovascular:   No murmur heard.  Irregularly irregular rhythm, tachycardic   Pulmonary/Chest: Effort normal and breath sounds normal. No respiratory distress. She has no wheezes. She has no rales.   Abdominal: Soft. Bowel sounds are normal. She exhibits no distension. There is no tenderness.   Musculoskeletal: Normal range of motion. She exhibits no edema.   Neurological: She is alert and oriented to person, place, and time. No cranial nerve deficit.   Skin: Skin is warm and dry. Capillary refill takes less than 2 seconds. No rash noted. There is erythema (Erythema of right lower extremity surrounding entire ankle.).    Psychiatric: She has a normal mood and affect. Her behavior is normal.         CRANIAL NERVES     CN III, IV, VI   Pupils are equal, round, and reactive to light.  Extraocular motions are normal.        Significant Labs: All pertinent labs within the past 24 hours have been reviewed.    Significant Imaging: I have reviewed and interpreted all pertinent imaging results/findings within the past 24 hours.      Assessment/Plan:      * Atrial fibrillation with RVR  Acute/uncontrolled.  Unsure of time frame as this could have been going on for days per patient report.  Will give 1 dose of full-dose Lovenox and defer further anticoagulation to Cardiology.  TTE in a.m..  Continue diltiazem drip.  Trend troponins.      Venous stasis dermatitis of right lower extremity  Patient currently on outpatient Keflex with improvement in right lower extremity appearance symptoms.  Continue Keflex.      Severe obesity (BMI 35.0-39.9) with comorbidity, today 37.9  Body mass index is 37.55 kg/m². Morbid obesity complicates all aspects of disease management from diagnostic modalities to treatment. Weight loss encouraged and health benefits explained to patient.        Acquired hypothyroidism  Patient has chronic hypothyroidism. TFTs reviewed-   Lab Results   Component Value Date    TSH 0.578 04/22/2019   . Will continue chronic levothyroxine and adjust for and clinical changes.      History of deep venous thrombosis (DVT) of distal vein of right lower extremity, 1965 and 1971  Noted      Essential hypertension  Continue home medications in addition to diltiazem drip for atrial fibrillation at this time    Coronary artery disease involving native coronary artery of native heart with unstable angina pectoris  Chronic.  Continue aspirin and Plavix.  Monitor on telemetry.  Trend troponins.  Consult with Cardiology.  Possible that neck pain on Sunday was associated with a cardiac event.      Moderate persistent asthma without  complication  Chronic/Controlled. Continue home medications.          VTE Risk Mitigation (From admission, onward)         Ordered     enoxaparin injection 100 mg  Once      03/10/20 8198                      Yumiko Carter MD  Department of Hospital Medicine   [facility]

## 2020-03-10 NOTE — SUBJECTIVE & OBJECTIVE
Past Medical History:   Diagnosis Date    Allergy     Anticoagulant long-term use     Arthritis     Asthma     Coronary artery disease     stent x 1    Deep vein blood clot of right lower extremity 1965 & 1971    Full dentures     Heart attack 01/13/2017    Hypertension     Osteoporosis     Sleep apnea     Thyroid disease     Ulcer     Wears glasses        Past Surgical History:   Procedure Laterality Date    cardic stent  01/13/2017    CHOLECYSTECTOMY      EYE SURGERY      bilateral cataracts    FRACTURE SURGERY  2011    left wrist     KNEE ARTHROPLASTY Left 8/15/2018    Procedure: ARTHROPLASTY, KNEE;  Surgeon: Shantanu Santiago MD;  Location: [facility];  Service: Orthopedics;  Laterality: Left;  ANESTHESIA GENERAL AND BLOCK    THYROIDECTOMY         Review of patient's allergies indicates:   Allergen Reactions    Bactroban [mupirocin calcium] Itching and Other (See Comments)     Red, tingling, itching, burning     Ditropan [oxybutynin chloride] Swelling     swelling    Lipitor [atorvastatin] Other (See Comments)     Fall/ balance     Myrbetriq [mirabegron] Swelling and Other (See Comments)     Legs swelling, couldn't void     Pravastatin Other (See Comments)     Balance problem     Oxycodone     Codeine Nausea Only    Mobic [meloxicam] Other (See Comments)     Not sure reaction    Perfume Other (See Comments)     And flowers cause allergy and makes her cough.    Sulfur Other (See Comments)     Was told not to take as a child       No current facility-administered medications on file prior to encounter.      Current Outpatient Medications on File Prior to Encounter   Medication Sig    acetaminophen (TYLENOL) 650 MG TbSR Take 650 mg by mouth every 8 (eight) hours as needed (pain).     albuterol 90 mcg/actuation inhaler Inhale 2 puffs into the lungs every 6 (six) hours as needed for Wheezing.    aspirin (ECOTRIN) 81 MG EC tablet Take 81 mg by mouth once daily.    CALCIUM  CARBONATE/VITAMIN D3 (CALCIUM 500 + D ORAL) Take 2 tablets by mouth once daily.    carvedilol (COREG) 6.25 MG tablet TAKE 1 TABLET TWICE DAILY WITH MEALS (Patient taking differently: Take 6.25 mg by mouth 2 (two) times daily with meals. )    cephALEXin (KEFLEX) 500 MG capsule Take 1 capsule (500 mg total) by mouth every 8 (eight) hours. for 10 days    clopidogreL (PLAVIX) 75 mg tablet Take 1 tablet (75 mg total) by mouth once daily.    gabapentin (NEURONTIN) 100 MG capsule Take 1 capsule (100 mg total) by mouth 3 (three) times daily.    glucosamine-chondroitin 500-400 mg tablet Take 1 tablet by mouth 3 (three) times daily.    montelukast (SINGULAIR) 10 mg tablet Take 1 tablet (10 mg total) by mouth every evening. (Patient taking differently: Take 10 mg by mouth as needed (rhinitis). )    nitroGLYCERIN (NITROSTAT) 0.4 MG SL tablet Place 1 tablet (0.4 mg total) under the tongue every 5 (five) minutes as needed for Chest pain. (Patient taking differently: Place 0.4 mg under the tongue every 5 (five) minutes as needed for Chest pain. Seek medical help if pain is not relieved by the third dose.)    omeprazole (PRILOSEC) 40 MG capsule TAKE 1 CAPSULE (40 MG TOTAL) BY MOUTH ONCE DAILY.    SYNTHROID 112 mcg tablet TAKE ONE TABLET BY MOUTH EVERY DAY BEFORE BREAKFAST (Patient taking differently: Take 112 mcg by mouth before breakfast. )    [DISCONTINUED] cholecalciferol, vitamin D3, (VITAMIN D3 ORAL) Take 1,500 Units by mouth as needed.    [DISCONTINUED] furosemide (LASIX) 20 MG tablet TAKE ONE TABLET BY MOUTH DAILY FOR FOUR DAYS    [DISCONTINUED] hydrocortisone 1 % cream     [DISCONTINUED] lidocaine-prilocaine (EMLA) cream Apply topically 2 (two) times daily as needed.    [DISCONTINUED] mineral oil-hydrophil petrolat (AQUAPHOR) Oint Apply topically 2 (two) times daily. To lower extremities    [DISCONTINUED] mupirocin (BACTROBAN) 2 % ointment Apply topically 3 (three) times daily.    [DISCONTINUED] nystatin  (MYCOSTATIN) 100,000 unit/mL suspension TAKE 4 ML BY MOUTH FOUR TIMES DAILY FOR 10 DAYS    [DISCONTINUED] silver sulfADIAZINE 1% (SILVADENE) 1 % cream Apply topically once daily.    [DISCONTINUED] triamcinolone acetonide 0.1% (KENALOG) 0.1 % cream Apply on the skin TWICE DAILY AS NEEDED     Family History     Problem Relation (Age of Onset)    Bipolar disorder Son    Bone cancer Paternal Uncle    Breast cancer Maternal Uncle    Cancer Father, Brother, Maternal Aunt, Maternal Uncle, Paternal Aunt, Paternal Uncle, Maternal Grandmother, Son    Colon cancer Maternal Uncle    Dementia Maternal Aunt, Brother    Drug abuse Son    Early death Son    Esophageal cancer Father, Brother, Son    DIEGO disease Father, Son, Brother    Heart disease Mother, Maternal Aunt, Paternal Uncle    Kidney cancer Maternal Aunt    Kidney disease Maternal Aunt    Liver cancer Maternal Aunt    Lung cancer Maternal Uncle    Lupus Mother, Daughter    Osteoporosis Mother    Parkinsonism Paternal Aunt, Paternal Grandmother    Stomach cancer Paternal Aunt, Paternal Uncle    Ulcers Mother, Brother        Tobacco Use    Smoking status: Former Smoker     Packs/day: 0.50     Types: Cigarettes     Last attempt to quit: 1/13/2017     Years since quitting: 3.1    Smokeless tobacco: Never Used   Substance and Sexual Activity    Alcohol use: No    Drug use: No    Sexual activity: Never     Review of Systems   Constitutional: Positive for fatigue. Negative for chills and fever.   HENT: Negative for congestion and rhinorrhea.    Eyes: Negative for photophobia and visual disturbance.   Respiratory: Negative for cough, shortness of breath and wheezing.    Cardiovascular: Positive for palpitations. Negative for chest pain and leg swelling.   Gastrointestinal: Negative for abdominal distention, abdominal pain, nausea and vomiting.   Endocrine: Negative for cold intolerance and heat intolerance.   Genitourinary: Negative for dysuria and urgency.    Musculoskeletal: Positive for neck pain (On Sunday). Negative for arthralgias and neck stiffness.   Skin: Positive for wound (Right lower extremity, improving on outpatient antibiotics). Negative for rash.   Neurological: Positive for weakness. Negative for dizziness.   Psychiatric/Behavioral: Negative for agitation and confusion. The patient is nervous/anxious.      Objective:     Vital Signs (Most Recent):  Temp: 97.8 °F (36.6 °C) (03/10/20 1315)  Pulse: (!) 121 (03/10/20 1551)  Resp: 18 (03/10/20 1315)  BP: (!) 143/62 (03/10/20 1531)  SpO2: 99 % (03/10/20 1551) Vital Signs (24h Range):  Temp:  [97.8 °F (36.6 °C)] 97.8 °F (36.6 °C)  Pulse:  [110-140] 121  Resp:  [18] 18  SpO2:  [96 %-99 %] 99 %  BP: (116-171)/() 143/62     Weight: 96.2 kg (212 lb)  Body mass index is 37.55 kg/m².    Physical Exam   Constitutional: She is oriented to person, place, and time. She appears well-developed and well-nourished. No distress.   HENT:   Head: Normocephalic and atraumatic.   Eyes: Pupils are equal, round, and reactive to light. EOM are normal.   Neck: Normal range of motion. Neck supple. No thyromegaly present.   Cardiovascular:   No murmur heard.  Irregularly irregular rhythm, tachycardic   Pulmonary/Chest: Effort normal and breath sounds normal. No respiratory distress. She has no wheezes. She has no rales.   Abdominal: Soft. Bowel sounds are normal. She exhibits no distension. There is no tenderness.   Musculoskeletal: Normal range of motion. She exhibits no edema.   Neurological: She is alert and oriented to person, place, and time. No cranial nerve deficit.   Skin: Skin is warm and dry. Capillary refill takes less than 2 seconds. No rash noted. There is erythema (Erythema of right lower extremity surrounding entire ankle.).   Psychiatric: She has a normal mood and affect. Her behavior is normal.         CRANIAL NERVES     CN III, IV, VI   Pupils are equal, round, and reactive to light.  Extraocular motions are  normal.        Significant Labs: All pertinent labs within the past 24 hours have been reviewed.    Significant Imaging: I have reviewed and interpreted all pertinent imaging results/findings within the past 24 hours.

## 2020-03-10 NOTE — ED NOTES
Pt AAOx4, Abc's intact. NADN. Son at BS, no needs at this time, call light in easy reach. Pt instructed to use for assistance. SR up x1 for pt comfort.

## 2020-03-10 NOTE — H&P
"[facility] Medicine  History & Physical    Patient Name: Heather Hughes  MRN: [id number]  Admission Date: 3/10/2020  Attending Physician: Yumiko Carter MD  Primary Care Provider: Graham Mazariegos MD         Patient information was obtained from patient, spouse/SO, relative(s), past medical records and ER records.     Subjective:     Principal Problem:Atrial fibrillation with RVR    Chief Complaint:   Chief Complaint   Patient presents with    Palpitations        HPI: Ms. Hughes is a 79 y.o. 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  She reports that this all started on Sunday when she began to have jaw pain which resolved after taking nitroglycerin.  Denied associated chest pain but reports that jaw pain was her symptom of her previous heart attack.  Since that time she reports increased weakness and fatigue and feels like her heart is fluttering.  Today her home health nurse came to address her venous stasis wounds for which she is currently taking Keflex for an infection of her right lower extremity which she reports has been improving and the nurse noted that her heart rate was irregular and elevated.  She presented to the ED for further evaluation.  Denies fever chills, denies nausea or vomiting.  Denies decreased p.o. intake.  Does report some increased anxiety secondary to her  being ill.  Currently on aspirin and Plavix but not on full-dose blood thinner.  EKG with AFib with RVR and possible new left bundle-branch block.  Troponin mildly elevated.  Patient is to be admitted for cardiology consult, diltiazem drip, TTE.    I discussed at length with patient and her son atrial fibrillation and have it increases risk for blood clots.  I discussed with them that I am going to give her a 1 time dose of " full-dose Lovenox blood thinner tonight and will let Cardiology make the decision on further blood thinners in the morning.  Patient reports history of GI bleed a few years ago.  States will monitor her closely for bleeding and will continue PPI.  Patient and son expressed understanding.    Past Medical History:   Diagnosis Date    Allergy     Anticoagulant long-term use     Arthritis     Asthma     Coronary artery disease     stent x 1    Deep vein blood clot of right lower extremity 1965 & 1971    Full dentures     Heart attack 01/13/2017    Hypertension     Osteoporosis     Sleep apnea     Thyroid disease     Ulcer     Wears glasses        Past Surgical History:   Procedure Laterality Date    cardic stent  01/13/2017    CHOLECYSTECTOMY      EYE SURGERY      bilateral cataracts    FRACTURE SURGERY  2011    left wrist     KNEE ARTHROPLASTY Left 8/15/2018    Procedure: ARTHROPLASTY, KNEE;  Surgeon: Shantanu Santiago MD;  Location: [facility];  Service: Orthopedics;  Laterality: Left;  ANESTHESIA GENERAL AND BLOCK    THYROIDECTOMY         Review of patient's allergies indicates:   Allergen Reactions    Bactroban [mupirocin calcium] Itching and Other (See Comments)     Red, tingling, itching, burning     Ditropan [oxybutynin chloride] Swelling     swelling    Lipitor [atorvastatin] Other (See Comments)     Fall/ balance     Myrbetriq [mirabegron] Swelling and Other (See Comments)     Legs swelling, couldn't void     Pravastatin Other (See Comments)     Balance problem     Oxycodone     Codeine Nausea Only    Mobic [meloxicam] Other (See Comments)     Not sure reaction    Perfume Other (See Comments)     And flowers cause allergy and makes her cough.    Sulfur Other (See Comments)     Was told not to take as a child       No current facility-administered medications on file prior to encounter.      Current Outpatient Medications on File Prior to Encounter   Medication Sig    acetaminophen  (TYLENOL) 650 MG TbSR Take 650 mg by mouth every 8 (eight) hours as needed (pain).     albuterol 90 mcg/actuation inhaler Inhale 2 puffs into the lungs every 6 (six) hours as needed for Wheezing.    aspirin (ECOTRIN) 81 MG EC tablet Take 81 mg by mouth once daily.    CALCIUM CARBONATE/VITAMIN D3 (CALCIUM 500 + D ORAL) Take 2 tablets by mouth once daily.    carvedilol (COREG) 6.25 MG tablet TAKE 1 TABLET TWICE DAILY WITH MEALS (Patient taking differently: Take 6.25 mg by mouth 2 (two) times daily with meals. )    cephALEXin (KEFLEX) 500 MG capsule Take 1 capsule (500 mg total) by mouth every 8 (eight) hours. for 10 days    clopidogreL (PLAVIX) 75 mg tablet Take 1 tablet (75 mg total) by mouth once daily.    gabapentin (NEURONTIN) 100 MG capsule Take 1 capsule (100 mg total) by mouth 3 (three) times daily.    glucosamine-chondroitin 500-400 mg tablet Take 1 tablet by mouth 3 (three) times daily.    montelukast (SINGULAIR) 10 mg tablet Take 1 tablet (10 mg total) by mouth every evening. (Patient taking differently: Take 10 mg by mouth as needed (rhinitis). )    nitroGLYCERIN (NITROSTAT) 0.4 MG SL tablet Place 1 tablet (0.4 mg total) under the tongue every 5 (five) minutes as needed for Chest pain. (Patient taking differently: Place 0.4 mg under the tongue every 5 (five) minutes as needed for Chest pain. Seek medical help if pain is not relieved by the third dose.)    omeprazole (PRILOSEC) 40 MG capsule TAKE 1 CAPSULE (40 MG TOTAL) BY MOUTH ONCE DAILY.    SYNTHROID 112 mcg tablet TAKE ONE TABLET BY MOUTH EVERY DAY BEFORE BREAKFAST (Patient taking differently: Take 112 mcg by mouth before breakfast. )    [DISCONTINUED] cholecalciferol, vitamin D3, (VITAMIN D3 ORAL) Take 1,500 Units by mouth as needed.    [DISCONTINUED] furosemide (LASIX) 20 MG tablet TAKE ONE TABLET BY MOUTH DAILY FOR FOUR DAYS    [DISCONTINUED] hydrocortisone 1 % cream     [DISCONTINUED] lidocaine-prilocaine (EMLA) cream Apply topically  2 (two) times daily as needed.    [DISCONTINUED] mineral oil-hydrophil petrolat (AQUAPHOR) Oint Apply topically 2 (two) times daily. To lower extremities    [DISCONTINUED] mupirocin (BACTROBAN) 2 % ointment Apply topically 3 (three) times daily.    [DISCONTINUED] nystatin (MYCOSTATIN) 100,000 unit/mL suspension TAKE 4 ML BY MOUTH FOUR TIMES DAILY FOR 10 DAYS    [DISCONTINUED] silver sulfADIAZINE 1% (SILVADENE) 1 % cream Apply topically once daily.    [DISCONTINUED] triamcinolone acetonide 0.1% (KENALOG) 0.1 % cream Apply on the skin TWICE DAILY AS NEEDED     Family History     Problem Relation (Age of Onset)    Bipolar disorder Son    Bone cancer Paternal Uncle    Breast cancer Maternal Uncle    Cancer Father, Brother, Maternal Aunt, Maternal Uncle, Paternal Aunt, Paternal Uncle, Maternal Grandmother, Son    Colon cancer Maternal Uncle    Dementia Maternal Aunt, Brother    Drug abuse Son    Early death Son    Esophageal cancer Father, Brother, Son    DIEGO disease Father, Son, Brother    Heart disease Mother, Maternal Aunt, Paternal Uncle    Kidney cancer Maternal Aunt    Kidney disease Maternal Aunt    Liver cancer Maternal Aunt    Lung cancer Maternal Uncle    Lupus Mother, Daughter    Osteoporosis Mother    Parkinsonism Paternal Aunt, Paternal Grandmother    Stomach cancer Paternal Aunt, Paternal Uncle    Ulcers Mother, Brother        Tobacco Use    Smoking status: Former Smoker     Packs/day: 0.50     Types: Cigarettes     Last attempt to quit: 1/13/2017     Years since quitting: 3.1    Smokeless tobacco: Never Used   Substance and Sexual Activity    Alcohol use: No    Drug use: No    Sexual activity: Never     Review of Systems   Constitutional: Positive for fatigue. Negative for chills and fever.   HENT: Negative for congestion and rhinorrhea.    Eyes: Negative for photophobia and visual disturbance.   Respiratory: Negative for cough, shortness of breath and wheezing.    Cardiovascular: Positive for  palpitations. Negative for chest pain and leg swelling.   Gastrointestinal: Negative for abdominal distention, abdominal pain, nausea and vomiting.   Endocrine: Negative for cold intolerance and heat intolerance.   Genitourinary: Negative for dysuria and urgency.   Musculoskeletal: Positive for neck pain (On Sunday). Negative for arthralgias and neck stiffness.   Skin: Positive for wound (Right lower extremity, improving on outpatient antibiotics). Negative for rash.   Neurological: Positive for weakness. Negative for dizziness.   Psychiatric/Behavioral: Negative for agitation and confusion. The patient is nervous/anxious.      Objective:     Vital Signs (Most Recent):  Temp: 97.8 °F (36.6 °C) (03/10/20 1315)  Pulse: (!) 121 (03/10/20 1551)  Resp: 18 (03/10/20 1315)  BP: (!) 143/62 (03/10/20 1531)  SpO2: 99 % (03/10/20 1551) Vital Signs (24h Range):  Temp:  [97.8 °F (36.6 °C)] 97.8 °F (36.6 °C)  Pulse:  [110-140] 121  Resp:  [18] 18  SpO2:  [96 %-99 %] 99 %  BP: (116-171)/() 143/62     Weight: 96.2 kg (212 lb)  Body mass index is 37.55 kg/m².    Physical Exam   Constitutional: She is oriented to person, place, and time. She appears well-developed and well-nourished. No distress.   HENT:   Head: Normocephalic and atraumatic.   Eyes: Pupils are equal, round, and reactive to light. EOM are normal.   Neck: Normal range of motion. Neck supple. No thyromegaly present.   Cardiovascular:   No murmur heard.  Irregularly irregular rhythm, tachycardic   Pulmonary/Chest: Effort normal and breath sounds normal. No respiratory distress. She has no wheezes. She has no rales.   Abdominal: Soft. Bowel sounds are normal. She exhibits no distension. There is no tenderness.   Musculoskeletal: Normal range of motion. She exhibits no edema.   Neurological: She is alert and oriented to person, place, and time. No cranial nerve deficit.   Skin: Skin is warm and dry. Capillary refill takes less than 2 seconds. No rash noted. There is  erythema (Erythema of right lower extremity surrounding entire ankle.).   Psychiatric: She has a normal mood and affect. Her behavior is normal.         CRANIAL NERVES     CN III, IV, VI   Pupils are equal, round, and reactive to light.  Extraocular motions are normal.        Significant Labs: All pertinent labs within the past 24 hours have been reviewed.    Significant Imaging: I have reviewed and interpreted all pertinent imaging results/findings within the past 24 hours.    Assessment/Plan:     * Atrial fibrillation with RVR  Acute/uncontrolled.  Unsure of time frame as this could have been going on for days per patient report.  Will give 1 dose of full-dose Lovenox and defer further anticoagulation to Cardiology.  TTE in a.m..  Continue diltiazem drip.  Trend troponins.      Venous stasis dermatitis of right lower extremity  Patient currently on outpatient Keflex with improvement in right lower extremity appearance symptoms.  Continue Keflex.      Severe obesity (BMI 35.0-39.9) with comorbidity, today 37.9  Body mass index is 37.55 kg/m². Morbid obesity complicates all aspects of disease management from diagnostic modalities to treatment. Weight loss encouraged and health benefits explained to patient.        Acquired hypothyroidism  Patient has chronic hypothyroidism. TFTs reviewed-   Lab Results   Component Value Date    TSH 0.578 04/22/2019   . Will continue chronic levothyroxine and adjust for and clinical changes.      History of deep venous thrombosis (DVT) of distal vein of right lower extremity, 1965 and 1971  Noted      Essential hypertension  Continue home medications in addition to diltiazem drip for atrial fibrillation at this time    Coronary artery disease involving native coronary artery of native heart with unstable angina pectoris  Chronic.  Continue aspirin and Plavix.  Monitor on telemetry.  Trend troponins.  Consult with Cardiology.  Possible that neck pain on Sunday was associated with a  cardiac event.      Moderate persistent asthma without complication  Chronic/Controlled. Continue home medications.          VTE Risk Mitigation (From admission, onward)         Ordered     enoxaparin injection 40 mg  Daily      03/10/20 1654     IP VTE HIGH RISK PATIENT  Once      03/10/20 1654                   Yumiko Carter MD  Department of Hospital Medicine   [facility]

## 2020-03-10 NOTE — ASSESSMENT & PLAN NOTE
Chronic.  Continue aspirin and Plavix.  Monitor on telemetry.  Trend troponins.  Cardiology following.  Troponins have normalized

## 2020-03-10 NOTE — ASSESSMENT & PLAN NOTE
Body mass index is 37.55 kg/m². Morbid obesity complicates all aspects of disease management from diagnostic modalities to treatment. Weight loss encouraged and health benefits explained to patient.

## 2020-03-11 ENCOUNTER — ANESTHESIA EVENT (OUTPATIENT)
Dept: CARDIOLOGY | Facility: HOSPITAL | Age: 80
DRG: 309 | End: 2020-03-11
Payer: MEDICARE

## 2020-03-11 LAB
ANION GAP SERPL CALC-SCNC: 7 MMOL/L (ref 8–16)
BASOPHILS # BLD AUTO: 0.04 K/UL (ref 0–0.2)
BASOPHILS NFR BLD: 0.7 % (ref 0–1.9)
BUN SERPL-MCNC: 25 MG/DL (ref 8–23)
CALCIUM SERPL-MCNC: 9.6 MG/DL (ref 8.7–10.5)
CHLORIDE SERPL-SCNC: 105 MMOL/L (ref 95–110)
CO2 SERPL-SCNC: 26 MMOL/L (ref 23–29)
CREAT SERPL-MCNC: 0.9 MG/DL (ref 0.5–1.4)
DIFFERENTIAL METHOD: ABNORMAL
EOSINOPHIL # BLD AUTO: 0.4 K/UL (ref 0–0.5)
EOSINOPHIL NFR BLD: 6.4 % (ref 0–8)
ERYTHROCYTE [DISTWIDTH] IN BLOOD BY AUTOMATED COUNT: 13.3 % (ref 11.5–14.5)
EST. GFR  (AFRICAN AMERICAN): >60 ML/MIN/1.73 M^2
EST. GFR  (NON AFRICAN AMERICAN): >60 ML/MIN/1.73 M^2
GLUCOSE SERPL-MCNC: 122 MG/DL (ref 70–110)
HCT VFR BLD AUTO: 33.1 % (ref 37–48.5)
HGB BLD-MCNC: 10.2 G/DL (ref 12–16)
IMM GRANULOCYTES # BLD AUTO: 0.02 K/UL (ref 0–0.04)
LYMPHOCYTES # BLD AUTO: 2.6 K/UL (ref 1–4.8)
LYMPHOCYTES NFR BLD: 44.7 % (ref 18–48)
MCH RBC QN AUTO: 28.9 PG (ref 27–31)
MCHC RBC AUTO-ENTMCNC: 30.8 G/DL (ref 32–36)
MCV RBC AUTO: 94 FL (ref 82–98)
MONOCYTES # BLD AUTO: 0.6 K/UL (ref 0.3–1)
MONOCYTES NFR BLD: 10.7 % (ref 4–15)
NEUTROPHILS # BLD AUTO: 2.2 K/UL (ref 1.8–7.7)
NEUTROPHILS NFR BLD: 37.2 % (ref 38–73)
NRBC BLD-RTO: 0 /100 WBC
PLATELET # BLD AUTO: 234 K/UL (ref 150–350)
PMV BLD AUTO: 12.2 FL (ref 9.2–12.9)
POTASSIUM SERPL-SCNC: 4.3 MMOL/L (ref 3.5–5.1)
RBC # BLD AUTO: 3.53 M/UL (ref 4–5.4)
SODIUM SERPL-SCNC: 138 MMOL/L (ref 136–145)
TROPONIN I SERPL DL<=0.01 NG/ML-MCNC: 0.02 NG/ML (ref 0–0.03)
WBC # BLD AUTO: 5.9 K/UL (ref 3.9–12.7)

## 2020-03-11 PROCEDURE — 94761 N-INVAS EAR/PLS OXIMETRY MLT: CPT | Mod: HCNC

## 2020-03-11 PROCEDURE — 99900035 HC TECH TIME PER 15 MIN (STAT): Mod: HCNC

## 2020-03-11 PROCEDURE — 84484 ASSAY OF TROPONIN QUANT: CPT | Mod: HCNC

## 2020-03-11 PROCEDURE — 25000003 PHARM REV CODE 250: Mod: HCNC | Performed by: INTERNAL MEDICINE

## 2020-03-11 PROCEDURE — 63600175 PHARM REV CODE 636 W HCPCS: Mod: HCNC | Performed by: INTERNAL MEDICINE

## 2020-03-11 PROCEDURE — 85025 COMPLETE CBC W/AUTO DIFF WBC: CPT | Mod: HCNC

## 2020-03-11 PROCEDURE — 80048 BASIC METABOLIC PNL TOTAL CA: CPT | Mod: HCNC

## 2020-03-11 PROCEDURE — 63600175 PHARM REV CODE 636 W HCPCS: Mod: HCNC | Performed by: HOSPITALIST

## 2020-03-11 PROCEDURE — 36415 COLL VENOUS BLD VENIPUNCTURE: CPT | Mod: HCNC

## 2020-03-11 PROCEDURE — 97165 OT EVAL LOW COMPLEX 30 MIN: CPT | Mod: HCNC

## 2020-03-11 PROCEDURE — 97162 PT EVAL MOD COMPLEX 30 MIN: CPT | Mod: HCNC

## 2020-03-11 PROCEDURE — 25000003 PHARM REV CODE 250: Mod: HCNC | Performed by: HOSPITALIST

## 2020-03-11 PROCEDURE — 97530 THERAPEUTIC ACTIVITIES: CPT | Mod: HCNC

## 2020-03-11 PROCEDURE — 12000002 HC ACUTE/MED SURGE SEMI-PRIVATE ROOM: Mod: HCNC

## 2020-03-11 RX ORDER — ENOXAPARIN SODIUM 100 MG/ML
1 INJECTION SUBCUTANEOUS
Status: DISCONTINUED | OUTPATIENT
Start: 2020-03-11 | End: 2020-03-12

## 2020-03-11 RX ORDER — DILTIAZEM HCL 1 MG/ML
10 INJECTION, SOLUTION INTRAVENOUS
Status: COMPLETED | OUTPATIENT
Start: 2020-03-11 | End: 2020-03-11

## 2020-03-11 RX ADMIN — AMIODARONE HYDROCHLORIDE 0.5 MG/MIN: 1.8 INJECTION, SOLUTION INTRAVENOUS at 08:03

## 2020-03-11 RX ADMIN — CEPHALEXIN 500 MG: 250 CAPSULE ORAL at 09:03

## 2020-03-11 RX ADMIN — DILTIAZEM HYDROCHLORIDE 10 MG/HR: 5 INJECTION INTRAVENOUS at 01:03

## 2020-03-11 RX ADMIN — GABAPENTIN 100 MG: 100 CAPSULE ORAL at 08:03

## 2020-03-11 RX ADMIN — GABAPENTIN 100 MG: 100 CAPSULE ORAL at 05:03

## 2020-03-11 RX ADMIN — CLOPIDOGREL BISULFATE 75 MG: 75 TABLET ORAL at 09:03

## 2020-03-11 RX ADMIN — LEVOTHYROXINE SODIUM 112 MCG: 112 TABLET ORAL at 05:03

## 2020-03-11 RX ADMIN — AMIODARONE HYDROCHLORIDE 0.5 MG/MIN: 1.8 INJECTION, SOLUTION INTRAVENOUS at 12:03

## 2020-03-11 RX ADMIN — CARVEDILOL 6.25 MG: 6.25 TABLET, FILM COATED ORAL at 09:03

## 2020-03-11 RX ADMIN — CEPHALEXIN 500 MG: 250 CAPSULE ORAL at 05:03

## 2020-03-11 RX ADMIN — PANTOPRAZOLE SODIUM 40 MG: 40 TABLET, DELAYED RELEASE ORAL at 09:03

## 2020-03-11 RX ADMIN — ASPIRIN 81 MG: 81 TABLET, COATED ORAL at 09:03

## 2020-03-11 RX ADMIN — PETROLATUM: 420 OINTMENT TOPICAL at 12:03

## 2020-03-11 RX ADMIN — ENOXAPARIN SODIUM 100 MG: 100 INJECTION SUBCUTANEOUS at 12:03

## 2020-03-11 RX ADMIN — MONTELUKAST 10 MG: 10 TABLET, FILM COATED ORAL at 08:03

## 2020-03-11 RX ADMIN — GABAPENTIN 100 MG: 100 CAPSULE ORAL at 09:03

## 2020-03-11 RX ADMIN — DILTIAZEM HYDROCHLORIDE 10 MG/HR: 5 INJECTION INTRAVENOUS at 09:03

## 2020-03-11 RX ADMIN — CEPHALEXIN 500 MG: 250 CAPSULE ORAL at 01:03

## 2020-03-11 NOTE — CONSULTS
[facility]  Cardiology  Consult Note    Patient Name: Heather Hughes  MRN: [id number]  Admission Date: 3/10/2020  Hospital Length of Stay: 1 days  Code Status: Full Code   Attending Provider: Yumiko Carter MD   Consulting Provider: Nicolas Echevarria MD  Primary Care Physician: Graham Mazariegos MD  Principal Problem:Atrial fibrillation with RVR    Patient information was obtained from patient, relative(s) and ER records.     Consults  Subjective:     Chief Complaint:  Palpitations, gum tightness    HPI:   The patient is a 79-year-old female with history of coronary artery disease status post percutaneous revascularization of the right coronary artery with a drug-eluting stent on 01/13/2017.  She has history of atrial fibrillation back in the 1990s she was evaluated by Dr. KRISTEN harris.  She has hypertension hypothyroidism,  chronic venous stasis with cellulitis of the right foot.  The coronary arteriogram perform 1 13 17 showed left main clear, 50% stenosis of the mid left anterior descending artery circumflex was clear, 99% stenosis of the proximal right treated with a drug-eluting stent.  She has been doing fine on Saturday she started taking a dietary supplement [employer]ani [employer].  On Sunday morning she noticed that her heart was palpitating and she had pain in her gums.  She took sublingual nitroglycerin with relief.  She took a total of 3 of them.  On Sunday she mention to the home health about the episodes and also that her heart was fluttering.  The nurse noticed that she was in atrial fibrillation she was brought into the emergency room.  She was initially treated with Cardizem.  She has remained asymptomatic.  She has developed a new left bundle branch block.  Her troponins level are mildly elevated.    Past Medical History:   Diagnosis Date    Allergy     Anticoagulant long-term use     Arthritis     Asthma     Coronary artery disease     stent x 1    Deep vein blood clot of right  lower extremity 1965 & 1971    Full dentures     Heart attack 01/13/2017    Hypertension     Osteoporosis     Sleep apnea     Thyroid disease     Ulcer     Wears glasses        Past Surgical History:   Procedure Laterality Date    cardic stent  01/13/2017    CHOLECYSTECTOMY      EYE SURGERY      bilateral cataracts    FRACTURE SURGERY  2011    left wrist     KNEE ARTHROPLASTY Left 8/15/2018    Procedure: ARTHROPLASTY, KNEE;  Surgeon: Shantanu Santiago MD;  Location: [facility];  Service: Orthopedics;  Laterality: Left;  ANESTHESIA GENERAL AND BLOCK    THYROIDECTOMY         Review of patient's allergies indicates:   Allergen Reactions    Bactroban [mupirocin calcium] Itching and Other (See Comments)     Red, tingling, itching, burning     Ditropan [oxybutynin chloride] Swelling     swelling    Lipitor [atorvastatin] Other (See Comments)     Fall/ balance     Myrbetriq [mirabegron] Swelling and Other (See Comments)     Legs swelling, couldn't void     Pravastatin Other (See Comments)     Balance problem     Oxycodone     Codeine Nausea Only    Mobic [meloxicam] Other (See Comments)     Not sure reaction    Perfume Other (See Comments)     And flowers cause allergy and makes her cough.    Sulfur Other (See Comments)     Was told not to take as a child       No current facility-administered medications on file prior to encounter.      Current Outpatient Medications on File Prior to Encounter   Medication Sig    acetaminophen (TYLENOL) 650 MG TbSR Take 650 mg by mouth every 8 (eight) hours as needed (pain).     albuterol 90 mcg/actuation inhaler Inhale 2 puffs into the lungs every 6 (six) hours as needed for Wheezing.    aspirin (ECOTRIN) 81 MG EC tablet Take 81 mg by mouth once daily.    CALCIUM CARBONATE/VITAMIN D3 (CALCIUM 500 + D ORAL) Take 2 tablets by mouth once daily.    carvedilol (COREG) 6.25 MG tablet TAKE 1 TABLET TWICE DAILY WITH MEALS (Patient taking differently: Take 6.25 mg by  mouth 2 (two) times daily with meals. )    cephALEXin (KEFLEX) 500 MG capsule Take 1 capsule (500 mg total) by mouth every 8 (eight) hours. for 10 days    clopidogreL (PLAVIX) 75 mg tablet Take 1 tablet (75 mg total) by mouth once daily.    fluconazole (DIFLUCAN) 150 MG Tab once.     gabapentin (NEURONTIN) 100 MG capsule Take 1 capsule (100 mg total) by mouth 3 (three) times daily.    glucosamine-chondroitin 500-400 mg tablet Take 1 tablet by mouth 3 (three) times daily.    montelukast (SINGULAIR) 10 mg tablet Take 1 tablet (10 mg total) by mouth every evening. (Patient taking differently: Take 10 mg by mouth as needed (rhinitis). )    nitroGLYCERIN (NITROSTAT) 0.4 MG SL tablet Place 1 tablet (0.4 mg total) under the tongue every 5 (five) minutes as needed for Chest pain. (Patient taking differently: Place 0.4 mg under the tongue every 5 (five) minutes as needed for Chest pain. Seek medical help if pain is not relieved by the third dose.)    omeprazole (PRILOSEC) 40 MG capsule TAKE 1 CAPSULE (40 MG TOTAL) BY MOUTH ONCE DAILY.    SYNTHROID 112 mcg tablet TAKE ONE TABLET BY MOUTH EVERY DAY BEFORE BREAKFAST (Patient taking differently: Take 112 mcg by mouth before breakfast. )     Family History     Problem Relation (Age of Onset)    Bipolar disorder Son    Bone cancer Paternal Uncle    Breast cancer Maternal Uncle    Cancer Father, Brother, Maternal Aunt, Maternal Uncle, Paternal Aunt, Paternal Uncle, Maternal Grandmother, Son    Colon cancer Maternal Uncle    Dementia Maternal Aunt, Brother    Drug abuse Son    Early death Son    Esophageal cancer Father, Brother, Son    DIEGO disease Father, Son, Brother    Heart disease Mother, Maternal Aunt, Paternal Uncle    Kidney cancer Maternal Aunt    Kidney disease Maternal Aunt    Liver cancer Maternal Aunt    Lung cancer Maternal Uncle    Lupus Mother, Daughter    Osteoporosis Mother    Parkinsonism Paternal Aunt, Paternal Grandmother    Stomach cancer Paternal  Aunt, Paternal Uncle    Ulcers Mother, Brother        Tobacco Use    Smoking status: Former Smoker     Packs/day: 0.50     Types: Cigarettes     Last attempt to quit: 1/13/2017     Years since quitting: 3.1    Smokeless tobacco: Never Used   Substance and Sexual Activity    Alcohol use: No    Drug use: No    Sexual activity: Never     Review of Systems   Constitution: Negative. Negative for fever and weight gain.   Cardiovascular: Positive for chest pain. Negative for leg swelling.   Respiratory: Negative.  Negative for cough and shortness of breath.    Skin: Positive for poor wound healing. Negative for flushing and rash.   Musculoskeletal: Negative.  Negative for back pain and muscle cramps.   Gastrointestinal: Negative.  Negative for abdominal pain and constipation.   Neurological: Positive for weakness. Negative for dizziness.   Psychiatric/Behavioral: Negative.  Negative for altered mental status and hallucinations.     Objective:     Vital Signs (Most Recent):  Temp: 96.2 °F (35.7 °C) (03/11/20 0811)  Pulse: 89 (03/11/20 0400)  Resp: 18 (03/11/20 0930)  BP: 110/70 (03/11/20 0930)  SpO2: 99 % (03/11/20 0930) Vital Signs (24h Range):  Temp:  [96.2 °F (35.7 °C)-97.8 °F (36.6 °C)] 96.2 °F (35.7 °C)  Pulse:  [] 89  Resp:  [17-20] 18  SpO2:  [94 %-99 %] 99 %  BP: ()/() 110/70     Weight: 96.2 kg (212 lb)  Body mass index is 37.55 kg/m².    SpO2: 99 %  O2 Device (Oxygen Therapy): room air      Intake/Output Summary (Last 24 hours) at 3/11/2020 1000  Last data filed at 3/10/2020 1800  Gross per 24 hour   Intake 150 ml   Output --   Net 150 ml       Lines/Drains/Airways     Peripheral Intravenous Line                 Peripheral IV - Single Lumen 03/10/20 1330 18 G Right Upper Arm less than 1 day                Physical Exam   Constitutional: She is oriented to person, place, and time. She appears well-developed and well-nourished.   HENT:   Head: Normocephalic and atraumatic.   Eyes: EOM are  normal.   Cardiovascular: Normal heart sounds. An irregular rhythm present. Tachycardia present.   Pulmonary/Chest: Effort normal and breath sounds normal.   Abdominal: Soft. Bowel sounds are normal.   Musculoskeletal:   Redness of the right leg   Neurological: She is alert and oriented to person, place, and time.   Skin: Skin is warm and dry.   Psychiatric: She has a normal mood and affect. Her behavior is normal. Judgment and thought content normal.   Nursing note and vitals reviewed.      Significant Labs:   BMP:   Recent Labs   Lab 03/10/20  1416 03/11/20  0205   GLU 99 122*    138   K 4.6 4.3    105   CO2 26 26   BUN 23 25*   CREATININE 1.0 0.9   CALCIUM 10.3 9.6   , CMP   Recent Labs   Lab 03/10/20  1416 03/11/20  0205    138   K 4.6 4.3    105   CO2 26 26   GLU 99 122*   BUN 23 25*   CREATININE 1.0 0.9   CALCIUM 10.3 9.6   PROT 6.8  --    ALBUMIN 3.8  --    BILITOT 0.3  --    ALKPHOS 93  --    AST 20  --    ALT 14  --    ANIONGAP 8 7*   ESTGFRAFRICA >60 >60   EGFRNONAA 54* >60    and CBC   Recent Labs   Lab 03/10/20  1416 03/11/20  0205   WBC 7.34 5.90   HGB 11.6* 10.2*   HCT 37.8 33.1*    234       Significant Imaging: X-Ray: CXR: X-Ray Chest 1 View (CXR): No results found for this visit on 03/10/20.    Assessment and Plan:     * Atrial fibrillation with RVR  New onset of atrial fibrillation.  Will get an echocardiogram today start amiodarone intravenously and see if she converts.  If not consider KIARA cardioversion in a.m..    Venous stasis dermatitis of right lower extremity  On cephalosporins    Essential hypertension  Stable    Coronary artery disease involving native coronary artery of native heart with unstable angina pectoris  Mildly elevated troponins.        VTE Risk Mitigation (From admission, onward)         Ordered     enoxaparin injection 40 mg  Daily      03/10/20 1654     IP VTE HIGH RISK PATIENT  Once      03/10/20 1654                Thank you for your consult.  I will follow-up with patient. Please contact us if you have any additional questions.    Nicolas Echevarria MD  Cardiology   [facility]

## 2020-03-11 NOTE — ASSESSMENT & PLAN NOTE
New onset of atrial fibrillation.  Will get an echocardiogram today start amiodarone intravenously and see if she converts.  If not consider KIARA cardioversion in a.m..

## 2020-03-11 NOTE — PLAN OF CARE
CM met with pt and family bedside to complete discharge assessment. Pt verified information on facesheet as correct. Denies POA/LW. PCP is Dr. Mazariegos. Pharm is [facility]. Pt reports living at listed address with spouse, also has her son that lives nearby for assistance/transportation when needed. Pt is active with [facility] (Disclosure signed). DME- rollator, bsc, sc, cane. Reports being modified independent with ADLs. DC plan is home with continued home health. CM following.      03/11/20 1125   Discharge Assessment   Assessment Type Discharge Planning Assessment   Confirmed/corrected address and phone number on facesheet? Yes   Assessment information obtained from? Patient   Communicated expected length of stay with patient/caregiver yes   Prior to hospitilization cognitive status: Alert/Oriented   Prior to hospitalization functional status: Independent;Assistive Equipment   Current cognitive status: Alert/Oriented   Current Functional Status: Independent;Assistive Equipment   Lives With spouse   Able to Return to Prior Arrangements yes   Is patient able to care for self after discharge? Yes   Patient's perception of discharge disposition home health   Readmission Within the Last 30 Days no previous admission in last 30 days   Patient currently being followed by outpatient case management? No   Patient currently receives any other outside agency services? No   Equipment Currently Used at Home rollator;walker, rolling;bedside commode;shower chair;cane, straight   Do you have any problems affording any of your prescribed medications? No   Is the patient taking medications as prescribed? yes   Does the patient have transportation home? Yes   Transportation Anticipated family or friend will provide   Does the patient receive services at [facility]? No   Discharge Plan A Home Health   Discharge Plan B Home   DME Needed Upon Discharge  none   Patient/Family in Agreement with Plan yes

## 2020-03-11 NOTE — PLAN OF CARE
Problem: Occupational Therapy Goal  Goal: Occupational Therapy Goal  Description  Goals to be met by: 3/25/2020     Patient will increase functional independence with ADLs by performing:    LE Dressing with Supervision with use of AD/AD as needed.  Toileting from toilet with Supervision for hygiene and clothing management.   Toilet transfer to toilet with Supervision with use of AD as needed.     Outcome: Ongoing, Progressing  OT POC initiated and established in collaboration with patient.

## 2020-03-11 NOTE — RESPIRATORY THERAPY
03/11/20 0811   Patient Assessment/Suction   Level of Consciousness (AVPU) alert   PRE-TX-O2   O2 Device (Oxygen Therapy) room air   SpO2 98 %   Pulse Oximetry Type Intermittent   $ Pulse Oximetry - Multiple Charge Pulse Oximetry - Multiple   Aerosol Therapy   $ Aerosol Therapy Charges PRN treatment not required

## 2020-03-11 NOTE — HPI
The patient is a 79-year-old female with history of coronary artery disease status post percutaneous revascularization of the right coronary artery with a drug-eluting stent on 01/13/2017.  She has history of atrial fibrillation back in the 1990s she was evaluated by Dr. KRISTEN harris.  She has hypertension hypothyroidism,  chronic venous stasis with cellulitis of the right foot.  The coronary arteriogram perform 1 13 17 showed left main clear, 50% stenosis of the mid left anterior descending artery circumflex was clear, 99% stenosis of the proximal right treated with a drug-eluting stent.  She has been doing fine on Saturday she started taking a dietary supplement Kyani Higginson.  On Sunday morning she noticed that her heart was palpitating and she had pain in her gums.  She took sublingual nitroglycerin with relief.  She took a total of 3 of them.  On Sunday she mention to the home health about the episodes and also that her heart was fluttering.  The nurse noticed that she was in atrial fibrillation she was brought into the emergency room.  She was initially treated with Cardizem.  She has remained asymptomatic.  She has developed a new left bundle branch block.  Her troponins level are mildly elevated.

## 2020-03-11 NOTE — PLAN OF CARE
Problem: Physical Therapy Goal  Goal: Physical Therapy Goal  Description  Goals to be met by: 04-     Patient will increase functional independence with mobility by performin. Supine to sit with Contact Guard Assistance  2. Sit to stand transfer with Contact Guard Assistance  3. Bed to chair transfer with Contact Guard Assistance using Rolling Walker  4. Gait  x 250 feet with Contact Guard Assistance using Rolling Walker.   5. Lower extremity exercise program x20 reps    Outcome: Ongoing, Progressing   PT eval and treat completed. Standing and few steps with RW min assist x2. Mobility limited by tachycardia. Pt to benefit from HHPT

## 2020-03-11 NOTE — PROGRESS NOTES
03/11/20 1059        Wound 03/10/20 1700 Venous Ulcer anterior Foot   Date First Assessed/Time First Assessed: 03/10/20 1700   Primary Wound Type: Venous Ulcer  Side: Right  Orientation: anterior  Location: Foot   Wound WDL WDL

## 2020-03-11 NOTE — SUBJECTIVE & OBJECTIVE
Past Medical History:   Diagnosis Date    Allergy     Anticoagulant long-term use     Arthritis     Asthma     Coronary artery disease     stent x 1    Deep vein blood clot of right lower extremity 1965 & 1971    Full dentures     Heart attack 01/13/2017    Hypertension     Osteoporosis     Sleep apnea     Thyroid disease     Ulcer     Wears glasses        Past Surgical History:   Procedure Laterality Date    cardic stent  01/13/2017    CHOLECYSTECTOMY      EYE SURGERY      bilateral cataracts    FRACTURE SURGERY  2011    left wrist     KNEE ARTHROPLASTY Left 8/15/2018    Procedure: ARTHROPLASTY, KNEE;  Surgeon: Shantanu Santiago MD;  Location: [facility];  Service: Orthopedics;  Laterality: Left;  ANESTHESIA GENERAL AND BLOCK    THYROIDECTOMY         Review of patient's allergies indicates:   Allergen Reactions    Bactroban [mupirocin calcium] Itching and Other (See Comments)     Red, tingling, itching, burning     Ditropan [oxybutynin chloride] Swelling     swelling    Lipitor [atorvastatin] Other (See Comments)     Fall/ balance     Myrbetriq [mirabegron] Swelling and Other (See Comments)     Legs swelling, couldn't void     Pravastatin Other (See Comments)     Balance problem     Oxycodone     Codeine Nausea Only    Mobic [meloxicam] Other (See Comments)     Not sure reaction    Perfume Other (See Comments)     And flowers cause allergy and makes her cough.    Sulfur Other (See Comments)     Was told not to take as a child       No current facility-administered medications on file prior to encounter.      Current Outpatient Medications on File Prior to Encounter   Medication Sig    acetaminophen (TYLENOL) 650 MG TbSR Take 650 mg by mouth every 8 (eight) hours as needed (pain).     albuterol 90 mcg/actuation inhaler Inhale 2 puffs into the lungs every 6 (six) hours as needed for Wheezing.    aspirin (ECOTRIN) 81 MG EC tablet Take 81 mg by mouth once daily.    CALCIUM  CARBONATE/VITAMIN D3 (CALCIUM 500 + D ORAL) Take 2 tablets by mouth once daily.    carvedilol (COREG) 6.25 MG tablet TAKE 1 TABLET TWICE DAILY WITH MEALS (Patient taking differently: Take 6.25 mg by mouth 2 (two) times daily with meals. )    cephALEXin (KEFLEX) 500 MG capsule Take 1 capsule (500 mg total) by mouth every 8 (eight) hours. for 10 days    clopidogreL (PLAVIX) 75 mg tablet Take 1 tablet (75 mg total) by mouth once daily.    fluconazole (DIFLUCAN) 150 MG Tab once.     gabapentin (NEURONTIN) 100 MG capsule Take 1 capsule (100 mg total) by mouth 3 (three) times daily.    glucosamine-chondroitin 500-400 mg tablet Take 1 tablet by mouth 3 (three) times daily.    montelukast (SINGULAIR) 10 mg tablet Take 1 tablet (10 mg total) by mouth every evening. (Patient taking differently: Take 10 mg by mouth as needed (rhinitis). )    nitroGLYCERIN (NITROSTAT) 0.4 MG SL tablet Place 1 tablet (0.4 mg total) under the tongue every 5 (five) minutes as needed for Chest pain. (Patient taking differently: Place 0.4 mg under the tongue every 5 (five) minutes as needed for Chest pain. Seek medical help if pain is not relieved by the third dose.)    omeprazole (PRILOSEC) 40 MG capsule TAKE 1 CAPSULE (40 MG TOTAL) BY MOUTH ONCE DAILY.    SYNTHROID 112 mcg tablet TAKE ONE TABLET BY MOUTH EVERY DAY BEFORE BREAKFAST (Patient taking differently: Take 112 mcg by mouth before breakfast. )     Family History     Problem Relation (Age of Onset)    Bipolar disorder Son    Bone cancer Paternal Uncle    Breast cancer Maternal Uncle    Cancer Father, Brother, Maternal Aunt, Maternal Uncle, Paternal Aunt, Paternal Uncle, Maternal Grandmother, Son    Colon cancer Maternal Uncle    Dementia Maternal Aunt, Brother    Drug abuse Son    Early death Son    Esophageal cancer Father, Brother, Son    DIEGO disease Father, Son, Brother    Heart disease Mother, Maternal Aunt, Paternal Uncle    Kidney cancer Maternal Aunt    Kidney disease  Maternal Aunt    Liver cancer Maternal Aunt    Lung cancer Maternal Uncle    Lupus Mother, Daughter    Osteoporosis Mother    Parkinsonism Paternal Aunt, Paternal Grandmother    Stomach cancer Paternal Aunt, Paternal Uncle    Ulcers Mother, Brother        Tobacco Use    Smoking status: Former Smoker     Packs/day: 0.50     Types: Cigarettes     Last attempt to quit: 1/13/2017     Years since quitting: 3.1    Smokeless tobacco: Never Used   Substance and Sexual Activity    Alcohol use: No    Drug use: No    Sexual activity: Never     Review of Systems   Constitution: Negative. Negative for fever and weight gain.   Cardiovascular: Positive for chest pain. Negative for leg swelling.   Respiratory: Negative.  Negative for cough and shortness of breath.    Skin: Positive for poor wound healing. Negative for flushing and rash.   Musculoskeletal: Negative.  Negative for back pain and muscle cramps.   Gastrointestinal: Negative.  Negative for abdominal pain and constipation.   Neurological: Positive for weakness. Negative for dizziness.   Psychiatric/Behavioral: Negative.  Negative for altered mental status and hallucinations.     Objective:     Vital Signs (Most Recent):  Temp: 96.2 °F (35.7 °C) (03/11/20 0811)  Pulse: 89 (03/11/20 0400)  Resp: 18 (03/11/20 0930)  BP: 110/70 (03/11/20 0930)  SpO2: 99 % (03/11/20 0930) Vital Signs (24h Range):  Temp:  [96.2 °F (35.7 °C)-97.8 °F (36.6 °C)] 96.2 °F (35.7 °C)  Pulse:  [] 89  Resp:  [17-20] 18  SpO2:  [94 %-99 %] 99 %  BP: ()/() 110/70     Weight: 96.2 kg (212 lb)  Body mass index is 37.55 kg/m².    SpO2: 99 %  O2 Device (Oxygen Therapy): room air      Intake/Output Summary (Last 24 hours) at 3/11/2020 1000  Last data filed at 3/10/2020 1800  Gross per 24 hour   Intake 150 ml   Output --   Net 150 ml       Lines/Drains/Airways     Peripheral Intravenous Line                 Peripheral IV - Single Lumen 03/10/20 1330 18 G Right Upper Arm less than 1 day                 Physical Exam   Constitutional: She is oriented to person, place, and time. She appears well-developed and well-nourished.   HENT:   Head: Normocephalic and atraumatic.   Eyes: EOM are normal.   Cardiovascular: Normal heart sounds. An irregular rhythm present. Tachycardia present.   Pulmonary/Chest: Effort normal and breath sounds normal.   Abdominal: Soft. Bowel sounds are normal.   Musculoskeletal:   Redness of the right leg   Neurological: She is alert and oriented to person, place, and time.   Skin: Skin is warm and dry.   Psychiatric: She has a normal mood and affect. Her behavior is normal. Judgment and thought content normal.   Nursing note and vitals reviewed.      Significant Labs:   BMP:   Recent Labs   Lab 03/10/20  1416 03/11/20  0205   GLU 99 122*    138   K 4.6 4.3    105   CO2 26 26   BUN 23 25*   CREATININE 1.0 0.9   CALCIUM 10.3 9.6   , CMP   Recent Labs   Lab 03/10/20  1416 03/11/20  0205    138   K 4.6 4.3    105   CO2 26 26   GLU 99 122*   BUN 23 25*   CREATININE 1.0 0.9   CALCIUM 10.3 9.6   PROT 6.8  --    ALBUMIN 3.8  --    BILITOT 0.3  --    ALKPHOS 93  --    AST 20  --    ALT 14  --    ANIONGAP 8 7*   ESTGFRAFRICA >60 >60   EGFRNONAA 54* >60    and CBC   Recent Labs   Lab 03/10/20  1416 03/11/20  0205   WBC 7.34 5.90   HGB 11.6* 10.2*   HCT 37.8 33.1*    234       Significant Imaging: X-Ray: CXR: X-Ray Chest 1 View (CXR): No results found for this visit on 03/10/20.

## 2020-03-11 NOTE — PLAN OF CARE
Pt resting with eyes closed. She reports pain 4/10 in her foot. Treated with repositioning, dressing change, and PRN pain medication. Medication given per MAR. VS and tele monitored. Quiet environment and relaxation facilitated. Bed in lowest position with wheels locked, and side rails up x2. Safety maintained. Plan of care reviewed and pt verbalized understanding. Call light in reach. No further requests at this time. Will continue to monitor.

## 2020-03-11 NOTE — PT/OT/SLP EVAL
Physical Therapy Evaluation    Patient Name:  Heather Hughes   MRN:  [id number]    Recommendations:     Discharge Recommendations:  home health PT   Discharge Equipment Recommendations: none   Barriers to discharge: None    Assessment:     Heather Hughes is a 79 y.o. female admitted with a medical diagnosis of Atrial fibrillation with RVR.  She presents with the following impairments/functional limitations:  weakness, impaired endurance, impaired self care skills, impaired functional mobilty, gait instability, decreased safety awareness, decreased lower extremity function, edema, impaired cardiopulmonary response to activity, impaired balance, impaired skin . Pt on cardiac drip with vitals monitored frequently. Nurse laquita stated OK to see pt with limited therapy. Pt with tachycardia during standing and few steps. Pt to progress therapy as indicated.    Rehab Prognosis: Fair; patient would benefit from acute skilled PT services to address these deficits and reach maximum level of function.    Recent Surgery: Procedure(s) (LRB):  Transesophageal echo (KIARA) intra-procedure log documentation (N/A)      Plan:     During this hospitalization, patient to be seen 6 x/week to address the identified rehab impairments via gait training, therapeutic activities, therapeutic exercises and progress toward the following goals:    · Plan of Care Expires:  04/10/20    Subjective   Pt stated that she has been up to bathroom earlier with nursing  Son at bedside requesting pt be allowed to don slippers vs socks due to R foot redness/edema  Pt hooked to BP/pulse ox and 2 IV  Stated daughter from [place] arrived last night and spent night here  Chief Complaint: weakness, R foot redness/edema  Patient/Family Comments/goals: get well  Pain/Comfort:  · Pain Rating 1: 0/10    Patients cultural, spiritual, Zoroastrian conflicts given the current situation:      Living Environment:  Home with spouse  Prior to admission, patients level of  function was ambulatory and functional household distance.  Equipment used at home: rollator.  DME owned (not currently used): none.  Upon discharge, patient will have assistance from family.    Objective:     Communicated with nurse Palacios prior to session.  Patient found HOB elevated with blood pressure cuff, pulse ox (continuous), peripheral IV, telemetry  upon PT entry to room.    General Precautions: Standard, fall   Orthopedic Precautions:N/A   Braces: N/A     Exams:  · Postural Exam:  Patient presented with the following abnormalities:    · -       Rounded shoulders  · -       Forward head  · RLE ROM: WFL  · RLE Strength: WFL  · LLE ROM: WFL  · LLE Strength: WFL    Functional Mobility:  · Bed Mobility:     · Rolling Right: minimum assistance  · Scooting: minimum assistance  · Supine to Sit: minimum assistance  · Sit to Supine: minimum assistance  · Transfers:     · Sit to Stand:  minimum assistance and of 2 persons with rolling walker  · Gait: side steps with min assist x2 with tachycardia      Therapeutic Activities and Exercises:   back to bed post PT and repositioned to HOB  RLE elevated on pillow due to redness/edema  Son at bedside    AM-PAC 6 CLICK MOBILITY  Total Score:15     Patient left HOB elevated with all lines intact, call button in reach, bed alarm on and maddy Palacios present.    GOALS:   Multidisciplinary Problems     Physical Therapy Goals        Problem: Physical Therapy Goal    Goal Priority Disciplines Outcome Goal Variances Interventions   Physical Therapy Goal     PT, PT/OT Ongoing, Progressing     Description:  Goals to be met by: 04-     Patient will increase functional independence with mobility by performin. Supine to sit with Contact Guard Assistance  2. Sit to stand transfer with Contact Guard Assistance  3. Bed to chair transfer with Contact Guard Assistance using Rolling Walker  4. Gait  x 250 feet with Contact Guard Assistance using Rolling Walker.   5. Lower extremity  exercise program x20 reps                     History:     Past Medical History:   Diagnosis Date    Allergy     Anticoagulant long-term use     Arthritis     Asthma     Coronary artery disease     stent x 1    Deep vein blood clot of right lower extremity 1965 & 1971    Full dentures     Heart attack 01/13/2017    Hypertension     Osteoporosis     Sleep apnea     Thyroid disease     Ulcer     Wears glasses        Past Surgical History:   Procedure Laterality Date    cardic stent  01/13/2017    CHOLECYSTECTOMY      EYE SURGERY      bilateral cataracts    FRACTURE SURGERY  2011    left wrist     KNEE ARTHROPLASTY Left 8/15/2018    Procedure: ARTHROPLASTY, KNEE;  Surgeon: Shantanu Santiago MD;  Location[facility];  Service: Orthopedics;  Laterality: Left;  ANESTHESIA GENERAL AND BLOCK    THYROIDECTOMY         Time Tracking:     PT Received On: 03/11/20  PT Start Time: 1331     PT Stop Time: 1355  PT Total Time (min): 24 min     Billable Minutes: Evaluation 10 and Therapeutic Activity 14      Valarie Tejada, PT  03/11/2020

## 2020-03-11 NOTE — PROGRESS NOTES
"[facility] Medicine  Progress Note    Patient Name: Heather Hughes  MRN: [id number]  Patient Class: IP- Inpatient   Admission Date: 3/10/2020  Length of Stay: 1 days  Attending Physician: Yumiko Carter MD  Primary Care Provider: Graham Mazariegos MD        Subjective:     Principal Problem:Atrial fibrillation with RVR        HPI:  Ms. Hughes is a 79 y.o. 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  She reports that this all started on Sunday when she began to have jaw pain which resolved after taking nitroglycerin.  Denied associated chest pain but reports that jaw pain was her symptom of her previous heart attack.  Since that time she reports increased weakness and fatigue and feels like her heart is fluttering.  Today her home health nurse came to address her venous stasis wounds for which she is currently taking Keflex for an infection of her right lower extremity which she reports has been improving and the nurse noted that her heart rate was irregular and elevated.  She presented to the ED for further evaluation.  Denies fever chills, denies nausea or vomiting.  Denies decreased p.o. intake.  Does report some increased anxiety secondary to her  being ill.  Currently on aspirin and Plavix but not on full-dose blood thinner.  EKG with AFib with RVR and possible new left bundle-branch block.  Troponin mildly elevated.  Patient is to be admitted for cardiology consult, diltiazem drip, TTE.    I discussed at length with patient and her son atrial fibrillation and have it increases risk for blood clots.  I discussed with them that I am going to give her a 1 time dose of full-dose Lovenox blood thinner tonight and will let Cardiology make the decision on further blood thinners in the morning.  Patient " reports history of GI bleed a few years ago.  States will monitor her closely for bleeding and will continue PPI.  Patient and son expressed understanding.    Overview/Hospital Course:  Patient was admitted to the hospital medicine service for new onset AFib with RVR.  Also with new left bundle-branch block in ED and mildly elevated troponin.  She was placed on a Cardizem drip and Cardiology was consulted.  TTE was performed.  Changed amiodarone drip per Cardiology with plan for scheduled CHAN if no conversion to normal sinus rhythm with amiodarone.  PT and OT consulted.  Keflex continued for lower extremity wound..    Interval History:  Patient seen and examined.  On amiodarone drip per Cardiology.  Plan for chan with cardioversion tomorrow if still in AFib.    Review of Systems   Constitutional: Positive for fatigue. Negative for chills and fever.   HENT: Negative for congestion and rhinorrhea.    Eyes: Negative for photophobia and visual disturbance.   Respiratory: Negative for cough, shortness of breath and wheezing.    Cardiovascular: Positive for palpitations. Negative for chest pain and leg swelling.   Gastrointestinal: Negative for abdominal distention, abdominal pain, nausea and vomiting.   Endocrine: Negative for cold intolerance and heat intolerance.   Genitourinary: Negative for dysuria and urgency.   Musculoskeletal: Positive for neck pain (On Sunday). Negative for arthralgias and neck stiffness.   Skin: Positive for wound (Right lower extremity, improving on outpatient antibiotics). Negative for rash.   Neurological: Positive for weakness. Negative for dizziness.   Psychiatric/Behavioral: Negative for agitation and confusion. The patient is nervous/anxious.      Objective:     Vital Signs (Most Recent):  Temp: 97.1 °F (36.2 °C) (03/11/20 1552)  Pulse: 69 (03/11/20 1552)  Resp: 18 (03/11/20 1552)  BP: 122/65 (03/11/20 1552)  SpO2: 96 % (03/11/20 1552) Vital Signs (24h Range):  Temp:  [96.2 °F (35.7  °C)-97.1 °F (36.2 °C)] 97.1 °F (36.2 °C)  Pulse:  [] 69  Resp:  [17-20] 18  SpO2:  [94 %-99 %] 96 %  BP: ()/(61-90) 122/65     Weight: 96.2 kg (212 lb)  Body mass index is 37.55 kg/m².    Intake/Output Summary (Last 24 hours) at 3/11/2020 1559  Last data filed at 3/10/2020 1800  Gross per 24 hour   Intake 150 ml   Output --   Net 150 ml      Physical Exam   Constitutional: She is oriented to person, place, and time. She appears well-developed and well-nourished. No distress.   HENT:   Head: Normocephalic and atraumatic.   Eyes: Pupils are equal, round, and reactive to light. EOM are normal.   Neck: Normal range of motion. Neck supple. No thyromegaly present.   Cardiovascular:   No murmur heard.  Irregularly irregular rhythm, tachycardic   Pulmonary/Chest: Effort normal and breath sounds normal. No respiratory distress. She has no wheezes. She has no rales.   Abdominal: Soft. Bowel sounds are normal. She exhibits no distension. There is no tenderness.   Musculoskeletal: Normal range of motion. She exhibits no edema.   Neurological: She is alert and oriented to person, place, and time. No cranial nerve deficit.   Skin: Skin is warm and dry. Capillary refill takes less than 2 seconds. No rash noted. There is erythema (Erythema of right lower extremity surrounding entire ankle.).   Psychiatric: She has a normal mood and affect. Her behavior is normal.       Significant Labs: All pertinent labs within the past 24 hours have been reviewed.    Significant Imaging: I have reviewed and interpreted all pertinent imaging results/findings within the past 24 hours.      Assessment/Plan:      * Atrial fibrillation with RVR  Acute/uncontrolled.  Unsure of time frame as this could have been going on for days per patient report.  Defer anticoagulation to Cardiology.  TTE pending.  Amiodarone drip per Cardiology, plan for possible KIARA tomorrow    Venous stasis dermatitis of right lower extremity  Patient currently on  outpatient Keflex with improvement in right lower extremity appearance symptoms.  Continue Keflex.      Severe obesity (BMI 35.0-39.9) with comorbidity, today 37.9  Body mass index is 37.55 kg/m². Morbid obesity complicates all aspects of disease management from diagnostic modalities to treatment. Weight loss encouraged and health benefits explained to patient.        Acquired hypothyroidism  Patient has chronic hypothyroidism. TFTs reviewed-   Lab Results   Component Value Date    TSH 0.578 04/22/2019   . Will continue chronic levothyroxine and adjust for and clinical changes.      History of deep venous thrombosis (DVT) of distal vein of right lower extremity, 1965 and 1971  Noted      Essential hypertension  Continue home medications.  Diltiazem drip changed amiodarone per Cardiology.    Coronary artery disease involving native coronary artery of native heart with unstable angina pectoris  Chronic.  Continue aspirin and Plavix.  Monitor on telemetry.  Trend troponins.  Cardiology following.  Troponins have normalized    Moderate persistent asthma without complication  Chronic/Controlled. Continue home medications.          VTE Risk Mitigation (From admission, onward)         Ordered     enoxaparin injection 100 mg  Every 12 hours (non-standard times)      03/11/20 1053     IP VTE HIGH RISK PATIENT  Once      03/10/20 1654                      Yumiko Carter MD  Department of Hospital Medicine   [facility]

## 2020-03-11 NOTE — PT/OT/SLP EVAL
Occupational Therapy   Evaluation    Name: Heather Hughes  MRN: [id number]  Admitting Diagnosis:  Atrial fibrillation with RVR      Recommendations:     Discharge Recommendations: home, home health OT  Discharge Equipment Recommendations:  none  Barriers to discharge:  None    Assessment:     Heather Hughes is a 79 y.o. female with a medical diagnosis of Atrial fibrillation with RVR.  She presents with limited OOB activity at time of evaluation due to elevated HR - per nurse, Suzanne, no OOB activity at this time. Patient does report that she had been OOB twice this morning with assistance and use of walker to go to the bathroom. Performance deficits affecting function: weakness, impaired endurance, impaired self care skills, impaired functional mobilty, decreased lower extremity function. Patient will have assistance of spouse and son upon discharge.    Rehab Prognosis: Good; patient would benefit from acute skilled OT services to address these deficits and reach maximum level of function.       Plan:     Patient to be seen 3 x/week to address the above listed problems via self-care/home management, therapeutic activities, therapeutic exercises  · Plan of Care Expires: 03/25/20  · Plan of Care Reviewed with: patient, son    Subjective     Chief Complaint: None  Patient/Family Comments/goals: To be able to go home soon     Occupational Profile:  Living Environment: Patient lives with spouse in single story home with threshold step to enter only with walk-in shower   Previous level of function: Mod (I) to Supervision for ADL using rollator for mobility, shower chair, and raised toilet seat  Roles and Routines: Patient and her spouse are both still driving short distances to the grocery, [employer], etc but when pt/spouse have MD appointments in [place] or elsewhere, patient's son provides transportation   Equipment Used at Home:  bedside commode, cane, straight, rollator, raised toilet, shower chair, bath bench,  walker, rolling, wheelchair, other (see comments)(pt reports that she owns all of these devices but only uses rollator, raised toilet and shower chair on a daily basis)  Assistance upon Discharge: Spouse; adult son (who is present at [facility]) reports he is available 24/7 to assist as well    Pain/Comfort:  · Pain Rating 1: 0/10(no pain reported or indicated)    Patients cultural, spiritual, Worship conflicts given the current situation:      Objective:     Communicated with: Nurse prior to and Suzanne present at end of session.  Patient found HOB elevated with bed alarm, peripheral IV, telemetry(son present) upon OT entry to room.    General Precautions: Standard, fall   Orthopedic Precautions:    Braces: N/A     Occupational Performance:    Functional Mobility/Transfers:  · N/T - pt not cleared for EOB or OOB activity at this time    Activities of Daily Living:  · Feeding:  independence after Set-up of meal tray  · Grooming: Independent after Set-up of meal tray    · Lower Body Dressing: minimum assistance and moderate assistance from bed level    Cognitive/Visual Perceptual:  Cognitive/Psychosocial Skills:     -       Oriented to: Person, Place and Situation   -       Safety awareness/insight to disability: intact     Physical Exam:  Upper Extremity Strength:    -       Right Upper Extremity: WFL  -       Left Upper Extremity: WFL  Fine Motor Coordination:    -       Intact    AMPAC 6 Click ADL:  AMPAC Total Score: 19    Treatment & Education:  - OTR providing education/instruction regarding OT role/POC, safety awareness, use of call button and use of bed/chair alarms; patient and patient's son verbalize/demonstrate understanding and in agreement when appropriate.   Education:    Patient left HOB elevated with all lines intact, call button in reach, bed alarm on, nurse notified and nurse and patient's son present    GOALS:   Multidisciplinary Problems     Occupational Therapy Goals        Problem: Occupational  Therapy Goal    Goal Priority Disciplines Outcome Interventions   Occupational Therapy Goal     OT, PT/OT Ongoing, Progressing    Description:  Goals to be met by: 3/25/2020     Patient will increase functional independence with ADLs by performing:    LE Dressing with Supervision with use of AD/AD as needed.  Toileting from toilet with Supervision for hygiene and clothing management.   Toilet transfer to toilet with Supervision with use of AD as needed.                      History:     Past Medical History:   Diagnosis Date    Allergy     Anticoagulant long-term use     Arthritis     Asthma     Coronary artery disease     stent x 1    Deep vein blood clot of right lower extremity 1965 & 1971    Full dentures     Heart attack 01/13/2017    Hypertension     Osteoporosis     Sleep apnea     Thyroid disease     Ulcer     Wears glasses        Past Surgical History:   Procedure Laterality Date    cardic stent  01/13/2017    CHOLECYSTECTOMY      EYE SURGERY      bilateral cataracts    FRACTURE SURGERY  2011    left wrist     KNEE ARTHROPLASTY Left 8/15/2018    Procedure: ARTHROPLASTY, KNEE;  Surgeon: Shantanu Santiago MD;  [facility]: [facility];  Service: Orthopedics;  Laterality: Left;  ANESTHESIA GENERAL AND BLOCK    THYROIDECTOMY         Time Tracking:     OT Date of Treatment: 03/11/20  OT Start Time: 1141  OT Stop Time: 1153  OT Total Time (min): 12 min    Billable Minutes:Evaluation 12    BRENDAN Sanchez, HEDY  3/11/2020

## 2020-03-11 NOTE — SUBJECTIVE & OBJECTIVE
Interval History:  Patient seen and examined.  On amiodarone drip per Cardiology.  Plan for chan with cardioversion tomorrow if still in AFib.    Review of Systems   Constitutional: Positive for fatigue. Negative for chills and fever.   HENT: Negative for congestion and rhinorrhea.    Eyes: Negative for photophobia and visual disturbance.   Respiratory: Negative for cough, shortness of breath and wheezing.    Cardiovascular: Positive for palpitations. Negative for chest pain and leg swelling.   Gastrointestinal: Negative for abdominal distention, abdominal pain, nausea and vomiting.   Endocrine: Negative for cold intolerance and heat intolerance.   Genitourinary: Negative for dysuria and urgency.   Musculoskeletal: Positive for neck pain (On Sunday). Negative for arthralgias and neck stiffness.   Skin: Positive for wound (Right lower extremity, improving on outpatient antibiotics). Negative for rash.   Neurological: Positive for weakness. Negative for dizziness.   Psychiatric/Behavioral: Negative for agitation and confusion. The patient is nervous/anxious.      Objective:     Vital Signs (Most Recent):  Temp: 97.1 °F (36.2 °C) (03/11/20 1552)  Pulse: 69 (03/11/20 1552)  Resp: 18 (03/11/20 1552)  BP: 122/65 (03/11/20 1552)  SpO2: 96 % (03/11/20 1552) Vital Signs (24h Range):  Temp:  [96.2 °F (35.7 °C)-97.1 °F (36.2 °C)] 97.1 °F (36.2 °C)  Pulse:  [] 69  Resp:  [17-20] 18  SpO2:  [94 %-99 %] 96 %  BP: ()/(61-90) 122/65     Weight: 96.2 kg (212 lb)  Body mass index is 37.55 kg/m².    Intake/Output Summary (Last 24 hours) at 3/11/2020 1559  Last data filed at 3/10/2020 1800  Gross per 24 hour   Intake 150 ml   Output --   Net 150 ml      Physical Exam   Constitutional: She is oriented to person, place, and time. She appears well-developed and well-nourished. No distress.   HENT:   Head: Normocephalic and atraumatic.   Eyes: Pupils are equal, round, and reactive to light. EOM are normal.   Neck: Normal range  of motion. Neck supple. No thyromegaly present.   Cardiovascular:   No murmur heard.  Irregularly irregular rhythm, tachycardic   Pulmonary/Chest: Effort normal and breath sounds normal. No respiratory distress. She has no wheezes. She has no rales.   Abdominal: Soft. Bowel sounds are normal. She exhibits no distension. There is no tenderness.   Musculoskeletal: Normal range of motion. She exhibits no edema.   Neurological: She is alert and oriented to person, place, and time. No cranial nerve deficit.   Skin: Skin is warm and dry. Capillary refill takes less than 2 seconds. No rash noted. There is erythema (Erythema of right lower extremity surrounding entire ankle.).   Psychiatric: She has a normal mood and affect. Her behavior is normal.       Significant Labs: All pertinent labs within the past 24 hours have been reviewed.    Significant Imaging: I have reviewed and interpreted all pertinent imaging results/findings within the past 24 hours.

## 2020-03-11 NOTE — HOSPITAL COURSE
Patient was admitted to the hospital medicine service for new onset AFib with RVR.  Also with new left bundle-branch block in ED and mildly elevated troponin.  She was placed on a Cardizem drip and Cardiology was consulted.  TTE was performed.  Changed to amiodarone drip per Cardiology with plan for scheduled KIARA if no conversion to normal sinus rhythm with amiodarone.  PT and OT consulted. Outpatient Keflex continued for lower extremity wound..  KIARA with cardioversion was performed on 03/12.  Patient was transitioned to p.o. amiodarone after being on amiodarone drip for 24 hr.  She was discharged home on p.o. amiodarone 200 mg t.i.d. x5 days then 200 mg b.i.d. until seen in clinic with Dr. Godfrey per recommendations of Dr. Echevarria.  She was started on Eliquis 5 mg b.i.d. on discharge.  Anticoagulation and fall/bleeding precautions explained to patient prior to discharge.  Discharge plans discussed with Dr. Echevarria on day of discharge.    Discharge exam  Awake, alert, no apparent distress  Regular rate and rhythm no murmur  Lungs clear to auscultation no wheeze  Abdomen soft nontender nondistended  RLE bandage c/d/i

## 2020-03-12 ENCOUNTER — ANESTHESIA (OUTPATIENT)
Dept: CARDIOLOGY | Facility: HOSPITAL | Age: 80
DRG: 309 | End: 2020-03-12
Payer: MEDICARE

## 2020-03-12 ENCOUNTER — EXTERNAL HOME HEALTH (OUTPATIENT)
Dept: HOME HEALTH SERVICES | Facility: HOSPITAL | Age: 80
End: 2020-03-12
Payer: MEDICARE

## 2020-03-12 VITALS
BODY MASS INDEX: 37.56 KG/M2 | TEMPERATURE: 98 F | DIASTOLIC BLOOD PRESSURE: 65 MMHG | HEART RATE: 70 BPM | SYSTOLIC BLOOD PRESSURE: 143 MMHG | WEIGHT: 212 LBS | RESPIRATION RATE: 18 BRPM | HEIGHT: 63 IN | OXYGEN SATURATION: 99 %

## 2020-03-12 LAB
ANION GAP SERPL CALC-SCNC: 4 MMOL/L (ref 8–16)
AORTIC ROOT ANNULUS: 2.66 CM
AORTIC VALVE CUSP SEPERATION: 1.43 CM
ASCENDING AORTA: 2.89 CM
AV INDEX (PROSTH): 1.13
AV MEAN GRADIENT: 3 MMHG
AV PEAK GRADIENT: 5 MMHG
AV VALVE AREA: 3.64 CM2
AV VELOCITY RATIO: 0.96
BASOPHILS # BLD AUTO: 0.04 K/UL (ref 0–0.2)
BASOPHILS NFR BLD: 0.6 % (ref 0–1.9)
BSA FOR ECHO PROCEDURE: 2.07 M2
BSA FOR ECHO PROCEDURE: 2.07 M2
BUN SERPL-MCNC: 14 MG/DL (ref 8–23)
CALCIUM SERPL-MCNC: 9.4 MG/DL (ref 8.7–10.5)
CHLORIDE SERPL-SCNC: 104 MMOL/L (ref 95–110)
CO2 SERPL-SCNC: 30 MMOL/L (ref 23–29)
CREAT SERPL-MCNC: 0.8 MG/DL (ref 0.5–1.4)
CV ECHO LV RWT: 0.64 CM
DIFFERENTIAL METHOD: ABNORMAL
DOP CALC AO PEAK VEL: 1.12 M/S
DOP CALC AO VTI: 14.65 CM
DOP CALC LVOT AREA: 3.2 CM2
DOP CALC LVOT DIAMETER: 2.03 CM
DOP CALC LVOT PEAK VEL: 1.08 M/S
DOP CALC LVOT STROKE VOLUME: 53.34 CM3
DOP CALCLVOT PEAK VEL VTI: 16.49 CM
E WAVE DECELERATION TIME: 134.76 MSEC
E/E' RATIO: 15.89 M/S
ECHO LV POSTERIOR WALL: 1.1 CM (ref 0.6–1.1)
EOSINOPHIL # BLD AUTO: 0.3 K/UL (ref 0–0.5)
EOSINOPHIL NFR BLD: 4.2 % (ref 0–8)
ERYTHROCYTE [DISTWIDTH] IN BLOOD BY AUTOMATED COUNT: 13.5 % (ref 11.5–14.5)
EST. GFR  (AFRICAN AMERICAN): >60 ML/MIN/1.73 M^2
EST. GFR  (NON AFRICAN AMERICAN): >60 ML/MIN/1.73 M^2
FRACTIONAL SHORTENING: 26 % (ref 28–44)
GLUCOSE SERPL-MCNC: 99 MG/DL (ref 70–110)
HCT VFR BLD AUTO: 34.4 % (ref 37–48.5)
HGB BLD-MCNC: 10.6 G/DL (ref 12–16)
IMM GRANULOCYTES # BLD AUTO: 0.02 K/UL (ref 0–0.04)
INTERVENTRICULAR SEPTUM: 1.13 CM (ref 0.6–1.1)
IVRT: 85.63 MSEC
LA MAJOR: 5.65 CM
LA MINOR: 5.63 CM
LA WIDTH: 3.92 CM
LEFT ATRIUM SIZE: 3.89 CM
LEFT ATRIUM VOLUME INDEX: 36.9 ML/M2
LEFT ATRIUM VOLUME: 73.1 CM3
LEFT INTERNAL DIMENSION IN SYSTOLE: 2.54 CM (ref 2.1–4)
LEFT VENTRICLE DIASTOLIC VOLUME INDEX: 24.83 ML/M2
LEFT VENTRICLE DIASTOLIC VOLUME: 49.22 ML
LEFT VENTRICLE MASS INDEX: 60 G/M2
LEFT VENTRICLE SYSTOLIC VOLUME INDEX: 11.7 ML/M2
LEFT VENTRICLE SYSTOLIC VOLUME: 23.13 ML
LEFT VENTRICULAR INTERNAL DIMENSION IN DIASTOLE: 3.45 CM (ref 3.5–6)
LEFT VENTRICULAR MASS: 118.9 G
LV LATERAL E/E' RATIO: 13 M/S
LV SEPTAL E/E' RATIO: 20.43 M/S
LYMPHOCYTES # BLD AUTO: 2 K/UL (ref 1–4.8)
LYMPHOCYTES NFR BLD: 30.4 % (ref 18–48)
MAGNESIUM SERPL-MCNC: 2.1 MG/DL (ref 1.6–2.6)
MCH RBC QN AUTO: 28.9 PG (ref 27–31)
MCHC RBC AUTO-ENTMCNC: 30.8 G/DL (ref 32–36)
MCV RBC AUTO: 94 FL (ref 82–98)
MONOCYTES # BLD AUTO: 0.6 K/UL (ref 0.3–1)
MONOCYTES NFR BLD: 9.4 % (ref 4–15)
MV MEAN GRADIENT: 4 MMHG
MV PEAK E VEL: 1.43 M/S
MV STENOSIS PRESSURE HALF TIME: 71 MS
MV VALVE AREA P 1/2 METHOD: 3.1 CM2
NEUTROPHILS # BLD AUTO: 3.7 K/UL (ref 1.8–7.7)
NEUTROPHILS NFR BLD: 55.1 % (ref 38–73)
NRBC BLD-RTO: 0 /100 WBC
PISA TR MAX VEL: 3.09 M/S
PLATELET # BLD AUTO: 224 K/UL (ref 150–350)
PMV BLD AUTO: 12.3 FL (ref 9.2–12.9)
POTASSIUM SERPL-SCNC: 4.4 MMOL/L (ref 3.5–5.1)
PV PEAK VELOCITY: 0.88 CM/S
RA MAJOR: 4.87 CM
RA PRESSURE: 3 MMHG
RA WIDTH: 3.44 CM
RBC # BLD AUTO: 3.67 M/UL (ref 4–5.4)
RIGHT VENTRICULAR END-DIASTOLIC DIMENSION: 2.98 CM
SINUS: 2.67 CM
SODIUM SERPL-SCNC: 138 MMOL/L (ref 136–145)
STJ: 2.74 CM
TDI LATERAL: 0.11 M/S
TDI SEPTAL: 0.07 M/S
TDI: 0.09 M/S
TR MAX PG: 38 MMHG
TRICUSPID ANNULAR PLANE SYSTOLIC EXCURSION: 2.42 CM
TV REST PULMONARY ARTERY PRESSURE: 41 MMHG
WBC # BLD AUTO: 6.72 K/UL (ref 3.9–12.7)

## 2020-03-12 PROCEDURE — D9220A PRA ANESTHESIA: ICD-10-PCS | Mod: HCNC,ANES,, | Performed by: ANESTHESIOLOGY

## 2020-03-12 PROCEDURE — D9220A PRA ANESTHESIA: ICD-10-PCS | Mod: HCNC,CRNA,, | Performed by: NURSE ANESTHETIST, CERTIFIED REGISTERED

## 2020-03-12 PROCEDURE — 63600175 PHARM REV CODE 636 W HCPCS: Mod: HCNC | Performed by: NURSE ANESTHETIST, CERTIFIED REGISTERED

## 2020-03-12 PROCEDURE — D9220A PRA ANESTHESIA: Mod: HCNC,ANES,, | Performed by: ANESTHESIOLOGY

## 2020-03-12 PROCEDURE — 37000008 HC ANESTHESIA 1ST 15 MINUTES: Mod: HCNC | Performed by: INTERNAL MEDICINE

## 2020-03-12 PROCEDURE — 63600175 PHARM REV CODE 636 W HCPCS: Mod: HCNC | Performed by: INTERNAL MEDICINE

## 2020-03-12 PROCEDURE — 80048 BASIC METABOLIC PNL TOTAL CA: CPT | Mod: HCNC

## 2020-03-12 PROCEDURE — 92960 CARDIOVERSION ELECTRIC EXT: CPT | Mod: HCNC | Performed by: INTERNAL MEDICINE

## 2020-03-12 PROCEDURE — 37000009 HC ANESTHESIA EA ADD 15 MINS: Mod: HCNC | Performed by: INTERNAL MEDICINE

## 2020-03-12 PROCEDURE — 85025 COMPLETE CBC W/AUTO DIFF WBC: CPT | Mod: HCNC

## 2020-03-12 PROCEDURE — 25000003 PHARM REV CODE 250: Mod: HCNC | Performed by: HOSPITALIST

## 2020-03-12 PROCEDURE — 36415 COLL VENOUS BLD VENIPUNCTURE: CPT | Mod: HCNC

## 2020-03-12 PROCEDURE — 25000003 PHARM REV CODE 250: Mod: HCNC | Performed by: ANESTHESIOLOGY

## 2020-03-12 PROCEDURE — D9220A PRA ANESTHESIA: Mod: HCNC,CRNA,, | Performed by: NURSE ANESTHETIST, CERTIFIED REGISTERED

## 2020-03-12 PROCEDURE — 63600175 PHARM REV CODE 636 W HCPCS: Mod: HCNC | Performed by: ANESTHESIOLOGY

## 2020-03-12 PROCEDURE — 83735 ASSAY OF MAGNESIUM: CPT | Mod: HCNC

## 2020-03-12 PROCEDURE — 97116 GAIT TRAINING THERAPY: CPT | Mod: HCNC

## 2020-03-12 PROCEDURE — 92960 CARDIOVERSION ELECTRIC EXT: CPT | Mod: HCNC

## 2020-03-12 PROCEDURE — 99900035 HC TECH TIME PER 15 MIN (STAT): Mod: HCNC

## 2020-03-12 RX ORDER — AMIODARONE HYDROCHLORIDE 200 MG/1
200 TABLET ORAL 3 TIMES DAILY
Status: DISCONTINUED | OUTPATIENT
Start: 2020-03-12 | End: 2020-03-12

## 2020-03-12 RX ORDER — AMIODARONE HYDROCHLORIDE 200 MG/1
200 TABLET ORAL 3 TIMES DAILY
Status: DISCONTINUED | OUTPATIENT
Start: 2020-03-12 | End: 2020-03-12 | Stop reason: HOSPADM

## 2020-03-12 RX ORDER — SODIUM CHLORIDE, SODIUM LACTATE, POTASSIUM CHLORIDE, CALCIUM CHLORIDE 600; 310; 30; 20 MG/100ML; MG/100ML; MG/100ML; MG/100ML
INJECTION, SOLUTION INTRAVENOUS CONTINUOUS
Status: DISCONTINUED | OUTPATIENT
Start: 2020-03-12 | End: 2020-03-12 | Stop reason: HOSPADM

## 2020-03-12 RX ORDER — AMIODARONE HYDROCHLORIDE 200 MG/1
TABLET ORAL
Qty: 65 TABLET | Refills: 0 | Status: SHIPPED | OUTPATIENT
Start: 2020-03-12 | End: 2020-04-11

## 2020-03-12 RX ORDER — SODIUM CHLORIDE 9 MG/ML
INJECTION, SOLUTION INTRAVENOUS CONTINUOUS PRN
Status: DISCONTINUED | OUTPATIENT
Start: 2020-03-12 | End: 2020-03-12

## 2020-03-12 RX ORDER — LIDOCAINE HYDROCHLORIDE 10 MG/ML
1 INJECTION, SOLUTION EPIDURAL; INFILTRATION; INTRACAUDAL; PERINEURAL ONCE
Status: COMPLETED | OUTPATIENT
Start: 2020-03-12 | End: 2020-03-12

## 2020-03-12 RX ORDER — LIDOCAINE HYDROCHLORIDE 20 MG/ML
INJECTION INTRAVENOUS
Status: DISCONTINUED | OUTPATIENT
Start: 2020-03-12 | End: 2020-03-12

## 2020-03-12 RX ORDER — PROPOFOL 10 MG/ML
VIAL (ML) INTRAVENOUS
Status: DISCONTINUED | OUTPATIENT
Start: 2020-03-12 | End: 2020-03-12

## 2020-03-12 RX ADMIN — AMIODARONE HYDROCHLORIDE 0.5 MG/MIN: 1.8 INJECTION, SOLUTION INTRAVENOUS at 10:03

## 2020-03-12 RX ADMIN — ASPIRIN 81 MG: 81 TABLET, COATED ORAL at 10:03

## 2020-03-12 RX ADMIN — PROPOFOL 20 MG: 10 INJECTION, EMULSION INTRAVENOUS at 08:03

## 2020-03-12 RX ADMIN — CEPHALEXIN 500 MG: 250 CAPSULE ORAL at 12:03

## 2020-03-12 RX ADMIN — APIXABAN 5 MG: 2.5 TABLET, FILM COATED ORAL at 12:03

## 2020-03-12 RX ADMIN — GABAPENTIN 100 MG: 100 CAPSULE ORAL at 10:03

## 2020-03-12 RX ADMIN — PROPOFOL 60 MG: 10 INJECTION, EMULSION INTRAVENOUS at 08:03

## 2020-03-12 RX ADMIN — LEVOTHYROXINE SODIUM 112 MCG: 112 TABLET ORAL at 10:03

## 2020-03-12 RX ADMIN — CEPHALEXIN 500 MG: 250 CAPSULE ORAL at 10:03

## 2020-03-12 RX ADMIN — SODIUM CHLORIDE: 0.9 INJECTION, SOLUTION INTRAVENOUS at 07:03

## 2020-03-12 RX ADMIN — SODIUM CHLORIDE, SODIUM GLUCONATE, SODIUM ACETATE, POTASSIUM CHLORIDE, MAGNESIUM CHLORIDE, SODIUM PHOSPHATE, DIBASIC, AND POTASSIUM PHOSPHATE: .53; .5; .37; .037; .03; .012; .00082 INJECTION, SOLUTION INTRAVENOUS at 08:03

## 2020-03-12 RX ADMIN — LIDOCAINE HYDROCHLORIDE 50 MG: 20 INJECTION, SOLUTION INTRAVENOUS at 08:03

## 2020-03-12 RX ADMIN — AMIODARONE HYDROCHLORIDE 200 MG: 200 TABLET ORAL at 12:03

## 2020-03-12 RX ADMIN — CLOPIDOGREL BISULFATE 75 MG: 75 TABLET ORAL at 10:03

## 2020-03-12 RX ADMIN — LIDOCAINE HYDROCHLORIDE 10 MG: 10 INJECTION, SOLUTION EPIDURAL; INFILTRATION; INTRACAUDAL; PERINEURAL at 08:03

## 2020-03-12 RX ADMIN — PANTOPRAZOLE SODIUM 40 MG: 40 TABLET, DELAYED RELEASE ORAL at 10:03

## 2020-03-12 RX ADMIN — SODIUM CHLORIDE, SODIUM LACTATE, POTASSIUM CHLORIDE, AND CALCIUM CHLORIDE: .6; .31; .03; .02 INJECTION, SOLUTION INTRAVENOUS at 08:03

## 2020-03-12 RX ADMIN — GABAPENTIN 100 MG: 100 CAPSULE ORAL at 12:03

## 2020-03-12 RX ADMIN — ENOXAPARIN SODIUM 100 MG: 100 INJECTION SUBCUTANEOUS at 12:03

## 2020-03-12 NOTE — NURSING
Discharge instructions given to patient and daughter.  Instructed on med regimen and f/u appoint.  They v/u.  Iv and tele removed per policy, cath intact.  Discharged to home in NAD.  Escorted down to vehicle by staff.

## 2020-03-12 NOTE — PT/OT/SLP PROGRESS
Physical Therapy Treatment    Patient Name:  Heather Hughes   MRN:  [id number]    Recommendations:     Discharge Recommendations:  home health PT   Discharge Equipment Recommendations: none   Barriers to discharge: None    Assessment:     Heather Hughes is a 79 y.o. female admitted with a medical diagnosis of Atrial fibrillation with RVR.  She presents with the following impairments/functional limitations:  weakness, impaired endurance, impaired functional mobilty, gait instability, impaired cardiopulmonary response to activity . Pt had KIARA this am- currently awake and ready to get OOB. Pt tolerated use of bathroom to void and short distance ambulation in room and up in chair. Pt to benefit from HHPT.    Rehab Prognosis: Fair; patient would benefit from acute skilled PT services to address these deficits and reach maximum level of function.    Recent Surgery: Procedure(s) (LRB):  Transesophageal echo (KIARA) intra-procedure log documentation (N/A)  Cardioversion (Left) Day of Surgery    Plan:     During this hospitalization, patient to be seen 6 x/week to address the identified rehab impairments via gait training, therapeutic activities, therapeutic exercises and progress toward the following goals:    · Plan of Care Expires:  04/10/20    Subjective   Stated has HHPT and will resume that   Wanting to pee  Chief Complaint: weakness  Patient/Family Comments/goals: get well  Pain/Comfort:  · Pain Rating 1: 0/10      Objective:     Communicated with nurse Reid prior to session.  Patient found HOB elevated with telemetry, peripheral IV upon PT entry to room.     General Precautions: Standard, fall   Orthopedic Precautions:N/A   Braces: N/A     Functional Mobility:  · Bed Mobility:     · Rolling Right: supervision  · Scooting: contact guard assistance  · Supine to Sit: contact guard assistance  · Transfers:     · Sit to Stand:  contact guard assistance with rolling walker  · Bed to Chair: contact guard  assistance with  rolling walker  using  Stand Pivot  · Toilet Transfer: contact guard assistance with  grab bars  using  Stand Pivot  · Gait: 20ft in room with RW and IV pole      AM-PAC 6 CLICK MOBILITY          Therapeutic Activities and Exercises:   OOB to chair- education on thera ex with AP,LAQ,marches and GS    Patient left up in chair with all lines intact, call button in reach and CNA Kamilla present..    GOALS:   Multidisciplinary Problems     Physical Therapy Goals     Not on file          Multidisciplinary Problems (Resolved)        Problem: Physical Therapy Goal    Goal Priority Disciplines Outcome Goal Variances Interventions   Physical Therapy Goal   (Resolved)     PT, PT/OT Met     Description:  Goals to be met by: 04-     Patient will increase functional independence with mobility by performin. Supine to sit with Contact Guard Assistance  2. Sit to stand transfer with Contact Guard Assistance  3. Bed to chair transfer with Contact Guard Assistance using Rolling Walker  4. Gait  x 250 feet with Contact Guard Assistance using Rolling Walker.   5. Lower extremity exercise program x20 reps                     Time Tracking:     PT Received On: 20  PT Start Time: 1147     PT Stop Time: 1207  PT Total Time (min): 20 min     Billable Minutes: Gait Training 20    Treatment Type: Treatment  PT/PTA: PT     PTA Visit Number: 0     Valarie Tejada, PT  2020

## 2020-03-12 NOTE — PROGRESS NOTES
Recovery phase completed.  VSS no problems noted.  Report called to Jeanette and pt transported back to room.

## 2020-03-12 NOTE — PLAN OF CARE
I confirmed with [facility] at [phone] that they received my fax and the pt is accepted. They confirmed that the pt is discharging home today. Pts discharge destination booked. Neena Jacob LCSW     03/12/20 1229   Post-Acute Status   Post-Acute Authorization Home Health/Hospice   Home Health/Hospice Status Set-up Complete

## 2020-03-12 NOTE — PROGRESS NOTES
Transport patient via bed to Cardiology procedure room.  Time out performed and procedure explained to patient.  Consent obtained and pt set up for procedure.

## 2020-03-12 NOTE — PLAN OF CARE
03/12/20 1548   Final Note   Assessment Type Final Discharge Note   Anticipated Discharge Disposition Home-Health   Hospital Follow Up  Appt(s) scheduled? Yes

## 2020-03-12 NOTE — PROGRESS NOTES
Dr Rascon at bedside to perform test Sedation performed by anesthesia.  Pt tolerates procedure well.

## 2020-03-12 NOTE — ANESTHESIA POSTPROCEDURE EVALUATION
Anesthesia Post Evaluation    Patient: Heather Hughes    Procedure(s) Performed: Procedure(s) (LRB):  Transesophageal echo (KIARA) intra-procedure log documentation (N/A)  Cardioversion (Left)    Final Anesthesia Type: general    Patient location during evaluation: PACU  Patient participation: Yes- Able to Participate  Level of consciousness: awake and alert and oriented  Post-procedure vital signs: reviewed and stable  Pain management: adequate  Airway patency: patent    PONV status at discharge: No PONV  Anesthetic complications: no      Cardiovascular status: blood pressure returned to baseline  Respiratory status: unassisted, spontaneous ventilation and room air  Hydration status: euvolemic  Follow-up not needed.          Vitals Value Taken Time   /55 3/12/2020  8:44 AM   Temp 35.8 °C (96.4 °F) 3/12/2020  3:36 AM   Pulse 60 3/12/2020  8:45 AM   Resp 22 3/12/2020  8:45 AM   SpO2 97 % 3/12/2020  8:45 AM   Vitals shown include unvalidated device data.      No case tracking events are documented in the log.      Pain/Leonor Score: No data recorded

## 2020-03-12 NOTE — PLAN OF CARE
Hospital follow up scheduled with PCP and Dr. Godfrey. AVS updated.      03/12/20 1127   Discharge Assessment   Assessment Type Discharge Planning Reassessment

## 2020-03-12 NOTE — PLAN OF CARE
NAD noted. POC reviewed w pt and they verbalized understanding.  Tele  [id number].  Pt free from falls.  Pt ambulated to the bathroom.  Bed in low position, side rails up X2, bed alarm on, wheels locked, call light in reach. Will continue to monitor.

## 2020-03-12 NOTE — ANESTHESIA PREPROCEDURE EVALUATION
2020  Heathre Hughes is a 79 y.o., female.    Anesthesia Evaluation    I have reviewed the Patient Summary Reports.    I have reviewed the Nursing Notes.   I have reviewed the Medications.     Review of Systems  Anesthesia Hx:  No problems with previous Anesthesia    Social:  Former Smoker Smoking Status: Former Smoker  Quit Smokin17  Smokeless Tobacco Status: Never Used  Alcohol use: No  Drug use: No       Cardiovascular:   Hypertension, poorly controlled Past MI CAD  CABG/stent Dysrhythmias  Angina    Pulmonary:   Asthma Sleep Apnea Moderate persistent asthma without complication   Musculoskeletal:   Arthritis     Endocrine:   Hypothyroidism        Physical Exam  General:  Well nourished    Airway/Jaw/Neck:  Airway Findings: Mouth Opening: Normal Tongue: Normal  General Airway Assessment: Adult, Good  Mallampati: II  Improves to II with phonation.  TM Distance: 4-6 cm      Dental:  Dental Findings: In tact   Chest/Lungs:  Chest/Lungs Findings: Clear to auscultation, Normal Respiratory Rate     Heart/Vascular:  Heart Findings: Rate: Normal  Rhythm: Regular Rhythm  Sounds: Normal  Heart murmur: negative       Mental Status:  Mental Status Findings:  Cooperative, Alert and Oriented         Anesthesia Plan  Type of Anesthesia, risks & benefits discussed:  Anesthesia Type:  general  Patient's Preference:   Intra-op Monitoring Plan: standard ASA monitors  Intra-op Monitoring Plan Comments:   Post Op Pain Control Plan:   Post Op Pain Control Plan Comments:   Induction:   IV  Beta Blocker:  Patient is not currently on a Beta-Blocker (No further documentation required).       Informed Consent: Patient understands risks and agrees with Anesthesia plan.  Questions answered. Anesthesia consent signed with patient.  ASA Score: 4     Day of Surgery Review of History & Physical: I have interviewed and  examined the patient. I have reviewed the patient's H&P dated:  There are no significant changes.  H&P update referred to the surgeon.  H&P completed by Anesthesiologist.       Ready For Surgery From Anesthesia Perspective.

## 2020-03-12 NOTE — TRANSFER OF CARE
"Anesthesia Transfer of Care Note    Patient: Heather Hughes    Procedure(s) Performed: Procedure(s) (LRB):  Transesophageal echo (KIARA) intra-procedure log documentation (N/A)    Patient location: PACU    Anesthesia Type: general    Transport from OR: Transported from OR on 2-3 L/min O2 by NC with adequate spontaneous ventilation    Post pain: adequate analgesia    Post assessment: no apparent anesthetic complications    Post vital signs: stable    Level of consciousness: awake, alert and oriented    Nausea/Vomiting: no nausea/vomiting    Complications: none    Transfer of care protocol was followed      Last vitals:   Visit Vitals  BP (!) 192/83   Pulse (!) 113   Temp 35.8 °C (96.4 °F)   Resp 18   Ht 5' 3" (1.6 m)   Wt 96.2 kg (212 lb)   SpO2 99%   Breastfeeding? No   BMI 37.55 kg/m²     "

## 2020-03-12 NOTE — DISCHARGE INSTRUCTIONS
Thank you for choosing [facility] for your medical care. The primary doctor who is taking care of you at the time of your discharge is Yumiko Carter MD.     You were admitted to the hospital with Atrial fibrillation with RVR.     Please note your discharge instructions, including diet/activity restrictions, follow-up appointments, and medication changes.  If you have any questions about your medical issues, prescriptions, or any other questions, please feel free to contact the [facility] Medicine Dept at [phone] and we will help.    If you are previously with Home health, outpatient PT/OT or under a therapy program, you are cleared to return to those programs.    Please direct all long term medication refills and follow up to your primary care provider, Graham Mazariegos MD. Thank you again for letting us take care of your health care needs.    Please note the following discharge instructions per your discharging physician-  STOP plavix, continue aspirin 81 mg daily and start eliquis 5 mg twice a day (get refill from PCP or Dr. Godfrey)  If bleeding noted, seek medical attention.  Take amiodarone by mouth 3x a day for 5 days, then 2x a day after until seen by Dr. Godfrey and he can adjust the dose from there  See Dr. Godfrey in about 2 weeks

## 2020-03-12 NOTE — PLAN OF CARE
CM called pt's pharmacy to check coverage of eliquis. Per pharmacist- eliquis is covered with a $47 copay. CM discussed this pt bedside- pt stated she can afford medication. CM also discussed cardiologist with her. Pt stated that she would like to follow up with her regular cardiologist- Dr. [facility] - [place], [place] - [address] [facility] [address]  [address] [facility] [address]  Phone: [phone] Fax: [phone]    [facility] - [place], [place] - [address] [place] [address] [facility] [place] [address]  Phone: [phone] Fax: [phone]

## 2020-03-15 NOTE — DISCHARGE SUMMARY
"[facility] Medicine  Discharge Summary      Patient Name: Heather Hughes  MRN: [id number]  Admission Date: 3/10/2020  Hospital Length of Stay: 2 days  Discharge Date and Time: 3/12/2020  5:08 PM  Attending Physician: No att. providers found   Discharging Provider: Yumiko Carter MD  Primary Care Provider: Graham Mazariegos MD      HPI:   Ms. Hughes is a 79 y.o. 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  She reports that this all started on Sunday when she began to have jaw pain which resolved after taking nitroglycerin.  Denied associated chest pain but reports that jaw pain was her symptom of her previous heart attack.  Since that time she reports increased weakness and fatigue and feels like her heart is fluttering.  Today her home health nurse came to address her venous stasis wounds for which she is currently taking Keflex for an infection of her right lower extremity which she reports has been improving and the nurse noted that her heart rate was irregular and elevated.  She presented to the ED for further evaluation.  Denies fever chills, denies nausea or vomiting.  Denies decreased p.o. intake.  Does report some increased anxiety secondary to her  being ill.  Currently on aspirin and Plavix but not on full-dose blood thinner.  EKG with AFib with RVR and possible new left bundle-branch block.  Troponin mildly elevated.  Patient is to be admitted for cardiology consult, diltiazem drip, TTE.    I discussed at length with patient and her son atrial fibrillation and have it increases risk for blood clots.  I discussed with them that I am going to give her a 1 time dose of full-dose Lovenox blood thinner tonight and will let Cardiology make the decision on further blood thinners in the morning.  Patient " reports history of GI bleed a few years ago.  States will monitor her closely for bleeding and will continue PPI.  Patient and son expressed understanding.    Procedure(s) (LRB):  Transesophageal echo (KIARA) intra-procedure log documentation (N/A)  Cardioversion (Left)      Hospital Course:   Patient was admitted to the hospital medicine service for new onset AFib with RVR.  Also with new left bundle-branch block in ED and mildly elevated troponin.  She was placed on a Cardizem drip and Cardiology was consulted.  TTE was performed.  Changed to amiodarone drip per Cardiology with plan for scheduled KIARA if no conversion to normal sinus rhythm with amiodarone.  PT and OT consulted. Outpatient Keflex continued for lower extremity wound..  KIARA with cardioversion was performed on 03/12.  Patient was transitioned to p.o. amiodarone after being on amiodarone drip for 24 hr.  She was discharged home on p.o. amiodarone 200 mg t.i.d. x5 days then 200 mg b.i.d. until seen in clinic with Dr. Godfrey per recommendations of Dr. Echevarria.  She was started on Eliquis 5 mg b.i.d. on discharge.  Anticoagulation and fall/bleeding precautions explained to patient prior to discharge.  Discharge plans discussed with Dr. Echevarria on day of discharge.    Discharge exam  Awake, alert, no apparent distress  Regular rate and rhythm no murmur  Lungs clear to auscultation no wheeze  Abdomen soft nontender nondistended  RLE bandage c/d/i     Consults:     No new Assessment & Plan notes have been filed under this hospital service since the last note was generated.  Service: Hospital Medicine    Final Active Diagnoses:    Diagnosis Date Noted POA    PRINCIPAL PROBLEM:  Atrial fibrillation with RVR [I48.91] 03/10/2020 Yes    Venous stasis dermatitis of right lower extremity [I87.2] 03/10/2020 Yes    Severe obesity (BMI 35.0-39.9) with comorbidity, today 37.9 [E66.01] 10/03/2018 Yes    Acquired hypothyroidism [E03.9] 08/31/2018 Yes    History of  deep venous thrombosis (DVT) of distal vein of right lower extremity, 1965 and 1971 [Z86.718] 08/31/2018 Not Applicable    Essential hypertension [I10] 08/09/2017 Yes    Coronary artery disease involving native coronary artery of native heart with unstable angina pectoris [I25.110] 01/13/2017 Yes    Moderate persistent asthma without complication [J45.40] 01/13/2017 Yes      Problems Resolved During this Admission:       Discharged Condition: good    Disposition: Home-Health Care Svc    Follow Up:  Follow-up Information     Chet Godfrey MD On 3/26/2020.    Specialties:  Cardiology, General Surgery  Why:  1:00 PM for hospital follow up  Contact information:  [address]  [address]  [place] [address]  [phone]             [facility].    Why:  Home Health  Contact information:  [address]  [address]  [facility] [address]  [phone]               Patient Instructions:      Notify your health care provider if you experience any of the following:  difficulty breathing or increased cough     Notify your health care provider if you experience any of the following:  persistent dizziness, light-headedness, or visual disturbances     Notify your health care provider if you experience any of the following:  increased confusion or weakness     SUBSEQUENT HOME HEALTH ORDERS   Order Comments: Subsequent Home Health Orders    Resume prior home health orders and add PT eval and treat 3x/week, OT eval and treat 2x/week     Order Specific Question Answer Comments   What Home Health Agency is the patient currently using? Other/External [facility]     Activity as tolerated       Significant Diagnostic Studies: Labs:   BMP: No results for input(s): GLU, NA, K, CL, CO2, BUN, CREATININE, CALCIUM, MG in the last 48 hours., CMP No results for input(s): NA, K, CL, CO2, GLU, BUN, CREATININE, CALCIUM, PROT, ALBUMIN, BILITOT, ALKPHOS, AST, ALT, ANIONGAP, ESTGFRAFRICA, EGFRNONAA in the last 48 hours., CBC No results  for input(s): WBC, HGB, HCT, PLT in the last 48 hours. and Troponin   Recent Labs   Lab 03/11/20  0205   TROPONINI 0.022       Pending Diagnostic Studies:     None       Radiology Results (last 7 days)     Procedure Component Value Units Date/Time   X-Ray Chest AP Portable [[id number]] Resulted: 03/10/20 1423   Order Status: Completed Updated: 03/10/20 1426   Narrative:     EXAMINATION:  XR CHEST AP PORTABLE    CLINICAL HISTORY:  Chest Pain; palpitations and shortness of breath    TECHNIQUE:  Single frontal view of the chest was performed.    COMPARISON:  08/30/2018    FINDINGS:  The heart is enlarged and unchanged.  There is slight prominence of the central pulmonary vasculature but no chanda pulmonary edema.  No focal consolidation or pleural effusion.  The aorta is calcified and ectatic.   Impression:       Cardiomegaly and minimal pulmonary vascular congestion.      Electronically signed by: Rama Estes  Date: 03/10/2020  Time: 14:23       Medications:  Reconciled Home Medications:      Medication List      START taking these medications    amiodarone 200 MG Tab  Commonly known as:  PACERONE  Take 1 tablet (200 mg total) by mouth 3 (three) times daily for 5 days, THEN 1 tablet (200 mg total) 2 (two) times daily for 25 days.  Start taking on:  March 12, 2020     apixaban 5 mg Tab  Commonly known as:  ELIQUIS  Take 1 tablet (5 mg total) by mouth 2 (two) times daily.        CHANGE how you take these medications    montelukast 10 mg tablet  Commonly known as:  SINGULAIR  Take 1 tablet (10 mg total) by mouth every evening.  What changed:    · when to take this  · reasons to take this     nitroGLYCERIN 0.4 MG SL tablet  Commonly known as:  NITROSTAT  Place 1 tablet (0.4 mg total) under the tongue every 5 (five) minutes as needed for Chest pain.  What changed:  additional instructions     SYNTHROID 112 MCG tablet  Generic drug:  levothyroxine  TAKE ONE TABLET BY MOUTH EVERY DAY BEFORE BREAKFAST  What changed:     · how much to take  · when to take this        CONTINUE taking these medications    acetaminophen 650 MG Tbsr  Commonly known as:  TYLENOL  Take 650 mg by mouth every 8 (eight) hours as needed (pain).     albuterol 90 mcg/actuation inhaler  Commonly known as:  PROVENTIL/VENTOLIN HFA  Inhale 2 puffs into the lungs every 6 (six) hours as needed for Wheezing.     aspirin 81 MG EC tablet  Commonly known as:  ECOTRIN  Take 81 mg by mouth once daily.     CALCIUM 500 + D ORAL  Take 2 tablets by mouth once daily.     fluconazole 150 MG Tab  Commonly known as:  DIFLUCAN  once.     gabapentin 100 MG capsule  Commonly known as:  NEURONTIN  Take 1 capsule (100 mg total) by mouth 3 (three) times daily.     glucosamine-chondroitin 500-400 mg tablet  Take 1 tablet by mouth 3 (three) times daily.     omeprazole 40 MG capsule  Commonly known as:  PRILOSEC  TAKE 1 CAPSULE (40 MG TOTAL) BY MOUTH ONCE DAILY.        STOP taking these medications    carvediloL 6.25 MG tablet  Commonly known as:  COREG     clopidogreL 75 mg tablet  Commonly known as:  PLAVIX        ASK your doctor about these medications    cephALEXin 500 MG capsule  Commonly known as:  KEFLEX  Take 1 capsule (500 mg total) by mouth every 8 (eight) hours. for 10 days  Ask about: Should I take this medication?            Indwelling Lines/Drains at time of discharge:   Lines/Drains/Airways     None                 Time spent on the discharge of patient: 40 minutes  Patient was seen and examined on the date of discharge and determined to be suitable for discharge.     Plan of care discussed with cardiologist Dr. Echevarria on day of discharge.  Dr. Echevarria cleared patient for discharge from cardiac standpoint on day of discharge.  Yumiko Carter MD  Department of Hospital Medicine  [facility]

## 2020-03-16 ENCOUNTER — PATIENT OUTREACH (OUTPATIENT)
Dept: ADMINISTRATIVE | Facility: CLINIC | Age: 80
End: 2020-03-16

## 2020-03-16 NOTE — PATIENT INSTRUCTIONS
Discharge Instructions for Atrial Fibrillation  You have been diagnosed with an abnormal heart rhythm called atrial fibrillation. With this condition, your hearts 2 upper chambers quiver rather than squeeze the blood out in a normal pattern. This leads to an irregular and sometimes rapid heartbeat. Some people will develop associated symptoms such as a flip-flopping heartbeat, chest pain, lightheadedness, or shortness of breath. Other people may have no symptoms at all. Atrial fibrillation is serious because it affects the hearts ability to fill with blood as it should. Blood clots may form. This increases the risk for stroke. Untreated atrial fibrillation can also lead to heart failure. Atrial fibrillation can be controlled. With treatment, most people with atrial fibrillation lead normal lives.  Treatment options  Recommended treatment for atrial fibrillation depends on your age, symptoms, how long you have had atrial fibrillation, and other factors. You will have a complete evaluation to find out if you have any abnormalities that caused your heart to go into atrial fibrillation. This might be blocked heart arteries or a thyroid problem. Your doctor will assess your particular case and discuss choices with you.  Treatment choices may include:  · Treating an underlying disorder that puts you at risk for atrial fibrillation. For example, correcting an abnormal thyroid or electrolyte problem, or treating a blocked heart artery.  · Restoring a normal heart rhythm with an electrical shock (cardioversion) or with an antiarrhythmic medicine (chemical cardioversion)  · Using medicine to control your heart rate in atrial fibrillation.  · Preventing the risk for blood clot and stroke using blood-thinning medicines. Your doctor will tell you what he or she recommends. Choices may include aspirin, clopidogrel, warfarin, dabigatran, rivaroxaban, apixaban, and edoxaban.  · Doing catheter ablation or a surgical maze  procedure. These use different methods to destroy certain areas of heart tissue. This interrupts the electrical signals causing atrial fibrillation. One of these procedures may be a choice when medicines do not work, or as an alternative to long-term medicine.  · Other treatment choices may be recommended for you by your doctor.  Managing risk factors for stroke and preventing heart failure are important parts of any treatment plan for atrial fibrillation.  Home care  · Take your medicines exactly as directed. Dont skip doses.  · Work with your doctor to find the right medicines and doses for you.  · Learn to take your own pulse. Keep a record of your results. Ask your doctor which pulse rates mean that you need medical attention. Slowing your pulse is often the goal of treatment. Ask your doctor if its OK for you to use an automatic machine to check your pulse at home. Sometimes these machines dont count the pulse correctly when you have atrial fibrillation.  · Limit your intake of coffee, tea, cola, and other beverages with caffeine. Talk with your doctor about whether you should eliminate caffeine.  · Avoid over-the-counter medicines that have caffeine in them.  · Let your doctor know what medicines you take, including prescription and over-the-counter medicines, as well as any supplements. They interfere with some medicines given for atrial fibrillation.  · Ask your doctor about whether you can drink alcohol. Some people need to avoid alcohol to better treat atrial fibrillation. If you are taking blood-thinner medicines, alcohol may interfere with them by increasing their effect.  · Never take stimulants such as amphetamines or cocaine. These drugs can speed up your heart rate and trigger atrial fibrillation.  Follow-up care  Follow up with your doctor, or as advised.     When should I call my healthcare provider  Call your healthcare provider right away if you have any of the  following:  · Weakness  · Dizziness  · Fainting  · Fatigue  · Shortness of breath  · Chest pain with increased activity  · A change in the usual regularity of your heartbeat, or an unusually fast heartbeat   Date Last Reviewed: 4/23/2016  © 2996-9769 [employer]. [address] [place] [address], [place] [address]. All rights reserved. This information is not intended as a substitute for professional medical care. Always follow your healthcare professional's instructions.

## 2020-03-17 ENCOUNTER — TELEPHONE (OUTPATIENT)
Dept: FAMILY MEDICINE | Facility: CLINIC | Age: 80
End: 2020-03-17

## 2020-03-17 NOTE — TELEPHONE ENCOUNTER
----- Message from Wesly Alaniz sent at 3/17/2020 10:22 AM CDT -----  Contact: Kerri with Home Health  Type: Needs Medical Advice    Who Called:  Kerri    Best Call Back Number: [phone] (Kerri)  Additional Information: PT has wound that is draining and would like order to culture. Please call to discuss.

## 2020-03-19 ENCOUNTER — TELEPHONE (OUTPATIENT)
Dept: FAMILY MEDICINE | Facility: CLINIC | Age: 80
End: 2020-03-19

## 2020-03-19 NOTE — TELEPHONE ENCOUNTER
Preliminary result looks like Pseudomonas.  Final culture results are pending.    She saw Dr. Dangelo for this on March 3rd and he referred her to Wound Care, did she go?    It looks like she had a follow-up appointment today with Monika and canceled it.

## 2020-03-20 ENCOUNTER — TELEPHONE (OUTPATIENT)
Dept: FAMILY MEDICINE | Facility: CLINIC | Age: 80
End: 2020-03-20

## 2020-03-20 ENCOUNTER — TELEPHONE (OUTPATIENT)
Dept: CARDIOLOGY | Facility: CLINIC | Age: 80
End: 2020-03-20

## 2020-03-20 DIAGNOSIS — A49.8 PSEUDOMONAS AERUGINOSA INFECTION: Primary | ICD-10-CM

## 2020-03-20 RX ORDER — LEVOFLOXACIN 750 MG/1
750 TABLET ORAL DAILY
Qty: 14 TABLET | Refills: 0 | Status: SHIPPED | OUTPATIENT
Start: 2020-03-20 | End: 2020-04-03 | Stop reason: SDUPTHER

## 2020-03-20 RX ORDER — AMIODARONE HYDROCHLORIDE 200 MG/1
TABLET ORAL
Qty: 65 TABLET | Refills: 0 | Status: CANCELLED | OUTPATIENT
Start: 2020-03-20 | End: 2020-04-19

## 2020-03-20 NOTE — TELEPHONE ENCOUNTER
Sent rx for levaquin 750mg once daily for 14 days to [place] [place]  Is Home health doing wound care?  She was referred to wound care by Dr. Dangelo but declined to go.

## 2020-03-20 NOTE — TELEPHONE ENCOUNTER
Dr Mazariegos informed. Patient is not willing to go out at this time. She was given message. Waiting on culture results.

## 2020-03-23 NOTE — TELEPHONE ENCOUNTER
Spoke to daughter Angela Steele- she has been using Aquaphor and cleaning wound twice daily-started using Neosporin on it last evening-she says she prefers to do the wound care since home health only comes in once or twice a week. Are the products she is using sufficient?

## 2020-03-23 NOTE — TELEPHONE ENCOUNTER
I would recommend using Bactroban/mupirocin instead of the Neosporin.  Neosporin has a high risk of contact allergy.  An alternative would be Silvadene burn cream

## 2020-03-24 ENCOUNTER — TELEPHONE (OUTPATIENT)
Dept: HOME HEALTH SERVICES | Facility: HOSPITAL | Age: 80
End: 2020-03-24

## 2020-03-24 ENCOUNTER — TELEPHONE (OUTPATIENT)
Dept: FAMILY MEDICINE | Facility: CLINIC | Age: 80
End: 2020-03-24

## 2020-03-24 NOTE — TELEPHONE ENCOUNTER
Spoke to Kandi- patients daughter is doing twice daily wound care- advised to use Silvadene cream. Patient given Levaquin for the infection.

## 2020-03-24 NOTE — TELEPHONE ENCOUNTER
----- Message from Izabella Moraes sent at 3/24/2020  9:10 AM CDT -----  Type: Needs Medical Advice    Who Called:  Kandi with [facility]   Best Call Back Number: [phone]  Additional Information: Requesting to speak with nurse concerning patient's woundcare/please call back as soon as possible to advise.

## 2020-03-26 ENCOUNTER — TELEPHONE (OUTPATIENT)
Dept: CARDIOLOGY | Facility: CLINIC | Age: 80
End: 2020-03-26

## 2020-03-27 ENCOUNTER — PATIENT MESSAGE (OUTPATIENT)
Dept: CARDIOLOGY | Facility: CLINIC | Age: 80
End: 2020-03-27

## 2020-03-27 ENCOUNTER — OFFICE VISIT (OUTPATIENT)
Dept: CARDIOLOGY | Facility: CLINIC | Age: 80
End: 2020-03-27
Payer: MEDICARE

## 2020-03-27 DIAGNOSIS — E66.01 SEVERE OBESITY (BMI 35.0-39.9) WITH COMORBIDITY: ICD-10-CM

## 2020-03-27 DIAGNOSIS — I83.019 VENOUS ULCER OF RIGHT LEG: ICD-10-CM

## 2020-03-27 DIAGNOSIS — I10 ESSENTIAL HYPERTENSION: ICD-10-CM

## 2020-03-27 DIAGNOSIS — E78.5 DYSLIPIDEMIA: ICD-10-CM

## 2020-03-27 DIAGNOSIS — G47.33 OSA ON CPAP: ICD-10-CM

## 2020-03-27 DIAGNOSIS — R79.89 ELEVATED BRAIN NATRIURETIC PEPTIDE (BNP) LEVEL: ICD-10-CM

## 2020-03-27 DIAGNOSIS — L97.919 VENOUS ULCER OF RIGHT LEG: ICD-10-CM

## 2020-03-27 DIAGNOSIS — I48.91 ATRIAL FIBRILLATION WITH RVR: Primary | ICD-10-CM

## 2020-03-27 DIAGNOSIS — I25.10 ATHEROSCLEROSIS OF NATIVE CORONARY ARTERY OF NATIVE HEART WITHOUT ANGINA PECTORIS: ICD-10-CM

## 2020-03-27 DIAGNOSIS — Z78.9 STATIN INTOLERANCE: ICD-10-CM

## 2020-03-27 DIAGNOSIS — Z95.5 STATUS POST INSERTION OF DRUG-ELUTING STENT INTO RIGHT CORONARY ARTERY FOR CORONARY ARTERY DISEASE: ICD-10-CM

## 2020-03-27 DIAGNOSIS — I87.2 VENOUS STASIS DERMATITIS OF RIGHT LOWER EXTREMITY: ICD-10-CM

## 2020-03-27 DIAGNOSIS — Z86.718 HISTORY OF DEEP VENOUS THROMBOSIS (DVT) OF DISTAL VEIN OF RIGHT LOWER EXTREMITY: ICD-10-CM

## 2020-03-27 PROBLEM — D50.9 IRON DEFICIENCY ANEMIA: Status: ACTIVE | Noted: 2019-08-22

## 2020-03-27 PROCEDURE — 99215 PR OFFICE/OUTPT VISIT, EST, LEVL V, 40-54 MIN: ICD-10-PCS | Mod: HCNC,95,, | Performed by: INTERNAL MEDICINE

## 2020-03-27 PROCEDURE — 1159F PR MEDICATION LIST DOCUMENTED IN MEDICAL RECORD: ICD-10-PCS | Mod: HCNC,95,, | Performed by: INTERNAL MEDICINE

## 2020-03-27 PROCEDURE — 1159F MED LIST DOCD IN RCRD: CPT | Mod: HCNC,95,, | Performed by: INTERNAL MEDICINE

## 2020-03-27 PROCEDURE — 1101F PR PT FALLS ASSESS DOC 0-1 FALLS W/OUT INJ PAST YR: ICD-10-PCS | Mod: HCNC,CPTII,95, | Performed by: INTERNAL MEDICINE

## 2020-03-27 PROCEDURE — 99499 RISK ADDL DX/OHS AUDIT: ICD-10-PCS | Mod: HCNC,95,, | Performed by: INTERNAL MEDICINE

## 2020-03-27 PROCEDURE — 99499 UNLISTED E&M SERVICE: CPT | Mod: HCNC,95,, | Performed by: INTERNAL MEDICINE

## 2020-03-27 PROCEDURE — 1101F PT FALLS ASSESS-DOCD LE1/YR: CPT | Mod: HCNC,CPTII,95, | Performed by: INTERNAL MEDICINE

## 2020-03-27 PROCEDURE — 99215 OFFICE O/P EST HI 40 MIN: CPT | Mod: HCNC,95,, | Performed by: INTERNAL MEDICINE

## 2020-03-27 RX ORDER — ROSUVASTATIN CALCIUM 5 MG/1
5 TABLET, COATED ORAL DAILY
Qty: 30 TABLET | Refills: 3 | Status: SHIPPED | OUTPATIENT
Start: 2020-03-27 | End: 2020-05-14

## 2020-03-27 RX ORDER — AMIODARONE HYDROCHLORIDE 200 MG/1
200 TABLET ORAL DAILY
Qty: 30 TABLET | Refills: 11 | Status: SHIPPED | OUTPATIENT
Start: 2020-03-27 | End: 2020-04-28 | Stop reason: SDUPTHER

## 2020-03-27 NOTE — PROGRESS NOTES
Subjective:    Patient ID:  Heather Hughes is a 79 y.o. female who presents for follow-up of No chief complaint on file.  Also CAD post ARSLAN, history of MI, ALVINA off CPAP, new onset PAF now on amiodarone and NOAC  PCP: Dr. Mazariegos, only see as needed about every 4 months  Prior cardiologist: Dr. Ace, reviewed by Ms. Sawant, FNP in 4/2019, ordered CPAP  Vascular: Dr. Stafford, sclerosing procedure in 2/2020, planning additional follow up in future  Lives with , Chet, chronically ill, patient is the caretaker with stress  Son, Chet,   Daughter from [place], Angela, here with patient, also helps out.  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  Nicotine: quit 2017, 5 py  Alcohol: none  Illicit drugs: none  Cardiac symptoms: none  Home BP: 123/72, HR 72  Medication compliance: yes  Diet: regular, use little salt  Caffeine: 2-3 cpd, sleep well  Labs: 4/2018, .6, not on Rx, 4/2019 normal BMP, CBC (Hgb 9.2) iron low sat, normal TSH, no A1C  Lab Results   Component Value Date    TSH 1.663 03/10/2020        Lab Results   Component Value Date    HGBA1C 5.3 01/14/2017       Lab Results   Component Value Date    WBC 6.72 03/12/2020    HGB 10.6 (L) 03/12/2020    HCT 34.4 (L) 03/12/2020    MCV 94 03/12/2020     03/12/2020       CMP  Sodium   Date Value Ref Range Status   03/12/2020 138 136 - 145 mmol/L Final     Potassium   Date Value Ref Range Status   03/12/2020 4.4 3.5 - 5.1 mmol/L Final     Chloride   Date Value Ref Range Status   03/12/2020 104 95 - 110 mmol/L Final     CO2   Date Value Ref Range Status   03/12/2020 30 (H) 23 - 29 mmol/L Final     Glucose   Date Value Ref Range Status   03/12/2020 99 70 - 110 mg/dL Final     BUN, Bld   Date Value Ref Range Status   03/12/2020 14 8 - 23 mg/dL Final     Creatinine   Date Value Ref Range Status    03/12/2020 0.8 0.5 - 1.4 mg/dL Final     Calcium   Date Value Ref Range Status   03/12/2020 9.4 8.7 - 10.5 mg/dL Final     Total Protein   Date Value Ref Range Status   03/10/2020 6.8 6.0 - 8.4 g/dL Final     Albumin   Date Value Ref Range Status   03/10/2020 3.8 3.5 - 5.2 g/dL Final     Total Bilirubin   Date Value Ref Range Status   03/10/2020 0.3 0.1 - 1.0 mg/dL Final     Comment:     For infants and newborns, interpretation of results should be based  on gestational age, weight and in agreement with clinical  observations.  Premature Infant recommended reference ranges:  Up to 24 hours.............<8.0 mg/dL  Up to 48 hours............<12.0 mg/dL  3-5 days..................<15.0 mg/dL  6-29 days.................<15.0 mg/dL       Alkaline Phosphatase   Date Value Ref Range Status   03/10/2020 93 55 - 135 U/L Final     AST   Date Value Ref Range Status   03/10/2020 20 10 - 40 U/L Final     ALT   Date Value Ref Range Status   03/10/2020 14 10 - 44 U/L Final     Anion Gap   Date Value Ref Range Status   03/12/2020 4 (L) 8 - 16 mmol/L Final     eGFR if    Date Value Ref Range Status   03/12/2020 >60 >60 mL/min/1.73 m^2 Final     eGFR if non    Date Value Ref Range Status   03/12/2020 >60 >60 mL/min/1.73 m^2 Final     Comment:     Calculation used to obtain the estimated glomerular filtration  rate (eGFR) is the CKD-EPI equation.        @labrcntip(troponini)@    BNP   Date Value Ref Range Status   03/10/2020 1,035 (H) 0 - 99 pg/mL Final     Comment:     Values of less than 100 pg/ml are consistent with non-CHF populations.   }   Lab Results   Component Value Date    CHOL 223 (H) 04/11/2018    CHOL 172 01/14/2017     Lab Results   Component Value Date    HDL 67 04/11/2018    HDL 55 01/14/2017     Lab Results   Component Value Date    LDLCALC 133.6 04/11/2018    LDLCALC 103.4 01/14/2017     Lab Results   Component Value Date    TRIG 112 04/11/2018    TRIG 68 01/14/2017     Lab Results  "  Component Value Date    CHOLHDL 30.0 2018    CHOLHDL 32.0 2017        Last Echo: DSE 2018, KIARA 3/2020  Last stress test: 2018  Cardiovascular angiogram: 2017 with ARSLAN to RCA  EC2019 NSR normal, rate 61  Fundoscopic exam: within the past year, negative for HTN retinopathy    In 2019:  WF here to check adherence use of CPAP started in 2019. Son report  noted better sleep. Summary report using 93.3% of the time. No heart worries. Had discussion about leg arthritis and cellulitis, resolve per Dr. Mazariegos, explained not due to CVD.     Dr. Ace noted "77 y.o. female with a past medical history of coronary artery disease     Patient is due to undergo left knee total replacement.  Had an angiogram or early part of this year.  She had a PCI to the right coronary artery.  There was an intermediate lesion in the LAD.  He is currently asymptomatic and denies any chest pain shortness of breath however her exercise capacity is extremely limited and she walks with a cane.  This is related to her knee issues."    DSE 2018 - Left ventricle ejection fraction is normal.  No stress induced wall motion abnormality  Negative for ischemia.    Venous US LE - 2019 -     HPI comments: in 3/2020, return post hospital for new onset AF with RVR, Doing well at home, BP better controlled, not using CPAP for last several months due to ankle problem with venous ulceration and being treated by Dr. Stafford.   DCS "presents to the ED today with an onset of heart "flutters," jaw pain, and shortness of breath. She reports that this all started on  when she began to have jaw pain which resolved after taking nitroglycerin. Denied associated chest pain but reports that jaw pain was her symptom of her previous heart attack. Since that time she reports increased weakness and fatigue and feels like her heart is fluttering. Today her home health nurse came to address her venous stasis wounds for which she is " "currently taking Keflex for an infection of her right lower extremity which she reports has been improving and the nurse noted that her heart rate was irregular and elevated. She presented to the ED for further evaluation. Denies fever chills, denies nausea or vomiting. Denies decreased p.o. intake. Does report some increased anxiety secondary to her  being ill. Currently on aspirin and Plavix but not on full-dose blood thinner. EKG with AFib with RVR and possible new left bundle-branch block. Troponin mildly elevated. Patient is to be admitted for cardiology consult, diltiazem drip, TTE.     Also with new left bundle-branch block in ED and mildly elevated troponin. She was placed on a Cardizem drip and Cardiology was consulted. TTE was performed. Changed to amiodarone drip per Cardiology with plan for scheduled KIARA if no conversion to normal sinus rhythm with amiodarone. PT and OT consulted. Outpatient Keflex continued for lower extremity wound.. KIARA with cardioversion was performed on 03/12. Patient was transitioned to p.o. amiodarone after being on amiodarone drip for 24 hr. She was discharged home on p.o. amiodarone 200 mg t.i.d. x5 days then 200 mg b.i.d. until seen in clinic with Dr. Godfrey per recommendations of Dr. Echevarria. She was started on Eliquis 5 mg b.i.d. on discharge. Anticoagulation and fall/bleeding precautions explained to patient prior to discharge. Discharge plans discussed with Dr. Echevarria on day of discharge."    KIARA - "Mild concentric left ventricular hypertrophy.  Normal left ventricular systolic function. The estimated ejection fraction is 55%.  Atrial fibrillation observed.  Normal right ventricular systolic function.  Mild left atrial enlargement.  Mild right atrial enlargement.  No interatrial septal defect present.  Normal appearing left atrial appendage. No thrombus is present in the appendage.  A 150 J synchronized cardioversion was successfully performed with restoration of " "normal sinus rhythm."    Review of Systems   Constitution: Negative for diaphoresis, fever, malaise/fatigue, night sweats and weight gain.   HENT: Negative for nosebleeds and tinnitus.    Eyes: Negative for visual disturbance.   Cardiovascular: Negative for chest pain, claudication, cyanosis, dyspnea on exertion, irregular heartbeat, leg swelling, near-syncope, orthopnea, palpitations and paroxysmal nocturnal dyspnea.   Respiratory: Positive for snoring. Negative for cough, shortness of breath, sleep disturbances due to breathing and wheezing.         [place] score 9, off CPAP from 10/2019   Endocrine: Positive for heat intolerance. Negative for polydipsia and polyuria.   Hematologic/Lymphatic: Bruises/bleeds easily.   Skin: Positive for dry skin, itching and rash. Negative for color change, flushing, nail changes, poor wound healing and suspicious lesions.   Musculoskeletal: Positive for arthritis, joint pain, joint swelling and muscle weakness. Negative for falls, gout, muscle cramps and myalgias.   Gastrointestinal: Positive for heartburn. Negative for hematemesis, hematochezia, melena and nausea.   Genitourinary: Positive for frequency.   Neurological: Positive for excessive daytime sleepiness. Negative for disturbances in coordination, dizziness, focal weakness, headaches, light-headedness, loss of balance, numbness, vertigo and weakness.   Psychiatric/Behavioral: Negative for depression and substance abuse. The patient does not have insomnia and is not nervous/anxious.    Allergic/Immunologic: Positive for environmental allergies.        Objective:      Physical not performed in Virtual Visit.    Assessment:       1. Atrial fibrillation with RVR    2. Venous stasis dermatitis of right lower extremity    3. Status post insertion of drug-eluting stent into right coronary artery for coronary artery disease,. 1/2017    4. Venous ulcer of right leg    5. Dyslipidemia, baseline .6    6. Severe obesity (BMI " 35.0-39.9) with comorbidity, today 37.9    7. ALVINA on CPAP, started 4/2019, using 93%    8. Essential hypertension    9. History of deep venous thrombosis (DVT) of distal vein of right lower extremity, 1965 and 1971    10. Elevated brain natriuretic peptide (BNP) level    11. Statin intolerance, tried atorvastatin and pravastatin    12. Atherosclerosis of native coronary artery of native heart without angina pectoris          Plan:       Diagnoses and all orders for this visit:    Atrial fibrillation with RVR  -     apixaban (ELIQUIS) 5 mg Tab; Take 1 tablet (5 mg total) by mouth 2 (two) times daily.  -     amiodarone (PACERONE) 200 MG Tab; Take 1 tablet (200 mg total) by mouth once daily.    Venous stasis dermatitis of right lower extremity    Status post insertion of drug-eluting stent into right coronary artery for coronary artery disease,. 1/2017  -     rosuvastatin (CRESTOR) 5 MG tablet; Take 1 tablet (5 mg total) by mouth once daily.  -     Lipid panel; Future  -     High sensitivity CRP (Cardiac CRP); Future    Venous ulcer of right leg    Dyslipidemia, baseline .6  -     rosuvastatin (CRESTOR) 5 MG tablet; Take 1 tablet (5 mg total) by mouth once daily.  -     Lipid panel; Future    Severe obesity (BMI 35.0-39.9) with comorbidity, today 37.9    ALVINA on CPAP, started 4/2019, using 93%    Essential hypertension    History of deep venous thrombosis (DVT) of distal vein of right lower extremity, 1965 and 1971    Elevated brain natriuretic peptide (BNP) level    Statin intolerance, tried atorvastatin and pravastatin  -     rosuvastatin (CRESTOR) 5 MG tablet; Take 1 tablet (5 mg total) by mouth once daily.    Atherosclerosis of native coronary artery of native heart without angina pectoris   -     High sensitivity CRP (Cardiac CRP); Future    - All medical issues reviewed, willing to try Crestor  - Need to restart CPAP, explained as risk factor for PAF  - CV status stable, all medications reviewed, patient  acknowledge good understanding.  - Recommend healthy living: no nicotine, moderate alcohol, healthy diet and regular exercise aiming for fitness, and weight control   - Instruction for Mediterranean diet and heart healthy exercise given.  - Check home blood pressure, 2 days weekly, do 2 readings within 5 minutes in AM and PM, keep log for review.  - Weigh twice weekly, try to lose 1-2 lbs per week. Target weight loss of 5%-10%.  - Highly recommend 30 minutes of exercise / activities daily, can have Sunday off, with 2-3 sessions of muscle strengthening weekly. A  would be very helpful.  - Recommend at least biannual cardiovascular evaluation in view of patient's significant risk factors. Patient's preference    The patient location is: home  The chief complaint leading to consultation is: post hospital DC for new onset PAF with RVR  Visit type: Virtual visit with synchronous audio and video  Total time spent with patient: 50  Each patient to whom he or she provides medical services by telemedicine is:  (1) informed of the relationship between the physician and patient and the respective role of any other health care provider with respect to management of the patient; and (2) notified that he or she may decline to receive medical services by telemedicine and may withdraw from such care at any time.      50 minutes and greater than 50% was spent in counseling and coordination of care. The above assessment and plan have been discussed at length. Prior physician's note reviewed. Labs and procedure over the last 6 months reviewed. Problem List updated. Asked to bring in all active medications / pills bottles with next visit.

## 2020-03-27 NOTE — TELEPHONE ENCOUNTER
Spoke with patients in regards to rescheduling virtual visit. Dr lovell had called patient back and discussed appointment further. Patient to follow up in 6 months.

## 2020-03-31 ENCOUNTER — TELEPHONE (OUTPATIENT)
Dept: HOME HEALTH SERVICES | Facility: HOSPITAL | Age: 80
End: 2020-03-31

## 2020-04-03 ENCOUNTER — PATIENT MESSAGE (OUTPATIENT)
Dept: FAMILY MEDICINE | Facility: CLINIC | Age: 80
End: 2020-04-03

## 2020-04-03 DIAGNOSIS — A49.8 PSEUDOMONAS AERUGINOSA INFECTION: ICD-10-CM

## 2020-04-03 RX ORDER — LEVOFLOXACIN 750 MG/1
750 TABLET ORAL DAILY
Qty: 7 TABLET | Refills: 0 | Status: SHIPPED | OUTPATIENT
Start: 2020-04-03 | End: 2020-04-10

## 2020-04-06 ENCOUNTER — PATIENT MESSAGE (OUTPATIENT)
Dept: FAMILY MEDICINE | Facility: CLINIC | Age: 80
End: 2020-04-06

## 2020-04-18 DIAGNOSIS — E03.9 HYPOTHYROIDISM, UNSPECIFIED TYPE: ICD-10-CM

## 2020-04-21 ENCOUNTER — TELEPHONE (OUTPATIENT)
Dept: FAMILY MEDICINE | Facility: CLINIC | Age: 80
End: 2020-04-21

## 2020-04-21 RX ORDER — LEVOTHYROXINE SODIUM 112 UG/1
TABLET ORAL
Qty: 30 TABLET | Refills: 10 | Status: SHIPPED | OUTPATIENT
Start: 2020-04-21 | End: 2021-03-15

## 2020-04-21 NOTE — TELEPHONE ENCOUNTER
----- Message from Kerry Zeng MA sent at 4/21/2020 10:01 AM CDT -----  Contact: Kandi  Type: Needs Medical Advice  Who Called:  Kandi - [facility]  Best Call Back Number:   Additional Information: nurse wanted to know if wound that is still draining and being treating with antibiotics needs to be cultured. She is on the way to the home now.  Please call to advise

## 2020-04-21 NOTE — TELEPHONE ENCOUNTER
Hypothyroidism, unspecified type  -     SYNTHROID 112 mcg tablet; TAKE ONE TABLET BY MOUTH EVERY DAY BEFORE BREAKFAST  Dispense: 30 tablet; Refill: 10  -     TSH; Future; Expected date: 10/21/2020

## 2020-04-21 NOTE — TELEPHONE ENCOUNTER
----- Message from Izabella Moraes sent at 4/21/2020  8:53 AM CDT -----  Type: Needs Medical Advice    Who Called:  (Kandi) Nurse with [facility]  Best Call Back Number: [phone]  Additional Information: Requesting to speak with nurse concerning acquiring wound culture test for patient due to wound on leg still draining/please call back to advise.

## 2020-04-23 ENCOUNTER — DOCUMENT SCAN (OUTPATIENT)
Dept: HOME HEALTH SERVICES | Facility: HOSPITAL | Age: 80
End: 2020-04-23
Payer: MEDICARE

## 2020-04-27 ENCOUNTER — TELEPHONE (OUTPATIENT)
Dept: FAMILY MEDICINE | Facility: CLINIC | Age: 80
End: 2020-04-27

## 2020-04-27 ENCOUNTER — DOCUMENT SCAN (OUTPATIENT)
Dept: HOME HEALTH SERVICES | Facility: HOSPITAL | Age: 80
End: 2020-04-27
Payer: MEDICARE

## 2020-04-28 ENCOUNTER — TELEPHONE (OUTPATIENT)
Dept: FAMILY MEDICINE | Facility: CLINIC | Age: 80
End: 2020-04-28

## 2020-04-28 ENCOUNTER — OFFICE VISIT (OUTPATIENT)
Dept: FAMILY MEDICINE | Facility: CLINIC | Age: 80
End: 2020-04-28
Payer: MEDICARE

## 2020-04-28 ENCOUNTER — NURSE TRIAGE (OUTPATIENT)
Dept: ADMINISTRATIVE | Facility: CLINIC | Age: 80
End: 2020-04-28

## 2020-04-28 VITALS
WEIGHT: 216.94 LBS | TEMPERATURE: 99 F | HEART RATE: 69 BPM | SYSTOLIC BLOOD PRESSURE: 158 MMHG | HEIGHT: 63 IN | DIASTOLIC BLOOD PRESSURE: 80 MMHG | BODY MASS INDEX: 38.44 KG/M2 | OXYGEN SATURATION: 97 %

## 2020-04-28 DIAGNOSIS — T50.905A ADVERSE EFFECT OF DRUG, INITIAL ENCOUNTER: Primary | ICD-10-CM

## 2020-04-28 DIAGNOSIS — I48.91 ATRIAL FIBRILLATION WITH RVR: ICD-10-CM

## 2020-04-28 DIAGNOSIS — I10 ESSENTIAL HYPERTENSION: ICD-10-CM

## 2020-04-28 DIAGNOSIS — L56.8 DRUG-INDUCED PHOTOSENSITIVITY: ICD-10-CM

## 2020-04-28 DIAGNOSIS — T50.905A DRUG-INDUCED PHOTOSENSITIVITY: ICD-10-CM

## 2020-04-28 DIAGNOSIS — L30.9 DERMATITIS: ICD-10-CM

## 2020-04-28 DIAGNOSIS — L29.9 ITCHING: ICD-10-CM

## 2020-04-28 PROCEDURE — 99213 OFFICE O/P EST LOW 20 MIN: CPT | Mod: HCNC,S$GLB,, | Performed by: FAMILY MEDICINE

## 2020-04-28 PROCEDURE — 3079F PR MOST RECENT DIASTOLIC BLOOD PRESSURE 80-89 MM HG: ICD-10-PCS | Mod: HCNC,CPTII,S$GLB, | Performed by: FAMILY MEDICINE

## 2020-04-28 PROCEDURE — 1159F PR MEDICATION LIST DOCUMENTED IN MEDICAL RECORD: ICD-10-PCS | Mod: HCNC,S$GLB,, | Performed by: FAMILY MEDICINE

## 2020-04-28 PROCEDURE — 1126F AMNT PAIN NOTED NONE PRSNT: CPT | Mod: HCNC,S$GLB,, | Performed by: FAMILY MEDICINE

## 2020-04-28 PROCEDURE — 99999 PR PBB SHADOW E&M-EST. PATIENT-LVL III: ICD-10-PCS | Mod: PBBFAC,HCNC,, | Performed by: FAMILY MEDICINE

## 2020-04-28 PROCEDURE — 3079F DIAST BP 80-89 MM HG: CPT | Mod: HCNC,CPTII,S$GLB, | Performed by: FAMILY MEDICINE

## 2020-04-28 PROCEDURE — 1126F PR PAIN SEVERITY QUANTIFIED, NO PAIN PRESENT: ICD-10-PCS | Mod: HCNC,S$GLB,, | Performed by: FAMILY MEDICINE

## 2020-04-28 PROCEDURE — 1101F PT FALLS ASSESS-DOCD LE1/YR: CPT | Mod: HCNC,CPTII,S$GLB, | Performed by: FAMILY MEDICINE

## 2020-04-28 PROCEDURE — 3077F SYST BP >= 140 MM HG: CPT | Mod: HCNC,CPTII,S$GLB, | Performed by: FAMILY MEDICINE

## 2020-04-28 PROCEDURE — 1159F MED LIST DOCD IN RCRD: CPT | Mod: HCNC,S$GLB,, | Performed by: FAMILY MEDICINE

## 2020-04-28 PROCEDURE — 99999 PR PBB SHADOW E&M-EST. PATIENT-LVL III: CPT | Mod: PBBFAC,HCNC,, | Performed by: FAMILY MEDICINE

## 2020-04-28 PROCEDURE — 99213 PR OFFICE/OUTPT VISIT, EST, LEVL III, 20-29 MIN: ICD-10-PCS | Mod: HCNC,S$GLB,, | Performed by: FAMILY MEDICINE

## 2020-04-28 PROCEDURE — 3077F PR MOST RECENT SYSTOLIC BLOOD PRESSURE >= 140 MM HG: ICD-10-PCS | Mod: HCNC,CPTII,S$GLB, | Performed by: FAMILY MEDICINE

## 2020-04-28 PROCEDURE — 1101F PR PT FALLS ASSESS DOC 0-1 FALLS W/OUT INJ PAST YR: ICD-10-PCS | Mod: HCNC,CPTII,S$GLB, | Performed by: FAMILY MEDICINE

## 2020-04-28 RX ORDER — AMIODARONE HYDROCHLORIDE 200 MG/1
200 TABLET ORAL DAILY
Qty: 10 TABLET | Refills: 0 | Status: SHIPPED | OUTPATIENT
Start: 2020-04-28 | End: 2021-03-22

## 2020-04-28 RX ORDER — AMIODARONE HYDROCHLORIDE 200 MG/1
200 TABLET ORAL DAILY
Qty: 10 TABLET | Refills: 0 | Status: CANCELLED | OUTPATIENT
Start: 2020-04-28 | End: 2021-04-28

## 2020-04-28 RX ORDER — HYDROXYZINE HYDROCHLORIDE 10 MG/1
10 TABLET, FILM COATED ORAL 3 TIMES DAILY PRN
Qty: 30 TABLET | Refills: 0 | Status: SHIPPED | OUTPATIENT
Start: 2020-04-28 | End: 2020-05-08

## 2020-04-28 RX ORDER — TRIAMCINOLONE ACETONIDE 0.25 MG/G
CREAM TOPICAL 2 TIMES DAILY
Qty: 80 G | Refills: 0 | Status: SHIPPED | OUTPATIENT
Start: 2020-04-28 | End: 2022-03-11

## 2020-04-28 NOTE — PROGRESS NOTES
Subjective:       Patient ID: Heather Hughes is a 79 y.o. female.    Chief Complaint: Rash    HPI   Patient presents for medication problem and for evaluation of a rash on sun-exposed areas including the face, upper chest and arms bilateral as she could not be seen by her family doctor (Dr. Mazariegos) today.  She tells me she feels okay today but believes her rash has made her chronic issues with dry skin and itching worse.  The rash has been present past 2-3 weeks but was much less severe before she had recent outdoor exposure a few days ago.  She tells me her home health nurse advised her this was likely caused by her exposure to sunlight and use of antibiotics but she is unsure if this is the case as she tells me it was not a very loren day and she only had a few hours of exposure.  She notes she has looked for other causes of rash as she has had dermatitis secondary to clothing detergent and other products previously but she has not been able to identify any changes in any type of personal products, detergents, plant or other outdoor exposure, animal exposure, any new foods or any new medications outside of 3 rounds of antibiotics for lower extremity cellulitis which has been healing well with home health wound care monitoring closely.  Outside of dryness and itching she denies any other associated symptoms and tells me she has not taken anything for symptoms with exception of a very old prescription of Atarax that she previously used with a past allergic reaction.  This worked very well to resolve her itching for 5-6 hours without any adverse effects.  She was recommended by the home health nurse to use a moisturizer or aloe but has not used any topical products yet.  She tells me her only other problem is uncontrolled blood pressures recently with running out of her amiodarone.  She tells me she is unsure of how this happened but has been taking her medication as directed and was advised at the pharmacy  "that her insurance would not pay for her to fill the medication early.  She believes she needs 8-10 tablets of the medication to get her to her next refill and is willing to pay out-of-pocket for this.  She reports prior to running out of the medication her blood pressures were well controlled.  The medication has been prescribed by Dr. Godfrey but she has not heard back about a refill and is requesting this today.    Review of Systems   Constitutional: Negative for activity change, appetite change, fatigue and unexpected weight change.   Respiratory: Negative for cough, chest tightness, shortness of breath and wheezing.    Cardiovascular: Positive for leg swelling. Negative for chest pain and palpitations.   Gastrointestinal: Negative for abdominal pain, blood in stool, constipation, diarrhea, nausea and vomiting.   Genitourinary: Negative for difficulty urinating.   Musculoskeletal: Negative for arthralgias, joint swelling and myalgias.   Skin: Positive for rash and wound.   Neurological: Negative for tremors, syncope and weakness.   Hematological: Negative for adenopathy. Does not bruise/bleed easily.   Psychiatric/Behavioral: Negative for dysphoric mood. The patient is not nervous/anxious.          Objective:      Vitals:    04/28/20 1440 04/28/20 1501   BP: (!) 174/82 (!) 158/80   Pulse: 69    Temp: 98.6 °F (37 °C)    SpO2: 97%    Weight: 98.4 kg (216 lb 14.9 oz)    Height: 5' 3" (1.6 m)    PainSc: 0-No pain      Physical Exam   Constitutional: She is oriented to person, place, and time. She appears well-developed and well-nourished. She is cooperative.  Non-toxic appearance. She does not have a sickly appearance. She does not appear ill. No distress.   HENT:   Head: Normocephalic and atraumatic.   Cardiovascular: Normal rate, regular rhythm and normal heart sounds. Exam reveals no gallop and no friction rub.   No murmur heard.  Pulmonary/Chest: Effort normal and breath sounds normal. No respiratory distress. She " has no wheezes. She exhibits no tenderness.   Abdominal: Soft. Bowel sounds are normal. She exhibits no distension and no mass. There is no tenderness. There is no rebound and no guarding.   Musculoskeletal: Normal range of motion. She exhibits no edema, tenderness or deformity.   Lymphadenopathy:     She has no cervical adenopathy.   Neurological: She is alert and oriented to person, place, and time.   Skin: Skin is warm and dry. Rash noted. She is not diaphoretic.   She has a slightly erythematous appearing rash over the sun-exposed areas of the face, neck, anterior chest and upper extremities bilateral.  She has extremely dry skin and flaking over the upper extremities bilateral but there are not bullae present and the skin is clean, closed and dry.  There is no improved herbal increased warmth over the areas of the face, neck or anterior chest and upper extremities her slightly warm a in areas although she reports sitting with her arms crossed prior to examination.  There are no masses or sinus tracts in the rash does not have the appearance of a cellulitis but that of a mild sunburn.  She is nontender to palpation over the areas of rash.    Bandages are in place over the lower extremity and removal was deferred.   Psychiatric: She has a normal mood and affect. Her behavior is normal. Judgment and thought content normal.   Nursing note and vitals reviewed.        Assessment:       1. Adverse effect of drug, initial encounter    2. Drug-induced photosensitivity    3. Itching    4. Dermatitis    5. Atrial fibrillation with RVR    6. Essential hypertension          Plan:   Adverse effect of drug, initial encounter    Drug-induced photosensitivity    Itching  -     hydroxyzine HCL (ATARAX) 10 MG Tab; Take 1 tablet (10 mg total) by mouth 3 (three) times daily as needed (itching).  Dispense: 30 tablet; Refill: 0    Dermatitis  -     triamcinolone acetonide 0.025% (KENALOG) 0.025 % cream; Apply topically 2 (two) times  daily.  Dispense: 80 g; Refill: 0    Atrial fibrillation with RVR  -     amiodarone (PACERONE) 200 MG Tab; Take 1 tablet (200 mg total) by mouth once daily.  Dispense: 10 tablet; Refill: 0    Essential hypertension      Follow up in about 1 week (around 5/5/2020), or if symptoms worsen or fail to improve.    Heather appears to be stable today but has a significant rash over sun-exposed areas of her body.  She was previously on several course of antibiotics including an extended course of Levaquin which is known to cause photosensitivity.  I advised her that I do not see any significant signs or symptoms of cellulitis or abscess and I agree with her home health nurse that this is a drug-induced photosensitivity reaction and symptoms will improve and resolve on their own over time without further sunlight exposure an antibiotic usage.  She has had significant itching apparently has a long history of issues with dermatitis.  I highly recommended use of the moisturizer and discussed recommendations for bathing to avoid drying the skin.  I also discussed use of topical triamcinolone cream which may be able to reduce the dryness and decreased itching quickly.  She was very interested in continue use Atarax for itching I discussed the risks and benefits of the medication with her.  I do have some concern about using sedating antihistamines at her age but as she has done well on the medication without any adverse effects with somewhat chronic sporadic use I did agree to provide her with a refill today.  Her blood pressure was significantly elevated but did improve somewhat on upon recheck before the end of the visit.  Although she has a history of hypertension she is actually not on any antihypertensive medication and apparently stopped lisinopril and hydralazine long ago reviewing her chart.  She believes that amiodarone is a medication for her blood pressure and I discussed with her that this is an anti dysrhythmic and is  treating her atrial fibrillation.  She does not appear to be in atrial fibrillation on exam with a regular rate and rhythm but I did recommend against her going without the medication for at least a week until her refill as authorized by her insurance and discussed the risks and benefits of the medication with her.  I asked my nurse to call the pharmacy to determine how many days of the medication she needs to get to her refill and was advised she needed a 10 day supply and provided her with this.  She believes that anxiety surrounding being out of the medication may be increasing her blood pressures but reviewing past blood pressure values these have not been optimally controlled for quite some time.  I did discuss restarting a blood pressure medication but she had no interest in this.  I recommended she check her blood pressure once daily, keep a log of these and make a virtual follow-up visit with Dr. Mazariegos in 1 week to review her blood pressure control as long as her rash and itching improve or resolve.  I am happy to see her back at any time if she has any other problems and cannot be seen by Dr. Mazariegos.    Heather was seen for 25 min visit today and greater than half this time was spent counseling on the above.

## 2020-04-28 NOTE — TELEPHONE ENCOUNTER
----- Message from Nasrin Mancera sent at 4/28/2020 12:09 PM CDT -----  Contact: Sarai caldera/ Nurse on call  Type:  Same Day Appointment Request    Caller is requesting a same day appointment.  Caller declined first available appointment listed below.      Name of Caller:  Sarai caldera/ On call  When is the first available appointment?  Needs to be seen today  Symptoms:   Raised bp and rash /on call adv'd pt needs to be seen in the office today    Best Call Back Number:  [phone]  Additional Information:   Pls call Kandi [place] [facility] nurse

## 2020-04-28 NOTE — TELEPHONE ENCOUNTER
Home health nurse calling w/ patient present. She reports patient's blood pressure is elevated at 186/86. Reports swelling w/ redness to bilateral lower arms. And bumps on patients left leg. Per protocol I advised she be seen today in the office. She would like to get in touch with a nurse from PCP's office.  Kandi @ [phone]        Reason for Disposition   Systolic BP >= 180 OR Diastolic >= 110    Additional Information   Negative: Sounds like a life-threatening emergency to the triager   Negative: Systolic BP >= 160 OR Diastolic >= 100, and any cardiac or neurologic symptoms (e.g., chest pain, difficulty breathing, unsteady gait, blurred vision)   Negative: Patient sounds very sick or weak to the triager   Negative: BP Systolic BP >= 140 OR Diastolic >= 90 and postpartum < 4 weeks   Negative: Systolic BP >= 180 OR Diastolic >= 110, and missed most recent dose of blood pressure medication    Protocols used: HIGH BLOOD PRESSURE-A-OH

## 2020-04-28 NOTE — TELEPHONE ENCOUNTER
Pharmacy verbalized that pt would only need 10 pills before next refill. Pending is the 10 pill request.

## 2020-05-05 ENCOUNTER — PATIENT MESSAGE (OUTPATIENT)
Dept: ADMINISTRATIVE | Facility: HOSPITAL | Age: 80
End: 2020-05-05

## 2020-05-06 PROCEDURE — G0179 PR HOME HEALTH MD RECERTIFICATION: ICD-10-PCS | Mod: ,,, | Performed by: FAMILY MEDICINE

## 2020-05-06 PROCEDURE — G0179 MD RECERTIFICATION HHA PT: HCPCS | Mod: ,,, | Performed by: FAMILY MEDICINE

## 2020-05-07 ENCOUNTER — EXTERNAL HOME HEALTH (OUTPATIENT)
Dept: HOME HEALTH SERVICES | Facility: HOSPITAL | Age: 80
End: 2020-05-07
Payer: MEDICARE

## 2020-05-08 ENCOUNTER — PATIENT MESSAGE (OUTPATIENT)
Dept: FAMILY MEDICINE | Facility: CLINIC | Age: 80
End: 2020-05-08

## 2020-05-08 NOTE — TELEPHONE ENCOUNTER
She saw Gabe Donohue who diagnosed her with venous stasis ulcers and arranged for an appointment with Dr. Stafford to see about having a venoplasty done.  She then developed cellulitis of the ankle and was given Keflex by Dr. Dangelo.  I suspect the venoplasty was postponed because of the ban on elective procedures.  I can have her see dermatology and physical therapy may be able to help with compression wrappings but I cannot make the corona virus which is interfering with everyone's schedules go away.  I am sorry she is having difficulty getting in with me but I have too many patients and my wait times are too long.  If she would like to go elsewhere we would be happy to forward her records

## 2020-05-14 ENCOUNTER — OFFICE VISIT (OUTPATIENT)
Dept: DERMATOLOGY | Facility: CLINIC | Age: 80
End: 2020-05-14
Payer: MEDICARE

## 2020-05-14 VITALS — BODY MASS INDEX: 38.44 KG/M2 | HEIGHT: 63 IN | WEIGHT: 216.94 LBS

## 2020-05-14 DIAGNOSIS — I87.2 STASIS DERMATITIS OF BOTH LEGS: ICD-10-CM

## 2020-05-14 DIAGNOSIS — R79.89 ELEVATED BRAIN NATRIURETIC PEPTIDE (BNP) LEVEL: ICD-10-CM

## 2020-05-14 DIAGNOSIS — I87.2 VENOUS STASIS DERMATITIS OF RIGHT LOWER EXTREMITY: Primary | ICD-10-CM

## 2020-05-14 DIAGNOSIS — D48.5 NEOPLASM OF UNCERTAIN BEHAVIOR OF SKIN: ICD-10-CM

## 2020-05-14 DIAGNOSIS — L98.9 DISEASE OF SKIN AND SUBCUTANEOUS TISSUE: Primary | ICD-10-CM

## 2020-05-14 PROCEDURE — 1159F PR MEDICATION LIST DOCUMENTED IN MEDICAL RECORD: ICD-10-PCS | Mod: HCNC,S$GLB,, | Performed by: DERMATOLOGY

## 2020-05-14 PROCEDURE — 88305 TISSUE EXAM BY PATHOLOGIST: CPT | Mod: 26,HCNC,, | Performed by: DERMATOLOGY

## 2020-05-14 PROCEDURE — 11104 PR PUNCH BIOPSY, SKIN, SINGLE LESION: ICD-10-PCS | Mod: HCNC,S$GLB,, | Performed by: DERMATOLOGY

## 2020-05-14 PROCEDURE — 88312 PR  SPECIAL STAINS,GROUP I: ICD-10-PCS | Mod: 26,HCNC,, | Performed by: DERMATOLOGY

## 2020-05-14 PROCEDURE — 1101F PR PT FALLS ASSESS DOC 0-1 FALLS W/OUT INJ PAST YR: ICD-10-PCS | Mod: HCNC,CPTII,S$GLB, | Performed by: DERMATOLOGY

## 2020-05-14 PROCEDURE — 1126F AMNT PAIN NOTED NONE PRSNT: CPT | Mod: HCNC,S$GLB,, | Performed by: DERMATOLOGY

## 2020-05-14 PROCEDURE — 1101F PT FALLS ASSESS-DOCD LE1/YR: CPT | Mod: HCNC,CPTII,S$GLB, | Performed by: DERMATOLOGY

## 2020-05-14 PROCEDURE — 88305 TISSUE EXAM BY PATHOLOGIST: ICD-10-PCS | Mod: 26,HCNC,, | Performed by: DERMATOLOGY

## 2020-05-14 PROCEDURE — 88305 TISSUE EXAM BY PATHOLOGIST: CPT | Mod: HCNC | Performed by: DERMATOLOGY

## 2020-05-14 PROCEDURE — 99999 PR PBB SHADOW E&M-EST. PATIENT-LVL III: ICD-10-PCS | Mod: PBBFAC,HCNC,, | Performed by: DERMATOLOGY

## 2020-05-14 PROCEDURE — 99202 PR OFFICE/OUTPT VISIT, NEW, LEVL II, 15-29 MIN: ICD-10-PCS | Mod: 25,HCNC,S$GLB, | Performed by: DERMATOLOGY

## 2020-05-14 PROCEDURE — 1159F MED LIST DOCD IN RCRD: CPT | Mod: HCNC,S$GLB,, | Performed by: DERMATOLOGY

## 2020-05-14 PROCEDURE — 1126F PR PAIN SEVERITY QUANTIFIED, NO PAIN PRESENT: ICD-10-PCS | Mod: HCNC,S$GLB,, | Performed by: DERMATOLOGY

## 2020-05-14 PROCEDURE — 88312 SPECIAL STAINS GROUP 1: CPT | Mod: HCNC | Performed by: DERMATOLOGY

## 2020-05-14 PROCEDURE — 11104 PUNCH BX SKIN SINGLE LESION: CPT | Mod: HCNC,S$GLB,, | Performed by: DERMATOLOGY

## 2020-05-14 PROCEDURE — 88312 SPECIAL STAINS GROUP 1: CPT | Mod: 26,HCNC,, | Performed by: DERMATOLOGY

## 2020-05-14 PROCEDURE — 99202 OFFICE O/P NEW SF 15 MIN: CPT | Mod: 25,HCNC,S$GLB, | Performed by: DERMATOLOGY

## 2020-05-14 PROCEDURE — 99999 PR PBB SHADOW E&M-EST. PATIENT-LVL III: CPT | Mod: PBBFAC,HCNC,, | Performed by: DERMATOLOGY

## 2020-05-14 RX ORDER — SILVER SULFADIAZINE 10 G/1000G
1 CREAM TOPICAL DAILY
COMMUNITY
Start: 2020-05-08 | End: 2020-08-12 | Stop reason: ALTCHOICE

## 2020-05-14 RX ORDER — FUROSEMIDE 20 MG/1
20 TABLET ORAL DAILY PRN
Qty: 90 TABLET | Refills: 3 | Status: SHIPPED | OUTPATIENT
Start: 2020-05-14 | End: 2021-05-25

## 2020-05-14 RX ORDER — TRIAMCINOLONE ACETONIDE 1 MG/G
CREAM TOPICAL
Qty: 454 G | Refills: 3 | Status: SHIPPED | OUTPATIENT
Start: 2020-05-14 | End: 2020-06-22

## 2020-05-14 NOTE — Clinical Note
Tavo Godfrey,Could you please put Mrs Hughes back on lasix to help manage her statis derm?Thank youGemini Amado

## 2020-05-14 NOTE — LETTER
May 14, 2020      Graham Mazariegos MD  [address]  [facility] [address]           [address], [address] [address]  Phone: [phone]          Patient: Heather Hughes   MR Number: [id number]   YOB: 1940   Date of Visit: 5/14/2020       Dear Dr. Graham Mazariegos:    Thank you for referring Heather Hughes to me for evaluation. Attached you will find relevant portions of my assessment and plan of care.    If you have questions, please do not hesitate to call me. I look forward to following Heather Hughes along with you.    Sincerely,    Gemini Amado MD    Enclosure  CC:  No Recipients    If you would like to receive this communication electronically, please contact [email or web address] or [phone] to request more information on [employer] access.    For providers and/or their staff who would like to refer a patient to [facility], please contact us through our one-stop-shop provider referral line, [facility], at [phone].    If you feel you have received this communication in error or would no longer like to receive these types of communications, please e-mail [email or web address]

## 2020-05-14 NOTE — PROGRESS NOTES
"  Subjective:       Patient ID:  Heather Hughes is a 79 y.o. female who presents for   Chief Complaint   Patient presents with    Rash     bilateral lower legs    Spot     face     New patient  Previous derm Dr Swanson    Patient here today with c/o a rash to bilateral lower legs for several months, red,itchy, taking Atarax, aquaphor, has been on antibiotics, Silvadene cream, no relief  Left knee replacement august 2018, "that's when it started on the left knee"  Denies sun exposure    Also c/o spot on R cheek, changing    Denies PHX NMSC    Current Outpatient Medications:     acetaminophen (TYLENOL) 650 MG TbSR, Take 650 mg by mouth every 8 (eight) hours as needed (pain). , Disp: , Rfl:     albuterol 90 mcg/actuation inhaler, Inhale 2 puffs into the lungs every 6 (six) hours as needed for Wheezing., Disp: 18 g, Rfl: 2    amiodarone (PACERONE) 200 MG Tab, Take 1 tablet (200 mg total) by mouth once daily., Disp: 10 tablet, Rfl: 0    apixaban (ELIQUIS) 5 mg Tab, Take 1 tablet (5 mg total) by mouth 2 (two) times daily., Disp: 60 tablet, Rfl: 11    aspirin (ECOTRIN) 81 MG EC tablet, Take 81 mg by mouth once daily., Disp: , Rfl:     CALCIUM CARBONATE/VITAMIN D3 (CALCIUM 500 + D ORAL), Take 2 tablets by mouth once daily., Disp: , Rfl:     gabapentin (NEURONTIN) 100 MG capsule, Take 1 capsule (100 mg total) by mouth 3 (three) times daily., Disp: 90 capsule, Rfl: 5    glucosamine-chondroitin 500-400 mg tablet, Take 1 tablet by mouth 3 (three) times daily., Disp: , Rfl:     montelukast (SINGULAIR) 10 mg tablet, Take 1 tablet (10 mg total) by mouth every evening. (Patient taking differently: Take 10 mg by mouth as needed (rhinitis). ), Disp: 90 tablet, Rfl: 3    omeprazole (PRILOSEC) 40 MG capsule, TAKE 1 CAPSULE (40 MG TOTAL) BY MOUTH ONCE DAILY., Disp: 90 capsule, Rfl: 3    silver sulfADIAZINE 1% (SILVADENE) 1 % cream, 1 application once daily. Apply to affected area, Disp: , Rfl:     SYNTHROID 112 mcg " tablet, TAKE ONE TABLET BY MOUTH EVERY DAY BEFORE BREAKFAST, Disp: 30 tablet, Rfl: 10    triamcinolone acetonide 0.025% (KENALOG) 0.025 % cream, Apply topically 2 (two) times daily., Disp: 80 g, Rfl: 0    nitroGLYCERIN (NITROSTAT) 0.4 MG SL tablet, Place 1 tablet (0.4 mg total) under the tongue every 5 (five) minutes as needed for Chest pain. (Patient not taking: Reported on 5/14/2020), Disp: 25 tablet, Rfl: 3        Review of Systems   Skin: Positive for itching, rash and dry skin. Negative for daily sunscreen use.   Hematologic/Lymphatic: Bruises/bleeds easily.        Objective:    Physical Exam   Skin:   Areas Examined (abnormalities noted in diagram):   Head / Face Inspection Performed  Neck Inspection Performed  RUE Inspected  LUE Inspection Performed  RLE Inspected  LLE Inspection Performed                       Diagram Legend     Erythematous scaling macule/papule c/w actinic keratosis       Vascular papule c/w angioma      Pigmented verrucoid papule/plaque c/w seborrheic keratosis      Yellow umbilicated papule c/w sebaceous hyperplasia      Irregularly shaped tan macule c/w lentigo     1-2 mm smooth white papules consistent with Milia      Movable subcutaneous cyst with punctum c/w epidermal inclusion cyst      Subcutaneous movable cyst c/w pilar cyst      Firm pink to brown papule c/w dermatofibroma      Pedunculated fleshy papule(s) c/w skin tag(s)      Evenly pigmented macule c/w junctional nevus     Mildly variegated pigmented, slightly irregular-bordered macule c/w mildly atypical nevus      Flesh colored to evenly pigmented papule c/w intradermal nevus       Pink pearly papule/plaque c/w basal cell carcinoma      Erythematous hyperkeratotic cursted plaque c/w SCC      Surgical scar with no sign of skin cancer recurrence      Open and closed comedones      Inflammatory papules and pustules      Verrucoid papule consistent consistent with wart     Erythematous eczematous patches and plaques      "Dystrophic onycholytic nail with subungual debris c/w onychomycosis     Umbilicated papule    Erythematous-base heme-crusted tan verrucoid plaque consistent with inflamed seborrheic keratosis     Erythematous Silvery Scaling Plaque c/w Psoriasis     See annotation                          Assessment / Plan:      Pathology Orders:     Normal Orders This Visit    Specimen to Pathology, Dermatology     Comments:    Number of Specimens:->1  ------------------------->-------------------------  Spec 1 Procedure:->Biopsy  Spec 1 Clinical Impression:->photodistributed erytheamtous  scaly pruritic eruption arms and V neck, photoallergic drug  vs CTD vs other  Spec 1 Source:->R upper arm    Questions:    Procedure Type:  Dermatology and skin neoplasms    Number of Specimens:  1    ------------------------:  -------------------------    Spec 1 Procedure:  Biopsy    Spec 1 Clinical Impression:  photodistributed erytheamtous scaly pruritic eruption arms and V neck, photoallergic drug vs CTD vs other    Spec 1 Source:  R upper arm        Disease of skin and subcutaneous tissue  -     Specimen to Pathology, Dermatology  Punch biopsy procedure note:  Punch biopsy performed after verbal consent obtained. Area marked and prepped with alcohol. Approximately 1cc of 1% lidocaine with epinephrine injected. 3 mm disposable punch used to remove lesion. Hemostasis obtained and biopsy site closed with 1 Prolene sutures. Wound care instructions reviewed with patient and handout given.    Photodistributed, very itchy, past antibiotherapy, multiple courses, denies sun exposure  No sx/sy dermatomyositis    Stasis dermatitis of both legs  Tac 0.1% cream BID  Elevation and compression  Today, applied TAC cream, thick vaseline, non stick gauze, curlex and coban  patient declined unna boot, states "they tried that before and I reacted to it, especially the pink stuff" - we only have calamine unna in office  May apply OTC zinc oxide cream liberally " as well    Neoplasm of uncertain behavior of skin  Left nasal sidewall, favor benign  One week since onset  Reexamine at the time of SR (2 weeks)    Patient instructed in importance in daily sun protection of at least spf 30. Mineral sunscreen ingredients preferred (Zinc +/- Titanium).   Recommend Elta MD for daily use on face and neck.  Patient encouraged to wear hat for all outdoor exposure.   Also discussed sun avoidance and use of protective clothing.       Follow up in about 2 weeks (around 5/28/2020).     Addendum  MD Gemini Cruz MD             She was placed on Lasix 20 mg in 4/2019 during her hospitalization by my assistant KD Palacios. I have sent a Rx for daily use to [facility] mail order.     Dr. Godfrey

## 2020-05-14 NOTE — PATIENT INSTRUCTIONS
Punch Biopsy Wound Care    Your doctor has performed a punch biopsy today.  A band aid and antibiotic ointment has been placed over the site.  This should remain in place for 24 hours.  It is recommended that you keep the area dry for the first 24 hours.  After 24 hours, you may remove the band aid and wash the area with warm soap and water and apply Vaseline jelly.  Many patients prefer to use Neosporin or Bacitracin ointment.  This is acceptable; however know that you can develop an allergy to this medication even if you have used it safely for years.  It is important to keep the area moist.  Letting it dry out and get air slows healing time, will worsen the scar, and make it more difficult to remove the stitches if they were placed.  Band aid is optional after first 24 hours.      If you notice increasing redness, tenderness, pain, or yellow drainage at the biopsy or surgical site, please notify your doctor.  These are signs of an infection.    If your biopsy/surgical site is bleeding, apply firm pressure for 15 minutes straight.  Repeat for another 15 minutes, if it is still bleeding.   If the surgical site continues to bleed, then please contact your doctor.     [facility]  [address] [place] [address], [address] [address]  Dept: [phone]      XEROSIS (DRY SKIN)    Xerosis is the term for dry skin.  We all have a natural oil coating over our skin produced by the skin oil glands.  If this oil is removed, the skin becomes dry which can lead to cracking, which can lead to inflammation. Xerosis is usually a long-term problem that recurs often, especially in the winter.    1. Cause     Long hot baths or showers can remove our natural oil and lead to xerosis.  One should never take more than one bath or shower a day and for no longer than ten minutes.   Use of harsh soaps such as Zest, Dial, and Ivory can worsen and cause xerosis.   Cold winter weather worsens xerosis  because the amount of moisture contained in cold air is much less than the amount of moisture in warm air.    2. Treatment     Treatment is intended to restore the natural oil to your skin.  Keep the skin lubricated.     Do not take more than one bath or shower a day.  Use lukewarm water, not hot.  Hot water dries out the skin.     Use a gentle moisturizing soap such as Cetaphil soap, cerave gentle cleanser, Oil of Olay, Dove sensitive bar, Basis, Ivory moisture care, Restoraderm cleanser.     When toweling dry, dont rub.  Blot the skin so there is still some water left on the skin.  Immediately after toweling, you should apply a moisturizing cream from neck to toes such as Cerave cream, Cetaphil cream, Restoraderm or Eucerin Original Formula cream.  Alpha hydroxyacid lotions, i.e., AmLactin or Cerave SA, also work very well for preventing dry skin, but may burn when used on inflamed or reddened skin.     If you like to swim during the winter months, you should not use soap when getting out of the pool.  When you have finished swimming, rinse off the chlorine with cool to warm water.  If this will be the only shower of the day, then you may use Cetaphil or another mild soap to cleanse your skin.  After the shower, apply a moisturizing cream to all of the skin as above.    3. Additional recommendation:      Use unscented hypoallergenic detergent for your clothes, avoid softener or dryer sheets unless they are unscented and hypoallergenic (all free and clear; downy free and gentle).    [facility]; [address] [facility] [place] [address] Tel: [phone] Fax: [phone]

## 2020-05-15 ENCOUNTER — TELEPHONE (OUTPATIENT)
Dept: DERMATOLOGY | Facility: CLINIC | Age: 80
End: 2020-05-15

## 2020-05-15 NOTE — TELEPHONE ENCOUNTER
Spoke with Kandi, advised Dr Amado would like to have Triamcinolone applied once a day, covered with zinc cream then apply non-stick gauze, wrap with kerlix and coban.

## 2020-05-15 NOTE — TELEPHONE ENCOUNTER
----- Message from Kareen Jackson sent at 5/15/2020 10:54 AM CDT -----  Contact: Vania [facility]  Type: Needs Medical Advice    Who Called:  vania  Ferdinand Call Back Number: [phone]  Additional Information: Requesting a call back regarding pt was in clinic yesterday and pt stated to home health nurse that the doctor change pt wound care order and wanted to get confirmation that order has been changed   Please Advise ---Thank you

## 2020-05-21 LAB
COMMENT: NORMAL
FINAL PATHOLOGIC DIAGNOSIS: NORMAL
GROSS: NORMAL
MICROSCOPIC EXAM: NORMAL

## 2020-05-26 ENCOUNTER — OFFICE VISIT (OUTPATIENT)
Dept: DERMATOLOGY | Facility: CLINIC | Age: 80
End: 2020-05-26
Payer: MEDICARE

## 2020-05-26 VITALS — BODY MASS INDEX: 38.44 KG/M2 | WEIGHT: 216.94 LBS | HEIGHT: 63 IN

## 2020-05-26 DIAGNOSIS — I87.2 VENOUS STASIS DERMATITIS OF RIGHT LOWER EXTREMITY: ICD-10-CM

## 2020-05-26 DIAGNOSIS — B07.8 COMMON WART: Primary | ICD-10-CM

## 2020-05-26 DIAGNOSIS — L50.9 URTICARIAL DERMATITIS: ICD-10-CM

## 2020-05-26 PROCEDURE — 99999 PR PBB SHADOW E&M-EST. PATIENT-LVL III: ICD-10-PCS | Mod: PBBFAC,HCNC,, | Performed by: DERMATOLOGY

## 2020-05-26 PROCEDURE — 1126F AMNT PAIN NOTED NONE PRSNT: CPT | Mod: HCNC,S$GLB,, | Performed by: DERMATOLOGY

## 2020-05-26 PROCEDURE — 1126F PR PAIN SEVERITY QUANTIFIED, NO PAIN PRESENT: ICD-10-PCS | Mod: HCNC,S$GLB,, | Performed by: DERMATOLOGY

## 2020-05-26 PROCEDURE — 1101F PT FALLS ASSESS-DOCD LE1/YR: CPT | Mod: HCNC,CPTII,S$GLB, | Performed by: DERMATOLOGY

## 2020-05-26 PROCEDURE — 1101F PR PT FALLS ASSESS DOC 0-1 FALLS W/OUT INJ PAST YR: ICD-10-PCS | Mod: HCNC,CPTII,S$GLB, | Performed by: DERMATOLOGY

## 2020-05-26 PROCEDURE — 99999 PR PBB SHADOW E&M-EST. PATIENT-LVL III: CPT | Mod: PBBFAC,HCNC,, | Performed by: DERMATOLOGY

## 2020-05-26 PROCEDURE — 1159F MED LIST DOCD IN RCRD: CPT | Mod: HCNC,S$GLB,, | Performed by: DERMATOLOGY

## 2020-05-26 PROCEDURE — 99213 PR OFFICE/OUTPT VISIT, EST, LEVL III, 20-29 MIN: ICD-10-PCS | Mod: 25,HCNC,S$GLB, | Performed by: DERMATOLOGY

## 2020-05-26 PROCEDURE — 17110 DESTRUCTION B9 LES UP TO 14: CPT | Mod: HCNC,S$GLB,, | Performed by: DERMATOLOGY

## 2020-05-26 PROCEDURE — 99213 OFFICE O/P EST LOW 20 MIN: CPT | Mod: 25,HCNC,S$GLB, | Performed by: DERMATOLOGY

## 2020-05-26 PROCEDURE — 17110 PR DESTRUCTION BENIGN LESIONS UP TO 14: ICD-10-PCS | Mod: HCNC,S$GLB,, | Performed by: DERMATOLOGY

## 2020-05-26 PROCEDURE — 1159F PR MEDICATION LIST DOCUMENTED IN MEDICAL RECORD: ICD-10-PCS | Mod: HCNC,S$GLB,, | Performed by: DERMATOLOGY

## 2020-05-26 NOTE — PROGRESS NOTES
Subjective:       Patient ID:  Heather Hughes is a 79 y.o. female who presents for   Chief Complaint   Patient presents with    Follow-up     LOV: 05- with severe stasis derm on R lower leg ; states much better since resuming lasix and applying TAC cream. Wrapping loosely.  Urticarial rash cleared as well  Patient is very happy and grateful    She also has a spot on her R lower leg that she scratched and would like to have checked.    Punch BX- R upper arm  Skin, right upper, biopsy:   --PAPULAR URTICARIA Comment:  The histologic findings could also be   compatible with arthopod assault    Denies PHX NMSC      Current Outpatient Medications:     acetaminophen (TYLENOL) 650 MG TbSR, Take 650 mg by mouth every 8 (eight) hours as needed (pain). , Disp: , Rfl:     albuterol 90 mcg/actuation inhaler, Inhale 2 puffs into the lungs every 6 (six) hours as needed for Wheezing., Disp: 18 g, Rfl: 2    amiodarone (PACERONE) 200 MG Tab, Take 1 tablet (200 mg total) by mouth once daily., Disp: 10 tablet, Rfl: 0    apixaban (ELIQUIS) 5 mg Tab, Take 1 tablet (5 mg total) by mouth 2 (two) times daily., Disp: 60 tablet, Rfl: 11    aspirin (ECOTRIN) 81 MG EC tablet, Take 81 mg by mouth once daily., Disp: , Rfl:     CALCIUM CARBONATE/VITAMIN D3 (CALCIUM 500 + D ORAL), Take 2 tablets by mouth once daily., Disp: , Rfl:     furosemide (LASIX) 20 MG tablet, Take 1 tablet (20 mg total) by mouth daily as needed (For fluid retention)., Disp: 90 tablet, Rfl: 3    gabapentin (NEURONTIN) 100 MG capsule, Take 1 capsule (100 mg total) by mouth 3 (three) times daily., Disp: 90 capsule, Rfl: 5    glucosamine-chondroitin 500-400 mg tablet, Take 1 tablet by mouth 3 (three) times daily., Disp: , Rfl:     montelukast (SINGULAIR) 10 mg tablet, Take 1 tablet (10 mg total) by mouth every evening. (Patient taking differently: Take 10 mg by mouth as needed (rhinitis). ), Disp: 90 tablet, Rfl: 3    nitroGLYCERIN (NITROSTAT) 0.4 MG  SL tablet, Place 1 tablet (0.4 mg total) under the tongue every 5 (five) minutes as needed for Chest pain., Disp: 25 tablet, Rfl: 3    omeprazole (PRILOSEC) 40 MG capsule, TAKE 1 CAPSULE (40 MG TOTAL) BY MOUTH ONCE DAILY., Disp: 90 capsule, Rfl: 3    silver sulfADIAZINE 1% (SILVADENE) 1 % cream, 1 application once daily. Apply to affected area, Disp: , Rfl:     SYNTHROID 112 mcg tablet, TAKE ONE TABLET BY MOUTH EVERY DAY BEFORE BREAKFAST, Disp: 30 tablet, Rfl: 10    triamcinolone acetonide 0.025% (KENALOG) 0.025 % cream, Apply topically 2 (two) times daily., Disp: 80 g, Rfl: 0    triamcinolone acetonide 0.1% (KENALOG) 0.1 % cream, AAA legs and arms bid PRN itch, Disp: 454 g, Rfl: 3          Review of Systems   Skin: Positive for itching, rash and dry skin. Negative for daily sunscreen use.   Hematologic/Lymphatic: Bruises/bleeds easily.       Past Medical History:   Diagnosis Date    Allergy     Anticoagulant long-term use     Arthritis     Asthma     Coronary artery disease     stent x 1    Deep vein blood clot of right lower extremity 1965 & 1971    Full dentures     Heart attack 01/13/2017    Hypertension     Osteoporosis     Sleep apnea     Thyroid disease     Ulcer     Wears glasses          Objective:    Physical Exam   Constitutional: She appears well-developed and well-nourished. No distress.   Eyes: Lids are normal.    Neurological: She is alert and oriented to person, place, and time. She is not disoriented.   Psychiatric: She has a normal mood and affect. She is not agitated.   Skin:   Areas Examined (abnormalities noted in diagram):   Head / Face Inspection Performed  Neck Inspection Performed  RUE Inspected  LUE Inspection Performed  RLE Inspected  LLE Inspection Performed                       Diagram Legend     Erythematous scaling macule/papule c/w actinic keratosis       Vascular papule c/w angioma      Pigmented verrucoid papule/plaque c/w seborrheic keratosis      Yellow  umbilicated papule c/w sebaceous hyperplasia      Irregularly shaped tan macule c/w lentigo     1-2 mm smooth white papules consistent with Milia      Movable subcutaneous cyst with punctum c/w epidermal inclusion cyst      Subcutaneous movable cyst c/w pilar cyst      Firm pink to brown papule c/w dermatofibroma      Pedunculated fleshy papule(s) c/w skin tag(s)      Evenly pigmented macule c/w junctional nevus     Mildly variegated pigmented, slightly irregular-bordered macule c/w mildly atypical nevus      Flesh colored to evenly pigmented papule c/w intradermal nevus       Pink pearly papule/plaque c/w basal cell carcinoma      Erythematous hyperkeratotic cursted plaque c/w SCC      Surgical scar with no sign of skin cancer recurrence      Open and closed comedones      Inflammatory papules and pustules      Verrucoid papule consistent consistent with wart     Erythematous eczematous patches and plaques     Dystrophic onycholytic nail with subungual debris c/w onychomycosis     Umbilicated papule    Erythematous-base heme-crusted tan verrucoid plaque consistent with inflamed seborrheic keratosis     Erythematous Silvery Scaling Plaque c/w Psoriasis     See annotation      Assessment / Plan:        Common wart, R thigh x 2, requests treatment   Verbal consent obtained. 2 lesions removed with shallow shave removal after anesthesia with 1% lidocaine with epinephrine. Hemostasis achieved with aluminum chloride. No complications.    Venous stasis dermatitis of right lower extremity  Markedly improved  conitinue lasix 20mg daily (rx by Dr Godfrey)  Continue elevation  Ok to use less strong compression more often (was not able to put on tight stockings)    Urticarial dermatitis  markedlyl improved with improvement of legs  Form of Id?    Patient instructed in importance in daily sun protection of at least spf 30. Mineral sunscreen ingredients preferred (Zinc +/- Titanium).   Recommend Elta MD for daily use on face and  neck.  Patient encouraged to wear hat for all outdoor exposure.   Also discussed sun avoidance and use of protective clothing.               Follow up if symptoms worsen or fail to improve.

## 2020-06-18 ENCOUNTER — DOCUMENT SCAN (OUTPATIENT)
Dept: HOME HEALTH SERVICES | Facility: HOSPITAL | Age: 80
End: 2020-06-18
Payer: MEDICARE

## 2020-06-22 ENCOUNTER — HOSPITAL ENCOUNTER (OUTPATIENT)
Dept: RADIOLOGY | Facility: CLINIC | Age: 80
Discharge: HOME | End: 2020-06-22
Attending: FAMILY MEDICINE
Payer: MEDICARE

## 2020-06-22 ENCOUNTER — OFFICE VISIT (OUTPATIENT)
Dept: FAMILY MEDICINE | Facility: CLINIC | Age: 80
End: 2020-06-22
Payer: MEDICARE

## 2020-06-22 VITALS
SYSTOLIC BLOOD PRESSURE: 134 MMHG | DIASTOLIC BLOOD PRESSURE: 88 MMHG | OXYGEN SATURATION: 96 % | HEART RATE: 65 BPM | HEIGHT: 63 IN | TEMPERATURE: 97 F | BODY MASS INDEX: 38.62 KG/M2 | WEIGHT: 218 LBS
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
## 2020-06-22 DIAGNOSIS — S52.611A CLOSED DISPLACED FRACTURE OF STYLOID PROCESS OF RIGHT ULNA, INITIAL ENCOUNTER: Primary | ICD-10-CM

## 2020-06-22 PROCEDURE — 73110 X-RAY EXAM OF WRIST: CPT | Mod: TC,HCNC,FY,PO,RT

## 2020-06-22 PROCEDURE — 99214 PR OFFICE/OUTPT VISIT, EST, LEVL IV, 30-39 MIN: ICD-10-PCS | Mod: HCNC,25,S$GLB, | Performed by: FAMILY MEDICINE

## 2020-06-22 PROCEDURE — 73110 X-RAY EXAM OF WRIST: CPT | Mod: 26,HCNC,RT,S$GLB | Performed by: RADIOLOGY

## 2020-06-22 PROCEDURE — 1100F PTFALLS ASSESS-DOCD GE2>/YR: CPT | Mod: HCNC,CPTII,S$GLB, | Performed by: FAMILY MEDICINE

## 2020-06-22 PROCEDURE — 1159F MED LIST DOCD IN RCRD: CPT | Mod: HCNC,S$GLB,, | Performed by: FAMILY MEDICINE

## 2020-06-22 PROCEDURE — 1100F PR PT FALLS ASSESS DOC 2+ FALLS/FALL W/INJURY/YR: ICD-10-PCS | Mod: HCNC,CPTII,S$GLB, | Performed by: FAMILY MEDICINE

## 2020-06-22 PROCEDURE — 99214 OFFICE O/P EST MOD 30 MIN: CPT | Mod: HCNC,25,S$GLB, | Performed by: FAMILY MEDICINE

## 2020-06-22 PROCEDURE — 25600 CLTX DST RDL FX/EPHYS SEP WO: CPT | Mod: HCNC,RT,S$GLB, | Performed by: FAMILY MEDICINE

## 2020-06-22 PROCEDURE — 3288F PR FALLS RISK ASSESSMENT DOCUMENTED: ICD-10-PCS | Mod: HCNC,CPTII,S$GLB, | Performed by: FAMILY MEDICINE

## 2020-06-22 PROCEDURE — 1159F PR MEDICATION LIST DOCUMENTED IN MEDICAL RECORD: ICD-10-PCS | Mod: HCNC,S$GLB,, | Performed by: FAMILY MEDICINE

## 2020-06-22 PROCEDURE — 3079F PR MOST RECENT DIASTOLIC BLOOD PRESSURE 80-89 MM HG: ICD-10-PCS | Mod: HCNC,CPTII,S$GLB, | Performed by: FAMILY MEDICINE

## 2020-06-22 PROCEDURE — 73110 XR WRIST COMPLETE 3 VIEWS RIGHT: ICD-10-PCS | Mod: 26,HCNC,RT,S$GLB | Performed by: RADIOLOGY

## 2020-06-22 PROCEDURE — 3075F SYST BP GE 130 - 139MM HG: CPT | Mod: HCNC,CPTII,S$GLB, | Performed by: FAMILY MEDICINE

## 2020-06-22 PROCEDURE — 99999 PR PBB SHADOW E&M-EST. PATIENT-LVL IV: ICD-10-PCS | Mod: PBBFAC,HCNC,, | Performed by: FAMILY MEDICINE

## 2020-06-22 PROCEDURE — 3075F PR MOST RECENT SYSTOLIC BLOOD PRESS GE 130-139MM HG: ICD-10-PCS | Mod: HCNC,CPTII,S$GLB, | Performed by: FAMILY MEDICINE

## 2020-06-22 PROCEDURE — 99999 PR PBB SHADOW E&M-EST. PATIENT-LVL IV: CPT | Mod: PBBFAC,HCNC,, | Performed by: FAMILY MEDICINE

## 2020-06-22 PROCEDURE — 1125F PR PAIN SEVERITY QUANTIFIED, PAIN PRESENT: ICD-10-PCS | Mod: HCNC,S$GLB,, | Performed by: FAMILY MEDICINE

## 2020-06-22 PROCEDURE — 25600 PR CLOSED RX DIST RAD/ULNA FX: ICD-10-PCS | Mod: HCNC,RT,S$GLB, | Performed by: FAMILY MEDICINE

## 2020-06-22 PROCEDURE — 1125F AMNT PAIN NOTED PAIN PRSNT: CPT | Mod: HCNC,S$GLB,, | Performed by: FAMILY MEDICINE

## 2020-06-22 PROCEDURE — 3079F DIAST BP 80-89 MM HG: CPT | Mod: HCNC,CPTII,S$GLB, | Performed by: FAMILY MEDICINE

## 2020-06-22 PROCEDURE — 3288F FALL RISK ASSESSMENT DOCD: CPT | Mod: HCNC,CPTII,S$GLB, | Performed by: FAMILY MEDICINE

## 2020-06-22 RX ORDER — TRAMADOL HYDROCHLORIDE 50 MG/1
50 TABLET ORAL EVERY 6 HOURS PRN
Qty: 40 TABLET | Refills: 0 | Status: SHIPPED | OUTPATIENT
Start: 2020-06-22 | End: 2020-06-26 | Stop reason: SDUPTHER

## 2020-06-22 NOTE — PATIENT INSTRUCTIONS
Wrist Fracture, General  You have a broken bone (fracture) in your wrist. This may be a small crack or chip in the bone. Or it may be a major break, with the broken parts pushed out of position. Wrist fractures are often treated with a splint or cast. They take about 4 to 6 weeks to heal. Severe injuries may need surgery.    Home care  Follow these guidelines when caring for yourself at home:  · Keep your arm elevated to reduce pain and swelling. When sitting or lying down keep your arm above the level of your heart. You can do this by placing your arm on a pillow that rests on your chest or on a pillow at your side. This is most important during the first 2 days (48 hours) after the injury.  · Put an ice pack on the injured area. Do this for 20 minutes every 1 to 2 hours the first day for pain relief. You can make an ice pack by wrapping a plastic bag of ice cubes in a thin towel. As the ice melts, be careful that the cast or splint doesnt get wet. Continue using the ice pack 3 to 4 times a day for the next 2 days. Then use the ice pack as needed to ease pain and swelling.  · Keep the cast or splint completely dry at all times. Bathe with your cast or splint out of the water. Protect it with a large plastic bag, rubber-banded at the top end. If a fiberglass cast or splint gets wet, you can dry it with a hair dryer on a cool setting.  · You may use acetaminophen or ibuprofen to control pain, unless another pain medicine was prescribed. If you have chronic liver or kidney disease, talk with your healthcare provider before using these medicines. Also talk with your provider if youve had a stomach ulcer or gastrointestinal bleeding.  · Dont put creams, lotions, or objects under the cast.  Follow-up care  Follow up with your healthcare provider, or as advised. This is to make sure the bone is healing the way it should. If a splint was put on, it may be changed to a cast during your follow-up visit. A cast may need  to be changed at 2 to 3 weeks, as the swelling goes down.  If X-rays were taken, a radiologist may look at them. You will be told of any new findings that may affect your care.  When to seek medical advice  Call your healthcare provider right away if any of these occur:  · The plaster cast or splint becomes wet or soft  · The cast or splint cracks  · Bad odor from the cast or wound fluid stains the cast  · The fiberglass cast or splint stays wet for more than 24 hours  · Tightness or pain under the cast or splint gets worse  · Fingers become swollen, cold, blue, numb, or tingly  · You cant move your fingers  · Skin around cast becomes red, swollen, or irritated  Date Last Reviewed: 5/1/2017  © 1150-2791 [employer]. [address] [place] [address], [place] [address]. All rights reserved. This information is not intended as a substitute for professional medical care. Always follow your healthcare professional's instructions.

## 2020-06-22 NOTE — PROGRESS NOTES
Subjective:          Chief Complaint: Heather Hguhes is a 80 y.o. female who had concerns including Fall.    Patient here today for evaluation of a right wrist injury.  Yesterday she was working outside picking tomatoes when she placed on to her walker and was entering her house.  Several tomatoes fell of her walker and she reached down to pick them up.  When she reached down she slipped and fell on her outstretched arm.  She felt pain in the area initially but did not think it was anything serious.  She woke up with her right wrist swollen and black and blue.  It is painful and she has limited range of motion.  She has taken Tylenol for pain which she says is affective.       Review of Systems   Constitution: Negative for chills and decreased appetite.   HENT: Negative for congestion and sore throat.    Eyes: Negative for blurred vision.   Cardiovascular: Negative for chest pain, dyspnea on exertion and palpitations.   Respiratory: Negative for cough and shortness of breath.    Skin: Negative for rash.   Neurological: Negative for difficulty with concentration, disturbances in coordination and headaches.   Psychiatric/Behavioral: Negative for altered mental status, depression, hallucinations, memory loss and suicidal ideas.                   Objective:        General: Heather is well-developed, well-nourished, appears stated age, in no acute distress, alert and oriented to time, place and person.     General    Nursing note and vitals reviewed.  Constitutional: She is oriented to person, place, and time. She appears well-developed and well-nourished.   HENT:   Nose: Nose normal.   Eyes: EOM are normal. Pupils are equal, round, and reactive to light.   Neck: Normal range of motion. Neck supple.   Cardiovascular: Normal rate.    Pulmonary/Chest: Effort normal.   Abdominal: Soft.   Neurological: She is alert and oriented to person, place, and time. She has normal reflexes.   Psychiatric: She has a normal mood and  affect. Her behavior is normal. Judgment and thought content normal.             Right Hand/Wrist Exam     Inspection   Effusion: Wrist - present   Bruising: Wrist - present   Deformity: Wrist - deformity     Pain   Wrist - The patient exhibits pain of the medial epicondyle and lateral epicondyle.    Swelling   Wrist - The patient is swollen on the medial epicondyle and lateral epicondyle.    Tenderness   The patient is tender to palpation of the ulnar area and radial area.    Range of Motion     Wrist   Extension:  20 abnormal   Flexion:  40 abnormal   Pronation: abnormal   Supination: abnormal     Other     Neuorologic Exam    Ulnar Distribution: normal      Left Hand/Wrist Exam     Inspection   Scars: Wrist - present   Effusion: Wrist - absent           Muscle Strength   Right Upper Extremity   Wrist extension: 4/5/5   Wrist flexion: 4/5/5   : 5/5/5     Vascular Exam       Capillary Refill  Right Hand: normal capillary refill    X-ray images ordered obtained interpreted by me.  They show a mildly displaced fracture of the ulnar styloid.  There is also a questionable impact fracture versus old epicondylar fracture of the distal radius.  Unsure if this is acute finding or old finding.  There is multiple degenerative changes of the wrist and fingers.          Assessment:       Encounter Diagnoses   Name Primary?    Fall, initial encounter     Closed displaced fracture of styloid process of right ulna, initial encounter Yes          Plan:         Patient needs short-arm immobilization for her distal ulnar fracture and possible distal radius fracture.  Order wrote for short-arm exos splint the patient will  at orthotics center down the street.  Will have patient follow-up in two weeks for recheck.    Will also give tramadol for breakthrough pain.                Patient questionnaires may have been collected.

## 2020-06-23 ENCOUNTER — TELEPHONE (OUTPATIENT)
Dept: CARDIOLOGY | Facility: CLINIC | Age: 80
End: 2020-06-23

## 2020-06-23 NOTE — TELEPHONE ENCOUNTER
Patient states she does not take crestor and she can not take that type of medication, she stated she has to many side effects and refuses to take it.

## 2020-06-23 NOTE — TELEPHONE ENCOUNTER
----- Message from Chet Godfrey MD sent at 6/23/2020 12:05 PM CDT -----  Does not appears she is taking the Crestor, recommended during her visit in 3/2020. Please review with patient, thanks, Dr. Godfrey

## 2020-06-26 ENCOUNTER — TELEPHONE (OUTPATIENT)
Dept: FAMILY MEDICINE | Facility: CLINIC | Age: 80
End: 2020-06-26

## 2020-06-26 RX ORDER — TRAMADOL HYDROCHLORIDE 50 MG/1
50 TABLET ORAL EVERY 6 HOURS PRN
Qty: 40 TABLET | Refills: 0 | Status: SHIPPED | OUTPATIENT
Start: 2020-06-26 | End: 2020-07-08 | Stop reason: SDUPTHER

## 2020-06-26 NOTE — TELEPHONE ENCOUNTER
"Called pt to inform that I will cancel meds with mail delivery and have them sent to pharmacy. Pt verbalized "whatever".   "

## 2020-06-26 NOTE — TELEPHONE ENCOUNTER
----- Message from Caroline Arciniega sent at 6/25/2020  3:16 PM CDT -----  Type:  RX Refill Request    Who Called:  Patient  Refill or New Rx:  refill  RX Name and Strength:  traMADoL (ULTRAM) 50 mg tablet  How is the patient currently taking it? (ex. 1XDay):  as directed  Is this a 30 day or 90 day RX:  30  Preferred Pharmacy with phone number:    [facility] [address] [facility] [address] [address] [facility] [address]  [place] [address]  Phone: [phone] Fax: [phone]  Local or Mail Order:  Local  Ordering Provider:  Loreto Streeter Call Back Number:  [phone]  Additional Information:  Patients pain medication for arm fracture was sent through mail order, needs it called locally-Please advise-thank you

## 2020-06-26 NOTE — TELEPHONE ENCOUNTER
"Pt is in pain and meds are only in "final stage" to be shipped. Pt stated she needs meds for pain ASAP  "

## 2020-07-01 ENCOUNTER — PATIENT MESSAGE (OUTPATIENT)
Dept: SPORTS MEDICINE | Facility: CLINIC | Age: 80
End: 2020-07-01

## 2020-07-05 PROCEDURE — G0179 MD RECERTIFICATION HHA PT: HCPCS | Mod: ,,, | Performed by: FAMILY MEDICINE

## 2020-07-05 PROCEDURE — G0179 PR HOME HEALTH MD RECERTIFICATION: ICD-10-PCS | Mod: ,,, | Performed by: FAMILY MEDICINE

## 2020-07-08 ENCOUNTER — EXTERNAL HOME HEALTH (OUTPATIENT)
Dept: HOME HEALTH SERVICES | Facility: HOSPITAL | Age: 80
End: 2020-07-08
Payer: MEDICARE

## 2020-07-08 ENCOUNTER — OFFICE VISIT (OUTPATIENT)
Dept: SPORTS MEDICINE | Facility: CLINIC | Age: 80
End: 2020-07-08
Payer: MEDICARE

## 2020-07-08 ENCOUNTER — HOSPITAL ENCOUNTER (OUTPATIENT)
Dept: RADIOLOGY | Facility: CLINIC | Age: 80
Discharge: HOME | End: 2020-07-08
Attending: FAMILY MEDICINE
Payer: MEDICARE

## 2020-07-08 VITALS
OXYGEN SATURATION: 97 % | DIASTOLIC BLOOD PRESSURE: 86 MMHG | HEIGHT: 63 IN | HEART RATE: 62 BPM | TEMPERATURE: 98 F | SYSTOLIC BLOOD PRESSURE: 154 MMHG | BODY MASS INDEX: 38.27 KG/M2 | WEIGHT: 216 LBS

## 2020-07-08 DIAGNOSIS — S52.611D CLOSED DISPLACED FRACTURE OF STYLOID PROCESS OF RIGHT ULNA WITH ROUTINE HEALING, SUBSEQUENT ENCOUNTER: Primary | ICD-10-CM

## 2020-07-08 DIAGNOSIS — S52.611D CLOSED DISPLACED FRACTURE OF STYLOID PROCESS OF RIGHT ULNA WITH ROUTINE HEALING, SUBSEQUENT ENCOUNTER: ICD-10-CM

## 2020-07-08 PROCEDURE — 1125F PR PAIN SEVERITY QUANTIFIED, PAIN PRESENT: ICD-10-PCS | Mod: HCNC,S$GLB,, | Performed by: FAMILY MEDICINE

## 2020-07-08 PROCEDURE — 3079F PR MOST RECENT DIASTOLIC BLOOD PRESSURE 80-89 MM HG: ICD-10-PCS | Mod: HCNC,CPTII,S$GLB, | Performed by: FAMILY MEDICINE

## 2020-07-08 PROCEDURE — 3288F FALL RISK ASSESSMENT DOCD: CPT | Mod: HCNC,CPTII,S$GLB, | Performed by: FAMILY MEDICINE

## 2020-07-08 PROCEDURE — 1100F PR PT FALLS ASSESS DOC 2+ FALLS/FALL W/INJURY/YR: ICD-10-PCS | Mod: HCNC,CPTII,S$GLB, | Performed by: FAMILY MEDICINE

## 2020-07-08 PROCEDURE — 1159F PR MEDICATION LIST DOCUMENTED IN MEDICAL RECORD: ICD-10-PCS | Mod: HCNC,S$GLB,, | Performed by: FAMILY MEDICINE

## 2020-07-08 PROCEDURE — 1159F MED LIST DOCD IN RCRD: CPT | Mod: HCNC,S$GLB,, | Performed by: FAMILY MEDICINE

## 2020-07-08 PROCEDURE — 99024 PR POST-OP FOLLOW-UP VISIT: ICD-10-PCS | Mod: HCNC,S$GLB,, | Performed by: FAMILY MEDICINE

## 2020-07-08 PROCEDURE — 3079F DIAST BP 80-89 MM HG: CPT | Mod: HCNC,CPTII,S$GLB, | Performed by: FAMILY MEDICINE

## 2020-07-08 PROCEDURE — 3077F SYST BP >= 140 MM HG: CPT | Mod: HCNC,CPTII,S$GLB, | Performed by: FAMILY MEDICINE

## 2020-07-08 PROCEDURE — 3288F PR FALLS RISK ASSESSMENT DOCUMENTED: ICD-10-PCS | Mod: HCNC,CPTII,S$GLB, | Performed by: FAMILY MEDICINE

## 2020-07-08 PROCEDURE — 3077F PR MOST RECENT SYSTOLIC BLOOD PRESSURE >= 140 MM HG: ICD-10-PCS | Mod: HCNC,CPTII,S$GLB, | Performed by: FAMILY MEDICINE

## 2020-07-08 PROCEDURE — 1125F AMNT PAIN NOTED PAIN PRSNT: CPT | Mod: HCNC,S$GLB,, | Performed by: FAMILY MEDICINE

## 2020-07-08 PROCEDURE — 99024 POSTOP FOLLOW-UP VISIT: CPT | Mod: HCNC,S$GLB,, | Performed by: FAMILY MEDICINE

## 2020-07-08 PROCEDURE — 73110 X-RAY EXAM OF WRIST: CPT | Mod: 26,HCNC,RT,S$GLB | Performed by: RADIOLOGY

## 2020-07-08 PROCEDURE — 99999 PR PBB SHADOW E&M-EST. PATIENT-LVL IV: ICD-10-PCS | Mod: PBBFAC,HCNC,, | Performed by: FAMILY MEDICINE

## 2020-07-08 PROCEDURE — 1100F PTFALLS ASSESS-DOCD GE2>/YR: CPT | Mod: HCNC,CPTII,S$GLB, | Performed by: FAMILY MEDICINE

## 2020-07-08 PROCEDURE — 73110 XR WRIST COMPLETE 3 VIEWS RIGHT: ICD-10-PCS | Mod: 26,HCNC,RT,S$GLB | Performed by: RADIOLOGY

## 2020-07-08 PROCEDURE — 99999 PR PBB SHADOW E&M-EST. PATIENT-LVL IV: CPT | Mod: PBBFAC,HCNC,, | Performed by: FAMILY MEDICINE

## 2020-07-08 PROCEDURE — 73110 X-RAY EXAM OF WRIST: CPT | Mod: TC,HCNC,FY,PO,RT

## 2020-07-08 RX ORDER — TRAMADOL HYDROCHLORIDE 50 MG/1
50 TABLET ORAL EVERY 6 HOURS PRN
Qty: 30 TABLET | Refills: 0 | Status: SHIPPED | OUTPATIENT
Start: 2020-07-08 | End: 2020-08-12

## 2020-07-08 RX ORDER — TRAMADOL HYDROCHLORIDE 50 MG/1
50 TABLET ORAL EVERY 6 HOURS PRN
Qty: 30 TABLET | Refills: 0 | Status: SHIPPED | OUTPATIENT
Start: 2020-07-08 | End: 2020-07-08

## 2020-07-08 NOTE — PATIENT INSTRUCTIONS
Wrist Fracture, General  You have a broken bone (fracture) in your wrist. This may be a small crack or chip in the bone. Or it may be a major break, with the broken parts pushed out of position. Wrist fractures are often treated with a splint or cast. They take about 4 to 6 weeks to heal. Severe injuries may need surgery.    Home care  Follow these guidelines when caring for yourself at home:  · Keep your arm elevated to reduce pain and swelling. When sitting or lying down keep your arm above the level of your heart. You can do this by placing your arm on a pillow that rests on your chest or on a pillow at your side. This is most important during the first 2 days (48 hours) after the injury.  · Put an ice pack on the injured area. Do this for 20 minutes every 1 to 2 hours the first day for pain relief. You can make an ice pack by wrapping a plastic bag of ice cubes in a thin towel. As the ice melts, be careful that the cast or splint doesnt get wet. Continue using the ice pack 3 to 4 times a day for the next 2 days. Then use the ice pack as needed to ease pain and swelling.  · Keep the cast or splint completely dry at all times. Bathe with your cast or splint out of the water. Protect it with a large plastic bag, rubber-banded at the top end. If a fiberglass cast or splint gets wet, you can dry it with a hair dryer on a cool setting.  · You may use acetaminophen or ibuprofen to control pain, unless another pain medicine was prescribed. If you have chronic liver or kidney disease, talk with your healthcare provider before using these medicines. Also talk with your provider if youve had a stomach ulcer or gastrointestinal bleeding.  · Dont put creams, lotions, or objects under the cast.  Follow-up care  Follow up with your healthcare provider, or as advised. This is to make sure the bone is healing the way it should. If a splint was put on, it may be changed to a cast during your follow-up visit. A cast may need  to be changed at 2 to 3 weeks, as the swelling goes down.  If X-rays were taken, a radiologist may look at them. You will be told of any new findings that may affect your care.  When to seek medical advice  Call your healthcare provider right away if any of these occur:  · The plaster cast or splint becomes wet or soft  · The cast or splint cracks  · Bad odor from the cast or wound fluid stains the cast  · The fiberglass cast or splint stays wet for more than 24 hours  · Tightness or pain under the cast or splint gets worse  · Fingers become swollen, cold, blue, numb, or tingly  · You cant move your fingers  · Skin around cast becomes red, swollen, or irritated  Date Last Reviewed: 5/1/2017  © 0071-3521 [employer] [place]. [address] [place] [address], [place] [address]. All rights reserved. This information is not intended as a substitute for professional medical care. Always follow your healthcare professional's instructions.

## 2020-07-08 NOTE — PROGRESS NOTES
Subjective:          Chief Complaint: Heather Hughes is a 80 y.o. female who had concerns including Pain of the Right Wrist.    Here for fracture follow-up of distal ulnar styloid as well as possible distal radius.  Patient is currently in a neck so splint.  Reports compliance and wearing excess splint.  States she is still experiencing pain in her wrist.  Denies any re-injury or exacerbation of her initial fracture.          Review of Systems   Constitution: Negative for chills and decreased appetite.   HENT: Negative for congestion and sore throat.    Eyes: Negative for blurred vision.   Cardiovascular: Negative for chest pain, dyspnea on exertion and palpitations.   Respiratory: Negative for cough and shortness of breath.    Skin: Negative for rash.   Neurological: Negative for difficulty with concentration, disturbances in coordination and headaches.   Psychiatric/Behavioral: Negative for altered mental status, depression, hallucinations, memory loss and suicidal ideas.                   Objective:        General: Heather is well-developed, well-nourished, appears stated age, in no acute distress, alert and oriented to time, place and person.     General    Nursing note and vitals reviewed.  Constitutional: She is oriented to person, place, and time. She appears well-developed and well-nourished.   HENT:   Nose: Nose normal.   Eyes: EOM are normal. Pupils are equal, round, and reactive to light.   Neck: Normal range of motion. Neck supple.   Cardiovascular: Normal rate.    Pulmonary/Chest: Effort normal.   Abdominal: Soft.   Neurological: She is alert and oriented to person, place, and time. She has normal reflexes.   Psychiatric: She has a normal mood and affect. Her behavior is normal. Judgment and thought content normal.             Right Hand/Wrist Exam     Inspection   Effusion: Wrist - present   Bruising: Wrist - present   Deformity: Wrist - deformity     Pain   Wrist - The patient exhibits pain of  the medial epicondyle and lateral epicondyle.    Swelling   Wrist - The patient is swollen on the medial epicondyle and lateral epicondyle.    Tenderness   The patient is tender to palpation of the ulnar area and radial area.    Range of Motion     Wrist   Extension:  20 abnormal   Flexion:  40 abnormal   Pronation: abnormal   Supination: abnormal     Other     Neuorologic Exam    Ulnar Distribution: normal      Left Hand/Wrist Exam     Inspection   Scars: Wrist - present   Effusion: Wrist - absent           Muscle Strength   Right Upper Extremity   Wrist extension: 4/5/5   Wrist flexion: 4/5/5   : 5/5/5     Vascular Exam       Capillary Refill  Right Hand: normal capillary refill      X-ray images ordered obtained interpreted by me.  They show displaced ulnar styloid fracture was seen in previous x-rays however there is some element of fragment restoration with new bony callus formation noted on this x-ray.  Distal radius defect appears unchanged giving evidence that this is likely an old fracture.        Assessment:       Encounter Diagnosis   Name Primary?    Closed displaced fracture of styloid process of right ulna with routine healing, subsequent encounter Yes          Plan:         Fractures progressing well.  Will remain excess splint for the time being in follow-up in four weeks for repeat x-rays and recheck of fracture.                  Patient questionnaires may have been collected.

## 2020-07-30 DIAGNOSIS — I48.91 ATRIAL FIBRILLATION, UNSPECIFIED TYPE: Primary | ICD-10-CM

## 2020-07-31 ENCOUNTER — PATIENT OUTREACH (OUTPATIENT)
Dept: ADMINISTRATIVE | Facility: HOSPITAL | Age: 80
End: 2020-07-31

## 2020-07-31 DIAGNOSIS — Z78.0 ASYMPTOMATIC MENOPAUSAL STATE: ICD-10-CM

## 2020-07-31 DIAGNOSIS — R53.1 ASTHENIA: Primary | ICD-10-CM

## 2020-08-03 ENCOUNTER — OFFICE VISIT (OUTPATIENT)
Dept: CARDIOLOGY | Facility: CLINIC | Age: 80
End: 2020-08-03
Payer: MEDICARE

## 2020-08-03 VITALS
OXYGEN SATURATION: 92 % | HEART RATE: 65 BPM | SYSTOLIC BLOOD PRESSURE: 211 MMHG | DIASTOLIC BLOOD PRESSURE: 93 MMHG | BODY MASS INDEX: 38.75 KG/M2 | WEIGHT: 218.69 LBS | HEIGHT: 63 IN

## 2020-08-03 DIAGNOSIS — I25.2 HISTORY OF MI (MYOCARDIAL INFARCTION): ICD-10-CM

## 2020-08-03 DIAGNOSIS — D50.0 IRON DEFICIENCY ANEMIA DUE TO CHRONIC BLOOD LOSS: ICD-10-CM

## 2020-08-03 DIAGNOSIS — G47.33 OSA ON CPAP: ICD-10-CM

## 2020-08-03 DIAGNOSIS — I44.7 LBBB (LEFT BUNDLE BRANCH BLOCK): ICD-10-CM

## 2020-08-03 DIAGNOSIS — E66.01 SEVERE OBESITY (BMI 35.0-39.9) WITH COMORBIDITY: ICD-10-CM

## 2020-08-03 DIAGNOSIS — Z95.5 STATUS POST INSERTION OF DRUG-ELUTING STENT INTO RIGHT CORONARY ARTERY FOR CORONARY ARTERY DISEASE: ICD-10-CM

## 2020-08-03 DIAGNOSIS — Z78.9 STATIN INTOLERANCE: Primary | ICD-10-CM

## 2020-08-03 DIAGNOSIS — I48.91 ATRIAL FIBRILLATION, UNSPECIFIED TYPE: ICD-10-CM

## 2020-08-03 DIAGNOSIS — R79.89 ELEVATED BRAIN NATRIURETIC PEPTIDE (BNP) LEVEL: ICD-10-CM

## 2020-08-03 DIAGNOSIS — I70.0 AORTIC ATHEROSCLEROSIS: ICD-10-CM

## 2020-08-03 DIAGNOSIS — I10 ESSENTIAL HYPERTENSION: ICD-10-CM

## 2020-08-03 DIAGNOSIS — Z79.01 ANTICOAGULANT LONG-TERM USE: ICD-10-CM

## 2020-08-03 DIAGNOSIS — M15.9 PRIMARY OSTEOARTHRITIS INVOLVING MULTIPLE JOINTS: ICD-10-CM

## 2020-08-03 DIAGNOSIS — Z79.899 LONG TERM CURRENT USE OF AMIODARONE: ICD-10-CM

## 2020-08-03 DIAGNOSIS — Z86.718 HISTORY OF DEEP VENOUS THROMBOSIS (DVT) OF DISTAL VEIN OF RIGHT LOWER EXTREMITY: ICD-10-CM

## 2020-08-03 PROBLEM — F43.9 STRESS AT HOME: Status: RESOLVED | Noted: 2019-08-22 | Resolved: 2020-08-03

## 2020-08-03 PROCEDURE — 3080F DIAST BP >= 90 MM HG: CPT | Mod: HCNC,CPTII,S$GLB, | Performed by: INTERNAL MEDICINE

## 2020-08-03 PROCEDURE — 93000 EKG 12-LEAD: ICD-10-PCS | Mod: HCNC,S$GLB,, | Performed by: INTERNAL MEDICINE

## 2020-08-03 PROCEDURE — 99499 RISK ADDL DX/OHS AUDIT: ICD-10-PCS | Mod: HCNC,S$GLB,, | Performed by: INTERNAL MEDICINE

## 2020-08-03 PROCEDURE — 1125F AMNT PAIN NOTED PAIN PRSNT: CPT | Mod: HCNC,S$GLB,, | Performed by: INTERNAL MEDICINE

## 2020-08-03 PROCEDURE — 1159F MED LIST DOCD IN RCRD: CPT | Mod: HCNC,S$GLB,, | Performed by: INTERNAL MEDICINE

## 2020-08-03 PROCEDURE — 3077F SYST BP >= 140 MM HG: CPT | Mod: HCNC,CPTII,S$GLB, | Performed by: INTERNAL MEDICINE

## 2020-08-03 PROCEDURE — 1125F PR PAIN SEVERITY QUANTIFIED, PAIN PRESENT: ICD-10-PCS | Mod: HCNC,S$GLB,, | Performed by: INTERNAL MEDICINE

## 2020-08-03 PROCEDURE — 99214 OFFICE O/P EST MOD 30 MIN: CPT | Mod: 25,HCNC,S$GLB, | Performed by: INTERNAL MEDICINE

## 2020-08-03 PROCEDURE — 99214 PR OFFICE/OUTPT VISIT, EST, LEVL IV, 30-39 MIN: ICD-10-PCS | Mod: 25,HCNC,S$GLB, | Performed by: INTERNAL MEDICINE

## 2020-08-03 PROCEDURE — 3080F PR MOST RECENT DIASTOLIC BLOOD PRESSURE >= 90 MM HG: ICD-10-PCS | Mod: HCNC,CPTII,S$GLB, | Performed by: INTERNAL MEDICINE

## 2020-08-03 PROCEDURE — 1101F PT FALLS ASSESS-DOCD LE1/YR: CPT | Mod: HCNC,CPTII,S$GLB, | Performed by: INTERNAL MEDICINE

## 2020-08-03 PROCEDURE — 3077F PR MOST RECENT SYSTOLIC BLOOD PRESSURE >= 140 MM HG: ICD-10-PCS | Mod: HCNC,CPTII,S$GLB, | Performed by: INTERNAL MEDICINE

## 2020-08-03 PROCEDURE — 99999 PR PBB SHADOW E&M-EST. PATIENT-LVL IV: ICD-10-PCS | Mod: PBBFAC,HCNC,, | Performed by: INTERNAL MEDICINE

## 2020-08-03 PROCEDURE — 1159F PR MEDICATION LIST DOCUMENTED IN MEDICAL RECORD: ICD-10-PCS | Mod: HCNC,S$GLB,, | Performed by: INTERNAL MEDICINE

## 2020-08-03 PROCEDURE — 99999 PR PBB SHADOW E&M-EST. PATIENT-LVL IV: CPT | Mod: PBBFAC,HCNC,, | Performed by: INTERNAL MEDICINE

## 2020-08-03 PROCEDURE — 93000 ELECTROCARDIOGRAM COMPLETE: CPT | Mod: HCNC,S$GLB,, | Performed by: INTERNAL MEDICINE

## 2020-08-03 PROCEDURE — 99499 UNLISTED E&M SERVICE: CPT | Mod: HCNC,S$GLB,, | Performed by: INTERNAL MEDICINE

## 2020-08-03 PROCEDURE — 1101F PR PT FALLS ASSESS DOC 0-1 FALLS W/OUT INJ PAST YR: ICD-10-PCS | Mod: HCNC,CPTII,S$GLB, | Performed by: INTERNAL MEDICINE

## 2020-08-03 NOTE — PROGRESS NOTES
Subjective:    Patient ID:  Heather Hughes is a 80 y.o. female who presents for follow-up of Blood Pressure Check  Also CAD post ARSLAN, history of MI, ALVINA off CPAP, new onset PAF now on amiodarone and NOAC, statin intolerance with imbalance and fall with fracture of the right wrist.  PCP: Dr. Mazariegos, only see as needed about every 4 months  Prior cardiologist: Dr. Ace, reviewed by Ms. Sawant, FNP in 4/2019, ordered CPAP  Vascular: Dr. Stafford, sclerosing procedure in 2/2020, planning additional follow up in future  Lives with , Chet, chronically ill, patient is the caretaker with stress  Son, Chet,   Daughter from [place], Angela, here with patient, also helps out.  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    Health literacy: medium  Activities: mostly sitting, no other exercise, sits a lot due to OA, knees and ankles  Nicotine: quit 2017, 5 py  Alcohol: none  Illicit drugs: none  Cardiac symptoms: none  Home BP: 130/78, HR 72  Medication compliance: yes, just can not take statin  Diet: regular, use little salt  Caffeine: 2-3 cpd, sleep well  Labs: 4/2018, .6, not on Rx, 4/2019 normal BMP, CBC (Hgb 9.2) iron low sat, normal TSH, no A1C  Lab Results   Component Value Date    TSH 1.663 03/10/2020        Lab Results   Component Value Date    HGBA1C 5.3 01/14/2017       Lab Results   Component Value Date    WBC 6.72 03/12/2020    HGB 10.6 (L) 03/12/2020    HCT 34.4 (L) 03/12/2020    MCV 94 03/12/2020     03/12/2020       CMP  Sodium   Date Value Ref Range Status   03/12/2020 138 136 - 145 mmol/L Final     Potassium   Date Value Ref Range Status   03/12/2020 4.4 3.5 - 5.1 mmol/L Final     Chloride   Date Value Ref Range Status   03/12/2020 104 95 - 110 mmol/L Final     CO2   Date Value Ref Range Status   03/12/2020 30 (H) 23 - 29 mmol/L Final     Glucose   Date Value Ref Range Status   03/12/2020 99 70 - 110 mg/dL Final     BUN, Bld   Date Value Ref Range Status   03/12/2020 14 8 - 23  mg/dL Final     Creatinine   Date Value Ref Range Status   03/12/2020 0.8 0.5 - 1.4 mg/dL Final     Calcium   Date Value Ref Range Status   03/12/2020 9.4 8.7 - 10.5 mg/dL Final     Total Protein   Date Value Ref Range Status   03/10/2020 6.8 6.0 - 8.4 g/dL Final     Albumin   Date Value Ref Range Status   03/10/2020 3.8 3.5 - 5.2 g/dL Final     Total Bilirubin   Date Value Ref Range Status   03/10/2020 0.3 0.1 - 1.0 mg/dL Final     Comment:     For infants and newborns, interpretation of results should be based  on gestational age, weight and in agreement with clinical  observations.  Premature Infant recommended reference ranges:  Up to 24 hours.............<8.0 mg/dL  Up to 48 hours............<12.0 mg/dL  3-5 days..................<15.0 mg/dL  6-29 days.................<15.0 mg/dL       Alkaline Phosphatase   Date Value Ref Range Status   03/10/2020 93 55 - 135 U/L Final     AST   Date Value Ref Range Status   03/10/2020 20 10 - 40 U/L Final     ALT   Date Value Ref Range Status   03/10/2020 14 10 - 44 U/L Final     Anion Gap   Date Value Ref Range Status   03/12/2020 4 (L) 8 - 16 mmol/L Final     eGFR if    Date Value Ref Range Status   03/12/2020 >60 >60 mL/min/1.73 m^2 Final     eGFR if non    Date Value Ref Range Status   03/12/2020 >60 >60 mL/min/1.73 m^2 Final     Comment:     Calculation used to obtain the estimated glomerular filtration  rate (eGFR) is the CKD-EPI equation.        @labrcntip(troponini)@    BNP   Date Value Ref Range Status   03/10/2020 1,035 (H) 0 - 99 pg/mL Final     Comment:     Values of less than 100 pg/ml are consistent with non-CHF populations.   }   Lab Results   Component Value Date    CHOL 236 (H) 06/22/2020    CHOL 223 (H) 04/11/2018    CHOL 172 01/14/2017     Lab Results   Component Value Date    HDL 68 06/22/2020    HDL 67 04/11/2018    HDL 55 01/14/2017     Lab Results   Component Value Date    LDLCALC 144.4 06/22/2020    LDLCALC 133.6  "04/11/2018    LDLCALC 103.4 01/14/2017     Lab Results   Component Value Date    TRIG 118 06/22/2020    TRIG 112 04/11/2018    TRIG 68 01/14/2017     Lab Results   Component Value Date    CHOLHDL 28.8 06/22/2020    CHOLHDL 30.0 04/11/2018    CHOLHDL 32.0 01/14/2017        Last Echo: DSE 5/2018, KIARA 3/2020  Last stress test: 5/2018  Cardiovascular angiogram: 1/2017 with ARSLAN to RCA  ECG: NSR normal, rate 65, LBBB  Fundoscopic exam: within the past year, negative for HTN retinopathy    In 8/2019:  WF here to check adherence use of CPAP started in 4/2019. Son report  noted better sleep. Summary report using 93.3% of the time. No heart worries. Had discussion about leg arthritis and cellulitis, resolve per Dr. Mazariegos, explained not due to CVD.     Dr. Ace noted "77 y.o. female with a past medical history of coronary artery disease     Patient is due to undergo left knee total replacement.  Had an angiogram or early part of this year.  She had a PCI to the right coronary artery.  There was an intermediate lesion in the LAD.  He is currently asymptomatic and denies any chest pain shortness of breath however her exercise capacity is extremely limited and she walks with a cane.  This is related to her knee issues."    DSE 5/2018 - Left ventricle ejection fraction is normal.  No stress induced wall motion abnormality  Negative for ischemia.    Venous US LE - 4/2019 -     In 3/2020, return post hospital for new onset AF with RVR, Doing well at home, BP better controlled, not using CPAP for last several months due to ankle problem with venous ulceration and being treated by Dr. Stafford.   DCS "presents to the ED today with an onset of heart "flutters," jaw pain, and shortness of breath. She reports that this all started on Sunday when she began to have jaw pain which resolved after taking nitroglycerin. Denied associated chest pain but reports that jaw pain was her symptom of her previous heart attack. Since that time " "she reports increased weakness and fatigue and feels like her heart is fluttering. Today her home health nurse came to address her venous stasis wounds for which she is currently taking Keflex for an infection of her right lower extremity which she reports has been improving and the nurse noted that her heart rate was irregular and elevated. She presented to the ED for further evaluation. Denies fever chills, denies nausea or vomiting. Denies decreased p.o. intake. Does report some increased anxiety secondary to her  being ill. Currently on aspirin and Plavix but not on full-dose blood thinner. EKG with AFib with RVR and possible new left bundle-branch block. Troponin mildly elevated. Patient is to be admitted for cardiology consult, diltiazem drip, TTE.     Also with new left bundle-branch block in ED and mildly elevated troponin. She was placed on a Cardizem drip and Cardiology was consulted. TTE was performed. Changed to amiodarone drip per Cardiology with plan for scheduled KIARA if no conversion to normal sinus rhythm with amiodarone. PT and OT consulted. Outpatient Keflex continued for lower extremity wound.. KIARA with cardioversion was performed on 03/12. Patient was transitioned to p.o. amiodarone after being on amiodarone drip for 24 hr. She was discharged home on p.o. amiodarone 200 mg t.i.d. x5 days then 200 mg b.i.d. until seen in clinic with Dr. Godfrey per recommendations of Dr. Echevarria. She was started on Eliquis 5 mg b.i.d. on discharge. Anticoagulation and fall/bleeding precautions explained to patient prior to discharge. Discharge plans discussed with Dr. Echevarria on day of discharge."    KIARA - "Mild concentric left ventricular hypertrophy.  Normal left ventricular systolic function. The estimated ejection fraction is 55%.  Atrial fibrillation observed.  Normal right ventricular systolic function.  Mild left atrial enlargement.  Mild right atrial enlargement.  No interatrial septal defect " "present.  Normal appearing left atrial appendage. No thrombus is present in the appendage.  A 150 J synchronized cardioversion was successfully performed with restoration of normal sinus rhythm."    HPI comments: in 8/2020, return for 6 months follow up, unable to take low dose Crestor, developed imbalance with unsteadiness and fall with fracture of the right wrist, noted a lot of OA. Do not want any more cholesterol medications.  CXR - The heart is enlarged and unchanged. There is slight prominence of the central pulmonary vasculature but no chanda pulmonary edema. No focal consolidation or pleural effusion. The aorta is calcified and ectatic.     Review of Systems   Constitution: Positive for weight gain (8 lbs since 3/2020). Negative for diaphoresis, fever, malaise/fatigue and night sweats.   HENT: Negative for nosebleeds and tinnitus.    Eyes: Negative for visual disturbance.   Cardiovascular: Negative for chest pain, claudication, cyanosis, dyspnea on exertion, irregular heartbeat, leg swelling, near-syncope, orthopnea, palpitations and paroxysmal nocturnal dyspnea.   Respiratory: Positive for snoring. Negative for cough, shortness of breath, sleep disturbances due to breathing and wheezing.         [place] score 9, today an 8, off CPAP from 10/2019   Endocrine: Positive for heat intolerance and polyuria. Negative for polydipsia.   Hematologic/Lymphatic: Bruises/bleeds easily.   Skin: Positive for dry skin, itching and rash. Negative for color change, flushing, nail changes, poor wound healing and suspicious lesions.   Musculoskeletal: Positive for arthritis, joint pain, joint swelling and muscle weakness. Negative for falls, gout, muscle cramps and myalgias.   Gastrointestinal: Positive for flatus. Negative for heartburn, hematemesis, hematochezia, melena and nausea.   Genitourinary: Positive for frequency and nocturia.   Neurological: Positive for excessive daytime sleepiness and loss of balance. Negative for " "disturbances in coordination, dizziness, focal weakness, headaches, light-headedness, numbness, vertigo and weakness.   Psychiatric/Behavioral: Negative for depression and substance abuse. The patient does not have insomnia and is not nervous/anxious.    Allergic/Immunologic: Positive for environmental allergies.        Objective:      Physical - Constitutional: She is oriented to person, place, and time. She appears well-developed and well-nourished.   HENT:   Head: Normocephalic.   Eyes: Pupils are equal, round, and reactive to light. Conjunctivae and EOM are normal.   Neck: Normal range of motion. Neck supple. No JVD present. No thyromegaly present.   Cardiovascular: Normal rate, regular rhythm and intact distal pulses. Exam reveals distant heart sounds. Exam reveals no gallop and no friction rub.   No murmur heard.  Pulses:       Carotid pulses are 1+ on the right side, and 1+ on the left side.       Radial pulses are 1+ on the right side, and 1+ on the left side.        Femoral pulses are 1+ on the right side, and 1+ on the left side.       Popliteal pulses are 1+ on the right side, and 1+ on the left side.        Right dorsalis pedis pulse not accessible and left dorsalis pedis pulse not accessible.        Right posterior tibial pulse not accessible and left posterior tibial pulse not accessible.   Pulmonary/Chest: Effort normal and breath sounds normal. She has no rales. She exhibits no tenderness.   Abdominal: Soft. Bowel sounds are normal. There is no tenderness.   Waist 48.5"   Musculoskeletal: Normal range of motion. She exhibits edema (1+ pitting in both LE).   Lymphadenopathy:     She has no cervical adenopathy.   Neurological: She is alert and oriented to person, place, and time.   Skin: Skin is warm and dry. No rash noted.    Assessment:       1. Statin intolerance, tried atorvastatin and pravastatin, refused Crestor    2. Atrial fibrillation, unspecified type    3. Primary osteoarthritis involving " multiple joints    4. Severe obesity (BMI 35.0-39.9) with comorbidity, today 38.7    5. Status post insertion of drug-eluting stent into right coronary artery for coronary artery disease,. 1/2017    6. ALVINA on CPAP, started 4/2019, stopped 10/2019    7. Iron deficiency anemia due to chronic blood loss    8. History of MI (myocardial infarction)    9. History of deep venous thrombosis (DVT) of distal vein of right lower extremity, 1965 and 1971    10. Essential hypertension    11. Anticoagulant long-term use    12. Long term current use of amiodarone    13. Aortic atherosclerosis    14. Elevated brain natriuretic peptide (BNP) level    15. LBBB (left bundle branch block), onset 8/2020         Plan:       Heather was seen today for blood pressure check.    Diagnoses and all orders for this visit:    Statin intolerance, tried atorvastatin and pravastatin, refused Crestor    Atrial fibrillation, unspecified type  -     EKG 12-lead    Primary osteoarthritis involving multiple joints    Severe obesity (BMI 35.0-39.9) with comorbidity, today 38.7    Status post insertion of drug-eluting stent into right coronary artery for coronary artery disease,. 1/2017    ALVINA on CPAP, started 4/2019, stopped 10/2019    Iron deficiency anemia due to chronic blood loss    History of MI (myocardial infarction)    History of deep venous thrombosis (DVT) of distal vein of right lower extremity, 1965 and 1971    Essential hypertension    Anticoagulant long-term use    Long term current use of amiodarone    Aortic atherosclerosis    Elevated brain natriuretic peptide (BNP) level    LBBB (left bundle branch block), onset 8/2020    - All medical issues reviewed, not willing to try new cholesterol Rx.  - Need to restart CPAP, explained as risk factor for PAF  - CV status stable, all medications reviewed, patient acknowledge good understanding.  - Recommend healthy living: no nicotine, moderate alcohol, healthy diet and regular exercise aiming for  fitness, and weight control   - Encourage activities as much as tolerated. Any activity is better than none!  - Instruction for Mediterranean diet and heart healthy exercise given.  - Check home blood pressure, 2 days weekly, do 2 readings within 5 minutes in AM and PM, keep log for review.  - Weigh twice weekly, try to lose 1-2 lbs per week. Target weight loss of 5%-10%.  - Highly recommend 30 minutes of exercise / activities daily, can have Sunday off, with 2-3 sessions of muscle strengthening weekly. A  would be very helpful.  - Recommend at least biannual cardiovascular evaluation in view of patient's significant risk factors. Patient's preference      50 minutes and greater than 50% was spent in counseling and coordination of care. The above assessment and plan have been discussed at length. Prior physician's note reviewed. Labs and procedure over the last 6 months reviewed. Problem List updated. Asked to bring in all active medications / pills bottles with next visit.

## 2020-08-03 NOTE — PATIENT INSTRUCTIONS
Discharge Instructions for Atrial Fibrillation  You have been diagnosed with an abnormal heart rhythm called atrial fibrillation. With this condition, your hearts 2 upper chambers quiver rather than squeeze the blood out in a normal pattern. This leads to an irregular and sometimes rapid heartbeat. Some people will develop associated symptoms such as a flip-flopping heartbeat, chest pain, lightheadedness, or shortness of breath. Other people may have no symptoms at all. Atrial fibrillation is serious because it affects the hearts ability to fill with blood as it should. Blood clots may form. This increases the risk for stroke. Untreated atrial fibrillation can also lead to heart failure. Atrial fibrillation can be controlled. With treatment, most people with atrial fibrillation lead normal lives.  Treatment options  Recommended treatment for atrial fibrillation depends on your age, symptoms, how long you have had atrial fibrillation, and other factors. You will have a complete evaluation to find out if you have any abnormalities that caused your heart to go into atrial fibrillation. This might be blocked heart arteries or a thyroid problem. Your doctor will assess your particular case and discuss choices with you.  Treatment choices may include:  · Treating an underlying disorder that puts you at risk for atrial fibrillation. For example, correcting an abnormal thyroid or electrolyte problem, or treating a blocked heart artery.  · Restoring a normal heart rhythm with an electrical shock (cardioversion) or with an antiarrhythmic medicine (chemical cardioversion)  · Using medicine to control your heart rate in atrial fibrillation.  · Preventing the risk for blood clot and stroke using blood-thinning medicines. Your doctor will tell you what he or she recommends. Choices may include aspirin, clopidogrel, warfarin, dabigatran, rivaroxaban, apixaban, and edoxaban.  · Doing catheter ablation or a surgical maze  procedure. These use different methods to destroy certain areas of heart tissue. This interrupts the electrical signals causing atrial fibrillation. One of these procedures may be a choice when medicines do not work, or as an alternative to long-term medicine.  · Other treatment choices may be recommended for you by your doctor.  Managing risk factors for stroke and preventing heart failure are important parts of any treatment plan for atrial fibrillation.  Home care  · Take your medicines exactly as directed. Dont skip doses.  · Work with your doctor to find the right medicines and doses for you.  · Learn to take your own pulse. Keep a record of your results. Ask your doctor which pulse rates mean that you need medical attention. Slowing your pulse is often the goal of treatment. Ask your doctor if its OK for you to use an automatic machine to check your pulse at home. Sometimes these machines dont count the pulse correctly when you have atrial fibrillation.  · Limit your intake of coffee, tea, cola, and other beverages with caffeine. Talk with your doctor about whether you should eliminate caffeine.  · Avoid over-the-counter medicines that have caffeine in them.  · Let your doctor know what medicines you take, including prescription and over-the-counter medicines, as well as any supplements. They interfere with some medicines given for atrial fibrillation.  · Ask your doctor about whether you can drink alcohol. Some people need to avoid alcohol to better treat atrial fibrillation. If you are taking blood-thinner medicines, alcohol may interfere with them by increasing their effect.  · Never take stimulants such as amphetamines or cocaine. These drugs can speed up your heart rate and trigger atrial fibrillation.  Follow-up care  Follow up with your doctor, or as advised.     When should I call my healthcare provider  Call your healthcare provider right away if you have any of the  following:  · Weakness  · Dizziness  · Fainting  · Fatigue  · Shortness of breath  · Chest pain with increased activity  · A change in the usual regularity of your heartbeat, or an unusually fast heartbeat   Date Last Reviewed: 4/23/2016 © 2000-2017 [employer]. [address] [place] [id number]. All rights reserved. This information is not intended as a substitute for professional medical care. Always follow your healthcare professional's instructions.        Understanding Atrial Fibrillation    An arrhythmia is any problem with the speed or pattern of the heartbeat. Atrial fibrillation (AFib) is a common type of arrhythmia. It causes fast, chaotic electrical signals in the atria. This leads to poor functioning of the heart. It also affects how much blood your heart can pump out to the body.  Afib may occur once in a while and go away on its own. Or it may continue for longer periods and need treatment.  AFib can lead to serious problems, such as stroke. Your healthcare provider will need to monitor and manage it.  What happens during atrial fibrillation?   The heart has an electrical system that sends signals to control the heartbeat. As signals move through the heart, they tell the hearts upper chambers (atria) and lower chambers (ventricles) when to squeeze (contract) and relax. This lets blood move through the heart and out to the body and lungs.  With AFib, the atria receive abnormal signals. This causes them to contract in a fast and irregular way, and out of sync with the ventricles. When this happens, the atria also have a harder time moving blood into the ventricles. Blood may then pool in the atria, which increases the risk for blood clots and stroke. The ventricles also may contract too quickly and irregularly. As a result, they may not pump blood to the body and lungs as well as they should. This can weaken the heart muscle over time and cause heart failure.  What causes atrial  fibrillation?  AFib is more common in older adults. It has many possible causes including:  · Coronary artery disease  · Heart valve disease  · Heart attack  · Heart surgery  · High blood pressure  · Thyroid disease  · Diabetes  · Lung disease  · Sleep apnea  · Heavy alcohol use  In some cases of AFib, doctors do not know the cause.  What are the symptoms of atrial fibrillation?  AFib may or may not cause symptoms. If symptoms do occur, they may include:  · A fast, pounding, irregular heartbeat  · Shortness of breath  · Tiredness  · Dizziness or fainting  · Chest pain  How is atrial fibrillation treated?  Treatments for AFib can include any of the options below.  · Medicines. You may be prescribed:  ¨ Heart rate medicines to help slow down the heartbeat  ¨ Heart rhythm medicines to help the heart beat more regularly  ¨ Anti-clotting medicines to help reduce the risk for blood clots and stroke  · Electrical cardioversion. Your healthcare provider uses special pads or paddles to send one or more brief electrical shocks to the heart. This can help reset the heartbeat to normal.  · Ablation. Long, thin tubes called catheters are threaded through a blood vessel to the heart. There, the catheters send out hot or cold energy to the areas causing the abnormal signals. This energy destroys the problem tissue or cells. This improves the chances that your heart will stay in normal rhythm without using medicines. If your heart rate and rhythm cant be controlled, you may need ablation and a pacemaker. These will help control the heart rate and regularity of the heartbeat.  · Surgery. During surgery, your healthcare provider may use different methods to create scar tissue in the areas of the heart causing the abnormal signals. The scar tissue disrupts the abnormal signals and may stop AFib from occurring.  What are the complications of atrial fibrillation?  These can include:  · Blood clots  · Stroke  · Heart failure. This  problem occurs when the heart muscle weakens so much that it can no longer pump blood well.  When should I call my healthcare provider?  Call your healthcare provider right away if you have any of these:  · Symptoms that dont get better with treatment, or get worse  · New symptoms   Date Last Reviewed: 5/1/2016  © 0737-5086 [employer]. [address] [place] [address], [place] [address]. All rights reserved. This information is not intended as a substitute for professional medical care. Always follow your healthcare professional's instructions.

## 2020-08-05 ENCOUNTER — HOSPITAL ENCOUNTER (OUTPATIENT)
Dept: RADIOLOGY | Facility: CLINIC | Age: 80
Discharge: HOME | End: 2020-08-05
Attending: FAMILY MEDICINE
Payer: MEDICARE

## 2020-08-05 ENCOUNTER — OFFICE VISIT (OUTPATIENT)
Dept: SPORTS MEDICINE | Facility: CLINIC | Age: 80
End: 2020-08-05
Payer: MEDICARE

## 2020-08-05 VITALS
HEIGHT: 63 IN | DIASTOLIC BLOOD PRESSURE: 76 MMHG | HEART RATE: 58 BPM | OXYGEN SATURATION: 96 % | SYSTOLIC BLOOD PRESSURE: 162 MMHG | TEMPERATURE: 99 F | BODY MASS INDEX: 38.74 KG/M2

## 2020-08-05 DIAGNOSIS — S52.611D CLOSED DISPLACED FRACTURE OF STYLOID PROCESS OF RIGHT ULNA WITH ROUTINE HEALING, SUBSEQUENT ENCOUNTER: ICD-10-CM

## 2020-08-05 DIAGNOSIS — R29.898 HAND WEAKNESS: ICD-10-CM

## 2020-08-05 DIAGNOSIS — S52.611D CLOSED DISPLACED FRACTURE OF STYLOID PROCESS OF RIGHT ULNA WITH ROUTINE HEALING, SUBSEQUENT ENCOUNTER: Primary | ICD-10-CM

## 2020-08-05 PROCEDURE — 1101F PR PT FALLS ASSESS DOC 0-1 FALLS W/OUT INJ PAST YR: ICD-10-PCS | Mod: HCNC,CPTII,S$GLB, | Performed by: FAMILY MEDICINE

## 2020-08-05 PROCEDURE — 99024 PR POST-OP FOLLOW-UP VISIT: ICD-10-PCS | Mod: HCNC,S$GLB,, | Performed by: FAMILY MEDICINE

## 2020-08-05 PROCEDURE — 99999 PR PBB SHADOW E&M-EST. PATIENT-LVL III: CPT | Mod: PBBFAC,HCNC,, | Performed by: FAMILY MEDICINE

## 2020-08-05 PROCEDURE — 3078F DIAST BP <80 MM HG: CPT | Mod: HCNC,CPTII,S$GLB, | Performed by: FAMILY MEDICINE

## 2020-08-05 PROCEDURE — 3077F PR MOST RECENT SYSTOLIC BLOOD PRESSURE >= 140 MM HG: ICD-10-PCS | Mod: HCNC,CPTII,S$GLB, | Performed by: FAMILY MEDICINE

## 2020-08-05 PROCEDURE — 1159F PR MEDICATION LIST DOCUMENTED IN MEDICAL RECORD: ICD-10-PCS | Mod: HCNC,S$GLB,, | Performed by: FAMILY MEDICINE

## 2020-08-05 PROCEDURE — 73110 XR WRIST COMPLETE 3 VIEWS RIGHT: ICD-10-PCS | Mod: 26,HCNC,RT,S$GLB | Performed by: RADIOLOGY

## 2020-08-05 PROCEDURE — 73110 X-RAY EXAM OF WRIST: CPT | Mod: TC,HCNC,FY,PO,RT

## 2020-08-05 PROCEDURE — 1125F PR PAIN SEVERITY QUANTIFIED, PAIN PRESENT: ICD-10-PCS | Mod: HCNC,S$GLB,, | Performed by: FAMILY MEDICINE

## 2020-08-05 PROCEDURE — 99024 POSTOP FOLLOW-UP VISIT: CPT | Mod: HCNC,S$GLB,, | Performed by: FAMILY MEDICINE

## 2020-08-05 PROCEDURE — 1125F AMNT PAIN NOTED PAIN PRSNT: CPT | Mod: HCNC,S$GLB,, | Performed by: FAMILY MEDICINE

## 2020-08-05 PROCEDURE — 3077F SYST BP >= 140 MM HG: CPT | Mod: HCNC,CPTII,S$GLB, | Performed by: FAMILY MEDICINE

## 2020-08-05 PROCEDURE — 1101F PT FALLS ASSESS-DOCD LE1/YR: CPT | Mod: HCNC,CPTII,S$GLB, | Performed by: FAMILY MEDICINE

## 2020-08-05 PROCEDURE — 3078F PR MOST RECENT DIASTOLIC BLOOD PRESSURE < 80 MM HG: ICD-10-PCS | Mod: HCNC,CPTII,S$GLB, | Performed by: FAMILY MEDICINE

## 2020-08-05 PROCEDURE — 99999 PR PBB SHADOW E&M-EST. PATIENT-LVL III: ICD-10-PCS | Mod: PBBFAC,HCNC,, | Performed by: FAMILY MEDICINE

## 2020-08-05 PROCEDURE — 73110 X-RAY EXAM OF WRIST: CPT | Mod: 26,HCNC,RT,S$GLB | Performed by: RADIOLOGY

## 2020-08-05 PROCEDURE — 1159F MED LIST DOCD IN RCRD: CPT | Mod: HCNC,S$GLB,, | Performed by: FAMILY MEDICINE

## 2020-08-05 NOTE — PROGRESS NOTES
Subjective:          Chief Complaint: Heather Hughes is a 80 y.o. female who had concerns including Follow-up.    Today for fracture follow-up of ulnar styloid as well as a questionable Colles fracture of the distal radial fracture which may or may not be an old fracture.  Appeared old on initial x-ray.  She has been wearing a neck so splint and reports compliance with use for the duration of her injury.  She reports that pain is nearly gone.  She is now concerned about loss of range of motion of the wrist as well as loss of intrinsic hand strength from having to mobilize her wrist for so long.  Patient's daughter reports that home health does come to her house and they do offer physical and occupational therapy services which patient has used in the past when she fractured her other wrist.          Review of Systems   Constitution: Negative for chills and decreased appetite.   HENT: Negative for congestion and sore throat.    Eyes: Negative for blurred vision.   Cardiovascular: Negative for chest pain, dyspnea on exertion and palpitations.   Respiratory: Negative for cough and shortness of breath.    Skin: Negative for rash.   Neurological: Negative for difficulty with concentration, disturbances in coordination and headaches.   Psychiatric/Behavioral: Negative for altered mental status, depression, hallucinations, memory loss and suicidal ideas.                   Objective:        General: Heather is well-developed, well-nourished, appears stated age, in no acute distress, alert and oriented to time, place and person.     General    Nursing note and vitals reviewed.  Constitutional: She is oriented to person, place, and time. She appears well-developed and well-nourished.   HENT:   Nose: Nose normal.   Eyes: EOM are normal. Pupils are equal, round, and reactive to light.   Neck: Normal range of motion. Neck supple.   Cardiovascular: Normal rate.    Pulmonary/Chest: Effort normal.   Abdominal: Soft.    Neurological: She is alert and oriented to person, place, and time. She has normal reflexes.   Psychiatric: She has a normal mood and affect. Her behavior is normal. Judgment and thought content normal.             Right Hand/Wrist Exam     Inspection   Effusion: Wrist - absent   Bruising: Wrist - absent   Deformity: Wrist - deformity     Range of Motion     Wrist   Extension:  30 abnormal   Flexion:  50 abnormal   Pronation: abnormal   Supination: abnormal     Tests     Atrophy   Intrinsic:  positive    Other     Neuorologic Exam    Ulnar Distribution: normal    Comments:  DRUJ stability - normal      Left Hand/Wrist Exam     Inspection   Scars: Wrist - present   Effusion: Wrist - absent           Muscle Strength   Right Upper Extremity   Wrist extension: 5/5/5   Wrist flexion: 5/5/5   : 5/5/5     Vascular Exam       Capillary Refill  Right Hand: normal capillary refill      X-ray images ordered obtained interpreted by me.  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  Evidence of distal radius fractures also present with evidence of good healing.  The previously noted degenerative changes of the wrist are also seen.        Assessment:       Encounter Diagnoses   Name Primary?   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  However given that there is no DRUJ instability and patient is not a  this is not a concerning finding.  Will discontinue use of the exos splint for the time being enroll patient in PT OT through her home health.  She may follow-up here if her wrist pain fails to improve or the condition changes.                    Patient questionnaires may have been collected.

## 2020-08-11 ENCOUNTER — TELEPHONE (OUTPATIENT)
Dept: FAMILY MEDICINE | Facility: CLINIC | Age: 80
End: 2020-08-11

## 2020-08-11 NOTE — TELEPHONE ENCOUNTER
patient is asymptomatic and spouse tested negative for covid-she was advised she may keep appt here.

## 2020-08-11 NOTE — TELEPHONE ENCOUNTER
----- Message from Kerri Burrell sent at 8/11/2020  9:49 AM CDT -----  Regarding: questions about appt  Contact: pt  Type: Needs Medical Advice    Who Called:      Best Call Back Number:     Additional Information: Requesting a call back from Nurse, regarding pt spouse has Covid symptoms but tested negative for test and pt wants to know does she still need to come in for aopt tomorrow or reschedule first víctor was Nov,please call back with advice

## 2020-08-12 ENCOUNTER — OFFICE VISIT (OUTPATIENT)
Dept: FAMILY MEDICINE | Facility: CLINIC | Age: 80
End: 2020-08-12
Attending: FAMILY MEDICINE
Payer: MEDICARE

## 2020-08-12 ENCOUNTER — LAB VISIT (OUTPATIENT)
Dept: LAB | Facility: HOSPITAL | Age: 80
End: 2020-08-12
Attending: FAMILY MEDICINE
Payer: MEDICARE

## 2020-08-12 VITALS
HEIGHT: 63 IN | HEART RATE: 56 BPM | WEIGHT: 220.88 LBS | OXYGEN SATURATION: 96 % | DIASTOLIC BLOOD PRESSURE: 86 MMHG | SYSTOLIC BLOOD PRESSURE: 140 MMHG | BODY MASS INDEX: 39.14 KG/M2 | TEMPERATURE: 97 F | RESPIRATION RATE: 20 BRPM

## 2020-08-12 DIAGNOSIS — Z86.718 HISTORY OF DEEP VENOUS THROMBOSIS (DVT) OF DISTAL VEIN OF RIGHT LOWER EXTREMITY: ICD-10-CM

## 2020-08-12 DIAGNOSIS — Z78.9 STATIN INTOLERANCE: ICD-10-CM

## 2020-08-12 DIAGNOSIS — I25.2 HISTORY OF MI (MYOCARDIAL INFARCTION): ICD-10-CM

## 2020-08-12 DIAGNOSIS — I10 ESSENTIAL HYPERTENSION: ICD-10-CM

## 2020-08-12 DIAGNOSIS — I48.91 ATRIAL FIBRILLATION WITH RVR: ICD-10-CM

## 2020-08-12 DIAGNOSIS — I25.110 CORONARY ARTERY DISEASE INVOLVING NATIVE CORONARY ARTERY OF NATIVE HEART WITH UNSTABLE ANGINA PECTORIS: ICD-10-CM

## 2020-08-12 DIAGNOSIS — E66.01 SEVERE OBESITY (BMI 35.0-39.9) WITH COMORBIDITY: ICD-10-CM

## 2020-08-12 DIAGNOSIS — Z95.5 STATUS POST INSERTION OF DRUG-ELUTING STENT INTO RIGHT CORONARY ARTERY FOR CORONARY ARTERY DISEASE: ICD-10-CM

## 2020-08-12 DIAGNOSIS — E03.9 ACQUIRED HYPOTHYROIDISM: ICD-10-CM

## 2020-08-12 DIAGNOSIS — K21.9 GASTROESOPHAGEAL REFLUX DISEASE, ESOPHAGITIS PRESENCE NOT SPECIFIED: ICD-10-CM

## 2020-08-12 DIAGNOSIS — Z79.01 ANTICOAGULANT LONG-TERM USE: ICD-10-CM

## 2020-08-12 DIAGNOSIS — G47.33 OSA ON CPAP: ICD-10-CM

## 2020-08-12 DIAGNOSIS — D50.0 IRON DEFICIENCY ANEMIA DUE TO CHRONIC BLOOD LOSS: ICD-10-CM

## 2020-08-12 DIAGNOSIS — J45.40 MODERATE PERSISTENT ASTHMA WITHOUT COMPLICATION: Primary | ICD-10-CM

## 2020-08-12 PROCEDURE — 36415 COLL VENOUS BLD VENIPUNCTURE: CPT | Mod: HCNC,PO

## 2020-08-12 PROCEDURE — 1101F PR PT FALLS ASSESS DOC 0-1 FALLS W/OUT INJ PAST YR: ICD-10-PCS | Mod: HCNC,CPTII,S$GLB, | Performed by: FAMILY MEDICINE

## 2020-08-12 PROCEDURE — 1101F PT FALLS ASSESS-DOCD LE1/YR: CPT | Mod: HCNC,CPTII,S$GLB, | Performed by: FAMILY MEDICINE

## 2020-08-12 PROCEDURE — 3077F PR MOST RECENT SYSTOLIC BLOOD PRESSURE >= 140 MM HG: ICD-10-PCS | Mod: HCNC,CPTII,S$GLB, | Performed by: FAMILY MEDICINE

## 2020-08-12 PROCEDURE — 1159F MED LIST DOCD IN RCRD: CPT | Mod: HCNC,S$GLB,, | Performed by: FAMILY MEDICINE

## 2020-08-12 PROCEDURE — 99999 PR PBB SHADOW E&M-EST. PATIENT-LVL V: ICD-10-PCS | Mod: PBBFAC,HCNC,, | Performed by: FAMILY MEDICINE

## 2020-08-12 PROCEDURE — 1159F PR MEDICATION LIST DOCUMENTED IN MEDICAL RECORD: ICD-10-PCS | Mod: HCNC,S$GLB,, | Performed by: FAMILY MEDICINE

## 2020-08-12 PROCEDURE — 99214 PR OFFICE/OUTPT VISIT, EST, LEVL IV, 30-39 MIN: ICD-10-PCS | Mod: 24,HCNC,S$GLB, | Performed by: FAMILY MEDICINE

## 2020-08-12 PROCEDURE — 1125F PR PAIN SEVERITY QUANTIFIED, PAIN PRESENT: ICD-10-PCS | Mod: HCNC,S$GLB,, | Performed by: FAMILY MEDICINE

## 2020-08-12 PROCEDURE — 99999 PR PBB SHADOW E&M-EST. PATIENT-LVL V: CPT | Mod: PBBFAC,HCNC,, | Performed by: FAMILY MEDICINE

## 2020-08-12 PROCEDURE — 99499 UNLISTED E&M SERVICE: CPT | Mod: HCNC,S$GLB,, | Performed by: FAMILY MEDICINE

## 2020-08-12 PROCEDURE — 3079F PR MOST RECENT DIASTOLIC BLOOD PRESSURE 80-89 MM HG: ICD-10-PCS | Mod: HCNC,CPTII,S$GLB, | Performed by: FAMILY MEDICINE

## 2020-08-12 PROCEDURE — 3079F DIAST BP 80-89 MM HG: CPT | Mod: HCNC,CPTII,S$GLB, | Performed by: FAMILY MEDICINE

## 2020-08-12 PROCEDURE — 1125F AMNT PAIN NOTED PAIN PRSNT: CPT | Mod: HCNC,S$GLB,, | Performed by: FAMILY MEDICINE

## 2020-08-12 PROCEDURE — 80048 BASIC METABOLIC PNL TOTAL CA: CPT | Mod: HCNC

## 2020-08-12 PROCEDURE — 99214 OFFICE O/P EST MOD 30 MIN: CPT | Mod: 24,HCNC,S$GLB, | Performed by: FAMILY MEDICINE

## 2020-08-12 PROCEDURE — 99499 RISK ADDL DX/OHS AUDIT: ICD-10-PCS | Mod: HCNC,S$GLB,, | Performed by: FAMILY MEDICINE

## 2020-08-12 PROCEDURE — 3077F SYST BP >= 140 MM HG: CPT | Mod: HCNC,CPTII,S$GLB, | Performed by: FAMILY MEDICINE

## 2020-08-12 RX ORDER — OMEPRAZOLE 40 MG/1
40 CAPSULE, DELAYED RELEASE ORAL DAILY
Qty: 90 CAPSULE | Refills: 3 | Status: SHIPPED | OUTPATIENT
Start: 2020-08-12 | End: 2022-03-11

## 2020-08-12 RX ORDER — MONTELUKAST SODIUM 10 MG/1
10 TABLET ORAL NIGHTLY
Qty: 90 TABLET | Refills: 3 | Status: SHIPPED | OUTPATIENT
Start: 2020-08-12 | End: 2021-06-28 | Stop reason: SDUPTHER

## 2020-08-12 NOTE — PROGRESS NOTES
Subjective:       Patient ID: Heather Hughes is a 80 y.o. female.    Chief Complaint: Follow-up    80-year-old female coming in for a follow-up on multiple medical problems.  In mid June she fell out of her wheelchair while trying to reach for some tomatoes that she had dropped.  She fell onto her outstretched right wrist in fractured the ulnar stylette.  There was a fracture of the distal radius as well but it was already healing and appeared to be older.  She was placed in a cast by Dr. Dangelo and is now using a wrist splint and working with therapy to improve mobility.  The patient has a DEXA scan scheduled today but she has decided that she does not wish to do one and once it canceled.  She has a history of coronary artery disease with a previous MI, deep vein thrombosis, sleep apnea on CPAP, arthritis, asthma, stasis dermatitis with vascular ulcers of the lower extremities, atrial fib status post cardioversion, hypothyroidism status post thyroidectomy iron deficiency anemia and statin intolerance.  Her blood pressure was elevated today but she states that she was chased into her house by a wild hog this morning and she is still somewhat upset.  She states her blood pressure at home usually runs with a systolic between 120 and 130 in the diastolics usually 70 or lower 80s.  Her venous ulcers have closed with the assistance of Dermatology and she is essentially without complaints other than regarding the wild hog.    Past Medical History:  No date: Allergy  No date: Anticoagulant long-term use  No date: Arthritis  No date: Asthma  No date: Coronary artery disease      Comment:  stent x 1  1965 & 1971: Deep vein blood clot of right lower extremity  No date: Full dentures  01/13/2017: Heart attack  No date: Hypertension  No date: Osteoporosis  No date: Sleep apnea  No date: Thyroid disease  No date: Ulcer  No date: Wears glasses    Past Medical History:  No date: Allergy  No date: Anticoagulant long-term  use  No date: Arthritis  No date: Asthma  No date: Coronary artery disease      Comment:  stent x 1  1965 & 1971: Deep vein blood clot of right lower extremity  No date: Full dentures  01/13/2017: Heart attack  No date: Hypertension  No date: Osteoporosis  No date: Sleep apnea  No date: Thyroid disease  No date: Ulcer  No date: Wears glasses    Current Outpatient Medications on File Prior to Visit:  acetaminophen (TYLENOL) 650 MG TbSR, Take 650 mg by mouth every 8 (eight) hours as needed (pain). , Disp: , Rfl:   albuterol 90 mcg/actuation inhaler, Inhale 2 puffs into the lungs every 6 (six) hours as needed for Wheezing., Disp: 18 g, Rfl: 2  amiodarone (PACERONE) 200 MG Tab, Take 1 tablet (200 mg total) by mouth once daily., Disp: 10 tablet, Rfl: 0  apixaban (ELIQUIS) 5 mg Tab, Take 1 tablet (5 mg total) by mouth 2 (two) times daily., Disp: 60 tablet, Rfl: 11  aspirin (ECOTRIN) 81 MG EC tablet, Take 81 mg by mouth once daily., Disp: , Rfl:   CALCIUM CARBONATE/VITAMIN D3 (CALCIUM 500 + D ORAL), Take 2 tablets by mouth once daily., Disp: , Rfl:   furosemide (LASIX) 20 MG tablet, Take 1 tablet (20 mg total) by mouth daily as needed (For fluid retention)., Disp: 90 tablet, Rfl: 3  gabapentin (NEURONTIN) 100 MG capsule, TAKE ONE CAPSULE BY MOUTH THREE TIMES DAILY (Patient taking differently: Take 100 mg by mouth 2 (two) times daily. ), Disp: 90 capsule, Rfl: 5  glucosamine-chondroitin 500-400 mg tablet, Take 1 tablet by mouth 3 (three) times daily., Disp: , Rfl:   nitroGLYCERIN (NITROSTAT) 0.4 MG SL tablet, Place 1 tablet (0.4 mg total) under the tongue every 5 (five) minutes as needed for Chest pain., Disp: 25 tablet, Rfl: 3  SYNTHROID 112 mcg tablet, TAKE ONE TABLET BY MOUTH EVERY DAY BEFORE BREAKFAST, Disp: 30 tablet, Rfl: 10  triamcinolone acetonide 0.025% (KENALOG) 0.025 % cream, Apply topically 2 (two) times daily. (Patient taking differently: Apply topically as needed. ), Disp: 80 g, Rfl: 0  (DISCONTINUED)  montelukast (SINGULAIR) 10 mg tablet, Take 1 tablet (10 mg total) by mouth every evening. (Patient taking differently: Take 10 mg by mouth as needed (rhinitis). ), Disp: 90 tablet, Rfl: 3  (DISCONTINUED) omeprazole (PRILOSEC) 40 MG capsule, TAKE 1 CAPSULE (40 MG TOTAL) BY MOUTH ONCE DAILY., Disp: 90 capsule, Rfl: 3  (DISCONTINUED) silver sulfADIAZINE 1% (SILVADENE) 1 % cream, 1 application once daily. Apply to affected area, Disp: , Rfl:   (DISCONTINUED) traMADoL (ULTRAM) 50 mg tablet, Take 1 tablet (50 mg total) by mouth every 6 (six) hours as needed for Pain. (Patient not taking: Reported on 8/12/2020), Disp: 30 tablet, Rfl: 0    No current facility-administered medications on file prior to visit.         Review of Systems   Constitutional: Negative for activity change, appetite change, chills, diaphoresis, fatigue and unexpected weight change.   Respiratory: Negative for chest tightness and shortness of breath.    Cardiovascular: Negative for chest pain and palpitations.   Gastrointestinal: Negative for abdominal pain, constipation, diarrhea, nausea and vomiting.   Genitourinary: Negative for dysuria and frequency.   Musculoskeletal: Positive for arthralgias and gait problem.   Neurological: Negative for dizziness, light-headedness and headaches.       Objective:      Physical Exam  Vitals signs and nursing note reviewed.   Constitutional:       General: She is not in acute distress.     Appearance: Normal appearance. She is obese. She is not ill-appearing, toxic-appearing or diaphoretic.      Comments: Borderline blood pressure control  Severe obesity with a BMI of 39.1 she is up 5.4 lb from her last visit with me January 3, 2020  She is sitting in a wheelchair   HENT:      Head: Normocephalic and atraumatic.      Right Ear: Tympanic membrane, ear canal and external ear normal. There is no impacted cerumen.      Left Ear: Tympanic membrane, ear canal and external ear normal. There is no impacted cerumen.       Nose: Nose normal. No congestion or rhinorrhea.   Eyes:      General: No scleral icterus.     Extraocular Movements: Extraocular movements intact.      Pupils: Pupils are equal, round, and reactive to light.   Neck:      Musculoskeletal: Normal range of motion and neck supple.      Vascular: No carotid bruit.   Cardiovascular:      Rate and Rhythm: Normal rate and regular rhythm.      Heart sounds: Normal heart sounds. No murmur. No friction rub. No gallop.    Pulmonary:      Effort: Pulmonary effort is normal. No respiratory distress.      Breath sounds: Normal breath sounds. No stridor. No wheezing, rhonchi or rales.   Abdominal:      General: Abdomen is flat. Bowel sounds are normal. There is no distension.      Palpations: Abdomen is soft. There is no mass.      Tenderness: There is no abdominal tenderness. There is no guarding or rebound.      Hernia: No hernia is present.   Musculoskeletal:         General: No swelling or tenderness.      Right lower leg: Edema (Trace) present.      Left lower leg: Edema (Trace) present.   Lymphadenopathy:      Cervical: No cervical adenopathy.   Skin:     Findings: Erythema (Mild erythema distal right lower extremity, no ulcerations) present.   Neurological:      Mental Status: She is alert.   Psychiatric:         Mood and Affect: Mood normal.         Behavior: Behavior normal.         Thought Content: Thought content normal.         Judgment: Judgment normal.         Assessment:       1. Moderate persistent asthma without complication    2. Coronary artery disease involving native coronary artery of native heart with unstable angina pectoris    3. History of MI (myocardial infarction)    4. Essential hypertension    5. Status post insertion of drug-eluting stent into right coronary artery for coronary artery disease,. 1/2017    6. Atrial fibrillation with RVR    7. History of deep venous thrombosis (DVT) of distal vein of right lower extremity, 1965 and 1971    8.  Anticoagulant long-term use    9. Iron deficiency anemia due to chronic blood loss    10. Acquired hypothyroidism    11. ALVINA on CPAP, started 4/2019, stopped 10/2019    12. Statin intolerance, tried atorvastatin and pravastatin, refused Crestor    13. Gastroesophageal reflux disease, esophagitis presence not specified        Plan:       1. Moderate persistent asthma without complication  Asymptomatic, rarely uses rescue inhaler but does need a refill on Singulair  - montelukast (SINGULAIR) 10 mg tablet; Take 1 tablet (10 mg total) by mouth every evening.  Dispense: 90 tablet; Refill: 3    2. Coronary artery disease involving native coronary artery of native heart with unstable angina pectoris  Asymptomatic    3. History of MI (myocardial infarction)    4. Essential hypertension  Mildly elevated at this time but is not at baseline condition  - Basic metabolic panel; Future    5. Status post insertion of drug-eluting stent into right coronary artery for coronary artery disease,. 1/2017  Stable    6. Atrial fibrillation with RVR  Normal sinus rhythm following cardioversion    7. History of deep venous thrombosis (DVT) of distal vein of right lower extremity, 1965 and 1971  No symptoms currently    8. Anticoagulant long-term use  No bleeding trouble    9. Iron deficiency anemia due to chronic blood loss  Lab Results   Component Value Date    WBC 6.72 03/12/2020    HGB 10.6 (L) 03/12/2020    HCT 34.4 (L) 03/12/2020    MCV 94 03/12/2020     03/12/2020       Stable    10. Acquired hypothyroidism  Lab Results   Component Value Date    TSH 1.663 03/10/2020     Stable    11. ALVINA on CPAP, started 4/2019, stopped 10/2019    12. Statin intolerance, tried atorvastatin and pravastatin, refused Crestor    13. Gastroesophageal reflux disease, esophagitis presence not specified  Asymptomatic on Prilosec  - omeprazole (PRILOSEC) 40 MG capsule; Take 1 capsule (40 mg total) by mouth once daily.  Dispense: 90 capsule; Refill:  3    14. Severe obesity (BMI 35.0-39.9) with comorbidity

## 2020-08-12 NOTE — PATIENT INSTRUCTIONS
Controlling High Blood Pressure  High blood pressure (hypertension) is often called the silent killer. This is because many people who have it dont know it. High blood pressure is defined as 140/90 mm Hg or higher. Know your blood pressure and remember to check it regularly. Doing so can save your life. Here are some things you can do to help control your blood pressure.    Choose heart-healthy foods  · Select low-salt, low-fat foods. Limit sodium intake to 2,400 mg per day or the amount suggested by your healthcare provider.  · Limit canned, dried, cured, packaged, and fast foods. These can contain a lot of salt.  · Eat 8 to 10 servings of fruits and vegetables every day.  · Choose lean meats, fish, or chicken.  · Eat whole-grain pasta, brown rice, and beans.  · Eat 2 to 3 servings of low-fat or fat-free dairy products.  · Ask your doctor about the DASH eating plan. This plan helps reduce blood pressure.  · When you go to a restaurant, ask that your meal be prepared with no added salt.  Maintain a healthy weight  · Ask your healthcare provider how many calories to eat a day. Then stick to that number.  · Ask your healthcare provider what weight range is healthiest for you. If you are overweight, a weight loss of only 3% to 5% of your body weight can help lower blood pressure. Generally, a good weight loss goal is to lose 10% of your body weight in a year.  · Limit snacks and sweets.  · Get regular exercise.  Get up and get active  · Choose activities you enjoy. Find ones you can do with friends or family. This includes bicycling, dancing, walking, and jogging.  · Park farther away from building entrances.  · Use stairs instead of the elevator.  · When you can, walk or bike instead of driving.  · [place] leaves, garden, or do household repairs.  · Be active at a moderate to vigorous level of physical activity for at least 40 minutes for a minimum of 3 to 4 days a week.   Manage stress  · Make time to relax and enjoy  life. Find time to laugh.  · Communicate your concerns with your loved ones and your healthcare provider.  · Visit with family and friends, and keep up with hobbies.  Limit alcohol and quit smoking  · Men should have no more than 2 drinks per day.  · Women should have no more than 1 drink per day.  · Talk with your healthcare provider about quitting smoking. Smoking significantly increases your risk for heart disease and stroke. Ask your healthcare provider about community smoking cessation programs and other options.  Medicines  If lifestyle changes arent enough, your healthcare provider may prescribe high blood pressure medicine. Take all medicines as prescribed. If you have any questions about your medicines, ask your healthcare provider before stopping or changing them.   Date Last Reviewed: 4/27/2016  © 7603-9306 [employer]. [address] [place] [address], [place] [address]. All rights reserved. This information is not intended as a substitute for professional medical care. Always follow your healthcare professional's instructions.        Controlling High Blood Pressure  High blood pressure (hypertension) is often called the silent killer. This is because many people who have it dont know it. High blood pressure is defined as 140/90 mm Hg or higher. Know your blood pressure and remember to check it regularly. Doing so can save your life. Here are some things you can do to help control your blood pressure.    Choose heart-healthy foods  · Select low-salt, low-fat foods. Limit sodium intake to 2,400 mg per day or the amount suggested by your healthcare provider.  · Limit canned, dried, cured, packaged, and fast foods. These can contain a lot of salt.  · Eat 8 to 10 servings of fruits and vegetables every day.  · Choose lean meats, fish, or chicken.  · Eat whole-grain pasta, brown rice, and beans.  · Eat 2 to 3 servings of low-fat or fat-free dairy products.  · Ask your doctor about the DASH eating plan.  This plan helps reduce blood pressure.  · When you go to a restaurant, ask that your meal be prepared with no added salt.  Maintain a healthy weight  · Ask your healthcare provider how many calories to eat a day. Then stick to that number.  · Ask your healthcare provider what weight range is healthiest for you. If you are overweight, a weight loss of only 3% to 5% of your body weight can help lower blood pressure. Generally, a good weight loss goal is to lose 10% of your body weight in a year.  · Limit snacks and sweets.  · Get regular exercise.  Get up and get active  · Choose activities you enjoy. Find ones you can do with friends or family. This includes bicycling, dancing, walking, and jogging.  · Park farther away from building entrances.  · Use stairs instead of the elevator.  · When you can, walk or bike instead of driving.  · [place] leaves, garden, or do household repairs.  · Be active at a moderate to vigorous level of physical activity for at least 40 minutes for a minimum of 3 to 4 days a week.   Manage stress  · Make time to relax and enjoy life. Find time to laugh.  · Communicate your concerns with your loved ones and your healthcare provider.  · Visit with family and friends, and keep up with hobbies.  Limit alcohol and quit smoking  · Men should have no more than 2 drinks per day.  · Women should have no more than 1 drink per day.  · Talk with your healthcare provider about quitting smoking. Smoking significantly increases your risk for heart disease and stroke. Ask your healthcare provider about community smoking cessation programs and other options.  Medicines  If lifestyle changes arent enough, your healthcare provider may prescribe high blood pressure medicine. Take all medicines as prescribed. If you have any questions about your medicines, ask your healthcare provider before stopping or changing them.   Date Last Reviewed: 4/27/2016  © 1623-8233 [employer]. [address] [facility],  [place] [address]. All rights reserved. This information is not intended as a substitute for professional medical care. Always follow your healthcare professional's instructions.        Weight Management: Getting Started  Healthy bodies come in all shapes and sizes. Not all bodies are made to be thin. For some people, a healthy weight is higher than the average weight listed on weight charts. Your healthcare provider can help you decide on a healthy weight for you.    Reasons to lose weight  Losing weight can help with some health problems, such as high blood pressure, heart disease, diabetes, sleep apnea, and arthritis. You may also feel more energy.  Set your long-term goal  Your goal doesn't even have to be a specific weight. You may decide on a fitness goal (such as being able to walk 10 miles a week), or a health goal (such as lowering your blood pressure). Choose a goal that is measurable and reasonable, so you know when you've reached it. A goal of reaching a BMI of less than 25 is not always reasonable (or possible).   Make an action plan  Habits dont change overnight. Setting your goals too high can leave you feeling discouraged if you cant reach them. Be realistic. Choose one or two small changes you can make now. Set an action plan for how you are going to make these changes. When you can stick to this plan, keep making a few more small changes. Taking small steps will help you stay on the path to success.  Track your progress  Write down your goals. Then, keep a daily record of your progress. Write down what you eat and how active you are. This record lets you look back on how much youve done. It may also help when youre feeling frustrated. Reward yourself for success. Even if you dont reach every goal, give yourself credit for what you do get done.  Get support  Encouragement from others can help make losing weight easier. Ask your family members and friends for support. They may even want to join  you. Also look to your healthcare provider, registered dietitian, and  for help. Your local hospital can give you more information about nutrition, exercise, and weight loss.  Date Last Reviewed: 1/31/2016  © 7522-9072 [employer]. [address] [place] [address], [place] [address]. All rights reserved. This information is not intended as a substitute for professional medical care. Always follow your healthcare professional's instructions.

## 2020-08-13 LAB
ANION GAP SERPL CALC-SCNC: 9 MMOL/L (ref 8–16)
BUN SERPL-MCNC: 21 MG/DL (ref 8–23)
CALCIUM SERPL-MCNC: 9.7 MG/DL (ref 8.7–10.5)
CHLORIDE SERPL-SCNC: 105 MMOL/L (ref 95–110)
CO2 SERPL-SCNC: 25 MMOL/L (ref 23–29)
CREAT SERPL-MCNC: 1.1 MG/DL (ref 0.5–1.4)
EST. GFR  (AFRICAN AMERICAN): 55.2 ML/MIN/1.73 M^2
EST. GFR  (NON AFRICAN AMERICAN): 47.9 ML/MIN/1.73 M^2
GLUCOSE SERPL-MCNC: 66 MG/DL (ref 70–110)
POTASSIUM SERPL-SCNC: 4.1 MMOL/L (ref 3.5–5.1)
SODIUM SERPL-SCNC: 139 MMOL/L (ref 136–145)

## 2020-08-14 ENCOUNTER — DOCUMENT SCAN (OUTPATIENT)
Dept: HOME HEALTH SERVICES | Facility: HOSPITAL | Age: 80
End: 2020-08-14
Payer: MEDICARE

## 2020-08-17 ENCOUNTER — TELEPHONE (OUTPATIENT)
Dept: FAMILY MEDICINE | Facility: CLINIC | Age: 80
End: 2020-08-17

## 2020-08-17 NOTE — TELEPHONE ENCOUNTER
Dr. Dangelo [facility] says patient doesn't qualify for home health so cannot do PT/OT through .

## 2020-08-17 NOTE — TELEPHONE ENCOUNTER
----- Message from Malik Green sent at 8/17/2020  2:19 PM CDT -----  Regarding: Home Health  Contact: Arianna/[facility]  Arianna called in and stated patient does not meet the criteria for home health & wanted to see if Dr. Mazariegos wanted to do a peer to peer?    Arianna's call back number is [phone], ID# [id number]

## 2020-08-18 NOTE — TELEPHONE ENCOUNTER
Family notified Maddie denied home health. But she already has home health with [facility]. I called  and they will check into this.

## 2020-08-18 NOTE — TELEPHONE ENCOUNTER
----- Message from Kerry Zeng MA sent at 8/17/2020  4:35 PM CDT -----  Type:  Patient Returning Call    Who Called:  Heather  Who Left Message for Patient:  Leonor  Does the patient know what this is regarding?:  insurance issue  Best Call Back Number:  [phone]  Additional Information:

## 2020-08-27 ENCOUNTER — DOCUMENT SCAN (OUTPATIENT)
Dept: HOME HEALTH SERVICES | Facility: HOSPITAL | Age: 80
End: 2020-08-27
Payer: MEDICARE

## 2020-09-04 ENCOUNTER — DOCUMENT SCAN (OUTPATIENT)
Dept: HOME HEALTH SERVICES | Facility: HOSPITAL | Age: 80
End: 2020-09-04
Payer: MEDICARE

## 2020-09-21 ENCOUNTER — HOSPITAL ENCOUNTER (EMERGENCY)
Facility: HOSPITAL | Age: 80
Discharge: HOME | End: 2020-09-21
Attending: EMERGENCY MEDICINE
Payer: MEDICARE

## 2020-09-21 ENCOUNTER — NURSE TRIAGE (OUTPATIENT)
Dept: ADMINISTRATIVE | Facility: CLINIC | Age: 80
End: 2020-09-21

## 2020-09-21 VITALS
HEIGHT: 63 IN | OXYGEN SATURATION: 98 % | WEIGHT: 220 LBS | BODY MASS INDEX: 38.98 KG/M2 | HEART RATE: 52 BPM | DIASTOLIC BLOOD PRESSURE: 78 MMHG | SYSTOLIC BLOOD PRESSURE: 182 MMHG | RESPIRATION RATE: 18 BRPM | TEMPERATURE: 98 F

## 2020-09-21 DIAGNOSIS — F41.9 ANXIETY: ICD-10-CM

## 2020-09-21 DIAGNOSIS — I10 UNCONTROLLED HYPERTENSION: Primary | ICD-10-CM

## 2020-09-21 LAB
ALBUMIN SERPL BCP-MCNC: 3.8 G/DL (ref 3.5–5.2)
ALP SERPL-CCNC: 78 U/L (ref 55–135)
ALT SERPL W/O P-5'-P-CCNC: 13 U/L (ref 10–44)
ANION GAP SERPL CALC-SCNC: 9 MMOL/L (ref 8–16)
AST SERPL-CCNC: 16 U/L (ref 10–40)
BASOPHILS # BLD AUTO: 0.04 K/UL (ref 0–0.2)
BASOPHILS NFR BLD: 0.8 % (ref 0–1.9)
BILIRUB SERPL-MCNC: 0.3 MG/DL (ref 0.1–1)
BUN SERPL-MCNC: 15 MG/DL (ref 8–23)
CALCIUM SERPL-MCNC: 9.5 MG/DL (ref 8.7–10.5)
CHLORIDE SERPL-SCNC: 106 MMOL/L (ref 95–110)
CO2 SERPL-SCNC: 25 MMOL/L (ref 23–29)
CREAT SERPL-MCNC: 0.9 MG/DL (ref 0.5–1.4)
DIFFERENTIAL METHOD: ABNORMAL
EOSINOPHIL # BLD AUTO: 0.1 K/UL (ref 0–0.5)
EOSINOPHIL NFR BLD: 2.5 % (ref 0–8)
ERYTHROCYTE [DISTWIDTH] IN BLOOD BY AUTOMATED COUNT: 14.5 % (ref 11.5–14.5)
EST. GFR  (AFRICAN AMERICAN): >60 ML/MIN/1.73 M^2
EST. GFR  (NON AFRICAN AMERICAN): >60 ML/MIN/1.73 M^2
GLUCOSE SERPL-MCNC: 98 MG/DL (ref 70–110)
HCT VFR BLD AUTO: 36.3 % (ref 37–48.5)
HGB BLD-MCNC: 11.5 G/DL (ref 12–16)
IMM GRANULOCYTES # BLD AUTO: 0.02 K/UL (ref 0–0.04)
IMM GRANULOCYTES NFR BLD AUTO: 0.4 % (ref 0–0.5)
LYMPHOCYTES # BLD AUTO: 1.1 K/UL (ref 1–4.8)
LYMPHOCYTES NFR BLD: 20.8 % (ref 18–48)
MCH RBC QN AUTO: 29.1 PG (ref 27–31)
MCHC RBC AUTO-ENTMCNC: 31.7 G/DL (ref 32–36)
MCV RBC AUTO: 92 FL (ref 82–98)
MONOCYTES # BLD AUTO: 0.4 K/UL (ref 0.3–1)
MONOCYTES NFR BLD: 8.5 % (ref 4–15)
NEUTROPHILS # BLD AUTO: 3.5 K/UL (ref 1.8–7.7)
NEUTROPHILS NFR BLD: 67 % (ref 38–73)
NRBC BLD-RTO: 0 /100 WBC
PLATELET # BLD AUTO: 198 K/UL (ref 150–350)
PMV BLD AUTO: 12.1 FL (ref 9.2–12.9)
POTASSIUM SERPL-SCNC: 4.2 MMOL/L (ref 3.5–5.1)
PROT SERPL-MCNC: 6.7 G/DL (ref 6–8.4)
RBC # BLD AUTO: 3.95 M/UL (ref 4–5.4)
SODIUM SERPL-SCNC: 140 MMOL/L (ref 136–145)
WBC # BLD AUTO: 5.15 K/UL (ref 3.9–12.7)

## 2020-09-21 PROCEDURE — 99284 EMERGENCY DEPT VISIT MOD MDM: CPT | Mod: HCNC

## 2020-09-21 PROCEDURE — 36415 COLL VENOUS BLD VENIPUNCTURE: CPT | Mod: HCNC

## 2020-09-21 PROCEDURE — 25000003 PHARM REV CODE 250: Mod: HCNC | Performed by: EMERGENCY MEDICINE

## 2020-09-21 PROCEDURE — 85025 COMPLETE CBC W/AUTO DIFF WBC: CPT | Mod: HCNC

## 2020-09-21 PROCEDURE — 80053 COMPREHEN METABOLIC PANEL: CPT | Mod: HCNC

## 2020-09-21 RX ORDER — LORAZEPAM 1 MG/1
0.5 TABLET ORAL EVERY 6 HOURS PRN
Qty: 10 TABLET | Refills: 0 | Status: SHIPPED | OUTPATIENT
Start: 2020-09-21 | End: 2020-12-22 | Stop reason: SDUPTHER

## 2020-09-21 RX ORDER — CLONIDINE HYDROCHLORIDE 0.2 MG/1
0.2 TABLET ORAL
Status: COMPLETED | OUTPATIENT
Start: 2020-09-21 | End: 2020-09-21

## 2020-09-21 RX ORDER — HYDRALAZINE HYDROCHLORIDE 25 MG/1
25 TABLET, FILM COATED ORAL 3 TIMES DAILY PRN
Qty: 60 TABLET | Refills: 0 | Status: SHIPPED | OUTPATIENT
Start: 2020-09-21 | End: 2021-06-24

## 2020-09-21 RX ORDER — HYDRALAZINE HYDROCHLORIDE 25 MG/1
50 TABLET, FILM COATED ORAL
Status: COMPLETED | OUTPATIENT
Start: 2020-09-21 | End: 2020-09-21

## 2020-09-21 RX ADMIN — HYDRALAZINE HYDROCHLORIDE 50 MG: 25 TABLET, FILM COATED ORAL at 08:09

## 2020-09-21 RX ADMIN — CLONIDINE HYDROCHLORIDE 0.2 MG: 0.2 TABLET ORAL at 06:09

## 2020-09-21 NOTE — ED NOTES
"Patient reporting that she has not had to take BP medications since "her heart was shocked." Patient reporting that she has been eating a lot of salt recently which she feels has caused her BP to elevate. Patient reporting mild headache and vision problems.   "

## 2020-09-21 NOTE — TELEPHONE ENCOUNTER
Reason for Disposition   Systolic BP >= 180 OR Diastolic >= 110    Additional Information   Negative: Sounds like a life-threatening emergency to the triager   Negative: Pregnant > 20 weeks or postpartum (< 6 weeks after delivery) and new hand or face swelling   Negative: Pregnant > 20 weeks and BP > 140/90   Negative: Systolic BP >= 160 OR Diastolic >= 100, and any cardiac or neurologic symptoms (e.g., chest pain, difficulty breathing, unsteady gait, blurred vision)   Negative: Patient sounds very sick or weak to the triager   Negative: BP Systolic BP >= 140 OR Diastolic >= 90 and postpartum (from 0 to 6 weeks after delivery)   Negative: Systolic BP >= 180 OR Diastolic >= 110, and missed most recent dose of blood pressure medication    Protocols used: HIGH BLOOD PRESSURE-A-OH    Pt's sister Angela stated Pt's BP has been elevated. Stated BP was 220/110. Pt took Catapres earlier today. BP is now 220/90. Per triage protocol, advised to see a Physician in the office today. Advised to go to Urgent Care or ED if PCP cannot see today. Advised a message will be sent to the Provider's office to contact her.

## 2020-09-21 NOTE — ED PROVIDER NOTES
Encounter Date: 9/21/2020    SCRIBE #1 NOTE: I, Pat Ignacio, am scribing for, and in the presence of, Orlando Mendez MD.       History     Chief Complaint   Patient presents with    Hypertension     today elevated        Time seen by provider: 6:32 PM on 09/21/2020    Heather Hughes is a 80 y.o. female with a PMHx of HTN, CAD s/p stent placement, and A-fib who presents to the ED with an onset of HA and bilateral foot and leg swelling associated with elevated blood pressure today. Son reports patient's BP was greater then 200/100 today. Patient states she took some of her husbands BP medication earlier today which did bring her BP down some. Patient reports she has not been taking her BP medication since being cardioverted in March but she does regularly check her BP at home and endorses recent normal blood pressures. Patient reports recent high salt intake. The patient denies cp, SOB or any other symptoms at this time.      The history is provided by the patient and a relative.     Review of patient's allergies indicates:   Allergen Reactions    Bactroban [mupirocin calcium] Itching and Other (See Comments)     Red, tingling, itching, burning     Ditropan [oxybutynin chloride] Swelling     swelling    Lipitor [atorvastatin] Other (See Comments)     Fall/ balance     Myrbetriq [mirabegron] Swelling and Other (See Comments)     Legs swelling, couldn't void     Pravastatin Other (See Comments)     Balance problem     Oxycodone     Codeine Nausea Only    Mobic [meloxicam] Other (See Comments)     Not sure reaction    Perfume Other (See Comments)     And flowers cause allergy and makes her cough.    Sulfur Other (See Comments)     Was told not to take as a child     Past Medical History:   Diagnosis Date    Allergy     Anticoagulant long-term use     Arthritis     Asthma     Coronary artery disease     stent x 1    Deep vein blood clot of right lower extremity 1965 & 1971    Full dentures      Heart attack 01/13/2017    Hypertension     Osteoporosis     Sleep apnea     Thyroid disease     Ulcer     Wears glasses      Past Surgical History:   Procedure Laterality Date    cardic stent  01/13/2017    CARDIOVERSION Left 3/12/2020    Procedure: Cardioversion;  Surgeon: Kaleigh Rascon MD;  Location: [facility] CATH LAB;  Service: Cardiology;  Laterality: Left;    CHOLECYSTECTOMY      EYE SURGERY      bilateral cataracts    FRACTURE SURGERY  2011    left wrist     KNEE ARTHROPLASTY Left 8/15/2018    Procedure: ARTHROPLASTY, KNEE;  Surgeon: Shantanu Santiago MD;  Location: [facility] OR;  Service: Orthopedics;  Laterality: Left;  ANESTHESIA GENERAL AND BLOCK    THYROIDECTOMY       Family History   Problem Relation Age of Onset    Lupus Mother     Osteoporosis Mother     Heart disease Mother     Ulcers Mother     Esophageal cancer Father     Cancer Father         esophageal    DIEGO disease Father     Cancer Brother     Esophageal cancer Brother     Lupus Daughter     DIEGO disease Son     Heart disease Maternal Aunt     Cancer Maternal Aunt     Kidney disease Maternal Aunt     Kidney cancer Maternal Aunt     Liver cancer Maternal Aunt     Dementia Maternal Aunt     Cancer Maternal Uncle     Colon cancer Maternal Uncle     Breast cancer Maternal Uncle     Lung cancer Maternal Uncle         smoker    Parkinsonism Paternal Aunt     Cancer Paternal Aunt     Stomach cancer Paternal Aunt     Heart disease Paternal Uncle     Cancer Paternal Uncle     Stomach cancer Paternal Uncle         x2    Bone cancer Paternal Uncle     Cancer Maternal Grandmother         tumors on head and body    Parkinsonism Paternal Grandmother     DIEGO disease Brother     Ulcers Brother     Dementia Brother     Early death Son         self    Bipolar disorder Son     Cancer Son     Esophageal cancer Son     Drug abuse Son      Social History     Tobacco Use    Smoking status: Former Smoker      Packs/day: 0.50     Types: Cigarettes     Quit date: 1/13/2017     Years since quitting: 3.6    Smokeless tobacco: Never Used   Substance Use Topics    Alcohol use: No    Drug use: No     Review of Systems   Constitutional: Negative for fever.   HENT: Negative for sore throat.    Respiratory: Negative for shortness of breath.    Cardiovascular: Positive for leg swelling (bilateral foot and leg swelling). Negative for chest pain.   Gastrointestinal: Negative for nausea.   Genitourinary: Negative for dysuria.   Musculoskeletal: Negative for back pain.   Skin: Negative for rash.   Neurological: Positive for headaches. Negative for weakness.   Hematological: Does not bruise/bleed easily.       Physical Exam     Initial Vitals [09/21/20 1734]   BP Pulse Resp Temp SpO2   (!) 234/96 68 18 98.2 °F (36.8 °C) 96 %      MAP       --         Physical Exam    Nursing note and vitals reviewed.  Constitutional: She appears well-developed and well-nourished. She is Obese .  Non-toxic appearance. No distress.   HENT:   Head: Normocephalic and atraumatic.   Eyes: EOM are normal. Pupils are equal, round, and reactive to light.   Neck: Normal range of motion. Neck supple. No neck rigidity. No JVD present.   Cardiovascular: Normal rate, regular rhythm, normal heart sounds and intact distal pulses. Exam reveals no gallop and no friction rub.    No murmur heard.  Pulmonary/Chest: Breath sounds normal. She has no wheezes. She has no rhonchi. She has no rales.   Abdominal: Soft. Bowel sounds are normal. She exhibits no distension. There is no abdominal tenderness. There is no rebound and no guarding.   Musculoskeletal: Normal range of motion.   Neurological: She is alert and oriented to person, place, and time. She has normal strength and normal reflexes. No cranial nerve deficit or sensory deficit. She exhibits normal muscle tone. Coordination normal. GCS eye subscore is 4. GCS verbal subscore is 5. GCS motor subscore is 6.   Skin: Skin  is warm and dry.   Psychiatric: She has a normal mood and affect. Her speech is normal and behavior is normal. She is not actively hallucinating.         ED Course   Procedures  Labs Reviewed   CBC W/ AUTO DIFFERENTIAL - Abnormal; Notable for the following components:       Result Value    RBC 3.95 (*)     Hemoglobin 11.5 (*)     Hematocrit 36.3 (*)     Mean Corpuscular Hemoglobin Conc 31.7 (*)     All other components within normal limits   COMPREHENSIVE METABOLIC PANEL          Imaging Results    None          Medical Decision Making:   History:   Old Medical Records: I decided to obtain old medical records.  Initial Assessment:   Heather Hughes is a 80 y.o. female with a PMHx of HTN, CAD s/p stent placement, and A-fib who presents to the ED with an onset of HA and bilateral foot and leg swelling associated with elevated blood pressure today. The patient's blood pressure is elevated here in the emergency department.  The patient has a history of hypertension and had been taken off of her antihypertensive medications.    Based on the patient's presentation today I do not see any signs of endorgan damage representing hypertensive urgency or emergency.  She is given oral clonidine followed by oral hydralazine in the ED with significant improvement her blood pressure.  I do not think the patient's presentation necessitates further intervention in the Emergency Department to acutely decrease their blood pressure further.  I have given the patient and/or their family specific return precautions and instructions to follow up with their regular doctor.  Seem she is having and intermittent periods of normal blood pressure and elevated blood pressure at home.  She will be given a prescription for p.r.n. hydralazine for blood pressures greater than 180 systolic and is to follow up closely with her primary care physician for re-evaluation.    Clinical Tests:   Lab Tests: Ordered and Reviewed            Scribe  Attestation:   Scribe #1: I performed the above scribed service and the documentation accurately describes the services I performed. I attest to the accuracy of the note.     I, Andrea Lugo, personally performed the services described in this documentation. All medical record entries made by the scribe were at my direction and in my presence.  I have reviewed the chart and agree that the record reflects my personal performance and is accurate and complete. Orlando Mendez MD.                    Clinical Impression:     ICD-10-CM ICD-9-CM   1. Uncontrolled hypertension  I10 401.9   2. Anxiety  F41.9 300.00                      Disposition:   Disposition: Discharged  Condition: Stable     ED Disposition Condition    Discharge Stable        ED Prescriptions     Medication Sig Dispense Start Date End Date Auth. Provider    LORazepam (ATIVAN) 1 MG tablet Take 0.5 tablets (0.5 mg total) by mouth every 6 (six) hours as needed for Anxiety. 10 tablet 9/21/2020 10/21/2020 Orlando Mendez MD    hydrALAZINE (APRESOLINE) 25 MG tablet Take 1 tablet (25 mg total) by mouth 3 (three) times daily as needed (elevated blood pressure greater than 180 systolic). 60 tablet 9/21/2020 9/21/2021 Orlando Mendez MD        Follow-up Information     Follow up With Specialties Details Why Contact Info    Graham Mazariegos MD Family Medicine Schedule an appointment as soon as possible for a visit   [address] [facility] [address]  [phone]      [facility] Emergency Medicine  As needed, If symptoms worsen [address] [place] [address] [id number] [phone]                                       Orlando Mendez MD  09/22/20 2333

## 2020-09-23 ENCOUNTER — PATIENT OUTREACH (OUTPATIENT)
Dept: ADMINISTRATIVE | Facility: OTHER | Age: 80
End: 2020-09-23

## 2020-09-23 ENCOUNTER — OFFICE VISIT (OUTPATIENT)
Dept: FAMILY MEDICINE | Facility: CLINIC | Age: 80
End: 2020-09-23
Attending: FAMILY MEDICINE
Payer: MEDICARE

## 2020-09-23 VITALS
WEIGHT: 230.19 LBS | BODY MASS INDEX: 40.79 KG/M2 | OXYGEN SATURATION: 95 % | SYSTOLIC BLOOD PRESSURE: 154 MMHG | HEART RATE: 58 BPM | DIASTOLIC BLOOD PRESSURE: 68 MMHG | TEMPERATURE: 98 F | HEIGHT: 63 IN

## 2020-09-23 DIAGNOSIS — N32.81 OVERACTIVE BLADDER: ICD-10-CM

## 2020-09-23 DIAGNOSIS — F41.9 ANXIETY: ICD-10-CM

## 2020-09-23 DIAGNOSIS — I10 ESSENTIAL HYPERTENSION: Primary | ICD-10-CM

## 2020-09-23 DIAGNOSIS — R32 INCONTINENCE IN FEMALE: ICD-10-CM

## 2020-09-23 DIAGNOSIS — I48.91 ATRIAL FIBRILLATION WITH RVR: ICD-10-CM

## 2020-09-23 PROCEDURE — 3077F PR MOST RECENT SYSTOLIC BLOOD PRESSURE >= 140 MM HG: ICD-10-PCS | Mod: HCNC,CPTII,S$GLB, | Performed by: FAMILY MEDICINE

## 2020-09-23 PROCEDURE — G0008 ADMIN INFLUENZA VIRUS VAC: HCPCS | Mod: HCNC,S$GLB,, | Performed by: FAMILY MEDICINE

## 2020-09-23 PROCEDURE — G0008 FLU VACCINE - HIGH DOSE (65+) PRESERVATIVE FREE IM: ICD-10-PCS | Mod: HCNC,S$GLB,, | Performed by: FAMILY MEDICINE

## 2020-09-23 PROCEDURE — 1125F PR PAIN SEVERITY QUANTIFIED, PAIN PRESENT: ICD-10-PCS | Mod: HCNC,S$GLB,, | Performed by: FAMILY MEDICINE

## 2020-09-23 PROCEDURE — 1159F MED LIST DOCD IN RCRD: CPT | Mod: HCNC,S$GLB,, | Performed by: FAMILY MEDICINE

## 2020-09-23 PROCEDURE — 99213 PR OFFICE/OUTPT VISIT, EST, LEVL III, 20-29 MIN: ICD-10-PCS | Mod: HCNC,25,S$GLB, | Performed by: FAMILY MEDICINE

## 2020-09-23 PROCEDURE — 99999 PR PBB SHADOW E&M-EST. PATIENT-LVL V: ICD-10-PCS | Mod: PBBFAC,HCNC,, | Performed by: FAMILY MEDICINE

## 2020-09-23 PROCEDURE — 90662 FLU VACCINE - HIGH DOSE (65+) PRESERVATIVE FREE IM: ICD-10-PCS | Mod: HCNC,S$GLB,, | Performed by: FAMILY MEDICINE

## 2020-09-23 PROCEDURE — 90662 IIV NO PRSV INCREASED AG IM: CPT | Mod: HCNC,S$GLB,, | Performed by: FAMILY MEDICINE

## 2020-09-23 PROCEDURE — 99213 OFFICE O/P EST LOW 20 MIN: CPT | Mod: HCNC,25,S$GLB, | Performed by: FAMILY MEDICINE

## 2020-09-23 PROCEDURE — 3078F PR MOST RECENT DIASTOLIC BLOOD PRESSURE < 80 MM HG: ICD-10-PCS | Mod: HCNC,CPTII,S$GLB, | Performed by: FAMILY MEDICINE

## 2020-09-23 PROCEDURE — 1101F PR PT FALLS ASSESS DOC 0-1 FALLS W/OUT INJ PAST YR: ICD-10-PCS | Mod: HCNC,CPTII,S$GLB, | Performed by: FAMILY MEDICINE

## 2020-09-23 PROCEDURE — 3078F DIAST BP <80 MM HG: CPT | Mod: HCNC,CPTII,S$GLB, | Performed by: FAMILY MEDICINE

## 2020-09-23 PROCEDURE — 1125F AMNT PAIN NOTED PAIN PRSNT: CPT | Mod: HCNC,S$GLB,, | Performed by: FAMILY MEDICINE

## 2020-09-23 PROCEDURE — 99999 PR PBB SHADOW E&M-EST. PATIENT-LVL V: CPT | Mod: PBBFAC,HCNC,, | Performed by: FAMILY MEDICINE

## 2020-09-23 PROCEDURE — 3077F SYST BP >= 140 MM HG: CPT | Mod: HCNC,CPTII,S$GLB, | Performed by: FAMILY MEDICINE

## 2020-09-23 PROCEDURE — 1101F PT FALLS ASSESS-DOCD LE1/YR: CPT | Mod: HCNC,CPTII,S$GLB, | Performed by: FAMILY MEDICINE

## 2020-09-23 PROCEDURE — 1159F PR MEDICATION LIST DOCUMENTED IN MEDICAL RECORD: ICD-10-PCS | Mod: HCNC,S$GLB,, | Performed by: FAMILY MEDICINE

## 2020-09-23 NOTE — PROGRESS NOTES
Subjective:       Patient ID: Heather Hughes is a 80 y.o. female.    Chief Complaint: Hospital Follow Up    80-year-old female comes in for a hospital follow-up following a visit to [facility] on September 21, 2020.  The patient has a history of coronary artery disease and hypertension and had developed atrial fibrillation with rapid ventricular response.  She was admitted to the hospital back in March where she had a KIARA and cardioversion for atrial fib with rapid ventricular response.  Following successful cardioversion she was placed on amiodarone IV for 24 hr and then switched over to p.o..  At that time other anti hypertensives were discontinued.  Over the several weeks prior to her visit to the ER she and her  were celebrating their 60th anniversary and ate out several times usually choosing high salt meals.  She began to notice some swelling of the lower extremities and was checking her blood pressure with elevations.  She actually took some of her 's antihypertensives and some Catapres without relief at which time her daughter brought her to the emergency room.  They treated her in the ER and got her blood pressure down under control satisfactorily enough that they released her instead of keeping her overnight.  She was placed on hydralazine 25 mg to be used up to 3 times a day p.r.n. systolic blood pressure greater than 180.  The patient reports she has not had to use the hydralazine since leaving the ER and her systolic blood pressures have all been under 180.  She has reduced her salt intake and her blood pressure has been under fairly good control.  Her other complaint is urinary incontinence and overactive bladder.  She has no complete rollover her bladder emptying and when she has to use her Lasix it is worse.  She is use several medications which resulted in complete bladder retention and inability to empty the bladder so she stop taking them.  She would like to see  Urology.  She has been having some anxiety problems with her 's failing health as well as her own bladder issues and has been placed on lorazepam which she uses sparingly up to every 6 hr.  She is warned that it may cause falls and increased risk of fractures.    Past Medical History:  No date: Allergy  No date: Anticoagulant long-term use  No date: Arthritis  No date: Asthma  No date: Coronary artery disease      Comment:  stent x 1  1965 & 1971: Deep vein blood clot of right lower extremity  No date: Full dentures  01/13/2017: Heart attack  No date: Hypertension  No date: Osteoporosis  No date: Sleep apnea  No date: Thyroid disease  No date: Ulcer  No date: Wears glasses    Past Surgical History:  01/13/2017: cardic stent  3/12/2020: CARDIOVERSION; Left      Comment:  Procedure: Cardioversion;  Surgeon: Kaleigh Rascon MD;  Location: [facility] CATH LAB;  Service: Cardiology;                 Laterality: Left;  No date: CHOLECYSTECTOMY  No date: EYE SURGERY      Comment:  bilateral cataracts  2011: FRACTURE SURGERY      Comment:  left wrist   8/15/2018: KNEE ARTHROPLASTY; Left      Comment:  Procedure: ARTHROPLASTY, KNEE;  Surgeon: Shantanu Santiago MD;  Location: [facility] OR;  Service: Orthopedics;                 Laterality: Left;  ANESTHESIA GENERAL AND BLOCK  No date: THYROIDECTOMY    Current Outpatient Medications on File Prior to Visit:  acetaminophen (TYLENOL) 650 MG TbSR, Take 650 mg by mouth every 8 (eight) hours as needed (pain). , Disp: , Rfl:   albuterol 90 mcg/actuation inhaler, Inhale 2 puffs into the lungs every 6 (six) hours as needed for Wheezing., Disp: 18 g, Rfl: 2  amiodarone (PACERONE) 200 MG Tab, Take 1 tablet (200 mg total) by mouth once daily., Disp: 10 tablet, Rfl: 0  apixaban (ELIQUIS) 5 mg Tab, Take 1 tablet (5 mg total) by mouth 2 (two) times daily., Disp: 60 tablet, Rfl: 11  aspirin (ECOTRIN) 81 MG EC tablet, Take 81 mg by mouth once daily., Disp: , Rfl:    CALCIUM CARBONATE/VITAMIN D3 (CALCIUM 500 + D ORAL), Take 2 tablets by mouth once daily., Disp: , Rfl:   furosemide (LASIX) 20 MG tablet, Take 1 tablet (20 mg total) by mouth daily as needed (For fluid retention)., Disp: 90 tablet, Rfl: 3  gabapentin (NEURONTIN) 100 MG capsule, TAKE ONE CAPSULE BY MOUTH THREE TIMES DAILY (Patient taking differently: Take 100 mg by mouth 2 (two) times daily. ), Disp: 90 capsule, Rfl: 5  glucosamine-chondroitin 500-400 mg tablet, Take 1 tablet by mouth 3 (three) times daily., Disp: , Rfl:   hydrALAZINE (APRESOLINE) 25 MG tablet, Take 1 tablet (25 mg total) by mouth 3 (three) times daily as needed (elevated blood pressure greater than 180 systolic)., Disp: 60 tablet, Rfl: 0  LORazepam (ATIVAN) 1 MG tablet, Take 0.5 tablets (0.5 mg total) by mouth every 6 (six) hours as needed for Anxiety., Disp: 10 tablet, Rfl: 0  montelukast (SINGULAIR) 10 mg tablet, Take 1 tablet (10 mg total) by mouth every evening., Disp: 90 tablet, Rfl: 3  nitroGLYCERIN (NITROSTAT) 0.4 MG SL tablet, Place 1 tablet (0.4 mg total) under the tongue every 5 (five) minutes as needed for Chest pain., Disp: 25 tablet, Rfl: 3  omeprazole (PRILOSEC) 40 MG capsule, Take 1 capsule (40 mg total) by mouth once daily., Disp: 90 capsule, Rfl: 3  SYNTHROID 112 mcg tablet, TAKE ONE TABLET BY MOUTH EVERY DAY BEFORE BREAKFAST, Disp: 30 tablet, Rfl: 10  triamcinolone acetonide 0.025% (KENALOG) 0.025 % cream, Apply topically 2 (two) times daily. (Patient taking differently: Apply topically as needed. ), Disp: 80 g, Rfl: 0    No current facility-administered medications on file prior to visit.         Review of Systems   Constitutional: Negative for chills, diaphoresis and fever.   Respiratory: Negative for chest tightness and shortness of breath.    Cardiovascular: Negative for chest pain and palpitations.   Musculoskeletal: Positive for arthralgias (Discomfort in right wrist due to recent fracture now healed). Negative for joint  swelling.   Psychiatric/Behavioral: Positive for sleep disturbance. The patient is nervous/anxious.        Objective:      Physical Exam  Vitals signs and nursing note reviewed.   Constitutional:       General: She is not in acute distress.     Appearance: She is obese. She is not ill-appearing, toxic-appearing or diaphoretic.      Comments: Mild systolic elevation with good diastolic and slightly low pulse  Morbid obesity with a BMI of 40.8 she is up 9.2 lb from her last visit with me August 12, 2020   Cardiovascular:      Rate and Rhythm: Normal rate and regular rhythm.   Pulmonary:      Effort: Pulmonary effort is normal. No respiratory distress.      Breath sounds: Normal breath sounds. No stridor. No wheezing, rhonchi or rales.   Chest:      Chest wall: No tenderness.   Neurological:      Mental Status: She is alert.   Psychiatric:         Attention and Perception: Attention and perception normal.         Mood and Affect: Mood and affect normal. Mood is not anxious or depressed. Affect is not inappropriate.         Speech: Speech normal.         Behavior: Behavior normal. Behavior is cooperative.         Thought Content: Thought content normal.         Cognition and Memory: Cognition normal.         Judgment: Judgment normal.      Comments: Some difficulties with hearing impair communication ability         Assessment:       1. Essential hypertension    2. Atrial fibrillation with RVR    3. Incontinence in female    4. Overactive bladder    5. Anxiety        Plan:       1. Essential hypertension  Fair control, very labile.  Patient has reduced her salt intake and has achieve satisfactory control    2. Atrial fibrillation with RVR  Status post cardioversion, normal sinus rhythm and followed by Cardiology    3. Incontinence in female  Urology consult  - Ambulatory referral/consult to Urology; Future    4. Overactive bladder  See above  - Ambulatory referral/consult to Urology; Future    5. Anxiety  Will allow the  benzodiazepine for now, if we can get her bladder symptoms under control and stabilize her blood pressure hopefully we can wean her off of it.  She and her daughter were both informed that it can increase her risk of falls and fractures and they are aware that it can be a problem.

## 2020-09-23 NOTE — PROGRESS NOTES
Chart was reviewed for overdue Proactive [facility] Encounters (JEFF)  topics  Updates were requested from care everywhere  Health Maintenance has been updated  LINKS immunization registry triggered

## 2020-09-24 ENCOUNTER — OFFICE VISIT (OUTPATIENT)
Dept: UROLOGY | Facility: CLINIC | Age: 80
End: 2020-09-24
Attending: FAMILY MEDICINE
Payer: MEDICARE

## 2020-09-24 ENCOUNTER — APPOINTMENT (OUTPATIENT)
Dept: LAB | Facility: HOSPITAL | Age: 80
End: 2020-09-24
Attending: NURSE PRACTITIONER
Payer: MEDICARE

## 2020-09-24 VITALS
HEIGHT: 63 IN | DIASTOLIC BLOOD PRESSURE: 84 MMHG | HEART RATE: 71 BPM | BODY MASS INDEX: 39.18 KG/M2 | RESPIRATION RATE: 18 BRPM | WEIGHT: 221.13 LBS | SYSTOLIC BLOOD PRESSURE: 190 MMHG

## 2020-09-24 DIAGNOSIS — R32 INCONTINENCE IN FEMALE: ICD-10-CM

## 2020-09-24 DIAGNOSIS — N32.81 OVERACTIVE BLADDER: ICD-10-CM

## 2020-09-24 DIAGNOSIS — N39.45 CONTINUOUS LEAKAGE OF URINE: Primary | ICD-10-CM

## 2020-09-24 DIAGNOSIS — R82.90 ABNORMAL URINE FINDINGS: ICD-10-CM

## 2020-09-24 LAB
BACTERIA #/AREA URNS HPF: ABNORMAL /HPF
BILIRUB SERPL-MCNC: ABNORMAL MG/DL
BILIRUB UR QL STRIP: NEGATIVE
BLOOD URINE, POC: ABNORMAL
CLARITY UR: CLEAR
CLARITY, POC UA: CLEAR
COLOR UR: YELLOW
COLOR, POC UA: YELLOW
GLUCOSE UR QL STRIP: ABNORMAL
GLUCOSE UR QL STRIP: NEGATIVE
HGB UR QL STRIP: ABNORMAL
KETONES UR QL STRIP: ABNORMAL
KETONES UR QL STRIP: NEGATIVE
LEUKOCYTE ESTERASE UR QL STRIP: ABNORMAL
LEUKOCYTE ESTERASE URINE, POC: ABNORMAL
MICROSCOPIC COMMENT: ABNORMAL
NITRITE UR QL STRIP: NEGATIVE
NITRITE, POC UA: ABNORMAL
NON-SQ EPI CELLS #/AREA URNS HPF: 1 /HPF
PH UR STRIP: 6 [PH] (ref 5–8)
PH, POC UA: 6
PROT UR QL STRIP: NEGATIVE
PROTEIN, POC: ABNORMAL
RBC #/AREA URNS HPF: 1 /HPF (ref 0–4)
SP GR UR STRIP: 1.01 (ref 1–1.03)
SPECIFIC GRAVITY, POC UA: 1.01
URN SPEC COLLECT METH UR: ABNORMAL
UROBILINOGEN UR STRIP-ACNC: NEGATIVE EU/DL
UROBILINOGEN, POC UA: 0.2
WBC #/AREA URNS HPF: 18 /HPF (ref 0–5)

## 2020-09-24 PROCEDURE — 99999 PR PBB SHADOW E&M-EST. PATIENT-LVL V: ICD-10-PCS | Mod: PBBFAC,HCNC,, | Performed by: NURSE PRACTITIONER

## 2020-09-24 PROCEDURE — 3079F PR MOST RECENT DIASTOLIC BLOOD PRESSURE 80-89 MM HG: ICD-10-PCS | Mod: HCNC,CPTII,S$GLB, | Performed by: NURSE PRACTITIONER

## 2020-09-24 PROCEDURE — 51701 PR INSERTION OF NON-INDWELLING BLADDER CATHETERIZATION FOR RESIDUAL UR: ICD-10-PCS | Mod: HCNC,S$GLB,, | Performed by: NURSE PRACTITIONER

## 2020-09-24 PROCEDURE — 1159F MED LIST DOCD IN RCRD: CPT | Mod: HCNC,S$GLB,, | Performed by: NURSE PRACTITIONER

## 2020-09-24 PROCEDURE — 1101F PT FALLS ASSESS-DOCD LE1/YR: CPT | Mod: HCNC,CPTII,S$GLB, | Performed by: NURSE PRACTITIONER

## 2020-09-24 PROCEDURE — 81002 POCT URINE DIPSTICK WITHOUT MICROSCOPE: ICD-10-PCS | Mod: HCNC,S$GLB,, | Performed by: NURSE PRACTITIONER

## 2020-09-24 PROCEDURE — 3077F SYST BP >= 140 MM HG: CPT | Mod: HCNC,CPTII,S$GLB, | Performed by: NURSE PRACTITIONER

## 2020-09-24 PROCEDURE — 99204 OFFICE O/P NEW MOD 45 MIN: CPT | Mod: HCNC,25,S$GLB, | Performed by: NURSE PRACTITIONER

## 2020-09-24 PROCEDURE — 99204 PR OFFICE/OUTPT VISIT, NEW, LEVL IV, 45-59 MIN: ICD-10-PCS | Mod: HCNC,25,S$GLB, | Performed by: NURSE PRACTITIONER

## 2020-09-24 PROCEDURE — 1159F PR MEDICATION LIST DOCUMENTED IN MEDICAL RECORD: ICD-10-PCS | Mod: HCNC,S$GLB,, | Performed by: NURSE PRACTITIONER

## 2020-09-24 PROCEDURE — 81002 URINALYSIS NONAUTO W/O SCOPE: CPT | Mod: HCNC,S$GLB,, | Performed by: NURSE PRACTITIONER

## 2020-09-24 PROCEDURE — 87077 CULTURE AEROBIC IDENTIFY: CPT | Mod: HCNC

## 2020-09-24 PROCEDURE — 1126F PR PAIN SEVERITY QUANTIFIED, NO PAIN PRESENT: ICD-10-PCS | Mod: HCNC,S$GLB,, | Performed by: NURSE PRACTITIONER

## 2020-09-24 PROCEDURE — 81000 URINALYSIS NONAUTO W/SCOPE: CPT | Mod: HCNC

## 2020-09-24 PROCEDURE — 1126F AMNT PAIN NOTED NONE PRSNT: CPT | Mod: HCNC,S$GLB,, | Performed by: NURSE PRACTITIONER

## 2020-09-24 PROCEDURE — 3079F DIAST BP 80-89 MM HG: CPT | Mod: HCNC,CPTII,S$GLB, | Performed by: NURSE PRACTITIONER

## 2020-09-24 PROCEDURE — 87086 URINE CULTURE/COLONY COUNT: CPT | Mod: HCNC

## 2020-09-24 PROCEDURE — 99999 PR PBB SHADOW E&M-EST. PATIENT-LVL V: CPT | Mod: PBBFAC,HCNC,, | Performed by: NURSE PRACTITIONER

## 2020-09-24 PROCEDURE — 87186 SC STD MICRODIL/AGAR DIL: CPT | Mod: HCNC

## 2020-09-24 PROCEDURE — 87088 URINE BACTERIA CULTURE: CPT | Mod: HCNC

## 2020-09-24 PROCEDURE — 3077F PR MOST RECENT SYSTOLIC BLOOD PRESSURE >= 140 MM HG: ICD-10-PCS | Mod: HCNC,CPTII,S$GLB, | Performed by: NURSE PRACTITIONER

## 2020-09-24 PROCEDURE — 1101F PR PT FALLS ASSESS DOC 0-1 FALLS W/OUT INJ PAST YR: ICD-10-PCS | Mod: HCNC,CPTII,S$GLB, | Performed by: NURSE PRACTITIONER

## 2020-09-24 PROCEDURE — 51701 INSERT BLADDER CATHETER: CPT | Mod: HCNC,S$GLB,, | Performed by: NURSE PRACTITIONER

## 2020-09-24 NOTE — LETTER
September 25, 2020      Graham Mazariegos MD  [address]  [place] [address]           [place] [facility]  [address] [place] [address] [address]  [place] [address]  Phone: [phone]  Fax: [phone]          Patient: Heather Hughes   MR Number: [id number]   YOB: 1940   Date of Visit: 9/24/2020       Dear Dr. Graham Mazariegos:    Thank you for referring Heather Hughes to me for evaluation. Attached you will find relevant portions of my assessment and plan of care.    If you have questions, please do not hesitate to call me. I look forward to following Heather Hughes along with you.    Sincerely,    Darling Humphreys, [facility]    Enclosure  CC:  No Recipients    If you would like to receive this communication electronically, please contact [email or web address] or [phone] to request more information on [employer] Link access.    For providers and/or their staff who would like to refer a patient to [facility], please contact us through our one-stop-shop provider referral line, [facility], at [phone].    If you feel you have received this communication in error or would no longer like to receive these types of communications, please e-mail [email or web address]

## 2020-09-24 NOTE — PATIENT INSTRUCTIONS
Treating Incontinence in Women: Nonsurgical Methods    The best treatment for you will depend on the type of incontinence you have. Your symptoms, age, and any underlying problems that are found also affect your treatment. While some types of incontinence may eventually require surgery, nonsurgical treatments may be effective in many cases. Nonsurgical treatments include lifestyle changes, muscle-strengthening exercises, and medicines.  Nonsurgical Treatments  Treatment for stress urinary incontinence includes:  · Bladder training  · Lifestyle changes such as weight loss and increased activity if incontinence is due to being overweight  · Medicines, if bladder training has not helped  · Pelvic floor muscle exercises  Lifestyle changes  · Losing weight. Excess weight puts extra pressure on the pelvic floor muscles. Exercising and eating right can help you lose weight. This helps other treatments work better.  · Making certain diet changes. Some foods may make you need to urinate more, so it may be good to avoid them. These include caffeinated drinks and alcohol. Ask your healthcare provider whether these or other diet changes might be helpful.  · Quitting smoking. Smoking can lead to a chronic cough that strains pelvic floor muscles. Smoking may also damage the bladder and urethra.  Pelvic floor musle exercises  There are exercises you can do to help strengthen your pelvic floor muscles. The pelvic floor muscles act as a sling to help hold the bladder and urethra in place. These muscles also help keep the urethra closed. Weak pelvic floor muscles may allow urine to leak. To strengthen the pelvic floor muscles, do the exercises daily. In a few months, the muscles will be stronger and tighter. This can help prevent urine leakage.  Date Last Reviewed: 1/1/2017  © 7385-5510 [employer]. [address] [place] [address], [place] [address]. All rights reserved. This information is not intended as a substitute for  professional medical care. Always follow your healthcare professional's instructions.        Overactive Bladder Syndrome (OAB)     Normally, urine stays in the bladder until a person decides to release it. With OAB, the bladder muscles contract involuntarily, causing a sudden urge to urinate and even urine leakage.   When the bladder muscle contract or squeeze involuntarily, it is called overactive bladder syndrome. This causes an intense urge to urinate, known as urgency. Urgency can occur many times during the day and night. If urine leaks with the urgency, it is called urge incontinence.  A disease that affects the bladder nerves, such as multiple sclerosis, can cause overactive bladder syndrome. Other conditions, such as urinary tract infection (UTI) or prostate problems in men, can also lead to OAB. But the exact cause is often not known.  How is overactive bladder syndrome diagnosed?  Your healthcare provider will examine you and ask about your symptoms and health history. You may also have one or more of the following:  · Urine test to take samples of urine and have them checked for problems.  · Urinary diary to record how much fluid you take in and urinate out in a 3 day period.  · Bladder ultrasound to study the bladder as it empties. Ultrasound uses sound waves to create detailed images of the inside of the body.  · Cystoscopy to allow the healthcare provider to look for problems in the urinary tract. The test uses a thin, flexible scope called a cystoscope with a light and camera on the end. The scope is inserted into the urethra (the tube that carries urine out of the body).  · Urodynamic studies, a battery of tests designed to measure and record many aspects of urinary bladder function, including pressures, volume, and urine flow.  How is overactive bladder syndrome treated?  Treatment depends on the cause and severity of your OAB. Treatments may include the following:  · Changing urination habits may  be suggested. For instance, your healthcare provider may suggest that you urinate as soon as you feel the urge. You may also need to limit how much fluid you have during the day.  · Exercising your pelvic muscles can help strengthen muscles used during urination. These exercises are called Kegels. They involve georgina as if you were stopping your urine stream and tightening your rectum as if trying not to pass gas. Your healthcare provider can help you learn how to do Kegels.  · Biofeedback to help you learn to control the movement of your bladder muscles. Sensors are placed on your abdomen. They turn signals given off by your muscles into lines on a computer screen.  · Medicine may be given to relax the bladder muscle. Medicine can also help ease bladder contractions, which reduces the urge to urinate.  · Neuromodulation may be done if medicine and behavioral changes dont work. Electrical pulses are sent to the sacral nerves (nerves that affect the pelvic area). These pulses help relieve OAB and urge incontinence.  · Surgery to make the bladder larger may be done in severe cases.  With treatment, OAB can be managed. A condition, such as UTI, that has caused you to have OAB will be treated. Treatment may involve taking medicine for months or years. You may also need to make changes in your daily routine. This may include going to the bathroom more often than you think you need to. Or, you may need to cut back on caffeine and alcohol because these can make symptoms worse. Your healthcare provider can tell you more.     When to call your healthcare provider  Call the healthcare provider right away if you have any of the following:  · Fever of 100.4°F (38.0 °C) or higher   · No improvement with treatment  · Trouble urinating because of pain  · Back or abdominal pain   Date Last Reviewed: 1/1/2017  © 9351-6527 [employer]. [address] [place] [address], [place] [address]. All rights reserved. This  information is not intended as a substitute for professional medical care. Always follow your healthcare professional's instructions.        Urinary Incontinence, Female (Adult)  Urinary incontinence means loss of control of the bladder. This problem affects many women, especially as they get older. If you have incontinence, you may be embarrassed to ask for help. But know that this problem can be treated.     Types of Incontinence  There are different types of incontinence. Two of the main types are described here. You can have more than one type.  Stress incontinence: With this type, urine leaks when pressure (stress) is put on the bladder. This may happen when you cough, sneeze, or laugh. Stress incontinence most often occurs because the pelvic floor muscles that support the bladder and urethra are weak. This can happen after pregnancy and vaginal childbirth or a hysterectomy. It can also be due to excess body weight or hormone changes.  Urge incontinence (also called overactive bladder): With this type, a sudden urge to urinate is felt often. This may happen even though there may not be much urine in the bladder. The need to urinate often during the night is common. Urge incontinence most often occurs because of bladder spasms. This may be due to bladder irritation or infection. Damage to bladder nerves or pelvic muscles, constipation, and certain medicines can also lead to urge incontinence.  Treatment of urinary incontinence depends on the cause. Further evaluation is needed to find the type you have. This will likely include an exam and certain tests. Based on the results, you and your healthcare provider can then plan treatment. Until a diagnosis is made, the home care tips below can help relieve symptoms.  Home care  · Do pelvic floor muscle (Kegel) exercises, if they are prescribed. The pelvic floor muscles help support the bladder and urethra. Many women find that their symptoms improve when doing special  exercises that strengthen these muscles. To do the exercises:  ¨ Contract the muscles you would use to stop your stream of urine, but do this when youre not urinating. Hold for 10 seconds, then relax. Repeat 10 to 20 times in a row, at least 3 times a day. Your provider may give you other instructions for how to do the exercises and how often.  · Keep a bladder diary. This helps track how often and how much you urinate over a set period of time. Bring this diary with you to your next visit with the provider. The information can help your provider learn more about your bladder problem.  · Lose weight, if advised to by your provider. Excess weight puts pressure on the bladder. Your provider can help you create a weight-loss plan thats right for you. This may include exercising more and making certain diet changes.  · Avoid foods and drinks that may irritate the bladder. These can include alcohol and caffeinated drinks.  · Quit smoking. Smoking and other tobacco use can lead to chronic cough that strains the pelvic floor muscles. Smoking may also damage the bladder and urethra. Talk with your provider about treatments or methods you can use to quit smoking.  · If drinking large amounts of fluid causes you to have symptoms, you may be advised to limit your fluid intake. You may also be advised to drink most of your fluids during the day and to limit fluids at night.  · If youre worried about urine leakage or accidents, you may wear absorbent pads to catch urine. Change the pads often. This helps reduce discomfort. It may also reduce the risk of skin or bladder infections.  Follow-up care  Follow up with your healthcare provider as directed. If testing was done, youll be told the results as soon as they are ready. It may take some to find the right treatment for your problem. Work closely with your provider to ensure you get the best care for your needs. Your treatment plan may include special therapies or medicines.  Certain procedures or surgery may also be options. Be sure to discuss any questions you have with your provider.  When to seek medical advice  Call the healthcare provider right away if any of these occur:  · Fever of 100.4°F (38°C) or higher, or as directed by your provider  · Bladder pain or fullness  · Abdominal swelling  · Nausea or vomiting  · Back pain  · Weakness, dizziness or fainting  Date Last Reviewed: 7/26/2015  © 2274-7388 [employer]. [address] [place] [address], [place] [address]. All rights reserved. This information is not intended as a substitute for professional medical care. Always follow your healthcare professional's instructions.

## 2020-09-24 NOTE — PROGRESS NOTES
[facility] Note  Staff: EDINSON Mancera    PCP: Dr. Graham Mazariegos    Chief Complaint: Urinary incontinence    Subjective:        HPI: Heather Hughes is a 80 y.o. female NEW PT presents today for evaluation of Urinary incontinence and overactive bladder symptoms.  She is accompanied by her daughter during ov today.    She has no control over her bladder emptying and when she has to use her Lasix her urinary symptoms are worsened.  She has used several OAB/Incontinence medications in the past which resulted in complete bladder retention and inability to empty the bladder therefore she stopped taking them and very apprehensive to attempt and try any others at this point.    She has been having some anxiety problems with her 's failing health as well as her own bladder issues and has been placed on lorazepam by PCP office which she uses sparingly up to every 6 hr.  She is warned that it may cause falls and increased risk of fractures.    The following are the  meds that pt has tried in the past which caused leg swelling and urinary retention issues:  Ditropan/Oxybuytnin and Myrbetriq.     Family Hx of  Cancer?  None    REVIEW OF SYSTEMS:  A comprehensive 10 system review was performed and is negative except as noted above in HPI    PMHx:  Past Medical History:   Diagnosis Date    Allergy     Anticoagulant long-term use     Arthritis     Asthma     Coronary artery disease     stent x 1    Deep vein blood clot of right lower extremity 1965 & 1971    Full dentures     Heart attack 01/13/2017    Hypertension     Osteoporosis     Sleep apnea     Thyroid disease     Ulcer     Wears glasses      PSHx:  Past Surgical History:   Procedure Laterality Date    cardic stent  01/13/2017    CARDIOVERSION Left 3/12/2020    Procedure: Cardioversion;  Surgeon: Kaleigh Rascon MD;  Location: [facility] CATH LAB;  Service: Cardiology;  Laterality: Left;    CHOLECYSTECTOMY       EYE SURGERY      bilateral cataracts    FRACTURE SURGERY  2011    left wrist     KNEE ARTHROPLASTY Left 8/15/2018    Procedure: ARTHROPLASTY, KNEE;  Surgeon: Shantanu Santiago MD;  Location: [facility];  Service: Orthopedics;  Laterality: Left;  ANESTHESIA GENERAL AND BLOCK    THYROIDECTOMY       Allergies:  Bactroban [mupirocin calcium], Ditropan [oxybutynin chloride], Lipitor [atorvastatin], Myrbetriq [mirabegron], Pravastatin, Oxycodone, Codeine, Mobic [meloxicam], Perfume, and Sulfur    Medications: reviewed   Anticoagulation: Yes - Eliquis 5 mg BID and Ecotrin 81 mg daily    Objective:     Vitals:    09/24/20 1516   BP: (!) 190/84   Pulse: 71   Resp: 18     Physical Exam   Vitals reviewed.  Constitutional: She is oriented to person, place, and time. She appears well-developed.   HENT:   Head: Normocephalic and atraumatic.   Eyes: Conjunctivae are normal. Pupils are equal, round, and reactive to light.   Neck: Normal range of motion. Neck supple.   Cardiovascular: Normal rate, regular rhythm and normal heart sounds.    Pulmonary/Chest: Effort normal and breath sounds normal.   Abdominal: Soft. Bowel sounds are normal.   Musculoskeletal: Normal range of motion.   Neurological: She is alert and oriented to person, place, and time. She has normal reflexes.   Skin: Skin is warm and dry.     Psychiatric: Her behavior is normal. Judgment and thought content normal.      EXAM performed by me in office today:  External Genitalia: normal hair distribution, no lesions  Urethral meatus: normal without prolapse or caruncle  Urethra: without tenderness or mass  Bladder: without fulness or tenderness  +Atrophic vaginitis  14 fr red rubber straight in and out cath performed by me with 20 mL of urine residual    LABS REVIEW:  UA today: (Clean Catch)  Color:Clear, Yellow  Spec. Grav.  1.015  PH  6.0  Trace leukocytes  Trace blood in urine  Assessment:       1. Continuous leakage of urine    2. Incontinence in female    3.  Overactive bladder    4. Abnormal urine findings          Plan:   Urinary incontinence/OAB symptoms, intolerable to OAB meds:    1. UA, UA Micro, and urine culture to be performed.  2. At this point, pt would like other options for her incontinence treatment other than p.o. meds due to her hx of side affects.  Therefore I am referring her to Urogyn for further evaluation with pelvic exam and possible UDS in near future.    F/u-Our office will contact this pt after we receive and review urine results.  Pt verbalized understanding at conclusion of appointment today.  Pt was instructed to contact our office should their symptoms worsen or any new  complaints.    [facility]: Active    Darling Humphreys, KD-C

## 2020-09-27 LAB — BACTERIA UR CULT: ABNORMAL

## 2020-09-28 RX ORDER — CIPROFLOXACIN 500 MG/1
500 TABLET ORAL 2 TIMES DAILY
Qty: 28 TABLET | Refills: 0 | Status: SHIPPED | OUTPATIENT
Start: 2020-09-28 | End: 2020-10-12

## 2020-09-29 ENCOUNTER — PATIENT MESSAGE (OUTPATIENT)
Dept: OTHER | Facility: OTHER | Age: 80
End: 2020-09-29

## 2020-10-20 ENCOUNTER — OFFICE VISIT (OUTPATIENT)
Dept: UROGYNECOLOGY | Facility: CLINIC | Age: 80
End: 2020-10-20
Payer: MEDICARE

## 2020-10-20 DIAGNOSIS — N39.43 POST-VOID DRIBBLING: ICD-10-CM

## 2020-10-20 DIAGNOSIS — R35.0 URINARY FREQUENCY: Primary | ICD-10-CM

## 2020-10-20 DIAGNOSIS — N95.2 ATROPHIC VAGINITIS: ICD-10-CM

## 2020-10-20 DIAGNOSIS — N39.46 MIXED INCONTINENCE URGE AND STRESS: ICD-10-CM

## 2020-10-20 LAB
BILIRUB SERPL-MCNC: NORMAL MG/DL
BLOOD URINE, POC: NORMAL
CLARITY, POC UA: CLEAR
COLOR, POC UA: YELLOW
GLUCOSE UR QL STRIP: NORMAL
KETONES UR QL STRIP: NORMAL
LEUKOCYTE ESTERASE URINE, POC: NORMAL
NITRITE, POC UA: NORMAL
PH, POC UA: 7
PROTEIN, POC: NORMAL
SPECIFIC GRAVITY, POC UA: 1000
UROBILINOGEN, POC UA: NORMAL

## 2020-10-20 PROCEDURE — 3077F SYST BP >= 140 MM HG: CPT | Mod: HCNC,CPTII,S$GLB, | Performed by: NURSE PRACTITIONER

## 2020-10-20 PROCEDURE — 3080F PR MOST RECENT DIASTOLIC BLOOD PRESSURE >= 90 MM HG: ICD-10-PCS | Mod: HCNC,CPTII,S$GLB, | Performed by: NURSE PRACTITIONER

## 2020-10-20 PROCEDURE — 81002 URINALYSIS NONAUTO W/O SCOPE: CPT | Mod: HCNC,S$GLB,, | Performed by: NURSE PRACTITIONER

## 2020-10-20 PROCEDURE — 81002 POCT URINE DIPSTICK WITHOUT MICROSCOPE: ICD-10-PCS | Mod: HCNC,S$GLB,, | Performed by: NURSE PRACTITIONER

## 2020-10-20 PROCEDURE — 99999 PR PBB SHADOW E&M-EST. PATIENT-LVL IV: CPT | Mod: PBBFAC,HCNC,, | Performed by: NURSE PRACTITIONER

## 2020-10-20 PROCEDURE — 3077F PR MOST RECENT SYSTOLIC BLOOD PRESSURE >= 140 MM HG: ICD-10-PCS | Mod: HCNC,CPTII,S$GLB, | Performed by: NURSE PRACTITIONER

## 2020-10-20 PROCEDURE — 1101F PR PT FALLS ASSESS DOC 0-1 FALLS W/OUT INJ PAST YR: ICD-10-PCS | Mod: HCNC,CPTII,S$GLB, | Performed by: NURSE PRACTITIONER

## 2020-10-20 PROCEDURE — 99999 PR PBB SHADOW E&M-EST. PATIENT-LVL IV: ICD-10-PCS | Mod: PBBFAC,HCNC,, | Performed by: NURSE PRACTITIONER

## 2020-10-20 PROCEDURE — 99214 OFFICE O/P EST MOD 30 MIN: CPT | Mod: HCNC,25,S$GLB, | Performed by: NURSE PRACTITIONER

## 2020-10-20 PROCEDURE — 1101F PT FALLS ASSESS-DOCD LE1/YR: CPT | Mod: HCNC,CPTII,S$GLB, | Performed by: NURSE PRACTITIONER

## 2020-10-20 PROCEDURE — 1159F MED LIST DOCD IN RCRD: CPT | Mod: HCNC,S$GLB,, | Performed by: NURSE PRACTITIONER

## 2020-10-20 PROCEDURE — 1126F AMNT PAIN NOTED NONE PRSNT: CPT | Mod: HCNC,S$GLB,, | Performed by: NURSE PRACTITIONER

## 2020-10-20 PROCEDURE — 3080F DIAST BP >= 90 MM HG: CPT | Mod: HCNC,CPTII,S$GLB, | Performed by: NURSE PRACTITIONER

## 2020-10-20 PROCEDURE — 1159F PR MEDICATION LIST DOCUMENTED IN MEDICAL RECORD: ICD-10-PCS | Mod: HCNC,S$GLB,, | Performed by: NURSE PRACTITIONER

## 2020-10-20 PROCEDURE — 1126F PR PAIN SEVERITY QUANTIFIED, NO PAIN PRESENT: ICD-10-PCS | Mod: HCNC,S$GLB,, | Performed by: NURSE PRACTITIONER

## 2020-10-20 PROCEDURE — 99214 PR OFFICE/OUTPT VISIT, EST, LEVL IV, 30-39 MIN: ICD-10-PCS | Mod: HCNC,25,S$GLB, | Performed by: NURSE PRACTITIONER

## 2020-10-20 NOTE — LETTER
October 21, 2020      Darling Humphreys, [address]  [address]  [facility] [address]           [facility]  [address] [place] [address] [address]  [place] [address]  Phone: [phone]  Fax: [phone]          Patient: Heather Hughes   MR Number: [id number]   YOB: 1940   Date of Visit: 10/20/2020       Dear Darling Humphreys:    Thank you for referring Heather Hughes to me for evaluation. Attached you will find relevant portions of my assessment and plan of care.    If you have questions, please do not hesitate to call me. I look forward to following Heather Hughes along with you.    Sincerely,    LISA Atkins, [facility]    Enclosure  CC:  No Recipients    If you would like to receive this communication electronically, please contact [email or web address] or [phone] to request more information on [employer] Link access.    For providers and/or their staff who would like to refer a patient to [facility], please contact us through our one-stop-shop provider referral line, [facility] , at [phone].    If you feel you have received this communication in error or would no longer like to receive these types of communications, please e-mail [email or web address]

## 2020-10-20 NOTE — PROGRESS NOTES
Subjective:       Patient ID: Heather Hughes is a 80 y.o. female.    Chief Complaint: incontience      Heather Hughes is a 80 y.o. female who is new to me, presents today for consult in regards to incontinence.  She has been having problems for years but it seems to be getting worse.  She has frequency x 6-8 during the day and nocturia x 1 occasionally 2.  She does feel some PVF at times.  She has some post void dribbling as well.  She has both UUI and OFELIA.  She also notes that when she moves from sitting to standing she will start to leak and there is no controlling the leak.  She denies any history of recurrent UTI.  She denies any history of kidney stones.  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  She drinks minimal water during the day and about 1 cup of coffee.  She has occasional sprite.  She has tried myrbetriq and ditropan in the past.  She had urinary retention with both medications, so she is very leery of trying any other medications.  She denies any vaginal discharge, bleeding or bulging.  She denies any history of breast cancer.  She has not had a WWE in about 30 years.  She is not currently sexually active.  She is anxious to see what other options are available to her.     Review of Systems   Constitutional: Negative for activity change, fever and unexpected weight change.   HENT: Negative for hearing loss.    Eyes: Negative for visual disturbance.   Respiratory: Negative for shortness of breath and wheezing.    Cardiovascular: Negative for chest pain, palpitations and leg swelling.   Gastrointestinal: Negative for abdominal pain, constipation and diarrhea.   Genitourinary: Positive for frequency and urgency. Negative for dyspareunia, dysuria, vaginal bleeding and vaginal discharge.   Musculoskeletal: Positive for gait problem. Negative for neck pain.   Skin: Negative for rash and wound.   Allergic/Immunologic: Negative for immunocompromised state.   Neurological: Negative for tremors, speech  difficulty and weakness.   Hematological: Does not bruise/bleed easily.   Psychiatric/Behavioral: Negative for agitation and confusion.       Objective:      Physical Exam  Constitutional:       General: She is not in acute distress.     Appearance: She is well-developed.   HENT:      Head: Normocephalic and atraumatic.   Neck:      Musculoskeletal: Neck supple.      Thyroid: No thyromegaly.   Pulmonary:      Effort: Pulmonary effort is normal. No respiratory distress.   Abdominal:      Palpations: Abdomen is soft.      Tenderness: There is no abdominal tenderness.      Hernia: No hernia is present.   Musculoskeletal: Normal range of motion.      Right lower le+ Edema present.      Left lower le+ Edema present.      Comments: Ambulates with walker   Skin:     General: Skin is warm and dry.      Findings: No rash.   Neurological:      Mental Status: She is alert and oriented to person, place, and time.   Psychiatric:         Behavior: Behavior normal.         Cognition and Memory: Memory is impaired.       Pelvic Exam:  V: No lesions. No palpable nodes.   Va: no discharge or bleeding noted.  Tissue is atrophic.  Good length and fairly good support  Meatus:No caruncle or stenosis  Urethra: Non tender. No suburethral masses. No hypermobility,  No OFELIA in supine position.  Cx/Cuff: Normal   Uterus: small, non-tender.  Ad: No mass or tenderness.  Levators :Symmetrical. Normal tone. Non tender. Contractions 1/5 and unable to sustain for full 5 seconds.  BL: Non tender  RV: No hemorrhoids.      Assessment:       1. Urinary frequency    2. Mixed incontinence urge and stress    3. Post-void dribbling    4. Atrophic vaginitis        Plan:       Urinary frequency- I don't believe she is a good candidate for anticholinergics as she has some memory issues and has problems with retention in the past.  NP did review 3rd line therapies, but we will get a CMG to help better direct therapy.  -     POCT URINE DIPSTICK WITHOUT  MICROSCOPE    Mixed incontinence urge and stress- as noted above    Post-void dribbling- as noted above    Atrophic vaginitis- consider estrace cream in the future.    RTC 1-2 weeks for CMG.

## 2020-10-21 VITALS
DIASTOLIC BLOOD PRESSURE: 92 MMHG | WEIGHT: 222.69 LBS | BODY MASS INDEX: 39.46 KG/M2 | HEIGHT: 63 IN | SYSTOLIC BLOOD PRESSURE: 202 MMHG | HEART RATE: 73 BPM

## 2020-10-27 ENCOUNTER — TELEPHONE (OUTPATIENT)
Dept: UROGYNECOLOGY | Facility: CLINIC | Age: 80
End: 2020-10-27

## 2020-10-27 NOTE — TELEPHONE ENCOUNTER
----- Message from Julita Brower sent at 10/27/2020 11:59 AM CDT -----  Contact: Daughter  Type: Needs Medical Advice  Who Called:  Angela Streeter Call Back Number:   Additional Information: the daughter would like to resched an appt for tomr procedure asked for a call back call her at [phone]

## 2020-11-04 ENCOUNTER — PROCEDURE VISIT (OUTPATIENT)
Dept: UROGYNECOLOGY | Facility: CLINIC | Age: 80
End: 2020-11-04
Payer: MEDICARE

## 2020-11-04 VITALS
HEIGHT: 63 IN | DIASTOLIC BLOOD PRESSURE: 91 MMHG | WEIGHT: 222.69 LBS | BODY MASS INDEX: 39.46 KG/M2 | SYSTOLIC BLOOD PRESSURE: 223 MMHG | HEART RATE: 66 BPM | RESPIRATION RATE: 18 BRPM | TEMPERATURE: 97 F

## 2020-11-04 DIAGNOSIS — N39.46 MIXED INCONTINENCE URGE AND STRESS: ICD-10-CM

## 2020-11-04 DIAGNOSIS — N39.43 POST-VOID DRIBBLING: ICD-10-CM

## 2020-11-04 DIAGNOSIS — R35.0 URINARY FREQUENCY: Primary | ICD-10-CM

## 2020-11-04 DIAGNOSIS — N95.2 ATROPHIC VAGINITIS: ICD-10-CM

## 2020-11-04 LAB
BILIRUB SERPL-MCNC: ABNORMAL MG/DL
BLOOD URINE, POC: ABNORMAL
CLARITY, POC UA: CLEAR
COLOR, POC UA: YELLOW
GLUCOSE UR QL STRIP: NORMAL
KETONES UR QL STRIP: ABNORMAL
LEUKOCYTE ESTERASE URINE, POC: ABNORMAL
NITRITE, POC UA: ABNORMAL
PH, POC UA: 5
PROTEIN, POC: ABNORMAL
SPECIFIC GRAVITY, POC UA: 1.02
UROBILINOGEN, POC UA: NORMAL

## 2020-11-04 PROCEDURE — 51725 SIMPLE CYSTOMETROGRAM: CPT | Mod: HCNC,S$GLB,, | Performed by: NURSE PRACTITIONER

## 2020-11-04 PROCEDURE — 99214 PR OFFICE/OUTPT VISIT, EST, LEVL IV, 30-39 MIN: ICD-10-PCS | Mod: HCNC,25,S$GLB, | Performed by: NURSE PRACTITIONER

## 2020-11-04 PROCEDURE — 81002 URINALYSIS NONAUTO W/O SCOPE: CPT | Mod: HCNC,S$GLB,, | Performed by: NURSE PRACTITIONER

## 2020-11-04 PROCEDURE — 51725 PR SIMPLE CYSTOMETROGRAM: ICD-10-PCS | Mod: HCNC,S$GLB,, | Performed by: NURSE PRACTITIONER

## 2020-11-04 PROCEDURE — 81002 POCT URINE DIPSTICK WITHOUT MICROSCOPE: ICD-10-PCS | Mod: HCNC,S$GLB,, | Performed by: NURSE PRACTITIONER

## 2020-11-04 PROCEDURE — 99214 OFFICE O/P EST MOD 30 MIN: CPT | Mod: HCNC,25,S$GLB, | Performed by: NURSE PRACTITIONER

## 2020-11-04 NOTE — PROCEDURES
"Subjective:       Patient ID: Heather Hughes is a 80 y.o. female.    Chief Complaint: CMG      Heather Hughes is a 80 y.o. female who presents today for CMG to help better direct therapy for her incontinence.  She was last seen on 10/20/20.  She is not able to take oral medication for OAB.  She continues to have "constant" leakage and "soaking" her pads/depends.  She is ready to proceed with the CMG.    Review of Systems   Constitutional: Negative for activity change, fever and unexpected weight change.   HENT: Negative for hearing loss.    Eyes: Negative for visual disturbance.   Respiratory: Negative for shortness of breath and wheezing.    Cardiovascular: Negative for chest pain, palpitations and leg swelling.   Gastrointestinal: Negative for abdominal pain, constipation and diarrhea.   Genitourinary: Positive for frequency and urgency. Negative for dyspareunia, dysuria, vaginal bleeding and vaginal discharge.   Musculoskeletal: Negative for gait problem and neck pain.   Skin: Negative for rash and wound.   Allergic/Immunologic: Negative for immunocompromised state.   Neurological: Negative for tremors, speech difficulty and weakness.   Hematological: Does not bruise/bleed easily.   Psychiatric/Behavioral: Negative for agitation and confusion.       Objective:      Physical Exam  Constitutional:       General: She is not in acute distress.     Appearance: She is well-developed.   HENT:      Head: Normocephalic and atraumatic.   Neck:      Musculoskeletal: Neck supple.      Thyroid: No thyromegaly.   Pulmonary:      Effort: Pulmonary effort is normal. No respiratory distress.   Abdominal:      Palpations: Abdomen is soft.      Tenderness: There is no abdominal tenderness.      Hernia: No hernia is present.   Musculoskeletal: Normal range of motion.   Skin:     General: Skin is warm and dry.      Findings: No rash.   Neurological:      Mental Status: She is alert and oriented to person, place, and time. "   Psychiatric:         Behavior: Behavior normal.       Pelvic Exam:  V: No lesions. No palpable nodes.   Va: no discharge or bleeding.  Good length and fairly good support.  Meatus:No caruncle or stenosis  Urethra: Non tender. No suburethral masses. No hypermobility.  No OFELIA in supine position  Cx/Cuff: Normal   Uterus: non-tender  Ad: No mass or tenderness.  Levators :Symmetrical. Normal tone. Non tender.  BL: Non tender  RV: No hemorrhoids.      Assessment:       1. Urinary frequency    2. Mixed incontinence urge and stress    3. Post-void dribbling    4. Atrophic vaginitis          Procedure Note:   CMG-    After betadine irrigation of the urethra, lidocaine instilled via urojet.  A #8 Maltese catheter inserted into the bladder.  40 mls of urine withdrawn.  The catheter was then attached to sterile water and the water was allowed to flow into the bladder.  >40 cm of water closure pressure noted.  The pt was then asked to stand.  First sensation was at approx 200 mls.  When the bladder was stressed, the pt felt the urge to go and started leaking.  She was able to eventually stop the leakage and the flow of water resumed.  At about every 50 ML the pt would get a strong urge to void and start leaking.  Total bladder capacity was approx 350 mls.  The catheter was then withdrawn.  Pt asked to cough multiple times and had a small amount of incontinence.  Pt then allowed to ambulate to the restroom.  Pt had a few drops of  incontinence while ambulating and waiting for the restroom.     Plan:       Urinary frequency- NP will review CMG results with Dr. Knight.  Np also reviewed 3rd line therapies with pt.    -     POCT URINE DIPSTICK WITHOUT MICROSCOPE    Mixed incontinence urge and stress- as noted above    Post-void dribbling- as noted above    Atrophic vaginitis- monitor    RTC PRN

## 2020-12-11 ENCOUNTER — PATIENT MESSAGE (OUTPATIENT)
Dept: OTHER | Facility: OTHER | Age: 80
End: 2020-12-11

## 2020-12-11 ENCOUNTER — PATIENT OUTREACH (OUTPATIENT)
Dept: ADMINISTRATIVE | Facility: HOSPITAL | Age: 80
End: 2020-12-11

## 2020-12-11 NOTE — LETTER
December 21, 2020    Heather Henri Hughes  [address]  [place] [address]             [facility]  [address]  [facility] [address]  Phone: [phone] Dear Heather Henri Dodsons     [facility].  We are reaching out to our patients due for an updated Bone Density Scan         If you have had this at another facility, please let our staff know where we can obtain the record.  If you would like to schedule your updated mammogram here in the clinic, please call to schedule at [phone].   You may also schedule it by clicking on the link below.  We do them Monday - Friday.       Thank you for allowing us to care for you,   Graham Monique LPN, Clinical Care Coordinator   [address] [address]   P: [phone]   F: [phone]

## 2020-12-16 DIAGNOSIS — G57.93 NEUROPATHY INVOLVING BOTH LOWER EXTREMITIES: ICD-10-CM

## 2020-12-17 RX ORDER — GABAPENTIN 100 MG/1
CAPSULE ORAL
Qty: 90 CAPSULE | Refills: 5 | Status: SHIPPED | OUTPATIENT
Start: 2020-12-17 | End: 2022-03-17

## 2020-12-17 NOTE — PROGRESS NOTES
Refill Routing Note   Medication(s) are not appropriate for processing by [facility] for the following reason(s):     - Outside of protocol  [facility] action(s):  Route        Medication reconciliation completed: No   Automatic Epic Generated Protocol Data:        Requested Prescriptions   Pending Prescriptions Disp Refills    gabapentin (NEURONTIN) 100 MG capsule [Pharmacy Med Name: gabapentin 100 mg capsule] 90 capsule 5     Sig: TAKE ONE CAPSULE BY MOUTH THREE TIMES DAILY       Anticonvulsants Protocol Passed - 12/16/2020  8:00 AM        Passed - Visit with Authorizing provider in past 9 months or upcoming 90 days        Passed - No active pregnancy on record              Appointments  past 12m or future 3m with PCP    Date Provider   Last Visit   9/23/2020 Graham Mazariegos MD   Next Visit   3/23/2021 Graham Mazariegos MD   ED visits in past 90 days: 1        Note composed:6:20 PM 12/16/2020

## 2020-12-22 ENCOUNTER — OFFICE VISIT (OUTPATIENT)
Dept: CARDIOLOGY | Facility: CLINIC | Age: 80
End: 2020-12-22
Payer: MEDICARE

## 2020-12-22 VITALS
WEIGHT: 222 LBS | BODY MASS INDEX: 39.34 KG/M2 | HEART RATE: 69 BPM | DIASTOLIC BLOOD PRESSURE: 106 MMHG | OXYGEN SATURATION: 100 % | TEMPERATURE: 98 F | HEIGHT: 63 IN | SYSTOLIC BLOOD PRESSURE: 260 MMHG | RESPIRATION RATE: 16 BRPM

## 2020-12-22 DIAGNOSIS — G47.19 EXCESSIVE DAYTIME SLEEPINESS: ICD-10-CM

## 2020-12-22 DIAGNOSIS — Z95.5 STATUS POST INSERTION OF DRUG-ELUTING STENT INTO RIGHT CORONARY ARTERY FOR CORONARY ARTERY DISEASE: ICD-10-CM

## 2020-12-22 DIAGNOSIS — I70.0 AORTIC ATHEROSCLEROSIS: ICD-10-CM

## 2020-12-22 DIAGNOSIS — R79.89 ELEVATED BRAIN NATRIURETIC PEPTIDE (BNP) LEVEL: ICD-10-CM

## 2020-12-22 DIAGNOSIS — I25.2 HISTORY OF MI (MYOCARDIAL INFARCTION): ICD-10-CM

## 2020-12-22 DIAGNOSIS — Z91.89 SEDENTARY LIFESTYLE: ICD-10-CM

## 2020-12-22 DIAGNOSIS — I48.0 PAF (PAROXYSMAL ATRIAL FIBRILLATION): ICD-10-CM

## 2020-12-22 DIAGNOSIS — I44.7 LBBB (LEFT BUNDLE BRANCH BLOCK): ICD-10-CM

## 2020-12-22 DIAGNOSIS — G47.33 OSA ON CPAP: ICD-10-CM

## 2020-12-22 DIAGNOSIS — G89.4 CHRONIC PAIN SYNDROME: ICD-10-CM

## 2020-12-22 DIAGNOSIS — F41.1 GAD (GENERALIZED ANXIETY DISORDER): ICD-10-CM

## 2020-12-22 DIAGNOSIS — E78.5 DYSLIPIDEMIA: ICD-10-CM

## 2020-12-22 DIAGNOSIS — E66.01 CLASS 2 SEVERE OBESITY DUE TO EXCESS CALORIES WITH SERIOUS COMORBIDITY AND BODY MASS INDEX (BMI) OF 39.0 TO 39.9 IN ADULT: ICD-10-CM

## 2020-12-22 DIAGNOSIS — Z79.899 LONG TERM CURRENT USE OF AMIODARONE: ICD-10-CM

## 2020-12-22 DIAGNOSIS — Z79.01 ANTICOAGULANT LONG-TERM USE: ICD-10-CM

## 2020-12-22 DIAGNOSIS — R09.89 LABILE HYPERTENSION: Primary | ICD-10-CM

## 2020-12-22 DIAGNOSIS — F43.9 STRESS AT HOME: ICD-10-CM

## 2020-12-22 PROBLEM — D64.9 ANEMIA: Status: ACTIVE | Noted: 2020-12-22

## 2020-12-22 PROCEDURE — 99215 OFFICE O/P EST HI 40 MIN: CPT | Mod: HCWC,25,S$GLB, | Performed by: INTERNAL MEDICINE

## 2020-12-22 PROCEDURE — 3077F SYST BP >= 140 MM HG: CPT | Mod: HCWC,CPTII,S$GLB, | Performed by: INTERNAL MEDICINE

## 2020-12-22 PROCEDURE — 99999 PR PBB SHADOW E&M-EST. PATIENT-LVL IV: CPT | Mod: PBBFAC,HCWC,, | Performed by: INTERNAL MEDICINE

## 2020-12-22 PROCEDURE — 93000 EKG 12-LEAD: ICD-10-PCS | Mod: HCWC,S$GLB,, | Performed by: INTERNAL MEDICINE

## 2020-12-22 PROCEDURE — 3077F PR MOST RECENT SYSTOLIC BLOOD PRESSURE >= 140 MM HG: ICD-10-PCS | Mod: HCWC,CPTII,S$GLB, | Performed by: INTERNAL MEDICINE

## 2020-12-22 PROCEDURE — 1100F PTFALLS ASSESS-DOCD GE2>/YR: CPT | Mod: HCWC,CPTII,S$GLB, | Performed by: INTERNAL MEDICINE

## 2020-12-22 PROCEDURE — 3288F PR FALLS RISK ASSESSMENT DOCUMENTED: ICD-10-PCS | Mod: HCWC,CPTII,S$GLB, | Performed by: INTERNAL MEDICINE

## 2020-12-22 PROCEDURE — 1100F PR PT FALLS ASSESS DOC 2+ FALLS/FALL W/INJURY/YR: ICD-10-PCS | Mod: HCWC,CPTII,S$GLB, | Performed by: INTERNAL MEDICINE

## 2020-12-22 PROCEDURE — 1159F PR MEDICATION LIST DOCUMENTED IN MEDICAL RECORD: ICD-10-PCS | Mod: HCWC,S$GLB,, | Performed by: INTERNAL MEDICINE

## 2020-12-22 PROCEDURE — 3078F DIAST BP <80 MM HG: CPT | Mod: HCWC,CPTII,S$GLB, | Performed by: INTERNAL MEDICINE

## 2020-12-22 PROCEDURE — 3078F PR MOST RECENT DIASTOLIC BLOOD PRESSURE < 80 MM HG: ICD-10-PCS | Mod: HCWC,CPTII,S$GLB, | Performed by: INTERNAL MEDICINE

## 2020-12-22 PROCEDURE — 99999 PR PBB SHADOW E&M-EST. PATIENT-LVL IV: ICD-10-PCS | Mod: PBBFAC,HCWC,, | Performed by: INTERNAL MEDICINE

## 2020-12-22 PROCEDURE — 1159F MED LIST DOCD IN RCRD: CPT | Mod: HCWC,S$GLB,, | Performed by: INTERNAL MEDICINE

## 2020-12-22 PROCEDURE — 3288F FALL RISK ASSESSMENT DOCD: CPT | Mod: HCWC,CPTII,S$GLB, | Performed by: INTERNAL MEDICINE

## 2020-12-22 PROCEDURE — 93000 ELECTROCARDIOGRAM COMPLETE: CPT | Mod: HCWC,S$GLB,, | Performed by: INTERNAL MEDICINE

## 2020-12-22 PROCEDURE — 99215 PR OFFICE/OUTPT VISIT, EST, LEVL V, 40-54 MIN: ICD-10-PCS | Mod: HCWC,25,S$GLB, | Performed by: INTERNAL MEDICINE

## 2020-12-22 RX ORDER — CARVEDILOL 6.25 MG/1
6.25 TABLET ORAL 2 TIMES DAILY WITH MEALS
Qty: 60 TABLET | Refills: 11 | Status: SHIPPED | OUTPATIENT
Start: 2020-12-22 | End: 2021-12-31

## 2020-12-22 RX ORDER — LORAZEPAM 1 MG/1
1 TABLET ORAL EVERY 6 HOURS PRN
Qty: 30 TABLET | Refills: 3 | Status: SHIPPED | OUTPATIENT
Start: 2020-12-22 | End: 2021-02-10 | Stop reason: SDUPTHER

## 2020-12-22 NOTE — PATIENT INSTRUCTIONS
Recommended Mediterranean dietEating Heart-Healthy Food: Using the DASH Plan  Eating for your heart doesnt have to be hard or boring. You just need to know how to make healthier choices. The DASH eating plan has been developed to help you do just that. DASH stands for Dietary Approaches to Stop Hypertension. It is a plan that has been proven to be healthier for your heart and to lower your risk for high blood pressure. It can also help lower your risk for cancer, heart disease, osteoporosis, and diabetes.  Choosing from Each Food Group  Choose foods from each of the food groups below each day. Try to get the recommended number of servings for each food group. The serving numbers are based on a diet of 2,000 calories a day. Talk to your doctor if youre unsure about your calorie needs.  Grains   Servings: 7-8 a day  A serving is:  · 1 slice bread  · 1 ounce dry cereal  · half a cup cooked rice or pasta  Best choices: Whole grains and any grains high in fiber.  Vegetables   Servings: 4-5 a day  A serving is:  · 1 cup raw leafy vegetable  · Half a cup cooked vegetable  · Three-quarter cup vegetable juice  Best choices: Fresh or frozen vegetable prepared without too much added salt or fat.    Fruits   Servings: 4-5 a day  A serving is:  · Three-quarter cup fruit juice  · 1 medium fruit  · One-quarter cup dried fruit  · One-half cup fresh, frozen, or canned fruit  Best choices: A variety of fresh fruits of different colors. Whole fruits are a much better choice than fruit juices.  Low-fat or Fat Free Dairy   Servings: 2-3 a day  A serving is:  · 8 ounces milk  · 1 cup yogurt  · One and a half ounces cheese  Best choices: Skim or 1% milk, low-fat or fat free yogurt or buttermilk, and low-fat cheeses.       Meat, Poultry, Fish   Servings: 2 or fewer a day  A serving is:  · 3 ounces cooked meat, poultry, or fish  Best choices: Lean meats and fish. Trim away visible fat. Broil, roast, or boil instead of frying. Remove skin  from poultry before eating.  Nuts, Seeds, Beans   Servings: 4-5 a week  A serving is:  · One third cup nuts (or one and a half ounces)  · 2 tablespoons sunflower seeds  · Half a cup cooked beans  Best choices: Dry roasted nuts with no salt added, lentils, kidney beans, garbanzo beans, and whole ramirez beans.    Fats and Oils   Servings: 2 a day  A serving is:  · 1 teaspoon vegetable oil  · 1 teaspoon soft margarine  · 1 tablespoon low-fat mayonnaise  · 1 teaspoon regular mayonnaise  · 2 tablespoons light salad dressing  · 1 tablespoon regular salad dressing  Best choices: Monounsaturated and polyunsaturated fats such as olive, canola, or safflower oil.  Sweets   Servings: 5 a week or fewer  A serving is:  · 1 tablespoon sugar, maple syrup, or honey  · 1 tablespoon jam or jelly  · 1 half-ounce jelly beans (about 15)  · 8 ounces lemonade  Best choices: Dried fruit can be a satisfying sweet. Choose low-fat sweets when possible. And watch your serving sizes!       Aerobic Exercise for a Healthy Heart  Exercise is a lot more than an energy booster and a stress reliever. It also strengthens your heart muscle, lowers your blood pressure and blood cholesterol, and burns calories.      Remember, some activity is better than none.     Choose an Aerobic Activity  Choose a nonstop activity that makes your heart and lungs work harder than they do when you rest or walk normally. This aerobic exercise can improve the way your heart and other muscles use oxygen. Make it fun by exercising with a friend and choosing an activity you enjoy. Here are some ideas:  · Walking  · Swimming  · Bicycling  · Stair climbing  · Dancing  · Jogging  Exercise Regularly  If you havent been exercising regularly,  get your doctors okay first. Then start slowly.  Here are some tips:  · Begin exercising 3 times a week for 5-10 minutes at a time.  · When you feel comfortable, add a few minutes each week.  · Slowly build up to exercising 3-4 times each  week for 20-40 minutes. Aim for a total of 150 or more minutes a week.  · Be sure to carry your nitroglycerin with you when you exercise.  · If you get angina when youre exercising, stop what youre doing, take your nitroglycerin, and call your doctor.  © 5712-6083 [employer], [address] [place] [address], [place] [address]. All rights reserved. This information is not intended as a substitute for professional medical care. Always follow your healthcare professional's instructions.    Losing Weight (Cardiovascular)  Excess weight is a major risk factor for heart disease. Losing weight may help keep your arteries open so that your heart can get the oxygen-rich blood it needs. Weight loss can also help lower your blood pressure and reduce your risk for diabetes. All in all, losing weight makes you healthier.          Exercise with a friend. When activity is fun, you're more likely to stick with it.        Calories and Weight Loss  Calories are the fuel your body burns for energy. You get the calories you need from the food you eat. For healthy weight loss, women should eat at least 1,200 calories a day, men at least 1,500.    When you eat more calories than you need, your body stores the extra calories as fat. One pound of fat equals 3,500 calories.    To lose weight, try to burn 500 calories a day more than you eat. To do this, eat 250 calories less each day. Add activity to burn the other 250 calories. Walking 21/2 miles burns about 250 calories.    Eat a variety of healthy foods. Its the best way to make calories count.     Tips for losing weight:  Drink 8 to 10 glasses of water a day.    Dont skip meals. Instead, eat smaller portions.       Brisk Activity Is Best  Brisk activity gets your heart pumping faster. It makes your heart healthier. Its also the best way to burn calories. In fact, your body may keep burning calories for hours after you stop a brisk activity.    Begin by walking 10 minutes most  days.    Add more time and speed to your walk. Build up as you feel able.    Try to walk briskly at least 30 minutes most days. If needed, you can break this into 2 shorter sessions.     Check off the ideas below that you could try to make your day more active:    Take the stairs instead of the elevator.    Park your car farther away and walk.    Ride a bike to work or to the store.    Walk laps around the mall.    Discharge Instructions: Taking Your Blood Pressure  Blood pressure is the force of blood as it moves from the heart through the blood vessels. You can take your own blood pressure reading using a digital monitor. Take readings as often as your healthcare provider instructs. Take your readings each time in the same way, using the same arm. Here are guidelines for taking your blood pressure.  The American Heart Association (AHA) recommends purchasing a blood pressure monitor that is validated and approved by the Association for the Advancement of Medical Instrumentation, the [place] Hypertension Society, and the International Protocol for the Validation of Automated BP Measuring Devices. If the blood pressure monitor is for a senior adult, a pregnant woman, or a child, make certain it is validated for use with such a population. For the most reliable readings, the AHA recommends an automatic, cuff-style, upper arm (bicep) monitor. The readings from finger and wrist monitors are not as reliable as the upper arm monitor.        Step 1. Relax    · Wait at least a half hour after smoking, eating, or exercising. Do not drink coffee, tea, soda, or other caffeinated beverages before checking your blood pressure.   · Sit comfortably at a table. Place the monitor near you.  · Rest for a few minutes before you begin.        Step 2. Wrap the cuff    · Place your arm on the table, palm up. Put your arm in a position that is level with your heart. Wrap the cuff around your upper arm, about an inch above your  elbow. Its best to wrap the cuff on bare skin, not over clothing.  · Make sure your cuff fits. If it doesnt wrap around your upper arm, order a larger cuff. A cuff that is too large or too small can result in an inaccurate blood pressure reading.           Step 3. Inflate the cuff    · Pump the cuff until the scale reads 200. If you have a self-inflating cuff, push the button that starts the pump.  · The cuff will tighten, then loosen.  · The numbers will change. When they stop changing, your blood pressure reading will appear.  · If you get a reading that is too high or too low for you, relax for a few minutes. Then do the test again.    Step 4. Write down the results  · Write down your blood pressure numbers. Major the date and time. Keep your results in one place, such as a notebook.  · Remove the cuff from your arm. Turn off the machine.  · Take the readings with you to your medical appointments.  · If you start a new blood pressure medicine, or change a blood pressure medicine dose, note the day you started the new drug or dosage on your blood pressure recording sheet. This will help your healthcare provider monitor the effect of medication changes.     Date Last Reviewed: 4/27/2016  © 0693-4146 [employer]. [address] [place] [address], [place] [address]. All rights reserved. This information is not intended as a substitute for professional medical care. Always follow your healthcare professional's instructions.        Controlling High Blood Pressure  High blood pressure (hypertension) is often called the silent killer. This is because many people who have it dont know it. High blood pressure is defined as 140/90 mm Hg or higher. Know your blood pressure and remember to check it regularly. Doing so can save your life. Here are some things you can do to help control your blood pressure.    Choose heart-healthy foods  · Select low-salt, low-fat foods. Limit sodium intake to 2,400 mg per day or the  amount suggested by your healthcare provider.  · Limit canned, dried, cured, packaged, and fast foods. These can contain a lot of salt.  · Eat 8 to 10 servings of fruits and vegetables every day.  · Choose lean meats, fish, or chicken.  · Eat whole-grain pasta, brown rice, and beans.  · Eat 2 to 3 servings of low-fat or fat-free dairy products.  · Ask your doctor about the DASH eating plan. This plan helps reduce blood pressure.  · When you go to a restaurant, ask that your meal be prepared with no added salt.  Maintain a healthy weight  · Ask your healthcare provider how many calories to eat a day. Then stick to that number.  · Ask your healthcare provider what weight range is healthiest for you. If you are overweight, a weight loss of only 3% to 5% of your body weight can help lower blood pressure. Generally, a good weight loss goal is to lose 10% of your body weight in a year.  · Limit snacks and sweets.  · Get regular exercise.  Get up and get active  · Choose activities you enjoy. Find ones you can do with friends or family. This includes bicycling, dancing, walking, and jogging.  · Park farther away from building entrances.  · Use stairs instead of the elevator.  · When you can, walk or bike instead of driving.  · Aguila leaves, garden, or do household repairs.  · Be active at a moderate to vigorous level of physical activity for at least 40 minutes for a minimum of 3 to 4 days a week.   Manage stress  · Make time to relax and enjoy life. Find time to laugh.  · Communicate your concerns with your loved ones and your healthcare provider.  · Visit with family and friends, and keep up with hobbies.  Limit alcohol and quit smoking  · Men should have no more than 2 drinks per day.  · Women should have no more than 1 drink per day.  · Talk with your healthcare provider about quitting smoking. Smoking significantly increases your risk for heart disease and stroke. Ask your healthcare provider about community smoking  cessation programs and other options.  Medicines  If lifestyle changes arent enough, your healthcare provider may prescribe high blood pressure medicine. Take all medicines as prescribed. If you have any questions about your medicines, ask your healthcare provider before stopping or changing them.   Date Last Reviewed: 4/27/2016  © 2895-8091 [employer]. [address] [place] [address], [place] [address]. All rights reserved. This information is not intended as a substitute for professional medical care. Always follow your healthcare professional's instructions.

## 2020-12-22 NOTE — PROGRESS NOTES
Subjective:    Patient ID:  Heather Hughes is a 80 y.o. female who presents for follow-up of Hypertension  Also CAD post ARSLAN, history of MI not on cholesterol Rx, ALVINA off CPAP, PAF now on amiodarone and NOAC, statin intolerance with imbalance and fall with fracture of the right wrist now refuses any additional trials, labile HTN, post ED visit 9/2020  PCP: Dr. Mazariegos, only see as needed about every 4 months  Prior cardiologist: Dr. Ace, reviewed by Ms. Sawant, FNP in 4/2019, ordered CPAP  Vascular: Dr. Stafford, sclerosing procedure in 2/2020, planning additional follow up in future  Lives with , Chet, chronically ill, diagnosed with CA, lot of stress  Son, Chet,   Daughter from [place], Angela, here with patient, moved back to [facility] out.  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    Health literacy: medium  Vaccinations: up-to-date  Activities: mostly sitting, no other exercise, sits a lot due to OA, knees and ankles, do cooking and laundry   Nicotine: quit 2017, 5 py  Alcohol: none  Illicit drugs: none  Cardiac symptoms: none, worry about BP  Home BP:  to 260  Medication compliance: yes, just can not take statin  Diet: regular, use little salt  Caffeine: 1 cpd, sleep well  Labs: 4/2018, .6, not on Rx, 4/2019 normal BMP, CBC (Hgb 9.2) iron low sat, normal TSH, no A1C  Lab Results   Component Value Date    TSH 1.663 03/10/2020        Lab Results   Component Value Date    HGBA1C 5.3 01/14/2017       Lab Results   Component Value Date    WBC 6.72 03/12/2020    HGB 10.6 (L) 03/12/2020    HCT 34.4 (L) 03/12/2020    MCV 94 03/12/2020     03/12/2020     Lab Results   Component Value Date    WBC 5.15 09/21/2020    HGB 11.5 (L) 09/21/2020    HCT 36.3 (L) 09/21/2020    MCV 92 09/21/2020     09/21/2020       CMP  Sodium   Date Value Ref Range Status   09/21/2020 140 136 - 145 mmol/L Final     Potassium   Date Value Ref Range Status   09/21/2020 4.2 3.5 - 5.1 mmol/L Final      Chloride   Date Value Ref Range Status   09/21/2020 106 95 - 110 mmol/L Final     CO2   Date Value Ref Range Status   09/21/2020 25 23 - 29 mmol/L Final     Glucose   Date Value Ref Range Status   09/21/2020 98 70 - 110 mg/dL Final     BUN   Date Value Ref Range Status   09/21/2020 15 8 - 23 mg/dL Final     Creatinine   Date Value Ref Range Status   09/21/2020 0.9 0.5 - 1.4 mg/dL Final     Calcium   Date Value Ref Range Status   09/21/2020 9.5 8.7 - 10.5 mg/dL Final     Total Protein   Date Value Ref Range Status   09/21/2020 6.7 6.0 - 8.4 g/dL Final     Albumin   Date Value Ref Range Status   09/21/2020 3.8 3.5 - 5.2 g/dL Final     Total Bilirubin   Date Value Ref Range Status   09/21/2020 0.3 0.1 - 1.0 mg/dL Final     Comment:     For infants and newborns, interpretation of results should be based  on gestational age, weight and in agreement with clinical  observations.  Premature Infant recommended reference ranges:  Up to 24 hours.............<8.0 mg/dL  Up to 48 hours............<12.0 mg/dL  3-5 days..................<15.0 mg/dL  6-29 days.................<15.0 mg/dL       Alkaline Phosphatase   Date Value Ref Range Status   09/21/2020 78 55 - 135 U/L Final     AST   Date Value Ref Range Status   09/21/2020 16 10 - 40 U/L Final     ALT   Date Value Ref Range Status   09/21/2020 13 10 - 44 U/L Final     Anion Gap   Date Value Ref Range Status   09/21/2020 9 8 - 16 mmol/L Final     eGFR if    Date Value Ref Range Status   09/21/2020 >60 >60 mL/min/1.73 m^2 Final     eGFR if non    Date Value Ref Range Status   09/21/2020 >60 >60 mL/min/1.73 m^2 Final     Comment:     Calculation used to obtain the estimated glomerular filtration  rate (eGFR) is the CKD-EPI equation.        @labrcntip(troponini)@    BNP   Date Value Ref Range Status   03/10/2020 1,035 (H) 0 - 99 pg/mL Final     Comment:     Values of less than 100 pg/ml are consistent with non-CHF populations.   }   Lab Results  "  Component Value Date    CHOL 236 (H) 06/22/2020    CHOL 223 (H) 04/11/2018    CHOL 172 01/14/2017     Lab Results   Component Value Date    HDL 68 06/22/2020    HDL 67 04/11/2018    HDL 55 01/14/2017     Lab Results   Component Value Date    LDLCALC 144.4 06/22/2020    LDLCALC 133.6 04/11/2018    LDLCALC 103.4 01/14/2017     Lab Results   Component Value Date    TRIG 118 06/22/2020    TRIG 112 04/11/2018    TRIG 68 01/14/2017     Lab Results   Component Value Date    CHOLHDL 28.8 06/22/2020    CHOLHDL 30.0 04/11/2018    CHOLHDL 32.0 01/14/2017        Last Echo: DSE 5/2018, KIARA 3/2020  Last stress test: 5/2018  Cardiovascular angiogram: 1/2017 with ARSLAN to RCA  ECG: NSR normal, rate 63  Fundoscopic exam: within the past year, negative for HTN retinopathy    In 8/2019:  WF here to check adherence use of CPAP started in 4/2019. Son report  noted better sleep. Summary report using 93.3% of the time. No heart worries. Had discussion about leg arthritis and cellulitis, resolve per Dr. Mazariegos, explained not due to CVD.     Dr. Ace noted "77 y.o. female with a past medical history of coronary artery disease     Patient is due to undergo left knee total replacement.  Had an angiogram or early part of this year.  She had a PCI to the right coronary artery.  There was an intermediate lesion in the LAD.  He is currently asymptomatic and denies any chest pain shortness of breath however her exercise capacity is extremely limited and she walks with a cane.  This is related to her knee issues."    DSE 5/2018 - Left ventricle ejection fraction is normal.  No stress induced wall motion abnormality  Negative for ischemia.    Venous US LE - 4/2019 -     Hypertension  Pertinent negatives include no chest pain, headaches, malaise/fatigue, orthopnea, palpitations, PND or shortness of breath.     In 3/2020, return post hospital for new onset AF with RVR, Doing well at home, BP better controlled, not using CPAP for last several " "months due to ankle problem with venous ulceration and being treated by Dr. Stafford.   DCS "presents to the ED today with an onset of heart "flutters," jaw pain, and shortness of breath. She reports that this all started on Sunday when she began to have jaw pain which resolved after taking nitroglycerin. Denied associated chest pain but reports that jaw pain was her symptom of her previous heart attack. Since that time she reports increased weakness and fatigue and feels like her heart is fluttering. Today her home health nurse came to address her venous stasis wounds for which she is currently taking Keflex for an infection of her right lower extremity which she reports has been improving and the nurse noted that her heart rate was irregular and elevated. She presented to the ED for further evaluation. Denies fever chills, denies nausea or vomiting. Denies decreased p.o. intake. Does report some increased anxiety secondary to her  being ill. Currently on aspirin and Plavix but not on full-dose blood thinner. EKG with AFib with RVR and possible new left bundle-branch block. Troponin mildly elevated. Patient is to be admitted for cardiology consult, diltiazem drip, TTE.     Also with new left bundle-branch block in ED and mildly elevated troponin. She was placed on a Cardizem drip and Cardiology was consulted. TTE was performed. Changed to amiodarone drip per Cardiology with plan for scheduled KIARA if no conversion to normal sinus rhythm with amiodarone. PT and OT consulted. Outpatient Keflex continued for lower extremity wound.. KIARA with cardioversion was performed on 03/12. Patient was transitioned to p.o. amiodarone after being on amiodarone drip for 24 hr. She was discharged home on p.o. amiodarone 200 mg t.i.d. x5 days then 200 mg b.i.d. until seen in clinic with Dr. Godfrey per recommendations of Dr. Echevarria. She was started on Eliquis 5 mg b.i.d. on discharge. Anticoagulation and fall/bleeding precautions " "explained to patient prior to discharge. Discharge plans discussed with Dr. Echevarria on day of discharge."    KIARA - "Mild concentric left ventricular hypertrophy.  Normal left ventricular systolic function. The estimated ejection fraction is 55%.  Atrial fibrillation observed.  Normal right ventricular systolic function.  Mild left atrial enlargement.  Mild right atrial enlargement.  No interatrial septal defect present.  Normal appearing left atrial appendage. No thrombus is present in the appendage.  A 150 J synchronized cardioversion was successfully performed with restoration of normal sinus rhythm."    In 8/2020, return for 6 months follow up, unable to take low dose Crestor, developed imbalance with unsteadiness and fall with fracture of the right wrist, noted a lot of OA. Do not want any more cholesterol medications.  CXR - The heart is enlarged and unchanged. There is slight prominence of the central pulmonary vasculature but no chanda pulmonary edema. No focal consolidation or pleural effusion. The aorta is calcified and ectatic.     HPI comments: in 12/2020 return for labile HTN, been off all BP medication since DCC in 3/2020. Recent family illness creating a lot stress, labile BP appears better control with Ativan 0.5 mg.     ED noted "80 y.o. female with a PMHx of HTN, CAD s/p stent placement, and A-fib who presents to the ED with an onset of HA and bilateral foot and leg swelling associated with elevated blood pressure today. Son reports patient's BP was greater then 200/100 today. Patient states she took some of her husbands BP medication earlier today which did bring her BP down some. Patient reports she has not been taking her BP medication since being cardioverted in March but she does regularly check her BP at home and endorses recent normal blood pressures. Patient reports recent high salt intake. The patient denies cp, SOB or any other symptoms at this time.     Based on the patient's " "presentation today I do not see any signs of endorgan damage representing hypertensive urgency or emergency. She is given oral clonidine followed by oral hydralazine in the ED with significant improvement her blood pressure.   She will be given a prescription for p.r.n. hydralazine for blood pressures greater than 180 systolic and is to follow up closely with her primary care physician for re-evaluation.     Dr. Mazariegos noted "Over the several weeks prior to her visit to the ER she and her  were celebrating their 60th anniversary and ate out several times usually choosing high salt meals. She began to notice some swelling of the lower extremities and was checking her blood pressure with elevations."     Review of Systems   Constitution: Positive for weight gain (3 lbs since 8/2020). Negative for diaphoresis, fever, malaise/fatigue and night sweats.   HENT: Positive for stridor and tinnitus. Negative for nosebleeds.    Eyes: Negative for visual disturbance.   Cardiovascular: Negative for chest pain, claudication, cyanosis, dyspnea on exertion, irregular heartbeat, leg swelling, near-syncope, orthopnea, palpitations and paroxysmal nocturnal dyspnea.   Respiratory: Positive for snoring and wheezing. Negative for cough, shortness of breath and sleep disturbances due to breathing.         Warren score 9, today an 12, off CPAP from 10/2019   Endocrine: Positive for cold intolerance, heat intolerance, polyphagia and polyuria. Negative for polydipsia.   Hematologic/Lymphatic: Bruises/bleeds easily.   Skin: Positive for dry skin and poor wound healing. Negative for color change, flushing, nail changes and suspicious lesions.   Musculoskeletal: Positive for arthritis, joint pain, joint swelling, muscle weakness and stiffness. Negative for falls, gout, muscle cramps and myalgias.   Gastrointestinal: Positive for excessive appetite and flatus. Negative for heartburn, hematemesis, hematochezia, melena and nausea. " "  Genitourinary: Positive for bladder incontinence, frequency, hematuria, hesitancy, nocturia and urgency.   Neurological: Positive for difficulty with concentration, disturbances in coordination, excessive daytime sleepiness, dizziness, focal weakness, light-headedness, loss of balance and paresthesias. Negative for headaches, numbness, vertigo and weakness.   Psychiatric/Behavioral: Negative for depression and substance abuse. The patient does not have insomnia and is not nervous/anxious.    Allergic/Immunologic: Positive for environmental allergies.        Objective:      Physical - Constitutional: She is oriented to person, place, and time. She appears well-developed and well-nourished.   HENT:   Head: Normocephalic.   Eyes: Pupils are equal, round, and reactive to light. Conjunctivae and EOM are normal.   Neck: Normal range of motion. Neck supple. No JVD present. No thyromegaly present.   Cardiovascular: Normal rate, regular rhythm and intact distal pulses. Exam reveals distant heart sounds. Exam reveals no gallop and no friction rub.   No murmur heard.  Pulses:       Carotid pulses are 1+ on the right side, and 1+ on the left side.       Radial pulses are 1+ on the right side, and 1+ on the left side.        Femoral pulses are 1+ on the right side, and 1+ on the left side.       Popliteal pulses are 1+ on the right side, and 1+ on the left side.        Right dorsalis pedis pulse not accessible and left dorsalis pedis pulse not accessible.        Right posterior tibial pulse not accessible and left posterior tibial pulse not accessible.   Pulmonary/Chest: Effort normal and breath sounds normal. She has no rales. She exhibits no tenderness.   Abdominal: Soft. Bowel sounds are normal. There is no tenderness.   Waist 48.5"   Musculoskeletal: Normal range of motion. She exhibits edema (1+ pitting in both LE).   Lymphadenopathy:     She has no cervical adenopathy.   Neurological: She is alert and oriented to person, " place, and time.   Skin: Skin is warm and dry. No rash noted.    Assessment:       1. Labile hypertension    2. Stress at home    3. FRANNIE (generalized anxiety disorder)    4. ALVINA on CPAP, started 4/2019, stopped 10/2019    5. Long term current use of amiodarone, started 3/2020    6. Anticoagulant long-term use    7. Aortic atherosclerosis    8. PAF (paroxysmal atrial fibrillation)    9. Dyslipidemia, baseline .6    10. Sedentary lifestyle    11. LBBB (left bundle branch block), onset 8/2020    12. Elevated brain natriuretic peptide (BNP) level    13. History of MI (myocardial infarction)    14. Status post insertion of drug-eluting stent into right coronary artery for coronary artery disease,. 1/2017    15. Excessive daytime sleepiness    16. Class 2 severe obesity due to excess calories with serious comorbidity and body mass index (BMI) of 39.0 to 39.9 in adult         Plan:       Heather was seen today for hypertension.    Diagnoses and all orders for this visit:    Labile hypertension  -     IN OFFICE EKG 12-LEAD (to Muse)  -     carvediloL (COREG) 6.25 MG tablet; Take 1 tablet (6.25 mg total) by mouth 2 (two) times daily with meals.  -     LORazepam (ATIVAN) 1 MG tablet; Take 1 tablet (1 mg total) by mouth every 6 (six) hours as needed for Anxiety.    Stress at home    FRANNIE (generalized anxiety disorder)  -     carvediloL (COREG) 6.25 MG tablet; Take 1 tablet (6.25 mg total) by mouth 2 (two) times daily with meals.  -     LORazepam (ATIVAN) 1 MG tablet; Take 1 tablet (1 mg total) by mouth every 6 (six) hours as needed for Anxiety.    ALVINA on CPAP, started 4/2019, stopped 10/2019    Long term current use of amiodarone, started 3/2020    Anticoagulant long-term use    Aortic atherosclerosis    PAF (paroxysmal atrial fibrillation)  -     IN OFFICE EKG 12-LEAD (to Muse)    Dyslipidemia, baseline .6    Sedentary lifestyle    LBBB (left bundle branch block), onset 8/2020    Elevated brain natriuretic peptide  "(BNP) level    History of MI (myocardial infarction)    Status post insertion of drug-eluting stent into right coronary artery for coronary artery disease,. 1/2017    Excessive daytime sleepiness    Class 2 severe obesity due to excess calories with serious comorbidity and body mass index (BMI) of 39.0 to 39.9 in adult    - All medical issues reviewed, not willing to try new cholesterol Rx.  - Need to restart CPAP, explained as risk factor for PAF  - Refer to "Chronic Pain" article in Consumer Report 6/2019 issue.  - Consider Tylenol use up to 3000 mg a day.  - All medications reviewed, patient acknowledge good understanding.  - Recommend healthy living: no nicotine, moderate alcohol, healthy diet and regular exercise aiming for fitness, and weight control   - Encourage activities as much as tolerated. Any activity is better than none!  - Instruction for Mediterranean diet and heart healthy exercise given.  - Check home blood pressure, 2 days weekly, do 2 readings within 5 minutes in AM and PM, keep log for review.  - Weigh twice weekly, try to lose 1-2 lbs per week. Target weight loss of 5%-10%.  - Highly recommend 30 minutes of exercise / activities daily, can have Sunday off, with 2-3 sessions of muscle strengthening weekly. A  would be very helpful.  - Need to follow up with PCP in 4 weeks.  - Will follow up as needed per family preference.      50 minutes and greater than 50% was spent in counseling and coordination of care. The above assessment and plan have been discussed at length. Prior physician's note reviewed. Labs and procedure over the last 6 months reviewed. Problem List updated. Asked to bring in all active medications / pills bottles with next visit.                   "

## 2021-01-22 ENCOUNTER — PATIENT MESSAGE (OUTPATIENT)
Dept: ADMINISTRATIVE | Facility: OTHER | Age: 81
End: 2021-01-22

## 2021-02-10 ENCOUNTER — OFFICE VISIT (OUTPATIENT)
Dept: CARDIOLOGY | Facility: CLINIC | Age: 81
End: 2021-02-10
Payer: MEDICARE

## 2021-02-10 VITALS
WEIGHT: 229.25 LBS | BODY MASS INDEX: 40.62 KG/M2 | HEART RATE: 63 BPM | HEIGHT: 63 IN | SYSTOLIC BLOOD PRESSURE: 189 MMHG | DIASTOLIC BLOOD PRESSURE: 78 MMHG

## 2021-02-10 DIAGNOSIS — F41.1 GAD (GENERALIZED ANXIETY DISORDER): ICD-10-CM

## 2021-02-10 DIAGNOSIS — I44.7 LBBB (LEFT BUNDLE BRANCH BLOCK): ICD-10-CM

## 2021-02-10 DIAGNOSIS — Z95.5 STATUS POST INSERTION OF DRUG-ELUTING STENT INTO RIGHT CORONARY ARTERY FOR CORONARY ARTERY DISEASE: ICD-10-CM

## 2021-02-10 DIAGNOSIS — E78.5 DYSLIPIDEMIA: ICD-10-CM

## 2021-02-10 DIAGNOSIS — I25.10 CAD IN NATIVE ARTERY: ICD-10-CM

## 2021-02-10 DIAGNOSIS — I87.2 VENOUS STASIS DERMATITIS OF RIGHT LOWER EXTREMITY: ICD-10-CM

## 2021-02-10 DIAGNOSIS — I10 ESSENTIAL HYPERTENSION: ICD-10-CM

## 2021-02-10 DIAGNOSIS — I48.0 PAF (PAROXYSMAL ATRIAL FIBRILLATION): ICD-10-CM

## 2021-02-10 DIAGNOSIS — I70.0 AORTIC ATHEROSCLEROSIS: ICD-10-CM

## 2021-02-10 DIAGNOSIS — I25.2 HISTORY OF MI (MYOCARDIAL INFARCTION): ICD-10-CM

## 2021-02-10 DIAGNOSIS — Z79.899 LONG TERM CURRENT USE OF AMIODARONE: ICD-10-CM

## 2021-02-10 DIAGNOSIS — Z78.9 STATIN INTOLERANCE: Primary | ICD-10-CM

## 2021-02-10 DIAGNOSIS — R09.89 LABILE HYPERTENSION: ICD-10-CM

## 2021-02-10 DIAGNOSIS — I48.91 ATRIAL FIBRILLATION WITH RVR: ICD-10-CM

## 2021-02-10 DIAGNOSIS — I25.110 CORONARY ARTERY DISEASE INVOLVING NATIVE CORONARY ARTERY OF NATIVE HEART WITH UNSTABLE ANGINA PECTORIS: ICD-10-CM

## 2021-02-10 PROBLEM — I25.84 CORONARY ARTERY DISEASE DUE TO CALCIFIED CORONARY LESION: Status: ACTIVE | Noted: 2017-01-13

## 2021-02-10 PROCEDURE — 3077F PR MOST RECENT SYSTOLIC BLOOD PRESSURE >= 140 MM HG: ICD-10-PCS | Mod: CPTII,S$GLB,, | Performed by: INTERNAL MEDICINE

## 2021-02-10 PROCEDURE — 93005 ELECTROCARDIOGRAM TRACING: CPT | Mod: S$GLB,,, | Performed by: INTERNAL MEDICINE

## 2021-02-10 PROCEDURE — 99215 PR OFFICE/OUTPT VISIT, EST, LEVL V, 40-54 MIN: ICD-10-PCS | Mod: S$GLB,,, | Performed by: INTERNAL MEDICINE

## 2021-02-10 PROCEDURE — 99215 OFFICE O/P EST HI 40 MIN: CPT | Mod: S$GLB,,, | Performed by: INTERNAL MEDICINE

## 2021-02-10 PROCEDURE — 1101F PT FALLS ASSESS-DOCD LE1/YR: CPT | Mod: CPTII,S$GLB,, | Performed by: INTERNAL MEDICINE

## 2021-02-10 PROCEDURE — 3288F PR FALLS RISK ASSESSMENT DOCUMENTED: ICD-10-PCS | Mod: CPTII,S$GLB,, | Performed by: INTERNAL MEDICINE

## 2021-02-10 PROCEDURE — 3078F DIAST BP <80 MM HG: CPT | Mod: CPTII,S$GLB,, | Performed by: INTERNAL MEDICINE

## 2021-02-10 PROCEDURE — 99999 PR PBB SHADOW E&M-EST. PATIENT-LVL IV: ICD-10-PCS | Mod: PBBFAC,,, | Performed by: INTERNAL MEDICINE

## 2021-02-10 PROCEDURE — 93010 ELECTROCARDIOGRAM REPORT: CPT | Mod: S$GLB,,, | Performed by: INTERNAL MEDICINE

## 2021-02-10 PROCEDURE — 1159F PR MEDICATION LIST DOCUMENTED IN MEDICAL RECORD: ICD-10-PCS | Mod: S$GLB,,, | Performed by: INTERNAL MEDICINE

## 2021-02-10 PROCEDURE — 1101F PR PT FALLS ASSESS DOC 0-1 FALLS W/OUT INJ PAST YR: ICD-10-PCS | Mod: CPTII,S$GLB,, | Performed by: INTERNAL MEDICINE

## 2021-02-10 PROCEDURE — 3288F FALL RISK ASSESSMENT DOCD: CPT | Mod: CPTII,S$GLB,, | Performed by: INTERNAL MEDICINE

## 2021-02-10 PROCEDURE — 99999 PR PBB SHADOW E&M-EST. PATIENT-LVL IV: CPT | Mod: PBBFAC,,, | Performed by: INTERNAL MEDICINE

## 2021-02-10 PROCEDURE — 93010 EKG 12-LEAD: ICD-10-PCS | Mod: S$GLB,,, | Performed by: INTERNAL MEDICINE

## 2021-02-10 PROCEDURE — 93005 EKG 12-LEAD: ICD-10-PCS | Mod: S$GLB,,, | Performed by: INTERNAL MEDICINE

## 2021-02-10 PROCEDURE — 1126F AMNT PAIN NOTED NONE PRSNT: CPT | Mod: S$GLB,,, | Performed by: INTERNAL MEDICINE

## 2021-02-10 PROCEDURE — 1126F PR PAIN SEVERITY QUANTIFIED, NO PAIN PRESENT: ICD-10-PCS | Mod: S$GLB,,, | Performed by: INTERNAL MEDICINE

## 2021-02-10 PROCEDURE — 3078F PR MOST RECENT DIASTOLIC BLOOD PRESSURE < 80 MM HG: ICD-10-PCS | Mod: CPTII,S$GLB,, | Performed by: INTERNAL MEDICINE

## 2021-02-10 PROCEDURE — 3077F SYST BP >= 140 MM HG: CPT | Mod: CPTII,S$GLB,, | Performed by: INTERNAL MEDICINE

## 2021-02-10 PROCEDURE — 1159F MED LIST DOCD IN RCRD: CPT | Mod: S$GLB,,, | Performed by: INTERNAL MEDICINE

## 2021-02-10 RX ORDER — LORAZEPAM 1 MG/1
1 TABLET ORAL EVERY 6 HOURS PRN
Qty: 30 TABLET | Refills: 1 | Status: SHIPPED | OUTPATIENT
Start: 2021-02-10 | End: 2021-02-10

## 2021-02-10 RX ORDER — LORAZEPAM 1 MG/1
1 TABLET ORAL EVERY 6 HOURS PRN
Qty: 30 TABLET | Refills: 0 | Status: SHIPPED | OUTPATIENT
Start: 2021-02-10 | End: 2021-06-30 | Stop reason: SDUPTHER

## 2021-02-10 RX ORDER — LISINOPRIL 20 MG/1
20 TABLET ORAL DAILY
Qty: 30 TABLET | Refills: 11 | Status: SHIPPED | OUTPATIENT
Start: 2021-02-10 | End: 2021-06-30

## 2021-02-10 RX ORDER — HYDROXYZINE HYDROCHLORIDE 10 MG/1
10 TABLET, FILM COATED ORAL
Status: ON HOLD | COMMUNITY
Start: 2020-04-28 | End: 2022-03-17 | Stop reason: SDUPTHER

## 2021-02-10 RX ORDER — ALIROCUMAB 75 MG/ML
75 INJECTION, SOLUTION SUBCUTANEOUS
Qty: 4 SYRINGE | Refills: 10 | OUTPATIENT
Start: 2021-02-10 | End: 2021-02-18 | Stop reason: ALTCHOICE

## 2021-02-15 ENCOUNTER — SPECIALTY PHARMACY (OUTPATIENT)
Dept: PHARMACY | Facility: CLINIC | Age: 81
End: 2021-02-15

## 2021-02-18 DIAGNOSIS — Z78.9 STATIN INTOLERANCE: Primary | ICD-10-CM

## 2021-02-18 RX ORDER — EVOLOCUMAB 140 MG/ML
140 INJECTION, SOLUTION SUBCUTANEOUS
Qty: 6 SYRINGE | Refills: 10 | OUTPATIENT
Start: 2021-02-18 | End: 2021-03-31

## 2021-03-23 ENCOUNTER — OFFICE VISIT (OUTPATIENT)
Dept: FAMILY MEDICINE | Facility: CLINIC | Age: 81
End: 2021-03-23
Attending: FAMILY MEDICINE
Payer: MEDICARE

## 2021-03-23 VITALS
SYSTOLIC BLOOD PRESSURE: 118 MMHG | OXYGEN SATURATION: 97 % | BODY MASS INDEX: 40.66 KG/M2 | WEIGHT: 229.5 LBS | HEIGHT: 63 IN | TEMPERATURE: 97 F | HEART RATE: 56 BPM | DIASTOLIC BLOOD PRESSURE: 74 MMHG

## 2021-03-23 DIAGNOSIS — E66.01 MORBID OBESITY WITH BMI OF 40.0-44.9, ADULT: ICD-10-CM

## 2021-03-23 DIAGNOSIS — E03.9 HYPOTHYROIDISM, UNSPECIFIED TYPE: ICD-10-CM

## 2021-03-23 DIAGNOSIS — D50.0 IRON DEFICIENCY ANEMIA DUE TO CHRONIC BLOOD LOSS: ICD-10-CM

## 2021-03-23 DIAGNOSIS — I10 ESSENTIAL HYPERTENSION: Primary | ICD-10-CM

## 2021-03-23 DIAGNOSIS — E78.5 HYPERLIPIDEMIA, UNSPECIFIED HYPERLIPIDEMIA TYPE: ICD-10-CM

## 2021-03-23 DIAGNOSIS — I48.0 PAF (PAROXYSMAL ATRIAL FIBRILLATION): ICD-10-CM

## 2021-03-23 DIAGNOSIS — E03.9 ACQUIRED HYPOTHYROIDISM: ICD-10-CM

## 2021-03-23 PROCEDURE — 3288F PR FALLS RISK ASSESSMENT DOCUMENTED: ICD-10-PCS | Mod: CPTII,S$GLB,, | Performed by: FAMILY MEDICINE

## 2021-03-23 PROCEDURE — 1159F MED LIST DOCD IN RCRD: CPT | Mod: S$GLB,,, | Performed by: FAMILY MEDICINE

## 2021-03-23 PROCEDURE — 3288F FALL RISK ASSESSMENT DOCD: CPT | Mod: CPTII,S$GLB,, | Performed by: FAMILY MEDICINE

## 2021-03-23 PROCEDURE — 99214 OFFICE O/P EST MOD 30 MIN: CPT | Mod: S$GLB,,, | Performed by: FAMILY MEDICINE

## 2021-03-23 PROCEDURE — 99499 RISK ADDL DX/OHS AUDIT: ICD-10-PCS | Mod: S$GLB,,, | Performed by: FAMILY MEDICINE

## 2021-03-23 PROCEDURE — 1125F AMNT PAIN NOTED PAIN PRSNT: CPT | Mod: S$GLB,,, | Performed by: FAMILY MEDICINE

## 2021-03-23 PROCEDURE — 1159F PR MEDICATION LIST DOCUMENTED IN MEDICAL RECORD: ICD-10-PCS | Mod: S$GLB,,, | Performed by: FAMILY MEDICINE

## 2021-03-23 PROCEDURE — 99999 PR PBB SHADOW E&M-EST. PATIENT-LVL V: CPT | Mod: PBBFAC,,, | Performed by: FAMILY MEDICINE

## 2021-03-23 PROCEDURE — 99499 UNLISTED E&M SERVICE: CPT | Mod: S$GLB,,, | Performed by: FAMILY MEDICINE

## 2021-03-23 PROCEDURE — 99999 PR PBB SHADOW E&M-EST. PATIENT-LVL V: ICD-10-PCS | Mod: PBBFAC,,, | Performed by: FAMILY MEDICINE

## 2021-03-23 PROCEDURE — 1101F PR PT FALLS ASSESS DOC 0-1 FALLS W/OUT INJ PAST YR: ICD-10-PCS | Mod: CPTII,S$GLB,, | Performed by: FAMILY MEDICINE

## 2021-03-23 PROCEDURE — 1101F PT FALLS ASSESS-DOCD LE1/YR: CPT | Mod: CPTII,S$GLB,, | Performed by: FAMILY MEDICINE

## 2021-03-23 PROCEDURE — 1125F PR PAIN SEVERITY QUANTIFIED, PAIN PRESENT: ICD-10-PCS | Mod: S$GLB,,, | Performed by: FAMILY MEDICINE

## 2021-03-23 PROCEDURE — 3074F PR MOST RECENT SYSTOLIC BLOOD PRESSURE < 130 MM HG: ICD-10-PCS | Mod: CPTII,S$GLB,, | Performed by: FAMILY MEDICINE

## 2021-03-23 PROCEDURE — 3078F PR MOST RECENT DIASTOLIC BLOOD PRESSURE < 80 MM HG: ICD-10-PCS | Mod: CPTII,S$GLB,, | Performed by: FAMILY MEDICINE

## 2021-03-23 PROCEDURE — 99214 PR OFFICE/OUTPT VISIT, EST, LEVL IV, 30-39 MIN: ICD-10-PCS | Mod: S$GLB,,, | Performed by: FAMILY MEDICINE

## 2021-03-23 PROCEDURE — 3078F DIAST BP <80 MM HG: CPT | Mod: CPTII,S$GLB,, | Performed by: FAMILY MEDICINE

## 2021-03-23 PROCEDURE — 3074F SYST BP LT 130 MM HG: CPT | Mod: CPTII,S$GLB,, | Performed by: FAMILY MEDICINE

## 2021-03-23 RX ORDER — LEVOTHYROXINE SODIUM 112 UG/1
112 TABLET ORAL DAILY
Qty: 90 TABLET | Refills: 3 | Status: SHIPPED | OUTPATIENT
Start: 2021-03-23 | End: 2021-03-31

## 2021-03-30 ENCOUNTER — LAB VISIT (OUTPATIENT)
Dept: LAB | Facility: HOSPITAL | Age: 81
End: 2021-03-30
Attending: FAMILY MEDICINE
Payer: MEDICARE

## 2021-03-30 DIAGNOSIS — I10 ESSENTIAL HYPERTENSION: ICD-10-CM

## 2021-03-30 DIAGNOSIS — D50.0 IRON DEFICIENCY ANEMIA DUE TO CHRONIC BLOOD LOSS: ICD-10-CM

## 2021-03-30 DIAGNOSIS — E03.9 ACQUIRED HYPOTHYROIDISM: ICD-10-CM

## 2021-03-30 DIAGNOSIS — E78.5 HYPERLIPIDEMIA, UNSPECIFIED HYPERLIPIDEMIA TYPE: ICD-10-CM

## 2021-03-30 LAB
ALBUMIN SERPL BCP-MCNC: 3.5 G/DL (ref 3.5–5.2)
ALP SERPL-CCNC: 87 U/L (ref 55–135)
ALT SERPL W/O P-5'-P-CCNC: 8 U/L (ref 10–44)
ANION GAP SERPL CALC-SCNC: 5 MMOL/L (ref 8–16)
AST SERPL-CCNC: 15 U/L (ref 10–40)
BASOPHILS # BLD AUTO: 0.06 K/UL (ref 0–0.2)
BASOPHILS NFR BLD: 1.3 % (ref 0–1.9)
BILIRUB SERPL-MCNC: 0.5 MG/DL (ref 0.1–1)
BUN SERPL-MCNC: 16 MG/DL (ref 8–23)
CALCIUM SERPL-MCNC: 9.2 MG/DL (ref 8.7–10.5)
CHLORIDE SERPL-SCNC: 104 MMOL/L (ref 95–110)
CHOLEST SERPL-MCNC: 216 MG/DL (ref 120–199)
CHOLEST/HDLC SERPL: 3.2 {RATIO} (ref 2–5)
CO2 SERPL-SCNC: 30 MMOL/L (ref 23–29)
CREAT SERPL-MCNC: 1 MG/DL (ref 0.5–1.4)
DIFFERENTIAL METHOD: ABNORMAL
EOSINOPHIL # BLD AUTO: 0.2 K/UL (ref 0–0.5)
EOSINOPHIL NFR BLD: 4.2 % (ref 0–8)
ERYTHROCYTE [DISTWIDTH] IN BLOOD BY AUTOMATED COUNT: 14.8 % (ref 11.5–14.5)
EST. GFR  (AFRICAN AMERICAN): >60 ML/MIN/1.73 M^2
EST. GFR  (NON AFRICAN AMERICAN): 53.7 ML/MIN/1.73 M^2
GLUCOSE SERPL-MCNC: 89 MG/DL (ref 70–110)
HCT VFR BLD AUTO: 36.7 % (ref 37–48.5)
HDLC SERPL-MCNC: 67 MG/DL (ref 40–75)
HDLC SERPL: 31 % (ref 20–50)
HGB BLD-MCNC: 11.2 G/DL (ref 12–16)
IMM GRANULOCYTES # BLD AUTO: 0.02 K/UL (ref 0–0.04)
IMM GRANULOCYTES NFR BLD AUTO: 0.4 % (ref 0–0.5)
LDLC SERPL CALC-MCNC: 137.6 MG/DL (ref 63–159)
LYMPHOCYTES # BLD AUTO: 1.2 K/UL (ref 1–4.8)
LYMPHOCYTES NFR BLD: 26.1 % (ref 18–48)
MCH RBC QN AUTO: 29.6 PG (ref 27–31)
MCHC RBC AUTO-ENTMCNC: 30.5 G/DL (ref 32–36)
MCV RBC AUTO: 97 FL (ref 82–98)
MONOCYTES # BLD AUTO: 0.5 K/UL (ref 0.3–1)
MONOCYTES NFR BLD: 11.2 % (ref 4–15)
NEUTROPHILS # BLD AUTO: 2.6 K/UL (ref 1.8–7.7)
NEUTROPHILS NFR BLD: 56.8 % (ref 38–73)
NONHDLC SERPL-MCNC: 149 MG/DL
NRBC BLD-RTO: 0 /100 WBC
PLATELET # BLD AUTO: 226 K/UL (ref 150–450)
PMV BLD AUTO: 12.8 FL (ref 9.2–12.9)
POTASSIUM SERPL-SCNC: 4.6 MMOL/L (ref 3.5–5.1)
PROT SERPL-MCNC: 6.7 G/DL (ref 6–8.4)
RBC # BLD AUTO: 3.79 M/UL (ref 4–5.4)
SODIUM SERPL-SCNC: 139 MMOL/L (ref 136–145)
T4 FREE SERPL-MCNC: 1.5 NG/DL (ref 0.71–1.51)
TRIGL SERPL-MCNC: 57 MG/DL (ref 30–150)
TSH SERPL DL<=0.005 MIU/L-ACNC: 5.38 UIU/ML (ref 0.4–4)
WBC # BLD AUTO: 4.56 K/UL (ref 3.9–12.7)

## 2021-03-30 PROCEDURE — 85025 COMPLETE CBC W/AUTO DIFF WBC: CPT | Performed by: FAMILY MEDICINE

## 2021-03-30 PROCEDURE — 80061 LIPID PANEL: CPT | Performed by: FAMILY MEDICINE

## 2021-03-30 PROCEDURE — 80053 COMPREHEN METABOLIC PANEL: CPT | Performed by: FAMILY MEDICINE

## 2021-03-30 PROCEDURE — 84443 ASSAY THYROID STIM HORMONE: CPT | Performed by: FAMILY MEDICINE

## 2021-03-30 PROCEDURE — 84439 ASSAY OF FREE THYROXINE: CPT | Performed by: FAMILY MEDICINE

## 2021-03-30 PROCEDURE — 36415 COLL VENOUS BLD VENIPUNCTURE: CPT | Mod: PO | Performed by: FAMILY MEDICINE

## 2021-03-31 ENCOUNTER — OFFICE VISIT (OUTPATIENT)
Dept: CARDIOLOGY | Facility: CLINIC | Age: 81
End: 2021-03-31
Payer: MEDICARE

## 2021-03-31 ENCOUNTER — TELEPHONE (OUTPATIENT)
Dept: FAMILY MEDICINE | Facility: CLINIC | Age: 81
End: 2021-03-31

## 2021-03-31 VITALS
BODY MASS INDEX: 40.74 KG/M2 | DIASTOLIC BLOOD PRESSURE: 86 MMHG | WEIGHT: 229.94 LBS | HEIGHT: 63 IN | HEART RATE: 64 BPM | SYSTOLIC BLOOD PRESSURE: 217 MMHG

## 2021-03-31 DIAGNOSIS — I48.91 ATRIAL FIBRILLATION WITH RVR: ICD-10-CM

## 2021-03-31 DIAGNOSIS — E78.5 DYSLIPIDEMIA: ICD-10-CM

## 2021-03-31 DIAGNOSIS — I44.7 LBBB (LEFT BUNDLE BRANCH BLOCK): ICD-10-CM

## 2021-03-31 DIAGNOSIS — I25.10 CORONARY ARTERY DISEASE DUE TO CALCIFIED CORONARY LESION: ICD-10-CM

## 2021-03-31 DIAGNOSIS — I10 ESSENTIAL HYPERTENSION: ICD-10-CM

## 2021-03-31 DIAGNOSIS — Z86.718 HISTORY OF DEEP VENOUS THROMBOSIS (DVT) OF DISTAL VEIN OF RIGHT LOWER EXTREMITY: ICD-10-CM

## 2021-03-31 DIAGNOSIS — I25.84 CORONARY ARTERY DISEASE DUE TO CALCIFIED CORONARY LESION: ICD-10-CM

## 2021-03-31 DIAGNOSIS — I87.2 VENOUS STASIS DERMATITIS OF RIGHT LOWER EXTREMITY: ICD-10-CM

## 2021-03-31 DIAGNOSIS — Z79.01 ANTICOAGULANT LONG-TERM USE: ICD-10-CM

## 2021-03-31 DIAGNOSIS — Z95.5 STATUS POST INSERTION OF DRUG-ELUTING STENT INTO RIGHT CORONARY ARTERY FOR CORONARY ARTERY DISEASE: ICD-10-CM

## 2021-03-31 DIAGNOSIS — R45.4 DIFFICULTY CONTROLLING ANGER: ICD-10-CM

## 2021-03-31 DIAGNOSIS — Z78.9 STATIN INTOLERANCE: ICD-10-CM

## 2021-03-31 DIAGNOSIS — E66.01 CLASS 2 SEVERE OBESITY DUE TO EXCESS CALORIES WITH SERIOUS COMORBIDITY AND BODY MASS INDEX (BMI) OF 39.0 TO 39.9 IN ADULT: ICD-10-CM

## 2021-03-31 DIAGNOSIS — I70.0 AORTIC ATHEROSCLEROSIS: ICD-10-CM

## 2021-03-31 DIAGNOSIS — I25.2 HISTORY OF MI (MYOCARDIAL INFARCTION): ICD-10-CM

## 2021-03-31 DIAGNOSIS — R09.89 LABILE HYPERTENSION: ICD-10-CM

## 2021-03-31 DIAGNOSIS — F43.9 STRESS AT HOME: ICD-10-CM

## 2021-03-31 DIAGNOSIS — E03.9 HYPOTHYROIDISM, UNSPECIFIED TYPE: ICD-10-CM

## 2021-03-31 DIAGNOSIS — I48.0 PAF (PAROXYSMAL ATRIAL FIBRILLATION): ICD-10-CM

## 2021-03-31 PROCEDURE — 3077F PR MOST RECENT SYSTOLIC BLOOD PRESSURE >= 140 MM HG: ICD-10-PCS | Mod: CPTII,S$GLB,, | Performed by: INTERNAL MEDICINE

## 2021-03-31 PROCEDURE — 3288F FALL RISK ASSESSMENT DOCD: CPT | Mod: CPTII,S$GLB,, | Performed by: INTERNAL MEDICINE

## 2021-03-31 PROCEDURE — 3079F PR MOST RECENT DIASTOLIC BLOOD PRESSURE 80-89 MM HG: ICD-10-PCS | Mod: CPTII,S$GLB,, | Performed by: INTERNAL MEDICINE

## 2021-03-31 PROCEDURE — 1159F PR MEDICATION LIST DOCUMENTED IN MEDICAL RECORD: ICD-10-PCS | Mod: S$GLB,,, | Performed by: INTERNAL MEDICINE

## 2021-03-31 PROCEDURE — 3288F PR FALLS RISK ASSESSMENT DOCUMENTED: ICD-10-PCS | Mod: CPTII,S$GLB,, | Performed by: INTERNAL MEDICINE

## 2021-03-31 PROCEDURE — 99215 OFFICE O/P EST HI 40 MIN: CPT | Mod: S$GLB,,, | Performed by: INTERNAL MEDICINE

## 2021-03-31 PROCEDURE — 99999 PR PBB SHADOW E&M-EST. PATIENT-LVL IV: CPT | Mod: PBBFAC,,, | Performed by: INTERNAL MEDICINE

## 2021-03-31 PROCEDURE — 1126F AMNT PAIN NOTED NONE PRSNT: CPT | Mod: S$GLB,,, | Performed by: INTERNAL MEDICINE

## 2021-03-31 PROCEDURE — 1101F PR PT FALLS ASSESS DOC 0-1 FALLS W/OUT INJ PAST YR: ICD-10-PCS | Mod: CPTII,S$GLB,, | Performed by: INTERNAL MEDICINE

## 2021-03-31 PROCEDURE — 99215 PR OFFICE/OUTPT VISIT, EST, LEVL V, 40-54 MIN: ICD-10-PCS | Mod: S$GLB,,, | Performed by: INTERNAL MEDICINE

## 2021-03-31 PROCEDURE — 1159F MED LIST DOCD IN RCRD: CPT | Mod: S$GLB,,, | Performed by: INTERNAL MEDICINE

## 2021-03-31 PROCEDURE — 3079F DIAST BP 80-89 MM HG: CPT | Mod: CPTII,S$GLB,, | Performed by: INTERNAL MEDICINE

## 2021-03-31 PROCEDURE — 3077F SYST BP >= 140 MM HG: CPT | Mod: CPTII,S$GLB,, | Performed by: INTERNAL MEDICINE

## 2021-03-31 PROCEDURE — 99999 PR PBB SHADOW E&M-EST. PATIENT-LVL IV: ICD-10-PCS | Mod: PBBFAC,,, | Performed by: INTERNAL MEDICINE

## 2021-03-31 PROCEDURE — 1101F PT FALLS ASSESS-DOCD LE1/YR: CPT | Mod: CPTII,S$GLB,, | Performed by: INTERNAL MEDICINE

## 2021-03-31 PROCEDURE — 1126F PR PAIN SEVERITY QUANTIFIED, NO PAIN PRESENT: ICD-10-PCS | Mod: S$GLB,,, | Performed by: INTERNAL MEDICINE

## 2021-03-31 RX ORDER — LEVOTHYROXINE SODIUM 125 UG/1
125 TABLET ORAL DAILY
Qty: 90 TABLET | Refills: 0 | Status: SHIPPED | OUTPATIENT
Start: 2021-03-31 | End: 2021-07-01 | Stop reason: SDUPTHER

## 2021-03-31 RX ORDER — EVOLOCUMAB 140 MG/ML
140 INJECTION, SOLUTION SUBCUTANEOUS
Qty: 6 ML | Refills: 10 | Status: SHIPPED | OUTPATIENT
Start: 2021-03-31 | End: 2021-06-30

## 2021-04-05 ENCOUNTER — SPECIALTY PHARMACY (OUTPATIENT)
Dept: PHARMACY | Facility: CLINIC | Age: 81
End: 2021-04-05

## 2021-04-15 NOTE — PATIENT INSTRUCTIONS
Weight Management: Getting Started  Healthy bodies come in all shapes and sizes. Not all bodies are made to be thin. For some people, a healthy weight is higher than the average weight listed on weight charts. Your healthcare provider can help you decide on a healthy weight for you.    Reasons to lose weight  Losing weight can help with some health problems, such as high blood pressure, heart disease, diabetes, sleep apnea, and arthritis. You may also feel more energy.  Set your long-term goal  Your goal doesn't even have to be a specific weight. You may decide on a fitness goal (such as being able to walk 10 miles a week), or a health goal (such as lowering your blood pressure). Choose a goal that is measurable and reasonable, so you know when you've reached it. A goal of reaching a BMI of less than 25 is not always reasonable (or possible).   Make an action plan  Habits dont change overnight. Setting your goals too high can leave you feeling discouraged if you cant reach them. Be realistic. Choose one or two small changes you can make now. Set an action plan for how you are going to make these changes. When you can stick to this plan, keep making a few more small changes. Taking small steps will help you stay on the path to success.  Track your progress  Write down your goals. Then, keep a daily record of your progress. Write down what you eat and how active you are. This record lets you look back on how much youve done. It may also help when youre feeling frustrated. Reward yourself for success. Even if you dont reach every goal, give yourself credit for what you do get done.  Get support  Encouragement from others can help make losing weight easier. Ask your family members and friends for support. They may even want to join you. Also look to your healthcare provider, registered dietitian, and  for help. Your local hospital can give you more information about nutrition, exercise, and  weight loss.  Date Last Reviewed: 1/31/2016 © 2000-2016 [employer]. [address] [place] [address], [place] [address]. All rights reserved. This information is not intended as a substitute for professional medical care. Always follow your healthcare professional's instructions.        Controlling High Blood Pressure  High blood pressure (hypertension) is often called the silent killer. This is because many people who have it dont know it. High blood pressure is defined as 140/90 mm Hg or higher. Know your blood pressure and remember to check it regularly. Doing so can save your life. Here are some things you can do to help control your blood pressure.    Choose heart-healthy foods  · Select low-salt, low-fat foods. Limit sodium intake to 2,400 mg per day or the amount suggested by your healthcare provider.  · Limit canned, dried, cured, packaged, and fast foods. These can contain a lot of salt.  · Eat 8 to 10 servings of fruits and vegetables every day.  · Choose lean meats, fish, or chicken.  · Eat whole-grain pasta, brown rice, and beans.  · Eat 2 to 3 servings of low-fat or fat-free dairy products.  · Ask your doctor about the DASH eating plan. This plan helps reduce blood pressure.  · When you go to a restaurant, ask that your meal be prepared with no added salt.  Maintain a healthy weight  · Ask your healthcare provider how many calories to eat a day. Then stick to that number.  · Ask your healthcare provider what weight range is healthiest for you. If you are overweight, a weight loss of only 3% to 5% of your body weight can help lower blood pressure. Generally, a good weight loss goal is to lose 10% of your body weight in a year.  · Limit snacks and sweets.  · Get regular exercise.  Get up and get active  · Choose activities you enjoy. Find ones you can do with friends or family. This includes bicycling, dancing, walking, and jogging.  · Park farther away from building entrances.  · Use stairs  instead of the elevator.  · When you can, walk or bike instead of driving.  · [place] leaves, garden, or do household repairs.  · Be active at a moderate to vigorous level of physical activity for at least 40 minutes for a minimum of 3 to 4 days a week.   Manage stress  · Make time to relax and enjoy life. Find time to laugh.  · Communicate your concerns with your loved ones and your healthcare provider.  · Visit with family and friends, and keep up with hobbies.  Limit alcohol and quit smoking  · Men should have no more than 2 drinks per day.  · Women should have no more than 1 drink per day.  · Talk with your healthcare provider about quitting smoking. Smoking significantly increases your risk for heart disease and stroke. Ask your healthcare provider about community smoking cessation programs and other options.  Medicines  If lifestyle changes arent enough, your healthcare provider may prescribe high blood pressure medicine. Take all medicines as prescribed. If you have any questions about your medicines, ask your healthcare provider before stopping or changing them.   Date Last Reviewed: 4/27/2016 © 2000-2016 [employer]. [address] [place] [address], [place] [address]. All rights reserved. This information is not intended as a substitute for professional medical care. Always follow your healthcare professional's instructions.         no

## 2021-04-22 ENCOUNTER — SPECIALTY PHARMACY (OUTPATIENT)
Dept: PHARMACY | Facility: CLINIC | Age: 81
End: 2021-04-22

## 2021-04-28 ENCOUNTER — TELEPHONE (OUTPATIENT)
Dept: FAMILY MEDICINE | Facility: CLINIC | Age: 81
End: 2021-04-28

## 2021-05-14 ENCOUNTER — SPECIALTY PHARMACY (OUTPATIENT)
Dept: PHARMACY | Facility: CLINIC | Age: 81
End: 2021-05-14

## 2021-05-18 ENCOUNTER — TELEPHONE (OUTPATIENT)
Dept: DERMATOLOGY | Facility: CLINIC | Age: 81
End: 2021-05-18

## 2021-05-25 ENCOUNTER — TELEPHONE (OUTPATIENT)
Dept: FAMILY MEDICINE | Facility: CLINIC | Age: 81
End: 2021-05-25

## 2021-05-25 ENCOUNTER — OFFICE VISIT (OUTPATIENT)
Dept: DERMATOLOGY | Facility: CLINIC | Age: 81
End: 2021-05-25
Payer: MEDICARE

## 2021-05-25 DIAGNOSIS — R79.89 ELEVATED BRAIN NATRIURETIC PEPTIDE (BNP) LEVEL: ICD-10-CM

## 2021-05-25 DIAGNOSIS — L30.9 DERMATITIS: Primary | ICD-10-CM

## 2021-05-25 DIAGNOSIS — I87.2 VENOUS STASIS DERMATITIS OF RIGHT LOWER EXTREMITY: ICD-10-CM

## 2021-05-25 DIAGNOSIS — I87.2 VENOUS STASIS DERMATITIS OF BOTH LOWER EXTREMITIES: ICD-10-CM

## 2021-05-25 PROCEDURE — 3288F FALL RISK ASSESSMENT DOCD: CPT | Mod: CPTII,S$GLB,, | Performed by: STUDENT IN AN ORGANIZED HEALTH CARE EDUCATION/TRAINING PROGRAM

## 2021-05-25 PROCEDURE — 1101F PR PT FALLS ASSESS DOC 0-1 FALLS W/OUT INJ PAST YR: ICD-10-PCS | Mod: CPTII,S$GLB,, | Performed by: STUDENT IN AN ORGANIZED HEALTH CARE EDUCATION/TRAINING PROGRAM

## 2021-05-25 PROCEDURE — 99214 OFFICE O/P EST MOD 30 MIN: CPT | Mod: S$GLB,,, | Performed by: STUDENT IN AN ORGANIZED HEALTH CARE EDUCATION/TRAINING PROGRAM

## 2021-05-25 PROCEDURE — 1159F PR MEDICATION LIST DOCUMENTED IN MEDICAL RECORD: ICD-10-PCS | Mod: S$GLB,,, | Performed by: STUDENT IN AN ORGANIZED HEALTH CARE EDUCATION/TRAINING PROGRAM

## 2021-05-25 PROCEDURE — 1126F PR PAIN SEVERITY QUANTIFIED, NO PAIN PRESENT: ICD-10-PCS | Mod: S$GLB,,, | Performed by: STUDENT IN AN ORGANIZED HEALTH CARE EDUCATION/TRAINING PROGRAM

## 2021-05-25 PROCEDURE — 3288F PR FALLS RISK ASSESSMENT DOCUMENTED: ICD-10-PCS | Mod: CPTII,S$GLB,, | Performed by: STUDENT IN AN ORGANIZED HEALTH CARE EDUCATION/TRAINING PROGRAM

## 2021-05-25 PROCEDURE — 1126F AMNT PAIN NOTED NONE PRSNT: CPT | Mod: S$GLB,,, | Performed by: STUDENT IN AN ORGANIZED HEALTH CARE EDUCATION/TRAINING PROGRAM

## 2021-05-25 PROCEDURE — 99999 PR PBB SHADOW E&M-EST. PATIENT-LVL V: CPT | Mod: PBBFAC,,, | Performed by: STUDENT IN AN ORGANIZED HEALTH CARE EDUCATION/TRAINING PROGRAM

## 2021-05-25 PROCEDURE — 99999 PR PBB SHADOW E&M-EST. PATIENT-LVL V: ICD-10-PCS | Mod: PBBFAC,,, | Performed by: STUDENT IN AN ORGANIZED HEALTH CARE EDUCATION/TRAINING PROGRAM

## 2021-05-25 PROCEDURE — 99214 PR OFFICE/OUTPT VISIT, EST, LEVL IV, 30-39 MIN: ICD-10-PCS | Mod: S$GLB,,, | Performed by: STUDENT IN AN ORGANIZED HEALTH CARE EDUCATION/TRAINING PROGRAM

## 2021-05-25 PROCEDURE — 1101F PT FALLS ASSESS-DOCD LE1/YR: CPT | Mod: CPTII,S$GLB,, | Performed by: STUDENT IN AN ORGANIZED HEALTH CARE EDUCATION/TRAINING PROGRAM

## 2021-05-25 PROCEDURE — 1159F MED LIST DOCD IN RCRD: CPT | Mod: S$GLB,,, | Performed by: STUDENT IN AN ORGANIZED HEALTH CARE EDUCATION/TRAINING PROGRAM

## 2021-05-25 RX ORDER — FUROSEMIDE 40 MG/1
40 TABLET ORAL DAILY PRN
Qty: 90 TABLET | Refills: 1 | Status: SHIPPED | OUTPATIENT
Start: 2021-05-25 | End: 2021-09-03

## 2021-05-25 RX ORDER — TRIAMCINOLONE ACETONIDE 1 MG/G
OINTMENT TOPICAL 2 TIMES DAILY
Qty: 80 G | Refills: 2 | Status: SHIPPED | OUTPATIENT
Start: 2021-05-25 | End: 2021-07-27 | Stop reason: SDUPTHER

## 2021-05-27 ENCOUNTER — PATIENT MESSAGE (OUTPATIENT)
Dept: DERMATOLOGY | Facility: CLINIC | Age: 81
End: 2021-05-27

## 2021-06-04 DIAGNOSIS — G57.93 NEUROPATHY INVOLVING BOTH LOWER EXTREMITIES: ICD-10-CM

## 2021-06-04 PROCEDURE — G0180 MD CERTIFICATION HHA PATIENT: HCPCS | Mod: ,,, | Performed by: DERMATOLOGY

## 2021-06-04 PROCEDURE — G0180 PR HOME HEALTH MD CERTIFICATION: ICD-10-PCS | Mod: ,,, | Performed by: DERMATOLOGY

## 2021-06-07 ENCOUNTER — TELEPHONE (OUTPATIENT)
Dept: DERMATOLOGY | Facility: CLINIC | Age: 81
End: 2021-06-07

## 2021-06-08 RX ORDER — GABAPENTIN 100 MG/1
CAPSULE ORAL
Qty: 90 CAPSULE | Refills: 5 | OUTPATIENT
Start: 2021-06-08

## 2021-06-10 ENCOUNTER — PATIENT MESSAGE (OUTPATIENT)
Dept: PHARMACY | Facility: CLINIC | Age: 81
End: 2021-06-10

## 2021-06-11 ENCOUNTER — TELEPHONE (OUTPATIENT)
Dept: DERMATOLOGY | Facility: CLINIC | Age: 81
End: 2021-06-11

## 2021-06-14 ENCOUNTER — TELEPHONE (OUTPATIENT)
Dept: DERMATOLOGY | Facility: CLINIC | Age: 81
End: 2021-06-14

## 2021-06-23 ENCOUNTER — TELEPHONE (OUTPATIENT)
Dept: FAMILY MEDICINE | Facility: CLINIC | Age: 81
End: 2021-06-23

## 2021-06-24 RX ORDER — HYDRALAZINE HYDROCHLORIDE 25 MG/1
25 TABLET, FILM COATED ORAL EVERY 8 HOURS
Qty: 60 TABLET | Refills: 0
Start: 2021-06-24 | End: 2021-06-30 | Stop reason: SDUPTHER

## 2021-06-28 ENCOUNTER — OFFICE VISIT (OUTPATIENT)
Dept: FAMILY MEDICINE | Facility: CLINIC | Age: 81
End: 2021-06-28
Attending: FAMILY MEDICINE
Payer: MEDICARE

## 2021-06-28 ENCOUNTER — EXTERNAL HOME HEALTH (OUTPATIENT)
Dept: HOME HEALTH SERVICES | Facility: HOSPITAL | Age: 81
End: 2021-06-28
Payer: MEDICARE

## 2021-06-28 VITALS
TEMPERATURE: 99 F | SYSTOLIC BLOOD PRESSURE: 134 MMHG | DIASTOLIC BLOOD PRESSURE: 84 MMHG | HEIGHT: 63 IN | BODY MASS INDEX: 40.08 KG/M2 | OXYGEN SATURATION: 97 % | WEIGHT: 226.19 LBS | HEART RATE: 59 BPM

## 2021-06-28 DIAGNOSIS — I25.84 CORONARY ARTERY DISEASE DUE TO CALCIFIED CORONARY LESION: ICD-10-CM

## 2021-06-28 DIAGNOSIS — I10 ESSENTIAL HYPERTENSION: Primary | ICD-10-CM

## 2021-06-28 DIAGNOSIS — I48.91 ATRIAL FIBRILLATION WITH RVR: ICD-10-CM

## 2021-06-28 DIAGNOSIS — I25.10 CORONARY ARTERY DISEASE DUE TO CALCIFIED CORONARY LESION: ICD-10-CM

## 2021-06-28 DIAGNOSIS — J45.40 MODERATE PERSISTENT ASTHMA WITHOUT COMPLICATION: ICD-10-CM

## 2021-06-28 DIAGNOSIS — E03.9 ACQUIRED HYPOTHYROIDISM: ICD-10-CM

## 2021-06-28 PROCEDURE — 1159F MED LIST DOCD IN RCRD: CPT | Mod: S$GLB,,, | Performed by: FAMILY MEDICINE

## 2021-06-28 PROCEDURE — 3288F FALL RISK ASSESSMENT DOCD: CPT | Mod: CPTII,S$GLB,, | Performed by: FAMILY MEDICINE

## 2021-06-28 PROCEDURE — 1125F AMNT PAIN NOTED PAIN PRSNT: CPT | Mod: S$GLB,,, | Performed by: FAMILY MEDICINE

## 2021-06-28 PROCEDURE — 3075F PR MOST RECENT SYSTOLIC BLOOD PRESS GE 130-139MM HG: ICD-10-PCS | Mod: CPTII,S$GLB,, | Performed by: FAMILY MEDICINE

## 2021-06-28 PROCEDURE — 99999 PR PBB SHADOW E&M-EST. PATIENT-LVL IV: ICD-10-PCS | Mod: PBBFAC,,, | Performed by: FAMILY MEDICINE

## 2021-06-28 PROCEDURE — 99213 OFFICE O/P EST LOW 20 MIN: CPT | Mod: S$GLB,,, | Performed by: FAMILY MEDICINE

## 2021-06-28 PROCEDURE — 3079F DIAST BP 80-89 MM HG: CPT | Mod: CPTII,S$GLB,, | Performed by: FAMILY MEDICINE

## 2021-06-28 PROCEDURE — 3288F PR FALLS RISK ASSESSMENT DOCUMENTED: ICD-10-PCS | Mod: CPTII,S$GLB,, | Performed by: FAMILY MEDICINE

## 2021-06-28 PROCEDURE — 1101F PR PT FALLS ASSESS DOC 0-1 FALLS W/OUT INJ PAST YR: ICD-10-PCS | Mod: CPTII,S$GLB,, | Performed by: FAMILY MEDICINE

## 2021-06-28 PROCEDURE — 3075F SYST BP GE 130 - 139MM HG: CPT | Mod: CPTII,S$GLB,, | Performed by: FAMILY MEDICINE

## 2021-06-28 PROCEDURE — 99213 PR OFFICE/OUTPT VISIT, EST, LEVL III, 20-29 MIN: ICD-10-PCS | Mod: S$GLB,,, | Performed by: FAMILY MEDICINE

## 2021-06-28 PROCEDURE — 99999 PR PBB SHADOW E&M-EST. PATIENT-LVL IV: CPT | Mod: PBBFAC,,, | Performed by: FAMILY MEDICINE

## 2021-06-28 PROCEDURE — 1101F PT FALLS ASSESS-DOCD LE1/YR: CPT | Mod: CPTII,S$GLB,, | Performed by: FAMILY MEDICINE

## 2021-06-28 PROCEDURE — 1159F PR MEDICATION LIST DOCUMENTED IN MEDICAL RECORD: ICD-10-PCS | Mod: S$GLB,,, | Performed by: FAMILY MEDICINE

## 2021-06-28 PROCEDURE — 1125F PR PAIN SEVERITY QUANTIFIED, PAIN PRESENT: ICD-10-PCS | Mod: S$GLB,,, | Performed by: FAMILY MEDICINE

## 2021-06-28 PROCEDURE — 3079F PR MOST RECENT DIASTOLIC BLOOD PRESSURE 80-89 MM HG: ICD-10-PCS | Mod: CPTII,S$GLB,, | Performed by: FAMILY MEDICINE

## 2021-06-28 RX ORDER — MONTELUKAST SODIUM 10 MG/1
10 TABLET ORAL NIGHTLY
Qty: 90 TABLET | Refills: 3 | Status: SHIPPED | OUTPATIENT
Start: 2021-06-28

## 2021-06-29 ENCOUNTER — PATIENT OUTREACH (OUTPATIENT)
Dept: ADMINISTRATIVE | Facility: OTHER | Age: 81
End: 2021-06-29

## 2021-06-30 ENCOUNTER — DOCUMENT SCAN (OUTPATIENT)
Dept: HOME HEALTH SERVICES | Facility: HOSPITAL | Age: 81
End: 2021-06-30
Payer: MEDICARE

## 2021-06-30 ENCOUNTER — OFFICE VISIT (OUTPATIENT)
Dept: CARDIOLOGY | Facility: CLINIC | Age: 81
End: 2021-06-30
Payer: MEDICARE

## 2021-06-30 VITALS
HEIGHT: 63 IN | HEART RATE: 61 BPM | SYSTOLIC BLOOD PRESSURE: 195 MMHG | DIASTOLIC BLOOD PRESSURE: 84 MMHG | BODY MASS INDEX: 40.36 KG/M2 | WEIGHT: 227.75 LBS

## 2021-06-30 DIAGNOSIS — Z78.9 STATIN INTOLERANCE: ICD-10-CM

## 2021-06-30 DIAGNOSIS — I87.2 VENOUS STASIS DERMATITIS OF RIGHT LOWER EXTREMITY: ICD-10-CM

## 2021-06-30 DIAGNOSIS — F41.1 GAD (GENERALIZED ANXIETY DISORDER): ICD-10-CM

## 2021-06-30 DIAGNOSIS — E66.01 CLASS 2 SEVERE OBESITY DUE TO EXCESS CALORIES WITH SERIOUS COMORBIDITY AND BODY MASS INDEX (BMI) OF 39.0 TO 39.9 IN ADULT: ICD-10-CM

## 2021-06-30 DIAGNOSIS — Z86.718 HISTORY OF DEEP VENOUS THROMBOSIS (DVT) OF DISTAL VEIN OF RIGHT LOWER EXTREMITY: ICD-10-CM

## 2021-06-30 DIAGNOSIS — I25.2 HISTORY OF MI (MYOCARDIAL INFARCTION): ICD-10-CM

## 2021-06-30 DIAGNOSIS — I44.7 LBBB (LEFT BUNDLE BRANCH BLOCK): ICD-10-CM

## 2021-06-30 DIAGNOSIS — E78.5 DYSLIPIDEMIA: ICD-10-CM

## 2021-06-30 DIAGNOSIS — Z95.5 STATUS POST INSERTION OF DRUG-ELUTING STENT INTO RIGHT CORONARY ARTERY FOR CORONARY ARTERY DISEASE: ICD-10-CM

## 2021-06-30 DIAGNOSIS — I25.84 CORONARY ARTERY DISEASE DUE TO CALCIFIED CORONARY LESION: ICD-10-CM

## 2021-06-30 DIAGNOSIS — G47.33 OSA ON CPAP: ICD-10-CM

## 2021-06-30 DIAGNOSIS — R09.89 LABILE HYPERTENSION: ICD-10-CM

## 2021-06-30 DIAGNOSIS — I48.0 PAF (PAROXYSMAL ATRIAL FIBRILLATION): ICD-10-CM

## 2021-06-30 DIAGNOSIS — I10 ESSENTIAL HYPERTENSION: Primary | ICD-10-CM

## 2021-06-30 DIAGNOSIS — I48.91 ATRIAL FIBRILLATION WITH RVR: ICD-10-CM

## 2021-06-30 DIAGNOSIS — Z79.01 ANTICOAGULANT LONG-TERM USE: ICD-10-CM

## 2021-06-30 DIAGNOSIS — I70.0 AORTIC ATHEROSCLEROSIS: ICD-10-CM

## 2021-06-30 DIAGNOSIS — I25.10 CORONARY ARTERY DISEASE DUE TO CALCIFIED CORONARY LESION: ICD-10-CM

## 2021-06-30 PROCEDURE — 1159F MED LIST DOCD IN RCRD: CPT | Mod: S$GLB,,, | Performed by: INTERNAL MEDICINE

## 2021-06-30 PROCEDURE — 1126F AMNT PAIN NOTED NONE PRSNT: CPT | Mod: S$GLB,,, | Performed by: INTERNAL MEDICINE

## 2021-06-30 PROCEDURE — 3288F PR FALLS RISK ASSESSMENT DOCUMENTED: ICD-10-PCS | Mod: CPTII,S$GLB,, | Performed by: INTERNAL MEDICINE

## 2021-06-30 PROCEDURE — 99999 PR PBB SHADOW E&M-EST. PATIENT-LVL IV: CPT | Mod: PBBFAC,,, | Performed by: INTERNAL MEDICINE

## 2021-06-30 PROCEDURE — 3079F PR MOST RECENT DIASTOLIC BLOOD PRESSURE 80-89 MM HG: ICD-10-PCS | Mod: CPTII,S$GLB,, | Performed by: INTERNAL MEDICINE

## 2021-06-30 PROCEDURE — 99999 PR PBB SHADOW E&M-EST. PATIENT-LVL IV: ICD-10-PCS | Mod: PBBFAC,,, | Performed by: INTERNAL MEDICINE

## 2021-06-30 PROCEDURE — 3079F DIAST BP 80-89 MM HG: CPT | Mod: CPTII,S$GLB,, | Performed by: INTERNAL MEDICINE

## 2021-06-30 PROCEDURE — 3077F SYST BP >= 140 MM HG: CPT | Mod: CPTII,S$GLB,, | Performed by: INTERNAL MEDICINE

## 2021-06-30 PROCEDURE — 1126F PR PAIN SEVERITY QUANTIFIED, NO PAIN PRESENT: ICD-10-PCS | Mod: S$GLB,,, | Performed by: INTERNAL MEDICINE

## 2021-06-30 PROCEDURE — 3077F PR MOST RECENT SYSTOLIC BLOOD PRESSURE >= 140 MM HG: ICD-10-PCS | Mod: CPTII,S$GLB,, | Performed by: INTERNAL MEDICINE

## 2021-06-30 PROCEDURE — 1159F PR MEDICATION LIST DOCUMENTED IN MEDICAL RECORD: ICD-10-PCS | Mod: S$GLB,,, | Performed by: INTERNAL MEDICINE

## 2021-06-30 PROCEDURE — 3288F FALL RISK ASSESSMENT DOCD: CPT | Mod: CPTII,S$GLB,, | Performed by: INTERNAL MEDICINE

## 2021-06-30 PROCEDURE — 1101F PR PT FALLS ASSESS DOC 0-1 FALLS W/OUT INJ PAST YR: ICD-10-PCS | Mod: CPTII,S$GLB,, | Performed by: INTERNAL MEDICINE

## 2021-06-30 PROCEDURE — 1101F PT FALLS ASSESS-DOCD LE1/YR: CPT | Mod: CPTII,S$GLB,, | Performed by: INTERNAL MEDICINE

## 2021-06-30 PROCEDURE — 99215 PR OFFICE/OUTPT VISIT, EST, LEVL V, 40-54 MIN: ICD-10-PCS | Mod: S$GLB,,, | Performed by: INTERNAL MEDICINE

## 2021-06-30 PROCEDURE — 99215 OFFICE O/P EST HI 40 MIN: CPT | Mod: S$GLB,,, | Performed by: INTERNAL MEDICINE

## 2021-06-30 RX ORDER — HYDRALAZINE HYDROCHLORIDE 25 MG/1
25 TABLET, FILM COATED ORAL EVERY 8 HOURS
Qty: 270 TABLET | Refills: 3
Start: 2021-06-30 | End: 2021-07-06 | Stop reason: SDUPTHER

## 2021-06-30 RX ORDER — LORAZEPAM 1 MG/1
1 TABLET ORAL EVERY 6 HOURS PRN
Qty: 30 TABLET | Refills: 0 | Status: SHIPPED | OUTPATIENT
Start: 2021-06-30 | End: 2021-07-06 | Stop reason: SDUPTHER

## 2021-06-30 RX ORDER — LISINOPRIL 20 MG/1
40 TABLET ORAL DAILY
Qty: 180 TABLET | Refills: 3 | Status: SHIPPED | OUTPATIENT
Start: 2021-06-30 | End: 2022-06-30

## 2021-07-01 ENCOUNTER — PATIENT MESSAGE (OUTPATIENT)
Dept: FAMILY MEDICINE | Facility: CLINIC | Age: 81
End: 2021-07-01

## 2021-07-01 ENCOUNTER — TELEPHONE (OUTPATIENT)
Dept: FAMILY MEDICINE | Facility: CLINIC | Age: 81
End: 2021-07-01

## 2021-07-01 ENCOUNTER — PATIENT MESSAGE (OUTPATIENT)
Dept: PHARMACY | Facility: CLINIC | Age: 81
End: 2021-07-01

## 2021-07-01 DIAGNOSIS — E03.9 HYPOTHYROIDISM, UNSPECIFIED TYPE: ICD-10-CM

## 2021-07-01 RX ORDER — LEVOTHYROXINE SODIUM 125 UG/1
125 TABLET ORAL DAILY
Qty: 90 TABLET | Refills: 0 | Status: SHIPPED | OUTPATIENT
Start: 2021-07-01 | End: 2021-07-27 | Stop reason: SDUPTHER

## 2021-07-02 RX ORDER — LEVOTHYROXINE SODIUM 125 UG/1
TABLET ORAL
Qty: 90 TABLET | Refills: 0 | OUTPATIENT
Start: 2021-07-02

## 2021-07-03 ENCOUNTER — PATIENT MESSAGE (OUTPATIENT)
Dept: CARDIOLOGY | Facility: CLINIC | Age: 81
End: 2021-07-03

## 2021-07-03 DIAGNOSIS — F41.1 GAD (GENERALIZED ANXIETY DISORDER): ICD-10-CM

## 2021-07-03 DIAGNOSIS — R09.89 LABILE HYPERTENSION: ICD-10-CM

## 2021-07-06 RX ORDER — HYDRALAZINE HYDROCHLORIDE 25 MG/1
25 TABLET, FILM COATED ORAL EVERY 8 HOURS
Qty: 270 TABLET | Refills: 3
Start: 2021-07-06 | End: 2021-07-06 | Stop reason: SDUPTHER

## 2021-07-06 RX ORDER — HYDRALAZINE HYDROCHLORIDE 25 MG/1
25 TABLET, FILM COATED ORAL EVERY 8 HOURS
Qty: 270 TABLET | Refills: 3 | Status: SHIPPED | OUTPATIENT
Start: 2021-07-06 | End: 2021-07-28

## 2021-07-06 RX ORDER — LORAZEPAM 1 MG/1
1 TABLET ORAL EVERY 6 HOURS PRN
Qty: 30 TABLET | Refills: 0 | Status: SHIPPED | OUTPATIENT
Start: 2021-07-06 | End: 2021-10-06

## 2021-07-07 ENCOUNTER — LAB VISIT (OUTPATIENT)
Dept: LAB | Facility: CLINIC | Age: 81
End: 2021-07-07
Payer: MEDICARE

## 2021-07-07 DIAGNOSIS — E03.9 HYPOTHYROIDISM, UNSPECIFIED TYPE: ICD-10-CM

## 2021-07-07 LAB — TSH SERPL DL<=0.005 MIU/L-ACNC: 2.23 UIU/ML (ref 0.4–4)

## 2021-07-07 PROCEDURE — 84443 ASSAY THYROID STIM HORMONE: CPT | Performed by: FAMILY MEDICINE

## 2021-07-27 ENCOUNTER — OFFICE VISIT (OUTPATIENT)
Dept: FAMILY MEDICINE | Facility: CLINIC | Age: 81
End: 2021-07-27
Payer: MEDICARE

## 2021-07-27 VITALS
HEART RATE: 59 BPM | WEIGHT: 230.19 LBS | OXYGEN SATURATION: 97 % | DIASTOLIC BLOOD PRESSURE: 66 MMHG | HEIGHT: 63 IN | TEMPERATURE: 98 F | BODY MASS INDEX: 40.79 KG/M2 | SYSTOLIC BLOOD PRESSURE: 178 MMHG

## 2021-07-27 DIAGNOSIS — E03.9 HYPOTHYROIDISM, UNSPECIFIED TYPE: ICD-10-CM

## 2021-07-27 DIAGNOSIS — F41.1 GAD (GENERALIZED ANXIETY DISORDER): Primary | ICD-10-CM

## 2021-07-27 DIAGNOSIS — I10 ESSENTIAL HYPERTENSION: ICD-10-CM

## 2021-07-27 DIAGNOSIS — I87.2 VENOUS STASIS DERMATITIS OF BOTH LOWER EXTREMITIES: ICD-10-CM

## 2021-07-27 PROCEDURE — 1159F PR MEDICATION LIST DOCUMENTED IN MEDICAL RECORD: ICD-10-PCS | Mod: CPTII,S$GLB,, | Performed by: FAMILY MEDICINE

## 2021-07-27 PROCEDURE — 1126F PR PAIN SEVERITY QUANTIFIED, NO PAIN PRESENT: ICD-10-PCS | Mod: CPTII,S$GLB,, | Performed by: FAMILY MEDICINE

## 2021-07-27 PROCEDURE — 3288F FALL RISK ASSESSMENT DOCD: CPT | Mod: CPTII,S$GLB,, | Performed by: FAMILY MEDICINE

## 2021-07-27 PROCEDURE — 3078F DIAST BP <80 MM HG: CPT | Mod: CPTII,S$GLB,, | Performed by: FAMILY MEDICINE

## 2021-07-27 PROCEDURE — 1159F MED LIST DOCD IN RCRD: CPT | Mod: CPTII,S$GLB,, | Performed by: FAMILY MEDICINE

## 2021-07-27 PROCEDURE — 1101F PT FALLS ASSESS-DOCD LE1/YR: CPT | Mod: CPTII,S$GLB,, | Performed by: FAMILY MEDICINE

## 2021-07-27 PROCEDURE — 3288F PR FALLS RISK ASSESSMENT DOCUMENTED: ICD-10-PCS | Mod: CPTII,S$GLB,, | Performed by: FAMILY MEDICINE

## 2021-07-27 PROCEDURE — 99214 PR OFFICE/OUTPT VISIT, EST, LEVL IV, 30-39 MIN: ICD-10-PCS | Mod: S$GLB,,, | Performed by: FAMILY MEDICINE

## 2021-07-27 PROCEDURE — 3078F PR MOST RECENT DIASTOLIC BLOOD PRESSURE < 80 MM HG: ICD-10-PCS | Mod: CPTII,S$GLB,, | Performed by: FAMILY MEDICINE

## 2021-07-27 PROCEDURE — 3077F PR MOST RECENT SYSTOLIC BLOOD PRESSURE >= 140 MM HG: ICD-10-PCS | Mod: CPTII,S$GLB,, | Performed by: FAMILY MEDICINE

## 2021-07-27 PROCEDURE — 1160F RVW MEDS BY RX/DR IN RCRD: CPT | Mod: CPTII,S$GLB,, | Performed by: FAMILY MEDICINE

## 2021-07-27 PROCEDURE — 3077F SYST BP >= 140 MM HG: CPT | Mod: CPTII,S$GLB,, | Performed by: FAMILY MEDICINE

## 2021-07-27 PROCEDURE — 1126F AMNT PAIN NOTED NONE PRSNT: CPT | Mod: CPTII,S$GLB,, | Performed by: FAMILY MEDICINE

## 2021-07-27 PROCEDURE — 1160F PR REVIEW ALL MEDS BY PRESCRIBER/CLIN PHARMACIST DOCUMENTED: ICD-10-PCS | Mod: CPTII,S$GLB,, | Performed by: FAMILY MEDICINE

## 2021-07-27 PROCEDURE — 1101F PR PT FALLS ASSESS DOC 0-1 FALLS W/OUT INJ PAST YR: ICD-10-PCS | Mod: CPTII,S$GLB,, | Performed by: FAMILY MEDICINE

## 2021-07-27 PROCEDURE — 99214 OFFICE O/P EST MOD 30 MIN: CPT | Mod: S$GLB,,, | Performed by: FAMILY MEDICINE

## 2021-07-27 RX ORDER — SERTRALINE HYDROCHLORIDE 25 MG/1
25 TABLET, FILM COATED ORAL DAILY
Qty: 90 TABLET | Refills: 3 | Status: SHIPPED | OUTPATIENT
Start: 2021-07-27 | End: 2022-03-11

## 2021-07-27 RX ORDER — TRIAMCINOLONE ACETONIDE 1 MG/G
OINTMENT TOPICAL 2 TIMES DAILY
Qty: 80 G | Refills: 2 | Status: SHIPPED | OUTPATIENT
Start: 2021-07-27 | End: 2021-07-27

## 2021-07-27 RX ORDER — LEVOTHYROXINE SODIUM 125 UG/1
125 TABLET ORAL DAILY
Qty: 90 TABLET | Refills: 0 | Status: SHIPPED | OUTPATIENT
Start: 2021-07-27 | End: 2021-07-27

## 2021-07-27 RX ORDER — LEVOTHYROXINE SODIUM 125 UG/1
125 TABLET ORAL DAILY
Qty: 90 TABLET | Refills: 0 | Status: SHIPPED | OUTPATIENT
Start: 2021-07-27 | End: 2021-12-20 | Stop reason: SDUPTHER

## 2021-07-27 RX ORDER — TRIAMCINOLONE ACETONIDE 1 MG/G
OINTMENT TOPICAL 2 TIMES DAILY
Qty: 80 G | Refills: 2 | Status: SHIPPED | OUTPATIENT
Start: 2021-07-27 | End: 2022-03-11

## 2021-07-27 RX ORDER — SERTRALINE HYDROCHLORIDE 25 MG/1
25 TABLET, FILM COATED ORAL DAILY
Qty: 90 TABLET | Refills: 3 | Status: SHIPPED | OUTPATIENT
Start: 2021-07-27 | End: 2021-07-27

## 2021-07-28 ENCOUNTER — OFFICE VISIT (OUTPATIENT)
Dept: CARDIOLOGY | Facility: CLINIC | Age: 81
End: 2021-07-28
Payer: MEDICARE

## 2021-07-28 VITALS
BODY MASS INDEX: 40.82 KG/M2 | SYSTOLIC BLOOD PRESSURE: 228 MMHG | HEIGHT: 63 IN | DIASTOLIC BLOOD PRESSURE: 93 MMHG | WEIGHT: 230.38 LBS | HEART RATE: 68 BPM

## 2021-07-28 DIAGNOSIS — E78.5 DYSLIPIDEMIA: ICD-10-CM

## 2021-07-28 DIAGNOSIS — Z95.5 STATUS POST INSERTION OF DRUG-ELUTING STENT INTO RIGHT CORONARY ARTERY FOR CORONARY ARTERY DISEASE: ICD-10-CM

## 2021-07-28 DIAGNOSIS — Z79.01 ANTICOAGULANT LONG-TERM USE: ICD-10-CM

## 2021-07-28 DIAGNOSIS — Z86.718 HISTORY OF DEEP VENOUS THROMBOSIS (DVT) OF DISTAL VEIN OF RIGHT LOWER EXTREMITY: ICD-10-CM

## 2021-07-28 DIAGNOSIS — Z78.9 STATIN INTOLERANCE: Primary | ICD-10-CM

## 2021-07-28 DIAGNOSIS — E66.01 CLASS 2 SEVERE OBESITY DUE TO EXCESS CALORIES WITH SERIOUS COMORBIDITY AND BODY MASS INDEX (BMI) OF 39.0 TO 39.9 IN ADULT: ICD-10-CM

## 2021-07-28 DIAGNOSIS — I48.91 ATRIAL FIBRILLATION WITH RVR: ICD-10-CM

## 2021-07-28 DIAGNOSIS — I25.84 CORONARY ARTERY DISEASE DUE TO CALCIFIED CORONARY LESION: ICD-10-CM

## 2021-07-28 DIAGNOSIS — I25.10 CORONARY ARTERY DISEASE DUE TO CALCIFIED CORONARY LESION: ICD-10-CM

## 2021-07-28 DIAGNOSIS — I10 ESSENTIAL HYPERTENSION: ICD-10-CM

## 2021-07-28 DIAGNOSIS — R09.89 LABILE HYPERTENSION: ICD-10-CM

## 2021-07-28 DIAGNOSIS — I25.2 HISTORY OF MI (MYOCARDIAL INFARCTION): ICD-10-CM

## 2021-07-28 DIAGNOSIS — G47.33 OSA ON CPAP: ICD-10-CM

## 2021-07-28 DIAGNOSIS — I44.7 LBBB (LEFT BUNDLE BRANCH BLOCK): ICD-10-CM

## 2021-07-28 DIAGNOSIS — I87.2 VENOUS STASIS DERMATITIS OF RIGHT LOWER EXTREMITY: ICD-10-CM

## 2021-07-28 DIAGNOSIS — I48.0 PAF (PAROXYSMAL ATRIAL FIBRILLATION): ICD-10-CM

## 2021-07-28 PROCEDURE — 3080F PR MOST RECENT DIASTOLIC BLOOD PRESSURE >= 90 MM HG: ICD-10-PCS | Mod: CPTII,S$GLB,, | Performed by: INTERNAL MEDICINE

## 2021-07-28 PROCEDURE — 1101F PT FALLS ASSESS-DOCD LE1/YR: CPT | Mod: CPTII,S$GLB,, | Performed by: INTERNAL MEDICINE

## 2021-07-28 PROCEDURE — 99215 OFFICE O/P EST HI 40 MIN: CPT | Mod: S$GLB,,, | Performed by: INTERNAL MEDICINE

## 2021-07-28 PROCEDURE — 3288F FALL RISK ASSESSMENT DOCD: CPT | Mod: CPTII,S$GLB,, | Performed by: INTERNAL MEDICINE

## 2021-07-28 PROCEDURE — 3288F PR FALLS RISK ASSESSMENT DOCUMENTED: ICD-10-PCS | Mod: CPTII,S$GLB,, | Performed by: INTERNAL MEDICINE

## 2021-07-28 PROCEDURE — 1126F AMNT PAIN NOTED NONE PRSNT: CPT | Mod: CPTII,S$GLB,, | Performed by: INTERNAL MEDICINE

## 2021-07-28 PROCEDURE — 1159F MED LIST DOCD IN RCRD: CPT | Mod: CPTII,S$GLB,, | Performed by: INTERNAL MEDICINE

## 2021-07-28 PROCEDURE — 1101F PR PT FALLS ASSESS DOC 0-1 FALLS W/OUT INJ PAST YR: ICD-10-PCS | Mod: CPTII,S$GLB,, | Performed by: INTERNAL MEDICINE

## 2021-07-28 PROCEDURE — 1126F PR PAIN SEVERITY QUANTIFIED, NO PAIN PRESENT: ICD-10-PCS | Mod: CPTII,S$GLB,, | Performed by: INTERNAL MEDICINE

## 2021-07-28 PROCEDURE — 3077F PR MOST RECENT SYSTOLIC BLOOD PRESSURE >= 140 MM HG: ICD-10-PCS | Mod: CPTII,S$GLB,, | Performed by: INTERNAL MEDICINE

## 2021-07-28 PROCEDURE — 1159F PR MEDICATION LIST DOCUMENTED IN MEDICAL RECORD: ICD-10-PCS | Mod: CPTII,S$GLB,, | Performed by: INTERNAL MEDICINE

## 2021-07-28 PROCEDURE — 99215 PR OFFICE/OUTPT VISIT, EST, LEVL V, 40-54 MIN: ICD-10-PCS | Mod: S$GLB,,, | Performed by: INTERNAL MEDICINE

## 2021-07-28 PROCEDURE — 99999 PR PBB SHADOW E&M-EST. PATIENT-LVL IV: ICD-10-PCS | Mod: PBBFAC,,, | Performed by: INTERNAL MEDICINE

## 2021-07-28 PROCEDURE — 3077F SYST BP >= 140 MM HG: CPT | Mod: CPTII,S$GLB,, | Performed by: INTERNAL MEDICINE

## 2021-07-28 PROCEDURE — 99999 PR PBB SHADOW E&M-EST. PATIENT-LVL IV: CPT | Mod: PBBFAC,,, | Performed by: INTERNAL MEDICINE

## 2021-07-28 PROCEDURE — 3080F DIAST BP >= 90 MM HG: CPT | Mod: CPTII,S$GLB,, | Performed by: INTERNAL MEDICINE

## 2021-07-28 RX ORDER — HYDRALAZINE HYDROCHLORIDE 50 MG/1
50 TABLET, FILM COATED ORAL 3 TIMES DAILY
Qty: 90 TABLET | Refills: 11 | Status: SHIPPED | OUTPATIENT
Start: 2021-07-28 | End: 2022-02-16

## 2021-07-28 RX ORDER — HYDRALAZINE HYDROCHLORIDE 25 MG/1
50 TABLET, FILM COATED ORAL 3 TIMES DAILY
Qty: 180 TABLET | Refills: 11 | Status: SHIPPED | OUTPATIENT
Start: 2021-07-28 | End: 2021-07-28

## 2021-07-28 RX ORDER — HYDRALAZINE HYDROCHLORIDE 25 MG/1
50 TABLET, FILM COATED ORAL 3 TIMES DAILY
Qty: 180 TABLET | Refills: 11 | Status: SHIPPED | OUTPATIENT
Start: 2021-07-28 | End: 2021-07-28 | Stop reason: SDUPTHER

## 2021-08-03 ENCOUNTER — PATIENT MESSAGE (OUTPATIENT)
Dept: FAMILY MEDICINE | Facility: CLINIC | Age: 81
End: 2021-08-03

## 2021-08-03 DIAGNOSIS — R30.0 DYSURIA: Primary | ICD-10-CM

## 2021-08-03 PROCEDURE — G0179 MD RECERTIFICATION HHA PT: HCPCS | Mod: ,,, | Performed by: FAMILY MEDICINE

## 2021-08-03 PROCEDURE — G0179 PR HOME HEALTH MD RECERTIFICATION: ICD-10-PCS | Mod: ,,, | Performed by: FAMILY MEDICINE

## 2021-08-04 ENCOUNTER — TELEPHONE (OUTPATIENT)
Dept: FAMILY MEDICINE | Facility: CLINIC | Age: 81
End: 2021-08-04

## 2021-08-11 ENCOUNTER — PATIENT MESSAGE (OUTPATIENT)
Dept: FAMILY MEDICINE | Facility: CLINIC | Age: 81
End: 2021-08-11

## 2021-08-12 ENCOUNTER — OFFICE VISIT (OUTPATIENT)
Dept: FAMILY MEDICINE | Facility: CLINIC | Age: 81
End: 2021-08-12
Payer: MEDICARE

## 2021-08-12 DIAGNOSIS — R50.9 FEVER, UNSPECIFIED FEVER CAUSE: ICD-10-CM

## 2021-08-12 DIAGNOSIS — J06.9 UPPER RESPIRATORY TRACT INFECTION, UNSPECIFIED TYPE: Primary | ICD-10-CM

## 2021-08-12 DIAGNOSIS — B37.31 VAGINAL YEAST INFECTION: ICD-10-CM

## 2021-08-12 DIAGNOSIS — I70.0 AORTIC ATHEROSCLEROSIS: ICD-10-CM

## 2021-08-12 DIAGNOSIS — I25.84 CORONARY ARTERY DISEASE DUE TO CALCIFIED CORONARY LESION: ICD-10-CM

## 2021-08-12 DIAGNOSIS — Z91.89 SEDENTARY LIFESTYLE: ICD-10-CM

## 2021-08-12 DIAGNOSIS — I10 ESSENTIAL HYPERTENSION: ICD-10-CM

## 2021-08-12 DIAGNOSIS — I25.10 CORONARY ARTERY DISEASE DUE TO CALCIFIED CORONARY LESION: ICD-10-CM

## 2021-08-12 DIAGNOSIS — I25.2 HISTORY OF MI (MYOCARDIAL INFARCTION): ICD-10-CM

## 2021-08-12 DIAGNOSIS — Z79.01 ANTICOAGULANT LONG-TERM USE: ICD-10-CM

## 2021-08-12 DIAGNOSIS — R09.89 CHEST CONGESTION: ICD-10-CM

## 2021-08-12 PROCEDURE — 1160F PR REVIEW ALL MEDS BY PRESCRIBER/CLIN PHARMACIST DOCUMENTED: ICD-10-PCS | Mod: CPTII,95,, | Performed by: FAMILY MEDICINE

## 2021-08-12 PROCEDURE — 1159F MED LIST DOCD IN RCRD: CPT | Mod: CPTII,95,, | Performed by: FAMILY MEDICINE

## 2021-08-12 PROCEDURE — 1160F RVW MEDS BY RX/DR IN RCRD: CPT | Mod: CPTII,95,, | Performed by: FAMILY MEDICINE

## 2021-08-12 PROCEDURE — 1159F PR MEDICATION LIST DOCUMENTED IN MEDICAL RECORD: ICD-10-PCS | Mod: CPTII,95,, | Performed by: FAMILY MEDICINE

## 2021-08-12 PROCEDURE — 99214 PR OFFICE/OUTPT VISIT, EST, LEVL IV, 30-39 MIN: ICD-10-PCS | Mod: 95,,, | Performed by: FAMILY MEDICINE

## 2021-08-12 PROCEDURE — 99214 OFFICE O/P EST MOD 30 MIN: CPT | Mod: 95,,, | Performed by: FAMILY MEDICINE

## 2021-08-12 RX ORDER — FLUCONAZOLE 150 MG/1
150 TABLET ORAL DAILY
Qty: 3 TABLET | Refills: 0 | Status: SHIPPED | OUTPATIENT
Start: 2021-08-12 | End: 2021-08-13

## 2021-08-12 RX ORDER — ALBUTEROL SULFATE 1.25 MG/3ML
1.25 SOLUTION RESPIRATORY (INHALATION) EVERY 6 HOURS PRN
Qty: 1 BOX | Refills: 0 | Status: SHIPPED | OUTPATIENT
Start: 2021-08-12 | End: 2021-09-23 | Stop reason: SDUPTHER

## 2021-08-12 RX ORDER — DOXYCYCLINE HYCLATE 100 MG
100 TABLET ORAL 2 TIMES DAILY
Qty: 20 TABLET | Refills: 0 | Status: SHIPPED | OUTPATIENT
Start: 2021-08-12 | End: 2021-08-22

## 2021-08-17 ENCOUNTER — TELEPHONE (OUTPATIENT)
Dept: FAMILY MEDICINE | Facility: CLINIC | Age: 81
End: 2021-08-17

## 2021-08-17 DIAGNOSIS — R53.1 INCREASED WEAKNESS WHEN WALKING: ICD-10-CM

## 2021-08-17 DIAGNOSIS — R26.89 DECREASED MOBILITY: ICD-10-CM
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
## 2021-08-24 ENCOUNTER — HOSPITAL ENCOUNTER (OUTPATIENT)
Dept: RADIOLOGY | Facility: CLINIC | Age: 81
Discharge: HOME | End: 2021-08-24
Attending: FAMILY MEDICINE
Payer: MEDICARE

## 2021-08-24 ENCOUNTER — LAB VISIT (OUTPATIENT)
Dept: LAB | Facility: CLINIC | Age: 81
End: 2021-08-24
Payer: MEDICARE

## 2021-08-24 ENCOUNTER — OFFICE VISIT (OUTPATIENT)
Dept: FAMILY MEDICINE | Facility: CLINIC | Age: 81
End: 2021-08-24
Payer: MEDICARE

## 2021-08-24 VITALS
BODY MASS INDEX: 45.89 KG/M2 | OXYGEN SATURATION: 95 % | HEIGHT: 63 IN | WEIGHT: 259 LBS | HEART RATE: 54 BPM | DIASTOLIC BLOOD PRESSURE: 66 MMHG | TEMPERATURE: 98 F | SYSTOLIC BLOOD PRESSURE: 112 MMHG

## 2021-08-24 DIAGNOSIS — M25.561 ACUTE PAIN OF RIGHT KNEE: ICD-10-CM

## 2021-08-24 DIAGNOSIS — R05.9 COUGH: ICD-10-CM
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
## 2021-08-24 DIAGNOSIS — R06.02 SHORTNESS OF BREATH: ICD-10-CM

## 2021-08-24 DIAGNOSIS — R25.2 LEG CRAMPS: ICD-10-CM

## 2021-08-24 DIAGNOSIS — R25.2 LEG CRAMPS: Primary | ICD-10-CM

## 2021-08-24 LAB
ANION GAP SERPL CALC-SCNC: 12 MMOL/L (ref 8–16)
BUN SERPL-MCNC: 39 MG/DL (ref 8–23)
CALCIUM SERPL-MCNC: 10 MG/DL (ref 8.7–10.5)
CHLORIDE SERPL-SCNC: 97 MMOL/L (ref 95–110)
CO2 SERPL-SCNC: 26 MMOL/L (ref 23–29)
CREAT SERPL-MCNC: 1.8 MG/DL (ref 0.5–1.4)
EST. GFR  (AFRICAN AMERICAN): 30 ML/MIN/1.73 M^2
EST. GFR  (NON AFRICAN AMERICAN): 26 ML/MIN/1.73 M^2
GLUCOSE SERPL-MCNC: 105 MG/DL (ref 70–110)
POTASSIUM SERPL-SCNC: 4.9 MMOL/L (ref 3.5–5.1)
SODIUM SERPL-SCNC: 135 MMOL/L (ref 136–145)

## 2021-08-24 PROCEDURE — 99214 OFFICE O/P EST MOD 30 MIN: CPT | Mod: S$GLB,,, | Performed by: FAMILY MEDICINE

## 2021-08-24 PROCEDURE — 99214 PR OFFICE/OUTPT VISIT, EST, LEVL IV, 30-39 MIN: ICD-10-PCS | Mod: S$GLB,,, | Performed by: FAMILY MEDICINE

## 2021-08-24 PROCEDURE — 1160F PR REVIEW ALL MEDS BY PRESCRIBER/CLIN PHARMACIST DOCUMENTED: ICD-10-PCS | Mod: CPTII,S$GLB,, | Performed by: FAMILY MEDICINE

## 2021-08-24 PROCEDURE — 73562 X-RAY EXAM OF KNEE 3: CPT | Mod: RT,S$GLB,, | Performed by: RADIOLOGY

## 2021-08-24 PROCEDURE — 3074F PR MOST RECENT SYSTOLIC BLOOD PRESSURE < 130 MM HG: ICD-10-PCS | Mod: CPTII,S$GLB,, | Performed by: FAMILY MEDICINE

## 2021-08-24 PROCEDURE — 36415 PR COLLECTION VENOUS BLOOD,VENIPUNCTURE: ICD-10-PCS | Mod: ,,, | Performed by: FAMILY MEDICINE

## 2021-08-24 PROCEDURE — 71046 XR CHEST PA AND LATERAL: ICD-10-PCS | Mod: S$GLB,,, | Performed by: RADIOLOGY

## 2021-08-24 PROCEDURE — 36415 COLL VENOUS BLD VENIPUNCTURE: CPT | Mod: ,,, | Performed by: FAMILY MEDICINE

## 2021-08-24 PROCEDURE — 3078F PR MOST RECENT DIASTOLIC BLOOD PRESSURE < 80 MM HG: ICD-10-PCS | Mod: CPTII,S$GLB,, | Performed by: FAMILY MEDICINE

## 2021-08-24 PROCEDURE — 1160F RVW MEDS BY RX/DR IN RCRD: CPT | Mod: CPTII,S$GLB,, | Performed by: FAMILY MEDICINE

## 2021-08-24 PROCEDURE — 1159F MED LIST DOCD IN RCRD: CPT | Mod: CPTII,S$GLB,, | Performed by: FAMILY MEDICINE

## 2021-08-24 PROCEDURE — 3288F PR FALLS RISK ASSESSMENT DOCUMENTED: ICD-10-PCS | Mod: CPTII,S$GLB,, | Performed by: FAMILY MEDICINE

## 2021-08-24 PROCEDURE — 3288F FALL RISK ASSESSMENT DOCD: CPT | Mod: CPTII,S$GLB,, | Performed by: FAMILY MEDICINE

## 2021-08-24 PROCEDURE — 1100F PR PT FALLS ASSESS DOC 2+ FALLS/FALL W/INJURY/YR: ICD-10-PCS | Mod: CPTII,S$GLB,, | Performed by: FAMILY MEDICINE

## 2021-08-24 PROCEDURE — 1159F PR MEDICATION LIST DOCUMENTED IN MEDICAL RECORD: ICD-10-PCS | Mod: CPTII,S$GLB,, | Performed by: FAMILY MEDICINE

## 2021-08-24 PROCEDURE — 1100F PTFALLS ASSESS-DOCD GE2>/YR: CPT | Mod: CPTII,S$GLB,, | Performed by: FAMILY MEDICINE

## 2021-08-24 PROCEDURE — 3078F DIAST BP <80 MM HG: CPT | Mod: CPTII,S$GLB,, | Performed by: FAMILY MEDICINE

## 2021-08-24 PROCEDURE — 3074F SYST BP LT 130 MM HG: CPT | Mod: CPTII,S$GLB,, | Performed by: FAMILY MEDICINE

## 2021-08-24 PROCEDURE — 73562 XR KNEE 3 VIEW RIGHT: ICD-10-PCS | Mod: RT,S$GLB,, | Performed by: RADIOLOGY

## 2021-08-24 PROCEDURE — 80048 BASIC METABOLIC PNL TOTAL CA: CPT | Performed by: FAMILY MEDICINE

## 2021-08-24 PROCEDURE — 1125F PR PAIN SEVERITY QUANTIFIED, PAIN PRESENT: ICD-10-PCS | Mod: CPTII,S$GLB,, | Performed by: FAMILY MEDICINE

## 2021-08-24 PROCEDURE — 1125F AMNT PAIN NOTED PAIN PRSNT: CPT | Mod: CPTII,S$GLB,, | Performed by: FAMILY MEDICINE

## 2021-08-24 PROCEDURE — 71046 X-RAY EXAM CHEST 2 VIEWS: CPT | Mod: S$GLB,,, | Performed by: RADIOLOGY

## 2021-08-25 ENCOUNTER — PATIENT MESSAGE (OUTPATIENT)
Dept: FAMILY MEDICINE | Facility: CLINIC | Age: 81
End: 2021-08-25

## 2021-08-25 ENCOUNTER — TELEPHONE (OUTPATIENT)
Dept: FAMILY MEDICINE | Facility: CLINIC | Age: 81
End: 2021-08-25

## 2021-08-25 DIAGNOSIS — N17.9 AKI (ACUTE KIDNEY INJURY): Primary | ICD-10-CM

## 2021-09-03 ENCOUNTER — TELEPHONE (OUTPATIENT)
Dept: FAMILY MEDICINE | Facility: CLINIC | Age: 81
End: 2021-09-03

## 2021-09-03 DIAGNOSIS — E87.1 LOW SODIUM LEVELS: Primary | ICD-10-CM

## 2021-09-03 DIAGNOSIS — R79.89 ELEVATED BRAIN NATRIURETIC PEPTIDE (BNP) LEVEL: ICD-10-CM

## 2021-09-03 DIAGNOSIS — I87.2 VENOUS STASIS DERMATITIS OF RIGHT LOWER EXTREMITY: ICD-10-CM

## 2021-09-03 RX ORDER — FUROSEMIDE 40 MG/1
20 TABLET ORAL DAILY PRN
Qty: 90 TABLET | Refills: 1
Start: 2021-09-03 | End: 2021-11-11 | Stop reason: SDUPTHER

## 2021-09-07 ENCOUNTER — PATIENT MESSAGE (OUTPATIENT)
Dept: FAMILY MEDICINE | Facility: CLINIC | Age: 81
End: 2021-09-07

## 2021-09-08 ENCOUNTER — TELEPHONE (OUTPATIENT)
Dept: FAMILY MEDICINE | Facility: CLINIC | Age: 81
End: 2021-09-08

## 2021-09-13 ENCOUNTER — PATIENT MESSAGE (OUTPATIENT)
Dept: FAMILY MEDICINE | Facility: CLINIC | Age: 81
End: 2021-09-13

## 2021-09-21 ENCOUNTER — TELEPHONE (OUTPATIENT)
Dept: FAMILY MEDICINE | Facility: CLINIC | Age: 81
End: 2021-09-21

## 2021-09-23 DIAGNOSIS — J06.9 UPPER RESPIRATORY TRACT INFECTION, UNSPECIFIED TYPE: ICD-10-CM

## 2021-09-24 RX ORDER — ALBUTEROL SULFATE 1.25 MG/3ML
1.25 SOLUTION RESPIRATORY (INHALATION) EVERY 6 HOURS PRN
Qty: 1 BOX | Refills: 0 | Status: ON HOLD | OUTPATIENT
Start: 2021-09-24 | End: 2022-03-17 | Stop reason: HOSPADM

## 2021-09-29 ENCOUNTER — TELEPHONE (OUTPATIENT)
Dept: FAMILY MEDICINE | Facility: CLINIC | Age: 81
End: 2021-09-29

## 2021-10-02 PROCEDURE — G0179 MD RECERTIFICATION HHA PT: HCPCS | Mod: ,,, | Performed by: FAMILY MEDICINE

## 2021-10-02 PROCEDURE — G0179 PR HOME HEALTH MD RECERTIFICATION: ICD-10-PCS | Mod: ,,, | Performed by: FAMILY MEDICINE

## 2021-10-05 ENCOUNTER — EXTERNAL HOME HEALTH (OUTPATIENT)
Dept: HOME HEALTH SERVICES | Facility: HOSPITAL | Age: 81
End: 2021-10-05
Payer: MEDICARE

## 2021-10-06 ENCOUNTER — OFFICE VISIT (OUTPATIENT)
Dept: FAMILY MEDICINE | Facility: CLINIC | Age: 81
End: 2021-10-06
Payer: MEDICARE

## 2021-10-06 VITALS — SYSTOLIC BLOOD PRESSURE: 136 MMHG | DIASTOLIC BLOOD PRESSURE: 67 MMHG

## 2021-10-06 DIAGNOSIS — R09.89 LABILE HYPERTENSION: ICD-10-CM

## 2021-10-06 DIAGNOSIS — M81.0 AGE-RELATED OSTEOPOROSIS WITHOUT CURRENT PATHOLOGICAL FRACTURE: Primary | ICD-10-CM

## 2021-10-06 DIAGNOSIS — F41.1 GAD (GENERALIZED ANXIETY DISORDER): ICD-10-CM

## 2021-10-06 PROCEDURE — 99214 PR OFFICE/OUTPT VISIT, EST, LEVL IV, 30-39 MIN: ICD-10-PCS | Mod: 95,,, | Performed by: FAMILY MEDICINE

## 2021-10-06 PROCEDURE — 3075F SYST BP GE 130 - 139MM HG: CPT | Mod: CPTII,95,, | Performed by: FAMILY MEDICINE

## 2021-10-06 PROCEDURE — 1159F MED LIST DOCD IN RCRD: CPT | Mod: CPTII,95,, | Performed by: FAMILY MEDICINE

## 2021-10-06 PROCEDURE — 99214 OFFICE O/P EST MOD 30 MIN: CPT | Mod: 95,,, | Performed by: FAMILY MEDICINE

## 2021-10-06 PROCEDURE — 99499 UNLISTED E&M SERVICE: CPT | Mod: 95,,, | Performed by: FAMILY MEDICINE

## 2021-10-06 PROCEDURE — 1160F PR REVIEW ALL MEDS BY PRESCRIBER/CLIN PHARMACIST DOCUMENTED: ICD-10-PCS | Mod: CPTII,95,, | Performed by: FAMILY MEDICINE

## 2021-10-06 PROCEDURE — 3078F DIAST BP <80 MM HG: CPT | Mod: CPTII,95,, | Performed by: FAMILY MEDICINE

## 2021-10-06 PROCEDURE — 3078F PR MOST RECENT DIASTOLIC BLOOD PRESSURE < 80 MM HG: ICD-10-PCS | Mod: CPTII,95,, | Performed by: FAMILY MEDICINE

## 2021-10-06 PROCEDURE — 1160F RVW MEDS BY RX/DR IN RCRD: CPT | Mod: CPTII,95,, | Performed by: FAMILY MEDICINE

## 2021-10-06 PROCEDURE — 3075F PR MOST RECENT SYSTOLIC BLOOD PRESS GE 130-139MM HG: ICD-10-PCS | Mod: CPTII,95,, | Performed by: FAMILY MEDICINE

## 2021-10-06 PROCEDURE — 99499 RISK ADDL DX/OHS AUDIT: ICD-10-PCS | Mod: 95,,, | Performed by: FAMILY MEDICINE

## 2021-10-06 PROCEDURE — 1159F PR MEDICATION LIST DOCUMENTED IN MEDICAL RECORD: ICD-10-PCS | Mod: CPTII,95,, | Performed by: FAMILY MEDICINE

## 2021-10-06 RX ORDER — ALENDRONATE SODIUM 70 MG/1
70 TABLET ORAL
Qty: 12 TABLET | Refills: 3 | Status: SHIPPED | OUTPATIENT
Start: 2021-10-06 | End: 2022-03-11

## 2021-10-06 RX ORDER — LORAZEPAM 0.5 MG/1
0.5 TABLET ORAL EVERY 6 HOURS PRN
Qty: 30 TABLET | Refills: 0 | Status: SHIPPED | OUTPATIENT
Start: 2021-10-06 | End: 2022-02-07

## 2021-10-07 ENCOUNTER — PATIENT MESSAGE (OUTPATIENT)
Dept: CARDIOLOGY | Facility: CLINIC | Age: 81
End: 2021-10-07

## 2021-10-24 ENCOUNTER — PATIENT MESSAGE (OUTPATIENT)
Dept: FAMILY MEDICINE | Facility: CLINIC | Age: 81
End: 2021-10-24
Payer: MEDICARE

## 2021-10-27 DIAGNOSIS — M15.9 PRIMARY OSTEOARTHRITIS INVOLVING MULTIPLE JOINTS: ICD-10-CM

## 2021-10-27 DIAGNOSIS — G89.4 CHRONIC PAIN SYNDROME: Primary | ICD-10-CM

## 2021-10-27 DIAGNOSIS — R54 AGE-RELATED PHYSICAL DEBILITY: ICD-10-CM

## 2021-10-29 ENCOUNTER — EXTERNAL HOME HEALTH (OUTPATIENT)
Dept: HOME HEALTH SERVICES | Facility: HOSPITAL | Age: 81
End: 2021-10-29
Payer: MEDICARE

## 2021-11-11 DIAGNOSIS — I87.2 VENOUS STASIS DERMATITIS OF RIGHT LOWER EXTREMITY: ICD-10-CM

## 2021-11-11 DIAGNOSIS — R79.89 ELEVATED BRAIN NATRIURETIC PEPTIDE (BNP) LEVEL: ICD-10-CM

## 2021-11-11 RX ORDER — FUROSEMIDE 40 MG/1
TABLET ORAL
Qty: 90 TABLET | Refills: 1 | Status: SHIPPED | OUTPATIENT
Start: 2021-11-11 | End: 2022-02-16 | Stop reason: SDUPTHER

## 2021-12-01 ENCOUNTER — PES CALL (OUTPATIENT)
Dept: ADMINISTRATIVE | Facility: CLINIC | Age: 81
End: 2021-12-01
Payer: MEDICARE

## 2021-12-01 PROCEDURE — G0179 PR HOME HEALTH MD RECERTIFICATION: ICD-10-PCS | Mod: ,,, | Performed by: FAMILY MEDICINE

## 2021-12-01 PROCEDURE — G0179 MD RECERTIFICATION HHA PT: HCPCS | Mod: ,,, | Performed by: FAMILY MEDICINE

## 2021-12-09 ENCOUNTER — TELEPHONE (OUTPATIENT)
Dept: FAMILY MEDICINE | Facility: CLINIC | Age: 81
End: 2021-12-09
Payer: MEDICARE

## 2021-12-09 ENCOUNTER — DOCUMENT SCAN (OUTPATIENT)
Dept: HOME HEALTH SERVICES | Facility: HOSPITAL | Age: 81
End: 2021-12-09
Payer: MEDICARE

## 2021-12-09 DIAGNOSIS — R25.2 LEG CRAMPS: Primary | ICD-10-CM

## 2021-12-10 RX ORDER — POTASSIUM CHLORIDE 20 MEQ/1
20 TABLET, EXTENDED RELEASE ORAL DAILY
Qty: 90 TABLET | Refills: 1 | Status: SHIPPED | OUTPATIENT
Start: 2021-12-10 | End: 2022-03-08

## 2021-12-17 ENCOUNTER — DOCUMENT SCAN (OUTPATIENT)
Dept: HOME HEALTH SERVICES | Facility: HOSPITAL | Age: 81
End: 2021-12-17
Payer: MEDICARE

## 2021-12-21 ENCOUNTER — EXTERNAL HOME HEALTH (OUTPATIENT)
Dept: HOME HEALTH SERVICES | Facility: HOSPITAL | Age: 81
End: 2021-12-21
Payer: MEDICARE

## 2021-12-30 ENCOUNTER — TELEPHONE (OUTPATIENT)
Dept: FAMILY MEDICINE | Facility: CLINIC | Age: 81
End: 2021-12-30
Payer: MEDICARE

## 2022-01-04 ENCOUNTER — DOCUMENT SCAN (OUTPATIENT)
Dept: HOME HEALTH SERVICES | Facility: HOSPITAL | Age: 82
End: 2022-01-04
Payer: MEDICARE

## 2022-02-07 ENCOUNTER — OFFICE VISIT (OUTPATIENT)
Dept: FAMILY MEDICINE | Facility: CLINIC | Age: 82
End: 2022-02-07
Payer: MEDICARE

## 2022-02-07 DIAGNOSIS — B37.9 ANTIBIOTIC-INDUCED YEAST INFECTION: ICD-10-CM

## 2022-02-07 DIAGNOSIS — R09.89 LABILE HYPERTENSION: ICD-10-CM

## 2022-02-07 DIAGNOSIS — F32.9 REACTIVE DEPRESSION: Primary | ICD-10-CM

## 2022-02-07 DIAGNOSIS — J45.40 MODERATE PERSISTENT ASTHMA WITHOUT COMPLICATION: ICD-10-CM

## 2022-02-07 DIAGNOSIS — I70.0 AORTIC ATHEROSCLEROSIS: ICD-10-CM

## 2022-02-07 DIAGNOSIS — E78.5 DYSLIPIDEMIA: ICD-10-CM

## 2022-02-07 DIAGNOSIS — F41.1 GAD (GENERALIZED ANXIETY DISORDER): ICD-10-CM

## 2022-02-07 DIAGNOSIS — T36.95XA ANTIBIOTIC-INDUCED YEAST INFECTION: ICD-10-CM

## 2022-02-07 DIAGNOSIS — K13.79 MOUTH SORES: ICD-10-CM

## 2022-02-07 DIAGNOSIS — E03.9 ACQUIRED HYPOTHYROIDISM: ICD-10-CM

## 2022-02-07 DIAGNOSIS — J06.9 UPPER RESPIRATORY TRACT INFECTION, UNSPECIFIED TYPE: ICD-10-CM

## 2022-02-07 DIAGNOSIS — I10 ESSENTIAL HYPERTENSION: ICD-10-CM

## 2022-02-07 DIAGNOSIS — G47.33 OSA ON CPAP: ICD-10-CM

## 2022-02-07 PROCEDURE — 99214 PR OFFICE/OUTPT VISIT, EST, LEVL IV, 30-39 MIN: ICD-10-PCS | Mod: HCWC,S$GLB,, | Performed by: FAMILY MEDICINE

## 2022-02-07 PROCEDURE — 1160F PR REVIEW ALL MEDS BY PRESCRIBER/CLIN PHARMACIST DOCUMENTED: ICD-10-PCS | Mod: HCWC,CPTII,S$GLB, | Performed by: FAMILY MEDICINE

## 2022-02-07 PROCEDURE — 1125F PR PAIN SEVERITY QUANTIFIED, PAIN PRESENT: ICD-10-PCS | Mod: HCWC,CPTII,S$GLB, | Performed by: FAMILY MEDICINE

## 2022-02-07 PROCEDURE — 1160F RVW MEDS BY RX/DR IN RCRD: CPT | Mod: HCWC,CPTII,S$GLB, | Performed by: FAMILY MEDICINE

## 2022-02-07 PROCEDURE — 3075F PR MOST RECENT SYSTOLIC BLOOD PRESS GE 130-139MM HG: ICD-10-PCS | Mod: HCWC,CPTII,S$GLB, | Performed by: FAMILY MEDICINE

## 2022-02-07 PROCEDURE — 3078F DIAST BP <80 MM HG: CPT | Mod: HCWC,CPTII,S$GLB, | Performed by: FAMILY MEDICINE

## 2022-02-07 PROCEDURE — 1159F PR MEDICATION LIST DOCUMENTED IN MEDICAL RECORD: ICD-10-PCS | Mod: HCWC,CPTII,S$GLB, | Performed by: FAMILY MEDICINE

## 2022-02-07 PROCEDURE — 3075F SYST BP GE 130 - 139MM HG: CPT | Mod: HCWC,CPTII,S$GLB, | Performed by: FAMILY MEDICINE

## 2022-02-07 PROCEDURE — 1125F AMNT PAIN NOTED PAIN PRSNT: CPT | Mod: HCWC,CPTII,S$GLB, | Performed by: FAMILY MEDICINE

## 2022-02-07 PROCEDURE — 3288F FALL RISK ASSESSMENT DOCD: CPT | Mod: HCWC,CPTII,S$GLB, | Performed by: FAMILY MEDICINE

## 2022-02-07 PROCEDURE — 3288F PR FALLS RISK ASSESSMENT DOCUMENTED: ICD-10-PCS | Mod: HCWC,CPTII,S$GLB, | Performed by: FAMILY MEDICINE

## 2022-02-07 PROCEDURE — 3078F PR MOST RECENT DIASTOLIC BLOOD PRESSURE < 80 MM HG: ICD-10-PCS | Mod: HCWC,CPTII,S$GLB, | Performed by: FAMILY MEDICINE

## 2022-02-07 PROCEDURE — 1101F PR PT FALLS ASSESS DOC 0-1 FALLS W/OUT INJ PAST YR: ICD-10-PCS | Mod: HCWC,CPTII,S$GLB, | Performed by: FAMILY MEDICINE

## 2022-02-07 PROCEDURE — 1101F PT FALLS ASSESS-DOCD LE1/YR: CPT | Mod: HCWC,CPTII,S$GLB, | Performed by: FAMILY MEDICINE

## 2022-02-07 PROCEDURE — 1159F MED LIST DOCD IN RCRD: CPT | Mod: HCWC,CPTII,S$GLB, | Performed by: FAMILY MEDICINE

## 2022-02-07 PROCEDURE — 99214 OFFICE O/P EST MOD 30 MIN: CPT | Mod: HCWC,S$GLB,, | Performed by: FAMILY MEDICINE

## 2022-02-07 RX ORDER — FLUCONAZOLE 150 MG/1
150 TABLET ORAL DAILY
Qty: 2 TABLET | Refills: 1 | Status: SHIPPED | OUTPATIENT
Start: 2022-02-07 | End: 2022-02-08

## 2022-02-07 RX ORDER — DOXYCYCLINE HYCLATE 100 MG
100 TABLET ORAL 2 TIMES DAILY
Qty: 20 TABLET | Refills: 0 | Status: SHIPPED | OUTPATIENT
Start: 2022-02-07 | End: 2022-02-17

## 2022-02-07 RX ORDER — LORAZEPAM 0.5 MG/1
0.25 TABLET ORAL EVERY 6 HOURS PRN
Qty: 30 TABLET | Refills: 0 | Status: SHIPPED | OUTPATIENT
Start: 2022-02-07 | End: 2022-02-16

## 2022-02-07 NOTE — PROGRESS NOTES
"Subjective:       Patient ID: Heather Hughes is a 81 y.o. female.    Chief Complaint: Hypertension and recent death (Pts  has just passed away last week. )    This patient is new to me.  Heather Hughes presents to the clinic today with complaints of stress and anxiety related to the loss of her late . Patient states she is doing well with the loss of her  as she feels this went smoothly and quickly. Patient states she does miss him but she is glad he is no longer in pain. Patient is requesting a refill on her ativan. Patient reports she only takes this as needed as she does not like medications if she doesn't need them. Patient reports she only takes this "if I feel like im going to scream" with anxiety.   Patient's daughter (Angela) is with her today and reports the patient did not take her lasix yesterday and has leg swelling. Patient states when she takes her lasix she urinates an extraordinary amount and cannot leave the house. Patient states she did not take her lasix due to the  yesterday and she would not have been able to attend. Patient states her blood pressure has been in normal range at home so she figured skipping one day wouldn't hurt.   Patient is drowsy today and repeatedly fell asleep during the visit today. Patient has obstructive sleep apnea and was previously prescribed a CPAP machine. Patient reports she does not use the CPAP machine and just deals with the sleep apnea. Angela reports the patient has been sleeping a good amount and is just tired a lot.   Patient states she is having cough and congestion and wheezing. Angela reports she has been giving her breathing treatments with little improvement. Angela feels this is secondary infection from COVID, as Angela had COVID about 2 weeks ago and thinks she gave it to her mother. Patient states if she takes antibiotics she needs something for the yeast infection after as she always gets a yeast infection both " vaginally and orally.   Angela also requests a referral to pulmonology for her mother as she has been having wheezing and has a history of asthma.  Patient educated on plan of care, verbalized understanding.      Review of Systems   Constitutional: Negative for activity change, appetite change, chills, diaphoresis and fever.   HENT: Positive for postnasal drip. Negative for congestion, ear pain, sinus pressure, sneezing and sore throat.    Eyes: Negative for pain, discharge, redness and itching.   Respiratory: Positive for cough and wheezing. Negative for apnea, chest tightness and shortness of breath.    Cardiovascular: Positive for leg swelling. Negative for chest pain.   Gastrointestinal: Negative for abdominal distention, abdominal pain, constipation, diarrhea, nausea and vomiting.   Genitourinary: Negative for difficulty urinating, dysuria, flank pain and frequency.   Skin: Negative for color change, rash and wound.   Neurological: Negative for dizziness.   Psychiatric/Behavioral: Positive for dysphoric mood.       Patient Active Problem List   Diagnosis    History of MI (myocardial infarction)    Moderate persistent asthma without complication    Hypertension    Coronary artery disease due to calcified coronary lesion    Essential hypertension    S/P total knee arthroplasty, left 8/15/18    History of deep venous thrombosis (DVT) of distal vein of right lower extremity, 1965 and 1971    Acquired hypothyroidism    Class 2 severe obesity due to excess calories with serious comorbidity and body mass index (BMI) of 39.0 to 39.9 in adult    Arthritis of ankle, left    Degenerative joint disease of left hindfoot    Status post insertion of drug-eluting stent into right coronary artery for coronary artery disease,. 1/2017    ALVINA on CPAP, started 4/2019, stopped 10/2019    Iron deficiency anemia    Stress at home    Statin intolerance, tried atorvastatin and pravastatin, refused Crestor     Dyslipidemia, baseline .6    Atrial fibrillation with RVR    Venous stasis dermatitis of right lower extremity    Venous ulcer of right leg    Elevated brain natriuretic peptide (BNP) level    Primary osteoarthritis involving multiple joints    Anticoagulant long-term use    Long term current use of amiodarone, started 3/2020    Aortic atherosclerosis    LBBB (left bundle branch block), onset 2020    Labile hypertension    FRANNIE (generalized anxiety disorder)    PAF (paroxysmal atrial fibrillation)    Sedentary lifestyle, since knee operation in 2018    Anemia    Excessive daytime sleepiness    Chronic pain syndrome    Difficulty controlling anger       Objective:      Physical Exam  Vitals reviewed.   Constitutional:       General: She is not in acute distress.     Appearance: Normal appearance. She is well-developed.   HENT:      Head: Normocephalic.      Nose: Nose normal.   Eyes:      Conjunctiva/sclera: Conjunctivae normal.      Pupils: Pupils are equal, round, and reactive to light.   Cardiovascular:      Rate and Rhythm: Normal rate and regular rhythm.      Heart sounds: Normal heart sounds.   Pulmonary:      Effort: Pulmonary effort is normal. No respiratory distress.      Breath sounds: Wheezing present.   Abdominal:      General: There is no distension.      Palpations: Abdomen is soft.      Tenderness: There is no abdominal tenderness.   Musculoskeletal:      Cervical back: Normal range of motion and neck supple.      Right lower le+ Pitting Edema present.      Left lower le+ Pitting Edema present.   Skin:     General: Skin is warm and dry.      Findings: No rash.   Neurological:      Mental Status: She is alert and oriented to person, place, and time.   Psychiatric:         Behavior: Behavior normal.         Lab Results   Component Value Date    WBC 4.56 2021    HGB 11.2 (L) 2021    HCT 36.7 (L) 2021     2021    CHOL 216 (H) 2021     "TRIG 57 03/30/2021    HDL 67 03/30/2021    ALT 8 (L) 03/30/2021    AST 15 03/30/2021     (L) 08/24/2021    K 4.9 08/24/2021    CL 97 08/24/2021    CREATININE 1.8 (H) 08/24/2021    BUN 39 (H) 08/24/2021    CO2 26 08/24/2021    TSH 2.229 07/07/2021    INR 1.0 01/13/2017    HGBA1C 5.3 01/14/2017     The ASCVD Risk score (Granger BUCK Jr., et al., 2013) failed to calculate for the following reasons:    The 2013 ASCVD risk score is only valid for ages 40 to 79    The patient has a prior MI or stroke diagnosis  Visit Vitals  /72 (BP Location: Right arm, Patient Position: Sitting, BP Method: Medium (Manual))   Pulse 64   Temp 98.3 °F (36.8 °C) (Oral)   Resp 16   Ht 5' 3" (1.6 m)   Wt 107 kg (236 lb)   SpO2 95%   BMI 41.81 kg/m²      Assessment:       1. Reactive depression    2. Essential hypertension    3. ALVINA on CPAP, started 4/2019, stopped 10/2019    4. Aortic atherosclerosis    5. Upper respiratory tract infection, unspecified type    6. Moderate persistent asthma without complication    7. Mouth sores    8. Antibiotic-induced yeast infection    9. Labile hypertension    10. FRANNIE (generalized anxiety disorder)    11. Dyslipidemia, baseline .6    12. Acquired hypothyroidism        Plan:       Heather was seen today for hypertension and recent death.    Diagnoses and all orders for this visit:    Reactive depression / FRANNIE (generalized anxiety disorder)  -     LORazepam (ATIVAN) 0.5 MG tablet; Take 0.5 tablets (0.25 mg total) by mouth every 6 (six) hours as needed for Anxiety.    Essential hypertension / Labile hypertension  -     CBC Auto Differential; Future  -     Comprehensive Metabolic Panel; Future    ALVINA on CPAP, started 4/2019, stopped 10/2019  Not on CPAP  Stable    Aortic atherosclerosis  Stable  Continue medications as prescribed.  Follow up with PCP     Upper respiratory tract infection, unspecified type  -     doxycycline (VIBRA-TABS) 100 MG tablet; Take 1 tablet (100 mg total) by mouth 2 (two) " times daily. for 10 days    Moderate persistent asthma without complication  -     Ambulatory referral/consult to Pulmonology; Future    Mouth sores / Antibiotic-induced yeast infection  -     (Magic mouthwash) 1:1:1 diphenhydramine(Benadryl) 12.5mg/5ml liq, aluminum & magnesium hydroxide-simethicone (Maalox), LIDOcaine viscous 2%; Swish and spit 15 mLs every 4 (four) hours as needed (mouth sores). for mouth sores  -     fluconazole (DIFLUCAN) 150 MG Tab; Take 1 tablet (150 mg total) by mouth once daily. If symptoms persist repeat dose in 3 days. for 1 day    Dyslipidemia, baseline .6  -     Lipid Panel; Future    Acquired hypothyroidism  -     TSH; Future      Follow up if symptoms worsen or fail to improve, for keep scheduled appt.      Future Appointments     Date Provider Specialty Appt Notes    2/16/2022 Eric Tillman MD PhD Cardiology f/u    4/13/2022 Hoa Cortés MD Pulmonology asthma    5/27/2022  Lab fasting labs    5/31/2022 Yumiko Page MD Family Medicine 3 mo f/u

## 2022-02-09 VITALS
DIASTOLIC BLOOD PRESSURE: 72 MMHG | TEMPERATURE: 98 F | HEART RATE: 64 BPM | SYSTOLIC BLOOD PRESSURE: 138 MMHG | OXYGEN SATURATION: 95 % | HEIGHT: 63 IN | WEIGHT: 236 LBS | BODY MASS INDEX: 41.82 KG/M2 | RESPIRATION RATE: 16 BRPM

## 2022-02-09 NOTE — PROGRESS NOTES
Patient, Heather Hughes (MRN #[id number]), presented with a recorded BMI of 41.81 kg/m^2 consistent with the definition of morbid obesity (ICD-10 E66.01). The patient's morbid obesity was monitored, evaluated, addressed and/or treated. This addendum to the medical record is made on 02/09/2022.

## 2022-02-15 ENCOUNTER — PATIENT OUTREACH (OUTPATIENT)
Dept: ADMINISTRATIVE | Facility: OTHER | Age: 82
End: 2022-02-15
Payer: MEDICARE

## 2022-02-16 ENCOUNTER — OFFICE VISIT (OUTPATIENT)
Dept: CARDIOLOGY | Facility: CLINIC | Age: 82
End: 2022-02-16
Payer: MEDICARE

## 2022-02-16 VITALS
HEIGHT: 63 IN | HEART RATE: 61 BPM | SYSTOLIC BLOOD PRESSURE: 173 MMHG | BODY MASS INDEX: 37.89 KG/M2 | DIASTOLIC BLOOD PRESSURE: 72 MMHG | WEIGHT: 213.88 LBS

## 2022-02-16 DIAGNOSIS — R79.89 ELEVATED BRAIN NATRIURETIC PEPTIDE (BNP) LEVEL: ICD-10-CM

## 2022-02-16 DIAGNOSIS — I70.0 AORTIC ATHEROSCLEROSIS: ICD-10-CM

## 2022-02-16 DIAGNOSIS — I48.0 PAF (PAROXYSMAL ATRIAL FIBRILLATION): ICD-10-CM

## 2022-02-16 DIAGNOSIS — I44.7 LBBB (LEFT BUNDLE BRANCH BLOCK): ICD-10-CM

## 2022-02-16 DIAGNOSIS — Z79.899 LONG TERM CURRENT USE OF AMIODARONE: ICD-10-CM

## 2022-02-16 DIAGNOSIS — I48.91 ATRIAL FIBRILLATION WITH RVR: ICD-10-CM

## 2022-02-16 DIAGNOSIS — Z78.9 STATIN INTOLERANCE: ICD-10-CM

## 2022-02-16 DIAGNOSIS — I25.10 CORONARY ARTERY DISEASE DUE TO CALCIFIED CORONARY LESION: ICD-10-CM

## 2022-02-16 DIAGNOSIS — F41.1 GAD (GENERALIZED ANXIETY DISORDER): ICD-10-CM

## 2022-02-16 DIAGNOSIS — I25.2 HISTORY OF MI (MYOCARDIAL INFARCTION): ICD-10-CM

## 2022-02-16 DIAGNOSIS — F32.9 REACTIVE DEPRESSION: ICD-10-CM

## 2022-02-16 DIAGNOSIS — R07.0 THROAT PAIN: ICD-10-CM

## 2022-02-16 DIAGNOSIS — R09.89 LABILE HYPERTENSION: ICD-10-CM

## 2022-02-16 DIAGNOSIS — E78.5 DYSLIPIDEMIA: ICD-10-CM

## 2022-02-16 DIAGNOSIS — I25.84 CORONARY ARTERY DISEASE DUE TO CALCIFIED CORONARY LESION: ICD-10-CM

## 2022-02-16 DIAGNOSIS — E66.01 CLASS 2 SEVERE OBESITY DUE TO EXCESS CALORIES WITH SERIOUS COMORBIDITY AND BODY MASS INDEX (BMI) OF 39.0 TO 39.9 IN ADULT: ICD-10-CM

## 2022-02-16 DIAGNOSIS — Z95.5 STATUS POST INSERTION OF DRUG-ELUTING STENT INTO RIGHT CORONARY ARTERY FOR CORONARY ARTERY DISEASE: ICD-10-CM

## 2022-02-16 DIAGNOSIS — I87.2 VENOUS STASIS DERMATITIS OF RIGHT LOWER EXTREMITY: ICD-10-CM

## 2022-02-16 DIAGNOSIS — G47.33 OSA ON CPAP: ICD-10-CM

## 2022-02-16 DIAGNOSIS — I10 PRIMARY HYPERTENSION: Primary | ICD-10-CM

## 2022-02-16 DIAGNOSIS — Z86.718 HISTORY OF DEEP VENOUS THROMBOSIS (DVT) OF DISTAL VEIN OF RIGHT LOWER EXTREMITY: ICD-10-CM

## 2022-02-16 DIAGNOSIS — Z79.01 ANTICOAGULANT LONG-TERM USE: ICD-10-CM

## 2022-02-16 PROCEDURE — 1126F PR PAIN SEVERITY QUANTIFIED, NO PAIN PRESENT: ICD-10-PCS | Mod: HCNC,CPTII,S$GLB, | Performed by: INTERNAL MEDICINE

## 2022-02-16 PROCEDURE — 99999 PR PBB SHADOW E&M-EST. PATIENT-LVL IV: CPT | Mod: PBBFAC,HCNC,, | Performed by: INTERNAL MEDICINE

## 2022-02-16 PROCEDURE — 99499 RISK ADDL DX/OHS AUDIT: ICD-10-PCS | Mod: S$GLB,,, | Performed by: INTERNAL MEDICINE

## 2022-02-16 PROCEDURE — 3078F DIAST BP <80 MM HG: CPT | Mod: HCNC,CPTII,S$GLB, | Performed by: INTERNAL MEDICINE

## 2022-02-16 PROCEDURE — 3078F PR MOST RECENT DIASTOLIC BLOOD PRESSURE < 80 MM HG: ICD-10-PCS | Mod: HCNC,CPTII,S$GLB, | Performed by: INTERNAL MEDICINE

## 2022-02-16 PROCEDURE — 3077F PR MOST RECENT SYSTOLIC BLOOD PRESSURE >= 140 MM HG: ICD-10-PCS | Mod: HCNC,CPTII,S$GLB, | Performed by: INTERNAL MEDICINE

## 2022-02-16 PROCEDURE — 99499 UNLISTED E&M SERVICE: CPT | Mod: S$GLB,,, | Performed by: INTERNAL MEDICINE

## 2022-02-16 PROCEDURE — 1159F MED LIST DOCD IN RCRD: CPT | Mod: HCNC,CPTII,S$GLB, | Performed by: INTERNAL MEDICINE

## 2022-02-16 PROCEDURE — 3077F SYST BP >= 140 MM HG: CPT | Mod: HCNC,CPTII,S$GLB, | Performed by: INTERNAL MEDICINE

## 2022-02-16 PROCEDURE — 99215 OFFICE O/P EST HI 40 MIN: CPT | Mod: HCNC,S$GLB,, | Performed by: INTERNAL MEDICINE

## 2022-02-16 PROCEDURE — 1159F PR MEDICATION LIST DOCUMENTED IN MEDICAL RECORD: ICD-10-PCS | Mod: HCNC,CPTII,S$GLB, | Performed by: INTERNAL MEDICINE

## 2022-02-16 PROCEDURE — 99215 PR OFFICE/OUTPT VISIT, EST, LEVL V, 40-54 MIN: ICD-10-PCS | Mod: HCNC,S$GLB,, | Performed by: INTERNAL MEDICINE

## 2022-02-16 PROCEDURE — 1126F AMNT PAIN NOTED NONE PRSNT: CPT | Mod: HCNC,CPTII,S$GLB, | Performed by: INTERNAL MEDICINE

## 2022-02-16 PROCEDURE — 99999 PR PBB SHADOW E&M-EST. PATIENT-LVL IV: ICD-10-PCS | Mod: PBBFAC,HCNC,, | Performed by: INTERNAL MEDICINE

## 2022-02-16 RX ORDER — CARVEDILOL 6.25 MG/1
6.25 TABLET ORAL 2 TIMES DAILY WITH MEALS
Qty: 180 TABLET | Refills: 3 | Status: SHIPPED | OUTPATIENT
Start: 2022-02-16

## 2022-02-16 RX ORDER — AMIODARONE HYDROCHLORIDE 200 MG/1
200 TABLET ORAL DAILY
Qty: 90 TABLET | Refills: 3 | Status: SHIPPED | OUTPATIENT
Start: 2022-02-16 | End: 2022-04-13 | Stop reason: SDUPTHER

## 2022-02-16 RX ORDER — LORAZEPAM 1 MG/1
1 TABLET ORAL EVERY 6 HOURS PRN
Qty: 30 TABLET | Refills: 0 | Status: ON HOLD | OUTPATIENT
Start: 2022-02-16 | End: 2022-03-17 | Stop reason: SDUPTHER

## 2022-02-16 RX ORDER — FUROSEMIDE 40 MG/1
TABLET ORAL
Qty: 90 TABLET | Refills: 3 | Status: ON HOLD | OUTPATIENT
Start: 2022-02-16 | End: 2022-03-17 | Stop reason: SDUPTHER

## 2022-02-16 RX ORDER — HYDRALAZINE HYDROCHLORIDE 100 MG/1
100 TABLET, FILM COATED ORAL 3 TIMES DAILY
Qty: 90 TABLET | Refills: 11 | Status: ON HOLD | OUTPATIENT
Start: 2022-02-16 | End: 2022-03-17 | Stop reason: SDUPTHER

## 2022-02-16 NOTE — PROGRESS NOTES
[facility]    CC: HTN      Patient ID: Heather Hughes is a 81 y.o. female with a CAD s/p MI, PCI/ARSLAN to RCA in 2017, parosyxmal Afib (on amiodarone and Eliquis), LBBB, statin intolerance, former smoker, who presents for a follow up appointment.  Pertinent history/events are as follows:     -Pt presents for evaluation of low pulse rate.    -Pt followed by Dr. Godfrey of Cardiology in the past.    -At our initial clinic visit on 2/10/2021, Mrs. Hughes reported SOB when walking for approximately 1 year.  States symptoms have gotten worse over the past 6 months.  She has no chest pain or chest discomfort.  Reports blood pressure has been elevated in the 180's systolic.  EKG today shows normal sinus rhythm with no ischemic ST/T wave changes.    Plan:   Progressive SOB- Pt with history of CAD s/p MI, PCI/ARSLAN to RCA in 2017.  Plan for ischemic workup with nuclear stress test and echo after blood pressure is better controlled.  Continue ASA, Eliqis, beta blocker.    HTN- Start lisinopril 20 mg daily.  Continue carvedilol 6.25 mg bid.  Pt to keep log of blood pressure/heart rate and bring in next visit for review.    HLD- Pt is intolerant to multiple statins.  Start Praluent.  Paroxysmal Afib- Continue amiodarone and carvedilol for rate control.  Continue Eliquis for anticoagulation.    -At follow up visit on 3/31/2021, Mrs. Hughes reported continued SOB when walking as described at clinic visit on 2/10/2021.  Main complaint today is feeling angry with outbursts at family members.  States blood pressures are now better controlled, but she did not bring in log for review.  Plan:   Progressive SOB- Pt with history of CAD s/p MI, PCI/ARSLAN to RCA in 2017.  Plan for ischemic workup with nuclear stress test and echo after blood pressure is better controlled.  Continue ASA, Eliqis, beta blocker.    HTN- Continue lisinopril 20 mg daily.  Continue carvedilol 6.25 mg.  Pt to keep log of blood pressure/heart rate and  bring in next visit for review.    HLD- Pt is intolerant to multiple statins.  Start Praluent (now approved).  Paroxysmal Afib- Continue amiodarone and carvedilol for rate control.  Continue Eliquis for anticoagulation.   Anger Issues- Refer to Psychiatry to evaluation.     -At clinic visit on 6/30/2021, Mrs. Hughes reports continued elevated blood pressures.  SOB unchanged.  No chest pain.  Recently started on hydralazine 25 mg tid by PCP.  Plan:   Progressive SOB- Pt with history of CAD s/p MI, PCI/ARSLAN to RCA in 2017.  Plan for ischemic workup with nuclear stress test and echo after blood pressure is better controlled.  Continue ASA, Eliquis, beta blocker.    HTN- Remains significantly elevated.  Recently started on hydralazine 25 mg tid by PCP.  Increase lisinopril to 40 mg daily.  Continue carvedilol 6.25 mg.  Pt to keep log of blood pressure/heart rate and bring in next visit for review.  Enroll in digital HTN program.    HLD- Pt is intolerant to multiple statins.  Pt started on Praluent (now approved), but states she does not want to take it.  Paroxysmal Afib- Continue amiodarone and carvedilol for rate control.  Continue Eliquis for anticoagulation.   Anger Issues- Pt referred to Psychiatry to evaluation.      -At clinic visit on 7/28/2021, Mrs. Hughes reports improvement in elevated blood pressures.  SOB unchanged.  No chest pain.  Home log shows mainly 150's systolic.  Plan:   Progressive SOB- Pt with history of CAD s/p MI, PCI/ARSLAN to RCA in 2017.  Plan for ischemic workup with nuclear stress test and echo after blood pressure is better controlled.  Continue ASA, Eliquis, beta blocker.    HTN- Significantly improved, but remains elevated in hte 150's systolic.  Increase hydralazine to 50 mg mg tid.  Continue lisinopril 40 mg daily and carvedilol 6.25 mg.  Pt to keep log of blood pressure/heart rate and bring in next visit for review.  Pt enrolled in digital HTN program.    HLD- Pt is intolerant to multiple  statins.  Pt started on Praluent (now approved), but states she does not want to take it.  Paroxysmal Afib- Continue amiodarone and carvedilol for rate control.  Continue Eliquis for anticoagulation.   Anger Issues- Pt referred to Psychiatry to evaluation.    HPI:  Mrs. Hughes reports grieving the recent untimely death of her .  She has no chest pain or SOB.  Blood pressure has improved but remains elevated above 150 mmHg systolic.      Past Medical History:   Diagnosis Date    Allergy     Anticoagulant long-term use     Arthritis     Asthma     Coronary artery disease     stent x 1    Deep vein blood clot of right lower extremity  &     Full dentures     Heart attack 2017    Hypertension     Osteoporosis     Sleep apnea     Thyroid disease     Ulcer     Wears glasses      Past Surgical History:   Procedure Laterality Date    cardic stent  2017    CARDIOVERSION Left 3/12/2020    Procedure: Cardioversion;  Surgeon: Kaleigh Rascon MD;  Location: [facility] CATH LAB;  Service: Cardiology;  Laterality: Left;    CHOLECYSTECTOMY      EYE SURGERY      bilateral cataracts    FRACTURE SURGERY      left wrist     KNEE ARTHROPLASTY Left 8/15/2018    Procedure: ARTHROPLASTY, KNEE;  Surgeon: Shantanu Santiago MD;  Location: [facility] OR;  Service: Orthopedics;  Laterality: Left;  ANESTHESIA GENERAL AND BLOCK    THYROIDECTOMY       Social History     Socioeconomic History    Marital status:    Tobacco Use    Smoking status: Former Smoker     Packs/day: 0.50     Types: Cigarettes     Quit date: 2017     Years since quittin.0    Smokeless tobacco: Never Used   Substance and Sexual Activity    Alcohol use: No    Drug use: No    Sexual activity: Never     Family History   Problem Relation Age of Onset    Lupus Mother     Osteoporosis Mother     Heart disease Mother     Ulcers Mother     Esophageal cancer Father     Cancer Father         esophageal    DIEGO  disease Father     Cancer Brother     Esophageal cancer Brother     Lupus Daughter     DIEGO disease Son     Heart disease Maternal Aunt     Cancer Maternal Aunt     Kidney disease Maternal Aunt     Kidney cancer Maternal Aunt     Liver cancer Maternal Aunt     Dementia Maternal Aunt     Cancer Maternal Uncle     Colon cancer Maternal Uncle     Breast cancer Maternal Uncle     Lung cancer Maternal Uncle         smoker    Parkinsonism Paternal Aunt     Cancer Paternal Aunt     Stomach cancer Paternal Aunt     Heart disease Paternal Uncle     Cancer Paternal Uncle     Stomach cancer Paternal Uncle         x2    Bone cancer Paternal Uncle     Cancer Maternal Grandmother         tumors on head and body    Parkinsonism Paternal Grandmother     DIEGO disease Brother     Ulcers Brother     Dementia Brother     Early death Son         self    Bipolar disorder Son     Cancer Son     Esophageal cancer Son     Drug abuse Son        Review of patient's allergies indicates:   Allergen Reactions    Bactroban [mupirocin calcium] Itching and Other (See Comments)     Red, tingling, itching, burning     Ditropan [oxybutynin chloride] Swelling     swelling    Lipitor [atorvastatin] Other (See Comments)     Fall/ balance     Myrbetriq [mirabegron] Swelling and Other (See Comments)     Legs swelling, couldn't void     Pravastatin Other (See Comments)     Balance problem     Oxycodone     Codeine Nausea Only    Mobic [meloxicam] Other (See Comments)     Not sure reaction    Perfume Other (See Comments)     And flowers cause allergy and makes her cough.    Sulfur Other (See Comments)     Was told not to take as a child       Medication List with Changes/Refills   Current Medications    (MAGIC MOUTHWASH) 1:1:1 DIPHENHYDRAMINE(BENADRYL) 12.5MG/5ML LIQ, ALUMINUM & MAGNESIUM HYDROXIDE-SIMETHICONE (MAALOX), LIDOCAINE VISCOUS 2%    Swish and spit 15 mLs every 4 (four) hours as needed (mouth sores). for mouth  sores    ACETAMINOPHEN (TYLENOL) 650 MG TBSR    Take 650 mg by mouth every 8 (eight) hours as needed (pain).     ALBUTEROL (ACCUNEB) 1.25 MG/3 ML NEBU    Take 3 mLs (1.25 mg total) by nebulization every 6 (six) hours as needed (cough wheeze). Rescue    ALBUTEROL 90 MCG/ACTUATION INHALER    Inhale 2 puffs into the lungs every 6 (six) hours as needed for Wheezing.    ALENDRONATE (FOSAMAX) 70 MG TABLET    Take 1 tablet (70 mg total) by mouth every 7 days.    AMIODARONE (PACERONE) 200 MG TAB    Take 1 tablet (200 mg total) by mouth once daily. Need office visit before next refill, last seen 12/2020. This will be the LAST Rx if visit is not made.    APIXABAN (ELIQUIS) 5 MG TAB    Take 1 tablet (5 mg total) by mouth 2 (two) times daily. Need office visit before next refill, last seen 12/2020. This will be the LAST Rx if visit is not made.    ASPIRIN (ECOTRIN) 81 MG EC TABLET    Take 81 mg by mouth once daily.    CALCIUM CARBONATE/VITAMIN D3 (CALCIUM 500 + D ORAL)    Take 2 tablets by mouth once daily.    CARVEDILOL (COREG) 6.25 MG TABLET    Take 1 tablet (6.25 mg total) by mouth 2 (two) times daily with meals. Need office visit before next refill, last seen 12/2021. This will be the LAST Rx if visit is not made.    DOXYCYCLINE (VIBRA-TABS) 100 MG TABLET    Take 1 tablet (100 mg total) by mouth 2 (two) times daily. for 10 days    FUROSEMIDE (LASIX) 40 MG TABLET    TAKE ONE TABLET BY MOUTH DAILY AS NEEDED For (fluid retention)    GABAPENTIN (NEURONTIN) 100 MG CAPSULE    TAKE ONE CAPSULE BY MOUTH THREE TIMES DAILY    GLUCOSAMINE-CHONDROITIN 500-400 MG TABLET    Take 1 tablet by mouth 3 (three) times daily.    HYDRALAZINE (APRESOLINE) 50 MG TABLET    Take 1 tablet (50 mg total) by mouth 3 (three) times daily.    HYDROXYZINE HCL (ATARAX) 10 MG TAB    Take 10 mg by mouth as needed.    LISINOPRIL (PRINIVIL,ZESTRIL) 20 MG TABLET    Take 2 tablets (40 mg total) by mouth once daily.    LORAZEPAM (ATIVAN) 0.5 MG TABLET    Take 0.5  "tablets (0.25 mg total) by mouth every 6 (six) hours as needed for Anxiety.    MONTELUKAST (SINGULAIR) 10 MG TABLET    Take 1 tablet (10 mg total) by mouth every evening.    NITROGLYCERIN (NITROSTAT) 0.4 MG SL TABLET    Place 1 tablet (0.4 mg total) under the tongue every 5 (five) minutes as needed for Chest pain.    OMEPRAZOLE (PRILOSEC) 40 MG CAPSULE    Take 1 capsule (40 mg total) by mouth once daily.    ONDANSETRON (ZOFRAN-ODT) 4 MG TBDL    Take 1 tablet (4 mg total) by mouth every 8 (eight) hours as needed (nausea).    POTASSIUM CHLORIDE SA (K-DUR,KLOR-CON) 20 MEQ TABLET    Take 1 tablet (20 mEq total) by mouth once daily. When taking lasix    SERTRALINE (ZOLOFT) 25 MG TABLET    Take 1 tablet (25 mg total) by mouth once daily.    SYNTHROID 125 MCG TABLET    TAKE ONE TABLET BY MOUTH once a day    TRIAMCINOLONE ACETONIDE 0.025% (KENALOG) 0.025 % CREAM    Apply topically 2 (two) times daily.    TRIAMCINOLONE ACETONIDE 0.1% (KENALOG) 0.1 % OINTMENT    Apply topically 2 (two) times daily.       Review of Systems  Constitution: Denies chills, fever, and sweats.  HENT: Denies headaches or blurry vision.  Cardiovascular: Denies chest pain or irregular heart beat.  Respiratory: Denies cough or shortness of breath.  Gastrointestinal: Denies abdominal pain, nausea, or vomiting.  Musculoskeletal: Denies muscle cramps.  Neurological: Denies dizziness or focal weakness.  Psychiatric/Behavioral: Normal mental status.  Hematologic/Lymphatic: Denies bleeding problem or easy bruising/bleeding.  Skin: Denies rash or suspicious lesions    Physical Examination  BP (!) 173/72   Pulse 61   Ht 5' 3" (1.6 m)   Wt 97 kg (213 lb 13.5 oz)   BMI 37.88 kg/m²     Constitutional: No acute distress, conversant  HEENT: Sclera anicteric, Pupils equal, round and reactive to light, extraocular motions intact, Oropharynx clear  Neck: No JVD, no carotid bruits  Cardiovascular: regular rate and rhythm, no murmur, rubs or gallops, normal " S1/S2  Pulmonary: Clear to auscultation bilaterally  Abdominal: Abdomen soft, nontender, nondistended, positive bowel sounds  Extremities: No lower extremity edema,   Pulses:  Carotid pulses are 2+ on the right side, and 2+ on the left side.  Radial pulses are 2+ on the right side, and 2+ on the left side.   Femoral pulses are 2+ on the right side, and 2+ on the left side.  Popliteal pulses are 2+ on the right side, and 2+ on the left side.   Dorsalis pedis pulses are 2+ on the right side, and 2+ on the left side.   Posterior tibial pulses are 2+ on the right side, and 2+ on the left side.    Skin: No ecchymosis, erythema, or ulcers  Psych: Alert and oriented x 3, appropriate affect  Neuro: CNII-XII intact, no focal deficits    Labs:  Most Recent Data  CBC:   Lab Results   Component Value Date    WBC 4.56 03/30/2021    HGB 11.2 (L) 03/30/2021    HCT 36.7 (L) 03/30/2021     03/30/2021    MCV 97 03/30/2021    RDW 14.8 (H) 03/30/2021     BMP:   Lab Results   Component Value Date     (L) 08/24/2021    K 4.9 08/24/2021    CL 97 08/24/2021    CO2 26 08/24/2021    BUN 39 (H) 08/24/2021    CREATININE 1.8 (H) 08/24/2021     08/24/2021    CALCIUM 10.0 08/24/2021    MG 2.1 03/12/2020     LFTS;   Lab Results   Component Value Date    PROT 6.7 03/30/2021    ALBUMIN 3.5 03/30/2021    BILITOT 0.5 03/30/2021    AST 15 03/30/2021    ALKPHOS 87 03/30/2021    ALT 8 (L) 03/30/2021     COAGS:   Lab Results   Component Value Date    INR 1.0 01/13/2017     FLP:   Lab Results   Component Value Date    CHOL 216 (H) 03/30/2021    HDL 67 03/30/2021    LDLCALC 137.6 03/30/2021    TRIG 57 03/30/2021    CHOLHDL 31.0 03/30/2021     CARDIAC:   Lab Results   Component Value Date    TROPONINI 0.022 03/11/2020    BNP 1,035 (H) 03/10/2020         EKG 2/10/2021:  Normal sinus rhythm with no ischemic ST/T wave changes.    Echo 3/11/2020:  · Concentric left ventricular remodeling.  · The mean diastolic gradient across the mitral valve  "is 4 mmHg at a heart rate of bpm.  · Normal left ventricular systolic function. The estimated ejection fraction is 53%.  · Atrial fibrillation observed.  · Normal right ventricular systolic function.  · Mild left atrial enlargement.  · Mild mitral regurgitation.  · Mild to moderate tricuspid regurgitation.  · Normal central venous pressure (3 mmHg).  · The estimated PA systolic pressure is 41 mmHg.  · Pulmonary hypertension present.      Assessment/Plan:  Heather Hughes is a 81 y.o. female with a CAD s/p MI, PCI/ARSLAN to RCA in 2017, parosyxmal Afib (on amiodarone and Eliquis), LBBB, statin intolerance, former smoker, who presents for a follow up appointment.    1. SOB- Stable.  Pt with history of CAD s/p MI, PCI/ARSLAN to RCA in 2017.  Would like to get nuclear stress test and echo to evaluate for ischemia.  Pt wants to have appointment with ENT to "check her throat" before agreeing with stress test.  Continue ASA, Eliquis, beta blocker.      2. HTN- Significantly improved, but remains elevated in hte 150's systolic.  Increase hydralazine to 100 mg mg tid.  Continue lisinopril to 40 mg daily and carvedilol 6.25 mg.  Pt to keep log of blood pressure/heart rate and bring in next visit for review.  Pt enrolled in digital HTN program.      3. HLD- Pt is intolerant to multiple statins.  Pt started on Praluent (now approved), but states she does not want to take it.    4. Paroxysmal Afib- Continue amiodarone and carvedilol for rate control.  Continue Eliquis for anticoagulation.     5. Anger Issues- Pt referred to Psychiatry to evaluation.      Follow up in 1 month    Total duration of face to face visit time 45 minutes.  Total time spent counseling greater than fifty percent of total visit time.  Counseling included discussion regarding imaging findings, diagnosis, possibilities, treatment options, risks and benefits.  The patient had many questions regarding the options and long-term effects.    Eric Tillman, " MD, PhD  Interventional Cardiology

## 2022-02-16 NOTE — PATIENT INSTRUCTIONS
"  Assessment/Plan:  Heather Hughes is a 81 y.o. female with a CAD s/p MI, PCI/ARSLAN to RCA in 2017, parosyxmal Afib (on amiodarone and Eliquis), LBBB, statin intolerance, former smoker, who presents for a follow up appointment.    1. SOB- Stable.  Pt with history of CAD s/p MI, PCI/ARSLAN to RCA in 2017.  Would like to get nuclear stress test and echo to evaluate for ischemia.  Pt wants to have appointment with ENT to "check her throat" before agreeing with stress test.  Continue ASA, Eliquis, beta blocker.      2. HTN- Significantly improved, but remains elevated in hte 150's systolic.  Increase hydralazine to 100 mg mg tid.  Continue lisinopril to 40 mg daily and carvedilol 6.25 mg.  Pt to keep log of blood pressure/heart rate and bring in next visit for review.  Pt enrolled in digital HTN program.      3. HLD- Pt is intolerant to multiple statins.  Pt started on Praluent (now approved), but states she does not want to take it.    4. Paroxysmal Afib- Continue amiodarone and carvedilol for rate control.  Continue Eliquis for anticoagulation.     5. Anger Issues- Pt referred to Psychiatry to evaluation.      Follow up in 1 month  "

## 2022-03-11 ENCOUNTER — HOSPITAL ENCOUNTER (INPATIENT)
Facility: HOSPITAL | Age: 82
LOS: 6 days | Discharge: NURSING, REHAB OR LONG-TERM CARE | DRG: 193 | End: 2022-03-17
Attending: EMERGENCY MEDICINE | Admitting: INTERNAL MEDICINE
Payer: MEDICARE

## 2022-03-11 DIAGNOSIS — J18.9 ATYPICAL PNEUMONIA: ICD-10-CM

## 2022-03-11 DIAGNOSIS — R07.9 CHEST PAIN: ICD-10-CM

## 2022-03-11 DIAGNOSIS — I50.9 CHF (CONGESTIVE HEART FAILURE): ICD-10-CM

## 2022-03-11 DIAGNOSIS — N30.00 ACUTE CYSTITIS WITHOUT HEMATURIA: Primary | ICD-10-CM

## 2022-03-11 DIAGNOSIS — R79.89 ELEVATED BRAIN NATRIURETIC PEPTIDE (BNP) LEVEL: ICD-10-CM

## 2022-03-11 DIAGNOSIS — I87.2 VENOUS STASIS DERMATITIS OF RIGHT LOWER EXTREMITY: ICD-10-CM

## 2022-03-11 DIAGNOSIS — R53.1 WEAKNESS: ICD-10-CM

## 2022-03-11 DIAGNOSIS — F32.9 REACTIVE DEPRESSION: ICD-10-CM
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
## 2022-03-11 DIAGNOSIS — G57.93 NEUROPATHY INVOLVING BOTH LOWER EXTREMITIES: ICD-10-CM

## 2022-03-11 DIAGNOSIS — F41.1 GAD (GENERALIZED ANXIETY DISORDER): ICD-10-CM

## 2022-03-11 DIAGNOSIS — R06.02 SOB (SHORTNESS OF BREATH): ICD-10-CM

## 2022-03-11 DIAGNOSIS — R07.9 CHEST PAIN IN ADULT: ICD-10-CM

## 2022-03-11 DIAGNOSIS — R94.31 EKG ABNORMALITIES: ICD-10-CM

## 2022-03-11 PROBLEM — N17.9 AKI (ACUTE KIDNEY INJURY): Status: ACTIVE | Noted: 2022-03-11

## 2022-03-11 PROBLEM — S82.892A AVULSION FRACTURE OF ANKLE, LEFT, CLOSED, INITIAL ENCOUNTER: Status: ACTIVE | Noted: 2022-03-11

## 2022-03-11 LAB
ALBUMIN SERPL BCP-MCNC: 3.1 G/DL (ref 3.5–5.2)
ALP SERPL-CCNC: 69 U/L (ref 55–135)
ALT SERPL W/O P-5'-P-CCNC: 20 U/L (ref 10–44)
ANION GAP SERPL CALC-SCNC: 10 MMOL/L (ref 8–16)
AST SERPL-CCNC: 18 U/L (ref 10–40)
BACTERIA #/AREA URNS HPF: ABNORMAL /HPF
BASOPHILS # BLD AUTO: 0.02 K/UL (ref 0–0.2)
BASOPHILS NFR BLD: 0.2 % (ref 0–1.9)
BILIRUB SERPL-MCNC: 0.5 MG/DL (ref 0.1–1)
BILIRUB UR QL STRIP: ABNORMAL
BNP SERPL-MCNC: 240 PG/ML (ref 0–99)
BUN SERPL-MCNC: 24 MG/DL (ref 8–23)
CALCIUM SERPL-MCNC: 9.1 MG/DL (ref 8.7–10.5)
CHLORIDE SERPL-SCNC: 105 MMOL/L (ref 95–110)
CLARITY UR: ABNORMAL
CO2 SERPL-SCNC: 24 MMOL/L (ref 23–29)
COLOR UR: YELLOW
CREAT SERPL-MCNC: 1.8 MG/DL (ref 0.5–1.4)
DIFFERENTIAL METHOD: ABNORMAL
EOSINOPHIL # BLD AUTO: 0.1 K/UL (ref 0–0.5)
EOSINOPHIL NFR BLD: 0.6 % (ref 0–8)
ERYTHROCYTE [DISTWIDTH] IN BLOOD BY AUTOMATED COUNT: 15.6 % (ref 11.5–14.5)
EST. GFR  (AFRICAN AMERICAN): 30 ML/MIN/1.73 M^2
EST. GFR  (NON AFRICAN AMERICAN): 26 ML/MIN/1.73 M^2
GLUCOSE SERPL-MCNC: 93 MG/DL (ref 70–110)
GLUCOSE UR QL STRIP: NEGATIVE
HCT VFR BLD AUTO: 34.5 % (ref 37–48.5)
HGB BLD-MCNC: 10.4 G/DL (ref 12–16)
HGB UR QL STRIP: NEGATIVE
HYALINE CASTS #/AREA URNS LPF: 10 /LPF
IMM GRANULOCYTES # BLD AUTO: 0.04 K/UL (ref 0–0.04)
IMM GRANULOCYTES NFR BLD AUTO: 0.3 % (ref 0–0.5)
KETONES UR QL STRIP: NEGATIVE
LEUKOCYTE ESTERASE UR QL STRIP: ABNORMAL
LYMPHOCYTES # BLD AUTO: 0.9 K/UL (ref 1–4.8)
LYMPHOCYTES NFR BLD: 8 % (ref 18–48)
MAGNESIUM SERPL-MCNC: 2.2 MG/DL (ref 1.6–2.6)
MCH RBC QN AUTO: 29.6 PG (ref 27–31)
MCHC RBC AUTO-ENTMCNC: 30.1 G/DL (ref 32–36)
MCV RBC AUTO: 98 FL (ref 82–98)
MICROSCOPIC COMMENT: ABNORMAL
MONOCYTES # BLD AUTO: 0.7 K/UL (ref 0.3–1)
MONOCYTES NFR BLD: 6.3 % (ref 4–15)
NEUTROPHILS # BLD AUTO: 9.7 K/UL (ref 1.8–7.7)
NEUTROPHILS NFR BLD: 84.6 % (ref 38–73)
NITRITE UR QL STRIP: NEGATIVE
NRBC BLD-RTO: 0 /100 WBC
PH UR STRIP: 8 [PH] (ref 5–8)
PLATELET # BLD AUTO: 210 K/UL (ref 150–450)
PMV BLD AUTO: 12.6 FL (ref 9.2–12.9)
POTASSIUM SERPL-SCNC: 4.3 MMOL/L (ref 3.5–5.1)
PROT SERPL-MCNC: 5.9 G/DL (ref 6–8.4)
PROT UR QL STRIP: ABNORMAL
RBC # BLD AUTO: 3.51 M/UL (ref 4–5.4)
RBC #/AREA URNS HPF: 3 /HPF (ref 0–4)
SARS-COV-2 RDRP RESP QL NAA+PROBE: NEGATIVE
SODIUM SERPL-SCNC: 139 MMOL/L (ref 136–145)
SP GR UR STRIP: 1.01 (ref 1–1.03)
TROPONIN I SERPL DL<=0.01 NG/ML-MCNC: 0.03 NG/ML (ref 0–0.03)
URN SPEC COLLECT METH UR: ABNORMAL
UROBILINOGEN UR STRIP-ACNC: 1 EU/DL
WBC # BLD AUTO: 11.45 K/UL (ref 3.9–12.7)
WBC #/AREA URNS HPF: 45 /HPF (ref 0–5)

## 2022-03-11 PROCEDURE — 96375 TX/PRO/DX INJ NEW DRUG ADDON: CPT

## 2022-03-11 PROCEDURE — 81000 URINALYSIS NONAUTO W/SCOPE: CPT | Performed by: EMERGENCY MEDICINE

## 2022-03-11 PROCEDURE — U0002 COVID-19 LAB TEST NON-CDC: HCPCS | Performed by: EMERGENCY MEDICINE

## 2022-03-11 PROCEDURE — 25000003 PHARM REV CODE 250: Performed by: EMERGENCY MEDICINE

## 2022-03-11 PROCEDURE — 63600175 PHARM REV CODE 636 W HCPCS: Performed by: EMERGENCY MEDICINE

## 2022-03-11 PROCEDURE — 93005 ELECTROCARDIOGRAM TRACING: CPT

## 2022-03-11 PROCEDURE — 83880 ASSAY OF NATRIURETIC PEPTIDE: CPT | Performed by: EMERGENCY MEDICINE

## 2022-03-11 PROCEDURE — 83735 ASSAY OF MAGNESIUM: CPT | Performed by: EMERGENCY MEDICINE

## 2022-03-11 PROCEDURE — 84484 ASSAY OF TROPONIN QUANT: CPT | Performed by: EMERGENCY MEDICINE

## 2022-03-11 PROCEDURE — 90715 TDAP VACCINE 7 YRS/> IM: CPT | Performed by: EMERGENCY MEDICINE

## 2022-03-11 PROCEDURE — 29515 APPLICATION SHORT LEG SPLINT: CPT | Mod: LT

## 2022-03-11 PROCEDURE — 93010 ELECTROCARDIOGRAM REPORT: CPT | Mod: ,,, | Performed by: INTERNAL MEDICINE

## 2022-03-11 PROCEDURE — 99285 EMERGENCY DEPT VISIT HI MDM: CPT | Mod: 25

## 2022-03-11 PROCEDURE — 96361 HYDRATE IV INFUSION ADD-ON: CPT

## 2022-03-11 PROCEDURE — 85025 COMPLETE CBC W/AUTO DIFF WBC: CPT | Performed by: EMERGENCY MEDICINE

## 2022-03-11 PROCEDURE — 87077 CULTURE AEROBIC IDENTIFY: CPT | Performed by: EMERGENCY MEDICINE

## 2022-03-11 PROCEDURE — 36415 COLL VENOUS BLD VENIPUNCTURE: CPT | Performed by: EMERGENCY MEDICINE

## 2022-03-11 PROCEDURE — 63700000 PHARM REV CODE 250 ALT 637 W/O HCPCS: Performed by: EMERGENCY MEDICINE

## 2022-03-11 PROCEDURE — 80053 COMPREHEN METABOLIC PANEL: CPT | Performed by: EMERGENCY MEDICINE

## 2022-03-11 PROCEDURE — 96374 THER/PROPH/DIAG INJ IV PUSH: CPT

## 2022-03-11 PROCEDURE — 87186 SC STD MICRODIL/AGAR DIL: CPT | Performed by: EMERGENCY MEDICINE

## 2022-03-11 PROCEDURE — 87086 URINE CULTURE/COLONY COUNT: CPT | Performed by: EMERGENCY MEDICINE

## 2022-03-11 PROCEDURE — 27000207 HC ISOLATION

## 2022-03-11 PROCEDURE — 93010 EKG 12-LEAD: ICD-10-PCS | Mod: ,,, | Performed by: INTERNAL MEDICINE

## 2022-03-11 PROCEDURE — 51702 INSERT TEMP BLADDER CATH: CPT

## 2022-03-11 PROCEDURE — 12000002 HC ACUTE/MED SURGE SEMI-PRIVATE ROOM

## 2022-03-11 PROCEDURE — 90471 IMMUNIZATION ADMIN: CPT | Performed by: EMERGENCY MEDICINE

## 2022-03-11 PROCEDURE — 87088 URINE BACTERIA CULTURE: CPT | Performed by: EMERGENCY MEDICINE

## 2022-03-11 RX ORDER — LANOLIN ALCOHOL/MO/W.PET/CERES
800 CREAM (GRAM) TOPICAL
Status: DISCONTINUED | OUTPATIENT
Start: 2022-03-11 | End: 2022-03-17 | Stop reason: HOSPADM

## 2022-03-11 RX ORDER — SODIUM,POTASSIUM PHOSPHATES 280-250MG
2 POWDER IN PACKET (EA) ORAL
Status: DISCONTINUED | OUTPATIENT
Start: 2022-03-11 | End: 2022-03-17 | Stop reason: HOSPADM

## 2022-03-11 RX ORDER — AZITHROMYCIN 250 MG/1
500 TABLET, FILM COATED ORAL
Status: COMPLETED | OUTPATIENT
Start: 2022-03-11 | End: 2022-03-11

## 2022-03-11 RX ORDER — MORPHINE SULFATE 4 MG/ML
4 INJECTION, SOLUTION INTRAMUSCULAR; INTRAVENOUS EVERY 4 HOURS PRN
Status: DISCONTINUED | OUTPATIENT
Start: 2022-03-12 | End: 2022-03-17 | Stop reason: HOSPADM

## 2022-03-11 RX ORDER — ONDANSETRON 2 MG/ML
4 INJECTION INTRAMUSCULAR; INTRAVENOUS EVERY 6 HOURS PRN
Status: DISCONTINUED | OUTPATIENT
Start: 2022-03-12 | End: 2022-03-17 | Stop reason: HOSPADM

## 2022-03-11 RX ORDER — IBUPROFEN 200 MG
24 TABLET ORAL
Status: DISCONTINUED | OUTPATIENT
Start: 2022-03-11 | End: 2022-03-17 | Stop reason: HOSPADM

## 2022-03-11 RX ORDER — AMIODARONE HYDROCHLORIDE 200 MG/1
200 TABLET ORAL DAILY
Status: DISCONTINUED | OUTPATIENT
Start: 2022-03-12 | End: 2022-03-17 | Stop reason: HOSPADM

## 2022-03-11 RX ORDER — MORPHINE SULFATE 2 MG/ML
2 INJECTION, SOLUTION INTRAMUSCULAR; INTRAVENOUS EVERY 4 HOURS PRN
Status: DISCONTINUED | OUTPATIENT
Start: 2022-03-12 | End: 2022-03-17 | Stop reason: HOSPADM

## 2022-03-11 RX ORDER — SIMETHICONE 80 MG
1 TABLET,CHEWABLE ORAL 4 TIMES DAILY PRN
Status: DISCONTINUED | OUTPATIENT
Start: 2022-03-11 | End: 2022-03-17 | Stop reason: HOSPADM

## 2022-03-11 RX ORDER — CARVEDILOL 6.25 MG/1
6.25 TABLET ORAL 2 TIMES DAILY WITH MEALS
Status: DISCONTINUED | OUTPATIENT
Start: 2022-03-12 | End: 2022-03-17 | Stop reason: HOSPADM

## 2022-03-11 RX ORDER — SODIUM CHLORIDE 9 MG/ML
INJECTION, SOLUTION INTRAVENOUS CONTINUOUS
Status: DISCONTINUED | OUTPATIENT
Start: 2022-03-12 | End: 2022-03-12

## 2022-03-11 RX ORDER — POLYETHYLENE GLYCOL 3350 17 G/17G
17 POWDER, FOR SOLUTION ORAL DAILY
Status: DISCONTINUED | OUTPATIENT
Start: 2022-03-12 | End: 2022-03-17 | Stop reason: HOSPADM

## 2022-03-11 RX ORDER — HYDRALAZINE HYDROCHLORIDE 25 MG/1
100 TABLET, FILM COATED ORAL DAILY
Status: DISCONTINUED | OUTPATIENT
Start: 2022-03-12 | End: 2022-03-17 | Stop reason: HOSPADM

## 2022-03-11 RX ORDER — LEVOTHYROXINE SODIUM 125 UG/1
125 TABLET ORAL DAILY
Status: DISCONTINUED | OUTPATIENT
Start: 2022-03-12 | End: 2022-03-12

## 2022-03-11 RX ORDER — TALC
6 POWDER (GRAM) TOPICAL NIGHTLY PRN
Status: DISCONTINUED | OUTPATIENT
Start: 2022-03-11 | End: 2022-03-17 | Stop reason: HOSPADM

## 2022-03-11 RX ORDER — ACETAMINOPHEN 325 MG/1
650 TABLET ORAL EVERY 4 HOURS PRN
Status: DISCONTINUED | OUTPATIENT
Start: 2022-03-11 | End: 2022-03-17 | Stop reason: HOSPADM

## 2022-03-11 RX ORDER — LORAZEPAM 1 MG/1
1 TABLET ORAL EVERY 6 HOURS PRN
Status: DISCONTINUED | OUTPATIENT
Start: 2022-03-11 | End: 2022-03-17 | Stop reason: HOSPADM

## 2022-03-11 RX ORDER — FUROSEMIDE 10 MG/ML
40 INJECTION INTRAMUSCULAR; INTRAVENOUS
Status: COMPLETED | OUTPATIENT
Start: 2022-03-11 | End: 2022-03-11

## 2022-03-11 RX ORDER — IPRATROPIUM BROMIDE AND ALBUTEROL SULFATE 2.5; .5 MG/3ML; MG/3ML
3 SOLUTION RESPIRATORY (INHALATION) EVERY 6 HOURS PRN
Status: DISCONTINUED | OUTPATIENT
Start: 2022-03-11 | End: 2022-03-17 | Stop reason: HOSPADM

## 2022-03-11 RX ORDER — NALOXONE HCL 0.4 MG/ML
0.02 VIAL (ML) INJECTION
Status: DISCONTINUED | OUTPATIENT
Start: 2022-03-11 | End: 2022-03-17 | Stop reason: HOSPADM

## 2022-03-11 RX ORDER — GLUCAGON 1 MG
1 KIT INJECTION
Status: DISCONTINUED | OUTPATIENT
Start: 2022-03-11 | End: 2022-03-17 | Stop reason: HOSPADM

## 2022-03-11 RX ORDER — MAG HYDROX/ALUMINUM HYD/SIMETH 200-200-20
30 SUSPENSION, ORAL (FINAL DOSE FORM) ORAL 4 TIMES DAILY PRN
Status: DISCONTINUED | OUTPATIENT
Start: 2022-03-11 | End: 2022-03-17 | Stop reason: HOSPADM

## 2022-03-11 RX ORDER — MONTELUKAST SODIUM 10 MG/1
10 TABLET ORAL NIGHTLY
Status: DISCONTINUED | OUTPATIENT
Start: 2022-03-12 | End: 2022-03-17 | Stop reason: HOSPADM

## 2022-03-11 RX ORDER — IBUPROFEN 200 MG
16 TABLET ORAL
Status: DISCONTINUED | OUTPATIENT
Start: 2022-03-11 | End: 2022-03-17 | Stop reason: HOSPADM

## 2022-03-11 RX ORDER — SODIUM CHLORIDE 0.9 % (FLUSH) 0.9 %
3 SYRINGE (ML) INJECTION EVERY 12 HOURS PRN
Status: DISCONTINUED | OUTPATIENT
Start: 2022-03-11 | End: 2022-03-17 | Stop reason: HOSPADM

## 2022-03-11 RX ADMIN — SODIUM CHLORIDE 1000 ML: 0.9 INJECTION, SOLUTION INTRAVENOUS at 08:03

## 2022-03-11 RX ADMIN — TETANUS TOXOID, REDUCED DIPHTHERIA TOXOID AND ACELLULAR PERTUSSIS VACCINE, ADSORBED 0.5 ML: 5; 2.5; 8; 8; 2.5 SUSPENSION INTRAMUSCULAR at 10:03

## 2022-03-11 RX ADMIN — FUROSEMIDE 40 MG: 10 INJECTION, SOLUTION INTRAMUSCULAR; INTRAVENOUS at 10:03

## 2022-03-11 RX ADMIN — CEFTRIAXONE 1 G: 1 INJECTION, SOLUTION INTRAVENOUS at 10:03

## 2022-03-11 RX ADMIN — AZITHROMYCIN MONOHYDRATE 500 MG: 250 TABLET ORAL at 10:03

## 2022-03-12 PROBLEM — R19.7 DIARRHEA: Status: ACTIVE | Noted: 2022-03-12

## 2022-03-12 LAB
ALBUMIN SERPL BCP-MCNC: 2.9 G/DL (ref 3.5–5.2)
ALP SERPL-CCNC: 66 U/L (ref 55–135)
ALT SERPL W/O P-5'-P-CCNC: 19 U/L (ref 10–44)
ANION GAP SERPL CALC-SCNC: 9 MMOL/L (ref 8–16)
AST SERPL-CCNC: 19 U/L (ref 10–40)
BASOPHILS # BLD AUTO: 0.03 K/UL (ref 0–0.2)
BASOPHILS NFR BLD: 0.3 % (ref 0–1.9)
BILIRUB SERPL-MCNC: 0.5 MG/DL (ref 0.1–1)
BUN SERPL-MCNC: 23 MG/DL (ref 8–23)
CALCIUM SERPL-MCNC: 8.7 MG/DL (ref 8.7–10.5)
CHLORIDE SERPL-SCNC: 104 MMOL/L (ref 95–110)
CO2 SERPL-SCNC: 27 MMOL/L (ref 23–29)
CREAT SERPL-MCNC: 1.6 MG/DL (ref 0.5–1.4)
DIFFERENTIAL METHOD: ABNORMAL
EOSINOPHIL # BLD AUTO: 0 K/UL (ref 0–0.5)
EOSINOPHIL NFR BLD: 0.3 % (ref 0–8)
ERYTHROCYTE [DISTWIDTH] IN BLOOD BY AUTOMATED COUNT: 15.3 % (ref 11.5–14.5)
EST. GFR  (AFRICAN AMERICAN): 35 ML/MIN/1.73 M^2
EST. GFR  (NON AFRICAN AMERICAN): 30 ML/MIN/1.73 M^2
GLUCOSE SERPL-MCNC: 108 MG/DL (ref 70–110)
HCT VFR BLD AUTO: 32.1 % (ref 37–48.5)
HGB BLD-MCNC: 9.8 G/DL (ref 12–16)
IMM GRANULOCYTES # BLD AUTO: 0.02 K/UL (ref 0–0.04)
IMM GRANULOCYTES NFR BLD AUTO: 0.2 % (ref 0–0.5)
LYMPHOCYTES # BLD AUTO: 0.8 K/UL (ref 1–4.8)
LYMPHOCYTES NFR BLD: 8.3 % (ref 18–48)
MAGNESIUM SERPL-MCNC: 2.1 MG/DL (ref 1.6–2.6)
MCH RBC QN AUTO: 29.8 PG (ref 27–31)
MCHC RBC AUTO-ENTMCNC: 30.5 G/DL (ref 32–36)
MCV RBC AUTO: 98 FL (ref 82–98)
MONOCYTES # BLD AUTO: 0.8 K/UL (ref 0.3–1)
MONOCYTES NFR BLD: 8.2 % (ref 4–15)
NEUTROPHILS # BLD AUTO: 8 K/UL (ref 1.8–7.7)
NEUTROPHILS NFR BLD: 82.7 % (ref 38–73)
NRBC BLD-RTO: 0 /100 WBC
PHOSPHATE SERPL-MCNC: 3.2 MG/DL (ref 2.7–4.5)
PLATELET # BLD AUTO: 180 K/UL (ref 150–450)
PMV BLD AUTO: 13 FL (ref 9.2–12.9)
POTASSIUM SERPL-SCNC: 4 MMOL/L (ref 3.5–5.1)
PROT SERPL-MCNC: 5.7 G/DL (ref 6–8.4)
RBC # BLD AUTO: 3.29 M/UL (ref 4–5.4)
SODIUM SERPL-SCNC: 140 MMOL/L (ref 136–145)
TROPONIN I SERPL DL<=0.01 NG/ML-MCNC: 0.04 NG/ML (ref 0–0.03)
TROPONIN I SERPL DL<=0.01 NG/ML-MCNC: 0.04 NG/ML (ref 0–0.03)
WBC # BLD AUTO: 9.64 K/UL (ref 3.9–12.7)

## 2022-03-12 PROCEDURE — 80053 COMPREHEN METABOLIC PANEL: CPT | Performed by: NURSE PRACTITIONER

## 2022-03-12 PROCEDURE — 85025 COMPLETE CBC W/AUTO DIFF WBC: CPT | Performed by: NURSE PRACTITIONER

## 2022-03-12 PROCEDURE — 25000242 PHARM REV CODE 250 ALT 637 W/ HCPCS: Performed by: NURSE PRACTITIONER

## 2022-03-12 PROCEDURE — 27000221 HC OXYGEN, UP TO 24 HOURS

## 2022-03-12 PROCEDURE — 63700000 PHARM REV CODE 250 ALT 637 W/O HCPCS: Performed by: NURSE PRACTITIONER

## 2022-03-12 PROCEDURE — 94640 AIRWAY INHALATION TREATMENT: CPT

## 2022-03-12 PROCEDURE — 83735 ASSAY OF MAGNESIUM: CPT | Performed by: NURSE PRACTITIONER

## 2022-03-12 PROCEDURE — 94761 N-INVAS EAR/PLS OXIMETRY MLT: CPT

## 2022-03-12 PROCEDURE — 63600175 PHARM REV CODE 636 W HCPCS: Performed by: NURSE PRACTITIONER

## 2022-03-12 PROCEDURE — 99900035 HC TECH TIME PER 15 MIN (STAT)

## 2022-03-12 PROCEDURE — 25000003 PHARM REV CODE 250: Performed by: INTERNAL MEDICINE

## 2022-03-12 PROCEDURE — 36415 COLL VENOUS BLD VENIPUNCTURE: CPT | Performed by: NURSE PRACTITIONER

## 2022-03-12 PROCEDURE — 27000207 HC ISOLATION

## 2022-03-12 PROCEDURE — 84484 ASSAY OF TROPONIN QUANT: CPT | Mod: 91 | Performed by: NURSE PRACTITIONER

## 2022-03-12 PROCEDURE — 11000001 HC ACUTE MED/SURG PRIVATE ROOM

## 2022-03-12 PROCEDURE — 84100 ASSAY OF PHOSPHORUS: CPT | Performed by: NURSE PRACTITIONER

## 2022-03-12 PROCEDURE — 25000003 PHARM REV CODE 250: Performed by: NURSE PRACTITIONER

## 2022-03-12 RX ORDER — AZITHROMYCIN 250 MG/1
250 TABLET, FILM COATED ORAL DAILY
Status: COMPLETED | OUTPATIENT
Start: 2022-03-12 | End: 2022-03-15

## 2022-03-12 RX ORDER — LEVOTHYROXINE SODIUM 125 UG/1
125 TABLET ORAL
Status: DISCONTINUED | OUTPATIENT
Start: 2022-03-12 | End: 2022-03-17 | Stop reason: HOSPADM

## 2022-03-12 RX ADMIN — LEVOTHYROXINE SODIUM 125 MCG: 0.12 TABLET ORAL at 08:03

## 2022-03-12 RX ADMIN — MONTELUKAST 10 MG: 10 TABLET, FILM COATED ORAL at 08:03

## 2022-03-12 RX ADMIN — AZITHROMYCIN MONOHYDRATE 250 MG: 250 TABLET ORAL at 08:03

## 2022-03-12 RX ADMIN — APIXABAN 5 MG: 2.5 TABLET, FILM COATED ORAL at 08:03

## 2022-03-12 RX ADMIN — LORAZEPAM 1 MG: 1 TABLET ORAL at 01:03

## 2022-03-12 RX ADMIN — SODIUM CHLORIDE: 0.9 INJECTION, SOLUTION INTRAVENOUS at 01:03

## 2022-03-12 RX ADMIN — LORAZEPAM 1 MG: 1 TABLET ORAL at 08:03

## 2022-03-12 RX ADMIN — IPRATROPIUM BROMIDE AND ALBUTEROL SULFATE 3 ML: 2.5; .5 SOLUTION RESPIRATORY (INHALATION) at 09:03

## 2022-03-12 RX ADMIN — SODIUM CHLORIDE: 0.9 INJECTION, SOLUTION INTRAVENOUS at 03:03

## 2022-03-12 RX ADMIN — CEFTRIAXONE 1 G: 1 INJECTION, SOLUTION INTRAVENOUS at 09:03

## 2022-03-12 RX ADMIN — ACETAMINOPHEN 650 MG: 325 TABLET ORAL at 01:03

## 2022-03-12 RX ADMIN — HYDRALAZINE HYDROCHLORIDE 100 MG: 25 TABLET, FILM COATED ORAL at 05:03

## 2022-03-12 RX ADMIN — ACETAMINOPHEN 650 MG: 325 TABLET ORAL at 05:03

## 2022-03-12 RX ADMIN — CARVEDILOL 6.25 MG: 6.25 TABLET, FILM COATED ORAL at 08:03

## 2022-03-12 RX ADMIN — MELATONIN TAB 3 MG 6 MG: 3 TAB at 08:03

## 2022-03-12 RX ADMIN — ACETAMINOPHEN 650 MG: 325 TABLET ORAL at 08:03

## 2022-03-12 RX ADMIN — CARVEDILOL 6.25 MG: 6.25 TABLET, FILM COATED ORAL at 05:03

## 2022-03-12 RX ADMIN — AMIODARONE HYDROCHLORIDE 200 MG: 200 TABLET ORAL at 08:03

## 2022-03-12 NOTE — ED NOTES
Pt. Oxygen noted as 100% on 1L; Titrated down to room air: SpO2 noted as 89-90%. Pt. Placed back on 1L NC, SpO2 inc. To 98%

## 2022-03-12 NOTE — ASSESSMENT & PLAN NOTE
Patient with acute kidney injury likely d/t IVVD/Dehydration  Which is currently stable. Labs reviewed- Renal function/electrolytes with Estimated Creatinine Clearance: 28.1 mL/min (A) (based on SCr of 1.8 mg/dL (H)). according to latest data. Monitor urine output and serial BMP and adjust therapy as needed. Avoid nephrotoxins and renally dose meds for GFR listed above.

## 2022-03-12 NOTE — ASSESSMENT & PLAN NOTE
Patient with known CAD  which is controlled Will  monitor for S/Sx of angina/ACS. Continue to monitor on telemetry.

## 2022-03-12 NOTE — PLAN OF CARE
[facility]  Initial Discharge Assessment       Primary Care Provider: Yumiko Page MD    Admission Diagnosis: Weakness [R53.1]  SOB (shortness of breath) [R06.02]  Atypical pneumonia [J18.9]  Chest pain [R07.9]  Fall from slip, trip, or stumble, initial encounter [W01.0XXA]    Admission Date: 3/11/2022  Expected Discharge Date: to be determined    SW met with pt at bedside, daughterAngela present and answered most of the questions and verified information.  See DME below.  No HH, dialysis and doesn't take coumadin.  Daughter will drive pt home.  Daughter requested HH and hospital bed.    Discharge Barriers Identified: None    Payor: [employer] MEDICARE / Plan: [employer] MEDICARE HMO / Product Type: Capitation /     Extended Emergency Contact Information  Primary Emergency Contact: Angela Steele   [place]  Mobile Phone: [phone]  Relation: Daughter  Secondary Emergency Contact: Chet Hughes  Address: UNKNOWN   [place]  Mobile Phone: [phone]  Relation: Son  Preferred language: English   needed? No    Discharge Plan A: Home Health  Discharge Plan B: Home Health      [facility] [place] - [address] [place]  [address] [place]  [place] [address]  Phone: [phone] Fax: [phone]      Initial Assessment (most recent)     Adult Discharge Assessment - 03/12/22 0951        Discharge Assessment    Assessment Type Discharge Planning Assessment     Confirmed/corrected address, phone number and insurance Yes     Confirmed Demographics Correct on Facesheet     Source of Information family     Communicated JANAY with patient/caregiver No     Lives With parent(s);other (see comments)     Do you expect to return to your current living situation? Yes     Prior to hospitilization cognitive status: Alert/Oriented     Current cognitive status: Alert/Oriented     Walking or Climbing Stairs Difficulty ambulation difficulty, requires  equipment     Dressing/Bathing Difficulty bathing difficulty, assistance 1 person;dressing difficulty, dependent     Equipment Currently Used at Home wheelchair;bedside commode;bath bench;shower chair;grab bar;cane, straight;nebulizer     Readmission within 30 days? No     Patient currently being followed by outpatient case management? No     Do you currently have service(s) that help you manage your care at home? No     Do you take prescription medications? Yes     Do you have prescription coverage? Yes     Coverage [employer]     Do you have any problems affording any of your prescribed medications? TBD     Is the patient taking medications as prescribed? no     How do you get to doctors appointments? family or friend will provide     Are you on dialysis? No     Do you take coumadin? No     Discharge Plan A Home Health     Discharge Plan B Home Health     DME Needed Upon Discharge  hospital bed     Discharge Plan discussed with: Adult children     Discharge Barriers Identified None

## 2022-03-12 NOTE — ASSESSMENT & PLAN NOTE
Chronic, controlled.  Latest blood pressure and vitals reviewed-   Temp:  [97.8 °F (36.6 °C)-100.2 °F (37.9 °C)]   Pulse:  []   Resp:  [18-20]   BP: (147-167)/(64-72)   SpO2:  [89 %-99 %] .   Home meds for hypertension were reviewed and noted below.   Hypertension Medications             carvediloL (COREG) 6.25 MG tablet Take 1 tablet (6.25 mg total) by mouth 2 (two) times daily with meals. Need office visit before next refill, last seen 12/2021. This will be the LAST Rx if visit is not made.    furosemide (LASIX) 40 MG tablet TAKE ONE TABLET BY MOUTH DAILY AS NEEDED For (fluid retention)    hydrALAZINE (APRESOLINE) 100 MG tablet Take 1 tablet (100 mg total) by mouth 3 (three) times daily.    lisinopriL (PRINIVIL,ZESTRIL) 20 MG tablet Take 2 tablets (40 mg total) by mouth once daily.    nitroGLYCERIN (NITROSTAT) 0.4 MG SL tablet Place 1 tablet (0.4 mg total) under the tongue every 5 (five) minutes as needed for Chest pain.          While in the hospital, will manage blood pressure as follows; Continue home antihypertensive regimen, hold lisinopril    Will utilize p.r.n. blood pressure medication only if patient's blood pressure greater than  180/110 and she develops symptoms such as worsening chest pain or shortness of breath.

## 2022-03-12 NOTE — ASSESSMENT & PLAN NOTE
Patient with Long standing persistent (>12 months) atrial fibrillation which is controlled currently with Beta Blocker and Amiodarone. Patient is currently in sinus rhythm.[id number] Score: 4. Anticoagulation indicated. Anticoagulation done with eliquis.

## 2022-03-12 NOTE — ASSESSMENT & PLAN NOTE
Patient with current diagnosis of pneumonia due to bacterial etiology due to  unknown organism which is uncontrolled due to persistent hypoxia . I have reviewed the pertinent imaging. Current antimicrobial regimen consists of   Antibiotics (From admission, onward)            Start     Stop Route Frequency Ordered    03/12/22 2240  cefTRIAXone (ROCEPHIN) 1 g/50 mL D5W IVPB         -- IV Every 24 hours (non-standard times) 03/12/22 0105    03/12/22 0900  azithromycin tablet 250 mg         03/16 0859 Oral Daily 03/12/22 0105      . Cultures drawn and noted-   Microbiology Results (last 7 days)     Procedure Component Value Units Date/Time    Urine culture [[id number]] Collected: 03/11/22 2053    Order Status: No result Specimen: Urine Updated: 03/11/22 2123       Will monitor patient closely and continue current treatment plan unchanged.

## 2022-03-12 NOTE — ASSESSMENT & PLAN NOTE
Patient's anemia is currently controlled. Has not received any PRBCs to date.. Etiology likely d/t chronic disease  Current CBC reviewed-   Lab Results   Component Value Date    HGB 10.4 (L) 03/11/2022    HCT 34.5 (L) 03/11/2022     Monitor serial CBC and transfuse if patient becomes hemodynamically unstable, symptomatic or H/H drops below 7/21.

## 2022-03-12 NOTE — H&P
[facility] Medicine  History & Physical    Patient Name: Heather Hughes  MRN: [id number]  Patient Class: IP- Inpatient  Admission Date: 3/11/2022  Attending Physician: Aleshia Isabel MD   Primary Care Provider: Yumiko Page MD         Patient information was obtained from patient, relative(s), past medical records and ER records.     Subjective:     Principal Problem:Atypical pneumonia    Chief Complaint:   Chief Complaint   Patient presents with    Extremity Weakness     Got weak in bathroom and sat on floor x 40 minutes before EMS got her, pt reports diarrhea and cough, not eating or drinking well    Cough    Shortness of Breath     Room air saturation at home 83%        HPI: Heather Hughes is a 81 y.o. female with a past medical history of CAD, HTN, DVT, and past surgical history of cardiac stent, cardioversion and knee surgery, who presented to the ED tonight for generalized weakness and ankle pain after a fall at home. HPI provided by patient and daughter. Daughter states that the patient was in her normal state of health last night but was very difficulty to wake up this morning. She states that she slept almost 18 hours today. Daughter states that she tried to get her up and into a chair to encourage the patient to move around. She needed to use the restroom and needed assistance in walking there, where she usually is able to get around with a walker and no additional help. When they got to the bathroom, as they were turning to sit her on the toilet, her legs gave out and twisted underneath her. Her daughter assisted her to the ground and she landed on her left leg and hit her elbow on the floor, sustaining two skin tears. She has also had a cough for two weeks, no fever or chest pain. She did have several episodes of diarrhea. She denies congestion, abdominal pain, nausea or vomiting.     Upon arrival to the ED, the patient found to be on Oxygen via NC, as she was noted to  be hypoxic by EMS on their arrival at 83%. On oxygen, her pulse ox was 99%. CXR showed evidence of atypical pneumonia. Urinalysis showed a UTI. Xray of the left ankle showed an avulsion fracture of the medial malleolus. CBC showed chronic anemia. CMP showed an STACIE. BNP was elevated at 240, and initial troponin was elevated at 0.033. Hospital Medicine consulted for admission and further management.      Past Medical History:   Diagnosis Date    Allergy     Anticoagulant long-term use     Arthritis     Asthma     Coronary artery disease     stent x 1    Deep vein blood clot of right lower extremity 1965 & 1971    Full dentures     Heart attack 01/13/2017    Hypertension     Osteoporosis     Sleep apnea     Thyroid disease     Ulcer     Wears glasses        Past Surgical History:   Procedure Laterality Date    cardic stent  01/13/2017    CARDIOVERSION Left 3/12/2020    Procedure: Cardioversion;  Surgeon: Kaleigh Rascon MD;  Location: [facility] CATH LAB;  Service: Cardiology;  Laterality: Left;    CHOLECYSTECTOMY      EYE SURGERY      bilateral cataracts    FRACTURE SURGERY  2011    left wrist     KNEE ARTHROPLASTY Left 8/15/2018    Procedure: ARTHROPLASTY, KNEE;  Surgeon: Shantanu Santiago MD;  Location: [facility] OR;  Service: Orthopedics;  Laterality: Left;  ANESTHESIA GENERAL AND BLOCK    THYROIDECTOMY         Review of patient's allergies indicates:   Allergen Reactions    Bactroban [mupirocin calcium] Itching and Other (See Comments)     Red, tingling, itching, burning     Ditropan [oxybutynin chloride] Swelling     swelling    Lipitor [atorvastatin] Other (See Comments)     Fall/ balance     Myrbetriq [mirabegron] Swelling and Other (See Comments)     Legs swelling, couldn't void     Pravastatin Other (See Comments)     Balance problem     Oxycodone     Codeine Nausea Only    Mobic [meloxicam] Other (See Comments)     Not sure reaction    Perfume Other (See Comments)     And flowers cause  allergy and makes her cough.    Sulfur Other (See Comments)     Was told not to take as a child       No current facility-administered medications on file prior to encounter.     Current Outpatient Medications on File Prior to Encounter   Medication Sig    acetaminophen (TYLENOL) 650 MG TbSR Take 650 mg by mouth every 8 (eight) hours as needed (pain).     albuterol (ACCUNEB) 1.25 mg/3 mL Nebu Take 3 mLs (1.25 mg total) by nebulization every 6 (six) hours as needed (cough wheeze). Rescue    albuterol 90 mcg/actuation inhaler Inhale 2 puffs into the lungs every 6 (six) hours as needed for Wheezing.    amiodarone (PACERONE) 200 MG Tab Take 1 tablet (200 mg total) by mouth once daily. Need office visit before next refill, last seen 12/2020. This will be the LAST Rx if visit is not made.    apixaban (ELIQUIS) 5 mg Tab Take 1 tablet (5 mg total) by mouth 2 (two) times daily. Need office visit before next refill, last seen 12/2020. This will be the LAST Rx if visit is not made.    carvediloL (COREG) 6.25 MG tablet Take 1 tablet (6.25 mg total) by mouth 2 (two) times daily with meals. Need office visit before next refill, last seen 12/2021. This will be the LAST Rx if visit is not made.    hydrALAZINE (APRESOLINE) 100 MG tablet Take 1 tablet (100 mg total) by mouth 3 (three) times daily. (Patient taking differently: Take 100 mg by mouth Daily. 1/2-1 tablet)    lisinopriL (PRINIVIL,ZESTRIL) 20 MG tablet Take 2 tablets (40 mg total) by mouth once daily.    LORazepam (ATIVAN) 1 MG tablet Take 1 tablet (1 mg total) by mouth every 6 (six) hours as needed for Anxiety.    montelukast (SINGULAIR) 10 mg tablet Take 1 tablet (10 mg total) by mouth every evening.    nitroGLYCERIN (NITROSTAT) 0.4 MG SL tablet Place 1 tablet (0.4 mg total) under the tongue every 5 (five) minutes as needed for Chest pain.    ondansetron (ZOFRAN-ODT) 4 MG TbDL Take 1 tablet (4 mg total) by mouth every 8 (eight) hours as needed (nausea).     potassium chloride SA (K-DUR,KLOR-CON) 20 MEQ tablet TAKE ONE TABLET BY MOUTH ONCE DAILY when taking lasix (Patient taking differently: Take 20 mEq by mouth as needed.)    aspirin (ECOTRIN) 81 MG EC tablet Take 81 mg by mouth once daily.    furosemide (LASIX) 40 MG tablet TAKE ONE TABLET BY MOUTH DAILY AS NEEDED For (fluid retention)    gabapentin (NEURONTIN) 100 MG capsule TAKE ONE CAPSULE BY MOUTH THREE TIMES DAILY (Patient taking differently: Take 100 mg by mouth as needed.)    hydrOXYzine HCL (ATARAX) 10 MG Tab Take 10 mg by mouth as needed.    SYNTHROID 125 mcg tablet TAKE ONE TABLET BY MOUTH once a day     Family History       Problem Relation (Age of Onset)    Bipolar disorder Son    Bone cancer Paternal Uncle    Breast cancer Maternal Uncle    Cancer Father, Brother, Maternal Aunt, Maternal Uncle, Paternal Aunt, Paternal Uncle, Maternal Grandmother, Son    Colon cancer Maternal Uncle    Dementia Maternal Aunt, Brother    Drug abuse Son    Early death Son    Esophageal cancer Father, Brother, Son    DIEGO disease Father, Son, Brother    Heart disease Mother, Maternal Aunt, Paternal Uncle    Kidney cancer Maternal Aunt    Kidney disease Maternal Aunt    Liver cancer Maternal Aunt    Lung cancer Maternal Uncle    Lupus Mother, Daughter    Osteoporosis Mother    Parkinsonism Paternal Aunt, Paternal Grandmother    Stomach cancer Paternal Aunt, Paternal Uncle    Ulcers Mother, Brother          Tobacco Use    Smoking status: Former Smoker     Packs/day: 0.50     Types: Cigarettes     Quit date: 2017     Years since quittin.1    Smokeless tobacco: Never Used   Substance and Sexual Activity    Alcohol use: No    Drug use: No    Sexual activity: Never     Review of Systems   Constitutional:  Positive for activity change (decreased) and fatigue. Negative for chills and fever.   HENT:  Negative for congestion and sore throat.    Eyes:  Negative for visual disturbance.   Respiratory:  Positive for cough  and shortness of breath.    Cardiovascular:  Negative for chest pain and palpitations.   Gastrointestinal:  Positive for diarrhea. Negative for abdominal pain, nausea and vomiting.   Endocrine: Negative for cold intolerance and heat intolerance.   Genitourinary:  Negative for dysuria and hematuria.   Musculoskeletal:  Negative for arthralgias and myalgias.   Skin:  Negative for pallor and rash.   Neurological:  Positive for weakness. Negative for tremors and seizures.   Hematological:  Negative for adenopathy. Does not bruise/bleed easily.   Psychiatric/Behavioral:  Negative for hallucinations.    All other systems reviewed and are negative.  Objective:     Vital Signs (Most Recent):  Temp: 100.2 °F (37.9 °C) (03/12/22 0028)  Pulse: 77 (03/12/22 0028)  Resp: 20 (03/12/22 0028)  BP: (!) 165/72 (03/12/22 0028)  SpO2: 97 % (03/12/22 0028)   Vital Signs (24h Range):  Temp:  [97.8 °F (36.6 °C)-100.2 °F (37.9 °C)] 100.2 °F (37.9 °C)  Pulse:  [] 77  Resp:  [18-20] 20  SpO2:  [89 %-99 %] 97 %  BP: (147-167)/(64-72) 165/72     Weight: 99.8 kg (220 lb)  Body mass index is 38.97 kg/m².    Physical Exam  Vitals and nursing note reviewed.   Constitutional:       General: She is awake. She is not in acute distress.     Appearance: She is well-developed. She is obese. She is ill-appearing (elderly and chronically ill appearing).   HENT:      Head: Normocephalic and atraumatic.      Mouth/Throat:      Lips: Pink.      Mouth: Mucous membranes are moist.   Eyes:      Conjunctiva/sclera: Conjunctivae normal.      Pupils: Pupils are equal, round, and reactive to light.   Neck:      Thyroid: No thyromegaly.      Vascular: No JVD.   Cardiovascular:      Rate and Rhythm: Normal rate and regular rhythm.      Heart sounds: Normal heart sounds, S1 normal and S2 normal. No murmur heard.    No friction rub. No gallop.   Pulmonary:      Effort: Pulmonary effort is normal.      Breath sounds: Normal breath sounds.   Abdominal:       General: Abdomen is protuberant. Bowel sounds are normal. There is no distension.      Palpations: Abdomen is soft. There is no mass.      Tenderness: There is no abdominal tenderness.   Musculoskeletal:         General: Normal range of motion.        Arms:       Cervical back: Neck supple.      Right lower le+ Pitting Edema present.      Left lower leg: Tenderness and bony tenderness present. No deformity or lacerations. 1+ Pitting Edema present.        Legs:    Skin:     General: Skin is warm and dry.      Capillary Refill: Capillary refill takes less than 2 seconds.   Neurological:      General: No focal deficit present.      Mental Status: She is alert and oriented to person, place, and time. Mental status is at baseline.      Cranial Nerves: No cranial nerve deficit.      Sensory: Sensation is intact.      Motor: Weakness (generalized, no focal weakness) present.   Psychiatric:         Behavior: Behavior normal. Behavior is cooperative.         CRANIAL NERVES     CN III, IV, VI   Pupils are equal, round, and reactive to light.     Significant Labs: All pertinent labs within the past 24 hours have been reviewed.  CBC:   Recent Labs   Lab 22   WBC 11.45   HGB 10.4*   HCT 34.5*        CMP:   Recent Labs   Lab 22      K 4.3      CO2 24   GLU 93   BUN 24*   CREATININE 1.8*   CALCIUM 9.1   PROT 5.9*   ALBUMIN 3.1*   BILITOT 0.5   ALKPHOS 69   AST 18   ALT 20   ANIONGAP 10   EGFRNONAA 26*     Cardiac Markers:   Recent Labs   Lab 22   *     Magnesium:   Recent Labs   Lab 22   MG 2.2       Troponin:   Recent Labs   Lab 22   TROPONINI 0.033*     Urine Studies:   Recent Labs   Lab 22   COLORU Yellow   APPEARANCEUA Hazy*   PHUR 8.0   SPECGRAV 1.015   PROTEINUA 2+*   GLUCUA Negative   KETONESU Negative   BILIRUBINUA 1+*   OCCULTUA Negative   NITRITE Negative   UROBILINOGEN 1.0   LEUKOCYTESUR 1+*   RBCUA 3   WBCUA 45*    BACTERIA Many*   HYALINECASTS 10*       Significant Imaging: I have reviewed all pertinent imaging results/findings within the past 24 hours.   Imaging Results              X-Ray Chest AP Portable (Final result)  Result time 03/11/22 20:33:53      Final result by Aba Hoff MD (03/11/22 20:33:53)                   Impression:      Prominent interstitial markings could relate to pulmonary vascular congestion/edema versus an atypical or viral infectious etiology.    Possible small bilateral pleural effusions.      Electronically signed by: Aba Hoff  Date:    03/11/2022  Time:    20:33               Narrative:    EXAMINATION:  XR CHEST AP PORTABLE    CLINICAL HISTORY:  Shortness of breath    TECHNIQUE:  Single frontal view of the chest was performed.    COMPARISON:  Chest radiograph performed 08/24/2021    FINDINGS:  Grossly unchanged cardiomediastinal contours.  Enlargement the cardiac silhouette is suggested.  Prominent interstitial markings.    No definite pneumothorax.  Nonspecific blunting the costophrenic angles could relate to effusion versus scarring.    No acute findings suggested visualized abdomen.  Osseous and soft tissue structures appear without acute change.                                       X-Ray Ankle Complete Left (Final result)  Result time 03/11/22 20:32:04      Final result by Aba Hoff MD (03/11/22 20:32:04)                   Impression:      Curvilinear increased density at the medial aspect of the medial malleolus could relate to acute avulsive type fracture.  Clinical correlation recommended.    Additional details as above.      Electronically signed by: Aba Hoff  Date:    03/11/2022  Time:    20:32               Narrative:    EXAMINATION:  XR ANKLE COMPLETE 3 VIEW LEFT    CLINICAL HISTORY:  Fall on same level from slipping, tripping and stumbling without subsequent striking against object, initial encounter    TECHNIQUE:  AP, lateral and oblique views of the  left ankle were performed.    COMPARISON:  Left ankle radiograph performed 11/15/2018.    FINDINGS:  Osseous demineralization.    Curvilinear increased density at the medial aspect of the medial malleolus could relate to acute avulsive type fracture.  There is overlying soft tissue swelling.    Elsewhere, no definite additional potential acute fractures are seen.  The talar dome appears intact.  Enthesopathic changes are noted at the calcaneus.  Degenerative spurring of the dorsal midfoot.  Degenerative changes at the calcaneocuboid and talonavicular joints.  Possible loose body formation overlying the dorsal aspect of the talonavicular joint.    Questioned osteochondral lesion at the far medial talar dome seen on the 11/15/2018 study not substantially changed.    Vascular calcifications.                                        Assessment/Plan:     * Atypical pneumonia  Patient with current diagnosis of pneumonia due to bacterial etiology due to  unknown organism which is uncontrolled due to persistent hypoxia . I have reviewed the pertinent imaging. Current antimicrobial regimen consists of   Antibiotics (From admission, onward)            Start     Stop Route Frequency Ordered    03/12/22 2240  cefTRIAXone (ROCEPHIN) 1 g/50 mL D5W IVPB         -- IV Every 24 hours (non-standard times) 03/12/22 0105    03/12/22 0900  azithromycin tablet 250 mg         03/16 0859 Oral Daily 03/12/22 0105      . Cultures drawn and noted-   Microbiology Results (last 7 days)     Procedure Component Value Units Date/Time    Urine culture [[id number]] Collected: 03/11/22 2053    Order Status: No result Specimen: Urine Updated: 03/11/22 2123       Will monitor patient closely and continue current treatment plan unchanged.        Acute cystitis  Monitor urine culture  Rocephin daily      Avulsion fracture of ankle, left, closed, initial encounter  S/p slight fall at home due to weakness, daughter assisted to ground but ankle was twisted  "under her and she fell onto her ankle    Xray shows "Curvilinear increased density at the medial aspect of the medial malleolus could relate to acute avulsive type fracture."  Splint placed in ER  Ortho consulted  Pain meds PRN    STACIE (acute kidney injury)  Patient with acute kidney injury likely d/t IVVD/Dehydration  Which is currently stable. Labs reviewed- Renal function/electrolytes with Estimated Creatinine Clearance: 28.1 mL/min (A) (based on SCr of 1.8 mg/dL (H)). according to latest data. Monitor urine output and serial BMP and adjust therapy as needed. Avoid nephrotoxins and renally dose meds for GFR listed above.         Diarrhea  cdiff panel ordered  No recent abx or fevers  Denies abdominal pain      LBBB (left bundle branch block), onset 8/2020  Noted, current EKG shows same  Monitor tele      Anticoagulant long-term use  Fall and bleeding precautions      Atrial fibrillation with RVR  Patient with Long standing persistent (>12 months) atrial fibrillation which is controlled currently with Beta Blocker and Amiodarone. Patient is currently in sinus rhythm.[id number] Score: 4. Anticoagulation indicated. Anticoagulation done with eliquis.        Iron deficiency anemia  Patient's anemia is currently controlled. Has not received any PRBCs to date.. Etiology likely d/t chronic disease  Current CBC reviewed-   Lab Results   Component Value Date    HGB 10.4 (L) 03/11/2022    HCT 34.5 (L) 03/11/2022     Monitor serial CBC and transfuse if patient becomes hemodynamically unstable, symptomatic or H/H drops below 7/21.         Coronary artery disease due to calcified coronary lesion  Patient with known CAD  which is controlled Will  monitor for S/Sx of angina/ACS. Continue to monitor on telemetry.         Hypertension  Chronic, controlled.  Latest blood pressure and vitals reviewed-   Temp:  [97.8 °F (36.6 °C)-100.2 °F (37.9 °C)]   Pulse:  []   Resp:  [18-20]   BP: (147-167)/(64-72)   SpO2:  [89 %-99 %] . "   Home meds for hypertension were reviewed and noted below.   Hypertension Medications             carvediloL (COREG) 6.25 MG tablet Take 1 tablet (6.25 mg total) by mouth 2 (two) times daily with meals. Need office visit before next refill, last seen 12/2021. This will be the LAST Rx if visit is not made.    furosemide (LASIX) 40 MG tablet TAKE ONE TABLET BY MOUTH DAILY AS NEEDED For (fluid retention)    hydrALAZINE (APRESOLINE) 100 MG tablet Take 1 tablet (100 mg total) by mouth 3 (three) times daily.    lisinopriL (PRINIVIL,ZESTRIL) 20 MG tablet Take 2 tablets (40 mg total) by mouth once daily.    nitroGLYCERIN (NITROSTAT) 0.4 MG SL tablet Place 1 tablet (0.4 mg total) under the tongue every 5 (five) minutes as needed for Chest pain.          While in the hospital, will manage blood pressure as follows; Continue home antihypertensive regimen, hold lisinopril    Will utilize p.r.n. blood pressure medication only if patient's blood pressure greater than  180/110 and she develops symptoms such as worsening chest pain or shortness of breath.        Moderate persistent asthma without complication  Noted, chronic  duonebs PRN        VTE Risk Mitigation (From admission, onward)         Ordered     apixaban tablet 5 mg  2 times daily         03/11/22 2303     Reason for No Pharmacological VTE Prophylaxis  Once        Question:  Reasons:  Answer:  Already adequately anticoagulated on oral Anticoagulants    03/11/22 2303     IP VTE HIGH RISK PATIENT  Once         03/11/22 2303     Place sequential compression device  Until discontinued         03/11/22 2303                   KD Logan  Department of Hospital Medicine   [facility]

## 2022-03-12 NOTE — ED NOTES
Skin tear to LUE redressed with coban and nonadherent pad, discussed with pt. And family imminent admission and transfer, Pt. In NAD, DARIELS

## 2022-03-12 NOTE — ASSESSMENT & PLAN NOTE
"S/p slight fall at home due to weakness, daughter assisted to ground but ankle was twisted under her and she fell onto her ankle    Xray shows "Curvilinear increased density at the medial aspect of the medial malleolus could relate to acute avulsive type fracture."  Splint placed in ER  Ortho consulted  Pain meds PRN  "

## 2022-03-12 NOTE — ED PROVIDER NOTES
Encounter Date: 3/11/2022    SCRIBE #1 NOTE: I, Janisreji Marcos, am scribing for, and in the presence of, Krishna Lea MD.       History     Chief Complaint   Patient presents with    Extremity Weakness     Got weak in bathroom and sat on floor x 40 minutes before EMS got her, pt reports diarrhea and cough, not eating or drinking well    Cough    Shortness of Breath     Room air saturation at home 83%     Time seen by provider: 7:41 PM on 03/11/2022    Heather Hughes is a 81 y.o. female who presents to the ED via EMS with generalized weakness and left elbow wounds that occurred PTA. Pt reports weakness in her legs, which caused her to fall. Per EMS, pt had an O2 saturation of 83%. Pt is on oxygen now. Pt denies being on at home oxygen. Pt reports productive cough and SOB. Pt endorses having diarrhea for 2 days. Pt denies LOC or hitting her head. Pt is on eliquis. Pt also endorses left ankle pain. The patient denies fever, congestion, abdominal pain, N/V, or any other symptoms at this time. 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 PSHx of cardiac stent, left knee arthroplasty, and cardioversion.    The history is provided by the patient and the EMS personnel.     Review of patient's allergies indicates:   Allergen Reactions    Bactroban [mupirocin calcium] Itching and Other (See Comments)     Red, tingling, itching, burning     Ditropan [oxybutynin chloride] Swelling     swelling    Lipitor [atorvastatin] Other (See Comments)     Fall/ balance     Myrbetriq [mirabegron] Swelling and Other (See Comments)     Legs swelling, couldn't void     Pravastatin Other (See Comments)     Balance problem     Oxycodone     Codeine Nausea Only    Mobic [meloxicam] Other (See Comments)     Not sure reaction    Perfume Other (See Comments)     And flowers cause allergy and makes her cough.    Sulfur Other (See Comments)     Was told not to take as a  child     Past Medical History:   Diagnosis Date    Allergy     Anticoagulant long-term use     Arthritis     Asthma     Coronary artery disease     stent x 1    Deep vein blood clot of right lower extremity 1965 & 1971    Full dentures     Heart attack 01/13/2017    Hypertension     Osteoporosis     Sleep apnea     Thyroid disease     Ulcer     Wears glasses      Past Surgical History:   Procedure Laterality Date    cardic stent  01/13/2017    CARDIOVERSION Left 3/12/2020    Procedure: Cardioversion;  Surgeon: Kaleigh Rascon MD;  Location: [facility] CATH LAB;  Service: Cardiology;  Laterality: Left;    CHOLECYSTECTOMY      EYE SURGERY      bilateral cataracts    FRACTURE SURGERY  2011    left wrist     KNEE ARTHROPLASTY Left 8/15/2018    Procedure: ARTHROPLASTY, KNEE;  Surgeon: Shantanu Santiago MD;  Location: [facility] OR;  Service: Orthopedics;  Laterality: Left;  ANESTHESIA GENERAL AND BLOCK    THYROIDECTOMY       Family History   Problem Relation Age of Onset    Lupus Mother     Osteoporosis Mother     Heart disease Mother     Ulcers Mother     Esophageal cancer Father     Cancer Father         esophageal    DIEGO disease Father     Cancer Brother     Esophageal cancer Brother     Lupus Daughter     DIEGO disease Son     Heart disease Maternal Aunt     Cancer Maternal Aunt     Kidney disease Maternal Aunt     Kidney cancer Maternal Aunt     Liver cancer Maternal Aunt     Dementia Maternal Aunt     Cancer Maternal Uncle     Colon cancer Maternal Uncle     Breast cancer Maternal Uncle     Lung cancer Maternal Uncle         smoker    Parkinsonism Paternal Aunt     Cancer Paternal Aunt     Stomach cancer Paternal Aunt     Heart disease Paternal Uncle     Cancer Paternal Uncle     Stomach cancer Paternal Uncle         x2    Bone cancer Paternal Uncle     Cancer Maternal Grandmother         tumors on head and body    Parkinsonism Paternal Grandmother     DIEGO disease Brother      Ulcers Brother     Dementia Brother     Early death Son         self    Bipolar disorder Son     Cancer Son     Esophageal cancer Son     Drug abuse Son      Social History     Tobacco Use    Smoking status: Former Smoker     Packs/day: 0.50     Types: Cigarettes     Quit date: 2017     Years since quittin.1    Smokeless tobacco: Never Used   Substance Use Topics    Alcohol use: No    Drug use: No     Review of Systems   Constitutional: Negative for fever.   HENT: Negative for congestion.    Eyes: Negative for visual disturbance.   Respiratory: Positive for cough and shortness of breath.    Cardiovascular: Negative for chest pain.   Gastrointestinal: Positive for diarrhea. Negative for abdominal pain, nausea and vomiting.   Genitourinary: Negative for dysuria.   Musculoskeletal: Positive for arthralgias. Negative for joint swelling.   Skin: Positive for wound. Negative for rash.   Neurological: Positive for weakness (generalized). Negative for syncope.   Hematological: Does not bruise/bleed easily.   Psychiatric/Behavioral: Negative for confusion.       Physical Exam     Initial Vitals [22 1856]   BP Pulse Resp Temp SpO2   (!) 147/72 74 18 97.8 °F (36.6 °C) 99 %      MAP       --         Physical Exam    Nursing note and vitals reviewed.  Constitutional: She appears well-nourished.   On oxygen now.    HENT:   Head: Normocephalic and atraumatic.   Eyes: Conjunctivae and EOM are normal.   Neck: Neck supple. No thyroid mass present.   Normal range of motion.  Cardiovascular: Normal rate, regular rhythm and normal heart sounds. Exam reveals no gallop and no friction rub.    No murmur heard.  Pulmonary/Chest: Breath sounds normal. She has no wheezes. She has no rhonchi. She has no rales.   Abdominal: Abdomen is soft. Bowel sounds are normal. There is no abdominal tenderness.   Obese abdomen.    Musculoskeletal:      Left elbow: No deformity. Normal range of motion.      Cervical back: Normal  range of motion and neck supple.      Left ankle: Tenderness present. Normal range of motion.     Neurological: She is alert and oriented to person, place, and time. She has normal strength. No cranial nerve deficit or sensory deficit.   Skin: Skin is warm and dry. No rash noted.   Two superficial skin tears, one to left upper elbow and one to left lower elbow.    Psychiatric: She has a normal mood and affect. Her speech is normal. Cognition and memory are normal.         ED Course   Splint Application    Date/Time: 3/12/2022 5:04 AM  Performed by: Krishna Lea MD  Authorized by: Aleshia Isabel MD   Consent Done: Not Needed  Location details: left ankle  Splint type: ankle stirrup  Supplies used: cotton padding,  elastic bandage and Ortho-Glass  Post-procedure: The splinted body part was neurovascularly unchanged following the procedure.  Patient tolerance: Patient tolerated the procedure well with no immediate complications        Labs Reviewed   CBC W/ AUTO DIFFERENTIAL - Abnormal; Notable for the following components:       Result Value    RBC 3.51 (*)     Hemoglobin 10.4 (*)     Hematocrit 34.5 (*)     MCHC 30.1 (*)     RDW 15.6 (*)     Gran # (ANC) 9.7 (*)     Lymph # 0.9 (*)     Gran % 84.6 (*)     Lymph % 8.0 (*)     All other components within normal limits   COMPREHENSIVE METABOLIC PANEL - Abnormal; Notable for the following components:    BUN 24 (*)     Creatinine 1.8 (*)     Total Protein 5.9 (*)     Albumin 3.1 (*)     eGFR if  30 (*)     eGFR if non  26 (*)     All other components within normal limits   URINALYSIS, REFLEX TO URINE CULTURE - Abnormal; Notable for the following components:    Appearance, UA Hazy (*)     Protein, UA 2+ (*)     Bilirubin (UA) 1+ (*)     Leukocytes, UA 1+ (*)     All other components within normal limits    Narrative:     Specimen Source->Urine   URINALYSIS MICROSCOPIC - Abnormal; Notable for the following components:    WBC, UA 45 (*)      Bacteria Many (*)     Hyaline Casts, UA 10 (*)     All other components within normal limits    Narrative:     Specimen Source->Urine   B-TYPE NATRIURETIC PEPTIDE - Abnormal; Notable for the following components:     (*)     All other components within normal limits   TROPONIN I - Abnormal; Notable for the following components:    Troponin I 0.033 (*)     All other components within normal limits   CULTURE, URINE   SARS-COV-2 RNA AMPLIFICATION, QUAL   MAGNESIUM     EKG Readings: (Independently Interpreted)   Initial Reading: No STEMI. Rhythm: Normal Sinus Rhythm. Heart Rate: 70.   Nonspecific intraventricular block. Lateral infarct, age undetermined. Inferior infarct age undetermined.        Imaging Results          X-Ray Chest AP Portable (Final result)  Result time 03/11/22 20:33:53    Final result by Aba Hoff MD (03/11/22 20:33:53)                 Impression:      Prominent interstitial markings could relate to pulmonary vascular congestion/edema versus an atypical or viral infectious etiology.    Possible small bilateral pleural effusions.      Electronically signed by: Aba Hoff  Date:    03/11/2022  Time:    20:33             Narrative:    EXAMINATION:  XR CHEST AP PORTABLE    CLINICAL HISTORY:  Shortness of breath    TECHNIQUE:  Single frontal view of the chest was performed.    COMPARISON:  Chest radiograph performed 08/24/2021    FINDINGS:  Grossly unchanged cardiomediastinal contours.  Enlargement the cardiac silhouette is suggested.  Prominent interstitial markings.    No definite pneumothorax.  Nonspecific blunting the costophrenic angles could relate to effusion versus scarring.    No acute findings suggested visualized abdomen.  Osseous and soft tissue structures appear without acute change.                               X-Ray Ankle Complete Left (Final result)  Result time 03/11/22 20:32:04    Final result by Aba Hoff MD (03/11/22 20:32:04)                 Impression:       Curvilinear increased density at the medial aspect of the medial malleolus could relate to acute avulsive type fracture.  Clinical correlation recommended.    Additional details as above.      Electronically signed by: Aba Hoff  Date:    03/11/2022  Time:    20:32             Narrative:    EXAMINATION:  XR ANKLE COMPLETE 3 VIEW LEFT    CLINICAL HISTORY:  Fall on same level from slipping, tripping and stumbling without subsequent striking against object, initial encounter    TECHNIQUE:  AP, lateral and oblique views of the left ankle were performed.    COMPARISON:  Left ankle radiograph performed 11/15/2018.    FINDINGS:  Osseous demineralization.    Curvilinear increased density at the medial aspect of the medial malleolus could relate to acute avulsive type fracture.  There is overlying soft tissue swelling.    Elsewhere, no definite additional potential acute fractures are seen.  The talar dome appears intact.  Enthesopathic changes are noted at the calcaneus.  Degenerative spurring of the dorsal midfoot.  Degenerative changes at the calcaneocuboid and talonavicular joints.  Possible loose body formation overlying the dorsal aspect of the talonavicular joint.    Questioned osteochondral lesion at the far medial talar dome seen on the 11/15/2018 study not substantially changed.    Vascular calcifications.                                 Medications   montelukast tablet 10 mg (has no administration in time range)   levothyroxine tablet 125 mcg (has no administration in time range)   LORazepam tablet 1 mg (1 mg Oral Given 3/12/22 0118)   hydrALAZINE tablet 100 mg (has no administration in time range)   amiodarone tablet 200 mg (has no administration in time range)   carvediloL tablet 6.25 mg (has no administration in time range)   apixaban tablet 5 mg (has no administration in time range)   sodium chloride 0.9% flush 3 mL (has no administration in time range)   albuterol-ipratropium 2.5 mg-0.5 mg/3 mL  nebulizer solution 3 mL (has no administration in time range)   melatonin tablet 6 mg (has no administration in time range)   ondansetron injection 4 mg (has no administration in time range)   polyethylene glycol packet 17 g (has no administration in time range)   simethicone chewable tablet 80 mg (has no administration in time range)   aluminum-magnesium hydroxide-simethicone 200-200-20 mg/5 mL suspension 30 mL (has no administration in time range)   acetaminophen tablet 650 mg (650 mg Oral Given 3/12/22 0118)   naloxone 0.4 mg/mL injection 0.02 mg (has no administration in time range)   potassium bicarbonate disintegrating tablet 50 mEq (has no administration in time range)   potassium bicarbonate disintegrating tablet 35 mEq (has no administration in time range)   potassium bicarbonate disintegrating tablet 60 mEq (has no administration in time range)   magnesium oxide tablet 800 mg (has no administration in time range)   magnesium oxide tablet 800 mg (has no administration in time range)   potassium, sodium phosphates 280-160-250 mg packet 2 packet (has no administration in time range)   potassium, sodium phosphates 280-160-250 mg packet 2 packet (has no administration in time range)   potassium, sodium phosphates 280-160-250 mg packet 2 packet (has no administration in time range)   glucose chewable tablet 16 g (has no administration in time range)   glucose chewable tablet 24 g (has no administration in time range)   dextrose 50% injection 12.5 g (has no administration in time range)   dextrose 50% injection 25 g (has no administration in time range)   glucagon (human recombinant) injection 1 mg (has no administration in time range)   0.9%  NaCl infusion ( Intravenous New Bag 3/12/22 0121)   morphine injection 4 mg (has no administration in time range)   morphine injection 2 mg (has no administration in time range)   azithromycin tablet 250 mg (has no administration in time range)   cefTRIAXone (ROCEPHIN) 1  g/50 mL D5W IVPB (has no administration in time range)   sodium chloride 0.9% bolus 1,000 mL (0 mLs Intravenous Stopped 3/11/22 2118)   Tdap (BOOSTRIX) vaccine injection 0.5 mL (0.5 mLs Intramuscular Given 3/11/22 2229)   furosemide injection 40 mg (40 mg Intravenous Given 3/11/22 2244)   cefTRIAXone (ROCEPHIN) 1 g/50 mL D5W IVPB (0 g Intravenous Stopped 3/11/22 2314)   azithromycin tablet 500 mg (500 mg Oral Given 3/11/22 2244)     Medical Decision Making:   History:   Old Medical Records: I decided to obtain old medical records.  Independently Interpreted Test(s):   I have ordered and independently interpreted EKG Reading(s) - see prior notes  Clinical Tests:   Lab Tests: Reviewed and Ordered  Radiological Study: Ordered and Reviewed  Medical Tests: Reviewed and Ordered  ED Management:  This patient was emergently assessed shortly after arrival.  She is noted to have oxygen saturations that dipped down to the mid 80s on room air and is placed on supplemental nasal cannula with appropriate increase in oxygen saturation.  Vital signs additionally are stable.  UA concerning for evidence of progressing infection.  Urine culture in process she is started Rocephin.  BNP is elevated to 40, mild troponin elevation 0.03 in the absence of chest pain.  Chest x-ray indicates findings including prominent interstitial markings potentially relating to pulmonary vascular congestion versus atypical pneumonia and she additionally is provided azithromycin and Lasix.  Short-leg splint also provided for findings concerning for a small acute avulsive type fracture of the medial malleolus.  Patient does warrant admission for further respiratory care monitoring.  Case discussed with and accepted by the on-call nurse practitioner hospitalist.  The patient is in agreement with this disposition and will be transported to a telemetry bed in guarded condition.          Scribe Attestation:   Scribe #1: I performed the above scribed service and  the documentation accurately describes the services I performed. I attest to the accuracy of the note.              I, Dr. Krishna Lea, personally performed the services described in this documentation. All medical record entries made by the scribe were at my direction and in my presence.  I have reviewed the chart and agree that the record reflects my personal performance and is accurate and complete. Krishna Lea MD.  5:04 AM 03/12/2022      Clinical Impression:   Final diagnoses:  [R53.1] Weakness  [R06.02] SOB (shortness of breath)  [W01.0XXA] Fall from slip, trip, or stumble, initial encounter  [J18.9] Atypical pneumonia          ED Disposition Condition    Admit               Krishna Lea MD  03/12/22 [id number]

## 2022-03-12 NOTE — PLAN OF CARE
Patient arrived from ER via stretcher with daughter present. Patient is awake and alert. Denies SOB and distress at present. Patient c/o pain to left leg. Splint noted to left ankle fx. POC reviewed with fm and patient. Equipment usage reviewed as well. Encourage patient to call for assist when needed. Able to reposition self with assist. No bleeding concerns noted. Tele in place. Glasses present at bedside. Oxygen in use. O2@2L NC. Encourage patient that stool sample is needed for testing. Patient denies diarrhea at present. No other issues noted at present. Will cont to monitor and provide care as needed.

## 2022-03-12 NOTE — SUBJECTIVE & OBJECTIVE
Past Medical History:   Diagnosis Date    Allergy     Anticoagulant long-term use     Arthritis     Asthma     Coronary artery disease     stent x 1    Deep vein blood clot of right lower extremity 1965 & 1971    Full dentures     Heart attack 01/13/2017    Hypertension     Osteoporosis     Sleep apnea     Thyroid disease     Ulcer     Wears glasses        Past Surgical History:   Procedure Laterality Date    cardic stent  01/13/2017    CARDIOVERSION Left 3/12/2020    Procedure: Cardioversion;  Surgeon: Kaleigh Rascon MD;  Location: [facility] CATH LAB;  Service: Cardiology;  Laterality: Left;    CHOLECYSTECTOMY      EYE SURGERY      bilateral cataracts    FRACTURE SURGERY  2011    left wrist     KNEE ARTHROPLASTY Left 8/15/2018    Procedure: ARTHROPLASTY, KNEE;  Surgeon: Shantanu Santiago MD;  Location: [facility] OR;  Service: Orthopedics;  Laterality: Left;  ANESTHESIA GENERAL AND BLOCK    THYROIDECTOMY         Review of patient's allergies indicates:   Allergen Reactions    Bactroban [mupirocin calcium] Itching and Other (See Comments)     Red, tingling, itching, burning     Ditropan [oxybutynin chloride] Swelling     swelling    Lipitor [atorvastatin] Other (See Comments)     Fall/ balance     Myrbetriq [mirabegron] Swelling and Other (See Comments)     Legs swelling, couldn't void     Pravastatin Other (See Comments)     Balance problem     Oxycodone     Codeine Nausea Only    Mobic [meloxicam] Other (See Comments)     Not sure reaction    Perfume Other (See Comments)     And flowers cause allergy and makes her cough.    Sulfur Other (See Comments)     Was told not to take as a child       No current facility-administered medications on file prior to encounter.     Current Outpatient Medications on File Prior to Encounter   Medication Sig    acetaminophen (TYLENOL) 650 MG TbSR Take 650 mg by mouth every 8 (eight) hours as needed (pain).     albuterol (ACCUNEB) 1.25 mg/3 mL Nebu Take 3 mLs (1.25 mg total) by  nebulization every 6 (six) hours as needed (cough wheeze). Rescue    albuterol 90 mcg/actuation inhaler Inhale 2 puffs into the lungs every 6 (six) hours as needed for Wheezing.    amiodarone (PACERONE) 200 MG Tab Take 1 tablet (200 mg total) by mouth once daily. Need office visit before next refill, last seen 12/2020. This will be the LAST Rx if visit is not made.    apixaban (ELIQUIS) 5 mg Tab Take 1 tablet (5 mg total) by mouth 2 (two) times daily. Need office visit before next refill, last seen 12/2020. This will be the LAST Rx if visit is not made.    carvediloL (COREG) 6.25 MG tablet Take 1 tablet (6.25 mg total) by mouth 2 (two) times daily with meals. Need office visit before next refill, last seen 12/2021. This will be the LAST Rx if visit is not made.    hydrALAZINE (APRESOLINE) 100 MG tablet Take 1 tablet (100 mg total) by mouth 3 (three) times daily. (Patient taking differently: Take 100 mg by mouth Daily. 1/2-1 tablet)    lisinopriL (PRINIVIL,ZESTRIL) 20 MG tablet Take 2 tablets (40 mg total) by mouth once daily.    LORazepam (ATIVAN) 1 MG tablet Take 1 tablet (1 mg total) by mouth every 6 (six) hours as needed for Anxiety.    montelukast (SINGULAIR) 10 mg tablet Take 1 tablet (10 mg total) by mouth every evening.    nitroGLYCERIN (NITROSTAT) 0.4 MG SL tablet Place 1 tablet (0.4 mg total) under the tongue every 5 (five) minutes as needed for Chest pain.    ondansetron (ZOFRAN-ODT) 4 MG TbDL Take 1 tablet (4 mg total) by mouth every 8 (eight) hours as needed (nausea).    potassium chloride SA (K-DUR,KLOR-CON) 20 MEQ tablet TAKE ONE TABLET BY MOUTH ONCE DAILY when taking lasix (Patient taking differently: Take 20 mEq by mouth as needed.)    aspirin (ECOTRIN) 81 MG EC tablet Take 81 mg by mouth once daily.    furosemide (LASIX) 40 MG tablet TAKE ONE TABLET BY MOUTH DAILY AS NEEDED For (fluid retention)    gabapentin (NEURONTIN) 100 MG capsule TAKE ONE CAPSULE BY MOUTH THREE TIMES DAILY (Patient taking  differently: Take 100 mg by mouth as needed.)    hydrOXYzine HCL (ATARAX) 10 MG Tab Take 10 mg by mouth as needed.    SYNTHROID 125 mcg tablet TAKE ONE TABLET BY MOUTH once a day     Family History       Problem Relation (Age of Onset)    Bipolar disorder Son    Bone cancer Paternal Uncle    Breast cancer Maternal Uncle    Cancer Father, Brother, Maternal Aunt, Maternal Uncle, Paternal Aunt, Paternal Uncle, Maternal Grandmother, Son    Colon cancer Maternal Uncle    Dementia Maternal Aunt, Brother    Drug abuse Son    Early death Son    Esophageal cancer Father, Brother, Son    DIEGO disease Father, Son, Brother    Heart disease Mother, Maternal Aunt, Paternal Uncle    Kidney cancer Maternal Aunt    Kidney disease Maternal Aunt    Liver cancer Maternal Aunt    Lung cancer Maternal Uncle    Lupus Mother, Daughter    Osteoporosis Mother    Parkinsonism Paternal Aunt, Paternal Grandmother    Stomach cancer Paternal Aunt, Paternal Uncle    Ulcers Mother, Brother          Tobacco Use    Smoking status: Former Smoker     Packs/day: 0.50     Types: Cigarettes     Quit date: 2017     Years since quittin.1    Smokeless tobacco: Never Used   Substance and Sexual Activity    Alcohol use: No    Drug use: No    Sexual activity: Never     Review of Systems   Constitutional:  Positive for activity change (decreased) and fatigue. Negative for chills and fever.   HENT:  Negative for congestion and sore throat.    Eyes:  Negative for visual disturbance.   Respiratory:  Positive for cough and shortness of breath.    Cardiovascular:  Negative for chest pain and palpitations.   Gastrointestinal:  Positive for diarrhea. Negative for abdominal pain, nausea and vomiting.   Endocrine: Negative for cold intolerance and heat intolerance.   Genitourinary:  Negative for dysuria and hematuria.   Musculoskeletal:  Negative for arthralgias and myalgias.   Skin:  Negative for pallor and rash.   Neurological:  Positive for weakness. Negative  for tremors and seizures.   Hematological:  Negative for adenopathy. Does not bruise/bleed easily.   Psychiatric/Behavioral:  Negative for hallucinations.    All other systems reviewed and are negative.  Objective:     Vital Signs (Most Recent):  Temp: 100.2 °F (37.9 °C) (22)  Pulse: 77 (22)  Resp: 20 (22)  BP: (!) 165/72 (22)  SpO2: 97 % (22)   Vital Signs (24h Range):  Temp:  [97.8 °F (36.6 °C)-100.2 °F (37.9 °C)] 100.2 °F (37.9 °C)  Pulse:  [] 77  Resp:  [18-20] 20  SpO2:  [89 %-99 %] 97 %  BP: (147-167)/(64-72) 165/72     Weight: 99.8 kg (220 lb)  Body mass index is 38.97 kg/m².    Physical Exam  Vitals and nursing note reviewed.   Constitutional:       General: She is awake. She is not in acute distress.     Appearance: She is well-developed. She is obese. She is ill-appearing (elderly and chronically ill appearing).   HENT:      Head: Normocephalic and atraumatic.      Mouth/Throat:      Lips: Pink.      Mouth: Mucous membranes are moist.   Eyes:      Conjunctiva/sclera: Conjunctivae normal.      Pupils: Pupils are equal, round, and reactive to light.   Neck:      Thyroid: No thyromegaly.      Vascular: No JVD.   Cardiovascular:      Rate and Rhythm: Normal rate and regular rhythm.      Heart sounds: Normal heart sounds, S1 normal and S2 normal. No murmur heard.    No friction rub. No gallop.   Pulmonary:      Effort: Pulmonary effort is normal.      Breath sounds: Normal breath sounds.   Abdominal:      General: Abdomen is protuberant. Bowel sounds are normal. There is no distension.      Palpations: Abdomen is soft. There is no mass.      Tenderness: There is no abdominal tenderness.   Musculoskeletal:         General: Normal range of motion.        Arms:       Cervical back: Neck supple.      Right lower le+ Pitting Edema present.      Left lower leg: Tenderness and bony tenderness present. No deformity or lacerations. 1+ Pitting Edema  present.        Legs:    Skin:     General: Skin is warm and dry.      Capillary Refill: Capillary refill takes less than 2 seconds.   Neurological:      General: No focal deficit present.      Mental Status: She is alert and oriented to person, place, and time. Mental status is at baseline.      Cranial Nerves: No cranial nerve deficit.      Sensory: Sensation is intact.      Motor: Weakness (generalized, no focal weakness) present.   Psychiatric:         Behavior: Behavior normal. Behavior is cooperative.         CRANIAL NERVES     CN III, IV, VI   Pupils are equal, round, and reactive to light.     Significant Labs: All pertinent labs within the past 24 hours have been reviewed.  CBC:   Recent Labs   Lab 03/11/22 1956   WBC 11.45   HGB 10.4*   HCT 34.5*        CMP:   Recent Labs   Lab 03/11/22 1956      K 4.3      CO2 24   GLU 93   BUN 24*   CREATININE 1.8*   CALCIUM 9.1   PROT 5.9*   ALBUMIN 3.1*   BILITOT 0.5   ALKPHOS 69   AST 18   ALT 20   ANIONGAP 10   EGFRNONAA 26*     Cardiac Markers:   Recent Labs   Lab 03/11/22 1956   *     Magnesium:   Recent Labs   Lab 03/11/22 1956   MG 2.2       Troponin:   Recent Labs   Lab 03/11/22 1956   TROPONINI 0.033*     Urine Studies:   Recent Labs   Lab 03/11/22 2053   COLORU Yellow   APPEARANCEUA Hazy*   PHUR 8.0   SPECGRAV 1.015   PROTEINUA 2+*   GLUCUA Negative   KETONESU Negative   BILIRUBINUA 1+*   OCCULTUA Negative   NITRITE Negative   UROBILINOGEN 1.0   LEUKOCYTESUR 1+*   RBCUA 3   WBCUA 45*   BACTERIA Many*   HYALINECASTS 10*       Significant Imaging: I have reviewed all pertinent imaging results/findings within the past 24 hours.   Imaging Results              X-Ray Chest AP Portable (Final result)  Result time 03/11/22 20:33:53      Final result by Aba Hoff MD (03/11/22 20:33:53)                   Impression:      Prominent interstitial markings could relate to pulmonary vascular congestion/edema versus an atypical or  viral infectious etiology.    Possible small bilateral pleural effusions.      Electronically signed by: Aba Hoff  Date:    03/11/2022  Time:    20:33               Narrative:    EXAMINATION:  XR CHEST AP PORTABLE    CLINICAL HISTORY:  Shortness of breath    TECHNIQUE:  Single frontal view of the chest was performed.    COMPARISON:  Chest radiograph performed 08/24/2021    FINDINGS:  Grossly unchanged cardiomediastinal contours.  Enlargement the cardiac silhouette is suggested.  Prominent interstitial markings.    No definite pneumothorax.  Nonspecific blunting the costophrenic angles could relate to effusion versus scarring.    No acute findings suggested visualized abdomen.  Osseous and soft tissue structures appear without acute change.                                       X-Ray Ankle Complete Left (Final result)  Result time 03/11/22 20:32:04      Final result by Aba Hoff MD (03/11/22 20:32:04)                   Impression:      Curvilinear increased density at the medial aspect of the medial malleolus could relate to acute avulsive type fracture.  Clinical correlation recommended.    Additional details as above.      Electronically signed by: Aba Hoff  Date:    03/11/2022  Time:    20:32               Narrative:    EXAMINATION:  XR ANKLE COMPLETE 3 VIEW LEFT    CLINICAL HISTORY:  Fall on same level from slipping, tripping and stumbling without subsequent striking against object, initial encounter    TECHNIQUE:  AP, lateral and oblique views of the left ankle were performed.    COMPARISON:  Left ankle radiograph performed 11/15/2018.    FINDINGS:  Osseous demineralization.    Curvilinear increased density at the medial aspect of the medial malleolus could relate to acute avulsive type fracture.  There is overlying soft tissue swelling.    Elsewhere, no definite additional potential acute fractures are seen.  The talar dome appears intact.  Enthesopathic changes are noted at the calcaneus.   Degenerative spurring of the dorsal midfoot.  Degenerative changes at the calcaneocuboid and talonavicular joints.  Possible loose body formation overlying the dorsal aspect of the talonavicular joint.    Questioned osteochondral lesion at the far medial talar dome seen on the 11/15/2018 study not substantially changed.    Vascular calcifications.

## 2022-03-12 NOTE — PLAN OF CARE
Problem: Adult Inpatient Plan of Care  Goal: Plan of Care Review  Outcome: Ongoing, Progressing     Problem: Adult Inpatient Plan of Care  Goal: Patient-Specific Goal (Individualized)  Outcome: Ongoing, Progressing     Problem: Adult Inpatient Plan of Care  Goal: Optimal Comfort and Wellbeing  Outcome: Ongoing, Progressing         Pt AAOX4, No SOB or distress.  Splint noted to left ankle and leg.  POC reviewed with family and patient. Tele in place. Glasses present at bedside. Oxygen in use. O2@2L NC. Encourage patient that stool sample is needed for testing. Patient denies diarrhea at present. No other issues noted at present. Bed Low and Locked.

## 2022-03-12 NOTE — CONSULTS
Patient's history is reviewed.    This is an 81-year-old female with multiple medical problems is admitted for pneumonia and other medical issues.  She did have a fall and was noted to have pain around the left ankle.  X-rays demonstrated a nondisplaced medial malleolar fracture.    Past medical history is reviewed.    Medications are reviewed.    Lab work is reviewed.      On physical examination this patient is noted to have significant tenderness along the medial malleolus.  No tenderness on the lateral side of the ankle.  No obvious deformity.      X-rays including three views of the left ankle demonstrate a nondisplaced medial malleolar fracture.          Plan:  Nondisplaced medial malleolar fracture.  I recommend fracture boot for stabilization.  Will treat this fracture without surgical intervention.  Anticipate 6 weeks for resolution.  She may be weight-bearing as tolerated in the boot.  We will obtain x-rays of her knee to be sure that this is not a bimalleolar variant.  We will continue to follow with you.

## 2022-03-12 NOTE — HPI
Heather Hughes is a 81 y.o. female with a past medical history of CAD, HTN, DVT, and past surgical history of cardiac stent, cardioversion and knee surgery, who presented to the ED tonight for generalized weakness and ankle pain after a fall at home. HPI provided by patient and daughter. Daughter states that the patient was in her normal state of health last night but was very difficulty to wake up this morning. She states that she slept almost 18 hours today. Daughter states that she tried to get her up and into a chair to encourage the patient to move around. She needed to use the restroom and needed assistance in walking there, where she usually is able to get around with a walker and no additional help. When they got to the bathroom, as they were turning to sit her on the toilet, her legs gave out and twisted underneath her. Her daughter assisted her to the ground and she landed on her left leg and hit her elbow on the floor, sustaining two skin tears. She has also had a cough for two weeks, no fever or chest pain. She did have several episodes of diarrhea. She denies congestion, abdominal pain, nausea or vomiting.     Upon arrival to the ED, the patient found to be on Oxygen via NC, as she was noted to be hypoxic by EMS on their arrival at 83%. On oxygen, her pulse ox was 99%. CXR showed evidence of atypical pneumonia. Urinalysis showed a UTI. Xray of the left ankle showed an avulsion fracture of the medial malleolus. CBC showed chronic anemia. CMP showed an STACIE. BNP was elevated at 240, and initial troponin was elevated at 0.033. Hospital Medicine consulted for admission and further management.

## 2022-03-12 NOTE — NURSING
Fracture boot placed in patient's room. Notified Daughter that PT will instruct her how to use. Also advised her to bring Walker from home for Patient.

## 2022-03-13 PROBLEM — I50.30 (HFPEF) HEART FAILURE WITH PRESERVED EJECTION FRACTION: Status: ACTIVE | Noted: 2022-03-13

## 2022-03-13 PROBLEM — I44.7 LBBB (LEFT BUNDLE BRANCH BLOCK): Status: RESOLVED | Noted: 2020-08-03 | Resolved: 2022-03-13

## 2022-03-13 PROBLEM — R19.7 DIARRHEA: Status: RESOLVED | Noted: 2022-03-12 | Resolved: 2022-03-13

## 2022-03-13 LAB
ALBUMIN SERPL BCP-MCNC: 2.6 G/DL (ref 3.5–5.2)
ALP SERPL-CCNC: 60 U/L (ref 55–135)
ALT SERPL W/O P-5'-P-CCNC: 20 U/L (ref 10–44)
ANION GAP SERPL CALC-SCNC: 9 MMOL/L (ref 8–16)
AST SERPL-CCNC: 17 U/L (ref 10–40)
BASOPHILS # BLD AUTO: 0.03 K/UL (ref 0–0.2)
BASOPHILS NFR BLD: 0.5 % (ref 0–1.9)
BILIRUB SERPL-MCNC: 0.3 MG/DL (ref 0.1–1)
BNP SERPL-MCNC: 246 PG/ML (ref 0–99)
BUN SERPL-MCNC: 25 MG/DL (ref 8–23)
CALCIUM SERPL-MCNC: 8.6 MG/DL (ref 8.7–10.5)
CHLORIDE SERPL-SCNC: 104 MMOL/L (ref 95–110)
CO2 SERPL-SCNC: 25 MMOL/L (ref 23–29)
CREAT SERPL-MCNC: 1.6 MG/DL (ref 0.5–1.4)
DIFFERENTIAL METHOD: ABNORMAL
EOSINOPHIL # BLD AUTO: 0.2 K/UL (ref 0–0.5)
EOSINOPHIL NFR BLD: 3.1 % (ref 0–8)
ERYTHROCYTE [DISTWIDTH] IN BLOOD BY AUTOMATED COUNT: 15.2 % (ref 11.5–14.5)
EST. GFR  (AFRICAN AMERICAN): 35 ML/MIN/1.73 M^2
EST. GFR  (NON AFRICAN AMERICAN): 30 ML/MIN/1.73 M^2
GLUCOSE SERPL-MCNC: 114 MG/DL (ref 70–110)
HCT VFR BLD AUTO: 28 % (ref 37–48.5)
HGB BLD-MCNC: 8.6 G/DL (ref 12–16)
IMM GRANULOCYTES # BLD AUTO: 0.02 K/UL (ref 0–0.04)
IMM GRANULOCYTES NFR BLD AUTO: 0.3 % (ref 0–0.5)
LYMPHOCYTES # BLD AUTO: 1.1 K/UL (ref 1–4.8)
LYMPHOCYTES NFR BLD: 18.7 % (ref 18–48)
MAGNESIUM SERPL-MCNC: 2 MG/DL (ref 1.6–2.6)
MCH RBC QN AUTO: 29.8 PG (ref 27–31)
MCHC RBC AUTO-ENTMCNC: 30.7 G/DL (ref 32–36)
MCV RBC AUTO: 97 FL (ref 82–98)
MONOCYTES # BLD AUTO: 0.7 K/UL (ref 0.3–1)
MONOCYTES NFR BLD: 12.3 % (ref 4–15)
NEUTROPHILS # BLD AUTO: 3.8 K/UL (ref 1.8–7.7)
NEUTROPHILS NFR BLD: 65.1 % (ref 38–73)
NRBC BLD-RTO: 0 /100 WBC
PHOSPHATE SERPL-MCNC: 2.9 MG/DL (ref 2.7–4.5)
PLATELET # BLD AUTO: 159 K/UL (ref 150–450)
PMV BLD AUTO: 12.9 FL (ref 9.2–12.9)
POTASSIUM SERPL-SCNC: 4.2 MMOL/L (ref 3.5–5.1)
PROT SERPL-MCNC: 5.3 G/DL (ref 6–8.4)
RBC # BLD AUTO: 2.89 M/UL (ref 4–5.4)
SODIUM SERPL-SCNC: 138 MMOL/L (ref 136–145)
WBC # BLD AUTO: 5.84 K/UL (ref 3.9–12.7)

## 2022-03-13 PROCEDURE — 25000003 PHARM REV CODE 250: Performed by: NURSE PRACTITIONER

## 2022-03-13 PROCEDURE — 11000001 HC ACUTE MED/SURG PRIVATE ROOM

## 2022-03-13 PROCEDURE — 63600175 PHARM REV CODE 636 W HCPCS: Performed by: NURSE PRACTITIONER

## 2022-03-13 PROCEDURE — 94761 N-INVAS EAR/PLS OXIMETRY MLT: CPT

## 2022-03-13 PROCEDURE — 85025 COMPLETE CBC W/AUTO DIFF WBC: CPT | Performed by: NURSE PRACTITIONER

## 2022-03-13 PROCEDURE — 83880 ASSAY OF NATRIURETIC PEPTIDE: CPT | Performed by: STUDENT IN AN ORGANIZED HEALTH CARE EDUCATION/TRAINING PROGRAM

## 2022-03-13 PROCEDURE — 99232 SBSQ HOSP IP/OBS MODERATE 35: CPT | Mod: ,,, | Performed by: STUDENT IN AN ORGANIZED HEALTH CARE EDUCATION/TRAINING PROGRAM

## 2022-03-13 PROCEDURE — 93005 ELECTROCARDIOGRAM TRACING: CPT

## 2022-03-13 PROCEDURE — 80053 COMPREHEN METABOLIC PANEL: CPT | Performed by: NURSE PRACTITIONER

## 2022-03-13 PROCEDURE — 83735 ASSAY OF MAGNESIUM: CPT | Performed by: NURSE PRACTITIONER

## 2022-03-13 PROCEDURE — 25000003 PHARM REV CODE 250: Performed by: INTERNAL MEDICINE

## 2022-03-13 PROCEDURE — 99900035 HC TECH TIME PER 15 MIN (STAT)

## 2022-03-13 PROCEDURE — 27000221 HC OXYGEN, UP TO 24 HOURS

## 2022-03-13 PROCEDURE — 99232 PR SUBSEQUENT HOSPITAL CARE,LEVL II: ICD-10-PCS | Mod: ,,, | Performed by: STUDENT IN AN ORGANIZED HEALTH CARE EDUCATION/TRAINING PROGRAM

## 2022-03-13 PROCEDURE — 63700000 PHARM REV CODE 250 ALT 637 W/O HCPCS: Performed by: NURSE PRACTITIONER

## 2022-03-13 PROCEDURE — 25000003 PHARM REV CODE 250: Performed by: STUDENT IN AN ORGANIZED HEALTH CARE EDUCATION/TRAINING PROGRAM

## 2022-03-13 PROCEDURE — 36415 COLL VENOUS BLD VENIPUNCTURE: CPT | Performed by: STUDENT IN AN ORGANIZED HEALTH CARE EDUCATION/TRAINING PROGRAM

## 2022-03-13 PROCEDURE — 84100 ASSAY OF PHOSPHORUS: CPT | Performed by: NURSE PRACTITIONER

## 2022-03-13 RX ORDER — CETIRIZINE HYDROCHLORIDE 10 MG/1
10 TABLET ORAL ONCE
Status: COMPLETED | OUTPATIENT
Start: 2022-03-13 | End: 2022-03-13

## 2022-03-13 RX ADMIN — AZITHROMYCIN MONOHYDRATE 250 MG: 250 TABLET ORAL at 09:03

## 2022-03-13 RX ADMIN — CARVEDILOL 6.25 MG: 6.25 TABLET, FILM COATED ORAL at 09:03

## 2022-03-13 RX ADMIN — CARVEDILOL 6.25 MG: 6.25 TABLET, FILM COATED ORAL at 04:03

## 2022-03-13 RX ADMIN — APIXABAN 5 MG: 2.5 TABLET, FILM COATED ORAL at 08:03

## 2022-03-13 RX ADMIN — APIXABAN 5 MG: 2.5 TABLET, FILM COATED ORAL at 09:03

## 2022-03-13 RX ADMIN — MONTELUKAST 10 MG: 10 TABLET, FILM COATED ORAL at 08:03

## 2022-03-13 RX ADMIN — AMIODARONE HYDROCHLORIDE 200 MG: 200 TABLET ORAL at 09:03

## 2022-03-13 RX ADMIN — CETIRIZINE HYDROCHLORIDE 10 MG: 10 TABLET, FILM COATED ORAL at 10:03

## 2022-03-13 RX ADMIN — CEFTRIAXONE 1 G: 1 INJECTION, SOLUTION INTRAVENOUS at 11:03

## 2022-03-13 RX ADMIN — MELATONIN TAB 3 MG 6 MG: 3 TAB at 08:03

## 2022-03-13 RX ADMIN — LEVOTHYROXINE SODIUM 125 MCG: 0.12 TABLET ORAL at 05:03

## 2022-03-13 RX ADMIN — HYDRALAZINE HYDROCHLORIDE 100 MG: 25 TABLET, FILM COATED ORAL at 04:03

## 2022-03-13 NOTE — ASSESSMENT & PLAN NOTE
STACIE slightly improved since admission.   Suspicion initially for hypovolemia for which she got IV fluids.   IV fluids stopped now that she is eating and has no diarrhea.   Will continue to monitor. Renally dose medications.

## 2022-03-13 NOTE — ASSESSMENT & PLAN NOTE
Patient with possible atypical pneumonia based on CXR findings and complains of cough   On antibiotics now, will continue for 5 days   Antibiotics (From admission, onward)            Start     Stop Route Frequency Ordered    03/12/22 2240  cefTRIAXone (ROCEPHIN) 1 g/50 mL D5W IVPB         -- IV Every 24 hours (non-standard times) 03/12/22 0105    03/12/22 0900  azithromycin tablet 250 mg         03/16 0859 Oral Daily 03/12/22 0105      . Cultures drawn and noted-   Microbiology Results (last 7 days)     Procedure Component Value Units Date/Time    Urine culture [[id number]]  (Abnormal) Collected: 03/11/22 2053    Order Status: Completed Specimen: Urine Updated: 03/13/22 0757     Urine Culture, Routine GRAM NEGATIVE TRES  >100,000 cfu/ml  Identification and susceptibility pending      Narrative:      Specimen Source->Urine    Clostridium difficile EIA [[id number]]     Order Status: Canceled Specimen: Stool

## 2022-03-13 NOTE — SUBJECTIVE & OBJECTIVE
Interval History: Patient feels better today. She denies having SOB or worsening cough. She is eating better now.     Review of Systems   Constitutional:  Positive for fatigue.   HENT:  Negative for congestion.    Respiratory:  Positive for cough. Negative for shortness of breath.    Cardiovascular:  Negative for chest pain and leg swelling.   Gastrointestinal:  Negative for abdominal pain.   Genitourinary:  Positive for dysuria.   Neurological:  Positive for dizziness.   Psychiatric/Behavioral:  Positive for agitation.    Objective:     Vital Signs (Most Recent):  Temp: 97 °F (36.1 °C) (03/13/22 1138)  Pulse: (!) 55 (03/13/22 1138)  Resp: 16 (03/13/22 1138)  BP: (!) 127/58 (03/13/22 1138)  SpO2: 97 % (03/13/22 1138)   Vital Signs (24h Range):  Temp:  [96.2 °F (35.7 °C)-98.1 °F (36.7 °C)] 97 °F (36.1 °C)  Pulse:  [53-61] 55  Resp:  [16-18] 16  SpO2:  [96 %-98 %] 97 %  BP: (103-146)/(51-62) 127/58     Weight: 99.8 kg (220 lb)  Body mass index is 38.97 kg/m².    Intake/Output Summary (Last 24 hours) at 3/13/2022 1429  Last data filed at 3/13/2022 0646  Gross per 24 hour   Intake 209.76 ml   Output 300 ml   Net -90.24 ml      Physical Exam  Constitutional:       General: She is not in acute distress.     Appearance: Normal appearance.   HENT:      Head: Normocephalic.      Mouth/Throat:      Mouth: Mucous membranes are moist.   Cardiovascular:      Rate and Rhythm: Normal rate and regular rhythm.      Heart sounds: No murmur heard.  Pulmonary:      Effort: Pulmonary effort is normal.      Breath sounds: Rhonchi present.   Abdominal:      Palpations: Abdomen is soft.      Tenderness: There is no abdominal tenderness.   Musculoskeletal:         General: Signs of injury present.      Cervical back: Normal range of motion.      Comments: Left ankle fx    Skin:     General: Skin is warm.   Neurological:      Mental Status: She is alert and oriented to person, place, and time.   Psychiatric:         Mood and Affect: Mood  normal.       Significant Labs: All pertinent labs within the past 24 hours have been reviewed.      Significant Imaging: I have reviewed all pertinent imaging results/findings within the past 24 hours.

## 2022-03-13 NOTE — ASSESSMENT & PLAN NOTE
"S/p slight fall at home due to weakness, daughter assisted to ground but ankle was twisted under her and she fell onto her ankle    Xray shows "Curvilinear increased density at the medial aspect of the medial malleolus could relate to acute avulsive type fracture."  Splint placed in ER  Ortho consulted, conservative management. Fx boots given.   PT/OT for ambulation.   Pain meds PRN  "

## 2022-03-13 NOTE — ASSESSMENT & PLAN NOTE
Patient has history of CHFpEF.  Difficult to assess accurately her volume status on exam. CXR may suggest mild edema.   Holding IV fluids to prevent worsening overload.   Will get TTE while inpatient which will help tailor management.

## 2022-03-13 NOTE — PLAN OF CARE
Patient resting well in bed. No concerns at present. Jean medication well. Denies pain, SOB or discomfort. Stirrup Splint in place to left ankle area. No bleeding or discoloration noted. Encourage patient to call for assist when needed. No adverse reaction noted to ABT. Safety measure in place. Will cont to monitor. Fm present at bedside. No s/s of pain at present. No n/v noted.

## 2022-03-13 NOTE — PROGRESS NOTES
[facility] Hospital Medicine  Progress Note    Patient Name: Heather Hughes  MRN: [id number]  Patient Class: IP- Inpatient   Admission Date: 3/11/2022  Length of Stay: 2 days  Attending Physician: Chago Dong MD  Primary Care Provider: Yumiko Page MD        Subjective:     Principal Problem:Atypical pneumonia        HPI:  Heather Hughes is a 81 y.o. female with a past medical history of CAD, HTN, DVT, and past surgical history of cardiac stent, cardioversion and knee surgery, who presented to the ED tonight for generalized weakness and ankle pain after a fall at home. HPI provided by patient and daughter. Daughter states that the patient was in her normal state of health last night but was very difficulty to wake up this morning. She states that she slept almost 18 hours today. Daughter states that she tried to get her up and into a chair to encourage the patient to move around. She needed to use the restroom and needed assistance in walking there, where she usually is able to get around with a walker and no additional help. When they got to the bathroom, as they were turning to sit her on the toilet, her legs gave out and twisted underneath her. Her daughter assisted her to the ground and she landed on her left leg and hit her elbow on the floor, sustaining two skin tears. She has also had a cough for two weeks, no fever or chest pain. She did have several episodes of diarrhea. She denies congestion, abdominal pain, nausea or vomiting.     Upon arrival to the ED, the patient found to be on Oxygen via NC, as she was noted to be hypoxic by EMS on their arrival at 83%. On oxygen, her pulse ox was 99%. CXR showed evidence of atypical pneumonia. Urinalysis showed a UTI. Xray of the left ankle showed an avulsion fracture of the medial malleolus. CBC showed chronic anemia. CMP showed an STACIE. BNP was elevated at 240, and initial troponin was elevated at 0.033. Hospital Medicine consulted for  admission and further management.      Overview/Hospital Course:  Patient was started on IV antibiotics for possible pneumonia and got IV fluids due to the suspicion for hypovolemia causing STACIE. Symptoms improved with treatment. Orthopedics assessed the patient and recommended conservative management for the medial malleolus fx. She was given fracture boots and will be taught by PT on how to use at home.      Interval History: Patient feels better today. She denies having SOB or worsening cough. She is eating better now.     Review of Systems   Constitutional:  Positive for fatigue.   HENT:  Negative for congestion.    Respiratory:  Positive for cough. Negative for shortness of breath.    Cardiovascular:  Negative for chest pain and leg swelling.   Gastrointestinal:  Negative for abdominal pain.   Genitourinary:  Positive for dysuria.   Neurological:  Positive for dizziness.   Psychiatric/Behavioral:  Positive for agitation.    Objective:     Vital Signs (Most Recent):  Temp: 97 °F (36.1 °C) (03/13/22 1138)  Pulse: (!) 55 (03/13/22 1138)  Resp: 16 (03/13/22 1138)  BP: (!) 127/58 (03/13/22 1138)  SpO2: 97 % (03/13/22 1138)   Vital Signs (24h Range):  Temp:  [96.2 °F (35.7 °C)-98.1 °F (36.7 °C)] 97 °F (36.1 °C)  Pulse:  [53-61] 55  Resp:  [16-18] 16  SpO2:  [96 %-98 %] 97 %  BP: (103-146)/(51-62) 127/58     Weight: 99.8 kg (220 lb)  Body mass index is 38.97 kg/m².    Intake/Output Summary (Last 24 hours) at 3/13/2022 1429  Last data filed at 3/13/2022 0646  Gross per 24 hour   Intake 209.76 ml   Output 300 ml   Net -90.24 ml      Physical Exam  Constitutional:       General: She is not in acute distress.     Appearance: Normal appearance.   HENT:      Head: Normocephalic.      Mouth/Throat:      Mouth: Mucous membranes are moist.   Cardiovascular:      Rate and Rhythm: Normal rate and regular rhythm.      Heart sounds: No murmur heard.  Pulmonary:      Effort: Pulmonary effort is normal.      Breath sounds: Rhonchi  present.   Abdominal:      Palpations: Abdomen is soft.      Tenderness: There is no abdominal tenderness.   Musculoskeletal:         General: Signs of injury present.      Cervical back: Normal range of motion.      Comments: Left ankle fx    Skin:     General: Skin is warm.   Neurological:      Mental Status: She is alert and oriented to person, place, and time.   Psychiatric:         Mood and Affect: Mood normal.       Significant Labs: All pertinent labs within the past 24 hours have been reviewed.      Significant Imaging: I have reviewed all pertinent imaging results/findings within the past 24 hours.      Assessment/Plan:      * Atypical pneumonia  Patient with possible atypical pneumonia based on CXR findings and complains of cough   On antibiotics now, will continue for 5 days   Antibiotics (From admission, onward)            Start     Stop Route Frequency Ordered    03/12/22 2240  cefTRIAXone (ROCEPHIN) 1 g/50 mL D5W IVPB         -- IV Every 24 hours (non-standard times) 03/12/22 0105    03/12/22 0900  azithromycin tablet 250 mg         03/16 0859 Oral Daily 03/12/22 0105      . Cultures drawn and noted-   Microbiology Results (last 7 days)     Procedure Component Value Units Date/Time    Urine culture [[id number]]  (Abnormal) Collected: 03/11/22 2053    Order Status: Completed Specimen: Urine Updated: 03/13/22 0757     Urine Culture, Routine GRAM NEGATIVE TRES  >100,000 cfu/ml  Identification and susceptibility pending      Narrative:      Specimen Source->Urine    Clostridium difficile EIA [[id number]]     Order Status: Canceled Specimen: Stool               (HFpEF) heart failure with preserved ejection fraction  Patient has history of CHFpEF.  Difficult to assess accurately her volume status on exam. CXR may suggest mild edema.   Holding IV fluids to prevent worsening overload.   Will get TTE while inpatient which will help tailor management.       STACIE (acute kidney injury)  STACIE slightly improved since  "admission.   Suspicion initially for hypovolemia for which she got IV fluids.   IV fluids stopped now that she is eating and has no diarrhea.   Will continue to monitor. Renally dose medications.       Avulsion fracture of ankle, left, closed, initial encounter  S/p slight fall at home due to weakness, daughter assisted to ground but ankle was twisted under her and she fell onto her ankle    Xray shows "Curvilinear increased density at the medial aspect of the medial malleolus could relate to acute avulsive type fracture."  Splint placed in ER  Ortho consulted, conservative management. Fx boots given.   PT/OT for ambulation.   Pain meds PRN    Acute cystitis  pendin urine culture  Rocephin daily      Anticoagulant long-term use  Fall and bleeding precautions      Atrial fibrillation with RVR  Patient with Long standing persistent (>12 months) atrial fibrillation which is controlled currently with Beta Blocker and Amiodarone. Patient is currently in sinus rhythm.[id number] Score: 4. Anticoagulation indicated. Anticoagulation done with eliquis.        Iron deficiency anemia  Patient's anemia is currently controlled. Has not received any PRBCs to date.. Etiology likely d/t chronic disease  Current CBC reviewed-   Lab Results   Component Value Date    HGB 10.4 (L) 03/11/2022    HCT 34.5 (L) 03/11/2022     Monitor serial CBC and transfuse if patient becomes hemodynamically unstable, symptomatic or H/H drops below 7/21.         Coronary artery disease due to calcified coronary lesion  Patient with known CAD  which is controlled Will  monitor for S/Sx of angina/ACS. Continue to monitor on telemetry.         Hypertension  Chronic, controlled.  Latest blood pressure and vitals reviewed-   Temp:  [97.8 °F (36.6 °C)-100.2 °F (37.9 °C)]   Pulse:  []   Resp:  [18-20]   BP: (147-167)/(64-72)   SpO2:  [89 %-99 %] .   Home meds for hypertension were reviewed and noted below.   Hypertension Medications             carvediloL " (COREG) 6.25 MG tablet Take 1 tablet (6.25 mg total) by mouth 2 (two) times daily with meals. Need office visit before next refill, last seen 12/2021. This will be the LAST Rx if visit is not made.    furosemide (LASIX) 40 MG tablet TAKE ONE TABLET BY MOUTH DAILY AS NEEDED For (fluid retention)    hydrALAZINE (APRESOLINE) 100 MG tablet Take 1 tablet (100 mg total) by mouth 3 (three) times daily.    lisinopriL (PRINIVIL,ZESTRIL) 20 MG tablet Take 2 tablets (40 mg total) by mouth once daily.    nitroGLYCERIN (NITROSTAT) 0.4 MG SL tablet Place 1 tablet (0.4 mg total) under the tongue every 5 (five) minutes as needed for Chest pain.          While in the hospital, will manage blood pressure as follows; Continue home antihypertensive regimen, hold lisinopril    Will utilize p.r.n. blood pressure medication only if patient's blood pressure greater than  180/110 and she develops symptoms such as worsening chest pain or shortness of breath.        Moderate persistent asthma without complication  Noted, chronic  duonebs PRN      VTE Risk Mitigation (From admission, onward)         Ordered     apixaban tablet 5 mg  2 times daily         03/11/22 2303     Reason for No Pharmacological VTE Prophylaxis  Once        Question:  Reasons:  Answer:  Already adequately anticoagulated on oral Anticoagulants    03/11/22 2303     IP VTE HIGH RISK PATIENT  Once         03/11/22 2303     Place sequential compression device  Until discontinued         03/11/22 2303                Discharge Planning   JANAY:      Code Status: Full Code   Is the patient medically ready for discharge?:     Reason for patient still in hospital (select all that apply): Treatment  Discharge Plan A: Home Health                  Chago Dong MD  Department of Hospital Medicine   [facility]

## 2022-03-13 NOTE — PLAN OF CARE
Problem: Adult Inpatient Plan of Care  Goal: Patient-Specific Goal (Individualized)  Outcome: Ongoing, Progressing     Problem: Adult Inpatient Plan of Care  Goal: Optimal Comfort and Wellbeing  Outcome: Ongoing, Progressing

## 2022-03-14 LAB
ALBUMIN SERPL BCP-MCNC: 2.5 G/DL (ref 3.5–5.2)
ALP SERPL-CCNC: 63 U/L (ref 55–135)
ALT SERPL W/O P-5'-P-CCNC: 15 U/L (ref 10–44)
ANION GAP SERPL CALC-SCNC: 7 MMOL/L (ref 8–16)
AORTIC ROOT ANNULUS: 2.95 CM
AORTIC VALVE CUSP SEPERATION: 1.48 CM
AST SERPL-CCNC: 14 U/L (ref 10–40)
AV INDEX (PROSTH): 0.59
AV MEAN GRADIENT: 7 MMHG
AV PEAK GRADIENT: 13 MMHG
AV VALVE AREA: 1.78 CM2
AV VELOCITY RATIO: 0.56
BACTERIA UR CULT: ABNORMAL
BASOPHILS # BLD AUTO: 0.02 K/UL (ref 0–0.2)
BASOPHILS NFR BLD: 0.4 % (ref 0–1.9)
BILIRUB SERPL-MCNC: 0.4 MG/DL (ref 0.1–1)
BSA FOR ECHO PROCEDURE: 2.11 M2
BUN SERPL-MCNC: 20 MG/DL (ref 8–23)
CALCIUM SERPL-MCNC: 8.7 MG/DL (ref 8.7–10.5)
CHLORIDE SERPL-SCNC: 102 MMOL/L (ref 95–110)
CO2 SERPL-SCNC: 27 MMOL/L (ref 23–29)
CREAT SERPL-MCNC: 1.3 MG/DL (ref 0.5–1.4)
CV ECHO LV RWT: 0.59 CM
DIFFERENTIAL METHOD: ABNORMAL
DOP CALC AO PEAK VEL: 1.8 M/S
DOP CALC AO VTI: 42.6 CM
DOP CALC LVOT AREA: 3 CM2
DOP CALC LVOT DIAMETER: 1.97 CM
DOP CALC LVOT PEAK VEL: 1.01 M/S
DOP CALC LVOT STROKE VOLUME: 75.98 CM3
DOP CALC MV VTI: 52.96 CM
DOP CALCLVOT PEAK VEL VTI: 24.94 CM
E WAVE DECELERATION TIME: 309.77 MSEC
E/A RATIO: 1.04
E/E' RATIO: 17.2 M/S
ECHO LV POSTERIOR WALL: 1.26 CM (ref 0.6–1.1)
EJECTION FRACTION: 60 %
EOSINOPHIL # BLD AUTO: 0.2 K/UL (ref 0–0.5)
EOSINOPHIL NFR BLD: 3.9 % (ref 0–8)
ERYTHROCYTE [DISTWIDTH] IN BLOOD BY AUTOMATED COUNT: 15.3 % (ref 11.5–14.5)
EST. GFR  (AFRICAN AMERICAN): 45 ML/MIN/1.73 M^2
EST. GFR  (NON AFRICAN AMERICAN): 39 ML/MIN/1.73 M^2
FRACTIONAL SHORTENING: 35 % (ref 28–44)
GLUCOSE SERPL-MCNC: 105 MG/DL (ref 70–110)
HCT VFR BLD AUTO: 28.6 % (ref 37–48.5)
HGB BLD-MCNC: 8.6 G/DL (ref 12–16)
IMM GRANULOCYTES # BLD AUTO: 0.02 K/UL (ref 0–0.04)
IMM GRANULOCYTES NFR BLD AUTO: 0.4 % (ref 0–0.5)
INTERVENTRICULAR SEPTUM: 1.32 CM (ref 0.6–1.1)
LA MAJOR: 5.8 CM
LA WIDTH: 4.26 CM
LEFT ATRIUM SIZE: 4.65 CM
LEFT INTERNAL DIMENSION IN SYSTOLE: 2.75 CM (ref 2.1–4)
LEFT VENTRICLE DIASTOLIC VOLUME INDEX: 40.49 ML/M2
LEFT VENTRICLE DIASTOLIC VOLUME: 81.38 ML
LEFT VENTRICLE MASS INDEX: 101 G/M2
LEFT VENTRICLE SYSTOLIC VOLUME INDEX: 14.1 ML/M2
LEFT VENTRICLE SYSTOLIC VOLUME: 28.33 ML
LEFT VENTRICULAR INTERNAL DIMENSION IN DIASTOLE: 4.26 CM (ref 3.5–6)
LEFT VENTRICULAR MASS: 202.53 G
LV LATERAL E/E' RATIO: 18.43 M/S
LV SEPTAL E/E' RATIO: 16.13 M/S
LYMPHOCYTES # BLD AUTO: 1.1 K/UL (ref 1–4.8)
LYMPHOCYTES NFR BLD: 20 % (ref 18–48)
MAGNESIUM SERPL-MCNC: 2 MG/DL (ref 1.6–2.6)
MCH RBC QN AUTO: 29.1 PG (ref 27–31)
MCHC RBC AUTO-ENTMCNC: 30.1 G/DL (ref 32–36)
MCV RBC AUTO: 97 FL (ref 82–98)
MONOCYTES # BLD AUTO: 0.7 K/UL (ref 0.3–1)
MONOCYTES NFR BLD: 12.9 % (ref 4–15)
MV MEAN GRADIENT: 1 MMHG
MV PEAK A VEL: 1.24 M/S
MV PEAK E VEL: 1.29 M/S
MV PEAK GRADIENT: 7 MMHG
MV STENOSIS PRESSURE HALF TIME: 105.68 MS
MV VALVE AREA BY CONTINUITY EQUATION: 1.43 CM2
MV VALVE AREA P 1/2 METHOD: 2.08 CM2
NEUTROPHILS # BLD AUTO: 3.5 K/UL (ref 1.8–7.7)
NEUTROPHILS NFR BLD: 62.4 % (ref 38–73)
NRBC BLD-RTO: 0 /100 WBC
PHOSPHATE SERPL-MCNC: 2.5 MG/DL (ref 2.7–4.5)
PISA TR MAX VEL: 3.06 M/S
PLATELET # BLD AUTO: 156 K/UL (ref 150–450)
PMV BLD AUTO: 12.7 FL (ref 9.2–12.9)
POTASSIUM SERPL-SCNC: 4.2 MMOL/L (ref 3.5–5.1)
PROT SERPL-MCNC: 5.1 G/DL (ref 6–8.4)
PV PEAK VELOCITY: 1.24 CM/S
RA MAJOR: 4.74 CM
RA PRESSURE: 3 MMHG
RA WIDTH: 4.2 CM
RBC # BLD AUTO: 2.96 M/UL (ref 4–5.4)
RV TISSUE DOPPLER FREE WALL SYSTOLIC VELOCITY 1 (APICAL 4 CHAMBER VIEW): 13.78 CM/S
SODIUM SERPL-SCNC: 136 MMOL/L (ref 136–145)
TDI LATERAL: 0.07 M/S
TDI SEPTAL: 0.08 M/S
TDI: 0.08 M/S
TR MAX PG: 37 MMHG
TRICUSPID ANNULAR PLANE SYSTOLIC EXCURSION: 3.1 CM
TROPONIN I SERPL DL<=0.01 NG/ML-MCNC: 0.02 NG/ML (ref 0–0.03)
TROPONIN I SERPL DL<=0.01 NG/ML-MCNC: 0.03 NG/ML (ref 0–0.03)
TV REST PULMONARY ARTERY PRESSURE: 40 MMHG
WBC # BLD AUTO: 5.59 K/UL (ref 3.9–12.7)

## 2022-03-14 PROCEDURE — 25000003 PHARM REV CODE 250: Performed by: INTERNAL MEDICINE

## 2022-03-14 PROCEDURE — 93005 ELECTROCARDIOGRAM TRACING: CPT

## 2022-03-14 PROCEDURE — 84100 ASSAY OF PHOSPHORUS: CPT | Performed by: NURSE PRACTITIONER

## 2022-03-14 PROCEDURE — 93010 EKG 12-LEAD: ICD-10-PCS | Mod: ,,, | Performed by: INTERNAL MEDICINE

## 2022-03-14 PROCEDURE — 80053 COMPREHEN METABOLIC PANEL: CPT | Performed by: NURSE PRACTITIONER

## 2022-03-14 PROCEDURE — 63700000 PHARM REV CODE 250 ALT 637 W/O HCPCS: Performed by: NURSE PRACTITIONER

## 2022-03-14 PROCEDURE — 63600175 PHARM REV CODE 636 W HCPCS: Performed by: NURSE PRACTITIONER

## 2022-03-14 PROCEDURE — 27000221 HC OXYGEN, UP TO 24 HOURS

## 2022-03-14 PROCEDURE — 36415 COLL VENOUS BLD VENIPUNCTURE: CPT | Performed by: INTERNAL MEDICINE

## 2022-03-14 PROCEDURE — 83735 ASSAY OF MAGNESIUM: CPT | Performed by: NURSE PRACTITIONER

## 2022-03-14 PROCEDURE — 85025 COMPLETE CBC W/AUTO DIFF WBC: CPT | Performed by: NURSE PRACTITIONER

## 2022-03-14 PROCEDURE — 97530 THERAPEUTIC ACTIVITIES: CPT

## 2022-03-14 PROCEDURE — 93010 ELECTROCARDIOGRAM REPORT: CPT | Mod: ,,, | Performed by: INTERNAL MEDICINE

## 2022-03-14 PROCEDURE — 11000001 HC ACUTE MED/SURG PRIVATE ROOM

## 2022-03-14 PROCEDURE — 94761 N-INVAS EAR/PLS OXIMETRY MLT: CPT

## 2022-03-14 PROCEDURE — 25000003 PHARM REV CODE 250: Performed by: NURSE PRACTITIONER

## 2022-03-14 PROCEDURE — 99900035 HC TECH TIME PER 15 MIN (STAT)

## 2022-03-14 PROCEDURE — 36415 COLL VENOUS BLD VENIPUNCTURE: CPT | Performed by: NURSE PRACTITIONER

## 2022-03-14 PROCEDURE — 97161 PT EVAL LOW COMPLEX 20 MIN: CPT

## 2022-03-14 PROCEDURE — 84484 ASSAY OF TROPONIN QUANT: CPT | Mod: 91 | Performed by: INTERNAL MEDICINE

## 2022-03-14 RX ORDER — FUROSEMIDE 20 MG/1
20 TABLET ORAL DAILY
Status: DISCONTINUED | OUTPATIENT
Start: 2022-03-14 | End: 2022-03-17 | Stop reason: HOSPADM

## 2022-03-14 RX ORDER — TRAMADOL HYDROCHLORIDE 50 MG/1
50 TABLET ORAL EVERY 6 HOURS PRN
Status: DISCONTINUED | OUTPATIENT
Start: 2022-03-14 | End: 2022-03-17 | Stop reason: HOSPADM

## 2022-03-14 RX ADMIN — CARVEDILOL 6.25 MG: 6.25 TABLET, FILM COATED ORAL at 04:03

## 2022-03-14 RX ADMIN — TRAMADOL HYDROCHLORIDE 50 MG: 50 TABLET, COATED ORAL at 04:03

## 2022-03-14 RX ADMIN — MELATONIN TAB 3 MG 6 MG: 3 TAB at 08:03

## 2022-03-14 RX ADMIN — LEVOTHYROXINE SODIUM 125 MCG: 0.12 TABLET ORAL at 06:03

## 2022-03-14 RX ADMIN — MORPHINE SULFATE 4 MG: 4 INJECTION INTRAVENOUS at 08:03

## 2022-03-14 RX ADMIN — CARVEDILOL 6.25 MG: 6.25 TABLET, FILM COATED ORAL at 06:03

## 2022-03-14 RX ADMIN — ACETAMINOPHEN 650 MG: 325 TABLET ORAL at 03:03

## 2022-03-14 RX ADMIN — FUROSEMIDE 20 MG: 20 TABLET ORAL at 04:03

## 2022-03-14 RX ADMIN — APIXABAN 5 MG: 2.5 TABLET, FILM COATED ORAL at 08:03

## 2022-03-14 RX ADMIN — MONTELUKAST 10 MG: 10 TABLET, FILM COATED ORAL at 08:03

## 2022-03-14 RX ADMIN — POTASSIUM & SODIUM PHOSPHATES POWDER PACK 280-160-250 MG 2 PACKET: 280-160-250 PACK at 08:03

## 2022-03-14 RX ADMIN — AMIODARONE HYDROCHLORIDE 200 MG: 200 TABLET ORAL at 08:03

## 2022-03-14 RX ADMIN — MORPHINE SULFATE 4 MG: 4 INJECTION INTRAVENOUS at 04:03

## 2022-03-14 RX ADMIN — AZITHROMYCIN MONOHYDRATE 250 MG: 250 TABLET ORAL at 08:03

## 2022-03-14 RX ADMIN — POLYETHYLENE GLYCOL 3350 17 G: 17 POWDER, FOR SOLUTION ORAL at 08:03

## 2022-03-14 RX ADMIN — HYDRALAZINE HYDROCHLORIDE 100 MG: 25 TABLET, FILM COATED ORAL at 04:03

## 2022-03-14 NOTE — PT/OT/SLP EVAL
"Physical Therapy Evaluation    Patient Name:  Heather Hughes   MRN:  [id number]    Recommendations:     Discharge Recommendations:  rehabilitation facility (pending progress with PT; endorses significant weakness right now, but appears motivated and has good family support from daughter and son)   Discharge Equipment Recommendations: none   Barriers to discharge: None    Assessment:     Heather Hughes is a 81 y.o. female admitted with a medical diagnosis of Atypical pneumonia.  She presents with the following impairments/functional limitations:  weakness, impaired endurance, impaired self care skills, impaired functional mobilty, impaired balance, decreased upper extremity function, decreased lower extremity function, pain, impaired skin, edema, impaired cardiopulmonary response to activity, orthopedic precautions. Patient sleeping upon entering the room (son states she received IV pain medication this morning), but she is agreeable to participation with PT evaluation as tolerated. She denies pain at rest. Her daughter lives with her and works from home. She is Jayro with rollator at baseline. She requires ModA for supine to sit with increased time and VC to perform. She denies dizziness upon sitting EOB and SpO2 of 93-97% on RA. Attempted to apply left boot; however, patient unable to tolerate secondary to pain. Her toe nails are very long and may cause pain once boot applied. She states her podiatrist typically cuts her toe nails. She reports fatigue and requests to return to bed with ModA x2. Upon return to bed patient reports 5/10 bilateral jaw pain and "indegestion" she reports is similar to when she has experienced a heart attack in the past- charge nurse Janey and nurse Savana notified. Son present and bed alarm on.     Rehab Prognosis: Good; patient would benefit from acute skilled PT services to address these deficits and reach maximum level of function.    Recent Surgery: * No surgery found *  "     Plan:     During this hospitalization, patient to be seen QD7 to address the identified rehab impairments via gait training, therapeutic activities, therapeutic exercises and progress toward the following goals:    · Plan of Care Expires:  04/14/22    Subjective     Chief Complaint: jaw pain and indigestion upon return to bed  Patient/Family Comments/goals: agrees that she may need placement upon D/C   Pain/Comfort:  · Pain Rating 1: 0/10 (at rest)    Patients cultural, spiritual, Orthodox conflicts given the current situation:      Living Environment:  Patient's daughter lives with her in a H with no LINNEA   Prior to admission, patients level of function was Jayro with rollator. She reports her fall PTA was her only recent fall. She had HHPT for strengthening previously.  Equipment used at home: wheelchair, bedside commode, bath bench, cane, straight, shower chair, walker, rolling, rollator.  DME owned (not currently used): none.  Upon discharge, patient will have assistance from IRF and family.    Objective:     Communicated with NABILA Sawant prior to session.  Patient found HOB elevated with oxygen, PureWick, telemetry  upon PT entry to room.    General Precautions: Standard, fall   Orthopedic Precautions:LLE weight bearing as tolerated (with boot)   Braces:  (LLE splint with boot present in room required for ambulation)  Respiratory Status: Nasal cannula, flow 2 L/min - removed by RT for home O2 evaluation    Exams:  · Skin Integrity/Edema:      · -       Skin integrity: Wound left arm with dressing in place with dried blood  · RLE Strength: 3-/5  · LLE Strength: 2/5    Functional Mobility:  · Bed Mobility:     · Scooting: maximal assistance and of 2 persons  · Supine to Sit: moderate assistance  · Sit to Supine: moderate assistance and of 2 persons    Therapeutic Activities and Exercises:   Patient was educated on the importance of OOB activity and functional mobility to negate negative effects of prolonged  bed rest during hospitalization, safe transfers and ambulation, LLE WBAT with boot, PLB, and D/C planning     Patient performed 10 reps of right LAQ while sitting EOB     AM-PAC 6 CLICK MOBILITY  Total Score:8     Patient left HOB elevated with all lines intact, call button in reach, bed alarm on, charge RN and nurse notified and son present.    GOALS:   Multidisciplinary Problems     Physical Therapy Goals        Problem: Physical Therapy Goal    Goal Priority Disciplines Outcome Goal Variances Interventions   Physical Therapy Goal     PT, PT/OT      Description: Goals to be met by: 22     Patient will increase functional independence with mobility by performin. Supine to sit with Stand-by Assistance  2. Sit to stand transfer with Minimal Assistance  3. Bed to chair transfer with Minimal Assistance using Rolling Walker maintaining LLE WBAT with boot  4. Gait  x 15 feet with Minimal Assistance using Rolling Walker maintaining LLE WBAT with boot.   5. Lower extremity exercise program x20 reps per handout, with supervision                     History:     Past Medical History:   Diagnosis Date    Allergy     Anticoagulant long-term use     Arthritis     Asthma     Coronary artery disease     stent x 1    Deep vein blood clot of right lower extremity 1965 & 1971    Full dentures     Heart attack 2017    Hypertension     Osteoporosis     Sleep apnea     Thyroid disease     Ulcer     Wears glasses        Past Surgical History:   Procedure Laterality Date    cardic stent  2017    CARDIOVERSION Left 3/12/2020    Procedure: Cardioversion;  Surgeon: Kaleigh Rascon MD;  Location: [facility] CATH LAB;  Service: Cardiology;  Laterality: Left;    CHOLECYSTECTOMY      EYE SURGERY      bilateral cataracts    FRACTURE SURGERY      left wrist     KNEE ARTHROPLASTY Left 8/15/2018    Procedure: ARTHROPLASTY, KNEE;  Surgeon: Shantanu Santiago MD;  Location: [facility] OR;  Service: Orthopedics;   Laterality: Left;  ANESTHESIA GENERAL AND BLOCK    THYROIDECTOMY         Time Tracking:     PT Received On: 03/14/22  PT Start Time: 0950     PT Stop Time: 1025  PT Total Time (min): 35 min     Billable Minutes: Evaluation 10 and Therapeutic Activity 25 03/14/2022

## 2022-03-14 NOTE — HOSPITAL COURSE
Multiple medical problems including ESRD and CHF and poor functional mobility prior to this recent fall and L ankle Fx

## 2022-03-14 NOTE — CARE UPDATE
03/14/22 1030   Room Air SpO2 At Rest   Room Air SpO2 At Rest 96 %   Ambulation SpO2 Evaluation on Room Air   Room Air SpO2 During Ambulation 93 %   Pulse 51 bpm   SpO2 On Post Ambulation   SpO2 Post Ambulation 95 %   Post Ambulation Heart Rate 50 bpm

## 2022-03-14 NOTE — PROGRESS NOTES
"Pt is admitted s/p fall in her bathroom at home where her "ankle rolled out from under her". Pt has a fracture but is not going surgical route per ortho. Pt is in an ACE wrap with a fx boot. Pt tearful and cries and hollars when she is turned or any ADL care is provided. Pt daughter sits at bedside who is her primary caregiver. Pt offered prn pain med but states that she can only take tylenol bc "someone told her that if she took her pain meds it would knock her out". Pt cries and reports she hurts but does not want to be unable to participate in PT. RN and pt daughter educate pt and encourage use of prn meds for help with pain and healing. Pt daughter is able to get pt is understand that the pain medicine is ok to take and that even if she slept, it would be ok at night. Pt and daughter, who is POA, decide to try the IV morphine. Morphine given with efficacy and pt slept t/o night. Nurse discusses with pt and family other pain control options and possibly PO pain meds. Options discussed with oncoming nurse, and pt daughter states she will discuss with MD upon AM rounds. VSS. Tele box and NC in place. IV ABT given and scheduled meds given per order. Safety measures in place. RT at bedside to assess pt t/o night. SAHARA.  "

## 2022-03-14 NOTE — PROGRESS NOTES
[facility] Hospital Medicine  Progress Note    Patient Name: Heather Hughes  MRN: [id number]  Patient Class: IP- Inpatient   Admission Date: 3/11/2022  Length of Stay: 3 days  Attending Physician: Tsering Peace MD  Primary Care Provider: Yumiko Page MD        Subjective:     Principal Problem:Atypical pneumonia        HPI:  Heather Hughes is a 81 y.o. female with a past medical history of CAD, HTN, DVT, and past surgical history of cardiac stent, cardioversion and knee surgery, who presented to the ED tonight for generalized weakness and ankle pain after a fall at home. HPI provided by patient and daughter. Daughter states that the patient was in her normal state of health last night but was very difficulty to wake up this morning. She states that she slept almost 18 hours today. Daughter states that she tried to get her up and into a chair to encourage the patient to move around. She needed to use the restroom and needed assistance in walking there, where she usually is able to get around with a walker and no additional help. When they got to the bathroom, as they were turning to sit her on the toilet, her legs gave out and twisted underneath her. Her daughter assisted her to the ground and she landed on her left leg and hit her elbow on the floor, sustaining two skin tears. She has also had a cough for two weeks, no fever or chest pain. She did have several episodes of diarrhea. She denies congestion, abdominal pain, nausea or vomiting.     Upon arrival to the ED, the patient found to be on Oxygen via NC, as she was noted to be hypoxic by EMS on their arrival at 83%. On oxygen, her pulse ox was 99%. CXR showed evidence of atypical pneumonia. Urinalysis showed a UTI. Xray of the left ankle showed an avulsion fracture of the medial malleolus. CBC showed chronic anemia. CMP showed an STACIE. BNP was elevated at 240, and initial troponin was elevated at 0.033. Hospital Medicine consulted  for admission and further management.      Overview/Hospital Course:  Patient was started on IV antibiotics for possible pneumonia and got IV fluids due to the suspicion for hypovolemia causing STACIE. Symptoms improved with treatment. Orthopedics assessed the patient and recommended conservative management for the medial malleolus fx. She was given fracture boots and will be taught by PT on how to use at home.      C/o jaw pain/epigastric pain while working with PT. Reports it was similar to prior time she had cardiac symptoms. No shortness of breath, lightheadedness. Tolerating oral intake.     NAD, alert  NC, AT  RRR  CTAB  SNTND+BS  Trace edema; affected leg in boot.   PERRL    Vitals, labs and radiographs from past 24h reviewed and personally interpreted.      Assessment/Plan:      * Atypical pneumonia  Patient with possible atypical pneumonia based on CXR findings and complains of cough   On antibiotics now, will continue for 5 days   Antibiotics (From admission, onward)            Start     Stop Route Frequency Ordered    03/12/22 2240  cefTRIAXone (ROCEPHIN) 1 g/50 mL D5W IVPB         -- IV Every 24 hours (non-standard times) 03/12/22 0105    03/12/22 0900  azithromycin tablet 250 mg         03/16 0859 Oral Daily 03/12/22 0105      . Cultures drawn and noted-   Microbiology Results (last 7 days)     Procedure Component Value Units Date/Time    Urine culture [[id number]]  (Abnormal) Collected: 03/11/22 2053    Order Status: Completed Specimen: Urine Updated: 03/13/22 0757     Urine Culture, Routine GRAM NEGATIVE TRES  >100,000 cfu/ml  Identification and susceptibility pending      Narrative:      Specimen Source->Urine    Clostridium difficile EIA [[id number]]     Order Status: Canceled Specimen: Stool               (HFpEF) heart failure with preserved ejection fraction  Patient has history of CHFpEF.  Difficult to assess accurately her volume status on exam. CXR may suggest mild edema.   Holding IV fluids to  "prevent worsening overload.   Will get TTE while inpatient which will help tailor management.   -resuming home lasix    STACIE (acute kidney injury)  STACIE slightly improved since admission.   Suspicion initially for hypovolemia for which she got IV fluids.   IV fluids stopped now that she is eating and has no diarrhea.   Will continue to monitor. Renally dose medications.   -resuming home lasix    Avulsion fracture of ankle, left, closed, initial encounter  S/p slight fall at home due to weakness, daughter assisted to ground but ankle was twisted under her and she fell onto her ankle    Xray shows "Curvilinear increased density at the medial aspect of the medial malleolus could relate to acute avulsive type fracture."  Splint placed in ER  Ortho consulted, conservative management. Fx boots given.   PT/OT for ambulation.   Pain meds PRN    Acute cystitis  pendin urine culture  Rocephin daily      Anticoagulant long-term use  Fall and bleeding precautions      Atrial fibrillation with RVR  Patient with Long standing persistent (>12 months) atrial fibrillation which is controlled currently with Beta Blocker and Amiodarone. Patient is currently in sinus rhythm.[id number] Score: 4. Anticoagulation indicated. Anticoagulation done with eliquis.        Iron deficiency anemia  Patient's anemia is currently controlled. Has not received any PRBCs to date.. Etiology likely d/t chronic disease  Current CBC reviewed-   Lab Results   Component Value Date    HGB 10.4 (L) 03/11/2022    HCT 34.5 (L) 03/11/2022     Monitor serial CBC and transfuse if patient becomes hemodynamically unstable, symptomatic or H/H drops below 7/21.         Coronary artery disease due to calcified coronary lesion  Patient with known CAD  which is controlled Will  monitor for S/Sx of angina/ACS. Continue to monitor on telemetry.   -trend troponins. Follow echo      Hypertension  Chronic, controlled.  Latest blood pressure and vitals reviewed-   Temp:  [97.8 °F " (36.6 °C)-100.2 °F (37.9 °C)]   Pulse:  []   Resp:  [18-20]   BP: (147-167)/(64-72)   SpO2:  [89 %-99 %] .   Home meds for hypertension were reviewed and noted below.   Hypertension Medications             carvediloL (COREG) 6.25 MG tablet Take 1 tablet (6.25 mg total) by mouth 2 (two) times daily with meals. Need office visit before next refill, last seen 12/2021. This will be the LAST Rx if visit is not made.    furosemide (LASIX) 40 MG tablet TAKE ONE TABLET BY MOUTH DAILY AS NEEDED For (fluid retention)    hydrALAZINE (APRESOLINE) 100 MG tablet Take 1 tablet (100 mg total) by mouth 3 (three) times daily.    lisinopriL (PRINIVIL,ZESTRIL) 20 MG tablet Take 2 tablets (40 mg total) by mouth once daily.    nitroGLYCERIN (NITROSTAT) 0.4 MG SL tablet Place 1 tablet (0.4 mg total) under the tongue every 5 (five) minutes as needed for Chest pain.          While in the hospital, will manage blood pressure as follows; Continue home antihypertensive regimen, hold lisinopril    Will utilize p.r.n. blood pressure medication only if patient's blood pressure greater than  180/110 and she develops symptoms such as worsening chest pain or shortness of breath.        Moderate persistent asthma without complication  Noted, chronic  duonebs PRN      VTE Risk Mitigation (From admission, onward)         Ordered     apixaban tablet 5 mg  2 times daily         03/11/22 2303     Reason for No Pharmacological VTE Prophylaxis  Once        Question:  Reasons:  Answer:  Already adequately anticoagulated on oral Anticoagulants    03/11/22 2303     IP VTE HIGH RISK PATIENT  Once         03/11/22 2303     Place sequential compression device  Until discontinued         03/11/22 2303                Discharge Planning   JANAY:      Code Status: DNR   Is the patient medically ready for discharge?:     Reason for patient still in hospital (select all that apply): Treatment  Discharge Plan A: Home Health                  Tsering Peace,  MD  Department of Hospital Medicine   [facility]

## 2022-03-14 NOTE — PLAN OF CARE
Patient A&Ox4 with daughter and son at the bedside. Tolerated scheduled medications and prn pains medications as needed. Tele in place NSR. Left forearm cleaned and wrapped up. Bed in lowest position, call bell in reach, and all safety measures in place.

## 2022-03-14 NOTE — SUBJECTIVE & OBJECTIVE
Principal Problem:Atypical pneumonia    Principal Orthopedic Problem: L ankle Fx - MM    Interval History: multiple medical problems    Review of patient's allergies indicates:   Allergen Reactions    Bactroban [mupirocin calcium] Itching and Other (See Comments)     Red, tingling, itching, burning     Ditropan [oxybutynin chloride] Swelling     swelling    Lipitor [atorvastatin] Other (See Comments)     Fall/ balance     Myrbetriq [mirabegron] Swelling and Other (See Comments)     Legs swelling, couldn't void     Pravastatin Other (See Comments)     Balance problem     Oxycodone     Codeine Nausea Only    Mobic [meloxicam] Other (See Comments)     Not sure reaction    Perfume Other (See Comments)     And flowers cause allergy and makes her cough.    Sulfur Other (See Comments)     Was told not to take as a child       Current Facility-Administered Medications   Medication    acetaminophen tablet 650 mg    albuterol-ipratropium 2.5 mg-0.5 mg/3 mL nebulizer solution 3 mL    aluminum-magnesium hydroxide-simethicone 200-200-20 mg/5 mL suspension 30 mL    amiodarone tablet 200 mg    apixaban tablet 5 mg    azithromycin tablet 250 mg    carvediloL tablet 6.25 mg    cefTRIAXone (ROCEPHIN) 1 g/50 mL D5W IVPB    dextrose 50% injection 12.5 g    dextrose 50% injection 25 g    glucagon (human recombinant) injection 1 mg    glucose chewable tablet 16 g    glucose chewable tablet 24 g    hydrALAZINE tablet 100 mg    levothyroxine tablet 125 mcg    LORazepam tablet 1 mg    magnesium oxide tablet 800 mg    magnesium oxide tablet 800 mg    melatonin tablet 6 mg    montelukast tablet 10 mg    morphine injection 2 mg    morphine injection 4 mg    naloxone 0.4 mg/mL injection 0.02 mg    ondansetron injection 4 mg    polyethylene glycol packet 17 g    potassium bicarbonate disintegrating tablet 35 mEq    potassium bicarbonate disintegrating tablet 50 mEq    potassium bicarbonate disintegrating tablet 60 mEq    potassium, sodium  "phosphates 280-160-250 mg packet 2 packet    potassium, sodium phosphates 280-160-250 mg packet 2 packet    potassium, sodium phosphates 280-160-250 mg packet 2 packet    simethicone chewable tablet 80 mg    sodium chloride 0.9% flush 3 mL     Objective:     Vital Signs (Most Recent):  Temp: 97.8 °F (36.6 °C) (03/14/22 0309)  Pulse: 60 (03/14/22 0309)  Resp: 18 (03/14/22 0408)  BP: 135/71 (03/14/22 0652)  SpO2: 97 % (03/14/22 0518) Vital Signs (24h Range):  Temp:  [96.4 °F (35.8 °C)-98 °F (36.7 °C)] 97.8 °F (36.6 °C)  Pulse:  [55-68] 60  Resp:  [16-18] 18  SpO2:  [96 %-98 %] 97 %  BP: (120-165)/(58-71) 135/71     Weight: 99.8 kg (220 lb)  Height: 5' 3" (160 cm)  Body mass index is 38.97 kg/m².    No intake or output data in the 24 hours ending 03/14/22 0658    General    Nursing note and vitals reviewed.  Constitutional: She is oriented to person, place, and time.   Obese   Pulmonary/Chest: Effort normal.   Neurological: She is alert and oriented to person, place, and time.   Psychiatric: She has a normal mood and affect. Her behavior is normal.         Left Ankle/Foot Exam     Comments:  LLE DNVI. Splinted. Boot in room; has yet to be applied.      Significant Labs: CBC:   Recent Labs   Lab 03/13/22 0512 03/14/22 0457   WBC 5.84 5.59   HGB 8.6* 8.6*   HCT 28.0* 28.6*    156     CMP:   Recent Labs   Lab 03/13/22 0512 03/14/22 0457    136   K 4.2 4.2    102   CO2 25 27   * 105   BUN 25* 20   CREATININE 1.6* 1.3   CALCIUM 8.6* 8.7   PROT 5.3* 5.1*   ALBUMIN 2.6* 2.5*   BILITOT 0.3 0.4   ALKPHOS 60 63   AST 17 14   ALT 20 15   ANIONGAP 9 7*   EGFRNONAA 30* 39*     All pertinent labs within the past 24 hours have been reviewed.    Significant Imaging: None  "

## 2022-03-14 NOTE — ASSESSMENT & PLAN NOTE
Stable from an ortho stand point  LLE WBAT with Boot  Pt has DME at home including walker and WC with elevating leg rests  Will need out-patient follow up for serial X-rays

## 2022-03-15 LAB
ANION GAP SERPL CALC-SCNC: 7 MMOL/L (ref 8–16)
BASOPHILS # BLD AUTO: 0.04 K/UL (ref 0–0.2)
BASOPHILS NFR BLD: 0.7 % (ref 0–1.9)
BUN SERPL-MCNC: 18 MG/DL (ref 8–23)
CALCIUM SERPL-MCNC: 9 MG/DL (ref 8.7–10.5)
CHLORIDE SERPL-SCNC: 99 MMOL/L (ref 95–110)
CO2 SERPL-SCNC: 29 MMOL/L (ref 23–29)
CREAT SERPL-MCNC: 1 MG/DL (ref 0.5–1.4)
DIFFERENTIAL METHOD: ABNORMAL
EOSINOPHIL # BLD AUTO: 0.3 K/UL (ref 0–0.5)
EOSINOPHIL NFR BLD: 4.4 % (ref 0–8)
ERYTHROCYTE [DISTWIDTH] IN BLOOD BY AUTOMATED COUNT: 15 % (ref 11.5–14.5)
EST. GFR  (AFRICAN AMERICAN): >60 ML/MIN/1.73 M^2
EST. GFR  (NON AFRICAN AMERICAN): 53 ML/MIN/1.73 M^2
GLUCOSE SERPL-MCNC: 83 MG/DL (ref 70–110)
HCT VFR BLD AUTO: 29.7 % (ref 37–48.5)
HGB BLD-MCNC: 9.3 G/DL (ref 12–16)
IMM GRANULOCYTES # BLD AUTO: 0.03 K/UL (ref 0–0.04)
IMM GRANULOCYTES NFR BLD AUTO: 0.5 % (ref 0–0.5)
LYMPHOCYTES # BLD AUTO: 1.1 K/UL (ref 1–4.8)
LYMPHOCYTES NFR BLD: 18.5 % (ref 18–48)
MAGNESIUM SERPL-MCNC: 2 MG/DL (ref 1.6–2.6)
MCH RBC QN AUTO: 29.3 PG (ref 27–31)
MCHC RBC AUTO-ENTMCNC: 31.3 G/DL (ref 32–36)
MCV RBC AUTO: 94 FL (ref 82–98)
MONOCYTES # BLD AUTO: 0.7 K/UL (ref 0.3–1)
MONOCYTES NFR BLD: 11.7 % (ref 4–15)
NEUTROPHILS # BLD AUTO: 3.9 K/UL (ref 1.8–7.7)
NEUTROPHILS NFR BLD: 64.2 % (ref 38–73)
NRBC BLD-RTO: 0 /100 WBC
PHOSPHATE SERPL-MCNC: 3.3 MG/DL (ref 2.7–4.5)
PLATELET # BLD AUTO: 175 K/UL (ref 150–450)
PMV BLD AUTO: 12.5 FL (ref 9.2–12.9)
POTASSIUM SERPL-SCNC: 4.5 MMOL/L (ref 3.5–5.1)
POTASSIUM SERPL-SCNC: 5.1 MMOL/L (ref 3.5–5.1)
RBC # BLD AUTO: 3.17 M/UL (ref 4–5.4)
SODIUM SERPL-SCNC: 135 MMOL/L (ref 136–145)
TROPONIN I SERPL DL<=0.01 NG/ML-MCNC: 0.02 NG/ML (ref 0–0.03)
WBC # BLD AUTO: 6.07 K/UL (ref 3.9–12.7)

## 2022-03-15 PROCEDURE — 99223 1ST HOSP IP/OBS HIGH 75: CPT | Mod: ,,, | Performed by: GENERAL PRACTICE

## 2022-03-15 PROCEDURE — 63700000 PHARM REV CODE 250 ALT 637 W/O HCPCS: Performed by: NURSE PRACTITIONER

## 2022-03-15 PROCEDURE — 84132 ASSAY OF SERUM POTASSIUM: CPT | Performed by: INTERNAL MEDICINE

## 2022-03-15 PROCEDURE — 99223 PR INITIAL HOSPITAL CARE,LEVL III: ICD-10-PCS | Mod: ,,, | Performed by: GENERAL PRACTICE

## 2022-03-15 PROCEDURE — 99900035 HC TECH TIME PER 15 MIN (STAT)

## 2022-03-15 PROCEDURE — 84100 ASSAY OF PHOSPHORUS: CPT | Performed by: INTERNAL MEDICINE

## 2022-03-15 PROCEDURE — 25000003 PHARM REV CODE 250: Performed by: INTERNAL MEDICINE

## 2022-03-15 PROCEDURE — 85025 COMPLETE CBC W/AUTO DIFF WBC: CPT | Performed by: INTERNAL MEDICINE

## 2022-03-15 PROCEDURE — 94761 N-INVAS EAR/PLS OXIMETRY MLT: CPT

## 2022-03-15 PROCEDURE — 36415 COLL VENOUS BLD VENIPUNCTURE: CPT | Performed by: INTERNAL MEDICINE

## 2022-03-15 PROCEDURE — 97166 OT EVAL MOD COMPLEX 45 MIN: CPT

## 2022-03-15 PROCEDURE — 84484 ASSAY OF TROPONIN QUANT: CPT | Performed by: INTERNAL MEDICINE

## 2022-03-15 PROCEDURE — 25000003 PHARM REV CODE 250: Performed by: NURSE PRACTITIONER

## 2022-03-15 PROCEDURE — 83735 ASSAY OF MAGNESIUM: CPT | Performed by: INTERNAL MEDICINE

## 2022-03-15 PROCEDURE — 97535 SELF CARE MNGMENT TRAINING: CPT

## 2022-03-15 PROCEDURE — 97530 THERAPEUTIC ACTIVITIES: CPT | Mod: CQ

## 2022-03-15 PROCEDURE — 86580 TB INTRADERMAL TEST: CPT | Performed by: INTERNAL MEDICINE

## 2022-03-15 PROCEDURE — 80048 BASIC METABOLIC PNL TOTAL CA: CPT | Performed by: INTERNAL MEDICINE

## 2022-03-15 PROCEDURE — 11000001 HC ACUTE MED/SURG PRIVATE ROOM

## 2022-03-15 PROCEDURE — 30200315 PPD INTRADERMAL TEST REV CODE 302: Performed by: INTERNAL MEDICINE

## 2022-03-15 PROCEDURE — 63600175 PHARM REV CODE 636 W HCPCS: Performed by: NURSE PRACTITIONER

## 2022-03-15 RX ORDER — NYSTATIN 100000 [USP'U]/ML
500000 SUSPENSION ORAL 4 TIMES DAILY
Status: DISCONTINUED | OUTPATIENT
Start: 2022-03-15 | End: 2022-03-17 | Stop reason: HOSPADM

## 2022-03-15 RX ORDER — PANTOPRAZOLE SODIUM 40 MG/1
40 FOR SUSPENSION ORAL DAILY
Status: DISCONTINUED | OUTPATIENT
Start: 2022-03-15 | End: 2022-03-15

## 2022-03-15 RX ORDER — PANTOPRAZOLE SODIUM 40 MG/1
40 TABLET, DELAYED RELEASE ORAL DAILY
Status: DISCONTINUED | OUTPATIENT
Start: 2022-03-15 | End: 2022-03-17 | Stop reason: HOSPADM

## 2022-03-15 RX ORDER — LISINOPRIL 40 MG/1
40 TABLET ORAL DAILY
Status: DISCONTINUED | OUTPATIENT
Start: 2022-03-15 | End: 2022-03-17 | Stop reason: HOSPADM

## 2022-03-15 RX ADMIN — POLYETHYLENE GLYCOL 3350 17 G: 17 POWDER, FOR SOLUTION ORAL at 09:03

## 2022-03-15 RX ADMIN — APIXABAN 5 MG: 2.5 TABLET, FILM COATED ORAL at 09:03

## 2022-03-15 RX ADMIN — LISINOPRIL 40 MG: 40 TABLET ORAL at 06:03

## 2022-03-15 RX ADMIN — MORPHINE SULFATE 4 MG: 4 INJECTION INTRAVENOUS at 09:03

## 2022-03-15 RX ADMIN — MORPHINE SULFATE 4 MG: 4 INJECTION INTRAVENOUS at 03:03

## 2022-03-15 RX ADMIN — NYSTATIN 500000 UNITS: 500000 SUSPENSION ORAL at 01:03

## 2022-03-15 RX ADMIN — AZITHROMYCIN MONOHYDRATE 250 MG: 250 TABLET ORAL at 09:03

## 2022-03-15 RX ADMIN — LORAZEPAM 1 MG: 1 TABLET ORAL at 07:03

## 2022-03-15 RX ADMIN — MONTELUKAST 10 MG: 10 TABLET, FILM COATED ORAL at 08:03

## 2022-03-15 RX ADMIN — CEFTRIAXONE 1 G: 1 INJECTION, SOLUTION INTRAVENOUS at 09:03

## 2022-03-15 RX ADMIN — FUROSEMIDE 20 MG: 20 TABLET ORAL at 09:03

## 2022-03-15 RX ADMIN — SIMETHICONE 80 MG: 80 TABLET, CHEWABLE ORAL at 07:03

## 2022-03-15 RX ADMIN — TRAMADOL HYDROCHLORIDE 50 MG: 50 TABLET, COATED ORAL at 04:03

## 2022-03-15 RX ADMIN — APIXABAN 5 MG: 2.5 TABLET, FILM COATED ORAL at 08:03

## 2022-03-15 RX ADMIN — NYSTATIN 500000 UNITS: 500000 SUSPENSION ORAL at 04:03

## 2022-03-15 RX ADMIN — CEFTRIAXONE 1 G: 1 INJECTION, SOLUTION INTRAVENOUS at 12:03

## 2022-03-15 RX ADMIN — ONDANSETRON 4 MG: 2 INJECTION INTRAMUSCULAR; INTRAVENOUS at 07:03

## 2022-03-15 RX ADMIN — AMIODARONE HYDROCHLORIDE 200 MG: 200 TABLET ORAL at 09:03

## 2022-03-15 RX ADMIN — TUBERCULIN PURIFIED PROTEIN DERIVATIVE 5 UNITS: 5 INJECTION, SOLUTION INTRADERMAL at 01:03

## 2022-03-15 RX ADMIN — NYSTATIN 500000 UNITS: 500000 SUSPENSION ORAL at 08:03

## 2022-03-15 RX ADMIN — POTASSIUM & SODIUM PHOSPHATES POWDER PACK 280-160-250 MG 2 PACKET: 280-160-250 PACK at 03:03

## 2022-03-15 RX ADMIN — HYDRALAZINE HYDROCHLORIDE 100 MG: 25 TABLET, FILM COATED ORAL at 04:03

## 2022-03-15 RX ADMIN — CARVEDILOL 6.25 MG: 6.25 TABLET, FILM COATED ORAL at 04:03

## 2022-03-15 RX ADMIN — NYSTATIN 500000 UNITS: 500000 SUSPENSION ORAL at 09:03

## 2022-03-15 RX ADMIN — CARVEDILOL 6.25 MG: 6.25 TABLET, FILM COATED ORAL at 09:03

## 2022-03-15 RX ADMIN — PANTOPRAZOLE SODIUM 40 MG: 40 TABLET, DELAYED RELEASE ORAL at 01:03

## 2022-03-15 RX ADMIN — LEVOTHYROXINE SODIUM 125 MCG: 0.12 TABLET ORAL at 05:03

## 2022-03-15 NOTE — PLAN OF CARE
Per Stephanie with [facility] - the pt is accepted for admit and she asked when pt will be ready for discharge . I updated her that discharge will be tomorrow. She stated that she will request [employer] auth now and stated that she has already spoken to family. CM following.    03/15/22 1459   Post-Acute Status   Post-Acute Authorization Placement   Post-Acute Placement Status Pending payor review/awaiting authorization (if required)

## 2022-03-15 NOTE — PLAN OF CARE
Problem: Physical Therapy Goal  Goal: Physical Therapy Goal  Description: Goals to be met by: 22     Patient will increase functional independence with mobility by performin. Supine to sit with Stand-by Assistance  2. Sit to stand transfer with Minimal Assistance  3. Bed to chair transfer with Minimal Assistance using Rolling Walker maintaining LLE WBAT with boot  4. Gait  x 15 feet with Minimal Assistance using Rolling Walker maintaining LLE WBAT with boot.   5. Lower extremity exercise program x20 reps per handout, with supervision    3/15/2022 1514 by Allyssa Mendoza PTA  Outcome: Ongoing, Progressing

## 2022-03-15 NOTE — PROGRESS NOTES
Food & Nutrition                                                           Education     Diet Education: Preferences  Time Spent: 15 minutes  Learners: Patient        Nutrition Education provided with handouts: no        Comments: Patient on nutrition risk list for cultural preferences, however pt says she does not have any. Dislikes eggs and says she needs her meats to be fork tender/moist. Will update diet order. Also c/o early satiety and constipation, says this has been going on for awhile, especially since she ran out of prilosec. Discussed with RN. Tolerating PO, NFPE done 3/15/22, no wasting seen. I educated pt on tips for constipation and reflux. Verbalized understanding.        All questions and concerns answered. Dietitian's contact information provided.         Follow-Up: yes     Please Re-consult as needed           Thanks!

## 2022-03-15 NOTE — PHYSICIAN QUERY
PT Name: Heather Hughes  MR #: [id number]     DOCUMENTATION CLARIFICATION     CDS: Kirit Bennett RN CCDS               Contact information: [email or web address]   This form is a permanent document in the medical record.     Query Date: March 15, 2022    By submitting this query, we are merely seeking further clarification of documentation.  Please utilize your independent clinical judgment when addressing the question(s) below.  The Medical Record contains the following   Indicators   Supporting Clinical Findings Location in Medical Record     x SOB, MAJANO, Wheezing, Productive Cough, Use of Accessory Muscles, etc. productive cough and SOB 3/11/2022 ED provider notes     x RR         ABGs         O2 sat hypoxic by EMS on their arrival at 83%  O2 sat 89% on RA, RR 18-20 3/12/2022 H&P  3/11/2022 Vitals     x Hypoxia/Hypercapnia persistent hypoxia 3/12/2022 H&P    BiPAP/Intubation/Mechanical Ventilation       x Supplemental O2 Supplemental O2 @ 2L 3/11/2022 Vitals    Home O2, Oxygen Dependence       x Respiratory distress or failure hypoxic respiratory failure 3/15/2022 Cardiology consult     x Radiology findings Prominent interstitial markings could relate to pulmonary vascular congestion/edema versus an atypical or viral infectious etiology. Possible small bilateral pleural effusions. 3/11/2022 Chest x-ray     x Acute/Chronic Illness Atypical pneumonia  STACIE  Moderate persistent asthma without complication  history of CHFpEF 3/12/2022 H&P      3/13/2022 Hospital Medicine progress notes    Treatment      Other         The noted clinical guidelines are only system guidelines and do not replace the providers clinical judgment.    Provider, please specify the diagnosis or diagnoses associated with above clinical findings.     [ x   ] Acute Respiratory Failure with Hypoxia - ABG pO2 < 60 mmHg or O2 sat of <91% on room air and respiratory symptoms documented   [    ] Hypoxia Only   [    ] Other Respiratory Diagnosis  (please specify): _________________   [   ] Clinically Undetermined         Please document in your progress notes daily for the duration of treatment until resolved and include in your discharge summary.     Form No. [id number]

## 2022-03-15 NOTE — PLAN OF CARE
Problem: Renal Function Impairment (Acute Kidney Injury/Impairment)  Goal: Effective Renal Function  Outcome: Ongoing, Progressing     Problem: Fluid Imbalance (Pneumonia)  Goal: Fluid Balance  Outcome: Ongoing, Progressing     Problem: Respiratory Compromise (Pneumonia)  Goal: Effective Oxygenation and Ventilation  Outcome: Ongoing, Progressing     Problem: Fall Injury Risk  Goal: Absence of Fall and Fall-Related Injury  Outcome: Ongoing, Progressing

## 2022-03-15 NOTE — PT/OT/SLP PROGRESS
Physical Therapy Treatment    Patient Name:  Heather Hughes   MRN:  [id number]    Recommendations:     Discharge Recommendations:  rehabilitation facility (pending progress with PT; endorses significant weakness right now, but appears motivated and has good family support from daughter and son)   Discharge Equipment Recommendations: none   Barriers to discharge: None    Assessment:     Heather Hughes is a 81 y.o. female admitted with a medical diagnosis of Atypical pneumonia.  She presents with the following impairments/functional limitations:  weakness, impaired endurance, impaired self care skills, impaired functional mobilty, gait instability, decreased lower extremity function, pain, impaired skin, edema, orthopedic precautions. Pt found HOB elevated; 5/10 arthritic pain in R knee; no L ankle pain at rest but with motion elevates to 5/10; states concerns of toenail length for donning walking boot but could be encouraged to attempt.   Transferred to sit EOB with maxA and VCs. Sitting balance progressed from Gisela to SBA, with CGA from second person while therapist applied walking boot. No interference from toenails but some difficulty in placement due to splint and wrap.     Performed two stands of 30 seconds and 60 seconds to RW with modA and without significant increase of pain. Returned to supine with maxA x2 persons.  Pt anticipates her daughter to visit tonight and tend to her toenails. Prefers to don own shoe on R foot for standing rather than use  socks. Good effort and participation.    Rehab Prognosis: Good; patient would benefit from acute skilled PT services to address these deficits and reach maximum level of function.    Recent Surgery: * No surgery found *      Plan:     During this hospitalization, patient to be seen daily to address the identified rehab impairments via gait training, therapeutic activities, therapeutic exercises and progress toward the following goals:    · Plan of  "Care Expires:  04/14/22    Subjective     Chief Complaint: R kn pain, L ankle pain with movement  Patient/Family Comments/goals: "move easier"  Pain/Comfort:  · Pain Rating 1: 5/10  · Location - Side 1: Right  · Location 1: knee  · Pain Addressed 1: Reposition  · Pain Rating Post-Intervention 1: other (see comments) (unrated)  · Pain Rating 2: 5/10  · Location - Side 2: Left  · Location 2: ankle  · Pain Addressed 2: Reposition, Cessation of Activity  · Pain Rating Post-Intervention 2: other (see comments) (unrated)      Objective:     Communicated with nurse Sawant prior to session.  Patient found HOB elevated with PureWick, telemetry, peripheral IV upon PT entry to room.     General Precautions: Standard, fall   Orthopedic Precautions:LLE weight bearing as tolerated (with boot)   Braces:  (LLE splint with boot present in room required for ambulation)  Respiratory Status: Room air     Functional Mobility:  · Bed Mobility:     · Rolling Left:  moderate assistance  · Supine to Sit: maximal assistance  · Sit to Supine: maximal assistance and of 2 persons  · Transfers:     · Sit to Stand:  moderate assistance with rolling walker      AM-PAC 6 CLICK MOBILITY          Therapeutic Activities and Exercises:  -Dynamic seated balance at EOB x 12 minutes, Gisela progressing to SBA  -Seated therex to include LAQs, small marches, hip ab/add  -Static stands at RW 30 seconds and 60 seconds, unable to obtain full upright  -Participatory in scooting up in bed with RLE bridge and BUEs at handrails    Patient left right sidelying with all lines intact, call button in reach, bed alarm on and son and nurse Sawant present..    GOALS:   Multidisciplinary Problems     Physical Therapy Goals        Problem: Physical Therapy Goal    Goal Priority Disciplines Outcome Goal Variances Interventions   Physical Therapy Goal     PT, PT/OT      Description: Goals to be met by: 4/14/22     Patient will increase functional independence with mobility by " performin. Supine to sit with Stand-by Assistance  2. Sit to stand transfer with Minimal Assistance  3. Bed to chair transfer with Minimal Assistance using Rolling Walker maintaining LLE WBAT with boot  4. Gait  x 15 feet with Minimal Assistance using Rolling Walker maintaining LLE WBAT with boot.   5. Lower extremity exercise program x20 reps per handout, with supervision                     Time Tracking:     PT Received On: 03/15/22  PT Start Time: 1352     PT Stop Time: 1422  PT Total Time (min): 30 min     Billable Minutes: Therapeutic Activity 30    Treatment Type: Treatment  PT/PTA: PTA     PTA Visit Number: 1     03/15/2022

## 2022-03-15 NOTE — CARE UPDATE
03/15/22 0823   Patient Assessment/Suction   Level of Consciousness (AVPU) alert   Respiratory Effort Normal;Unlabored   All Lung Fields Breath Sounds diminished   PRE-TX-O2   O2 Device (Oxygen Therapy) room air   SpO2 95 %   Pulse Oximetry Type Intermittent   $ Pulse Oximetry - Multiple Charge Pulse Oximetry - Multiple   Pulse (!) 58   Resp 16   Aerosol Therapy   $ Aerosol Therapy Charges PRN treatment not required

## 2022-03-15 NOTE — PLAN OF CARE
CAITY met with pts son Deep Hughes [phone] in my office and he requested the pt to be sent to [facility] for rehab services and asked me to update his sister Anupama Steele at [phone]. I sent the pts 3 day packet, current med, chest xray, and therapy notes to [facility] to review for acceptance. CM following.    03/15/22 1027   Post-Acute Status   Post-Acute Authorization Placement   Post-Acute Placement Status Referrals Sent

## 2022-03-15 NOTE — PROGRESS NOTES
[facility] Hospital Medicine  Progress Note    Patient Name: Heather Hughes  MRN: [id number]  Patient Class: IP- Inpatient   Admission Date: 3/11/2022  Length of Stay: 4 days  Attending Physician: Tsering Peace MD  Primary Care Provider: Yumiko Page MD        Subjective:     Principal Problem:Atypical pneumonia        HPI:  Heather Hughes is a 81 y.o. female with a past medical history of CAD, HTN, DVT, and past surgical history of cardiac stent, cardioversion and knee surgery, who presented to the ED tonight for generalized weakness and ankle pain after a fall at home. HPI provided by patient and daughter. Daughter states that the patient was in her normal state of health last night but was very difficulty to wake up this morning. She states that she slept almost 18 hours today. Daughter states that she tried to get her up and into a chair to encourage the patient to move around. She needed to use the restroom and needed assistance in walking there, where she usually is able to get around with a walker and no additional help. When they got to the bathroom, as they were turning to sit her on the toilet, her legs gave out and twisted underneath her. Her daughter assisted her to the ground and she landed on her left leg and hit her elbow on the floor, sustaining two skin tears. She has also had a cough for two weeks, no fever or chest pain. She did have several episodes of diarrhea. She denies congestion, abdominal pain, nausea or vomiting.     Upon arrival to the ED, the patient found to be on Oxygen via NC, as she was noted to be hypoxic by EMS on their arrival at 83%. On oxygen, her pulse ox was 99%. CXR showed evidence of atypical pneumonia. Urinalysis showed a UTI. Xray of the left ankle showed an avulsion fracture of the medial malleolus. CBC showed chronic anemia. CMP showed an STACIE. BNP was elevated at 240, and initial troponin was elevated at 0.033. Hospital Medicine consulted  for admission and further management.      Overview/Hospital Course:  Patient was started on IV antibiotics for possible pneumonia and got IV fluids due to the suspicion for hypovolemia causing STACIE. Symptoms improved with treatment. Orthopedics assessed the patient and recommended conservative management for the medial malleolus fx. She was given fracture boots and will be taught by PT on how to use at home.       jaw pain/epigastric pain did not recur after yesterday morning.  Complained of feeling food was getting stuck while swallowing today.         NAD, alert  NC, AT  RRR  CTAB  SNTND+BS  Trace edema; affected leg in boot.   PERRL    Vitals, labs and radiographs from past 24h reviewed and personally interpreted.      Assessment/Plan:      * Atypical pneumonia  Patient with possible atypical pneumonia based on CXR findings and complains of cough   On antibiotics now, will continue for 5 days   Antibiotics (From admission, onward)            Start     Stop Route Frequency Ordered    03/12/22 2240  cefTRIAXone (ROCEPHIN) 1 g/50 mL D5W IVPB         -- IV Every 24 hours (non-standard times) 03/12/22 0105    03/12/22 0900  azithromycin tablet 250 mg         03/16 0859 Oral Daily 03/12/22 0105      . Cultures drawn and noted-   Microbiology Results (last 7 days)     Procedure Component Value Units Date/Time    Urine culture [[id number]]  (Abnormal) Collected: 03/11/22 2053    Order Status: Completed Specimen: Urine Updated: 03/13/22 0757     Urine Culture, Routine GRAM NEGATIVE TRES  >100,000 cfu/ml  Identification and susceptibility pending      Narrative:      Specimen Source->Urine    Clostridium difficile EIA [[id number]]     Order Status: Canceled Specimen: Stool               (HFpEF) heart failure with preserved ejection fraction  Patient has history of CHFpEF.  Difficult to assess accurately her volume status on exam. CXR may suggest mild edema.   Holding IV fluids to prevent worsening overload.   Will get TTE  "while inpatient which will help tailor management.   -resuming home lasix    STACIE (acute kidney injury)  STACIE slightly improved since admission.   Suspicion initially for hypovolemia for which she got IV fluids.   IV fluids stopped now that she is eating and has no diarrhea.   Will continue to monitor. Renally dose medications.   -resuming home lasix    Avulsion fracture of ankle, left, closed, initial encounter  S/p slight fall at home due to weakness, daughter assisted to ground but ankle was twisted under her and she fell onto her ankle    Xray shows "Curvilinear increased density at the medial aspect of the medial malleolus could relate to acute avulsive type fracture."  Splint placed in ER  Ortho consulted, conservative management. Fx boots given.   PT/OT for ambulation.   Pain meds PRN    Acute cystitis  pendin urine culture  Rocephin daily      Anticoagulant long-term use  Fall and bleeding precautions      Atrial fibrillation with RVR  Patient with Long standing persistent (>12 months) atrial fibrillation which is controlled currently with Beta Blocker and Amiodarone. Patient is currently in sinus rhythm.[id number] Score: 4. Anticoagulation indicated. Anticoagulation done with eliquis.        Iron deficiency anemia  Patient's anemia is currently controlled. Has not received any PRBCs to date.. Etiology likely d/t chronic disease  Current CBC reviewed-   Lab Results   Component Value Date    HGB 10.4 (L) 03/11/2022    HCT 34.5 (L) 03/11/2022     Monitor serial CBC and transfuse if patient becomes hemodynamically unstable, symptomatic or H/H drops below 7/21.         Coronary artery disease due to calcified coronary lesion  Patient with known CAD  which is controlled Will  monitor for S/Sx of angina/ACS. Continue to monitor on telemetry.   -trend troponins. Follow echo      Hypertension  Chronic, controlled.  Latest blood pressure and vitals reviewed-   Temp:  [97.8 °F (36.6 °C)-100.2 °F (37.9 °C)]   Pulse:  " []   Resp:  [18-20]   BP: (147-167)/(64-72)   SpO2:  [89 %-99 %] .   Home meds for hypertension were reviewed and noted below.   Hypertension Medications             carvediloL (COREG) 6.25 MG tablet Take 1 tablet (6.25 mg total) by mouth 2 (two) times daily with meals. Need office visit before next refill, last seen 12/2021. This will be the LAST Rx if visit is not made.    furosemide (LASIX) 40 MG tablet TAKE ONE TABLET BY MOUTH DAILY AS NEEDED For (fluid retention)    hydrALAZINE (APRESOLINE) 100 MG tablet Take 1 tablet (100 mg total) by mouth 3 (three) times daily.    lisinopriL (PRINIVIL,ZESTRIL) 20 MG tablet Take 2 tablets (40 mg total) by mouth once daily.    nitroGLYCERIN (NITROSTAT) 0.4 MG SL tablet Place 1 tablet (0.4 mg total) under the tongue every 5 (five) minutes as needed for Chest pain.          While in the hospital, will manage blood pressure as follows; Continue home antihypertensive regimen, hold lisinopril    Will utilize p.r.n. blood pressure medication only if patient's blood pressure greater than  180/110 and she develops symptoms such as worsening chest pain or shortness of breath.    -lisinopril to be resumed if K+ is not elevated on repeat      Moderate persistent asthma without complication  Noted, chronic  duonebs PRN    Dysphagia  -SLP consult      VTE Risk Mitigation (From admission, onward)         Ordered     apixaban tablet 5 mg  2 times daily         03/11/22 2303     Reason for No Pharmacological VTE Prophylaxis  Once        Question:  Reasons:  Answer:  Already adequately anticoagulated on oral Anticoagulants    03/11/22 2303     IP VTE HIGH RISK PATIENT  Once         03/11/22 2303     Place sequential compression device  Until discontinued         03/11/22 2303                Discharge Planning   JANAY:      Code Status: DNR   Is the patient medically ready for discharge?:     Reason for patient still in hospital (select all that apply): Treatment  Discharge Plan A: Home  Health                  Huley TEOFILO Peace MD  Department of Hospital Medicine   [facility]

## 2022-03-15 NOTE — PT/OT/SLP EVAL
"Occupational Therapy   Evaluation    Name: Heather Hughes  MRN: [id number]  Admitting Diagnosis:  Atypical pneumonia  Recent Surgery: * No surgery found *      Recommendations:     Discharge Recommendations: rehabilitation facility  Discharge Equipment Recommendations:  none  Barriers to discharge:  None    Assessment:     Heather Hughes is a 81 y.o. female with a medical diagnosis of Atypical pneumonia.  She presents with R knee pain especially with bed mobility. Patient also endorsed difficulty swallowing and keeping down food. Patient agreeable to sitting EOB requiring Max A, but declined transfers due to safety concerns. Noted intermittent coughing after drinking sprint at EOB.  Patient demonstrated good static sit balance once positioned EOB. Noted good balance when performing grooming tasks at EOB.  Performance deficits affecting function: weakness, impaired endurance, impaired self care skills, impaired functional mobilty, gait instability, decreased lower extremity function, edema, orthopedic precautions, pain.      Rehab Prognosis: Fair; patient would benefit from acute skilled OT services to address these deficits and reach maximum level of function.       Plan:     Patient to be seen 4 x/week to address the above listed problems via self-care/home management, therapeutic activities, therapeutic exercises  · Plan of Care Expires:    · Plan of Care Reviewed with: patient, son    Subjective     Chief Complaint: R knee pain  Patient/Family Comments/goals: "I've been having problems swallowing."    Occupational Profile:  Living Environment: Patient lives with daughter and son-in-law.  Previous level of function: Patient required assistance with LB dressing at baseline; Supervision with bathing; and Mod I with toileting and grooming tasks. Patient was ambulatory using rollator.    Equipment Used at Home:  wheelchair, bedside commode, bath bench, shower chair, grab bar, cane, straight, " nebulizer  Assistance upon Discharge: Patient will receive assistance from family.     Pain/Comfort:  · Pain Rating 1: 8/10  · Location - Side 1: Right  · Location - Orientation 1: generalized  · Location 1: knee  · Pain Addressed 1: Reposition, Distraction  · Pain Rating Post-Intervention 1: 8/10    Patients cultural, spiritual, Protestant conflicts given the current situation:      Objective:     Communicated with: nurse Sawant prior to session.  Patient found HOB elevated with PureWick, bed alarm, peripheral IV upon OT entry to room.    General Precautions: Standard, fall   Orthopedic Precautions:LLE weight bearing as tolerated   Braces:  (LLE splint with fx boot)  Respiratory Status: Room air    Occupational Performance:    Bed Mobility:    · Patient completed Scooting/Bridging with moderate assistance  · Patient completed Supine to Sit with maximal assistance  · Patient completed Sit to Supine with maximal assistance    Functional Mobility/Transfers:  · Not performed due to safety concerns; pt c/o LLE boot too painful to don    Activities of Daily Living:  · Grooming: stand by assistance with all grooming while seated EOB  · Lower Body Dressing: maximal assistance with all LB dressing  · Toileting: dependence with Purewick in place    Cognitive/Visual Perceptual:  Cognitive/Psychosocial Skills:     -       Oriented to: x4   -       Follows Commands/attention:Follows multistep  commands  -       Communication: clear/fluent  -       Safety awareness/insight to disability: intact   -       Mood/Affect/Coping skills/emotional control: Appropriate to situation and Cooperative  Visual/Perceptual:      -Intact     Physical Exam:  Postural examination/scapula alignment:    -       Rounded shoulders  -       Forward head  Upper Extremity Range of Motion:     -       Right Upper Extremity: WFL  -       Left Upper Extremity: WFL  Upper Extremity Strength:    -       Right Upper Extremity: WFL  -       Left Upper  Extremity: WFL   Strength:    -       Right Upper Extremity: WFL  -       Left Upper Extremity: WFL  Fine Motor Coordination:    -       Intact  Gross motor coordination:   WFL in BUE    AMPAC 6 Click ADL:  [facility] Total Score: 16    Treatment & Education:  OT ed pt on OT role & POC as well as discharge recommendations.    Education:    Patient left HOB elevated with all lines intact, call button in reach and son present    GOALS:   Multidisciplinary Problems     Occupational Therapy Goals        Problem: Occupational Therapy Goal    Goal Priority Disciplines Outcome Interventions   Occupational Therapy Goal     OT, PT/OT Ongoing, Progressing    Description: Goals to be met by: 4/5/2022     Patient will increase functional independence with ADLs by performing:    LE Dressing with Stand-by Assistance and Assistive Devices as needed.  Grooming while seated with Supervision.  Toileting from bedside commode with Minimal Assistance for hygiene and clothing management.   Supine to sit with Minimal Assistance.  Toilet transfer to bedside commode with Minimal Assistance.                     History:     Past Medical History:   Diagnosis Date    Allergy     Anticoagulant long-term use     Arthritis     Asthma     Coronary artery disease     stent x 1    Deep vein blood clot of right lower extremity 1965 & 1971    Full dentures     Heart attack 01/13/2017    Hypertension     Osteoporosis     Sleep apnea     Thyroid disease     Ulcer     Wears glasses        Past Surgical History:   Procedure Laterality Date    cardic stent  01/13/2017    CARDIOVERSION Left 3/12/2020    Procedure: Cardioversion;  Surgeon: Kaleigh Rascon MD;  Location: [facility] CATH LAB;  Service: Cardiology;  Laterality: Left;    CHOLECYSTECTOMY      EYE SURGERY      bilateral cataracts    FRACTURE SURGERY  2011    left wrist     KNEE ARTHROPLASTY Left 8/15/2018    Procedure: ARTHROPLASTY, KNEE;  Surgeon: Shantanu Santiago MD;  Location:  [facility] OR;  Service: Orthopedics;  Laterality: Left;  ANESTHESIA GENERAL AND BLOCK    THYROIDECTOMY         Time Tracking:     OT Date of Treatment: 03/15/22  OT Start Time: 1041  OT Stop Time: 1125  OT Total Time (min): 44 min    Billable Minutes:Evaluation 6  Self Care/Home Management 38    3/15/2022

## 2022-03-15 NOTE — PLAN OF CARE
Problem: Occupational Therapy Goal  Goal: Occupational Therapy Goal  Description: Goals to be met by: 4/5/2022     Patient will increase functional independence with ADLs by performing:    LE Dressing with Stand-by Assistance and Assistive Devices as needed.  Grooming while seated with Supervision.  Toileting from bedside commode with Minimal Assistance for hygiene and clothing management.   Supine to sit with Minimal Assistance.  Toilet transfer to bedside commode with Minimal Assistance.    Outcome: Ongoing, Progressing

## 2022-03-16 LAB
ANION GAP SERPL CALC-SCNC: 10 MMOL/L (ref 8–16)
BASOPHILS # BLD AUTO: 0.04 K/UL (ref 0–0.2)
BASOPHILS NFR BLD: 0.7 % (ref 0–1.9)
BUN SERPL-MCNC: 14 MG/DL (ref 8–23)
CALCIUM SERPL-MCNC: 9.1 MG/DL (ref 8.7–10.5)
CHLORIDE SERPL-SCNC: 97 MMOL/L (ref 95–110)
CO2 SERPL-SCNC: 29 MMOL/L (ref 23–29)
CREAT SERPL-MCNC: 1 MG/DL (ref 0.5–1.4)
CV PHARM DOSE: 0.4 MG
CV STRESS BASE HR: 63 BPM
DIASTOLIC BLOOD PRESSURE: 74 MMHG
DIFFERENTIAL METHOD: ABNORMAL
EOSINOPHIL # BLD AUTO: 0.3 K/UL (ref 0–0.5)
EOSINOPHIL NFR BLD: 5.3 % (ref 0–8)
ERYTHROCYTE [DISTWIDTH] IN BLOOD BY AUTOMATED COUNT: 14.9 % (ref 11.5–14.5)
EST. GFR  (AFRICAN AMERICAN): >60 ML/MIN/1.73 M^2
EST. GFR  (NON AFRICAN AMERICAN): 53 ML/MIN/1.73 M^2
GLUCOSE SERPL-MCNC: 77 MG/DL (ref 70–110)
HCT VFR BLD AUTO: 30 % (ref 37–48.5)
HGB BLD-MCNC: 9.3 G/DL (ref 12–16)
IMM GRANULOCYTES # BLD AUTO: 0.02 K/UL (ref 0–0.04)
IMM GRANULOCYTES NFR BLD AUTO: 0.4 % (ref 0–0.5)
LYMPHOCYTES # BLD AUTO: 1 K/UL (ref 1–4.8)
LYMPHOCYTES NFR BLD: 19 % (ref 18–48)
MAGNESIUM SERPL-MCNC: 2 MG/DL (ref 1.6–2.6)
MCH RBC QN AUTO: 28.9 PG (ref 27–31)
MCHC RBC AUTO-ENTMCNC: 31 G/DL (ref 32–36)
MCV RBC AUTO: 93 FL (ref 82–98)
MONOCYTES # BLD AUTO: 0.8 K/UL (ref 0.3–1)
MONOCYTES NFR BLD: 14.2 % (ref 4–15)
NEUTROPHILS # BLD AUTO: 3.3 K/UL (ref 1.8–7.7)
NEUTROPHILS NFR BLD: 60.4 % (ref 38–73)
NRBC BLD-RTO: 0 /100 WBC
OHS CV CPX 85 PERCENT MAX PREDICTED HEART RATE MALE: 115
OHS CV CPX MAX PREDICTED HEART RATE: 136
OHS CV CPX PATIENT IS FEMALE: 1
OHS CV CPX PATIENT IS MALE: 0
OHS CV CPX PEAK DIASTOLIC BLOOD PRESSURE: 74 MMHG
OHS CV CPX PEAK HEAR RATE: 73 BPM
OHS CV CPX PEAK RATE PRESSURE PRODUCT: 9563
OHS CV CPX PEAK SYSTOLIC BLOOD PRESSURE: 131 MMHG
OHS CV CPX PERCENT MAX PREDICTED HEART RATE ACHIEVED: 54
OHS CV CPX RATE PRESSURE PRODUCT PRESENTING: 8253
PLATELET # BLD AUTO: 188 K/UL (ref 150–450)
PMV BLD AUTO: 13.5 FL (ref 9.2–12.9)
POTASSIUM SERPL-SCNC: 4.1 MMOL/L (ref 3.5–5.1)
RBC # BLD AUTO: 3.22 M/UL (ref 4–5.4)
SODIUM SERPL-SCNC: 136 MMOL/L (ref 136–145)
SYSTOLIC BLOOD PRESSURE: 131 MMHG
WBC # BLD AUTO: 5.43 K/UL (ref 3.9–12.7)

## 2022-03-16 PROCEDURE — 63600175 PHARM REV CODE 636 W HCPCS: Performed by: NURSE PRACTITIONER

## 2022-03-16 PROCEDURE — 25000003 PHARM REV CODE 250: Performed by: NURSE PRACTITIONER

## 2022-03-16 PROCEDURE — 85025 COMPLETE CBC W/AUTO DIFF WBC: CPT | Performed by: INTERNAL MEDICINE

## 2022-03-16 PROCEDURE — 99499 UNLISTED E&M SERVICE: CPT | Mod: ,,, | Performed by: SPECIALIST

## 2022-03-16 PROCEDURE — 36415 COLL VENOUS BLD VENIPUNCTURE: CPT | Performed by: INTERNAL MEDICINE

## 2022-03-16 PROCEDURE — 80048 BASIC METABOLIC PNL TOTAL CA: CPT | Performed by: INTERNAL MEDICINE

## 2022-03-16 PROCEDURE — 99900035 HC TECH TIME PER 15 MIN (STAT)

## 2022-03-16 PROCEDURE — 63600175 PHARM REV CODE 636 W HCPCS: Performed by: SPECIALIST

## 2022-03-16 PROCEDURE — 92610 EVALUATE SWALLOWING FUNCTION: CPT

## 2022-03-16 PROCEDURE — 11000001 HC ACUTE MED/SURG PRIVATE ROOM

## 2022-03-16 PROCEDURE — 83735 ASSAY OF MAGNESIUM: CPT | Performed by: INTERNAL MEDICINE

## 2022-03-16 PROCEDURE — 94761 N-INVAS EAR/PLS OXIMETRY MLT: CPT

## 2022-03-16 PROCEDURE — 94640 AIRWAY INHALATION TREATMENT: CPT

## 2022-03-16 PROCEDURE — 25000003 PHARM REV CODE 250: Performed by: INTERNAL MEDICINE

## 2022-03-16 PROCEDURE — 25000242 PHARM REV CODE 250 ALT 637 W/ HCPCS: Performed by: NURSE PRACTITIONER

## 2022-03-16 PROCEDURE — 99499 NO LOS: ICD-10-PCS | Mod: ,,, | Performed by: SPECIALIST

## 2022-03-16 RX ORDER — REGADENOSON 0.08 MG/ML
0.4 INJECTION, SOLUTION INTRAVENOUS ONCE
Status: COMPLETED | OUTPATIENT
Start: 2022-03-16 | End: 2022-03-16

## 2022-03-16 RX ADMIN — CEFTRIAXONE 1 G: 1 INJECTION, SOLUTION INTRAVENOUS at 10:03

## 2022-03-16 RX ADMIN — NYSTATIN 500000 UNITS: 500000 SUSPENSION ORAL at 04:03

## 2022-03-16 RX ADMIN — CARVEDILOL 6.25 MG: 6.25 TABLET, FILM COATED ORAL at 04:03

## 2022-03-16 RX ADMIN — REGADENOSON 0.4 MG: 0.08 INJECTION, SOLUTION INTRAVENOUS at 10:03

## 2022-03-16 RX ADMIN — IPRATROPIUM BROMIDE AND ALBUTEROL SULFATE 3 ML: 2.5; .5 SOLUTION RESPIRATORY (INHALATION) at 11:03

## 2022-03-16 RX ADMIN — LEVOTHYROXINE SODIUM 125 MCG: 0.12 TABLET ORAL at 06:03

## 2022-03-16 RX ADMIN — PANTOPRAZOLE SODIUM 40 MG: 40 TABLET, DELAYED RELEASE ORAL at 11:03

## 2022-03-16 RX ADMIN — AMIODARONE HYDROCHLORIDE 200 MG: 200 TABLET ORAL at 11:03

## 2022-03-16 RX ADMIN — ONDANSETRON 4 MG: 2 INJECTION INTRAMUSCULAR; INTRAVENOUS at 10:03

## 2022-03-16 RX ADMIN — MONTELUKAST 10 MG: 10 TABLET, FILM COATED ORAL at 10:03

## 2022-03-16 RX ADMIN — POLYETHYLENE GLYCOL 3350 17 G: 17 POWDER, FOR SOLUTION ORAL at 11:03

## 2022-03-16 RX ADMIN — NYSTATIN 500000 UNITS: 500000 SUSPENSION ORAL at 02:03

## 2022-03-16 RX ADMIN — MORPHINE SULFATE 4 MG: 4 INJECTION INTRAVENOUS at 10:03

## 2022-03-16 RX ADMIN — TRAMADOL HYDROCHLORIDE 50 MG: 50 TABLET, COATED ORAL at 01:03

## 2022-03-16 RX ADMIN — LISINOPRIL 40 MG: 40 TABLET ORAL at 11:03

## 2022-03-16 RX ADMIN — FUROSEMIDE 20 MG: 20 TABLET ORAL at 11:03

## 2022-03-16 RX ADMIN — NYSTATIN 500000 UNITS: 500000 SUSPENSION ORAL at 10:03

## 2022-03-16 RX ADMIN — HYDRALAZINE HYDROCHLORIDE 100 MG: 25 TABLET, FILM COATED ORAL at 04:03

## 2022-03-16 RX ADMIN — APIXABAN 5 MG: 2.5 TABLET, FILM COATED ORAL at 10:03

## 2022-03-16 NOTE — PT/OT/SLP PROGRESS
Physical Therapy      Patient Name:  Heather Hughes   MRN:  [id number]    Patient not seen today secondary to Nurse/ GAMALIEL hold. Stress test scheduled for 10:00 am. Will follow-up tomorrow.

## 2022-03-16 NOTE — CARE UPDATE
03/16/22 0831   Patient Assessment/Suction   Level of Consciousness (AVPU) alert   All Lung Fields Breath Sounds diminished;clear   Rhythm/Pattern, Respiratory unlabored   PRE-TX-O2   O2 Device (Oxygen Therapy) room air   SpO2 96 %   Pulse Oximetry Type Intermittent   $ Pulse Oximetry - Multiple Charge Pulse Oximetry - Multiple   Pulse 61   Resp 18   Aerosol Therapy   $ Aerosol Therapy Charges PRN treatment not required   Respiratory Treatment Status (SVN) PRN treatment not required

## 2022-03-16 NOTE — PLAN OF CARE
Per Stephanie with [facility] the [employer] auth is still pending. She will keep me updated. CM following.    03/16/22 1438   Post-Acute Status   Post-Acute Authorization Placement   Post-Acute Placement Status Pending payor review/awaiting authorization (if required)

## 2022-03-16 NOTE — PLAN OF CARE
Problem: Adult Inpatient Plan of Care  Goal: Plan of Care Review  Outcome: Ongoing, Progressing  Goal: Patient-Specific Goal (Individualized)  Outcome: Ongoing, Progressing  Goal: Absence of Hospital-Acquired Illness or Injury  Outcome: Ongoing, Progressing  Goal: Optimal Comfort and Wellbeing  Outcome: Ongoing, Progressing  Goal: Readiness for Transition of Care  Outcome: Ongoing, Progressing     Problem: Fluid and Electrolyte Imbalance (Acute Kidney Injury/Impairment)  Goal: Fluid and Electrolyte Balance  Outcome: Ongoing, Progressing     Problem: Oral Intake Inadequate (Acute Kidney Injury/Impairment)  Goal: Optimal Nutrition Intake  Outcome: Ongoing, Progressing     Problem: Renal Function Impairment (Acute Kidney Injury/Impairment)  Goal: Effective Renal Function  Outcome: Ongoing, Progressing     Problem: Fluid Imbalance (Pneumonia)  Goal: Fluid Balance  Outcome: Ongoing, Progressing     Problem: Infection (Pneumonia)  Goal: Resolution of Infection Signs and Symptoms  Outcome: Ongoing, Progressing     Problem: Respiratory Compromise (Pneumonia)  Goal: Effective Oxygenation and Ventilation  Outcome: Ongoing, Progressing     Problem: Infection  Goal: Absence of Infection Signs and Symptoms  Outcome: Ongoing, Progressing     Problem: Impaired Wound Healing  Goal: Optimal Wound Healing  Outcome: Ongoing, Progressing     Problem: Skin Injury Risk Increased  Goal: Skin Health and Integrity  Outcome: Ongoing, Progressing     Problem: Fall Injury Risk  Goal: Absence of Fall and Fall-Related Injury  Outcome: Ongoing, Progressing     Problem: Suicide Risk  Goal: Absence of Self-Harm  Outcome: Ongoing, Progressing     Patient alert and oriented this shift. VSS. Turn q2hr. Andrewck in place.  today 03/16. PT/OT/ST working with patient today. Stress test done. Patient plan is to d/c to rehab tomorrow once auth is approved. Bed in lowest position, call light within reach, bed alarm set. Family at bedside

## 2022-03-16 NOTE — CHAPLAIN
Visited pt per suggestions of CM; son bedside of his mother, pt; they shared with me what CM told me; pt's   early February from cancer; all family gathered around,  had capacity to choose no treatment and all supported. Pt and   62 yrs, [facility] sweethearts; son very chatty, but pt chimed in, held her hand; offered compassionate presence and listening ear to both. Reminded them how grief affects everybody differently. Pt's daughter lost her  recently too as well as a son. Pt lost a son when he was 27; close family; pt essentially lives alone, but daughter there working from home. Pt is motivated to get well and physically stronger.    Pt and son shared they are Jain, shared their yari journey with me and pt shared how she draws on her yari in God to get her through it all. Pt welcomed me to pray over/with/for her as we held hands; very appreciative of the prayer and the visit. Both pt and son teary eyed but composed. Lord, in your mercy.

## 2022-03-16 NOTE — CONSULTS
[facility]  Cardiology  Progress Note    Patient Name: Heather Hughes  MRN: [id number]  Admission Date: 3/11/2022  Hospital Length of Stay: 5 days  Code Status: DNR   Attending Physician: Tsering Peace MD   Primary Care Physician: Yumiko Page MD  Expected Discharge Date: 3/16/2022  Principal Problem:Atypical pneumonia    Subjective:     Hospital Course:  Feels well no chest pain  Interval History:  Results of nuclear stress are pending  ROS  Objective:     Vital Signs (Most Recent):  Temp: 97 °F (36.1 °C) (03/16/22 0908)  Pulse: 62 (03/16/22 1022)  Resp: 18 (03/16/22 1022)  BP: 131/74 (03/16/22 1022)  SpO2: (!) 94 % (03/16/22 0908) Vital Signs (24h Range):  Temp:  [96.3 °F (35.7 °C)-97.4 °F (36.3 °C)] 97 °F (36.1 °C)  Pulse:  [61-64] 62  Resp:  [17-20] 18  SpO2:  [94 %-97 %] 94 %  BP: (124-191)/(57-77) 131/74     Weight: 99.8 kg (220 lb)  Body mass index is 38.97 kg/m².    SpO2: (!) 94 %  O2 Device (Oxygen Therapy): room air      Intake/Output Summary (Last 24 hours) at 3/16/2022 1147  Last data filed at 3/16/2022 0400  Gross per 24 hour   Intake --   Output 1200 ml   Net -1200 ml       Lines/Drains/Airways     Peripheral Intravenous Line  Duration                Peripheral IV - Single Lumen 03/16/22 0207 22 G Right Antecubital <1 day                Physical Exam lungs are clear  Cardiac regular  P2 slight decreased splitting  Abdomen is obese  Extremities catheterization left lower leg    Significant Labs:   CMP   Recent Labs   Lab 03/15/22  0539 03/15/22  1548 03/16/22  0450   *  --  136   K 5.1 4.5 4.1   CL 99  --  97   CO2 29  --  29   GLU 83  --  77   BUN 18  --  14   CREATININE 1.0  --  1.0   CALCIUM 9.0  --  9.1   ANIONGAP 7*  --  10   ESTGFRAFRICA >60  --  >60   EGFRNONAA 53*  --  53*       Significant Imaging:   Assessment and Plan:   History of ASCVD;  Continue current medical Rx and wait results of nuclear portion the test  Brief HPI:     Active Diagnoses:     Diagnosis Date Noted POA    PRINCIPAL PROBLEM:  Atypical pneumonia [J18.9] 03/11/2022 Yes    (HFpEF) heart failure with preserved ejection fraction [I50.30] 03/13/2022 Yes    Acute cystitis [N30.00] 03/11/2022 Yes    Avulsion fracture of ankle, left, closed, initial encounter [S82.655O] 03/11/2022 Yes    STACIE (acute kidney injury) [N17.9] 03/11/2022 Yes    Anticoagulant long-term use [Z79.01] 08/03/2020 Not Applicable    Atrial fibrillation with RVR [I48.91] 03/10/2020 Yes    Iron deficiency anemia [D50.9] 08/22/2019 Yes    Moderate persistent asthma without complication [J45.40] 01/13/2017 Yes    Hypertension [I10] 01/13/2017 Yes    Coronary artery disease due to calcified coronary lesion [I25.10, I25.84] 01/13/2017 Yes      Problems Resolved During this Admission:    Diagnosis Date Noted Date Resolved POA    Diarrhea [R19.7] 03/12/2022 03/13/2022 Yes    LBBB (left bundle branch block), onset 8/2020 [I44.7] 08/03/2020 03/13/2022 Yes       VTE Risk Mitigation (From admission, onward)         Ordered     apixaban tablet 5 mg  2 times daily         03/11/22 2303     Reason for No Pharmacological VTE Prophylaxis  Once        Question:  Reasons:  Answer:  Already adequately anticoagulated on oral Anticoagulants    03/11/22 2303     IP VTE HIGH RISK PATIENT  Once         03/11/22 2303     Place sequential compression device  Until discontinued         03/11/22 2303                Jaun Florian MD  Cardiology  [facility]

## 2022-03-16 NOTE — PT/OT/SLP PROGRESS
Occupational Therapy      Patient Name:  Heather Hughes   MRN:  [id number]    Attempted OT tx this morning. Patient out of room for stress test. Will follow up tomorrow (3/17).  3/16/2022

## 2022-03-16 NOTE — PT/OT/SLP EVAL
Speech Language Pathology Evaluation  Bedside Swallow    Patient Name:  Heather Hughes   MRN:  [id number]  Admitting Diagnosis: Atypical pneumonia    Recommendations:                 General Recommendations:  GI (Pt states she has an appointment scheduled in April)  Diet recommendations:  Dental Soft (IDDSI 6), Thin ---Gravy on all meats. No dry/crumbly foods  Aspiration Precautions: Alternating bites/sips, HOB to 90 degrees, Meds whole 1 at a time and Standard aspiration precautions   General Precautions: Standard, aspiration, fall  Communication strategies:  none    History:     Past Medical History:   Diagnosis Date    Allergy     Anticoagulant long-term use     Arthritis     Asthma     Coronary artery disease     stent x 1    Deep vein blood clot of right lower extremity 1965 & 1971    Full dentures     Heart attack 01/13/2017    Hypertension     Osteoporosis     Sleep apnea     Thyroid disease     Ulcer     Wears glasses        Past Surgical History:   Procedure Laterality Date    cardic stent  01/13/2017    CARDIOVERSION Left 3/12/2020    Procedure: Cardioversion;  Surgeon: Kaleigh Rascon MD;  Location: [facility] CATH LAB;  Service: Cardiology;  Laterality: Left;    CHOLECYSTECTOMY      EYE SURGERY      bilateral cataracts    FRACTURE SURGERY  2011    left wrist     KNEE ARTHROPLASTY Left 8/15/2018    Procedure: ARTHROPLASTY, KNEE;  Surgeon: Shantanu Santiago MD;  Location: [facility] OR;  Service: Orthopedics;  Laterality: Left;  ANESTHESIA GENERAL AND BLOCK    THYROIDECTOMY         Social History: Patient lives with daughter.  passes away in February.    Prior Intubation HX:  None this admit    Modified Barium Swallow: None in Epic    Imaging:  NM Myocardial Perfusion Spect Multi Pharmacologic   Final Result      1. No evidence for ischemia.  A few small fixed defects could relate to remote infarcts.   2. Global left ventricular systolic function is normal with an LV ejection  "fraction of 67 % and no evidence of LV dilatation. Wall motion is normal.         Electronically signed by: Gerard Wilkins   Date:    03/16/2022   Time:    12:04      X-Ray Knee 1 or 2 View Left   Final Result      Mild soft tissue swelling.  Left knee arthroplasty without acute abnormality.         Electronically signed by: Spencer Kruger MD   Date:    03/12/2022   Time:    16:44      X-Ray Chest AP Portable   Final Result      Prominent interstitial markings could relate to pulmonary vascular congestion/edema versus an atypical or viral infectious etiology.      Possible small bilateral pleural effusions.         Electronically signed by: Aba Hoff   Date:    03/11/2022   Time:    20:33      X-Ray Ankle Complete Left   Final Result      Curvilinear increased density at the medial aspect of the medial malleolus could relate to acute avulsive type fracture.  Clinical correlation recommended.      Additional details as above.         Electronically signed by: Aba Hoff   Date:    03/11/2022   Time:    20:32           Prior diet: Soft foods/thin liquids.    Occupation/hobbies/homemaking: Ambulates with rollator. At times, she requires assistance with ADLs. .    Subjective     "I made and appointment to see a GI doctor. I think I go in April."  "It sticks."    Pain/Comfort:  · Pain Rating 1: 0/10    Respiratory Status: Room air    Objective:   Pt seen for clinical swallow evaluation. She is AAOx4, pleasant and cooperative. She reports solid food dysphagia c/b globus sensation. Frequency ~2x/month, mostly with dry foods "like cookies and bread." Pt states she is able to clear with liquid wash.     Oral Musculature Evaluation  · Oral Musculature: WFL  · Dentition: upper and lower dentures  · Secretion Management: adequate  · Mucosal Quality: adequate  · Mandibular Strength and Mobility: WFL  · Oral Labial Strength and Mobility: WFL  · Lingual Strength and Mobility: WFL  · Velar Elevation: WFL  · Buccal " Strength and Mobility: WFL  · Volitional Cough: adequate  · Volitional Swallow: able to palpate laryngeal rise  · Voice Prior to PO Intake: clear; mildly strained    Bedside Swallow Eval:   Consistencies Assessed:  · Thin liquids --via tsp, cup and straw  · Puree --applesauce  · Mixed consistencies --diced peaches  · Solids --araceli cracker     Oral Phase:   · WFL    Pharyngeal Phase:   · no overt clinical signs/symptoms of aspiration   · No c/o globus sensation this date.     Compensatory Strategies  · None    Treatment: Pt/family educated re: results/recs of evaluation, compensatory swallow strategies and aspiration precautions. Encouraged GI follow up as she already has an appointment scheduled in April.    Assessment:     Heather Hughes is a 81 y.o. female with an SLP diagnosis of Dysphagia.  She presents with c/o solid food dysphagia c/b globus sensation. Frequency ~2x/month but progressing the past few weeks. Alteration of bites/sips and liquid washes are effective in clearing globus sensation per her report. No c/o globus sensation across PO trials today. REC downgrading to dental soft textures (IDDSI 6) with thin liquids. Encouraged pt to F/U with GI as she already has an appointment scheduled in April.    Goals:   Multidisciplinary Problems     SLP Goals     Not on file                Plan:     · Patient to be seen:      · Plan of Care expires:     · Plan of Care reviewed with:  patient   · SLP Follow-Up:  No (Pt discharging to IRF today)       Discharge recommendations:  rehabilitation facility   Barriers to Discharge:  None    Time Tracking:     SLP Treatment Date:   03/16/22  Speech Start Time:  1128  Speech Stop Time:  1143     Speech Total Time (min):  15 min    Billable Minutes: Eval Swallow and Oral Function 15 and Total Time 15    03/16/2022

## 2022-03-16 NOTE — PLAN OF CARE
Problem: Infection  Goal: Absence of Infection Signs and Symptoms  Outcome: Ongoing, Progressing     Problem: Skin Injury Risk Increased  Goal: Skin Health and Integrity  Outcome: Ongoing, Progressing

## 2022-03-16 NOTE — CONSULTS
[facility]  Department of Cardiology  Consult Note      PATIENT NAME: Heather Hughes    MRN: [id number]  TODAY'S DATE: 03/15/2022  ADMIT DATE: 3/11/2022                          CONSULT REQUESTED BY: Tsering Peace MD    SUBJECTIVE     PRINCIPAL PROBLEM: Atypical pneumonia      REASON FOR CONSULT:        HPI:  DR RIVERA OFFICE 2/16/22  Heather Hughes is a 81 y.o. female with a CAD s/p MI, PCI/ARSLAN to RCA in 2017, parosyxmal Afib (on amiodarone and Eliquis), LBBB, statin intolerance, former smoker, who presents for a follow up appointment.  Pertinent history/events are as follows:      -Pt presents for evaluation of low pulse rate.     -Pt followed by Dr. Godfrey of Cardiology in the past.     -At our initial clinic visit on 2/10/2021, Mrs. Hughes reported SOB when walking for approximately 1 year.  States symptoms have gotten worse over the past 6 months.  She has no chest pain or chest discomfort.  Reports blood pressure has been elevated in the 180's systolic.  EKG today shows normal sinus rhythm with no ischemic ST/T wave changes.    Plan:   Progressive SOB- Pt with history of CAD s/p MI, PCI/ARSLAN to RCA in 2017.  Plan for ischemic workup with nuclear stress test and echo after blood pressure is better controlled.  Continue ASA, Eliqis, beta blocker.    HTN- Start lisinopril 20 mg daily.  Continue carvedilol 6.25 mg bid.  Pt to keep log of blood pressure/heart rate and bring in next visit for review.    HLD- Pt is intolerant to multiple statins.  Start Praluent.  Paroxysmal Afib- Continue amiodarone and carvedilol for rate control.  Continue Eliquis for anticoagulation.     -At follow up visit on 3/31/2021, Mrs. Hughes reported continued SOB when walking as described at clinic visit on 2/10/2021.  Main complaint today is feeling angry with outbursts at family members.  States blood pressures are now better controlled, but she did not bring in log for review.  Plan:   Progressive SOB- Pt with  history of CAD s/p MI, PCI/ARSLAN to RCA in 2017.  Plan for ischemic workup with nuclear stress test and echo after blood pressure is better controlled.  Continue ASA, Eliqis, beta blocker.    HTN- Continue lisinopril 20 mg daily.  Continue carvedilol 6.25 mg.  Pt to keep log of blood pressure/heart rate and bring in next visit for review.    HLD- Pt is intolerant to multiple statins.  Start Praluent (now approved).  Paroxysmal Afib- Continue amiodarone and carvedilol for rate control.  Continue Eliquis for anticoagulation.   Anger Issues- Refer to Psychiatry to evaluation.      -At clinic visit on 6/30/2021, Mrs. Hughes reports continued elevated blood pressures.  SOB unchanged.  No chest pain.  Recently started on hydralazine 25 mg tid by PCP.  Plan:   Progressive SOB- Pt with history of CAD s/p MI, PCI/ARSLAN to RCA in 2017.  Plan for ischemic workup with nuclear stress test and echo after blood pressure is better controlled.  Continue ASA, Eliquis, beta blocker.    HTN- Remains significantly elevated.  Recently started on hydralazine 25 mg tid by PCP.  Increase lisinopril to 40 mg daily.  Continue carvedilol 6.25 mg.  Pt to keep log of blood pressure/heart rate and bring in next visit for review.  Enroll in digital HTN program.    HLD- Pt is intolerant to multiple statins.  Pt started on Praluent (now approved), but states she does not want to take it.  Paroxysmal Afib- Continue amiodarone and carvedilol for rate control.  Continue Eliquis for anticoagulation.   Anger Issues- Pt referred to Psychiatry to evaluation.       -At clinic visit on 7/28/2021, Mrs. Hughes reports improvement in elevated blood pressures.  SOB unchanged.  No chest pain.  Home log shows mainly 150's systolic.  Plan:   Progressive SOB- Pt with history of CAD s/p MI, PCI/ARSLAN to RCA in 2017.  Plan for ischemic workup with nuclear stress test and echo after blood pressure is better controlled.  Continue ASA, Eliquis, beta blocker.    HTN-  Significantly improved, but remains elevated in hte 150's systolic.  Increase hydralazine to 50 mg mg tid.  Continue lisinopril 40 mg daily and carvedilol 6.25 mg.  Pt to keep log of blood pressure/heart rate and bring in next visit for review.  Pt enrolled in digital HTN program.    HLD- Pt is intolerant to multiple statins.  Pt started on Praluent (now approved), but states she does not want to take it.  Paroxysmal Afib- Continue amiodarone and carvedilol for rate control.  Continue Eliquis for anticoagulation.   Anger Issues- Pt referred to Psychiatry to evaluation.       3/15/22    CARDIOLOGY CONSULT FOR CHEST PAIN AND EKG CHANGES.  The patient was admitted with atypical pneumonia hypoxic respiratory failure, GNR UTI, avulsion fracture of the medial malleolus, chronic anemia and acute kidney injury.  BNP was elevated to 40 initial troponins elevated at point 033.  The patient had burning chest pain similar to her previous heart attack and therefore Cardiology was consulted.    The subsequent serial troponins were essentially normal.  She has a history of paroxysmal atrial fibrillation Azar Vasc score 4 currently in sinus rhythm..  Her EKG revealed left bundle branch block.  According to the records above the patient has intermittent left bundle branch block.  She has history of heart failure preserved ejection fraction with possible mild edema on her chest x-ray.  Echo revealed left ventricle hypertrophy, left bundle branch block contraction pattern pulmonary hypertension and diastolic dysfunction        Review of patient's allergies indicates:   Allergen Reactions    Bactroban [mupirocin calcium] Itching and Other (See Comments)     Red, tingling, itching, burning     Ditropan [oxybutynin chloride] Swelling     swelling    Lipitor [atorvastatin] Other (See Comments)     Fall/ balance     Myrbetriq [mirabegron] Swelling and Other (See Comments)     Legs swelling, couldn't void     Pravastatin Other (See  Comments)     Balance problem     Oxycodone     Codeine Nausea Only    Mobic [meloxicam] Other (See Comments)     Not sure reaction    Perfume Other (See Comments)     And flowers cause allergy and makes her cough.    Sulfur Other (See Comments)     Was told not to take as a child       Past Medical History:   Diagnosis Date    Allergy     Anticoagulant long-term use     Arthritis     Asthma     Coronary artery disease     stent x 1    Deep vein blood clot of right lower extremity 1965 & 1971    Full dentures     Heart attack 2017    Hypertension     Osteoporosis     Sleep apnea     Thyroid disease     Ulcer     Wears glasses      Past Surgical History:   Procedure Laterality Date    cardic stent  2017    CARDIOVERSION Left 3/12/2020    Procedure: Cardioversion;  Surgeon: Kaleigh Rascon MD;  Location: [facility] CATH LAB;  Service: Cardiology;  Laterality: Left;    CHOLECYSTECTOMY      EYE SURGERY      bilateral cataracts    FRACTURE SURGERY      left wrist     KNEE ARTHROPLASTY Left 8/15/2018    Procedure: ARTHROPLASTY, KNEE;  Surgeon: Shantanu Santiago MD;  Location: [facility] OR;  Service: Orthopedics;  Laterality: Left;  ANESTHESIA GENERAL AND BLOCK    THYROIDECTOMY       Social History     Tobacco Use    Smoking status: Former Smoker     Packs/day: 0.50     Types: Cigarettes     Quit date: 2017     Years since quittin.1    Smokeless tobacco: Never Used   Substance Use Topics    Alcohol use: No    Drug use: No        REVIEW OF SYSTEMS  CONSTITUTIONAL: Negative for chills, fatigue and fever.   EYES: No double vision, No blurred vision  NEURO: No headaches, No dizziness  RESPIRATORY: Negative for cough, shortness of breath and wheezing.    CARDIOVASCULAR: Negative for chest pain. Negative for palpitations and leg swelling.   GI: Negative for abdominal pain, No melena, diarrhea, nausea and vomiting.   : Negative for dysuria and frequency, Negative for  hematuria  SKIN: Negative for bruising, Negative for edema or discoloration noted.   ENDOCRINE: Negative for polyphagia, Negative for heat intolerance, Negative for cold intolerance  PSYCHIATRIC: Negative for depression, Negative for anxiety, Negative for memory loss  MUSCULOSKELETAL: Negative for neck pain, Negative for muscle weakness, Negative for back pain     OBJECTIVE     VITAL SIGNS (Most Recent)  Temp: 97.4 °F (36.3 °C) (03/15/22 1602)  Pulse: 64 (03/15/22 1602)  Resp: 18 (03/15/22 1658)  BP: (!) 191/77 (03/15/22 1659)  SpO2: 96 % (03/15/22 1748)    VENTILATION STATUS  Resp: 18 (03/15/22 1658)  SpO2: 96 % (03/15/22 1748)       I & O (Last 24H):    Intake/Output Summary (Last 24 hours) at 3/15/2022 1903  Last data filed at 3/15/2022 1800  Gross per 24 hour   Intake --   Output 2400 ml   Net -2400 ml       WEIGHTS  Wt Readings from Last 3 Encounters:   03/14/22 1033 99.8 kg (220 lb)   03/12/22 0233 99.8 kg (220 lb)   03/12/22 0028 99.8 kg (220 lb)   03/11/22 1856 99.8 kg (220 lb)   02/16/22 1557 97 kg (213 lb 13.5 oz)   02/07/22 1508 107 kg (236 lb)       PHYSICAL EXAM  GENERAL: well built, well nourished, well-developed in no apparent distress alert and oriented.   HEENT: Normocephalic. Pupils normal and conjunctivae normal.  Mucous membranes normal, no cyanosis or icterus, trachea central,no pallor or icterus is noted..   NECK: No JVD. No bruit..   THYROID: Thyroid not enlarged. No nodules present..   CARDIAC: Regular rate and rhythm. S1 is normal.S2 is normal.No gallops, clicks or murmurs noted at this time.  CHEST ANATOMY: normal.   LUNGS: Clear to auscultation. No wheezing or rhonchi..   ABDOMEN: Soft no masses or organomegaly.  No abdomen pulsations or bruits.  Normal bowel sounds. No pulsations and no masses felt, No guarding or rebound.   URINARY: No mcfadden catheter   EXTREMITIES: No cyanosis, clubbing or edema noted at this time., no calf tenderness bilaterally.   PERIPHERAL VASCULAR SYSTEM: Good  palpable distal pulses.   CENTRAL NERVOUS SYSTEM: No focal motor or sensory deficits noted.   SKIN: Skin without lesions, moist, well perfused.   MUSCLE STRENGTH & TONE: No noteable weakness, atrophy or abnormal movement.     HOME MEDICATIONS:  No current facility-administered medications on file prior to encounter.     Current Outpatient Medications on File Prior to Encounter   Medication Sig Dispense Refill    acetaminophen (TYLENOL) 650 MG TbSR Take 650 mg by mouth every 8 (eight) hours as needed (pain).       albuterol (ACCUNEB) 1.25 mg/3 mL Nebu Take 3 mLs (1.25 mg total) by nebulization every 6 (six) hours as needed (cough wheeze). Rescue 1 Box 0    albuterol 90 mcg/actuation inhaler Inhale 2 puffs into the lungs every 6 (six) hours as needed for Wheezing. 18 g 2    amiodarone (PACERONE) 200 MG Tab Take 1 tablet (200 mg total) by mouth once daily. Need office visit before next refill, last seen 12/2020. This will be the LAST Rx if visit is not made. 90 tablet 3    apixaban (ELIQUIS) 5 mg Tab Take 1 tablet (5 mg total) by mouth 2 (two) times daily. Need office visit before next refill, last seen 12/2020. This will be the LAST Rx if visit is not made. 60 tablet 11    carvediloL (COREG) 6.25 MG tablet Take 1 tablet (6.25 mg total) by mouth 2 (two) times daily with meals. Need office visit before next refill, last seen 12/2021. This will be the LAST Rx if visit is not made. 180 tablet 3    hydrALAZINE (APRESOLINE) 100 MG tablet Take 1 tablet (100 mg total) by mouth 3 (three) times daily. (Patient taking differently: Take 100 mg by mouth Daily. 1/2-1 tablet) 90 tablet 11    lisinopriL (PRINIVIL,ZESTRIL) 20 MG tablet Take 2 tablets (40 mg total) by mouth once daily. 180 tablet 3    LORazepam (ATIVAN) 1 MG tablet Take 1 tablet (1 mg total) by mouth every 6 (six) hours as needed for Anxiety. 30 tablet 0    montelukast (SINGULAIR) 10 mg tablet Take 1 tablet (10 mg total) by mouth every evening. 90 tablet 3     nitroGLYCERIN (NITROSTAT) 0.4 MG SL tablet Place 1 tablet (0.4 mg total) under the tongue every 5 (five) minutes as needed for Chest pain. 25 tablet 3    ondansetron (ZOFRAN-ODT) 4 MG TbDL Take 1 tablet (4 mg total) by mouth every 8 (eight) hours as needed (nausea). 30 tablet 0    potassium chloride SA (K-DUR,KLOR-CON) 20 MEQ tablet TAKE ONE TABLET BY MOUTH ONCE DAILY when taking lasix (Patient taking differently: Take 20 mEq by mouth as needed.) 90 tablet 1    aspirin (ECOTRIN) 81 MG EC tablet Take 81 mg by mouth once daily.      furosemide (LASIX) 40 MG tablet TAKE ONE TABLET BY MOUTH DAILY AS NEEDED For (fluid retention) 90 tablet 3    gabapentin (NEURONTIN) 100 MG capsule TAKE ONE CAPSULE BY MOUTH THREE TIMES DAILY (Patient taking differently: Take 100 mg by mouth as needed.) 90 capsule 5    hydrOXYzine HCL (ATARAX) 10 MG Tab Take 10 mg by mouth as needed.      SYNTHROID 125 mcg tablet TAKE ONE TABLET BY MOUTH once a day 90 tablet 0       SCHEDULED MEDS:   amiodarone  200 mg Oral Daily    apixaban  5 mg Oral BID    carvediloL  6.25 mg Oral BID WM    cefTRIAXone (ROCEPHIN) IVPB  1 g Intravenous Q24H    furosemide  20 mg Oral Daily    hydrALAZINE  100 mg Oral Daily    levothyroxine  125 mcg Oral Before breakfast    lisinopriL  40 mg Oral Daily    montelukast  10 mg Oral QHS    nystatin  500,000 Units Oral QID    pantoprazole  40 mg Oral Daily    polyethylene glycol  17 g Oral Daily       CONTINUOUS INFUSIONS:    PRN MEDS:acetaminophen, albuterol-ipratropium, aluminum-magnesium hydroxide-simethicone, dextrose 50%, dextrose 50%, glucagon (human recombinant), glucose, glucose, LORazepam, magnesium oxide, magnesium oxide, melatonin, morphine, morphine, naloxone, ondansetron, potassium bicarbonate, potassium bicarbonate, potassium bicarbonate, potassium, sodium phosphates, potassium, sodium phosphates, potassium, sodium phosphates, simethicone, sodium chloride 0.9%, traMADoL    LABS AND  DIAGNOSTICS     CBC LAST 3 DAYS  Recent Labs   Lab 03/13/22  0512 03/14/22  0457 03/15/22  0539   WBC 5.84 5.59 6.07   RBC 2.89* 2.96* 3.17*   HGB 8.6* 8.6* 9.3*   HCT 28.0* 28.6* 29.7*   MCV 97 97 94   MCH 29.8 29.1 29.3   MCHC 30.7* 30.1* 31.3*   RDW 15.2* 15.3* 15.0*    156 175   MPV 12.9 12.7 12.5   GRAN 65.1  3.8 62.4  3.5 64.2  3.9   LYMPH 18.7  1.1 20.0  1.1 18.5  1.1   MONO 12.3  0.7 12.9  0.7 11.7  0.7   BASO 0.03 0.02 0.04   NRBC 0 0 0       COAGULATION LAST 3 DAYS  No results for input(s): LABPT, INR, APTT in the last 168 hours.    CHEMISTRY LAST 3 DAYS  Recent Labs   Lab 03/12/22  0154 03/13/22  0512 03/14/22  0457 03/15/22  0539 03/15/22  1548    138 136 135*  --    K 4.0 4.2 4.2 5.1 4.5    104 102 99  --    CO2 27 25 27 29  --    ANIONGAP 9 9 7* 7*  --    BUN 23 25* 20 18  --    CREATININE 1.6* 1.6* 1.3 1.0  --     114* 105 83  --    CALCIUM 8.7 8.6* 8.7 9.0  --    MG 2.1 2.0 2.0 2.0  --    ALBUMIN 2.9* 2.6* 2.5*  --   --    PROT 5.7* 5.3* 5.1*  --   --    ALKPHOS 66 60 63  --   --    ALT 19 20 15  --   --    AST 19 17 14  --   --    BILITOT 0.5 0.3 0.4  --   --        CARDIAC PROFILE LAST 3 DAYS  Recent Labs   Lab 03/11/22  1956 03/12/22  0151 03/13/22  0512 03/14/22  1114 03/14/22  1611 03/15/22  0539   *  --  246*  --   --   --    TROPONINI 0.033*   < >  --  0.025 0.022 0.019    < > = values in this interval not displayed.       ENDOCRINE LAST 3 DAYS  No results for input(s): TSH, PROCAL in the last 168 hours.    LAST ARTERIAL BLOOD GAS  ABG  No results for input(s): PH, PO2, PCO2, HCO3, BE in the last 168 hours.    LAST 7 DAYS MICROBIOLOGY   Microbiology Results (last 7 days)     Procedure Component Value Units Date/Time    Urine culture [[id number]]  (Abnormal)  (Susceptibility) Collected: 03/11/22 2053    Order Status: Completed Specimen: Urine Updated: 03/14/22 1059     Urine Culture, Routine PROTEUS MIRABILIS  >100,000 cfu/ml      Narrative:       Specimen Source->Urine    Clostridium difficile EIA [[id number]]     Order Status: Canceled Specimen: Stool           MOST RECENT IMAGING  Echo  · Concentric hypertrophy and normal systolic function.  · The estimated PA systolic pressure is 40 mmHg.  · There is abnormal septal wall motion consistent with left bundle branch   block.  · Mild left atrial enlargement.  · Mild tricuspid regurgitation.  · The estimated ejection fraction is 60%.  · Grade II left ventricular diastolic dysfunction.  · Normal right ventricular size with normal right ventricular systolic   function.  · Normal central venous pressure (3 mmHg).         ECHOCARDIOGRAM RESULTS (last 5)  Results for orders placed during the hospital encounter of 03/11/22    Echo    Interpretation Summary  · Concentric hypertrophy and normal systolic function.  · The estimated PA systolic pressure is 40 mmHg.  · There is abnormal septal wall motion consistent with left bundle branch block.  · Mild left atrial enlargement.  · Mild tricuspid regurgitation.  · The estimated ejection fraction is 60%.  · Grade II left ventricular diastolic dysfunction.  · Normal right ventricular size with normal right ventricular systolic function.  · Normal central venous pressure (3 mmHg).      Results for orders placed during the hospital encounter of 03/10/20    Transesophageal echo (KIARA) with possible cardioversion    Interpretation Summary  · Mild concentric left ventricular hypertrophy.  · Normal left ventricular systolic function. The estimated ejection fraction is 55%.  · Atrial fibrillation observed.  · Normal right ventricular systolic function.  · Mild left atrial enlargement.  · Mild right atrial enlargement.  · No interatrial septal defect present.  · Normal appearing left atrial appendage. No thrombus is present in the appendage.  · A 150 J synchronized cardioversion was successfully performed with restoration of normal sinus rhythm.      Echo Color Flow Doppler?  Yes    Interpretation Summary  · Concentric left ventricular remodeling.  · The mean diastolic gradient across the mitral valve is 4 mmHg at a heart rate of bpm.  · Normal left ventricular systolic function. The estimated ejection fraction is 53%.  · Atrial fibrillation observed.  · Normal right ventricular systolic function.  · Mild left atrial enlargement.  · Mild mitral regurgitation.  · Mild to moderate tricuspid regurgitation.  · Normal central venous pressure (3 mmHg).  · The estimated PA systolic pressure is 41 mmHg.  · Pulmonary hypertension present.      Results for orders placed during the hospital encounter of 05/25/18    STRESS TEST REPORT      Echocardiogram stress test    Narrative  · Left ventricle ejection fraction is normal.  · No stress induced wall motion abnormality  · Negative for ischemia.      CURRENT/PREVIOUS VISIT EKG  Results for orders placed or performed during the hospital encounter of 03/11/22   EKG 12-lead    Collection Time: 03/14/22 10:59 AM    Narrative    Test Reason : R07.9,    Vent. Rate : 054 BPM     Atrial Rate : 054 BPM     P-R Int : 000 ms          QRS Dur : 160 ms      QT Int : 512 ms       P-R-T Axes : 000 017 094 degrees     QTc Int : 485 ms    Wide QRS rhythm  Left bundle branch block  Abnormal ECG  When compared with ECG of 13-MAR-2022 15:26,  Wide QRS rhythm has replaced Sinus rhythm  Confirmed by Sonido Frias MD ([id number]) on 3/15/2022 6:16:22 PM    Referred By: [email or web address]   SELF           Confirmed By:Sonido Frias MD           ASSESSMENT/PLAN:     Active Hospital Problems    Diagnosis    *Atypical pneumonia    (HFpEF) heart failure with preserved ejection fraction    Acute cystitis    Avulsion fracture of ankle, left, closed, initial encounter    STACIE (acute kidney injury)    Anticoagulant long-term use    Atrial fibrillation with RVR    Iron deficiency anemia    Moderate persistent asthma without complication    Hypertension    Coronary artery disease due  to calcified coronary lesion       ASSESSMENT & PLAN:   Chest discomfort elevated troponin secondary to hypoxic respiratory failure  Intermittent left bundle branch block  Atypical pneumonia  History of coronary artery disease with PCI.  History of atrial fibrillation paroxysmal currently sinus rhythm on carvedilol on amiodarone.  History of heart failure preserved ejection fraction  GNR/ UTI  Lotion fracture malleolus  Hypertension poorly controlled    RECOMMENDATIONS:  I doubt acute coronary syndrome.  Because of the left bundle branch block which was not on the previous EKGs we are consulted.  Review of the records indicates intermittent left bundle branch block.  Will proceed with a Lexiscan stress test in the interim secondary to patient's known history and cardiovascular risk.        Aba Christian MD  [facility]  Department of Cardiology  Date of Service: 03/15/2022  7:03 PM

## 2022-03-16 NOTE — PROGRESS NOTES
[facility] Hospital Medicine  Progress Note    Patient Name: Heather Hughes  MRN: [id number]  Patient Class: IP- Inpatient   Admission Date: 3/11/2022  Length of Stay: 5 days  Attending Physician: Tsering Peace MD  Primary Care Provider: Yumiko Page MD        Subjective:     Principal Problem:Atypical pneumonia        HPI:  Heather Hughes is a 81 y.o. female with a past medical history of CAD, HTN, DVT, and past surgical history of cardiac stent, cardioversion and knee surgery, who presented to the ED tonight for generalized weakness and ankle pain after a fall at home. HPI provided by patient and daughter. Daughter states that the patient was in her normal state of health last night but was very difficulty to wake up this morning. She states that she slept almost 18 hours today. Daughter states that she tried to get her up and into a chair to encourage the patient to move around. She needed to use the restroom and needed assistance in walking there, where she usually is able to get around with a walker and no additional help. When they got to the bathroom, as they were turning to sit her on the toilet, her legs gave out and twisted underneath her. Her daughter assisted her to the ground and she landed on her left leg and hit her elbow on the floor, sustaining two skin tears. She has also had a cough for two weeks, no fever or chest pain. She did have several episodes of diarrhea. She denies congestion, abdominal pain, nausea or vomiting.     Upon arrival to the ED, the patient found to be on Oxygen via NC, as she was noted to be hypoxic by EMS on their arrival at 83%. On oxygen, her pulse ox was 99%. CXR showed evidence of atypical pneumonia. Urinalysis showed a UTI. Xray of the left ankle showed an avulsion fracture of the medial malleolus. CBC showed chronic anemia. CMP showed an STACIE. BNP was elevated at 240, and initial troponin was elevated at 0.033. Hospital Medicine consulted  for admission and further management.      Overview/Hospital Course:  Patient was started on IV antibiotics for possible pneumonia and got IV fluids due to the suspicion for hypovolemia causing STACIE. Symptoms improved with treatment. Orthopedics assessed the patient and recommended conservative management for the medial malleolus fx. She was given fracture boots and will be taught by PT on how to use at home.       jaw pain/epigastric pain did not recur after yesterday morning.  Not experiencing dysphagia today. No CP, SOB, lightheadedness. Feels improved globally         NAD, alert  NC, AT  RRR  CTAB  SNTND+BS  Trace edema; affected leg in boot.   PERRL    Vitals, labs and radiographs from past 24h reviewed and personally interpreted.      Assessment/Plan:      * Atypical pneumonia  Patient with possible atypical pneumonia based on CXR findings and complains of cough   On antibiotics now, will continue for 5 days   Antibiotics (From admission, onward)            Start     Stop Route Frequency Ordered    03/12/22 2240  cefTRIAXone (ROCEPHIN) 1 g/50 mL D5W IVPB         -- IV Every 24 hours (non-standard times) 03/12/22 0105    03/12/22 0900  azithromycin tablet 250 mg         03/16 0859 Oral Daily 03/12/22 0105      . Cultures drawn and noted-   Microbiology Results (last 7 days)     Procedure Component Value Units Date/Time    Urine culture [[id number]]  (Abnormal) Collected: 03/11/22 2053    Order Status: Completed Specimen: Urine Updated: 03/13/22 0757     Urine Culture, Routine GRAM NEGATIVE TRES  >100,000 cfu/ml  Identification and susceptibility pending      Narrative:      Specimen Source->Urine    Clostridium difficile EIA [[id number]]     Order Status: Canceled Specimen: Stool               (HFpEF) heart failure with preserved ejection fraction  Patient has history of CHFpEF. Not in exacerbation  -resuming home lasix    STACIE (acute kidney injury)  STACIE slightly improved since admission.   2/2 hypovolemia. Resolved  "with IVF  Will continue to monitor. Renally dose medications.   -resuming home lasix    Avulsion fracture of ankle, left, closed, initial encounter  S/p slight fall at home due to weakness, daughter assisted to ground but ankle was twisted under her and she fell onto her ankle    Xray shows "Curvilinear increased density at the medial aspect of the medial malleolus could relate to acute avulsive type fracture."  Splint placed in ER  Ortho consulted, conservative management. Fx boots given.   Follow up with orthopedic surgery within 4 weeks.   PT/OT for ambulation.   Pain meds PRN    Acute cystitis  pendin urine culture  Rocephin daily      Anticoagulant long-term use  Fall and bleeding precautions      Atrial fibrillation with RVR  Patient with Long standing persistent (>12 months) atrial fibrillation which is controlled currently with Beta Blocker and Amiodarone. Patient is currently in sinus rhythm.[id number] Score: 4. Anticoagulation indicated. Anticoagulation done with eliquis.        Iron deficiency anemia  Patient's anemia is currently controlled. Has not received any PRBCs to date.. Etiology likely d/t chronic disease  Current CBC reviewed-   Lab Results   Component Value Date    HGB 10.4 (L) 03/11/2022    HCT 34.5 (L) 03/11/2022     Monitor serial CBC and transfuse if patient becomes hemodynamically unstable, symptomatic or H/H drops below 7/21.         Coronary artery disease due to calcified coronary lesion  Patient with known CAD  which is controlled Will  monitor for S/Sx of angina/ACS. Continue to monitor on telemetry.   -stress without reversible defect       Hypertension  Chronic, controlled.  Latest blood pressure and vitals reviewed-   Temp:  [97.8 °F (36.6 °C)-100.2 °F (37.9 °C)]   Pulse:  []   Resp:  [18-20]   BP: (147-167)/(64-72)   SpO2:  [89 %-99 %] .   Home meds for hypertension were reviewed and noted below.   Hypertension Medications             carvediloL (COREG) 6.25 MG tablet Take 1 " tablet (6.25 mg total) by mouth 2 (two) times daily with meals. Need office visit before next refill, last seen 12/2021. This will be the LAST Rx if visit is not made.    furosemide (LASIX) 40 MG tablet TAKE ONE TABLET BY MOUTH DAILY AS NEEDED For (fluid retention)    hydrALAZINE (APRESOLINE) 100 MG tablet Take 1 tablet (100 mg total) by mouth 3 (three) times daily.    lisinopriL (PRINIVIL,ZESTRIL) 20 MG tablet Take 2 tablets (40 mg total) by mouth once daily.    nitroGLYCERIN (NITROSTAT) 0.4 MG SL tablet Place 1 tablet (0.4 mg total) under the tongue every 5 (five) minutes as needed for Chest pain.          While in the hospital, will manage blood pressure as follows; Continue home antihypertensive regimen, hold lisinopril    Will utilize p.r.n. blood pressure medication only if patient's blood pressure greater than  180/110 and she develops symptoms such as worsening chest pain or shortness of breath.    -lisinopril to be resumed if K+ is not elevated on repeat      Moderate persistent asthma without complication  Noted, chronic  duonebs PRN    Dysphagia  -SLP consult    Acute respiratory failure with hypoxia  -2/2 acute pneumonia. See above     VTE Risk Mitigation (From admission, onward)         Ordered     apixaban tablet 5 mg  2 times daily         03/11/22 2303     Reason for No Pharmacological VTE Prophylaxis  Once        Question:  Reasons:  Answer:  Already adequately anticoagulated on oral Anticoagulants    03/11/22 2303     IP VTE HIGH RISK PATIENT  Once         03/11/22 2303     Place sequential compression device  Until discontinued         03/11/22 2303                Discharge Planning   JANAY:      Code Status: DNR   Is the patient medically ready for discharge?:     Reason for patient still in hospital (select all that apply): Treatment  Discharge Plan A: Home Health                  Tsering Peace MD  Department of Hospital Medicine   [facility]

## 2022-03-17 VITALS
DIASTOLIC BLOOD PRESSURE: 69 MMHG | SYSTOLIC BLOOD PRESSURE: 141 MMHG | HEIGHT: 63 IN | BODY MASS INDEX: 38.98 KG/M2 | RESPIRATION RATE: 18 BRPM | WEIGHT: 220 LBS | TEMPERATURE: 98 F | HEART RATE: 54 BPM | OXYGEN SATURATION: 97 %

## 2022-03-17 LAB — TB INDURATION 48 - 72 HR READ: 0 0MM

## 2022-03-17 PROCEDURE — 97530 THERAPEUTIC ACTIVITIES: CPT

## 2022-03-17 PROCEDURE — 94761 N-INVAS EAR/PLS OXIMETRY MLT: CPT

## 2022-03-17 PROCEDURE — 99233 SBSQ HOSP IP/OBS HIGH 50: CPT | Mod: ,,, | Performed by: SPECIALIST

## 2022-03-17 PROCEDURE — 97110 THERAPEUTIC EXERCISES: CPT

## 2022-03-17 PROCEDURE — 99233 PR SUBSEQUENT HOSPITAL CARE,LEVL III: ICD-10-PCS | Mod: ,,, | Performed by: SPECIALIST

## 2022-03-17 PROCEDURE — 25000003 PHARM REV CODE 250: Performed by: INTERNAL MEDICINE

## 2022-03-17 PROCEDURE — 99900035 HC TECH TIME PER 15 MIN (STAT)

## 2022-03-17 PROCEDURE — 25000003 PHARM REV CODE 250: Performed by: NURSE PRACTITIONER

## 2022-03-17 RX ORDER — LORAZEPAM 1 MG/1
1 TABLET ORAL EVERY 6 HOURS PRN
Qty: 5 TABLET | Refills: 0 | Status: SHIPPED | OUTPATIENT
Start: 2022-03-17

## 2022-03-17 RX ORDER — HYDRALAZINE HYDROCHLORIDE 100 MG/1
100 TABLET, FILM COATED ORAL DAILY
Qty: 30 TABLET | Refills: 11 | Status: SHIPPED | OUTPATIENT
Start: 2022-03-17 | End: 2023-03-17

## 2022-03-17 RX ORDER — FUROSEMIDE 20 MG/1
TABLET ORAL
Start: 2022-03-17 | End: 2022-04-13

## 2022-03-17 RX ORDER — HYDROXYZINE HYDROCHLORIDE 10 MG/1
10 TABLET, FILM COATED ORAL 3 TIMES DAILY PRN
Start: 2022-03-17

## 2022-03-17 RX ORDER — POLYETHYLENE GLYCOL 3350 17 G/17G
17 POWDER, FOR SOLUTION ORAL DAILY
Refills: 0
Start: 2022-03-18 | End: 2022-04-13

## 2022-03-17 RX ADMIN — PANTOPRAZOLE SODIUM 40 MG: 40 TABLET, DELAYED RELEASE ORAL at 08:03

## 2022-03-17 RX ADMIN — NYSTATIN 500000 UNITS: 500000 SUSPENSION ORAL at 08:03

## 2022-03-17 RX ADMIN — NYSTATIN 500000 UNITS: 500000 SUSPENSION ORAL at 12:03

## 2022-03-17 RX ADMIN — LEVOTHYROXINE SODIUM 125 MCG: 0.12 TABLET ORAL at 06:03

## 2022-03-17 RX ADMIN — CARVEDILOL 6.25 MG: 6.25 TABLET, FILM COATED ORAL at 08:03

## 2022-03-17 RX ADMIN — LISINOPRIL 40 MG: 40 TABLET ORAL at 08:03

## 2022-03-17 RX ADMIN — APIXABAN 5 MG: 2.5 TABLET, FILM COATED ORAL at 08:03

## 2022-03-17 RX ADMIN — POLYETHYLENE GLYCOL 3350 17 G: 17 POWDER, FOR SOLUTION ORAL at 08:03

## 2022-03-17 RX ADMIN — FUROSEMIDE 20 MG: 20 TABLET ORAL at 08:03

## 2022-03-17 RX ADMIN — AMIODARONE HYDROCHLORIDE 200 MG: 200 TABLET ORAL at 08:03

## 2022-03-17 NOTE — PLAN OF CARE
The pt is cleared for discharge from case management to [facility].    03/17/22 1407   Final Note   Assessment Type Final Discharge Note   Anticipated Discharge Disposition SNF

## 2022-03-17 NOTE — PT/OT/SLP PROGRESS
"Physical Therapy Treatment    Patient Name:  Heather Hughes   MRN:  [id number]    Recommendations:     Discharge Recommendations:  nursing facility, skilled   Discharge Equipment Recommendations: none   Barriers to discharge: Decreased caregiver support    Assessment:     Heather Hughes is a 81 y.o. female admitted with a medical diagnosis of Atypical pneumonia.  She presents with the following impairments/functional limitations:    weakness, impaired mobility/endurance. Pt has short posterior splint LLE. Pt completed thera ex in supine x 10-20 reps. EOB sitting mod assist and standing max assist with own RW 20-30 seconds tolerance.     PT reached out to ISAAC Goff re clarification if posterior splint could be removed and a boot applied. Awaiting response.  Pt to benefit from SNF    Rehab Prognosis: Fair; patient would benefit from acute skilled PT services to address these deficits and reach maximum level of function.    Recent Surgery: * No surgery found *      Plan:     During this hospitalization, patient to be seen daily to address the identified rehab impairments via gait training, therapeutic activities, therapeutic exercises and progress toward the following goals:    · Plan of Care Expires:  22    Subjective   Son at bedside and assisting with care  Chief Complaint: weakness- sated "I gained a lot of weight since spouse  due to being inactive"  Patient/Family Comments/goals: get well  Pain/Comfort:  · Pain Rating 1: 0/10      Objective:     Communicated with nurse Mendoza prior to session.  Patient found HOB elevated with   upon PT entry to room.     General Precautions: Standard, fall   Orthopedic Precautions:LLE weight bearing as tolerated (with boot)   Braces:  (L foot with posterior splint. cam walker boot at bedside)  Respiratory Status: Room air     Functional Mobility:  · Bed Mobility:     · Rolling Left:  moderate assistance  · Scooting: maximal assistance  · Supine to Sit: moderate " assistance  · Sit to Supine: moderate assistance  · Transfers:     · Sit to Stand:  maximal assistance with rolling walker 20-30 seconds tolerance WBAT LLE      AM-PAC 6 CLICK MOBILITY          Therapeutic Activities and Exercises:   Patient was educated on the importance of OOB activity and functional mobility to negate negative effects of prolonged bed rest during hospitalization, safe transfers and ambulation, and D/C planning   thera ex with AP,QS,GS,SLR RLE and HS x 10-20 reps  Pt initiating mobility and to sit EOB  Has difficulty maintaining standing  Back to bed and able to assist with repositioning head of bed    Patient left HOB elevated with all lines intact, call button in reach, bed alarm on and student nurse present..    GOALS:   Multidisciplinary Problems     Physical Therapy Goals        Problem: Physical Therapy Goal    Goal Priority Disciplines Outcome Goal Variances Interventions   Physical Therapy Goal     PT, PT/OT Ongoing, Progressing     Description: Goals to be met by: 22     Patient will increase functional independence with mobility by performin. Supine to sit with Stand-by Assistance  2. Sit to stand transfer with Minimal Assistance  3. Bed to chair transfer with Minimal Assistance using Rolling Walker maintaining LLE WBAT with boot  4. Gait  x 15 feet with Minimal Assistance using Rolling Walker maintaining LLE WBAT with boot.   5. Lower extremity exercise program x20 reps per handout, with supervision                     Time Tracking:     PT Received On: 22  PT Start Time: 921     PT Stop Time: 1009  PT Total Time (min): 48 min     Billable Minutes: Therapeutic Activity 30 and Therapeutic Exercise 18    Treatment Type: Treatment  PT/PTA: PT     PTA Visit Number: 0     2022

## 2022-03-17 NOTE — PLAN OF CARE
Problem: Physical Therapy Goal  Goal: Physical Therapy Goal  Description: Goals to be met by: 22     Patient will increase functional independence with mobility by performin. Supine to sit with Stand-by Assistance  2. Sit to stand transfer with Minimal Assistance  3. Bed to chair transfer with Minimal Assistance using Rolling Walker maintaining LLE WBAT with boot  4. Gait  x 15 feet with Minimal Assistance using Rolling Walker maintaining LLE WBAT with boot.   5. Lower extremity exercise program x20 reps per handout, with supervision    Outcome: Ongoing, Progressing   EOB sitting/thera ex and standing with RW. Pt to benefit from SNF

## 2022-03-17 NOTE — PLAN OF CARE
Per Stephanie with [facility] - she has the [employer] auth and is ready for admit today. Dr Guerline Peace notified .  following.    03/17/22 0825   Post-Acute Status   Post-Acute Authorization Placement   Post-Acute Placement Status Set-up Complete/Auth obtained

## 2022-03-17 NOTE — PLAN OF CARE
I sent the pts AVS, NH orders , PPD and TB S/S form to [facility] via [employer]. CM following.    03/17/22 2387   Post-Acute Status   Post-Acute Authorization Placement   Post-Acute Placement Status Pending post-acute provider review/more information requested

## 2022-03-17 NOTE — PLAN OF CARE
Report can be called to [facility] at [phone] and since the van is already headed this way they will swing by and pick the pt up. Pts nurse updated.    03/17/22 1408   Post-Acute Status   Post-Acute Authorization Placement   Post-Acute Placement Status Set-up Complete/Auth obtained

## 2022-03-17 NOTE — PROGRESS NOTES
Formerly [facility], later [facility]  Department of Cardiology  Progress Note    PATIENT NAME: Heather Hughes  MRN: [id number]  TODAY'S DATE: 03/17/2022  ADMIT DATE: 3/11/2022    SUBJECTIVE     PRINCIPLE PROBLEM: Atypical pneumonia    INTERVAL HISTORY:    3/17/2022  Patient denies chest pain or SOB today. Complained of bilateral leg pain secondary to arthritis. Stress test negative for any RI. VSS.     3/16/2022  Feels well with no chest pain.     Review of patient's allergies indicates:   Allergen Reactions    Bactroban [mupirocin calcium] Itching and Other (See Comments)     Red, tingling, itching, burning     Ditropan [oxybutynin chloride] Swelling     swelling    Lipitor [atorvastatin] Other (See Comments)     Fall/ balance     Myrbetriq [mirabegron] Swelling and Other (See Comments)     Legs swelling, couldn't void     Pravastatin Other (See Comments)     Balance problem     Oxycodone     Codeine Nausea Only    Mobic [meloxicam] Other (See Comments)     Not sure reaction    Perfume Other (See Comments)     And flowers cause allergy and makes her cough.    Sulfur Other (See Comments)     Was told not to take as a child       REVIEW OF SYSTEMS  CARDIOVASCULAR: No recent chest pain, palpitations, arm, neck, or jaw pain  RESPIRATORY: No recent fever, cough chills, SOB or congestion  : No blood in the urine  GI: No Nausea, vomiting, constipation, diarrhea, blood, or reflux.  MUSCULOSKELETAL: No myalgias  NEURO: No lightheadedness or dizziness  EYES: No Double vision, blurry, vision or headache     OBJECTIVE     VITAL SIGNS (Most Recent)  Temp: 97.9 °F (36.6 °C) (03/17/22 1102)  Pulse: (!) 54 (03/17/22 1102)  Resp: 18 (03/17/22 1102)  BP: (!) 141/69 (03/17/22 1102)  SpO2: 97 % (03/17/22 1102)    VENTILATION STATUS  Resp: 18 (03/17/22 1102)  SpO2: 97 % (03/17/22 1102)       I & O (Last 24H):No intake or output data in the 24 hours ending 03/17/22 1558    WEIGHTS  Wt Readings from Last 3 Encounters:   03/14/22 1033 99.8 kg  (220 lb)   03/12/22 0233 99.8 kg (220 lb)   03/12/22 0028 99.8 kg (220 lb)   03/11/22 1856 99.8 kg (220 lb)   02/16/22 1557 97 kg (213 lb 13.5 oz)   02/07/22 1508 107 kg (236 lb)       PHYSICAL EXAM  CONSTITUTIONAL: Well built, well nourished in no apparent distress  NECK: no carotid bruit, no JVD  LUNGS: CTA  CHEST WALL: no tenderness  HEART: regular rate and rhythm, S1, S2 normal, no murmur, click, rub or gallop   ABDOMEN: soft, non-tender; bowel sounds normal; no masses,  no organomegaly  EXTREMITIES: Extremities normal, mild LE edema  NEURO: AAO X 3    SCHEDULED MEDS:   amiodarone  200 mg Oral Daily    apixaban  5 mg Oral BID    carvediloL  6.25 mg Oral BID WM    cefTRIAXone (ROCEPHIN) IVPB  1 g Intravenous Q24H    furosemide  20 mg Oral Daily    hydrALAZINE  100 mg Oral Daily    levothyroxine  125 mcg Oral Before breakfast    lisinopriL  40 mg Oral Daily    montelukast  10 mg Oral QHS    nystatin  500,000 Units Oral QID    pantoprazole  40 mg Oral Daily    polyethylene glycol  17 g Oral Daily       CONTINUOUS INFUSIONS:    PRN MEDS:acetaminophen, albuterol-ipratropium, aluminum-magnesium hydroxide-simethicone, dextrose 50%, dextrose 50%, glucagon (human recombinant), glucose, glucose, LORazepam, magnesium oxide, magnesium oxide, melatonin, morphine, morphine, naloxone, ondansetron, potassium bicarbonate, potassium bicarbonate, potassium bicarbonate, potassium, sodium phosphates, potassium, sodium phosphates, potassium, sodium phosphates, simethicone, sodium chloride 0.9%, traMADoL    LABS AND DIAGNOSTICS     CBC LAST 3 DAYS  Recent Labs   Lab 03/14/22  0457 03/15/22  0539 03/16/22  0450   WBC 5.59 6.07 5.43   RBC 2.96* 3.17* 3.22*   HGB 8.6* 9.3* 9.3*   HCT 28.6* 29.7* 30.0*   MCV 97 94 93   MCH 29.1 29.3 28.9   MCHC 30.1* 31.3* 31.0*   RDW 15.3* 15.0* 14.9*    175 188   MPV 12.7 12.5 13.5*   GRAN 62.4  3.5 64.2  3.9 60.4  3.3   LYMPH 20.0  1.1 18.5  1.1 19.0  1.0   MONO 12.9  0.7 11.7  0.7 14.2   0.8   BASO 0.02 0.04 0.04   NRBC 0 0 0       COAGULATION LAST 3 DAYS  No results for input(s): LABPT, INR, APTT in the last 168 hours.    CHEMISTRY LAST 3 DAYS  Recent Labs   Lab 03/12/22  0154 03/13/22  0512 03/14/22  0457 03/15/22  0539 03/15/22  1548 03/16/22  0450    138 136 135*  --  136   K 4.0 4.2 4.2 5.1 4.5 4.1    104 102 99  --  97   CO2 27 25 27 29  --  29   ANIONGAP 9 9 7* 7*  --  10   BUN 23 25* 20 18  --  14   CREATININE 1.6* 1.6* 1.3 1.0  --  1.0    114* 105 83  --  77   CALCIUM 8.7 8.6* 8.7 9.0  --  9.1   MG 2.1 2.0 2.0 2.0  --  2.0   ALBUMIN 2.9* 2.6* 2.5*  --   --   --    PROT 5.7* 5.3* 5.1*  --   --   --    ALKPHOS 66 60 63  --   --   --    ALT 19 20 15  --   --   --    AST 19 17 14  --   --   --    BILITOT 0.5 0.3 0.4  --   --   --        CARDIAC PROFILE LAST 3 DAYS  Recent Labs   Lab 03/11/22  1956 03/12/22  0151 03/13/22  0512 03/14/22  1114 03/14/22  1611 03/15/22  0539   *  --  246*  --   --   --    TROPONINI 0.033*   < >  --  0.025 0.022 0.019    < > = values in this interval not displayed.       ENDOCRINE LAST 3 DAYS  No results for input(s): TSH, PROCAL in the last 168 hours.    LAST ARTERIAL BLOOD GAS  ABG  No results for input(s): PH, PO2, PCO2, HCO3, BE in the last 168 hours.    LAST 7 DAYS MICROBIOLOGY   Microbiology Results (last 7 days)       Procedure Component Value Units Date/Time    Urine culture [[id number]]  (Abnormal)  (Susceptibility) Collected: 03/11/22 2053    Order Status: Completed Specimen: Urine Updated: 03/14/22 1059     Urine Culture, Routine PROTEUS MIRABILIS  >100,000 cfu/ml      Narrative:      Specimen Source->Urine    Clostridium difficile EIA [[id number]]     Order Status: Canceled Specimen: Stool             MOST RECENT IMAGING  NM Myocardial Perfusion Spect Multi Pharmacologic  Narrative: EXAMINATION:  NM MYOCARDIAL PERFUSION SPECT MULTI PHARM    CLINICAL HISTORY:  Chest pain (Ped 0-18y);ECG abnormal, intermediate CAD  risk;    TECHNIQUE:  SPECT images in short, vertical and horizontal long axis were acquired 30 minutes after the injection of 12.2 mCi of Tc-99m tetrofosmin at rest and 30.1 mCi during a cardiac stress. The clinical stress and ECG portion of the study is to be read separately.    COMPARISON:  None.    FINDINGS:  The quality of the study is good.    Stress SPECT images demonstrate there are small fixed defects near the apex and also along the mid anteroseptal wall.  No reversible defects.    The gated post-stress images reveal normal wall motion and normal wall thickness with an estimated LVEF of 67 %. The LV cavity is not dilated with an end-diastolic volume of 96 ml and an end-systolic volume of 32 ml.  Impression: 1. No evidence for ischemia.  A few small fixed defects could relate to remote infarcts.  2. Global left ventricular systolic function is normal with an LV ejection fraction of 67 % and no evidence of LV dilatation. Wall motion is normal.    Electronically signed by: Gerard Wilkins  Date:    03/16/2022  Time:    12:04  Nuclear Stress Test    The EKG portion of this study is uninterpretable.    There were no arrhythmias during stress.      [facility]  Results for orders placed during the hospital encounter of 03/11/22    Echo    Interpretation Summary  · Concentric hypertrophy and normal systolic function.  · The estimated PA systolic pressure is 40 mmHg.  · There is abnormal septal wall motion consistent with left bundle branch block.  · Mild left atrial enlargement.  · Mild tricuspid regurgitation.  · The estimated ejection fraction is 60%.  · Grade II left ventricular diastolic dysfunction.  · Normal right ventricular size with normal right ventricular systolic function.  · Normal central venous pressure (3 mmHg).      CURRENT/PREVIOUS VISIT EKG  Results for orders placed or performed during the hospital encounter of 03/11/22   EKG 12-lead    Collection Time: 03/14/22 10:59 AM    Narrative    Test  Reason : R07.9,    Vent. Rate : 054 BPM     Atrial Rate : 054 BPM     P-R Int : 000 ms          QRS Dur : 160 ms      QT Int : 512 ms       P-R-T Axes : 000 017 094 degrees     QTc Int : 485 ms    Wide QRS rhythm  Left bundle branch block  Abnormal ECG  When compared with ECG of 13-MAR-2022 15:26,  Wide QRS rhythm has replaced Sinus rhythm  Confirmed by Sonido Frias MD ([id number]) on 3/15/2022 6:16:22 PM    Referred By: [email or web address]   SELF           Confirmed By:Sonido Frias MD       ASSESSMENT/PLAN:     Active Hospital Problems    Diagnosis    *Atypical pneumonia    (HFpEF) heart failure with preserved ejection fraction    Acute cystitis    Avulsion fracture of ankle, left, closed, initial encounter    STACIE (acute kidney injury)    Anticoagulant long-term use    Atrial fibrillation with RVR    Iron deficiency anemia    Moderate persistent asthma without complication    Hypertension    Coronary artery disease due to calcified coronary lesion       RECOMMENDATIONS:  - Nuclear stress test was negative for RI. Normal troponin lvl.   - restart on lasix 20 mg once daily when kidney injury has resolved  - cont apixaban, ASA, lisinopril, carvedilol, and amiodarone  - transfer to rehab     Niesha Preston PA-C  Formerly [facility]  Department of Cardiology  Date of Service: 03/17/2022

## 2022-03-17 NOTE — DISCHARGE SUMMARY
[facility] Hospital Medicine  Discharge Summary      Patient Name: Heather Hughes  MRN: [id number]  Admission Date: 3/11/2022  Hospital Length of Stay: 6 days  Discharge Date and Time:  03/17/2022 1:37 PM  Attending Physician: Tsering Peace MD   Discharging Provider: Tsering Peace MD  Primary Care Provider: Yumiko Page MD        HPI:     81 y.o. female with a past medical history of CAD, HTN, DVT, and past surgical history of cardiac stent, cardioversion and knee surgery, who presented to the ED tonight for generalized weakness and ankle pain after a fall at home. HPI provided by patient and daughter. Daughter states that the patient was in her normal state of health last night but was very difficulty to wake up this morning. She states that she slept almost 18 hours today. Daughter states that she tried to get her up and into a chair to encourage the patient to move around. She needed to use the restroom and needed assistance in walking there, where she usually is able to get around with a walker and no additional help. When they got to the bathroom, as they were turning to sit her on the toilet, her legs gave out and twisted underneath her. Her daughter assisted her to the ground and she landed on her left leg and hit her elbow on the floor, sustaining two skin tears. She has also had a cough for two weeks, no fever or chest pain. She did have several episodes of diarrhea. She denies congestion, abdominal pain, nausea or vomiting.      Upon arrival to the ED, the patient found to be on Oxygen via NC, as she was noted to be hypoxic by EMS on their arrival at 83%. On oxygen, her pulse ox was 99%. CXR showed evidence of atypical pneumonia. Urinalysis showed a UTI. Xray of the left ankle showed an avulsion fracture of the medial malleolus. CBC showed chronic anemia. CMP showed an STACIE. BNP was elevated at 240, and initial troponin was elevated at 0.033. Hospital Medicine consulted  for admission and further management.      * No surgery found *      Hospital Course:     The patient was admitted with an avulsion fracture of the medial malleolus following a fall in the setting of having pneumonia and a UTI in addition to being found to have an STACIE.  Ceftriaxone and azithromycin were initiated and a full course of treatment for both UTI and pneumonia have been completed while in-house.  The patient is able to comfortably work with Physical therapy without the need of supplemental oxygen.  She will be discharged to a skilled nursing facility for rehabilitation.    Regarding the acute kidney injury, a diuretics were held and fluids administered.  With this her kidney injury resolved.  Lasix 20 daily was resumed and tolerated without compromising renal function.  (she says she was taking 30 at home prior to admission; it is unclear.)    During hospitalization she experienced chest pain.  Cardiology was consulted.  Minimal elevation of initial troponin was noted.  Stress test was unremarkable for reversible ischemia.    Regarding the fracture, she was seen by Orthopedic surgery.  She has been placed in a boot.  She is to see that service in 1 month.  The intention is to wear the boot for 6 weeks.          * Atypical pneumonia  Patient with possible atypical pneumonia based on CXR findings and complains of cough                Antibiotics (From admission, onward)                            Start     Stop Route Frequency Ordered     03/12/22 2240   cefTRIAXone (ROCEPHIN) 1 g/50 mL D5W IVPB         -- IV Every 24 hours (non-standard times) 03/12/22 0105     03/12/22 0900   azithromycin tablet 250 mg         03/16 0859 Oral Daily 03/12/22 0105       . Cultures drawn and noted-            Microbiology Results (last 7 days)      Procedure Component Value Units Date/Time     Urine culture [[id number]]  (Abnormal) Collected: 03/11/22 2053     Order Status: Completed Specimen: Urine Updated: 03/13/22 0313      "  Urine Culture, Routine GRAM NEGATIVE TRES  >100,000 cfu/ml  Identification and susceptibility pending       Narrative:       Specimen Source->Urine     Clostridium difficile EIA [[id number]]       Order Status: Canceled Specimen: Stool                     (HFpEF) heart failure with preserved ejection fraction  Patient has history of CHFpEF. Not in exacerbation  -resuming home lasix     STACIE (acute kidney injury)  STACIE slightly improved since admission.   2/2 hypovolemia. Resolved with IVF  ns.   -resuming home lasix     Avulsion fracture of ankle, left, closed, initial encounter  S/p slight fall at home due to weakness, daughter assisted to ground but ankle was twisted under her and she fell onto her ankle     Xray shows "Curvilinear increased density at the medial aspect of the medial malleolus could relate to acute avulsive type fracture."  Splint placed in ER  Ortho consulted, conservative management. Fx boots given.   Follow up with orthopedic surgery within 4 weeks.   PT/OT for ambulation.   Pain meds PRN     Acute cystitis    Rocephin daily. Now complete        Anticoagulant long-term use  Fall and bleeding precautions        Atrial fibrillation with RVR  Patient with Long standing persistent (>12 months) atrial fibrillation which is controlled currently with Beta Blocker and Amiodarone. Patient is currently in sinus rhythm.[id number] Score: 4. Anticoagulation indicated. Anticoagulation done with eliquis.           Iron deficiency anemia  Patient's anemia is currently controlled. Has not received any PRBCs to date.. Etiology likely d/t chronic disease  Current CBC reviewed-         Lab Results   Component Value Date     HGB 10.4 (L) 03/11/2022     HCT 34.5 (L) 03/11/2022      Monitor serial CBC and transfuse if patient becomes hemodynamically unstable, symptomatic or H/H drops below 7/21.            Coronary artery disease due to calcified coronary lesion  Patient with known CAD  which is controlled Will  " monitor for S/Sx of angina/ACS. Continue to monitor on telemetry.   -stress without reversible defect         Hypertension  Chronic, controlled.  Latest blood pressure and vitals reviewed-   Temp:  [97.8 °F (36.6 °C)-100.2 °F (37.9 °C)]   Pulse:  []   Resp:  [18-20]   BP: (147-167)/(64-72)   SpO2:  [89 %-99 %] .   Home meds for hypertension were reviewed and noted below.         Hypertension Medications                 carvediloL (COREG) 6.25 MG tablet Take 1 tablet (6.25 mg total) by mouth 2 (two) times daily with meals. Need office visit before next refill, last seen 12/2021. This will be the LAST Rx if visit is not made.     furosemide (LASIX) 40 MG tablet TAKE ONE TABLET BY MOUTH DAILY AS NEEDED For (fluid retention)     hydrALAZINE (APRESOLINE) 100 MG tablet Take 1 tablet (100 mg total) by mouth 3 (three) times daily.     lisinopriL (PRINIVIL,ZESTRIL) 20 MG tablet Take 2 tablets (40 mg total) by mouth once daily.     nitroGLYCERIN (NITROSTAT) 0.4 MG SL tablet Place 1 tablet (0.4 mg total) under the tongue every 5 (five) minutes as needed for Chest pain.             While in the hospital, will manage blood pressure as follows; Continue home antihypertensive regimen, hold lisinopril     Will utilize p.r.n. blood pressure medication only if patient's blood pressure greater than  180/110 and she develops symptoms such as worsening chest pain or shortness of breath.             Moderate persistent asthma without complication  Noted, chronic  duonebs PRN     Dysphagia  -SLP consulted. Diet recommended.      Acute respiratory failure with hypoxia  -2/2 acute pneumonia. See above                Consults:   Consults (From admission, onward)        Status Ordering Provider     Inpatient consult to Cardiology  Once        Provider:  Aba Christian MD    Completed DIONICIO QUEVEDO     Inpatient consult to Social Work/Case Management  Once        Provider:  (Not yet assigned)    DIONICIO Dewey      Inpatient consult to Orthopedics  Once        Provider:  Herbie Frances MD    Completed VENANCIO JEAN-BAPTISTE          Final Active Diagnoses:    Diagnosis Date Noted POA    PRINCIPAL PROBLEM:  Atypical pneumonia [J18.9] 03/11/2022 Yes    (HFpEF) heart failure with preserved ejection fraction [I50.30] 03/13/2022 Yes    Acute cystitis [N30.00] 03/11/2022 Yes    Avulsion fracture of ankle, left, closed, initial encounter [S82.702A] 03/11/2022 Yes    STACIE (acute kidney injury) [N17.9] 03/11/2022 Yes    Anticoagulant long-term use [Z79.01] 08/03/2020 Not Applicable    Atrial fibrillation with RVR [I48.91] 03/10/2020 Yes    Iron deficiency anemia [D50.9] 08/22/2019 Yes    Moderate persistent asthma without complication [J45.40] 01/13/2017 Yes    Hypertension [I10] 01/13/2017 Yes    Coronary artery disease due to calcified coronary lesion [I25.10, I25.84] 01/13/2017 Yes      Problems Resolved During this Admission:    Diagnosis Date Noted Date Resolved POA    Diarrhea [R19.7] 03/12/2022 03/13/2022 Yes    LBBB (left bundle branch block), onset 8/2020 [I44.7] 08/03/2020 03/13/2022 Yes      Discharged Condition: stable    Disposition: Skilled Nursing Facility    Follow Up:   Follow-up Information     [facility] Follow up.    Specialties: SNF Agency, Nursing Home Agency, Physical Therapy, Occupational Therapy, SNF Agency  Why: Nursing Home, SNF  Contact information:  [address] [place] [address] [phone]                       Patient Instructions:      Activity as tolerated     Medications:  Reconciled Home Medications:      Medication List      START taking these medications    polyethylene glycol 17 gram Pwpk  Commonly known as: GLYCOLAX  Take 17 g by mouth once daily.  Start taking on: March 18, 2022        CHANGE how you take these medications    albuterol 90 mcg/actuation inhaler  Commonly known as: PROVENTIL/VENTOLIN HFA  Inhale 2 puffs into the lungs every 6 (six) hours as needed for  Wheezing.  What changed: Another medication with the same name was removed. Continue taking this medication, and follow the directions you see here.     furosemide 20 MG tablet  Commonly known as: LASIX  Daily  What changed:   · medication strength  · additional instructions     hydrALAZINE 100 MG tablet  Commonly known as: APRESOLINE  Take 1 tablet (100 mg total) by mouth Daily. (Previously prescribed as TID but patient only takes daily)  What changed:   · when to take this  · additional instructions     hydrOXYzine HCL 10 MG Tab  Commonly known as: ATARAX  Take 1 tablet (10 mg total) by mouth 3 (three) times daily as needed (itching or anxiety).  What changed:   · when to take this  · reasons to take this        CONTINUE taking these medications    acetaminophen 650 MG Tbsr  Commonly known as: TYLENOL  Take 650 mg by mouth every 8 (eight) hours as needed (pain).     amiodarone 200 MG Tab  Commonly known as: PACERONE  Take 1 tablet (200 mg total) by mouth once daily. Need office visit before next refill, last seen 12/2020. This will be the LAST Rx if visit is not made.     apixaban 5 mg Tab  Commonly known as: ELIQUIS  Take 1 tablet (5 mg total) by mouth 2 (two) times daily. Need office visit before next refill, last seen 12/2020. This will be the LAST Rx if visit is not made.     aspirin 81 MG EC tablet  Commonly known as: ECOTRIN  Take 81 mg by mouth once daily.     carvediloL 6.25 MG tablet  Commonly known as: COREG  Take 1 tablet (6.25 mg total) by mouth 2 (two) times daily with meals. Need office visit before next refill, last seen 12/2021. This will be the LAST Rx if visit is not made.     lisinopriL 20 MG tablet  Commonly known as: PRINIVIL,ZESTRIL  Take 2 tablets (40 mg total) by mouth once daily.     LORazepam 1 MG tablet  Commonly known as: ATIVAN  Take 1 tablet (1 mg total) by mouth every 6 (six) hours as needed for Anxiety.     montelukast 10 mg tablet  Commonly known as: SINGULAIR  Take 1 tablet (10 mg  total) by mouth every evening.     ondansetron 4 MG Tbdl  Commonly known as: ZOFRAN-ODT  Take 1 tablet (4 mg total) by mouth every 8 (eight) hours as needed (nausea).     SYNTHROID 125 MCG tablet  Generic drug: levothyroxine  TAKE ONE TABLET BY MOUTH once a day        STOP taking these medications    gabapentin 100 MG capsule  Commonly known as: NEURONTIN     nitroGLYCERIN 0.4 MG SL tablet  Commonly known as: NITROSTAT     potassium chloride SA 20 MEQ tablet  Commonly known as: K-DUR,KLOR-CON              Pending Diagnostic Studies:     None        Indwelling Lines/Drains at time of discharge:   Lines/Drains/Airways     None                 Time spent on the discharge of patient: 35 minutes         Tsering Peace MD  Department of Hospital Medicine  [facility]

## 2022-03-17 NOTE — CARE UPDATE
03/17/22 0705   Patient Assessment/Suction   Level of Consciousness (AVPU) alert   All Lung Fields Breath Sounds diminished   Rhythm/Pattern, Respiratory unlabored   PRE-TX-O2   O2 Device (Oxygen Therapy) room air   SpO2 96 %   Pulse Oximetry Type Intermittent   $ Pulse Oximetry - Multiple Charge Pulse Oximetry - Multiple   Pulse 61   Resp 18   Aerosol Therapy   $ Aerosol Therapy Charges PRN treatment not required

## 2022-03-17 NOTE — PLAN OF CARE
[facility]     Department of Hospital Medicine     [address] [place] [address]     [phone] after hours  [phone] fax       NURSING HOME ORDERS    03/17/2022    Admit to Nursing Home:       Skilled Bed                                                Diagnoses:  Active Hospital Problems    Diagnosis  POA    *Atypical pneumonia [J18.9]  Yes    (HFpEF) heart failure with preserved ejection fraction [I50.30]  Yes    Acute cystitis [N30.00]  Yes    Avulsion fracture of ankle, left, closed, initial encounter [S82.892A]  Yes    STACIE (acute kidney injury) [N17.9]  Yes    Anticoagulant long-term use [Z79.01]  Not Applicable    Atrial fibrillation with RVR [I48.91]  Yes    Iron deficiency anemia [D50.9]  Yes    Moderate persistent asthma without complication [J45.40]  Yes    Hypertension [I10]  Yes    Coronary artery disease due to calcified coronary lesion [I25.10, I25.84]  Yes      Resolved Hospital Problems    Diagnosis Date Resolved POA    Diarrhea [R19.7] 03/13/2022 Yes    LBBB (left bundle branch block), onset 8/2020 [I44.7] 03/13/2022 Yes       Patient is homebound due to:  Atypical pneumonia    Allergies:  Review of patient's allergies indicates:   Allergen Reactions    Bactroban [mupirocin calcium] Itching and Other (See Comments)     Red, tingling, itching, burning     Ditropan [oxybutynin chloride] Swelling     swelling    Lipitor [atorvastatin] Other (See Comments)     Fall/ balance     Myrbetriq [mirabegron] Swelling and Other (See Comments)     Legs swelling, couldn't void     Pravastatin Other (See Comments)     Balance problem     Oxycodone     Codeine Nausea Only    Mobic [meloxicam] Other (See Comments)     Not sure reaction    Perfume Other (See Comments)     And flowers cause allergy and makes her cough.    Sulfur Other (See Comments)     Was told not to take as a child       Vitals:       Every shift (Skilled Nursing  patients)    Diet: Cardiac diet. Dysphagia mechanical soft (IDDSI level 5). thn liquids      Acitivities:     - Up in a chair each morning as tolerated      LABS:  CBC, CMP, Mg, Phos twice weekly for two weeks.     Nursing Precautions: - Aspiration precautions:             - Total assistance with meals            -  Upright 90 degrees befor during and after meals             -  Suction at bedside          - Fall precautions per nursing home protocol    - Decubitus precautions:        -  for positioning   - Pressure reducing foam mattress   - Turn patient every two hours. Use wedge pillows to anchor patient    CONSULTS:   Physical Therapy to evaluate and treat     Occupational Therapy to evaluate and treat     Speech Therapy  to evaluate and treat       MISCELLANEOUS CARE:      Routine Skin for Bedridden Patients:  Apply moisture barrier cream to all    skin folds and wet areas in perineal area daily and after baths and                           all bowel movements.          Medications: Discontinue all previous medication orders, if any. See new list below.        Medication List      START taking these medications    polyethylene glycol 17 gram Pwpk  Commonly known as: GLYCOLAX  Take 17 g by mouth once daily.  Start taking on: March 18, 2022        CHANGE how you take these medications    albuterol 90 mcg/actuation inhaler  Commonly known as: PROVENTIL/VENTOLIN HFA  Inhale 2 puffs into the lungs every 6 (six) hours as needed for Wheezing.  What changed: Another medication with the same name was removed. Continue taking this medication, and follow the directions you see here.     furosemide 20 MG tablet  Commonly known as: LASIX  Daily  What changed:   · medication strength  · additional instructions     hydrALAZINE 100 MG tablet  Commonly known as: APRESOLINE  Take 1 tablet (100 mg total) by mouth Daily. (Previously prescribed as TID but patient only takes daily)  What changed:   · when to take  this  · additional instructions     hydrOXYzine HCL 10 MG Tab  Commonly known as: ATARAX  Take 1 tablet (10 mg total) by mouth 3 (three) times daily as needed (itching or anxiety).  What changed:   · when to take this  · reasons to take this        CONTINUE taking these medications    acetaminophen 650 MG Tbsr  Commonly known as: TYLENOL  Take 650 mg by mouth every 8 (eight) hours as needed (pain).     amiodarone 200 MG Tab  Commonly known as: PACERONE  Take 1 tablet (200 mg total) by mouth once daily. Need office visit before next refill, last seen 12/2020. This will be the LAST Rx if visit is not made.     apixaban 5 mg Tab  Commonly known as: ELIQUIS  Take 1 tablet (5 mg total) by mouth 2 (two) times daily. Need office visit before next refill, last seen 12/2020. This will be the LAST Rx if visit is not made.     aspirin 81 MG EC tablet  Commonly known as: ECOTRIN  Take 81 mg by mouth once daily.     carvediloL 6.25 MG tablet  Commonly known as: COREG  Take 1 tablet (6.25 mg total) by mouth 2 (two) times daily with meals. Need office visit before next refill, last seen 12/2021. This will be the LAST Rx if visit is not made.     lisinopriL 20 MG tablet  Commonly known as: PRINIVIL,ZESTRIL  Take 2 tablets (40 mg total) by mouth once daily.     LORazepam 1 MG tablet  Commonly known as: ATIVAN  Take 1 tablet (1 mg total) by mouth every 6 (six) hours as needed for Anxiety.     montelukast 10 mg tablet  Commonly known as: SINGULAIR  Take 1 tablet (10 mg total) by mouth every evening.     ondansetron 4 MG Tbdl  Commonly known as: ZOFRAN-ODT  Take 1 tablet (4 mg total) by mouth every 8 (eight) hours as needed (nausea).     SYNTHROID 125 MCG tablet  Generic drug: levothyroxine  TAKE ONE TABLET BY MOUTH once a day        STOP taking these medications    gabapentin 100 MG capsule  Commonly known as: NEURONTIN     nitroGLYCERIN 0.4 MG SL tablet  Commonly known as: NITROSTAT     potassium chloride SA 20 MEQ tablet  Commonly  known as: RICK ALSTON                  _________________________________  Tsering Peace MD  03/17/2022

## 2022-03-17 NOTE — PLAN OF CARE
Problem: Adult Inpatient Plan of Care  Goal: Optimal Comfort and Wellbeing  Outcome: Ongoing, Progressing     Problem: Renal Function Impairment (Acute Kidney Injury/Impairment)  Goal: Effective Renal Function  Outcome: Ongoing, Progressing

## 2022-03-17 NOTE — PLAN OF CARE
Problem: Adult Inpatient Plan of Care  Goal: Plan of Care Review  Outcome: Met  Goal: Patient-Specific Goal (Individualized)  Outcome: Met  Goal: Absence of Hospital-Acquired Illness or Injury  Outcome: Met  Goal: Optimal Comfort and Wellbeing  Outcome: Met  Goal: Readiness for Transition of Care  Outcome: Met     Problem: Fluid and Electrolyte Imbalance (Acute Kidney Injury/Impairment)  Goal: Fluid and Electrolyte Balance  Outcome: Met     Problem: Oral Intake Inadequate (Acute Kidney Injury/Impairment)  Goal: Optimal Nutrition Intake  Outcome: Met     Problem: Renal Function Impairment (Acute Kidney Injury/Impairment)  Goal: Effective Renal Function  Outcome: Met     Problem: Fluid Imbalance (Pneumonia)  Goal: Fluid Balance  Outcome: Met     Problem: Infection (Pneumonia)  Goal: Resolution of Infection Signs and Symptoms  Outcome: Met     Problem: Respiratory Compromise (Pneumonia)  Goal: Effective Oxygenation and Ventilation  Outcome: Met     Problem: Infection  Goal: Absence of Infection Signs and Symptoms  Outcome: Met     Problem: Impaired Wound Healing  Goal: Optimal Wound Healing  Outcome: Met     Problem: Skin Injury Risk Increased  Goal: Skin Health and Integrity  Outcome: Met     Problem: Fall Injury Risk  Goal: Absence of Fall and Fall-Related Injury  Outcome: Met     Problem: Suicide Risk  Goal: Absence of Self-Harm  Outcome: Met

## 2022-03-18 ENCOUNTER — TELEPHONE (OUTPATIENT)
Dept: MEDSURG UNIT | Facility: HOSPITAL | Age: 82
End: 2022-03-18
Payer: MEDICARE

## 2022-04-07 PROCEDURE — G0180 MD CERTIFICATION HHA PATIENT: HCPCS | Mod: ,,, | Performed by: FAMILY MEDICINE

## 2022-04-07 PROCEDURE — G0180 PR HOME HEALTH MD CERTIFICATION: ICD-10-PCS | Mod: ,,, | Performed by: FAMILY MEDICINE

## 2022-04-08 ENCOUNTER — PATIENT MESSAGE (OUTPATIENT)
Dept: SPORTS MEDICINE | Facility: CLINIC | Age: 82
End: 2022-04-08
Payer: MEDICARE

## 2022-04-08 NOTE — TELEPHONE ENCOUNTER
Spoke with pt's daughter Mrs. Miller via phone. Scheduled per request for 4/27/2022. Advised that pt come 15 mins early for xray before visit. All understanding.

## 2022-04-12 ENCOUNTER — PATIENT MESSAGE (OUTPATIENT)
Dept: FAMILY MEDICINE | Facility: CLINIC | Age: 82
End: 2022-04-12
Payer: MEDICARE

## 2022-04-13 ENCOUNTER — TELEPHONE (OUTPATIENT)
Dept: FAMILY MEDICINE | Facility: CLINIC | Age: 82
End: 2022-04-13

## 2022-04-13 ENCOUNTER — OFFICE VISIT (OUTPATIENT)
Dept: FAMILY MEDICINE | Facility: CLINIC | Age: 82
End: 2022-04-13
Payer: MEDICARE

## 2022-04-13 DIAGNOSIS — R79.89 ELEVATED BRAIN NATRIURETIC PEPTIDE (BNP) LEVEL: ICD-10-CM

## 2022-04-13 DIAGNOSIS — I87.2 VENOUS STASIS DERMATITIS OF RIGHT LOWER EXTREMITY: ICD-10-CM

## 2022-04-13 DIAGNOSIS — I48.91 ATRIAL FIBRILLATION WITH RVR: ICD-10-CM

## 2022-04-13 DIAGNOSIS — N17.9 ACUTE KIDNEY INJURY: Primary | ICD-10-CM

## 2022-04-13 PROCEDURE — 99499 UNLISTED E&M SERVICE: CPT | Mod: 95,,, | Performed by: FAMILY MEDICINE

## 2022-04-13 PROCEDURE — 99214 OFFICE O/P EST MOD 30 MIN: CPT | Mod: 95,,, | Performed by: FAMILY MEDICINE

## 2022-04-13 PROCEDURE — 1160F RVW MEDS BY RX/DR IN RCRD: CPT | Mod: CPTII,95,, | Performed by: FAMILY MEDICINE

## 2022-04-13 PROCEDURE — 1159F PR MEDICATION LIST DOCUMENTED IN MEDICAL RECORD: ICD-10-PCS | Mod: CPTII,95,, | Performed by: FAMILY MEDICINE

## 2022-04-13 PROCEDURE — 1160F PR REVIEW ALL MEDS BY PRESCRIBER/CLIN PHARMACIST DOCUMENTED: ICD-10-PCS | Mod: CPTII,95,, | Performed by: FAMILY MEDICINE

## 2022-04-13 PROCEDURE — 99214 PR OFFICE/OUTPT VISIT, EST, LEVL IV, 30-39 MIN: ICD-10-PCS | Mod: 95,,, | Performed by: FAMILY MEDICINE

## 2022-04-13 PROCEDURE — 1157F PR ADVANCE CARE PLAN OR EQUIV PRESENT IN MEDICAL RECORD: ICD-10-PCS | Mod: CPTII,95,, | Performed by: FAMILY MEDICINE

## 2022-04-13 PROCEDURE — 99499 RISK ADDL DX/OHS AUDIT: ICD-10-PCS | Mod: 95,,, | Performed by: FAMILY MEDICINE

## 2022-04-13 PROCEDURE — 1111F DSCHRG MED/CURRENT MED MERGE: CPT | Mod: CPTII,95,, | Performed by: FAMILY MEDICINE

## 2022-04-13 PROCEDURE — 1157F ADVNC CARE PLAN IN RCRD: CPT | Mod: CPTII,95,, | Performed by: FAMILY MEDICINE

## 2022-04-13 PROCEDURE — 1159F MED LIST DOCD IN RCRD: CPT | Mod: CPTII,95,, | Performed by: FAMILY MEDICINE

## 2022-04-13 PROCEDURE — 1111F PR DISCHARGE MEDS RECONCILED W/ CURRENT OUTPATIENT MED LIST: ICD-10-PCS | Mod: CPTII,95,, | Performed by: FAMILY MEDICINE

## 2022-04-13 RX ORDER — AMIODARONE HYDROCHLORIDE 200 MG/1
200 TABLET ORAL DAILY
Qty: 90 TABLET | Refills: 3 | Status: SHIPPED | OUTPATIENT
Start: 2022-04-13 | End: 2022-07-12

## 2022-04-13 RX ORDER — FUROSEMIDE 20 MG/1
20 TABLET ORAL DAILY
Qty: 90 TABLET | Refills: 3 | Status: SHIPPED | OUTPATIENT
Start: 2022-04-13

## 2022-04-13 NOTE — PROGRESS NOTES
The patient location is: [place], [place]  The chief complaint leading to consultation is:     Visit type: audiovisual    Face to Face time with patient: 10 minutes  15 minutes of total time spent on the encounter, which includes face to face time and non-face to face time preparing to see the patient (eg, review of tests), Obtaining and/or reviewing separately obtained history, Documenting clinical information in the electronic or other health record, Independently interpreting results (not separately reported) and communicating results to the patient/family/caregiver, or Care coordination (not separately reported).         Each patient to whom he or she provides medical services by telemedicine is:  (1) informed of the relationship between the physician and patient and the respective role of any other health care provider with respect to management of the patient; and (2) notified that he or she may decline to receive medical services by telemedicine and may withdraw from such care at any time.    Notes:  Subjective:       Patient ID: Heather Hughes is a 81 y.o. female.    Chief Complaint: No chief complaint on file.      Fell on floor fractured foot and tore all the ligaments in her ankle. This happened March 11th. No surgery was necessary. Went to rehab. They were not really working with her and now they want home health and PT. She has been home x 1 week.     Since she's been home things have been going well. She has been feeling better and moving better. Her daughter does not want her to get up without help. PT needs two people to do PT.     This is her follow up.     She had her lasix reduced to 20mg which is working well for her.     Needs renewal amio.     Review of Systems   Constitutional: Positive for activity change. Negative for unexpected weight change.   HENT: Positive for trouble swallowing. Negative for hearing loss and rhinorrhea.    Eyes: Negative for discharge and visual disturbance.    Respiratory: Positive for wheezing. Negative for chest tightness.    Cardiovascular: Negative for chest pain and palpitations.   Gastrointestinal: Negative for blood in stool, constipation, diarrhea and vomiting.   Endocrine: Negative for polydipsia and polyuria.   Genitourinary: Negative for difficulty urinating, dysuria, hematuria and menstrual problem.   Musculoskeletal: Positive for arthralgias and joint swelling. Negative for neck pain.   Neurological: Negative for weakness and headaches.   Psychiatric/Behavioral: Negative for confusion and dysphoric mood.         Objective:      Physical Exam  Constitutional:       General: She is not in acute distress.     Appearance: Normal appearance. She is not ill-appearing.   Pulmonary:      Effort: Pulmonary effort is normal. No respiratory distress.   Musculoskeletal:      Comments: Right knee bruised  Left leg in brace   Neurological:      Mental Status: She is alert.   Psychiatric:         Mood and Affect: Mood normal.         Behavior: Behavior normal.         Thought Content: Thought content normal.         Judgment: Judgment normal.         Assessment:       1. Acute kidney injury    2. Elevated brain natriuretic peptide (BNP) level    3. Venous stasis dermatitis of right lower extremity    4. Atrial fibrillation with RVR        Plan:       Problem List Items Addressed This Visit        Cardiac/Vascular    Atrial fibrillation with RVR    Relevant Medications    amiodarone (PACERONE) 200 MG Tab    Venous stasis dermatitis of right lower extremity    Relevant Medications    furosemide (LASIX) 20 MG tablet    Elevated brain natriuretic peptide (BNP) level    Relevant Medications    furosemide (LASIX) 20 MG tablet      Other Visit Diagnoses     Acute kidney injury    -  Primary    Relevant Orders    Basic Metabolic Panel

## 2022-04-26 DIAGNOSIS — S82.55XA CLOSED NONDISPLACED FRACTURE OF MEDIAL MALLEOLUS OF LEFT TIBIA, INITIAL ENCOUNTER: Primary | ICD-10-CM

## 2022-04-27 ENCOUNTER — HOSPITAL ENCOUNTER (OUTPATIENT)
Dept: RADIOLOGY | Facility: CLINIC | Age: 82
Discharge: HOME | End: 2022-04-27
Attending: FAMILY MEDICINE
Payer: MEDICARE

## 2022-04-27 ENCOUNTER — OFFICE VISIT (OUTPATIENT)
Dept: SPORTS MEDICINE | Facility: CLINIC | Age: 82
End: 2022-04-27
Payer: MEDICARE

## 2022-04-27 ENCOUNTER — TELEPHONE (OUTPATIENT)
Dept: FAMILY MEDICINE | Facility: CLINIC | Age: 82
End: 2022-04-27
Payer: MEDICARE

## 2022-04-27 VITALS — HEIGHT: 63 IN | OXYGEN SATURATION: 91 % | BODY MASS INDEX: 38.98 KG/M2 | HEART RATE: 63 BPM | WEIGHT: 220 LBS

## 2022-04-27 DIAGNOSIS — M25.561 ACUTE PAIN OF RIGHT KNEE: Primary | ICD-10-CM

## 2022-04-27 DIAGNOSIS — M25.561 ACUTE PAIN OF RIGHT KNEE: ICD-10-CM

## 2022-04-27 DIAGNOSIS — S82.435A CLOSED NONDISPLACED OBLIQUE FRACTURE OF SHAFT OF LEFT FIBULA, INITIAL ENCOUNTER: ICD-10-CM

## 2022-04-27 DIAGNOSIS — S82.832A OTHER CLOSED FRACTURE OF DISTAL END OF LEFT FIBULA, INITIAL ENCOUNTER: ICD-10-CM

## 2022-04-27 DIAGNOSIS — S82.55XA CLOSED NONDISPLACED FRACTURE OF MEDIAL MALLEOLUS OF LEFT TIBIA, INITIAL ENCOUNTER: ICD-10-CM

## 2022-04-27 DIAGNOSIS — M80.00XA AGE-RELATED OSTEOPOROSIS WITH CURRENT PATHOLOGICAL FRACTURE, INITIAL ENCOUNTER: ICD-10-CM

## 2022-04-27 DIAGNOSIS — S82.55XD CLOSED NONDISPLACED FRACTURE OF MEDIAL MALLEOLUS OF LEFT TIBIA WITH ROUTINE HEALING, SUBSEQUENT ENCOUNTER: Primary | ICD-10-CM

## 2022-04-27 PROCEDURE — 73610 X-RAY EXAM OF ANKLE: CPT | Mod: TC,FY,PO,LT

## 2022-04-27 PROCEDURE — 1101F PT FALLS ASSESS-DOCD LE1/YR: CPT | Mod: CPTII,S$GLB,, | Performed by: FAMILY MEDICINE

## 2022-04-27 PROCEDURE — 73564 X-RAY EXAM KNEE 4 OR MORE: CPT | Mod: 26,RT,S$GLB, | Performed by: RADIOLOGY

## 2022-04-27 PROCEDURE — 3288F PR FALLS RISK ASSESSMENT DOCUMENTED: ICD-10-PCS | Mod: CPTII,S$GLB,, | Performed by: FAMILY MEDICINE

## 2022-04-27 PROCEDURE — 73562 XR KNEE ORTHO RIGHT WITH FLEXION: ICD-10-PCS | Mod: 26,LT,S$GLB, | Performed by: RADIOLOGY

## 2022-04-27 PROCEDURE — 1159F MED LIST DOCD IN RCRD: CPT | Mod: CPTII,S$GLB,, | Performed by: FAMILY MEDICINE

## 2022-04-27 PROCEDURE — 73610 X-RAY EXAM OF ANKLE: CPT | Mod: 26,LT,S$GLB, | Performed by: RADIOLOGY

## 2022-04-27 PROCEDURE — 1157F PR ADVANCE CARE PLAN OR EQUIV PRESENT IN MEDICAL RECORD: ICD-10-PCS | Mod: CPTII,S$GLB,, | Performed by: FAMILY MEDICINE

## 2022-04-27 PROCEDURE — 1125F AMNT PAIN NOTED PAIN PRSNT: CPT | Mod: CPTII,S$GLB,, | Performed by: FAMILY MEDICINE

## 2022-04-27 PROCEDURE — 73562 X-RAY EXAM OF KNEE 3: CPT | Mod: 26,LT,S$GLB, | Performed by: RADIOLOGY

## 2022-04-27 PROCEDURE — 1157F ADVNC CARE PLAN IN RCRD: CPT | Mod: CPTII,S$GLB,, | Performed by: FAMILY MEDICINE

## 2022-04-27 PROCEDURE — 3288F FALL RISK ASSESSMENT DOCD: CPT | Mod: CPTII,S$GLB,, | Performed by: FAMILY MEDICINE

## 2022-04-27 PROCEDURE — 99999 PR PBB SHADOW E&M-EST. PATIENT-LVL IV: ICD-10-PCS | Mod: PBBFAC,,, | Performed by: FAMILY MEDICINE

## 2022-04-27 PROCEDURE — 99214 OFFICE O/P EST MOD 30 MIN: CPT | Mod: 57,S$GLB,, | Performed by: FAMILY MEDICINE

## 2022-04-27 PROCEDURE — 73564 XR KNEE ORTHO RIGHT WITH FLEXION: ICD-10-PCS | Mod: 26,RT,S$GLB, | Performed by: RADIOLOGY

## 2022-04-27 PROCEDURE — 73562 X-RAY EXAM OF KNEE 3: CPT | Mod: TC,FY,PO,LT

## 2022-04-27 PROCEDURE — 99999 PR PBB SHADOW E&M-EST. PATIENT-LVL IV: CPT | Mod: PBBFAC,,, | Performed by: FAMILY MEDICINE

## 2022-04-27 PROCEDURE — 1101F PR PT FALLS ASSESS DOC 0-1 FALLS W/OUT INJ PAST YR: ICD-10-PCS | Mod: CPTII,S$GLB,, | Performed by: FAMILY MEDICINE

## 2022-04-27 PROCEDURE — 1159F PR MEDICATION LIST DOCUMENTED IN MEDICAL RECORD: ICD-10-PCS | Mod: CPTII,S$GLB,, | Performed by: FAMILY MEDICINE

## 2022-04-27 PROCEDURE — 27780 PR CLOSED RX PROX/SHAFT FIBULA FX: ICD-10-PCS | Mod: LT,S$GLB,, | Performed by: FAMILY MEDICINE

## 2022-04-27 PROCEDURE — 73610 XR ANKLE COMPLETE 3 VIEW LEFT: ICD-10-PCS | Mod: 26,LT,S$GLB, | Performed by: RADIOLOGY

## 2022-04-27 PROCEDURE — 27780 TREATMENT OF FIBULA FRACTURE: CPT | Mod: LT,S$GLB,, | Performed by: FAMILY MEDICINE

## 2022-04-27 PROCEDURE — 1160F RVW MEDS BY RX/DR IN RCRD: CPT | Mod: CPTII,S$GLB,, | Performed by: FAMILY MEDICINE

## 2022-04-27 PROCEDURE — 99214 PR OFFICE/OUTPT VISIT, EST, LEVL IV, 30-39 MIN: ICD-10-PCS | Mod: 57,S$GLB,, | Performed by: FAMILY MEDICINE

## 2022-04-27 PROCEDURE — 1125F PR PAIN SEVERITY QUANTIFIED, PAIN PRESENT: ICD-10-PCS | Mod: CPTII,S$GLB,, | Performed by: FAMILY MEDICINE

## 2022-04-27 PROCEDURE — 1160F PR REVIEW ALL MEDS BY PRESCRIBER/CLIN PHARMACIST DOCUMENTED: ICD-10-PCS | Mod: CPTII,S$GLB,, | Performed by: FAMILY MEDICINE

## 2022-04-27 RX ORDER — ERGOCALCIFEROL 1.25 MG/1
50000 CAPSULE ORAL
Qty: 12 CAPSULE | Refills: 0 | Status: SHIPPED | OUTPATIENT
Start: 2022-04-27 | End: 2022-07-14

## 2022-04-28 NOTE — PROGRESS NOTES
Subjective:          Chief Complaint: Heather Hughes is a 81 y.o. female who had no chief complaint listed for this encounter.    Patient is an 81-year-old female here today for follow-up of left medial malleolus fracture.  Sustained during a fall at home.  She is made to the hospital following her fall for additional concerns.  Orthopedics was consulted, Dr. Frances and dru to manage the fracture.  It appeared to be a minimal medial malleolus avulsion fracture so she was placed in a walking boot and given outpatient follow-up.  She has remained compliant in the boot with help from her daughter who acts as her caregiver.  However she states she is having additional pain in her left shin as well.  she does not report any additional trauma since leaving the hospital.  She was having home health PT but it was stops due to her being in too much pain to complete any of the exercises.  She still has home health OT.      Review of Systems   Constitutional: Negative for chills and decreased appetite.   HENT: Negative for congestion and sore throat.    Eyes: Negative for blurred vision.   Cardiovascular: Negative for chest pain, dyspnea on exertion and palpitations.   Respiratory: Negative for cough and shortness of breath.    Skin: Negative for rash.   Neurological: Negative for difficulty with concentration, disturbances in coordination and headaches.   Psychiatric/Behavioral: Negative for altered mental status, depression, hallucinations, memory loss and suicidal ideas.                   Objective:        General: Heather is well-developed, well-nourished, appears stated age, in no acute distress, alert and oriented to time, place and person.     General    Nursing note and vitals reviewed.  Constitutional: She is oriented to person, place, and time. She appears well-developed and well-nourished.   HENT:   Nose: Nose normal.   Eyes: EOM are normal. Pupils are equal, round, and reactive to light.   Neck: Neck  supple.   Cardiovascular: Normal rate.    Pulmonary/Chest: Effort normal.   Abdominal: Soft.   Neurological: She is alert and oriented to person, place, and time. She has normal reflexes.   Psychiatric: She has a normal mood and affect. Her behavior is normal. Judgment and thought content normal.         Left Ankle/Foot Exam     Pain   The patient exhibits pain of the lateral malleolus.    Tenderness   The patient is tender to palpation of the lateral malleolus.    Other   Sensation: normal    Comments:  LLE in tall walking boot      Left Knee Exam     Tenderness   Left knee tenderness location: mid shin - lateral.    Vascular Exam       Left Pulses    Posterior Tibial:      2+          X-ray images ordered obtained interpreted by me.  They show apparent resolution of the left medial malleolar fracture.  However there is a left distal fibular fracture appears subacute nature on the same view.  X-ray of the knee reveals a left fibular shaft fracture of the proximal 1/3 of the fibula.  This also appears subacute with evidence of hematoma formation already begun.            Assessment:       Encounter Diagnoses   Name Primary?    Closed nondisplaced fracture of medial malleolus of left tibia with routine healing, subsequent encounter Yes    Closed nondisplaced oblique fracture of shaft of left fibula, initial encounter     Other closed fracture of distal end of left fibula, initial encounter     Age-related osteoporosis with current pathological fracture, initial encounter           Plan:           She has new fractures on x-ray today when compared to x-rays from the hospital.  With no known trauma he is a likely pathological fractures from secondary osteoporosis.  They also could have been occult fractures that were not yet a visible on hospital x-rays.  Given patient's condition and taking an to account her level of comfort and not believe a long leg cast to be appropriate here.  Will keep her in long boot and  make her nonweightbearing.      will have her follow up in two weeks with repeat x-rays.  She was unable to tolerate alendronate in the past so as an alternative will prescribe her high-dose vitamin-D.          Patient questionnaires may have been collected.

## 2022-05-04 ENCOUNTER — PATIENT MESSAGE (OUTPATIENT)
Dept: FAMILY MEDICINE | Facility: CLINIC | Age: 82
End: 2022-05-04
Payer: MEDICARE

## 2022-05-12 ENCOUNTER — EXTERNAL HOME HEALTH (OUTPATIENT)
Dept: HOME HEALTH SERVICES | Facility: HOSPITAL | Age: 82
End: 2022-05-12
Payer: MEDICARE

## 2022-05-13 ENCOUNTER — DOCUMENT SCAN (OUTPATIENT)
Dept: HOME HEALTH SERVICES | Facility: HOSPITAL | Age: 82
End: 2022-05-13
Payer: MEDICARE

## 2022-05-25 DIAGNOSIS — Z78.0 ASYMPTOMATIC MENOPAUSAL STATE: ICD-10-CM

## 2022-05-26 ENCOUNTER — DOCUMENT SCAN (OUTPATIENT)
Dept: HOME HEALTH SERVICES | Facility: HOSPITAL | Age: 82
End: 2022-05-26
Payer: MEDICARE

## 2022-05-26 ENCOUNTER — PATIENT OUTREACH (OUTPATIENT)
Dept: ADMINISTRATIVE | Facility: HOSPITAL | Age: 82
End: 2022-05-26
Payer: MEDICARE

## 2022-05-30 ENCOUNTER — PATIENT MESSAGE (OUTPATIENT)
Dept: ADMINISTRATIVE | Facility: HOSPITAL | Age: 82
End: 2022-05-30
Payer: MEDICARE

## 2022-06-03 ENCOUNTER — DOCUMENT SCAN (OUTPATIENT)
Dept: HOME HEALTH SERVICES | Facility: HOSPITAL | Age: 82
End: 2022-06-03
Payer: MEDICARE

## 2022-08-24 ENCOUNTER — DOCUMENT SCAN (OUTPATIENT)
Dept: HOME HEALTH SERVICES | Facility: HOSPITAL | Age: 82
End: 2022-08-24
Payer: MEDICARE

## 2023-03-24 NOTE — HOSPITAL COURSE
Patient was started on IV antibiotics for possible pneumonia and got IV fluids due to the suspicion for hypovolemia causing STACIE. Symptoms improved with treatment. Orthopedics assessed the patient and recommended conservative management for the medial malleolus fx. She was given fracture boots and will be taught by PT on how to use at home.   [de-identified] : Sept 21 2022: Left sided popliteal and down thrombus (no femoral involvement) \par \par [id number] 05:44PM \par DOPPLER VENOUS LEFT LOWER EXTREMITY \par DOPPLER VENOUS LEFT LOWER EXTREMITY: EXAM: DOPPLER VENOUS LEFT LOWER EXTREMITY\par \par HISTORY: M79.662 Left lower leg pain\par \par \par COMPARISON: None.\par \par FINDINGS:\par Duplex and color-flow Doppler imaging of the lower extremity venous system was\par performed. There is thrombus noted in the distal femoral vein, popliteal vein,\par proximal posterior tibial vein and left gastrocnemius vein. The remainder of the\par left lower extremity deep venous system is patent.\par \par There is no Baker's cyst.\par \par IMPRESSION:\par Positive deep venous thrombosis.\par \par Signed by: Cassy Edwards MD\par  Signed Date: 7/21/2022 5:51 PM EDT

## 2023-05-02 NOTE — TELEPHONE ENCOUNTER
Goal Outcome Evaluation:              Outcome Evaluation: VSS.  O2 2L NC.  Room air during the day. A&O x4.  Antibiotic infused.  No complaints of pain.  Patient urinating dark red bloody urine. Bladder scan and ISC completed at 0230.  Urology consulted.  PPM with occasional ventricular paced beats and occasional PVCs.  Will continue to follow plan of care.   Patient stated after that visit she went home and found the rx for oxybutynin Er 5mg. She decided to take it. It helped some. So she called the pharmacy to fill the refill on the bottle. She wants to know if she can take it bid.     She was seen recently by Dr Varma that stated she is low on her vitamin D. She was advised to take otc vitamin D. Wants to know if she should get Rx.
